# Patient Record
Sex: MALE | Race: WHITE | NOT HISPANIC OR LATINO | Employment: OTHER | URBAN - METROPOLITAN AREA
[De-identification: names, ages, dates, MRNs, and addresses within clinical notes are randomized per-mention and may not be internally consistent; named-entity substitution may affect disease eponyms.]

---

## 2017-01-04 ENCOUNTER — TELEPHONE (OUTPATIENT)
Dept: TRANSPLANT | Facility: CLINIC | Age: 22
End: 2017-01-04

## 2017-01-04 NOTE — TELEPHONE ENCOUNTER
Issue Prograf tacrolimus level 12.3  Above goal level --creatinine above goal level   Plan confirm taking Prograf tacrolimus 1.5 mg twice per day   Confirm 12 hour Prograf tacrolimus level   Repeat transplant labs in one week -- with BK PCR QT /Urine protein

## 2017-01-04 NOTE — Clinical Note
OUTPATIENT LABORATORY TEST ORDER  Faxed to [facility] at [phone]    Patient Name: Jhoan Hoffman                   Transplant Date: April 28, 2012  YOB: 1995                                   Issue Date & Time:  1/4/2017  12:03 PM  [facility] MR: [id number]                               Exp. Date (1 year after date issued)    Diagnoses: Kidney Transplant (ICD-10  Z94.0)   Long term use of medications (ICD-10  Z79.899)     Lab results to be available on the same day drawn.   Patient should release information to the [facility], Transplant Center.  Please fax to the Transplant Center at [phone].    Every Month  ?CBC and Platelet  ?Basic Metabolic Panel (Sodium, Potassium, Chloride, CO2, Creatinine, Urea Nitrogen, Glucose, Calcium)  ?/Tacrolimus/Prograf drug level         ?BK (Polyoma Virus) PCR Quantitative - Plasma           Every 6 Months       Due: October and April                                          ?Urine for protein/creatinine             Every Year     Due: April                          ?ALA/PRA/DSA panel.                   Draw 20mL blood in red top tube and send to [facility].  If also sending an immunosuppressive drug level, ALA/PRA/DSA may be sent in same .  Please include completed instruction sheet and label tube with 2 identifiers.      If you have any questions, please call The Transplant [facility] at [phone] or [phone].    Please fax labs to [phone]  .

## 2017-01-10 ENCOUNTER — TELEPHONE (OUTPATIENT)
Dept: TRANSPLANT | Facility: CLINIC | Age: 22
End: 2017-01-10

## 2017-01-10 NOTE — Clinical Note
January 10, 2017    PHYSICIAN ORDERS      DATE & TIME ISSUED: January 10, 2017 2:58 PM  PATIENT NAME: Jhoan Hoffman   : 1995     [facility] MR# [if applicable]: [id number]     DIAGNOSIS:  Kidney Transplant  ICD-10 CODE: Z94.0     Please repeat the following labs in 1 week:  BMP  CBC  Tacrolimus level  BK virus    Any questions please call: [phone]    Please fax these results to [phone].    .

## 2017-01-10 NOTE — TELEPHONE ENCOUNTER
Issue Prograf tacrolimus level 12.6  And creatinine increased   Please call Jhoan Hoffman and mom repeat transplant  Labs in one week with BK virus

## 2017-01-10 NOTE — TELEPHONE ENCOUNTER
Spoke to patient and patients Mom regarding tac and creatinine levels increased.  Patient verbalizes understanding of repeat transplant levels in one week.  Updated current lab orders and faxed to lab.

## 2017-02-06 DIAGNOSIS — Z48.298 AFTERCARE FOLLOWING ORGAN TRANSPLANT: ICD-10-CM

## 2017-02-06 DIAGNOSIS — Z94.0 KIDNEY REPLACED BY TRANSPLANT: ICD-10-CM

## 2017-02-06 DIAGNOSIS — Z79.899 ENCOUNTER FOR LONG-TERM CURRENT USE OF MEDICATION: ICD-10-CM

## 2017-02-07 ENCOUNTER — TELEPHONE (OUTPATIENT)
Dept: NEPHROLOGY | Facility: CLINIC | Age: 22
End: 2017-02-07

## 2017-02-07 ENCOUNTER — HOSPITAL ENCOUNTER (OUTPATIENT)
Facility: CLINIC | Age: 22
Setting detail: SPECIMEN
Discharge: HOME | End: 2017-02-07
Admitting: INTERNAL MEDICINE
Payer: MEDICAID

## 2017-02-07 PROCEDURE — 80197 ASSAY OF TACROLIMUS: CPT | Performed by: INTERNAL MEDICINE

## 2017-02-07 NOTE — TELEPHONE ENCOUNTER
Venkat, this is Robbi's office from Medicine Specialty on the 3rd floor at [facility] located at [address] [place] [address]. We are reminding you of your upcoming Nephrology appointment on 2/20/2017 at 1:30 pm. Please arrive 30 minutes prior to your appointment time for check in. Also, please bring an updated medication list including dose of prescribed and over the counter medications you are currently taking or your labeled medication bottles with you to your appointment. If you have any questions or would like to cancel or reschedule your appointment, please call us at [phone].     If you are having labs draw same day you need a lab appointment scheduled 1 hour prior to your visit.    You are welcome to have your labs done 2-7 days before your appointment at any [facility] or [facility] facility. If you have your labs completed before your appointment, please still come 30 minutes early for check-in.     Thank-You for allowing us to be a member of your Health Care Team,     Henrietta MERCHANT CMA

## 2017-02-08 LAB
TACROLIMUS BLD-MCNC: 7.5 UG/L (ref 5–15)
TME LAST DOSE: NORMAL H

## 2017-02-09 ENCOUNTER — TELEPHONE (OUTPATIENT)
Dept: TRANSPLANT | Facility: CLINIC | Age: 22
End: 2017-02-09

## 2017-02-09 NOTE — Clinical Note
2017    PHYSICIAN ORDERS      DATE & TIME ISSUED: 2017 3:36 PM  PATIENT NAME: Jhoan Hoffman   : 1995     [facility] MR# [if applicable]: [id number]     DIAGNOSIS:  Kidney Transplant  ICD-10 CODE: Z94.0     Please repeat the following labs in one week:  CBC and Platelet  ?Basic Metabolic Panel (Sodium, Potassium, Chloride, CO2, Creatinine, Urea Nitrogen, Glucose, Calcium)  ?/Tacrolimus/Prograf drug level         ?BK (Polyoma Virus) PCR Quantitative - Plasma    Any questions please call: [phone]    Please fax these results to [phone].    .

## 2017-02-09 NOTE — TELEPHONE ENCOUNTER
Spoke to patients Mom, Yoanna, regarding need to repeat labs in one week.  Yoanna verbalizes understanding.  Lab orders mailed to patient and faxed to lab.

## 2017-02-09 NOTE — TELEPHONE ENCOUNTER
Please call  Jhoan Hoffman and his mother    Needs a complete set of transplant   labs repeated in one week

## 2017-02-17 DIAGNOSIS — Z94.0 KIDNEY REPLACED BY TRANSPLANT: ICD-10-CM

## 2017-02-17 DIAGNOSIS — Z79.899 ENCOUNTER FOR LONG-TERM CURRENT USE OF MEDICATION: ICD-10-CM

## 2017-02-17 DIAGNOSIS — Z48.298 AFTERCARE FOLLOWING ORGAN TRANSPLANT: ICD-10-CM

## 2017-02-17 PROCEDURE — 80197 ASSAY OF TACROLIMUS: CPT | Performed by: INTERNAL MEDICINE

## 2017-02-19 LAB
TACROLIMUS BLD-MCNC: 8.4 UG/L (ref 5–15)
TME LAST DOSE: NORMAL H

## 2017-02-20 ENCOUNTER — OFFICE VISIT (OUTPATIENT)
Dept: NEPHROLOGY | Facility: CLINIC | Age: 22
End: 2017-02-20
Attending: INTERNAL MEDICINE
Payer: MEDICAID

## 2017-02-20 VITALS
BODY MASS INDEX: 37.13 KG/M2 | HEIGHT: 62 IN | DIASTOLIC BLOOD PRESSURE: 87 MMHG | SYSTOLIC BLOOD PRESSURE: 148 MMHG | HEART RATE: 96 BPM | TEMPERATURE: 98.2 F | RESPIRATION RATE: 16 BRPM | WEIGHT: 201.8 LBS

## 2017-02-20 DIAGNOSIS — I15.1 HYPERTENSION SECONDARY TO OTHER RENAL DISORDERS: ICD-10-CM

## 2017-02-20 DIAGNOSIS — Z48.298 AFTERCARE FOLLOWING ORGAN TRANSPLANT: ICD-10-CM

## 2017-02-20 DIAGNOSIS — Z94.0 S/P KIDNEY TRANSPLANT: ICD-10-CM

## 2017-02-20 DIAGNOSIS — N25.81 SECONDARY RENAL HYPERPARATHYROIDISM (H): ICD-10-CM

## 2017-02-20 DIAGNOSIS — E55.9 VITAMIN D DEFICIENCY: ICD-10-CM

## 2017-02-20 DIAGNOSIS — Z94.0 KIDNEY REPLACED BY TRANSPLANT: Primary | ICD-10-CM

## 2017-02-20 DIAGNOSIS — D84.9 IMMUNOSUPPRESSED STATUS (H): ICD-10-CM

## 2017-02-20 PROCEDURE — 99212 OFFICE O/P EST SF 10 MIN: CPT | Mod: ZF

## 2017-02-20 RX ORDER — PREDNISONE 5 MG/1
5 TABLET ORAL DAILY
Qty: 90 TABLET | Refills: 3 | Status: SHIPPED | OUTPATIENT
Start: 2017-02-20 | End: 2018-03-12

## 2017-02-20 RX ORDER — MYCOPHENOLATE MOFETIL 250 MG/1
500 CAPSULE ORAL 2 TIMES DAILY
Qty: 120 CAPSULE | Refills: 11 | Status: SHIPPED | OUTPATIENT
Start: 2017-02-20 | End: 2017-08-30

## 2017-02-20 ASSESSMENT — PAIN SCALES - GENERAL: PAINLEVEL: NO PAIN (0)

## 2017-02-20 NOTE — LETTER
2/20/2017       RE: Jhoan Hoffman  [address] [facility] [address]     Dear Colleague,    Thank you for referring your patient, Jhoan Hoffman, to the [facility] NEPHROLOGY at [facility]. Please see a copy of my visit note below.    Assessment and Plan:  1. DDKT - baseline Cr ~ 1.6-1.9, which has been mostly stable outside of slightly increased creatinine at time of recent illness.  Kidney function back to baseline with recent labs.  As previously planned and discussed, recommend decreasing prednisone to 5 mg daily and increasing mycophenolate mofetil to 500 mg bid.  Will continue on tacrolimus at present dose.  2. HTN - fair control above target of less than 140/90 at this clinic visit.  Recommend patient check BP at home.  In addition, would recommend changing off atenolol because of its renal clearance to one of liver metabolism, such as carvedilol or metoprolol.  Would also consider adding ARB if not at goal.  Discussed weight loss, which should help lower blood pressure.  3. Anemia in chronic renal disease - stable Hgb.  Will follow.  4. Secondary renal hyperparathyroidism - mildly increased PTH and will treat vitamin D deficiency first.  Recommend repeating PTH at next clinic visit.  5. Vitamin D deficiency - low vitamin D level when last checked and patient just finished course of ergocalciferol.  Will start cholecalciferol 2000 iu daily now and repeat vitamin D level at next clinic visit.  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  7. Overweight - recommend increased exercise and watch caloric intake for weight loss.  8. Recommend return visit in 6 months.    Assessment and plan was discussed with patient and he voiced his understanding and agreement.    Reason for Visit:  Mr. Hoffman is here for routine follow up.    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          Transplant Hx:       Tx: DDKT  Date: 4/28/12       Present Maintenance IS: Tacrolimus, Mycophenolate mofetil and Prednisone       Baseline Creatinine: 1.6-1.9       Recent DSA: Yes  Date last checked: 9/2016       Biopsy: No    Mr. Hoffman reports feeling good overall with minimal medical complaints.  At his last clinic visit in August, it was discussed with patient about decreasing prednisone to 5 mg daily and increasing mycophenolate mofetil up to 500 mg bid.  Patient wanted to discuss this with his mother and after that discussion, he agree to make the change in about October.  There is documentation with the transplant coordinator about making this immunosuppression change and new prescriptions were sent.  However, patient now states he doesn't remember about this change and is still taking prednisone 10 mg daily and mycophenolate mofetil 250 mg bid.  He is okay with making the change, however.    His energy level is good and remains normal.  He is active and does get some exercise, mostly with walking.  He did have a flu-like virus about 2 weeks ago with a couple of days of GI upset, nausea and diarrhea.  Symptoms have since resolved.  Denies any chest pain or shortness of breath with exertion.  Appetite is good and weight is mostly stable.  No nausea, vomiting or diarrhea now.  No fever, sweats or chills.  No leg swelling.    Home BP: Not checked.      ROS:   A comprehensive review of systems was obtained and negative, except as noted in the HPI or PMH.    Active Medical Problems:  Patient Active Problem List   Diagnosis     Kidney replaced by transplant     Hypertension secondary to other renal disorders     Depression     BK viremia     GERD (gastroesophageal reflux disease)     Secondary renal hyperparathyroidism (H)     Vitamin D deficiency     Immunosuppressed status (H)     Aftercare following organ transplant       Personal Hx:  Social History     Social History     Marital status: Single     Spouse  name: N/A     Number of children: N/A     Years of education: N/A     Occupational History     Not on file.     Social History Main Topics     Smoking status: Never Smoker     Smokeless tobacco: Never Used      Comment: mother smokes outside     Alcohol use No     Drug use: No     Sexual activity: Not on file     Other Topics Concern     Not on file     Social History Narrative       Allergies:  Allergies   Allergen Reactions     Amoxicillin Swelling     Azithromycin      Z pack - can't take with cyclosporine.     Vancomycin      Ruth syndrome     Cefprozil Rash       Medications:  Prior to Admission medications    Medication Sig Start Date End Date Taking? Authorizing Provider   predniSONE (DELTASONE) 5 MG tablet Take 1 tablet (5 mg) by mouth daily 2/20/17  Yes Jose Culp MD   mycophenolate (CELLCEPT - GENERIC EQUIVALENT) 250 MG capsule Take 2 capsules (500 mg) by mouth 2 times daily 2/20/17  Yes Jose Culp MD   cholecalciferol 2000 UNITS CAPS Take 2,000 Int'l Units by mouth daily 2/20/17  Yes Jose Culp MD   amLODIPine (NORVASC) 5 MG tablet Take 1 tablet (5 mg) by mouth daily 8/24/16  Yes Jose Culp MD   tacrolimus (PROGRAF - GENERIC EQUIVALENT) 0.5 MG capsule Take 3 capsules (1.5 mg) by mouth 2 times daily 8/18/16  Yes Jose Culp MD   sulfamethoxazole-trimethoprim (BACTRIM,SEPTRA) 400-80 MG per tablet Take 1 tablet by mouth daily 5/16/16  Yes Jose Culp MD   omeprazole 20 MG tablet Take 1 tablet (20 mg) by mouth every 12 hours 1/29/16  Yes Samara Hauser MD   atenolol (TENORMIN) 25 MG tablet Take 2 tablets (50 mg) by mouth daily 10/31/14  Yes Jan Jones MD   FLUoxetine (PROZAC) 20 MG capsule Take 40 mg by mouth daily  7/23/14  Yes Mitch Anguiano MD   aspirin (ASPIRIN ADULT LOW STRENGTH) 81 MG EC tablet Take 1 tablet by mouth daily. 5/15/12  Yes Garrett Oneil MD   clonazePAM (KLONOPIN) 0.5 MG tablet Take 1 tablet (0.5 mg) by  "mouth 2 times daily as needed for anxiety 7/23/14 8/22/14  Mitch Anguiano MD       Vitals:  /87 (BP Location: Left arm, Patient Position: Chair, Cuff Size: Adult Regular)  Pulse 96  Temp 98.2  F (36.8  C) (Oral)  Resp 16  Ht 1.581 m (5' 2.25\")  Wt 91.5 kg (201 lb 12.8 oz)  BMI 36.61 kg/m2    Exam:   GENERAL APPEARANCE: alert and no distress  HENT: mouth without ulcers or lesions  LYMPHATICS: no cervical or supraclavicular nodes  RESP: lungs clear to auscultation - no rales, rhonchi or wheezes  CV: regular rhythm, normal rate, no rub, no murmur  EDEMA: no LE edema bilaterally  ABDOMEN: soft, nondistended, nontender, bowel sounds normal, overweight  MS: extremities normal - no gross deformities noted, no evidence of inflammation in joints, no muscle tenderness  SKIN: no rash  TX KIDNEY: normal    Results:   Recent Results (from the past 168 hour(s))   Tacrolimus level    Collection Time: 02/17/17  8:45 AM   Result Value Ref Range    Tacrolimus Last Dose 02/16/2017 2200     Tacrolimus Level 8.4 5.0 - 15.0 ug/L   TXP External Lab Result    Collection Time: 02/17/17  8:45 AM   Result Value Ref Range    Glucose (External) 109 70 - 110 MG/DL    Urea Nitrogen (External) 14.0 7 - 20 MG/DL    Creatinine (External) 1.85 (H) 0.70 - 1.30 MG/DL    GFR Estimated (External) 46 (L) >60 ml/min/1.73 m2    GFR Est if Black (External) 56 (L) >60 ml/min/1.73 m2    CO2 (External) 23.0 21 - 32 MMOL/L    Chloride (External) 106 98 - 108 MMOL/L    Sodium (External) 141 136 - 145 MMOL/L    Potassium (External) 4.0 3.5 - 5.1 MMOL/L    Calcium (External) 9.2 8.5 - 10.1 MG/DL    Anion Gap (External) 16.0 10.0 - 20.0    WBC Count (External) 10.1 4.80 - 10.80 K/UL    RBC Count (External) 4.62 (L) 4.70 - 6.10 M/UL    Hemoglobin (External) 13.3 (L) 14.0 - 18.0 GM/DL    Hematocrit (External) 39.4 (L) 42.0 - 52.0 %    MCV (External) 85.3 80.0 - 94.0 FL    MCH (External) 28.8 27.0 - 31.0 PG    MCHC (External) 33.8 31.5 - 36.5 %    RDW (External) " 13.7 11.5 - 14.5 %    Platelet Count (External) 204 150 - 450 K/UL    MPV (External) 11.8 9.0 - 13.0 FL    Nucleated RBCs (External) 0.0 <1 %    BK Virus PCR Quant Result (External) None Detected None Detected           Again, thank you for allowing me to participate in the care of your patient.      Sincerely,    Jose Culp MD

## 2017-02-20 NOTE — MR AVS SNAPSHOT
After Visit Summary   2/20/2017    Jhoan Hoffman    MRN: [id number]           Patient Information     Date Of Birth          1995        Visit Information        Provider Department      2/20/2017 1:30 PM Jose Culp MD [facility] Nephrology        Today's Diagnoses     Kidney replaced by transplant    -  1    S/P kidney transplant        Immunosuppressed status (H)        Aftercare following organ transplant        Hypertension secondary to other renal disorders        Secondary renal hyperparathyroidism (H)        Vitamin D deficiency          Care Instructions    Decrease prednisone to 5 mg daily.  Increase mycophenolate mofetil 500 mg (2 tablets) twice daily.    Because atenolol is excreted by the kidney and thus the dose in the blood stream may vary with changes in kidney function, would recommend changing this to another antihypertensive medication in the same category (beta blockers), such as carvedilol or metoprolol.    Please check and record blood pressure at home.  Goal is consistent sitting blood pressure less than 140/90.  Would check blood pressure after sitting for 3-5 minutes.    In addition, would recommend weight loss with watching caloric intake and increased exercise.        Follow-ups after your visit        Follow-up notes from your care team     Return in about 6 months (around 8/20/2017).      Your next 10 appointments already scheduled     Sep 18, 2017  1:30 PM CDT   (Arrive by 1:00 PM)   Return Kidney Transplant with Jose Culp MD   [facility] Nephrology ([facility])    [address] [place] [id number] [phone]              Who to contact     If you have questions or need follow up information about today's clinic visit or your schedule please contact [facility] NEPHROLOGY directly at [phone].  Normal or non-critical lab and imaging results will be communicated to you by MyChart, letter or  "phone within 4 business days after the clinic has received the results. If you do not hear from us within 7 days, please contact the clinic through [facility] or phone. If you have a critical or abnormal lab result, we will notify you by phone as soon as possible.  Submit refill requests through [facility] or call your pharmacy and they will forward the refill request to us. Please allow 3 business days for your refill to be completed.          Additional Information About Your Visit        [facility] Information     [facility] lets you send messages to your doctor, view your test results, renew your prescriptions, schedule appointments and more. To sign up, go to [email or web address] . Click on \"Log in\" on the left side of the screen, which will take you to the Welcome page. Then click on \"Sign up Now\" on the right side of the page.     You will be asked to enter the access code listed below, as well as some personal information. Please follow the directions to create your username and password.     Your access code is: [email or web address]  Expires: 2017  6:30 AM     Your access code will  in 90 days. If you need help or a new code, please call your [place] clinic or [phone].        Care EveryWhere ID     This is your Care EveryWhere ID. This could be used by other organizations to access your [place] medical records  [id number]        Your Vitals Were     Pulse Temperature Respirations Height BMI (Body Mass Index)       96 98.2  F (36.8  C) (Oral) 16 1.581 m (5' 2.25\") 36.61 kg/m2        Blood Pressure from Last 3 Encounters:   17 148/87   16 126/83   16 133/72    Weight from Last 3 Encounters:   17 91.5 kg (201 lb 12.8 oz)   16 90 kg (198 lb 6.4 oz)   16 94.8 kg (209 lb)              Today, you had the following     No orders found for display         Today's Medication Changes          These changes are accurate as of: 17  1:55 PM.  If you have any questions, ask your " nurse or doctor.               These medicines have changed or have updated prescriptions.        Dose/Directions    predniSONE 5 MG tablet   Commonly known as:  DELTASONE   This may have changed:  medication strength   Used for:  Kidney replaced by transplant   Changed by:  Jose Culp MD        Dose:  5 mg   Take 1 tablet (5 mg) by mouth daily   Quantity:  90 tablet   Refills:  3         Stop taking these medicines if you haven't already. Please contact your care team if you have questions.     vitamin D 88912 UNIT capsule   Commonly known as:  ERGOCALCIFEROL   Stopped by:  Jose Culp MD                Where to get your medicines      These medications were sent to [id number] - [place], [place] - [address] [address]     Phone:  [phone]     mycophenolate 250 MG capsule    predniSONE 5 MG tablet                Primary Care Provider Office Phone # Fax #    Leandro Malagon [phone]       [address] [address]        Thank you!     Thank you for choosing [facility] NEPHROLOGY  for your care. Our goal is always to provide you with excellent care. Hearing back from our patients is one way we can continue to improve our services. Please take a few minutes to complete the written survey that you may receive in the mail after your visit with us. Thank you!             Your Updated Medication List - Protect others around you: Learn how to safely use, store and throw away your medicines at www.disposemymeds.org.          This list is accurate as of: 2/20/17  1:55 PM.  Always use your most recent med list.                   Brand Name Dispense Instructions for use    amLODIPine 5 MG tablet    NORVASC    90 tablet    Take 1 tablet (5 mg) by mouth daily       ASPIRIN ADULT LOW STRENGTH 81 MG EC tablet   Generic drug:  aspirin     90 tablet    Take 1 tablet by mouth daily.       atenolol 25 MG tablet     TENORMIN    60 tablet    Take 2 tablets (50 mg) by mouth daily       cholecalciferol 2000 UNITS Caps     30 capsule    Take 2,000 Int'l Units by mouth daily       clonazePAM 0.5 MG tablet    klonoPIN    20 tablet    Take 1 tablet (0.5 mg) by mouth 2 times daily as needed for anxiety       FLUoxetine 20 MG capsule    PROzac    90 capsule    Take 40 mg by mouth daily       mycophenolate 250 MG capsule    CELLCEPT - GENERIC EQUIVALENT    120 capsule    Take 2 capsules (500 mg) by mouth 2 times daily       omeprazole 20 MG tablet     180 tablet    Take 1 tablet (20 mg) by mouth every 12 hours       predniSONE 5 MG tablet    DELTASONE    90 tablet    Take 1 tablet (5 mg) by mouth daily       sulfamethoxazole-trimethoprim 400-80 MG per tablet    BACTRIM/SEPTRA    30 tablet    Take 1 tablet by mouth daily       tacrolimus 0.5 MG capsule    PROGRAF - GENERIC EQUIVALENT    180 capsule    Take 3 capsules (1.5 mg) by mouth 2 times daily

## 2017-02-20 NOTE — PROGRESS NOTES
Assessment and Plan:  1. DDKT - baseline Cr ~ 1.6-1.9, which has been mostly stable outside of slightly increased creatinine at time of recent illness.  Kidney function back to baseline with recent labs.  As previously planned and discussed, recommend decreasing prednisone to 5 mg daily and increasing mycophenolate mofetil to 500 mg bid.  Will continue on tacrolimus at present dose.  2. HTN - fair control above target of less than 140/90 at this clinic visit.  Recommend patient check BP at home.  In addition, would recommend changing off atenolol because of its renal clearance to one of liver metabolism, such as carvedilol or metoprolol.  Would also consider adding ARB if not at goal.  Discussed weight loss, which should help lower blood pressure.  3. Anemia in chronic renal disease - stable Hgb.  Will follow.  4. Secondary renal hyperparathyroidism - mildly increased PTH and will treat vitamin D deficiency first.  Recommend repeating PTH at next clinic visit.  5. Vitamin D deficiency - low vitamin D level when last checked and patient just finished course of ergocalciferol.  Will start cholecalciferol 2000 iu daily now and repeat vitamin D level at next clinic visit.  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  7. Overweight - recommend increased exercise and watch caloric intake for weight loss.  8. Recommend return visit in 6 months.    Assessment and plan was discussed with patient and he voiced his understanding and agreement.    Reason for Visit:  Mr. Hoffman is here for routine follow up.    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         Transplant Hx:       Tx: DDKT  Date: 4/28/12       Present Maintenance IS: Tacrolimus, Mycophenolate mofetil and Prednisone       Baseline Creatinine: 1.6-1.9       Recent DSA: Yes  Date last checked: 9/2016       Biopsy: No    Mr. Hoffman reports feeling good overall with minimal medical  complaints.  At his last clinic visit in August, it was discussed with patient about decreasing prednisone to 5 mg daily and increasing mycophenolate mofetil up to 500 mg bid.  Patient wanted to discuss this with his mother and after that discussion, he agree to make the change in about October.  There is documentation with the transplant coordinator about making this immunosuppression change and new prescriptions were sent.  However, patient now states he doesn't remember about this change and is still taking prednisone 10 mg daily and mycophenolate mofetil 250 mg bid.  He is okay with making the change, however.    His energy level is good and remains normal.  He is active and does get some exercise, mostly with walking.  He did have a flu-like virus about 2 weeks ago with a couple of days of GI upset, nausea and diarrhea.  Symptoms have since resolved.  Denies any chest pain or shortness of breath with exertion.  Appetite is good and weight is mostly stable.  No nausea, vomiting or diarrhea now.  No fever, sweats or chills.  No leg swelling.    Home BP: Not checked.      ROS:   A comprehensive review of systems was obtained and negative, except as noted in the HPI or PMH.    Active Medical Problems:  Patient Active Problem List   Diagnosis     Kidney replaced by transplant     Hypertension secondary to other renal disorders     Depression     BK viremia     GERD (gastroesophageal reflux disease)     Secondary renal hyperparathyroidism (H)     Vitamin D deficiency     Immunosuppressed status (H)     Aftercare following organ transplant       Personal Hx:  Social History     Social History     Marital status: Single     Spouse name: N/A     Number of children: N/A     Years of education: N/A     Occupational History     Not on file.     Social History Main Topics     Smoking status: Never Smoker     Smokeless tobacco: Never Used      Comment: mother smokes outside     Alcohol use No     Drug use: No     Sexual  "activity: Not on file     Other Topics Concern     Not on file     Social History Narrative       Allergies:  Allergies   Allergen Reactions     Amoxicillin Swelling     Azithromycin      Z pack - can't take with cyclosporine.     Vancomycin      Ruth syndrome     Cefprozil Rash       Medications:  Prior to Admission medications    Medication Sig Start Date End Date Taking? Authorizing Provider   predniSONE (DELTASONE) 5 MG tablet Take 1 tablet (5 mg) by mouth daily 2/20/17  Yes Jose Culp MD   mycophenolate (CELLCEPT - GENERIC EQUIVALENT) 250 MG capsule Take 2 capsules (500 mg) by mouth 2 times daily 2/20/17  Yes Jose Culp MD   cholecalciferol 2000 UNITS CAPS Take 2,000 Int'l Units by mouth daily 2/20/17  Yes Jose Culp MD   amLODIPine (NORVASC) 5 MG tablet Take 1 tablet (5 mg) by mouth daily 8/24/16  Yes Jose Culp MD   tacrolimus (PROGRAF - GENERIC EQUIVALENT) 0.5 MG capsule Take 3 capsules (1.5 mg) by mouth 2 times daily 8/18/16  Yes Jose Culp MD   sulfamethoxazole-trimethoprim (BACTRIM,SEPTRA) 400-80 MG per tablet Take 1 tablet by mouth daily 5/16/16  Yes Jose Culp MD   omeprazole 20 MG tablet Take 1 tablet (20 mg) by mouth every 12 hours 1/29/16  Yes Samara Hauser MD   atenolol (TENORMIN) 25 MG tablet Take 2 tablets (50 mg) by mouth daily 10/31/14  Yes Jan Jones MD   FLUoxetine (PROZAC) 20 MG capsule Take 40 mg by mouth daily  7/23/14  Yes Mitch Anguiano MD   aspirin (ASPIRIN ADULT LOW STRENGTH) 81 MG EC tablet Take 1 tablet by mouth daily. 5/15/12  Yes Garrett Oneil MD   clonazePAM (KLONOPIN) 0.5 MG tablet Take 1 tablet (0.5 mg) by mouth 2 times daily as needed for anxiety 7/23/14 8/22/14  Mitch Anguiano MD       Vitals:  /87 (BP Location: Left arm, Patient Position: Chair, Cuff Size: Adult Regular)  Pulse 96  Temp 98.2  F (36.8  C) (Oral)  Resp 16  Ht 1.581 m (5' 2.25\")  Wt 91.5 kg (201 lb 12.8 oz)  BMI " 36.61 kg/m2    Exam:   GENERAL APPEARANCE: alert and no distress  HENT: mouth without ulcers or lesions  LYMPHATICS: no cervical or supraclavicular nodes  RESP: lungs clear to auscultation - no rales, rhonchi or wheezes  CV: regular rhythm, normal rate, no rub, no murmur  EDEMA: no LE edema bilaterally  ABDOMEN: soft, nondistended, nontender, bowel sounds normal, overweight  MS: extremities normal - no gross deformities noted, no evidence of inflammation in joints, no muscle tenderness  SKIN: no rash  TX KIDNEY: normal    Results:   Recent Results (from the past 168 hour(s))   Tacrolimus level    Collection Time: 02/17/17  8:45 AM   Result Value Ref Range    Tacrolimus Last Dose 02/16/2017 2200     Tacrolimus Level 8.4 5.0 - 15.0 ug/L   TXP External Lab Result    Collection Time: 02/17/17  8:45 AM   Result Value Ref Range    Glucose (External) 109 70 - 110 MG/DL    Urea Nitrogen (External) 14.0 7 - 20 MG/DL    Creatinine (External) 1.85 (H) 0.70 - 1.30 MG/DL    GFR Estimated (External) 46 (L) >60 ml/min/1.73 m2    GFR Est if Black (External) 56 (L) >60 ml/min/1.73 m2    CO2 (External) 23.0 21 - 32 MMOL/L    Chloride (External) 106 98 - 108 MMOL/L    Sodium (External) 141 136 - 145 MMOL/L    Potassium (External) 4.0 3.5 - 5.1 MMOL/L    Calcium (External) 9.2 8.5 - 10.1 MG/DL    Anion Gap (External) 16.0 10.0 - 20.0    WBC Count (External) 10.1 4.80 - 10.80 K/UL    RBC Count (External) 4.62 (L) 4.70 - 6.10 M/UL    Hemoglobin (External) 13.3 (L) 14.0 - 18.0 GM/DL    Hematocrit (External) 39.4 (L) 42.0 - 52.0 %    MCV (External) 85.3 80.0 - 94.0 FL    MCH (External) 28.8 27.0 - 31.0 PG    MCHC (External) 33.8 31.5 - 36.5 %    RDW (External) 13.7 11.5 - 14.5 %    Platelet Count (External) 204 150 - 450 K/UL    MPV (External) 11.8 9.0 - 13.0 FL    Nucleated RBCs (External) 0.0 <1 %    BK Virus PCR Quant Result (External) None Detected None Detected

## 2017-02-20 NOTE — PATIENT INSTRUCTIONS
Decrease prednisone to 5 mg daily.  Increase mycophenolate mofetil 500 mg (2 tablets) twice daily.    Because atenolol is excreted by the kidney and thus the dose in the blood stream may vary with changes in kidney function, would recommend changing this to another antihypertensive medication in the same category (beta blockers), such as carvedilol or metoprolol.    Please check and record blood pressure at home.  Goal is consistent sitting blood pressure less than 140/90.  Would check blood pressure after sitting for 3-5 minutes.    In addition, would recommend weight loss with watching caloric intake and increased exercise.

## 2017-02-20 NOTE — NURSING NOTE
"Chief Complaint   Patient presents with     RECHECK     Kidney follow up       Initial /87 (BP Location: Left arm, Patient Position: Chair, Cuff Size: Adult Regular)  Pulse 96  Temp 98.2  F (36.8  C) (Oral)  Resp 16  Ht 1.581 m (5' 2.25\")  Wt 91.5 kg (201 lb 12.8 oz)  BMI 36.61 kg/m2 Estimated body mass index is 36.61 kg/(m^2) as calculated from the following:    Height as of this encounter: 1.581 m (5' 2.25\").    Weight as of this encounter: 91.5 kg (201 lb 12.8 oz).  Medication Reconciliation: complete    "

## 2017-02-20 NOTE — LETTER
2/20/2017      RE: Jhoan Hoffman  [address] [facility] [address]       Assessment and Plan:  1. DDKT - baseline Cr ~ 1.6-1.9, which has been mostly stable outside of slightly increased creatinine at time of recent illness.  Kidney function back to baseline with recent labs.  As previously planned and discussed, recommend decreasing prednisone to 5 mg daily and increasing mycophenolate mofetil to 500 mg bid.  Will continue on tacrolimus at present dose.  2. HTN - fair control above target of less than 140/90 at this clinic visit.  Recommend patient check BP at home.  In addition, would recommend changing off atenolol because of its renal clearance to one of liver metabolism, such as carvedilol or metoprolol.  Would also consider adding ARB if not at goal.  Discussed weight loss, which should help lower blood pressure.  3. Anemia in chronic renal disease - stable Hgb.  Will follow.  4. Secondary renal hyperparathyroidism - mildly increased PTH and will treat vitamin D deficiency first.  Recommend repeating PTH at next clinic visit.  5. Vitamin D deficiency - low vitamin D level when last checked and patient just finished course of ergocalciferol.  Will start cholecalciferol 2000 iu daily now and repeat vitamin D level at next clinic visit.  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  7. Overweight - recommend increased exercise and watch caloric intake for weight loss.  8. Recommend return visit in 6 months.    Assessment and plan was discussed with patient and he voiced his understanding and agreement.    Reason for Visit:  Mr. Hoffman is here for routine follow up.    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         Transplant Hx:       Tx: DDKT  Date: 4/28/12       Present Maintenance IS: Tacrolimus, Mycophenolate mofetil and Prednisone       Baseline Creatinine: 1.6-1.9       Recent DSA: Yes  Date last checked:  9/2016       Biopsy: No    Mr. Hoffman reports feeling good overall with minimal medical complaints.  At his last clinic visit in August, it was discussed with patient about decreasing prednisone to 5 mg daily and increasing mycophenolate mofetil up to 500 mg bid.  Patient wanted to discuss this with his mother and after that discussion, he agree to make the change in about October.  There is documentation with the transplant coordinator about making this immunosuppression change and new prescriptions were sent.  However, patient now states he doesn't remember about this change and is still taking prednisone 10 mg daily and mycophenolate mofetil 250 mg bid.  He is okay with making the change, however.    His energy level is good and remains normal.  He is active and does get some exercise, mostly with walking.  He did have a flu-like virus about 2 weeks ago with a couple of days of GI upset, nausea and diarrhea.  Symptoms have since resolved.  Denies any chest pain or shortness of breath with exertion.  Appetite is good and weight is mostly stable.  No nausea, vomiting or diarrhea now.  No fever, sweats or chills.  No leg swelling.    Home BP: Not checked.      ROS:   A comprehensive review of systems was obtained and negative, except as noted in the HPI or PMH.    Active Medical Problems:  Patient Active Problem List   Diagnosis     Kidney replaced by transplant     Hypertension secondary to other renal disorders     Depression     BK viremia     GERD (gastroesophageal reflux disease)     Secondary renal hyperparathyroidism (H)     Vitamin D deficiency     Immunosuppressed status (H)     Aftercare following organ transplant       Personal Hx:  Social History     Social History     Marital status: Single     Spouse name: N/A     Number of children: N/A     Years of education: N/A     Occupational History     Not on file.     Social History Main Topics     Smoking status: Never Smoker     Smokeless tobacco: Never Used       Comment: mother smokes outside     Alcohol use No     Drug use: No     Sexual activity: Not on file     Other Topics Concern     Not on file     Social History Narrative       Allergies:  Allergies   Allergen Reactions     Amoxicillin Swelling     Azithromycin      Z pack - can't take with cyclosporine.     Vancomycin      Ruth syndrome     Cefprozil Rash       Medications:  Prior to Admission medications    Medication Sig Start Date End Date Taking? Authorizing Provider   predniSONE (DELTASONE) 5 MG tablet Take 1 tablet (5 mg) by mouth daily 2/20/17  Yes Jose Culp MD   mycophenolate (CELLCEPT - GENERIC EQUIVALENT) 250 MG capsule Take 2 capsules (500 mg) by mouth 2 times daily 2/20/17  Yes Jose Culp MD   cholecalciferol 2000 UNITS CAPS Take 2,000 Int'l Units by mouth daily 2/20/17  Yes Jose Culp MD   amLODIPine (NORVASC) 5 MG tablet Take 1 tablet (5 mg) by mouth daily 8/24/16  Yes Jose Culp MD   tacrolimus (PROGRAF - GENERIC EQUIVALENT) 0.5 MG capsule Take 3 capsules (1.5 mg) by mouth 2 times daily 8/18/16  Yes Jose Culp MD   sulfamethoxazole-trimethoprim (BACTRIM,SEPTRA) 400-80 MG per tablet Take 1 tablet by mouth daily 5/16/16  Yes Jose Culp MD   omeprazole 20 MG tablet Take 1 tablet (20 mg) by mouth every 12 hours 1/29/16  Yes Samara Hauser MD   atenolol (TENORMIN) 25 MG tablet Take 2 tablets (50 mg) by mouth daily 10/31/14  Yes Jan Jones MD   FLUoxetine (PROZAC) 20 MG capsule Take 40 mg by mouth daily  7/23/14  Yes Mitch Anguiano MD   aspirin (ASPIRIN ADULT LOW STRENGTH) 81 MG EC tablet Take 1 tablet by mouth daily. 5/15/12  Yes Garrett Oneil MD   clonazePAM (KLONOPIN) 0.5 MG tablet Take 1 tablet (0.5 mg) by mouth 2 times daily as needed for anxiety 7/23/14 8/22/14  Mitch Anguiano MD       Vitals:  /87 (BP Location: Left arm, Patient Position: Chair, Cuff Size: Adult Regular)  Pulse 96  Temp 98.2  F  "(36.8  C) (Oral)  Resp 16  Ht 1.581 m (5' 2.25\")  Wt 91.5 kg (201 lb 12.8 oz)  BMI 36.61 kg/m2    Exam:   GENERAL APPEARANCE: alert and no distress  HENT: mouth without ulcers or lesions  LYMPHATICS: no cervical or supraclavicular nodes  RESP: lungs clear to auscultation - no rales, rhonchi or wheezes  CV: regular rhythm, normal rate, no rub, no murmur  EDEMA: no LE edema bilaterally  ABDOMEN: soft, nondistended, nontender, bowel sounds normal, overweight  MS: extremities normal - no gross deformities noted, no evidence of inflammation in joints, no muscle tenderness  SKIN: no rash  TX KIDNEY: normal    Results:   Recent Results (from the past 168 hour(s))   Tacrolimus level    Collection Time: 02/17/17  8:45 AM   Result Value Ref Range    Tacrolimus Last Dose 02/16/2017 2200     Tacrolimus Level 8.4 5.0 - 15.0 ug/L   TXP External Lab Result    Collection Time: 02/17/17  8:45 AM   Result Value Ref Range    Glucose (External) 109 70 - 110 MG/DL    Urea Nitrogen (External) 14.0 7 - 20 MG/DL    Creatinine (External) 1.85 (H) 0.70 - 1.30 MG/DL    GFR Estimated (External) 46 (L) >60 ml/min/1.73 m2    GFR Est if Black (External) 56 (L) >60 ml/min/1.73 m2    CO2 (External) 23.0 21 - 32 MMOL/L    Chloride (External) 106 98 - 108 MMOL/L    Sodium (External) 141 136 - 145 MMOL/L    Potassium (External) 4.0 3.5 - 5.1 MMOL/L    Calcium (External) 9.2 8.5 - 10.1 MG/DL    Anion Gap (External) 16.0 10.0 - 20.0    WBC Count (External) 10.1 4.80 - 10.80 K/UL    RBC Count (External) 4.62 (L) 4.70 - 6.10 M/UL    Hemoglobin (External) 13.3 (L) 14.0 - 18.0 GM/DL    Hematocrit (External) 39.4 (L) 42.0 - 52.0 %    MCV (External) 85.3 80.0 - 94.0 FL    MCH (External) 28.8 27.0 - 31.0 PG    MCHC (External) 33.8 31.5 - 36.5 %    RDW (External) 13.7 11.5 - 14.5 %    Platelet Count (External) 204 150 - 450 K/UL    MPV (External) 11.8 9.0 - 13.0 FL    Nucleated RBCs (External) 0.0 <1 %    BK Virus PCR Quant Result (External) None Detected None " Detected           Jose Culp MD

## 2017-03-15 ENCOUNTER — TELEPHONE (OUTPATIENT)
Dept: TRANSPLANT | Facility: CLINIC | Age: 22
End: 2017-03-15

## 2017-03-15 NOTE — TELEPHONE ENCOUNTER
----- Message from Jacqueline Sifuentes RN sent at 3/5/2017  7:25 AM CST -----      ----- Message -----     From: Jose Culp MD     Sent: 2/20/2017   1:52 PM       To: Jacqueline Sifuentes RN    See clinic note.  Patient never did make immunosuppression change as recommended and documented in your telephone note that he did.    Is willing to decrease prednisone to 5 mg daily and increase mycophenolate mofetil to 500 mg bid now.  Please check in with him to see if he did this.    I would also like to change him off atenolol, but he wants to check with his mother.    He should be checking his BP.  I will have Rukhsana check in on him for BP in a couple of weeks.    Thanks.

## 2017-03-22 ENCOUNTER — TELEPHONE (OUTPATIENT)
Dept: NEPHROLOGY | Facility: CLINIC | Age: 22
End: 2017-03-22

## 2017-03-22 NOTE — TELEPHONE ENCOUNTER
Patient reports resting blood pressures consistently around 120/80. He is checking weekly and writer educated on the importance of consistent and accurate BP checks. Patient reports weight is stable and denies edema. Confirms that he changed Pred dose from 10mg to 5mg and increased MMF from 250mg to 500mg.    Patient returned verbal understanding of all teaching and will call with further questions/concerns.

## 2017-05-04 ENCOUNTER — RESULTS ONLY (OUTPATIENT)
Dept: OTHER | Facility: CLINIC | Age: 22
End: 2017-05-04

## 2017-05-04 DIAGNOSIS — Z48.298 AFTERCARE FOLLOWING ORGAN TRANSPLANT: ICD-10-CM

## 2017-05-04 DIAGNOSIS — Z79.899 ENCOUNTER FOR LONG-TERM CURRENT USE OF MEDICATION: ICD-10-CM

## 2017-05-04 DIAGNOSIS — Z94.0 KIDNEY REPLACED BY TRANSPLANT: ICD-10-CM

## 2017-05-04 PROCEDURE — 86833 HLA CLASS II HIGH DEFIN QUAL: CPT | Performed by: INTERNAL MEDICINE

## 2017-05-04 PROCEDURE — 80197 ASSAY OF TACROLIMUS: CPT | Performed by: INTERNAL MEDICINE

## 2017-05-04 PROCEDURE — 86832 HLA CLASS I HIGH DEFIN QUAL: CPT | Performed by: INTERNAL MEDICINE

## 2017-05-05 LAB
TACROLIMUS BLD-MCNC: 6.6 UG/L (ref 5–15)
TME LAST DOSE: NORMAL H

## 2017-05-08 LAB — PRA DONOR SPECIFIC ABY: NORMAL

## 2017-05-15 LAB
DONOR IDENTIFICATION: NORMAL
DSA COMMENTS: NORMAL
DSA PRESENT: NO
DSA TEST METHOD: NORMAL
ORGAN: NORMAL
SA1 CELL: NORMAL
SA1 COMMENTS: NORMAL
SA1 HI RISK ABY: NORMAL
SA1 MOD RISK ABY: NORMAL
SA1 TEST METHOD: NORMAL
SA2 CELL: NORMAL
SA2 COMMENTS: NORMAL
SA2 HI RISK ABY UA: NORMAL
SA2 MOD RISK ABY: NORMAL
SA2 TEST METHOD: NORMAL

## 2017-05-19 DIAGNOSIS — Z94.0 KIDNEY TRANSPLANTED: Primary | ICD-10-CM

## 2017-05-19 RX ORDER — SULFAMETHOXAZOLE AND TRIMETHOPRIM 400; 80 MG/1; MG/1
1 TABLET ORAL DAILY
Qty: 30 TABLET | Refills: 11 | Status: SHIPPED | OUTPATIENT
Start: 2017-05-19 | End: 2018-05-28

## 2017-05-31 DIAGNOSIS — Z94.0 KIDNEY REPLACED BY TRANSPLANT: ICD-10-CM

## 2017-05-31 DIAGNOSIS — Z48.298 AFTERCARE FOLLOWING ORGAN TRANSPLANT: ICD-10-CM

## 2017-05-31 DIAGNOSIS — Z79.899 ENCOUNTER FOR LONG-TERM CURRENT USE OF MEDICATION: ICD-10-CM

## 2017-05-31 PROCEDURE — 80197 ASSAY OF TACROLIMUS: CPT | Performed by: INTERNAL MEDICINE

## 2017-06-02 LAB
TACROLIMUS BLD-MCNC: ABNORMAL UG/L (ref 5–15)
TME LAST DOSE: ABNORMAL H

## 2017-06-06 ENCOUNTER — TELEPHONE (OUTPATIENT)
Dept: TRANSPLANT | Facility: CLINIC | Age: 22
End: 2017-06-06

## 2017-06-06 NOTE — TELEPHONE ENCOUNTER
Issue Prograf tacrolimus level less 3.0    Plan please call Jhoan Hoffman and Mom  Confirm taking medication  - no missed doses  Repeat transplant  Labs in one week

## 2017-06-06 NOTE — LETTER
PHYSICIAN ORDERS      DATE & TIME ISSUED: 2017 10:47 AM  PATIENT NAME: Johan Hoffman   : 1995     [facility] MR#  [id number]     DIAGNOSIS:  Kidney Transplant  ICD-10 CODE: Z94.0     Please collect the following lab work within 1-2 weeks    Tacrolimus Level  CBC  BMP    Any questions please call: [phone]    Please fax these results to [phone].    .

## 2017-06-07 NOTE — TELEPHONE ENCOUNTER
Call placed to patient: No answer. Voice message left requesting a return call to discuss tacrolimus level and informing patient to repeat transplant lab work in one week. Order sent.

## 2017-06-10 DIAGNOSIS — Z94.0 S/P KIDNEY TRANSPLANT: ICD-10-CM

## 2017-06-10 DIAGNOSIS — Z94.0 KIDNEY REPLACED BY TRANSPLANT: ICD-10-CM

## 2017-06-12 RX ORDER — TACROLIMUS 0.5 MG/1
CAPSULE ORAL
Qty: 180 CAPSULE | Refills: 11 | Status: SHIPPED | OUTPATIENT
Start: 2017-06-12 | End: 2018-05-12

## 2017-06-22 DIAGNOSIS — Z94.0 KIDNEY REPLACED BY TRANSPLANT: ICD-10-CM

## 2017-06-22 DIAGNOSIS — Z48.298 AFTERCARE FOLLOWING ORGAN TRANSPLANT: ICD-10-CM

## 2017-06-22 DIAGNOSIS — Z79.899 ENCOUNTER FOR LONG-TERM CURRENT USE OF MEDICATION: ICD-10-CM

## 2017-06-22 PROCEDURE — 80197 ASSAY OF TACROLIMUS: CPT | Performed by: INTERNAL MEDICINE

## 2017-06-25 LAB
TACROLIMUS BLD-MCNC: 8.5 UG/L (ref 5–15)
TME LAST DOSE: NORMAL H

## 2017-06-28 ENCOUNTER — TELEPHONE (OUTPATIENT)
Dept: TRANSPLANT | Facility: CLINIC | Age: 22
End: 2017-06-28

## 2017-06-28 NOTE — LETTER
PHYSICIAN ORDERS      DATE & TIME ISSUED: 2017 1:41 PM  PATIENT NAME: Jhoan Hoffman   : 1995     [facility] MR#  [id number]     DIAGNOSIS:  Kidney Transplant  ICD-10 CODE: Z94.0      Your recent creatinine level returned elevated. Please increase hydration to 2-3 liters per day and recheck the following lab within 1-2 weeks    BMP    Any questions please call: [phone]    Please fax these results to [phone].    .

## 2017-06-28 NOTE — TELEPHONE ENCOUNTER
Call placed to patients Mother Yoanna: No answer. Detailed voice message left requesting a return call to discuss elevated creatinine level. Order placed

## 2017-06-28 NOTE — TELEPHONE ENCOUNTER
ISSUE:  Cr elevated 2.22, baseline 1.7    PLAN:   Please call pt to ensure he is drinking 2-3L/day, no new illness/fever/malaise/abdominal pain/edema, medication changes, or normal UOP. If the above is normal, encourage continued hydration and repeat labs next week. Please place order for BMP.

## 2017-06-29 NOTE — TELEPHONE ENCOUNTER
F\U call placed to patient: No answer. Detailed voice message left requesting patient return call to discuss creatinine level.

## 2017-06-29 NOTE — TELEPHONE ENCOUNTER
Patient mother returned call to confirm no recent illness or medications. States that patient will increase hydration and recheck levels in 1-2 weeks.

## 2017-07-14 DIAGNOSIS — Z79.899 ENCOUNTER FOR LONG-TERM CURRENT USE OF MEDICATION: ICD-10-CM

## 2017-07-14 DIAGNOSIS — Z48.298 AFTERCARE FOLLOWING ORGAN TRANSPLANT: ICD-10-CM

## 2017-07-14 DIAGNOSIS — Z94.0 KIDNEY REPLACED BY TRANSPLANT: ICD-10-CM

## 2017-07-14 PROCEDURE — 80197 ASSAY OF TACROLIMUS: CPT | Performed by: INTERNAL MEDICINE

## 2017-07-17 LAB
TACROLIMUS BLD-MCNC: 7.3 UG/L (ref 5–15)
TME LAST DOSE: NORMAL H

## 2017-08-11 DIAGNOSIS — Z94.0 S/P KIDNEY TRANSPLANT: ICD-10-CM

## 2017-08-11 RX ORDER — AMLODIPINE BESYLATE 5 MG/1
TABLET ORAL
Qty: 90 TABLET | Refills: 3 | Status: SHIPPED | OUTPATIENT
Start: 2017-08-11 | End: 2018-08-13

## 2017-08-11 NOTE — TELEPHONE ENCOUNTER
Last Office Visit with Nephrologist:  2/20/17.  Medication refilled per Nephrology Clinic protocol.     Kary Hinojosa RN

## 2017-08-30 DIAGNOSIS — Z94.0 KIDNEY REPLACED BY TRANSPLANT: ICD-10-CM

## 2017-08-30 RX ORDER — MYCOPHENOLATE MOFETIL 250 MG/1
500 CAPSULE ORAL 2 TIMES DAILY
Qty: 120 CAPSULE | Refills: 6 | Status: SHIPPED | OUTPATIENT
Start: 2017-08-30 | End: 2018-05-04

## 2017-09-07 ENCOUNTER — TELEPHONE (OUTPATIENT)
Dept: NEPHROLOGY | Facility: CLINIC | Age: 22
End: 2017-09-07

## 2017-09-07 NOTE — TELEPHONE ENCOUNTER
Venkat, this is Robbi's office from Medicine Specialty on the 3rd floor at [facility] located at [address] [place] [address]. We are reminding you of your upcoming Nephrology appointment on 9/18/2017 at 1:30 pm. Please arrive an hour prior to your appointment time for labs. Also, please bring an updated medication list including dose of prescribed and over the counter medications you are currently taking or your labeled medication bottles with you to your appointment. If you have any questions or would like to cancel or reschedule your appointment, please call us at [phone].     If you are having labs draw same day you need a lab appointment scheduled 1 hour prior to your visit.    You are welcome to have your labs done 2-7 days before your appointment at any [facility] or [facility] facility. If you have your labs completed before your appointment, please still come 30 minutes early for check-in.     Thank-You for allowing us to be a member of your Health Care Team,     Henrietta Grissom., CMA

## 2017-09-18 ENCOUNTER — OFFICE VISIT (OUTPATIENT)
Dept: NEPHROLOGY | Facility: CLINIC | Age: 22
End: 2017-09-18
Attending: INTERNAL MEDICINE
Payer: MEDICAID

## 2017-09-18 VITALS
DIASTOLIC BLOOD PRESSURE: 87 MMHG | HEART RATE: 77 BPM | HEIGHT: 65 IN | WEIGHT: 193.2 LBS | OXYGEN SATURATION: 96 % | SYSTOLIC BLOOD PRESSURE: 136 MMHG | BODY MASS INDEX: 32.19 KG/M2

## 2017-09-18 DIAGNOSIS — N18.30 ANEMIA IN STAGE 3 CHRONIC KIDNEY DISEASE (H): ICD-10-CM

## 2017-09-18 DIAGNOSIS — E55.9 VITAMIN D DEFICIENCY: ICD-10-CM

## 2017-09-18 DIAGNOSIS — D84.9 IMMUNOSUPPRESSED STATUS (H): ICD-10-CM

## 2017-09-18 DIAGNOSIS — D63.1 ANEMIA IN STAGE 3 CHRONIC KIDNEY DISEASE (H): ICD-10-CM

## 2017-09-18 DIAGNOSIS — N25.81 SECONDARY RENAL HYPERPARATHYROIDISM (H): ICD-10-CM

## 2017-09-18 DIAGNOSIS — Z48.298 AFTERCARE FOLLOWING ORGAN TRANSPLANT: ICD-10-CM

## 2017-09-18 DIAGNOSIS — Z94.0 KIDNEY REPLACED BY TRANSPLANT: Primary | ICD-10-CM

## 2017-09-18 DIAGNOSIS — Z94.0 KIDNEY REPLACED BY TRANSPLANT: ICD-10-CM

## 2017-09-18 DIAGNOSIS — I15.1 HYPERTENSION SECONDARY TO OTHER RENAL DISORDERS: ICD-10-CM

## 2017-09-18 DIAGNOSIS — Z79.899 ENCOUNTER FOR LONG-TERM CURRENT USE OF MEDICATION: ICD-10-CM

## 2017-09-18 LAB
ANION GAP SERPL CALCULATED.3IONS-SCNC: 10 MMOL/L (ref 3–14)
BUN SERPL-MCNC: 20 MG/DL (ref 7–30)
CALCIUM SERPL-MCNC: 10 MG/DL (ref 8.5–10.1)
CHLORIDE SERPL-SCNC: 107 MMOL/L (ref 94–109)
CO2 SERPL-SCNC: 22 MMOL/L (ref 20–32)
CREAT SERPL-MCNC: 1.87 MG/DL (ref 0.66–1.25)
CREAT UR-MCNC: 111 MG/DL
ERYTHROCYTE [DISTWIDTH] IN BLOOD BY AUTOMATED COUNT: 13.2 % (ref 10–15)
GFR SERPL CREATININE-BSD FRML MDRD: 45 ML/MIN/1.7M2
GLUCOSE SERPL-MCNC: 108 MG/DL (ref 70–99)
HCT VFR BLD AUTO: 41.8 % (ref 40–53)
HGB BLD-MCNC: 14.1 G/DL (ref 13.3–17.7)
MCH RBC QN AUTO: 29.2 PG (ref 26.5–33)
MCHC RBC AUTO-ENTMCNC: 33.7 G/DL (ref 31.5–36.5)
MCV RBC AUTO: 87 FL (ref 78–100)
PLATELET # BLD AUTO: 215 10E9/L (ref 150–450)
POTASSIUM SERPL-SCNC: 3.7 MMOL/L (ref 3.4–5.3)
PROT UR-MCNC: 0.88 G/L
PROT/CREAT 24H UR: 0.79 G/G CR (ref 0–0.2)
PTH-INTACT SERPL-MCNC: 151 PG/ML (ref 12–72)
RBC # BLD AUTO: 4.83 10E12/L (ref 4.4–5.9)
SODIUM SERPL-SCNC: 139 MMOL/L (ref 133–144)
WBC # BLD AUTO: 10.7 10E9/L (ref 4–11)

## 2017-09-18 PROCEDURE — 36415 COLL VENOUS BLD VENIPUNCTURE: CPT | Performed by: INTERNAL MEDICINE

## 2017-09-18 PROCEDURE — 83970 ASSAY OF PARATHORMONE: CPT | Performed by: INTERNAL MEDICINE

## 2017-09-18 PROCEDURE — 80197 ASSAY OF TACROLIMUS: CPT | Performed by: INTERNAL MEDICINE

## 2017-09-18 PROCEDURE — 85027 COMPLETE CBC AUTOMATED: CPT | Performed by: INTERNAL MEDICINE

## 2017-09-18 PROCEDURE — 82306 VITAMIN D 25 HYDROXY: CPT | Performed by: INTERNAL MEDICINE

## 2017-09-18 PROCEDURE — 99212 OFFICE O/P EST SF 10 MIN: CPT | Mod: ZF

## 2017-09-18 PROCEDURE — 84156 ASSAY OF PROTEIN URINE: CPT | Performed by: INTERNAL MEDICINE

## 2017-09-18 PROCEDURE — 80048 BASIC METABOLIC PNL TOTAL CA: CPT | Performed by: INTERNAL MEDICINE

## 2017-09-18 ASSESSMENT — PAIN SCALES - GENERAL: PAINLEVEL: NO PAIN (0)

## 2017-09-18 NOTE — PROGRESS NOTES
Assessment and Plan:  1. DDKT - baseline Cr ~ 1.6-1.9, which had been stable, although seems to be trending closer to 1.9-2.2 range for the last 10 months.  This change is unlikely to be rejection and now stable.  Would not recommend a kidney transplant biopsy at this time, but would consider is creatinine is over 2.5.  Will make no changes in immunosuppression.  2. HTN - okay control right at target of less than 140/90.  No changes.  3. Anemia in chronic renal disease - stable Hgb.  Will follow.  4. Secondary renal hyperparathyroidism - mildly increased PTH and have been treating vitamin D deficiency first.  Will recheck PTH today.  5. Vitamin D deficiency - low vitamin D level when last checked and patient finished course of ergocalciferol and is now on cholecalciferol.  Will recheck vitamin D level.  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  7. Overweight - recommend increased exercise and watch caloric intake for weight loss.  8. Recommend return visit in 6 months.    Assessment and plan was discussed with patient and he voiced his understanding and agreement.    Reason for Visit:  Mr. Hoffman is here for routine follow up.    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         Transplant Hx:       Tx: DDKT  Date: 4/28/12       Present Maintenance IS: Tacrolimus, Mycophenolate mofetil and Prednisone       Baseline Creatinine: 1.6-1.9       Recent DSA: Yes  Date last checked: 5/2017       Biopsy: No    Mr. Hoffman reports feeling good overall with minimal medical complaints.  Patient did make the change in immunosuppression at his last clinic visit with increasing mycophenolate mofetil to 500 mg bid and decreased prednisone to 5 mg daily.  He hasn't had any issues with this change.  His energy level is good and has remained normal.  He is active and does get some exercise.  Denies any chest pain or shortness of breath with  exertion.  Appetite is good, but his weight has been down about 5-7 lbs.  No nausea or vomiting.  Occasional loose stools about once every week or so.  No bloody or black, tarry stools.  No fever, sweats or chills.  No leg swelling.    Home BP: Not sure.      ROS:   A comprehensive review of systems was obtained and negative, except as noted in the HPI or PMH.    Active Medical Problems:  Patient Active Problem List   Diagnosis     Kidney replaced by transplant     Hypertension secondary to other renal disorders     Depression     BK viremia     GERD (gastroesophageal reflux disease)     Secondary renal hyperparathyroidism (H)     Vitamin D deficiency     Immunosuppressed status (H)     Aftercare following organ transplant       Personal Hx:  Social History     Social History     Marital status: Single     Spouse name: N/A     Number of children: N/A     Years of education: N/A     Occupational History     Not on file.     Social History Main Topics     Smoking status: Never Smoker     Smokeless tobacco: Never Used      Comment: mother smokes outside     Alcohol use No     Drug use: No     Sexual activity: Not on file     Other Topics Concern     Not on file     Social History Narrative       Allergies:  Allergies   Allergen Reactions     Amoxicillin Swelling     Azithromycin      Z pack - can't take with cyclosporine.     Vancomycin      Ruth syndrome     Cefprozil Rash       Medications:  Prior to Admission medications    Medication Sig Start Date End Date Taking? Authorizing Provider   predniSONE (DELTASONE) 5 MG tablet Take 1 tablet (5 mg) by mouth daily 2/20/17  Yes Jose Culp MD   mycophenolate (CELLCEPT - GENERIC EQUIVALENT) 250 MG capsule Take 2 capsules (500 mg) by mouth 2 times daily 2/20/17  Yes Jose Culp MD   cholecalciferol 2000 UNITS CAPS Take 2,000 Int'l Units by mouth daily 2/20/17  Yes Jose Culp MD   amLODIPine (NORVASC) 5 MG tablet Take 1 tablet (5 mg) by  "mouth daily 8/24/16  Yes Jose Culp MD   tacrolimus (PROGRAF - GENERIC EQUIVALENT) 0.5 MG capsule Take 3 capsules (1.5 mg) by mouth 2 times daily 8/18/16  Yes Jose Culp MD   sulfamethoxazole-trimethoprim (BACTRIM,SEPTRA) 400-80 MG per tablet Take 1 tablet by mouth daily 5/16/16  Yes Jose Culp MD   omeprazole 20 MG tablet Take 1 tablet (20 mg) by mouth every 12 hours 1/29/16  Yes Samara Hauser MD   atenolol (TENORMIN) 25 MG tablet Take 2 tablets (50 mg) by mouth daily 10/31/14  Yes Jan Jones MD   FLUoxetine (PROZAC) 20 MG capsule Take 40 mg by mouth daily  7/23/14  Yes Mitch Anguiano MD   aspirin (ASPIRIN ADULT LOW STRENGTH) 81 MG EC tablet Take 1 tablet by mouth daily. 5/15/12  Yes Garrett Oneil MD   clonazePAM (KLONOPIN) 0.5 MG tablet Take 1 tablet (0.5 mg) by mouth 2 times daily as needed for anxiety 7/23/14 8/22/14  Mitch Anguiano MD       Vitals:  /87  Pulse 77  Ht 1.651 m (5' 5\")  Wt 87.6 kg (193 lb 3.2 oz)  SpO2 96%  BMI 32.15 kg/m2    Exam:   GENERAL APPEARANCE: alert and no distress  HENT: mouth without ulcers or lesions  LYMPHATICS: no cervical or supraclavicular nodes  RESP: lungs clear to auscultation - no rales, rhonchi or wheezes  CV: regular rhythm, normal rate, no rub, no murmur  EDEMA: no LE edema bilaterally  ABDOMEN: soft, nondistended, nontender, bowel sounds normal, overweight  MS: extremities normal - no gross deformities noted, no evidence of inflammation in joints, no muscle tenderness  SKIN: no rash  TX KIDNEY: normal    Results:   Recent Results (from the past 2016 hour(s))   Tacrolimus level    Collection Time: 07/14/17  8:50 AM   Result Value Ref Range    Tacrolimus Last Dose 2030 07/13/17     Tacrolimus Level 7.3 5.0 - 15.0 ug/L   TXP External Lab Result    Collection Time: 07/14/17  8:50 AM   Result Value Ref Range    Glucose (External) 103 70 - 110 MG/DL    Urea Nitrogen (External) 18.0 7 - 20 MG/DL    Creatinine " (External) 2.09 (H) 0.70 - 1.30 MG/DL    GFR Estimated (External) 40 (L) >60 ml/min/1.73m2    GFR Est if Black (External) 48 (L) >60 ml/min/1.73m2    CO2 (External) 27.1 21 - 32 MMOL/L    Chloride (External) 107 98 - 108 MMOL/L    Sodium (External) 142 136 - 145 MMOL/L    Potassium (External) 4.1 3.5 - 5.1 MMOL/L    Calcium (External) 9.8 8.5 - 10.1 MG/DL    Anion Gap (External) 12.0 10.0 - 20.0    WBC Count (External) 9.3 4.80 - 10.80 K/UL    RBC Count (External) 4.51 (L) 4.70 - 6.10 M/UL    Hemoglobin (External) 12.9 (L) 14.0 - 18.0 GM/DL    Hematocrit (External) 38.8 (L) 42.0 - 52.0 %    MCV (External) 86.0 80.0 - 94.0 FL    MCH (External) 28.6 27.0 - 31.0 PG    MCHC (External) 33.2 31.5 - 36.5 %    RDW (External) 13.2 11.5 - 14.5 %    Platelet Count (External) 182 150 - 450 K/UL    MPV (External) 11.4 9.0 - 13.0 FL

## 2017-09-18 NOTE — MR AVS SNAPSHOT
After Visit Summary   9/18/2017    Jhoan Hoffman    MRN: [id number]           Patient Information     Date Of Birth          1995        Visit Information        Provider Department      9/18/2017 1:30 PM Jose Culp MD [facility]        Today's Diagnoses     Kidney replaced by transplant    -  1    Secondary renal hyperparathyroidism (H)        Vitamin D deficiency        Immunosuppressed status (H)        Anemia in stage 3 chronic kidney disease        Aftercare following organ transplant        Hypertension secondary to other renal disorders           Follow-ups after your visit        Follow-up notes from your care team     Return in about 6 months (around 3/18/2018).      Your next 10 appointments already scheduled     Sep 18, 2017  2:15 PM CDT   Lab with  LAB   [facility] ([facility])    [address] [place] [address]  [address] [facility] [address] [phone]            Mar 12, 2018  3:35 PM CDT   (Arrive by 3:05 PM)   Return Kidney Transplant with Jose Culp MD   [facility] ([facility])    [address] [place] [address]  [address] [facility] [address] [phone]              Future tests that were ordered for you today     Open Future Orders        Priority Expected Expires Ordered    Basic metabolic panel Routine  10/18/2017 9/18/2017    CBC with platelets Routine  10/18/2017 9/18/2017    Parathyroid Hormone Intact Routine  10/18/2017 9/18/2017    Vitamin D Deficiency Routine  10/18/2017 9/18/2017    Protein  random urine with Creat Ratio Routine  10/18/2017 9/18/2017            Who to contact     If you have questions or need follow up information about today's clinic visit or your schedule please contact [facility] directly at [phone].  Normal or non-critical lab and imaging results will be communicated to you by MyChart, letter or phone within 4 business days after the  "clinic has received the results. If you do not hear from us within 7 days, please contact the clinic through [employer] or phone. If you have a critical or abnormal lab result, we will notify you by phone as soon as possible.  Submit refill requests through [employer] or call your pharmacy and they will forward the refill request to us. Please allow 3 business days for your refill to be completed.          Additional Information About Your Visit        "[employer] Daily Deals Information     [employer] lets you send messages to your doctor, view your test results, renew your prescriptions, schedule appointments and more. To sign up, go to [email or web address] . Click on \"Log in\" on the left side of the screen, which will take you to the Welcome page. Then click on \"Sign up Now\" on the right side of the page.     You will be asked to enter the access code listed below, as well as some personal information. Please follow the directions to create your username and password.     Your access code is: [email or web address]  Expires: 12/3/2017  6:30 AM     Your access code will  in 90 days. If you need help or a new code, please call your Pool clinic or [phone].        Care EveryWhere ID     This is your Care EveryWhere ID. This could be used by other organizations to access your Pool medical records  [id number]        Your Vitals Were     Pulse Height Pulse Oximetry BMI (Body Mass Index)          77 1.651 m (5' 5\") 96% 32.15 kg/m2         Blood Pressure from Last 3 Encounters:   17 136/87   17 148/87   16 126/83    Weight from Last 3 Encounters:   17 87.6 kg (193 lb 3.2 oz)   17 91.5 kg (201 lb 12.8 oz)   16 90 kg (198 lb 6.4 oz)               Primary Care Provider Office Phone # Fax #    Leandro LAATORRE [facility] [phone]       [address] [address]        Equal Access to Services     [facility] QIANA AH: bari Contiadaha, " rudi haddad ah. So [facility] [phone].    ATENCIÓN: Si bertha chanel, tiene a [place]ak disposición servicios gratuitos de asistencia lingüística. Asha al [phone].    We comply with applicable federal civil rights laws and [place] laws. We do not discriminate on the basis of race, color, national origin, age, disability sex, sexual orientation or gender identity.            Thank you!     Thank you for choosing [facility]  for your care. Our goal is always to provide you with excellent care. Hearing back from our patients is one way we can continue to improve our services. Please take a few minutes to complete the written survey that you may receive in the mail after your visit with us. Thank you!             Your Updated Medication List - Protect others around you: Learn how to safely use, store and throw away your medicines at www.disposemymeds.org.          This list is accurate as of: 9/18/17  2:11 PM.  Always use your most recent med list.                   Brand Name Dispense Instructions for use Diagnosis    amLODIPine 5 MG tablet    NORVASC    90 tablet    TAKE 1 TABLET BY MOUTH ONCE DAILY    S/P kidney transplant       ASPIRIN ADULT LOW STRENGTH 81 MG EC tablet   Generic drug:  aspirin     90 tablet    Take 1 tablet by mouth daily.    S/P kidney transplant       atenolol 25 MG tablet    TENORMIN    60 tablet    Take 2 tablets (50 mg) by mouth daily    Kidney replaced by transplant, Chronic kidney disease, stage I, Acute rejection of kidney transplant       cholecalciferol 2000 UNITS Caps     30 capsule    Take 2,000 Int'l Units by mouth daily        clonazePAM 0.5 MG tablet    klonoPIN    20 tablet    Take 1 tablet (0.5 mg) by mouth 2 times daily as needed for anxiety    Depression with anxiety       FLUoxetine 20 MG capsule    PROzac    90 capsule    Take 40 mg by mouth daily    Depression with anxiety       mycophenolate 250 MG capsule     GENERIC EQUIVALENT    120 capsule    Take 2 capsules (500 mg) by mouth 2 times daily    Kidney replaced by transplant       omeprazole 20 MG tablet     180 tablet    Take 1 tablet (20 mg) by mouth every 12 hours    S/P kidney transplant, Acute rejection of kidney transplant       predniSONE 5 MG tablet    DELTASONE    90 tablet    Take 1 tablet (5 mg) by mouth daily    Kidney replaced by transplant       sulfamethoxazole-trimethoprim 400-80 MG per tablet    BACTRIM/SEPTRA    30 tablet    Take 1 tablet by mouth daily    Kidney transplanted       tacrolimus 0.5 MG capsule    GENERIC EQUIVALENT    180 capsule    TAKE 3 CAPSULES (1.5 MG) TWICE A DAY    S/P kidney transplant, Kidney replaced by transplant

## 2017-09-18 NOTE — NURSING NOTE
"Chief Complaint   Patient presents with     RECHECK     Post kidney tx follow up        Initial /87  Pulse 77  Ht 1.651 m (5' 5\")  Wt 87.6 kg (193 lb 3.2 oz)  SpO2 96%  BMI 32.15 kg/m2 Estimated body mass index is 32.15 kg/(m^2) as calculated from the following:    Height as of this encounter: 1.651 m (5' 5\").    Weight as of this encounter: 87.6 kg (193 lb 3.2 oz).  Medication Reconciliation: complete   Helen Dowell CMA    "

## 2017-09-18 NOTE — LETTER
9/18/2017      RE: Jhoan Hoffman  [address] [place] [address]       Assessment and Plan:  1. DDKT - baseline Cr ~ 1.6-1.9, which had been stable, although seems to be trending closer to 1.9-2.2 range for the last 10 months.  This change is unlikely to be rejection and now stable.  Would not recommend a kidney transplant biopsy at this time, but would consider is creatinine is over 2.5.  Will make no changes in immunosuppression.  2. HTN - okay control right at target of less than 140/90.  No changes.  3. Anemia in chronic renal disease - stable Hgb.  Will follow.  4. Secondary renal hyperparathyroidism - mildly increased PTH and have been treating vitamin D deficiency first.  Will recheck PTH today.  5. Vitamin D deficiency - low vitamin D level when last checked and patient finished course of ergocalciferol and is now on cholecalciferol.  Will recheck vitamin D level.  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  7. Overweight - recommend increased exercise and watch caloric intake for weight loss.  8. Recommend return visit in 6 months.    Assessment and plan was discussed with patient and he voiced his understanding and agreement.    Reason for Visit:  Mr. Hoffman is here for routine follow up.    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         Transplant Hx:       Tx: DDKT  Date: 4/28/12       Present Maintenance IS: Tacrolimus, Mycophenolate mofetil and Prednisone       Baseline Creatinine: 1.6-1.9       Recent DSA: Yes  Date last checked: 5/2017       Biopsy: No    Mr. Hoffman reports feeling good overall with minimal medical complaints.  Patient did make the change in immunosuppression at his last clinic visit with increasing mycophenolate mofetil to 500 mg bid and decreased prednisone to 5 mg daily.  He hasn't had any issues with this change.  His energy level is good and has remained normal.  He is active  and does get some exercise.  Denies any chest pain or shortness of breath with exertion.  Appetite is good, but his weight has been down about 5-7 lbs.  No nausea or vomiting.  Occasional loose stools about once every week or so.  No bloody or black, tarry stools.  No fever, sweats or chills.  No leg swelling.    Home BP: Not sure.      ROS:   A comprehensive review of systems was obtained and negative, except as noted in the HPI or PMH.    Active Medical Problems:  Patient Active Problem List   Diagnosis     Kidney replaced by transplant     Hypertension secondary to other renal disorders     Depression     BK viremia     GERD (gastroesophageal reflux disease)     Secondary renal hyperparathyroidism (H)     Vitamin D deficiency     Immunosuppressed status (H)     Aftercare following organ transplant       Personal Hx:  Social History     Social History     Marital status: Single     Spouse name: N/A     Number of children: N/A     Years of education: N/A     Occupational History     Not on file.     Social History Main Topics     Smoking status: Never Smoker     Smokeless tobacco: Never Used      Comment: mother smokes outside     Alcohol use No     Drug use: No     Sexual activity: Not on file     Other Topics Concern     Not on file     Social History Narrative       Allergies:  Allergies   Allergen Reactions     Amoxicillin Swelling     Azithromycin      Z pack - can't take with cyclosporine.     Vancomycin      Ruth syndrome     Cefprozil Rash       Medications:  Prior to Admission medications    Medication Sig Start Date End Date Taking? Authorizing Provider   predniSONE (DELTASONE) 5 MG tablet Take 1 tablet (5 mg) by mouth daily 2/20/17  Yes Jose Culp MD   mycophenolate (CELLCEPT - GENERIC EQUIVALENT) 250 MG capsule Take 2 capsules (500 mg) by mouth 2 times daily 2/20/17  Yes Jose Culp MD   cholecalciferol 2000 UNITS CAPS Take 2,000 Int'l Units by mouth daily 2/20/17  Yes Robbi  "Jose Gerardo MD   amLODIPine (NORVASC) 5 MG tablet Take 1 tablet (5 mg) by mouth daily 8/24/16  Yes Jose Culp MD   tacrolimus (PROGRAF - GENERIC EQUIVALENT) 0.5 MG capsule Take 3 capsules (1.5 mg) by mouth 2 times daily 8/18/16  Yes Jose Culp MD   sulfamethoxazole-trimethoprim (BACTRIM,SEPTRA) 400-80 MG per tablet Take 1 tablet by mouth daily 5/16/16  Yes Jose Culp MD   omeprazole 20 MG tablet Take 1 tablet (20 mg) by mouth every 12 hours 1/29/16  Yes Samara Hauser MD   atenolol (TENORMIN) 25 MG tablet Take 2 tablets (50 mg) by mouth daily 10/31/14  Yes Jan Jones MD   FLUoxetine (PROZAC) 20 MG capsule Take 40 mg by mouth daily  7/23/14  Yes Mitch Anguiano MD   aspirin (ASPIRIN ADULT LOW STRENGTH) 81 MG EC tablet Take 1 tablet by mouth daily. 5/15/12  Yes Garrett Oneil MD   clonazePAM (KLONOPIN) 0.5 MG tablet Take 1 tablet (0.5 mg) by mouth 2 times daily as needed for anxiety 7/23/14 8/22/14  Mitch Anguiano MD       Vitals:  /87  Pulse 77  Ht 1.651 m (5' 5\")  Wt 87.6 kg (193 lb 3.2 oz)  SpO2 96%  BMI 32.15 kg/m2    Exam:   GENERAL APPEARANCE: alert and no distress  HENT: mouth without ulcers or lesions  LYMPHATICS: no cervical or supraclavicular nodes  RESP: lungs clear to auscultation - no rales, rhonchi or wheezes  CV: regular rhythm, normal rate, no rub, no murmur  EDEMA: no LE edema bilaterally  ABDOMEN: soft, nondistended, nontender, bowel sounds normal, overweight  MS: extremities normal - no gross deformities noted, no evidence of inflammation in joints, no muscle tenderness  SKIN: no rash  TX KIDNEY: normal    Results:   Recent Results (from the past 2016 hour(s))   Tacrolimus level    Collection Time: 07/14/17  8:50 AM   Result Value Ref Range    Tacrolimus Last Dose 2030 07/13/17     Tacrolimus Level 7.3 5.0 - 15.0 ug/L   TXP External Lab Result    Collection Time: 07/14/17  8:50 AM   Result Value Ref Range    Glucose (External) 103 70 " - 110 MG/DL    Urea Nitrogen (External) 18.0 7 - 20 MG/DL    Creatinine (External) 2.09 (H) 0.70 - 1.30 MG/DL    GFR Estimated (External) 40 (L) >60 ml/min/1.73m2    GFR Est if Black (External) 48 (L) >60 ml/min/1.73m2    CO2 (External) 27.1 21 - 32 MMOL/L    Chloride (External) 107 98 - 108 MMOL/L    Sodium (External) 142 136 - 145 MMOL/L    Potassium (External) 4.1 3.5 - 5.1 MMOL/L    Calcium (External) 9.8 8.5 - 10.1 MG/DL    Anion Gap (External) 12.0 10.0 - 20.0    WBC Count (External) 9.3 4.80 - 10.80 K/UL    RBC Count (External) 4.51 (L) 4.70 - 6.10 M/UL    Hemoglobin (External) 12.9 (L) 14.0 - 18.0 GM/DL    Hematocrit (External) 38.8 (L) 42.0 - 52.0 %    MCV (External) 86.0 80.0 - 94.0 FL    MCH (External) 28.6 27.0 - 31.0 PG    MCHC (External) 33.2 31.5 - 36.5 %    RDW (External) 13.2 11.5 - 14.5 %    Platelet Count (External) 182 150 - 450 K/UL    MPV (External) 11.4 9.0 - 13.0 FL       Jose Culp MD

## 2017-09-19 ENCOUNTER — DOCUMENTATION ONLY (OUTPATIENT)
Dept: TRANSPLANT | Facility: CLINIC | Age: 22
End: 2017-09-19

## 2017-09-19 LAB
DEPRECATED CALCIDIOL+CALCIFEROL SERPL-MC: 28 UG/L (ref 20–75)
TACROLIMUS BLD-MCNC: 5.5 UG/L (ref 5–15)
TME LAST DOSE: NORMAL H

## 2017-09-19 NOTE — PROGRESS NOTES
Vit D = 28  PTH = 151    Notes Recorded by Jose Culp MD on 9/19/2017 at 1:41 PM  Vitamin D deficiency and would continue on cholecalciferol at present dose.  Improved parathormone level and no changes.

## 2017-10-16 DIAGNOSIS — T86.11 ACUTE REJECTION OF KIDNEY TRANSPLANT: ICD-10-CM

## 2017-10-16 DIAGNOSIS — Z94.0 KIDNEY REPLACED BY TRANSPLANT: ICD-10-CM

## 2017-10-16 DIAGNOSIS — N18.1 CHRONIC KIDNEY DISEASE, STAGE I: ICD-10-CM

## 2017-10-16 RX ORDER — PREDNISONE 10 MG/1
TABLET ORAL
Qty: 15 TABLET | Refills: 3 | Status: SHIPPED | OUTPATIENT
Start: 2017-10-16 | End: 2018-03-07

## 2017-12-26 DIAGNOSIS — Z79.899 ENCOUNTER FOR LONG-TERM CURRENT USE OF MEDICATION: ICD-10-CM

## 2017-12-26 DIAGNOSIS — Z48.298 AFTERCARE FOLLOWING ORGAN TRANSPLANT: ICD-10-CM

## 2017-12-26 DIAGNOSIS — Z94.0 KIDNEY REPLACED BY TRANSPLANT: ICD-10-CM

## 2017-12-26 PROCEDURE — 80197 ASSAY OF TACROLIMUS: CPT | Performed by: INTERNAL MEDICINE

## 2017-12-28 LAB
TACROLIMUS BLD-MCNC: 4.8 UG/L (ref 5–15)
TME LAST DOSE: ABNORMAL H

## 2018-02-13 ENCOUNTER — TELEPHONE (OUTPATIENT)
Dept: TRANSPLANT | Facility: CLINIC | Age: 23
End: 2018-02-13

## 2018-02-13 PROCEDURE — 80197 ASSAY OF TACROLIMUS: CPT | Performed by: INTERNAL MEDICINE

## 2018-02-13 NOTE — TELEPHONE ENCOUNTER
Provider Call: Transplant Lab  Facility Name: [facility]  Facility Location:  Reason for Call: Annual lab reorder Fax# [phone]  Callback needed? No

## 2018-02-13 NOTE — LETTER
OUTPATIENT LABORATORY TEST ORDER    Patient Name: Jhoan Hoffman                   Transplant Date: 04-  YOB: 1995                       Issue Date & Time: February 13, 20188:46 AM  [facility] MR: [id number]                      Exp. Date (1 year after date issued)      Diagnoses: Kidney Transplant (ICD-10 Z94.0)   Long term use of medications (ICD-10  Z79.899)     Lab results to be available on the same day drawn.   Patient should release information to the [facility].  Please fax to the Transplant Center at [phone].      Every  Month  ?CBC and Platelet  ?Basic Metabolic Panel (Sodium, Potassium, Chloride, CO2, Creatinine, Urea Nitrogen, Glucose, Calcium)         ?/Tacrolimus/Prograf drug level               Every 6 Months       Due:  and                 ?BK (Polyoma Virus) PCR Quantitative - Plasma                                           ?Urine for protein/creatinine       If you have any questions, please call The [facility] Center at [phone] or [phone].    Please fax labs to [phone]  .

## 2018-02-14 DIAGNOSIS — Z48.298 AFTERCARE FOLLOWING ORGAN TRANSPLANT: Primary | ICD-10-CM

## 2018-02-14 LAB
TACROLIMUS BLD-MCNC: 3.4 UG/L (ref 5–15)
TME LAST DOSE: ABNORMAL H

## 2018-02-16 ENCOUNTER — TELEPHONE (OUTPATIENT)
Dept: TRANSPLANT | Facility: CLINIC | Age: 23
End: 2018-02-16

## 2018-02-16 NOTE — TELEPHONE ENCOUNTER
WBC = 11.4, slightly elevated  Cr 2.09    PLAN:  Check if having any fever?  URTI / UTI symptoms?  Recommend improving hydration and recheck CBC, BMP, UA/UC in 2 weeks.    [facility] [phone] (Phone)  [phone] (Fax)

## 2018-02-16 NOTE — TELEPHONE ENCOUNTER
Spoke to patients Mom, Yoanna, regarding elevated WBC and Creatinine.  Patient denies any fevers or illness.  Patient will repeat labs in 2 weeks.  Faxed current lab orders to [facility] lab.

## 2018-02-16 NOTE — LETTER
PHYSICIAN ORDERS      DATE & TIME ISSUED: 2018 4:04 PM  PATIENT NAME: Jhoan Hoffman   : 1995     [facility] MR# [if applicable]: [id number]     DIAGNOSIS:  Kidney Transplant  ICD-10 CODE: Z94.0     Please complete the following labs in 2 weeks:  CBC  BMP   UA/UC    Any questions please call: [phone]    Please fax these results to [phone].    .

## 2018-03-07 DIAGNOSIS — T86.11 ACUTE REJECTION OF KIDNEY TRANSPLANT: ICD-10-CM

## 2018-03-07 DIAGNOSIS — Z94.0 KIDNEY REPLACED BY TRANSPLANT: ICD-10-CM

## 2018-03-07 DIAGNOSIS — N18.1 CHRONIC KIDNEY DISEASE, STAGE I: ICD-10-CM

## 2018-03-07 RX ORDER — PREDNISONE 10 MG/1
TABLET ORAL
Qty: 15 TABLET | Refills: 11 | Status: SHIPPED | OUTPATIENT
Start: 2018-03-07 | End: 2019-03-17

## 2018-03-12 ENCOUNTER — OFFICE VISIT (OUTPATIENT)
Dept: NEPHROLOGY | Facility: CLINIC | Age: 23
End: 2018-03-12
Attending: INTERNAL MEDICINE
Payer: MEDICAID

## 2018-03-12 VITALS
TEMPERATURE: 98.4 F | SYSTOLIC BLOOD PRESSURE: 127 MMHG | HEIGHT: 65 IN | DIASTOLIC BLOOD PRESSURE: 80 MMHG | HEART RATE: 88 BPM | OXYGEN SATURATION: 99 % | BODY MASS INDEX: 33.52 KG/M2 | WEIGHT: 201.2 LBS

## 2018-03-12 DIAGNOSIS — Z48.298 AFTERCARE FOLLOWING ORGAN TRANSPLANT: ICD-10-CM

## 2018-03-12 DIAGNOSIS — Z94.0 KIDNEY REPLACED BY TRANSPLANT: Primary | ICD-10-CM

## 2018-03-12 DIAGNOSIS — E55.9 VITAMIN D DEFICIENCY: ICD-10-CM

## 2018-03-12 DIAGNOSIS — D84.9 IMMUNOSUPPRESSED STATUS (H): ICD-10-CM

## 2018-03-12 DIAGNOSIS — I15.1 HYPERTENSION SECONDARY TO OTHER RENAL DISORDERS: ICD-10-CM

## 2018-03-12 DIAGNOSIS — N25.81 SECONDARY RENAL HYPERPARATHYROIDISM (H): ICD-10-CM

## 2018-03-12 PROCEDURE — G0463 HOSPITAL OUTPT CLINIC VISIT: HCPCS | Mod: ZF

## 2018-03-12 ASSESSMENT — PAIN SCALES - GENERAL: PAINLEVEL: NO PAIN (0)

## 2018-03-12 NOTE — MR AVS SNAPSHOT
"              After Visit Summary   3/12/2018    Jhoan Hoffman    MRN: [id number]           Patient Information     Date Of Birth          1995        Visit Information        Provider Department      3/12/2018 3:35 PM Jose Culp MD [facility] Neph[facility]         Follow-ups after your visit        Follow-up notes from your care team     Return in about 6 months (around 9/12/2018).      Your next 10 appointments already scheduled     Mar 12, 2018  3:35 PM CDT   (Arrive by 3:05 PM)   Return Kidney Transplant with Jose Cupl MD   [facility] Nephrology ([facility])    [address] [place] [address]  [address]  [facility] [address] [phone]            Sep 10, 2018  3:35 PM CDT   (Arrive by 3:05 PM)   Return Kidney Transplant with Jose Culp MD   [facility] Nephrology ([facility])    [address] [place] [address]  [address]  [facility] [place] [address] [phone]              Who to contact     If you have questions or need follow up information about today's clinic visit or your schedule please contact [facility] NEPHROLOGY directly at [phone].  Normal or non-critical lab and imaging results will be communicated to you by MyChart, letter or phone within 4 business days after the clinic has received the results. If you do not hear from us within 7 days, please contact the clinic through MyChart or phone. If you have a critical or abnormal lab result, we will notify you by phone as soon as possible.  Submit refill requests through [employer] or call your pharmacy and they will forward the refill request to us. Please allow 3 business days for your refill to be completed.          Additional Information About Your Visit        MyChart Information     [employer] lets you send messages to your doctor, view your test results, renew your prescriptions, schedule appointments and more. To sign up, go to [email or web address] . Click on \"Log in\" on " "the left side of the screen, which will take you to the Welcome page. Then click on \"Sign up Now\" on the right side of the page.     You will be asked to enter the access code listed below, as well as some personal information. Please follow the directions to create your username and password.     Your access code is: [email or web address]  Expires: 2018  7:30 AM     Your access code will  in 90 days. If you need help or a new code, please call your [facility] or [phone].        Care EveryWhere ID     This is your Care EveryWhere ID. This could be used by other organizations to access your [place] medical records  [id number]        Your Vitals Were     Pulse Temperature Height Pulse Oximetry BMI (Body Mass Index)       88 98.4  F (36.9  C) (Oral) 1.651 m (5' 5\") 99% 33.48 kg/m2        Blood Pressure from Last 3 Encounters:   18 127/80   17 136/87   17 148/87    Weight from Last 3 Encounters:   18 91.3 kg (201 lb 3.2 oz)   17 87.6 kg (193 lb 3.2 oz)   17 91.5 kg (201 lb 12.8 oz)              Today, you had the following     No orders found for display         Today's Medication Changes          These changes are accurate as of 3/12/18  3:31 PM.  If you have any questions, ask your nurse or doctor.               These medicines have changed or have updated prescriptions.        Dose/Directions    predniSONE 10 MG tablet   Commonly known as:  DELTASONE   This may have changed:  Another medication with the same name was removed. Continue taking this medication, and follow the directions you see here.   Used for:  Chronic kidney disease, stage I, Kidney replaced by transplant, Acute rejection of kidney transplant   Changed by:  Jose Culp MD        TAKE 1/2 TABLET BY MOUTH ONCE DAILY   Quantity:  15 tablet   Refills:  11                Primary Care Provider Office Phone # Fax #    Leandro Malagon [phone]       [facility] [address] [place] [address]        Equal Access to Services     SERAREYMUNDO LANA : Hadii deepali ku hadcyndyo Sopipeali, waaxda luqadaha, qaybta kaalmada roberterica, rudi solorzano radhajanee joneschinmay garcia jase . So [facility] [phone].    ATENCIÓN: Si habla español, tiene a nowak disposición servicios gratuitos de asistencia lingüística. Llame al [phone].    We comply with applicable federal civil rights laws and [place] laws. We do not discriminate on the basis of race, color, national origin, age, disability, sex, sexual orientation, or gender identity.            Thank you!     Thank you for choosing [facility] NEPHROLOGY  for your care. Our goal is always to provide you with excellent care. Hearing back from our patients is one way we can continue to improve our services. Please take a few minutes to complete the written survey that you may receive in the mail after your visit with us. Thank you!             Your Updated Medication List - Protect others around you: Learn how to safely use, store and throw away your medicines at www.disposemymeds.org.          This list is accurate as of 3/12/18  3:31 PM.  Always use your most recent med list.                   Brand Name Dispense Instructions for use Diagnosis    amLODIPine 5 MG tablet    NORVASC    90 tablet    TAKE 1 TABLET BY MOUTH ONCE DAILY    S/P kidney transplant       ASPIRIN ADULT LOW STRENGTH 81 MG EC tablet   Generic drug:  aspirin     90 tablet    Take 1 tablet by mouth daily.    S/P kidney transplant       atenolol 25 MG tablet    TENORMIN    60 tablet    Take 2 tablets (50 mg) by mouth daily    Kidney replaced by transplant, Chronic kidney disease, stage I, Acute rejection of kidney transplant       cholecalciferol 2000 UNITS Caps     30 capsule    Take 2,000 Int'l Units by mouth daily        clonazePAM 0.5 MG tablet    klonoPIN    20 tablet    Take 1 tablet (0.5 mg) by mouth 2 times daily as needed for anxiety    Depression with anxiety       FLUoxetine  20 MG capsule    PROzac    90 capsule    Take 40 mg by mouth daily    Depression with anxiety       mycophenolate 250 MG capsule    GENERIC EQUIVALENT    120 capsule    Take 2 capsules (500 mg) by mouth 2 times daily    Kidney replaced by transplant       omeprazole 20 MG tablet     180 tablet    Take 1 tablet (20 mg) by mouth every 12 hours    S/P kidney transplant, Acute rejection of kidney transplant       predniSONE 10 MG tablet    DELTASONE    15 tablet    TAKE 1/2 TABLET BY MOUTH ONCE DAILY    Chronic kidney disease, stage I, Kidney replaced by transplant, Acute rejection of kidney transplant       sulfamethoxazole-trimethoprim 400-80 MG per tablet    BACTRIM/SEPTRA    30 tablet    Take 1 tablet by mouth daily    Kidney transplanted       tacrolimus 0.5 MG capsule    GENERIC EQUIVALENT    180 capsule    TAKE 3 CAPSULES (1.5 MG) TWICE A DAY    S/P kidney transplant, Kidney replaced by transplant

## 2018-03-12 NOTE — PROGRESS NOTES
Assessment and Plan:  1. DDKT - baseline Cr ~ 1.9-2.2, which had been stable.  This change is unlikely to be rejection and now stable.  Mild proteinuria.  Would not recommend a kidney transplant biopsy at this time, but would consider is creatinine is over 2.5.  Will make no changes in immunosuppression.  2. HTN - okay control right at target of less than 140/90.  No changes.  3. Anemia in chronic kidney disease - stable Hgb, near normal.  Will follow.  4. Secondary renal hyperparathyroidism - mildly increased PTH and have been treating vitamin D deficiency first.  Will recheck PTH at next clinic visit.  5. Vitamin D deficiency - low vitamin D level when last checked and will continue cholecalciferol.  Will recheck vitamin D level at next clinic visit.  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  7. Overweight - recommend increased exercise and watch caloric intake for weight loss.  8. Skin cancer risk - no new skin lesions.  Discussed sun protection.  9. Recommend return visit in 6 months.    Assessment and plan was discussed with patient and he voiced his understanding and agreement.    Reason for Visit:  Mr. Hoffman is here for routine follow up.    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         Transplant Hx:       Tx: DDKT  Date: 4/28/12       Present Maintenance IS: Tacrolimus, Mycophenolate mofetil and Prednisone       Baseline Creatinine: 1.9-2.2       Recent DSA: Yes  Date last checked: 5/2017       Biopsy: No    Mr. Hoffman reports feeling good overall with minimal medical complaints.  His energy level has been good and remains normal.  He is active and gets some exercise at times.  Denies any chest pain or shortness of breath with exertion.  Appetite is good and he has gained about 5-7 lbs, which frequently happens in the winter.  No nausea, vomiting or diarrhea.  No fever, sweats or chills.  No leg  swelling.    Home BP: Not checked.      ROS:   A comprehensive review of systems was obtained and negative, except as noted in the HPI or PMH.    Active Medical Problems:  Patient Active Problem List   Diagnosis     Kidney replaced by transplant     Hypertension secondary to other renal disorders     Depression     BK viremia     GERD (gastroesophageal reflux disease)     Secondary renal hyperparathyroidism (H)     Vitamin D deficiency     Immunosuppressed status (H)     Aftercare following organ transplant       Personal Hx:  Social History     Social History     Marital status: Single     Spouse name: N/A     Number of children: N/A     Years of education: N/A     Occupational History     Not on file.     Social History Main Topics     Smoking status: Never Smoker     Smokeless tobacco: Never Used      Comment: mother smokes outside     Alcohol use No     Drug use: No     Sexual activity: Not on file     Other Topics Concern     Not on file     Social History Narrative       Allergies:  Allergies   Allergen Reactions     Amoxicillin Swelling     Azithromycin      Z pack - can't take with cyclosporine.     Vancomycin      Ruth syndrome     Cefprozil Rash       Medications:  Prior to Admission medications    Medication Sig Start Date End Date Taking? Authorizing Provider   predniSONE (DELTASONE) 5 MG tablet Take 1 tablet (5 mg) by mouth daily 2/20/17  Yes Jose Culp MD   mycophenolate (CELLCEPT - GENERIC EQUIVALENT) 250 MG capsule Take 2 capsules (500 mg) by mouth 2 times daily 2/20/17  Yes Jose Culp MD   cholecalciferol 2000 UNITS CAPS Take 2,000 Int'l Units by mouth daily 2/20/17  Yes Jose Culp MD   amLODIPine (NORVASC) 5 MG tablet Take 1 tablet (5 mg) by mouth daily 8/24/16  Yes Jose Culp MD   tacrolimus (PROGRAF - GENERIC EQUIVALENT) 0.5 MG capsule Take 3 capsules (1.5 mg) by mouth 2 times daily 8/18/16  Yes Jose Culp MD  "  sulfamethoxazole-trimethoprim (BACTRIM,SEPTRA) 400-80 MG per tablet Take 1 tablet by mouth daily 5/16/16  Yes Jose Culp MD   omeprazole 20 MG tablet Take 1 tablet (20 mg) by mouth every 12 hours 1/29/16  Yes Samara Hauser MD   atenolol (TENORMIN) 25 MG tablet Take 2 tablets (50 mg) by mouth daily 10/31/14  Yes Jan Jones MD   FLUoxetine (PROZAC) 20 MG capsule Take 40 mg by mouth daily  7/23/14  Yes Mitch Anguiano MD   aspirin (ASPIRIN ADULT LOW STRENGTH) 81 MG EC tablet Take 1 tablet by mouth daily. 5/15/12  Yes Garrett Oneil MD   clonazePAM (KLONOPIN) 0.5 MG tablet Take 1 tablet (0.5 mg) by mouth 2 times daily as needed for anxiety 7/23/14 8/22/14  Mitch Anguiano MD       Vitals:  /80  Pulse 88  Temp 98.4  F (36.9  C) (Oral)  Ht 1.651 m (5' 5\")  Wt 91.3 kg (201 lb 3.2 oz)  SpO2 99%  BMI 33.48 kg/m2    Exam:   GENERAL APPEARANCE: alert and no distress  HENT: mouth without ulcers or lesions  LYMPHATICS: no cervical or supraclavicular nodes  RESP: lungs clear to auscultation - no rales, rhonchi or wheezes  CV: regular rhythm, normal rate, no rub, no murmur  EDEMA: no LE edema bilaterally  ABDOMEN: soft, nondistended, nontender, bowel sounds normal, overweight  MS: extremities normal - no gross deformities noted, no evidence of inflammation in joints, no muscle tenderness  SKIN: no rash  TX KIDNEY: normal    Results:   Recent Results (from the past 336 hour(s))   CBC with platelets differential    Collection Time: 03/02/18  8:25 AM   Result Value Ref Range    WBC Count (External) 8.8 4.80 - 10.80 K/UL    RBC Count (External) 4.37 (L) 4.70 - 6.10 M/UL    Hemoglobin (External) 13.1 (L) 14.0 - 18.0 GM/DL    Hematocrit (External) 38.3 (L) 42.0 - 52.0 %    MCV (External) 87.6 80.0 - 94.0 FL    MCH (External) 30.0 27.0 - 31.0 PG    MCHC (External) 34.2 31.0 - 36.5 %    RDW (External) 13.1 11.5 - 14.5 %    Platelet Count (External) 193 150 - 350 K/UL    Method (External) Automated  "    % Neutrophils (External) 55.7 40 - 75 %    % Lymphocytes (External) 27.8 20 - 45 %    % Monocytes (External) 10.3 (H) 1 - 10 %    % Eosinophils (External) 5.0 0 - 7 %    % Basophils (External) 0.6 0 - 3 %    % Immature Granulocytes (External) 0.6 0 - 5 %    Absolute Neutrophils (External) 4.9 1.2 - 8.1 K/UL    Absolute Lymphocytes (External) 2.4 0.6 - 4.7 K/UL    Absolute Monocytes (External) 0.9 0.0 - 1.3 K/UL    Absolute Eosinophils (External) 0.4 0.0 - 0.7 K/UL    Absolute Basophils (External) 0.1 0.0 - 0.2 K/UL    Absolute Immature Granulocytes (External) 0.1 0.0 - 0.54 K/UL    Nucleated RBCs (External) 0.0 <1 %   Basic metabolic panel    Collection Time: 03/02/18  8:25 AM   Result Value Ref Range    Glucose (External) 122 (H) 70 - 110 MG/DL    Urea Nitrogen (External) 20.0 7 - 27 MG/DL    Creatinine (External) 1.99 (H) 0.64 - 1.34 MG/DL    GFR Estimated (External) 41 (L) >60 mL/min/1.73m2    GFR Est if Black (External) 50 (L) >60 mL/min/1.73m2    CO2 (External) 20.0 (L) 22 - 29 MMOL/L    Chloride (External) 102 97 - 107 MMOL/L    Sodium (External) 138 134 - 145 MMOL/L    Potassium (External) 4.0 3.5 - 5.1 MMOL/L    Calcium (External) 9.1 8.6 - 10.3 MG/DL    Anion Gap (External) 20.0 10.0 - 20.0

## 2018-03-12 NOTE — NURSING NOTE
"Chief Complaint   Patient presents with     RECHECK     Kidney tx follow up       Initial /80  Pulse 88  Temp 98.4  F (36.9  C) (Oral)  Ht 1.651 m (5' 5\")  Wt 91.3 kg (201 lb 3.2 oz)  SpO2 99%  BMI 33.48 kg/m2 Estimated body mass index is 33.48 kg/(m^2) as calculated from the following:    Height as of this encounter: 1.651 m (5' 5\").    Weight as of this encounter: 91.3 kg (201 lb 3.2 oz).  Medication Reconciliation: complete   KATERINA DIANE CMA      "

## 2018-03-12 NOTE — LETTER
3/12/2018      RE: Jhoan Hoffman  [address] [place] [address]       Assessment and Plan:  1. DDKT - baseline Cr ~ 1.9-2.2, which had been stable.  This change is unlikely to be rejection and now stable.  Mild proteinuria.  Would not recommend a kidney transplant biopsy at this time, but would consider is creatinine is over 2.5.  Will make no changes in immunosuppression.  2. HTN - okay control right at target of less than 140/90.  No changes.  3. Anemia in chronic kidney disease - stable Hgb, near normal.  Will follow.  4. Secondary renal hyperparathyroidism - mildly increased PTH and have been treating vitamin D deficiency first.  Will recheck PTH at next clinic visit.  5. Vitamin D deficiency - low vitamin D level when last checked and will continue cholecalciferol.  Will recheck vitamin D level at next clinic visit.  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  7. Overweight - recommend increased exercise and watch caloric intake for weight loss.  8. Skin cancer risk - no new skin lesions.  Discussed sun protection.  9. Recommend return visit in 6 months.    Assessment and plan was discussed with patient and he voiced his understanding and agreement.    Reason for Visit:  Mr. Hoffman is here for routine follow up.    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         Transplant Hx:       Tx: DDKT  Date: 4/28/12       Present Maintenance IS: Tacrolimus, Mycophenolate mofetil and Prednisone       Baseline Creatinine: 1.9-2.2       Recent DSA: Yes  Date last checked: 5/2017       Biopsy: No    Mr. Hoffman reports feeling good overall with minimal medical complaints.  His energy level has been good and remains normal.  He is active and gets some exercise at times.  Denies any chest pain or shortness of breath with exertion.  Appetite is good and he has gained about 5-7 lbs, which frequently happens in the winter.   No nausea, vomiting or diarrhea.  No fever, sweats or chills.  No leg swelling.    Home BP: Not checked.      ROS:   A comprehensive review of systems was obtained and negative, except as noted in the HPI or PMH.    Active Medical Problems:  Patient Active Problem List   Diagnosis     Kidney replaced by transplant     Hypertension secondary to other renal disorders     Depression     BK viremia     GERD (gastroesophageal reflux disease)     Secondary renal hyperparathyroidism (H)     Vitamin D deficiency     Immunosuppressed status (H)     Aftercare following organ transplant       Personal Hx:  Social History     Social History     Marital status: Single     Spouse name: N/A     Number of children: N/A     Years of education: N/A     Occupational History     Not on file.     Social History Main Topics     Smoking status: Never Smoker     Smokeless tobacco: Never Used      Comment: mother smokes outside     Alcohol use No     Drug use: No     Sexual activity: Not on file     Other Topics Concern     Not on file     Social History Narrative       Allergies:  Allergies   Allergen Reactions     Amoxicillin Swelling     Azithromycin      Z pack - can't take with cyclosporine.     Vancomycin      Ruth syndrome     Cefprozil Rash       Medications:  Prior to Admission medications    Medication Sig Start Date End Date Taking? Authorizing Provider   predniSONE (DELTASONE) 5 MG tablet Take 1 tablet (5 mg) by mouth daily 2/20/17  Yes Jose Culp MD   mycophenolate (CELLCEPT - GENERIC EQUIVALENT) 250 MG capsule Take 2 capsules (500 mg) by mouth 2 times daily 2/20/17  Yes Jose Culp MD   cholecalciferol 2000 UNITS CAPS Take 2,000 Int'l Units by mouth daily 2/20/17  Yes Jose Culp MD   amLODIPine (NORVASC) 5 MG tablet Take 1 tablet (5 mg) by mouth daily 8/24/16  Yes Jose Culp MD   tacrolimus (PROGRAF - GENERIC EQUIVALENT) 0.5 MG capsule Take 3 capsules (1.5 mg) by mouth 2 times  "daily 8/18/16  Yes Jose Culp MD   sulfamethoxazole-trimethoprim (BACTRIM,SEPTRA) 400-80 MG per tablet Take 1 tablet by mouth daily 5/16/16  Yes Jose Culp MD   omeprazole 20 MG tablet Take 1 tablet (20 mg) by mouth every 12 hours 1/29/16  Yes Samara Hauser MD   atenolol (TENORMIN) 25 MG tablet Take 2 tablets (50 mg) by mouth daily 10/31/14  Yes Jan Jones MD   FLUoxetine (PROZAC) 20 MG capsule Take 40 mg by mouth daily  7/23/14  Yes Mitch Anguiano MD   aspirin (ASPIRIN ADULT LOW STRENGTH) 81 MG EC tablet Take 1 tablet by mouth daily. 5/15/12  Yes Garrett Oneil MD   clonazePAM (KLONOPIN) 0.5 MG tablet Take 1 tablet (0.5 mg) by mouth 2 times daily as needed for anxiety 7/23/14 8/22/14  Mitch Anguiano MD       Vitals:  /80  Pulse 88  Temp 98.4  F (36.9  C) (Oral)  Ht 1.651 m (5' 5\")  Wt 91.3 kg (201 lb 3.2 oz)  SpO2 99%  BMI 33.48 kg/m2    Exam:   GENERAL APPEARANCE: alert and no distress  HENT: mouth without ulcers or lesions  LYMPHATICS: no cervical or supraclavicular nodes  RESP: lungs clear to auscultation - no rales, rhonchi or wheezes  CV: regular rhythm, normal rate, no rub, no murmur  EDEMA: no LE edema bilaterally  ABDOMEN: soft, nondistended, nontender, bowel sounds normal, overweight  MS: extremities normal - no gross deformities noted, no evidence of inflammation in joints, no muscle tenderness  SKIN: no rash  TX KIDNEY: normal    Results:   Recent Results (from the past 336 hour(s))   CBC with platelets differential    Collection Time: 03/02/18  8:25 AM   Result Value Ref Range    WBC Count (External) 8.8 4.80 - 10.80 K/UL    RBC Count (External) 4.37 (L) 4.70 - 6.10 M/UL    Hemoglobin (External) 13.1 (L) 14.0 - 18.0 GM/DL    Hematocrit (External) 38.3 (L) 42.0 - 52.0 %    MCV (External) 87.6 80.0 - 94.0 FL    MCH (External) 30.0 27.0 - 31.0 PG    MCHC (External) 34.2 31.0 - 36.5 %    RDW (External) 13.1 11.5 - 14.5 %    Platelet Count (External) 193 " 150 - 350 K/UL    Method (External) Automated     % Neutrophils (External) 55.7 40 - 75 %    % Lymphocytes (External) 27.8 20 - 45 %    % Monocytes (External) 10.3 (H) 1 - 10 %    % Eosinophils (External) 5.0 0 - 7 %    % Basophils (External) 0.6 0 - 3 %    % Immature Granulocytes (External) 0.6 0 - 5 %    Absolute Neutrophils (External) 4.9 1.2 - 8.1 K/UL    Absolute Lymphocytes (External) 2.4 0.6 - 4.7 K/UL    Absolute Monocytes (External) 0.9 0.0 - 1.3 K/UL    Absolute Eosinophils (External) 0.4 0.0 - 0.7 K/UL    Absolute Basophils (External) 0.1 0.0 - 0.2 K/UL    Absolute Immature Granulocytes (External) 0.1 0.0 - 0.54 K/UL    Nucleated RBCs (External) 0.0 <1 %   Basic metabolic panel    Collection Time: 03/02/18  8:25 AM   Result Value Ref Range    Glucose (External) 122 (H) 70 - 110 MG/DL    Urea Nitrogen (External) 20.0 7 - 27 MG/DL    Creatinine (External) 1.99 (H) 0.64 - 1.34 MG/DL    GFR Estimated (External) 41 (L) >60 mL/min/1.73m2    GFR Est if Black (External) 50 (L) >60 mL/min/1.73m2    CO2 (External) 20.0 (L) 22 - 29 MMOL/L    Chloride (External) 102 97 - 107 MMOL/L    Sodium (External) 138 134 - 145 MMOL/L    Potassium (External) 4.0 3.5 - 5.1 MMOL/L    Calcium (External) 9.1 8.6 - 10.3 MG/DL    Anion Gap (External) 20.0 10.0 - 20.0       Jose Culp MD

## 2018-04-06 DIAGNOSIS — Z48.298 AFTERCARE FOLLOWING ORGAN TRANSPLANT: ICD-10-CM

## 2018-04-06 PROCEDURE — 80197 ASSAY OF TACROLIMUS: CPT | Performed by: INTERNAL MEDICINE

## 2018-04-09 ENCOUNTER — TELEPHONE (OUTPATIENT)
Dept: TRANSPLANT | Facility: CLINIC | Age: 23
End: 2018-04-09

## 2018-04-09 LAB
TACROLIMUS BLD-MCNC: 7.2 UG/L (ref 5–15)
TME LAST DOSE: NORMAL H

## 2018-04-09 NOTE — TELEPHONE ENCOUNTER
4/6/18: Tacrolimus = 7.2  Previous levels within goal.  Will continue to monitor.  No changes at this time.

## 2018-05-04 DIAGNOSIS — Z94.0 KIDNEY REPLACED BY TRANSPLANT: ICD-10-CM

## 2018-05-04 RX ORDER — MYCOPHENOLATE MOFETIL 250 MG/1
CAPSULE ORAL
Qty: 120 CAPSULE | Refills: 6 | Status: SHIPPED | OUTPATIENT
Start: 2018-05-04 | End: 2019-04-08

## 2018-05-12 DIAGNOSIS — Z94.0 KIDNEY REPLACED BY TRANSPLANT: Primary | ICD-10-CM

## 2018-05-12 DIAGNOSIS — Z94.0 S/P KIDNEY TRANSPLANT: ICD-10-CM

## 2018-05-14 DIAGNOSIS — Z48.298 AFTERCARE FOLLOWING ORGAN TRANSPLANT: ICD-10-CM

## 2018-05-14 PROCEDURE — 80197 ASSAY OF TACROLIMUS: CPT | Performed by: INTERNAL MEDICINE

## 2018-05-14 RX ORDER — TACROLIMUS 0.5 MG/1
1.5 CAPSULE ORAL 2 TIMES DAILY
Qty: 180 CAPSULE | Refills: 11 | Status: SHIPPED | OUTPATIENT
Start: 2018-05-14 | End: 2019-04-15

## 2018-05-15 LAB
TACROLIMUS BLD-MCNC: 5.4 UG/L (ref 5–15)
TME LAST DOSE: NORMAL H

## 2018-05-28 DIAGNOSIS — Z94.0 KIDNEY TRANSPLANTED: ICD-10-CM

## 2018-05-29 RX ORDER — SULFAMETHOXAZOLE AND TRIMETHOPRIM 400; 80 MG/1; MG/1
TABLET ORAL
Qty: 30 TABLET | Refills: 3 | Status: SHIPPED | OUTPATIENT
Start: 2018-05-29 | End: 2018-11-11

## 2018-06-06 DIAGNOSIS — Z48.298 AFTERCARE FOLLOWING ORGAN TRANSPLANT: ICD-10-CM

## 2018-06-06 PROCEDURE — 80197 ASSAY OF TACROLIMUS: CPT | Performed by: INTERNAL MEDICINE

## 2018-06-07 ENCOUNTER — TELEPHONE (OUTPATIENT)
Dept: TRANSPLANT | Facility: CLINIC | Age: 23
End: 2018-06-07

## 2018-06-07 NOTE — TELEPHONE ENCOUNTER
ISSUES:  - creatinine 2.40, up from baseline 1.8-2.2 over past two years.  - tacrolimus level in route to lab; level one month ago 5.4.  - WBC 12.5    PHONE CALL:  - no new meds, no illnesses  - denies fever, cough and symptoms of dysuria  - missed morning dose of meds one time in past month    PLAN:  - ensure adequate hydration  - repeat creatinine next week  - follow up tacrolimus drug level  - verbalizes understanding and agreement with plan

## 2018-06-08 ENCOUNTER — TELEPHONE (OUTPATIENT)
Dept: TRANSPLANT | Facility: CLINIC | Age: 23
End: 2018-06-08

## 2018-06-08 LAB
TACROLIMUS BLD-MCNC: 6 UG/L (ref 5–15)
TME LAST DOSE: NORMAL H

## 2018-06-08 NOTE — TELEPHONE ENCOUNTER
Call placed to patient. Patient unsure of accurate trough level and confirms current dose. Patient v\u to ensure accurate trough with next lab draw.

## 2018-06-08 NOTE — TELEPHONE ENCOUNTER
Tacrolimus 6, goal 3-5 per protocol.   Please call pt to confirm this was a good trough level. Verify dose 1.5 mg BID. Repeat level with next set of labs. Will make dose changes at that time if necessary.

## 2018-06-19 NOTE — TELEPHONE ENCOUNTER
Patient returned call. Creatinine now 2.1, Patient denies any medication changes or recent illness and v\u to continue to improve hydration and repeat level in 1-2 weeks.

## 2018-07-09 DIAGNOSIS — Z48.298 AFTERCARE FOLLOWING ORGAN TRANSPLANT: ICD-10-CM

## 2018-07-09 PROCEDURE — 80197 ASSAY OF TACROLIMUS: CPT | Performed by: INTERNAL MEDICINE

## 2018-07-11 LAB
TACROLIMUS BLD-MCNC: 5.5 UG/L (ref 5–15)
TME LAST DOSE: NORMAL H

## 2018-08-10 DIAGNOSIS — Z48.298 AFTERCARE FOLLOWING ORGAN TRANSPLANT: ICD-10-CM

## 2018-08-10 PROCEDURE — 80197 ASSAY OF TACROLIMUS: CPT | Performed by: INTERNAL MEDICINE

## 2018-08-13 DIAGNOSIS — Z94.0 S/P KIDNEY TRANSPLANT: ICD-10-CM

## 2018-08-13 RX ORDER — AMLODIPINE BESYLATE 5 MG/1
TABLET ORAL
Qty: 90 TABLET | Refills: 3 | Status: SHIPPED | OUTPATIENT
Start: 2018-08-13 | End: 2019-06-07

## 2018-08-14 LAB
TACROLIMUS BLD-MCNC: 3.4 UG/L (ref 5–15)
TME LAST DOSE: ABNORMAL H

## 2018-10-17 DIAGNOSIS — Z48.298 AFTERCARE FOLLOWING ORGAN TRANSPLANT: ICD-10-CM

## 2018-10-17 PROCEDURE — 80197 ASSAY OF TACROLIMUS: CPT | Performed by: INTERNAL MEDICINE

## 2018-10-18 ENCOUNTER — TELEPHONE (OUTPATIENT)
Dept: TRANSPLANT | Facility: CLINIC | Age: 23
End: 2018-10-18

## 2018-10-18 LAB
TACROLIMUS BLD-MCNC: 4.9 UG/L (ref 5–15)
TME LAST DOSE: ABNORMAL H

## 2018-10-18 NOTE — TELEPHONE ENCOUNTER
ISSUE:  WBC elevated 11.6    PLAN:  Call and assess for any recent illness, fever, s/s of uti, or other infection symptoms  Creatinine at baseline.  If having any dysuria symptoms order ua/uc, otherwise f/u with PCP    LPN TASK:  Call with above instructions  Enter ua/uc orders if needed

## 2018-10-18 NOTE — TELEPHONE ENCOUNTER
Call placed to patient. He notes that he was recently treated for a sore throat and ear infection with antibiotics. Otherwise not currently ill, no fever, s\s of uti or painful urination. Patient v\u to f\u with his PCP for elevated WBC.

## 2018-10-24 ENCOUNTER — DOCUMENTATION ONLY (OUTPATIENT)
Dept: TRANSPLANT | Facility: CLINIC | Age: 23
End: 2018-10-24

## 2018-10-24 DIAGNOSIS — Z48.298 AFTERCARE FOLLOWING ORGAN TRANSPLANT: ICD-10-CM

## 2018-10-24 DIAGNOSIS — Z94.0 KIDNEY TRANSPLANTED: Primary | ICD-10-CM

## 2018-10-24 NOTE — PROGRESS NOTES
Chart Prep    Clinic Visit on: 12/6/18    Last lab completed:  10/17/18    Lab letter updated: 10/24/18    Lab orders faxed to:  [facility] [phone] (Phone)  [phone] (Fax)     Lab orders up to date in Epic.

## 2018-10-24 NOTE — LETTER
PHYSICIAN ORDERS    DATE & TIME ISSUED: 2018 5:25 PM  PATIENT NAME: Jhoan Hoffman   : 1995     [facility] MR# [if applicable]: [id number]     DIAGNOSIS / ICD - 10 CODES    Kidney Transplanted (Z94.0)    After Care Following Organ Transplant (Z48.298)    Long Term Use of Medication (Z79.899)    Complications Kidney Transplant (T86.10)      Please complete the following labs:    Every 3 Months    Basic Metabolic panel    Complete Blood Count    Tacrolimus level    Every 6 months    Protein Random Urine with creatinine ratio    PRA Donor Specific Antibodies      Patient should release information to the [facility].   Please fax results to the Transplant Center at [phone].  Any questions please call [phone].      .

## 2018-11-01 ENCOUNTER — TELEPHONE (OUTPATIENT)
Dept: TRANSPLANT | Facility: CLINIC | Age: 23
End: 2018-11-01

## 2018-11-01 NOTE — TELEPHONE ENCOUNTER
Called and spoke with Jhoan Lenz's mother.  She denies any new medications.  We discussed that Dr. Culp agrees that this is an ER issue.   Provided her with the physician to physician phone line if the ER providers want to discuss patient with our team.    Mother verbalized understanding.    Jose Culp MD Hasebroock, Rebecca, RN                   I agree.  I really wouldn't know what else to do outside of what has been done so far.  I don't see any medications that would likely cause it.  Would confirm that he wasn't started on a new medication, especially an ACEI or ARB.     Jose Juan            Previous Messages       ----- Message -----      From: Joan Mayer, RN      Sent: 11/1/2018   8:44 AM        To: Jose Culp MD   Subject: reccommendation                                   Hi Dr. Culp-   Received a call from Jhoan's mother.  He has been in the ER 3 in [facility] 3 times over the last week and is in the ER again today for his throat swelling shut.  Strep was negative, CT was negative for any abscesses or abnormalities.  He has received antibiotics as well as decadron.  I talked to the mother and so did Jacqueline.  We told her this is not a transplant issue this is an ER issue.  Jacqueline reccommended to the mother that they go to an ER that is a little bigger than [facility], maybe [facility] or the .       Mother was insistent that we talk to a nephrologist for further recommendations.  Do you have any further recommendations other than what we told her?     Ginger

## 2018-11-01 NOTE — TELEPHONE ENCOUNTER
Received a call from Jhoan's mother informing me that he has been in the ER in [facility] 3 times int he last week and is there again this morning because his throat is swelling shut.  Mother reports he received antibiotics and decadron.  Strep test was negative and CT was negative for any abnormalities.   Mother looking for recommendations from the transplant team.    Jacqueline Meehan RNCC spoke with mother and informed her that this was an ER issue, not a transplant issue.  Mother concerned that strep causes kidney failure, but strep test was negative.  If the ER keeps discharging him and they are not getting the workup that is needed, Jacqueline recommended going to a larger ER, possibly [facility] or the  of .  Discussed that we can always provide a doc to doc number if the ER physicians want to collaborate with our team.      Discussed with mother that we will inform Dr. Culp of her concern and see if he has any recommendations further.  Mother verbalized understanding.   Call back number provided [phone].

## 2018-11-11 DIAGNOSIS — Z94.0 KIDNEY TRANSPLANTED: Primary | ICD-10-CM

## 2018-11-12 DIAGNOSIS — T86.11 ACUTE REJECTION OF KIDNEY TRANSPLANT: ICD-10-CM

## 2018-11-12 DIAGNOSIS — N18.1 CHRONIC KIDNEY DISEASE, STAGE I: ICD-10-CM

## 2018-11-12 DIAGNOSIS — Z94.0 KIDNEY REPLACED BY TRANSPLANT: ICD-10-CM

## 2018-11-12 RX ORDER — SULFAMETHOXAZOLE AND TRIMETHOPRIM 400; 80 MG/1; MG/1
1 TABLET ORAL DAILY
Qty: 30 TABLET | Refills: 11 | Status: SHIPPED | OUTPATIENT
Start: 2018-11-12 | End: 2019-10-22

## 2018-11-13 RX ORDER — FLUOXETINE 40 MG/1
CAPSULE ORAL
Qty: 90 CAPSULE | Refills: 11 | OUTPATIENT
Start: 2018-11-13

## 2018-11-13 RX ORDER — ATENOLOL 25 MG/1
TABLET ORAL
Qty: 180 TABLET | Refills: 11 | Status: SHIPPED | OUTPATIENT
Start: 2018-11-13 | End: 2019-08-12 | Stop reason: ALTCHOICE

## 2018-12-06 ENCOUNTER — OFFICE VISIT (OUTPATIENT)
Dept: NEPHROLOGY | Facility: CLINIC | Age: 23
End: 2018-12-06
Attending: INTERNAL MEDICINE
Payer: MEDICAID

## 2018-12-06 VITALS
OXYGEN SATURATION: 97 % | HEART RATE: 76 BPM | HEIGHT: 65 IN | WEIGHT: 203.4 LBS | SYSTOLIC BLOOD PRESSURE: 136 MMHG | DIASTOLIC BLOOD PRESSURE: 87 MMHG | BODY MASS INDEX: 33.89 KG/M2

## 2018-12-06 DIAGNOSIS — Z48.298 AFTERCARE FOLLOWING ORGAN TRANSPLANT: Primary | ICD-10-CM

## 2018-12-06 DIAGNOSIS — T86.11 ACUTE REJECTION OF KIDNEY TRANSPLANT: ICD-10-CM

## 2018-12-06 DIAGNOSIS — Z94.0 KIDNEY TRANSPLANTED: ICD-10-CM

## 2018-12-06 DIAGNOSIS — N18.30 ANEMIA IN STAGE 3 CHRONIC KIDNEY DISEASE (H): ICD-10-CM

## 2018-12-06 DIAGNOSIS — D84.9 IMMUNOSUPPRESSED STATUS (H): ICD-10-CM

## 2018-12-06 DIAGNOSIS — E55.9 VITAMIN D DEFICIENCY: ICD-10-CM

## 2018-12-06 DIAGNOSIS — Z94.0 S/P KIDNEY TRANSPLANT: ICD-10-CM

## 2018-12-06 DIAGNOSIS — D63.1 ANEMIA IN STAGE 3 CHRONIC KIDNEY DISEASE (H): ICD-10-CM

## 2018-12-06 DIAGNOSIS — I15.1 HYPERTENSION SECONDARY TO OTHER RENAL DISORDERS: ICD-10-CM

## 2018-12-06 DIAGNOSIS — Z94.0 KIDNEY REPLACED BY TRANSPLANT: ICD-10-CM

## 2018-12-06 DIAGNOSIS — Z48.298 AFTERCARE FOLLOWING ORGAN TRANSPLANT: ICD-10-CM

## 2018-12-06 DIAGNOSIS — N25.81 SECONDARY RENAL HYPERPARATHYROIDISM (H): ICD-10-CM

## 2018-12-06 PROCEDURE — G0463 HOSPITAL OUTPT CLINIC VISIT: HCPCS | Mod: ZF

## 2018-12-06 PROCEDURE — 80197 ASSAY OF TACROLIMUS: CPT | Performed by: INTERNAL MEDICINE

## 2018-12-06 ASSESSMENT — PAIN SCALES - GENERAL: PAINLEVEL: NO PAIN (0)

## 2018-12-06 NOTE — MR AVS SNAPSHOT
After Visit Summary   12/6/2018    Jhoan Hoffman    MRN: [id number]           Patient Information     Date Of Birth          1995        Visit Information        Provider Department      12/6/2018 4:35 PM 1, Uc Kidney/Pancreas Recipient [facility]        Today's Diagnoses     Kidney replaced by transplant    -  1    Aftercare following organ transplant        Immunosuppressed status (H)        Kidney transplanted        Hypertension secondary to other renal disorders        Acute rejection of kidney transplant        Secondary renal hyperparathyroidism (H)        Vitamin D deficiency           Follow-ups after your visit        Follow-up notes from your care team     Return in about 6 months (around 6/6/2019) for Routine Visit.      Your next 10 appointments already scheduled     Dec 06, 2018  4:35 PM CST   (Arrive by 4:05 PM)   Return Kidney Transplant with  Kidney/Pancreas Recipient 1   [facility] ([facility])    [address] [place] [address]  [address] [place] [address] [id number] [phone]            Jun 07, 2019  3:35 PM CDT   (Arrive by 3:05 PM)   Return Kidney Transplant with  Kidney/Pancreas Recipient 1   [facility] ([facility])    [address] [place] [address]  [address] [place] [id number] [phone]              Who to contact     If you have questions or need follow up information about today's clinic visit or your schedule please contact [facility] [place] [facility] NE[facility] directly at [phone].  Normal or non-critical lab and imaging results will be communicated to you by MyChart, letter or phone within 4 business days after the clinic has received the results. If you do not hear from us within 7 days, please contact the clinic through MyChart or phone. If you have a critical or abnormal lab result, we will notify you by phone as soon as possible.  Submit refill requests through [email or web address] or call your pharmacy and  "they will forward the refill request to us. Please allow 3 business days for your refill to be completed.          Additional Information About Your Visit        MyChart Information     [facility] lets you send messages to your doctor, view your test results, renew your prescriptions, schedule appointments and more. To sign up, go to [email or web address] . Click on \"Log in\" on the left side of the screen, which will take you to the Welcome page. Then click on \"Sign up Now\" on the right side of the page.     You will be asked to enter the access code listed below, as well as some personal information. Please follow the directions to create your username and password.     Your access code is: [email or web address]  Expires: 3/6/2019  4:22 PM     Your access code will  in 90 days. If you need help or a new code, please call your [place] clinic or [phone].        Care EveryWhere ID     This is your Care EveryWhere ID. This could be used by other organizations to access your [place] medical records  [id number]        Your Vitals Were     Pulse Height Pulse Oximetry BMI (Body Mass Index)          76 1.651 m (5' 5\") 97% 33.85 kg/m2         Blood Pressure from Last 3 Encounters:   18 136/87   18 127/80   17 136/87    Weight from Last 3 Encounters:   18 92.3 kg (203 lb 6.4 oz)   18 91.3 kg (201 lb 3.2 oz)   17 87.6 kg (193 lb 3.2 oz)              Today, you had the following     No orders found for display       Primary Care Provider Office Phone # Fax #    Leandro ALATORRE Manas [phone]       [place] [address] [address] [facility] [id number]        Equal Access to Services     KOURTNEY SCHWARTZ : Walt Avina, bari medina, qanitish kaalrudi veras. So [facility] [phone].    ATENCIÓN: Si habla español, tiene a nowak disposición servicios gratuitos de asistencia lingüística. Llame al [phone].    We " comply with applicable federal civil rights laws and [place] laws. We do not discriminate on the basis of race, color, national origin, age, disability, sex, sexual orientation, or gender identity.            Thank you!     Thank you for choosing [facility]PHR[facility]  for your care. Our goal is always to provide you with excellent care. Hearing back from our patients is one way we can continue to improve our services. Please take a few minutes to complete the written survey that you may receive in the mail after your visit with us. Thank you!             Your Updated Medication List - Protect others around you: Learn how to safely use, store and throw away your medicines at www.disposemymeds.org.          This list is accurate as of 12/6/18  4:22 PM.  Always use your most recent med list.                   Brand Name Dispense Instructions for use Diagnosis    amLODIPine 5 MG tablet    NORVASC    90 tablet    TAKE 1 TABLET BY MOUTH ONCE DAILY    S/P kidney transplant       ASPIRIN ADULT LOW STRENGTH 81 MG EC tablet   Generic drug:  aspirin     90 tablet    Take 1 tablet by mouth daily.    S/P kidney transplant       atenolol 25 MG tablet    TENORMIN    180 tablet    TAKE 1 TABLET BY MOUTH TWICE DAILY    Kidney replaced by transplant, Chronic kidney disease, stage I, Acute rejection of kidney transplant       cholecalciferol 2000 units Caps     30 capsule    Take 2,000 Int'l Units by mouth daily        clonazePAM 0.5 MG tablet    klonoPIN    20 tablet    Take 1 tablet (0.5 mg) by mouth 2 times daily as needed for anxiety    Depression with anxiety       FLUoxetine 20 MG capsule    PROzac    90 capsule    Take 40 mg by mouth daily    Depression with anxiety       mycophenolate 250 MG capsule    GENERIC EQUIVALENT    120 capsule    TAKE 2 CAPSULES TWICE A DAY    Kidney replaced by transplant       omeprazole 20 MG tablet     180 tablet    Take 1 tablet (20 mg) by mouth every 12 hours    S/P kidney transplant, Acute  rejection of kidney transplant       predniSONE 10 MG tablet    DELTASONE    15 tablet    TAKE 1/2 TABLET BY MOUTH ONCE DAILY    Chronic kidney disease, stage I, Kidney replaced by transplant, Acute rejection of kidney transplant       sulfamethoxazole-trimethoprim 400-80 MG tablet    BACTRIM/SEPTRA    30 tablet    Take 1 tablet by mouth daily    Kidney transplanted       tacrolimus 0.5 MG capsule    GENERIC EQUIVALENT    180 capsule    Take 3 capsules (1.5 mg) by mouth 2 times daily    S/P kidney transplant, Kidney replaced by transplant

## 2018-12-06 NOTE — NURSING NOTE
"Chief Complaint   Patient presents with     RECHECK     6 month post kidney tx     /87  Pulse 76  Ht 1.651 m (5' 5\")  Wt 92.3 kg (203 lb 6.4 oz)  SpO2 97%  BMI 33.85 kg/m2  Marva Waddell CMA  "

## 2018-12-06 NOTE — PROGRESS NOTES
[facility]  Nephrology Clinic  Kidney/Pancreas Recipient  2018     Name: Jhoan Hoffman  MRN: [id number]   Age: 23 year old  : 1995  Referring provider: Jan Jones     CHRONIC TRANSPLANT NEPHROLOGY VISIT    Assessment and Plan:   # DDKT:    - Baseline Cr ~ 1.9-2.2; Stable from last visit and less likely rejection.  The patient is on triple immune suppression but with some low-normal tacrolimus levels.  Will recheck DSA and urine studies and consider biopsy vs increase in tacrolimus goals   - Proteinuria: Mild   - Date of DSA last checked: 2017 Latest DSA: No   - BK Viremia: No   - Kidney Tx Biopsy: No    # Immunosuppression: Tacrolimus immediate release (goal  4-6), Mycophenolate mofetil (goal  1-3.5) and Prednisone (dose  5 mg daily)   - Changes: No    # Prophylaxis:    - PJP on bactrim.     # Hypertension: Controlled; Goal BP: < 130/80   - Changes: No    # Anemia in chronic renal disease: Hgb: Stable   - Iron studies: Not checked recently    # Mineral Bone Disorder:    - Secondary renal hyperparathyroidism; PTH level is: Minimally elevated   - Vitamin D; level is: Low, started on supplementation   - Calcium; level is: Normal   - Phosphorus; level is: Not checked recently     # Electrolytes:   - Bicarbonate; level: Low, mildly low, continue to monitor    Follow-up: Return in about 6 months (around 2019) for Routine Visit.     # Transplant History:  Etiology of kidney failure: congenital renal disease  Tx: DDKT  Transplant: 2012 (Kidney), 1995 (Kidney), 2011 (Kidney)  Donor Type:  - Brain Death Donor Class: Standard Criteria Donor  History of BK viremia: Yes  Significant changes in immunosuppression: None  Significant transplant-related complications: None    Transplant Office Phone Number: [phone]    Assessment and plan was discussed with the patient and they voiced understanding and agreement.    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 The patient was last seen on 03/12/2018. During this visit no changes were made to his immunosuppression. Please see that note for details.     Today, the patient reports feeling great. Was recently at the Emergency Room for throat pain and inflammation. This has since resolved. He says he is not exercising very frequently. The patient has received a flu vaccination this season.  He notes current good energy level.  He enjoys playing video games.     Recent Hospitalizations:  [x] No [] Yes    New Medical Issues: [x] No [] Yes    Decreased energy: [x] No [] Yes    Chest pain or SOB with exertion:  [x] No [] Yes    Appetite change or weight change: [x] No [] Yes    Nausea, vomiting or diarrhea:  [x] No [] Yes    Fever, sweats or chills: [x] No [] Yes    Leg swelling: [x] No [] Yes      Other medical issues:  No    Home BP: Doesn't know     Review of Systems:   A comprehensive review of systems was obtained and negative, except as noted in the HPI or past medical history.     Active Medications:     Current Outpatient Prescriptions:      amLODIPine (NORVASC) 5 MG tablet, TAKE 1 TABLET BY MOUTH ONCE DAILY, Disp: 90 tablet, Rfl: 3     aspirin (ASPIRIN ADULT LOW STRENGTH) 81 MG EC tablet, Take 1 tablet by mouth daily., Disp: 90 tablet, Rfl: 3     atenolol (TENORMIN) 25 MG tablet, TAKE 1 TABLET BY MOUTH TWICE DAILY, Disp: 180 tablet, Rfl: 11     cholecalciferol 2000 UNITS CAPS, Take 2,000 Int'l Units by mouth daily, Disp: 30 capsule, Rfl:      clonazePAM (KLONOPIN) 0.5 MG tablet, Take 1 tablet (0.5 mg) by mouth 2 times daily as needed for anxiety, Disp: 20 tablet, Rfl: 0     FLUoxetine (PROZAC) 20 MG capsule, Take 40 mg by mouth daily , Disp: 90 capsule, Rfl: 0     mycophenolate (GENERIC EQUIVALENT) 250 MG capsule, TAKE 2 CAPSULES TWICE A DAY,  Disp: 120 capsule, Rfl: 6     omeprazole 20 MG tablet, Take 1 tablet (20 mg) by mouth every 12 hours, Disp: 180 tablet, Rfl: 0     predniSONE (DELTASONE) 10 MG tablet, TAKE 1/2 TABLET BY MOUTH ONCE DAILY, Disp: 15 tablet, Rfl: 11     sulfamethoxazole-trimethoprim (BACTRIM/SEPTRA) 400-80 MG per tablet, Take 1 tablet by mouth daily, Disp: 30 tablet, Rfl: 11     tacrolimus (GENERIC EQUIVALENT) 0.5 MG capsule, Take 3 capsules (1.5 mg) by mouth 2 times daily, Disp: 180 capsule, Rfl: 11      Allergies:   Amoxicillin  Azithromycin  Vancomycin  Cefprozil      Active Medical Problems:  Kidney replaced by transplant  Hypertension secondary to other renal disorders  Depression   BK viremia  Gastroesophageal reflux disease  Secondary renal hyperparathyroidism  Vitamin D deficiency  Immunosuppressed status    Social History:   The patient doesn't smoke or consume alcohol.     Physical Exam:   There were no vitals taken for this visit.   Wt Readings from Last 4 Encounters:   03/12/18 91.3 kg (201 lb 3.2 oz)   09/18/17 87.6 kg (193 lb 3.2 oz)   02/20/17 91.5 kg (201 lb 12.8 oz)   08/25/16 90 kg (198 lb 6.4 oz)       GENERAL APPEARANCE: alert and no distress  HENT: mouth without ulcers or lesions  LYMPHATICS: no cervical or supraclavicular nodes  RESP: lungs clear to auscultation - no rales, rhonchi or wheezes  CV: regular rhythm, normal rate, no rub, no murmur  EDEMA: no LE edema bilaterally  ABDOMEN: soft, nondistended, nontender, bowel sounds normal  MS: extremities normal - no gross deformities noted, no evidence of inflammation in joints, no muscle tenderness  SKIN: no rash  TX KIDNEY: normal      Data:   Renal Latest Ref Rng & Units 12/6/2018 10/17/2018 8/10/2018   Na 133 - 144 mmol/L - - -   Na (external) 134 - 145 mmol/L 143 142 143   K 3.4 - 5.3 mmol/L - - -   K (external) 3.5 - 5.1 mmol/L 4.1 4.2 4.2   Cl 94 - 109 mmol/L - - -   Cl (external) 97 - 107 mmol/L 109(H) 107 105   CO2 20 - 32 mmol/L - - -   CO2 (external) 22 -  29 mmol/L 20(L) 21.0(L) 24.0   BUN 7 - 30 mg/dL - - -   BUN (external) 7.0 - 27.0 mg/dL 25.0 21.0 18.0   Cr 0.66 - 1.25 mg/dL - - -   Cr (external) 0.64 - 1.34 mg/dL 2.25(H) 1.88(H) 1.94(H)   Glucose 70 - 99 mg/dL - - -   Glucose (external) 70 - 110 mg/dL 100 86 94   Ca  8.5 - 10.1 mg/dL - - -   Ca (external) 8.6 - 10.3 mg/dL 10.1 10.0 9.1   Mg 1.6 - 2.3 mg/dL - - -   Mg (external) 1.8 - 2.4 MG/DL - - -     Bone Health Latest Ref Rng & Units 9/18/2017 3/27/2015 1/28/2015   Phos 2.8 - 4.6 mg/dL - - -   Phos (external) 2.5 - 4.9 MG/DL - 4.1 3.7   PTHi 12 - 72 pg/mL 151(H) - -   Vit D Def 20 - 75 ug/L 28 - -     Heme Latest Ref Rng & Units 12/6/2018 10/17/2018 8/10/2018   WBC 4.0 - 11.0 10e9/L - - -   WBC (external) 4.8 - 10.8 10(3)/uL 9.8 11.6(H) 9.2   Hgb 13.3 - 17.7 g/dL - - -   Hgb (external) 14.0 - 18.0 g/dL 13.5(L) 13.0(L) 13.8(L)   Plt 150 - 450 10e9/L - - -   Plt (external) 150 - 350 10(3)/uL 217 231 191     Liver Latest Ref Rng & Units 11/7/2017 9/18/2015 4/28/2015   AP 40 - 150 U/L - - -   AP (external) 46 - 116 U/L 146(H) 102 110   TBili 0.2 - 1.3 mg/dL - - -   TBili (external) 0.2 - 1.0 MG/DL 0.3 0.5 0.4   ALT 0 - 70 U/L - - -   ALT (external) 12 - 78 U/L 30 34 36   AST 0 - 45 U/L - - -   AST (external) 15 - 37 U/L 14(L) 15 14(L)   Tot Protein 6.8 - 8.8 g/dL - - -   Tot Protein (external) 6.4 - 8.2 GM/DL 7.3 7.0 6.3(L)   Albumin 3.9 - 5.1 g/dL - - -   Albumin (external) 3.4 - 5.0 GM/DL 4.0 4.1 3.6     Pancreas Latest Ref Rng & Units 2/16/2011 10/20/2010   Amylase 30 - 110 U/L 142(H) 127(H)   Lipase 20 - 250 U/L 202 256(H)     Iron studies Latest Ref Rng & Units 3/21/2012 12/13/2011 9/27/2011   Iron 35 - 180 ug/dL 138 63 81   Iron sat 15 - 46 % 75(H) 33 55(H)   Ferritin 20 - 300 ng/mL - - -     [facility] Txp Virology Latest Ref Rng & Units 11/7/2017 5/4/2017 2/17/2017   CMV IgG EU/mL - - -   CMV IgM <0.90 - - -   CMV IgM Interp <0.90 - - -   CVM DNA Quant - - - -   CMV Quant <100 Copies/mL - - -   CMV QT Log <2.0  Log copies/mL - - -   BK Spec - - - -   BK Res BKNEG copies/mL - - -   BK Log <2.7 Log copies/mL - - -   BK Quant Log Ext - - - -   BK Quant Result Ext None detected None detected None detected None Detected   BK Quant Spec Ext - - - -   EBV IgG - - - -   Hep B Core NEG - - -   Hep B Surf - - - -   HIV 1&2 NEG - - -        Recent Labs   Lab Test  07/09/18   0850  08/10/18   0819  10/17/18   0834   DOSTAC  07/08/18  2200  2030, 08/09/18  2000, 10/16/18   TACROL  5.5  3.4*  4.9*     Recent Labs   Lab Test  06/02/11   0654  06/03/11   2340  08/30/11   1503   DOSMPA  Not Provided  Not Provided  [id number] CORRECTED ON 08/30 AT 1509: PREVIOUSLY REPORTED  ON 8 30   MPAG  >200.0*  >200.0*  38.5           Scribe Disclosure:   I, John Gold, am serving as a scribe to document services personally performed by German Comer at this visit, based upon the provider's statements to me. All documentation has been reviewed by the aforementioned provider prior to being entered into the official medical record.     Portions of this medical record were completed by a scribe. UPON MY REVIEW AND AUTHENTICATION BY ELECTRONIC SIGNATURE, this confirms (a) I performed the applicable clinical services, and (b) the record is accurate.

## 2018-12-07 ENCOUNTER — TELEPHONE (OUTPATIENT)
Dept: TRANSPLANT | Facility: CLINIC | Age: 23
End: 2018-12-07

## 2018-12-07 LAB
TACROLIMUS BLD-MCNC: 4.3 UG/L (ref 5–15)
TME LAST DOSE: ABNORMAL H

## 2018-12-07 NOTE — LETTER
PHYSICIAN ORDERS      DATE & TIME ISSUED: 2018 11:11 AM  PATIENT NAME: Jhoan Hoffman   : 1995     [facility] MR# [if applicable]: [id number]     DIAGNOSIS:  Kidney transplant  ICD-10 CODE: Z94.0     Please obtain the following labs in 1-2 weeks   Routine UA with micro reflex to culture   Random urine protein/creatinine ratio   PRA Donor Specic Antibody     Any questions please call: [phone]  Please fax results [phone].    .

## 2018-12-07 NOTE — TELEPHONE ENCOUNTER
Called Jhoan and discussed Dr. Comer's reccommendations listed below.    Jhoan verbalized understanding and states he will go get them done in the next 1-2 weeks    German Comer MD Hasebroock, Rebecca, RN                   Creatinine up a little over the past couple of years     Lets check:   1. Urine protein   2. Urinalysis   3. DSA     Thanks,   Viral

## 2018-12-07 NOTE — LETTER
PHYSICIAN ORDERS      DATE & TIME ISSUED: 2018 11:07 AM  PATIENT NAME: Jhoan Hoffman   : 1995     [facility] MR# [if applicable]: [id number]     DIAGNOSIS:  Kidney transplant  ICD-10 CODE: Z94.0     Please obtain the following labs in 1-2 weeks   Routine UA with micro reflex to culture   Random urine protein/creatinine ratio          Any questions please call: ***    { TRANSPLANT DEPT FAXES:[id number]}.    {[facility] TRANSPLANT MD SIGNATURE:[id number]}

## 2019-01-15 ENCOUNTER — RESULTS ONLY (OUTPATIENT)
Dept: OTHER | Facility: CLINIC | Age: 24
End: 2019-01-15

## 2019-01-15 DIAGNOSIS — Z48.298 AFTERCARE FOLLOWING ORGAN TRANSPLANT: ICD-10-CM

## 2019-01-15 DIAGNOSIS — Z94.0 KIDNEY TRANSPLANTED: ICD-10-CM

## 2019-01-15 PROCEDURE — 86833 HLA CLASS II HIGH DEFIN QUAL: CPT | Performed by: TRANSPLANT SURGERY

## 2019-01-15 PROCEDURE — 80197 ASSAY OF TACROLIMUS: CPT | Performed by: INTERNAL MEDICINE

## 2019-01-15 PROCEDURE — 86832 HLA CLASS I HIGH DEFIN QUAL: CPT | Performed by: TRANSPLANT SURGERY

## 2019-01-16 ENCOUNTER — TELEPHONE (OUTPATIENT)
Dept: TRANSPLANT | Facility: CLINIC | Age: 24
End: 2019-01-16

## 2019-01-16 LAB
TACROLIMUS BLD-MCNC: 5.5 UG/L (ref 5–15)
TME LAST DOSE: NORMAL H

## 2019-01-16 NOTE — LETTER
PHYSICIAN ORDERS      DATE & TIME ISSUED: 2019 11:12 AM  PATIENT NAME: Jhoan Hoffman   : 1995     [facility] MR# [if applicable]: [id number]     DIAGNOSIS:  Kidney transplant  ICD-10 CODE: Z94.0     Please obtain the following labs with next lab draw   Routine UA with micro reflex to culture               Random urine protein/creatinine ratio               PRA Donor Specic Antibody     BMP    Any questions please call: [phone]  Please fax results to [phone].    .

## 2019-01-16 NOTE — TELEPHONE ENCOUNTER
ISSUE:  WBC elevated 11.9  Patient was supposed to have UA with reflux to culture, DSA, and urine protein creatinine ration completed in December per Dr. Comer's request for elevated creatinine    PLAN:  Contacted lab to track down lab results  Call and assess for s/s of infection.  Encourage him to follow up with PCP for any illness  Encourage him to have additional labs completed    OUTCOME:  Called patients lab.  A BMP was collected yesterday, but no other labs.  They will fax BMP.   Called and spoke with Jhoan.  He states he has recently had some diarrhea but other than that no s/s of infection.  I encouraged him to follow up with his PCP if he continues to have diarrhea or he develops other signs of infection.   Patient plans to go get labs done tomorrow.  Orders faxed.

## 2019-01-17 LAB
DONOR IDENTIFICATION: NORMAL
DPB1*04 01: 1197
DSA COMMENTS: NORMAL
DSA PRESENT: YES
DSA TEST METHOD: NORMAL
ORGAN: NORMAL
SA1 CELL: NORMAL
SA1 COMMENTS: NORMAL
SA1 HI RISK ABY: NORMAL
SA1 MOD RISK ABY: NORMAL
SA1 TEST METHOD: NORMAL
SA2 CELL: NORMAL
SA2 COMMENTS: NORMAL
SA2 HI RISK ABY UA: NORMAL
SA2 MOD RISK ABY: NORMAL
SA2 TEST METHOD: NORMAL
UNACCEPTABLE ANTIGEN: NORMAL
UNOS CPRA: 100

## 2019-03-17 DIAGNOSIS — T86.11 ACUTE REJECTION OF KIDNEY TRANSPLANT: ICD-10-CM

## 2019-03-17 DIAGNOSIS — Z94.0 KIDNEY REPLACED BY TRANSPLANT: Primary | ICD-10-CM

## 2019-03-17 DIAGNOSIS — N18.1 CHRONIC KIDNEY DISEASE, STAGE I: ICD-10-CM

## 2019-03-18 RX ORDER — PREDNISONE 10 MG/1
5 TABLET ORAL DAILY
Qty: 15 TABLET | Refills: 11 | Status: SHIPPED | OUTPATIENT
Start: 2019-03-18 | End: 2020-07-10

## 2019-04-08 DIAGNOSIS — Z94.0 KIDNEY REPLACED BY TRANSPLANT: Primary | ICD-10-CM

## 2019-04-08 RX ORDER — MYCOPHENOLATE MOFETIL 250 MG/1
500 CAPSULE ORAL 2 TIMES DAILY
Qty: 120 CAPSULE | Refills: 1 | Status: SHIPPED | OUTPATIENT
Start: 2019-04-08 | End: 2019-06-03

## 2019-04-15 ENCOUNTER — TELEPHONE (OUTPATIENT)
Dept: TRANSPLANT | Facility: CLINIC | Age: 24
End: 2019-04-15

## 2019-04-15 ENCOUNTER — RESULTS ONLY (OUTPATIENT)
Dept: OTHER | Facility: CLINIC | Age: 24
End: 2019-04-15

## 2019-04-15 DIAGNOSIS — Z94.0 S/P KIDNEY TRANSPLANT: ICD-10-CM

## 2019-04-15 DIAGNOSIS — Z94.0 KIDNEY REPLACED BY TRANSPLANT: Primary | ICD-10-CM

## 2019-04-15 DIAGNOSIS — Z48.298 AFTERCARE FOLLOWING ORGAN TRANSPLANT: ICD-10-CM

## 2019-04-15 DIAGNOSIS — Z94.0 KIDNEY TRANSPLANTED: ICD-10-CM

## 2019-04-15 DIAGNOSIS — Z94.0 KIDNEY TRANSPLANTED: Primary | ICD-10-CM

## 2019-04-15 PROCEDURE — 86832 HLA CLASS I HIGH DEFIN QUAL: CPT | Performed by: TRANSPLANT SURGERY

## 2019-04-15 PROCEDURE — 80197 ASSAY OF TACROLIMUS: CPT | Performed by: INTERNAL MEDICINE

## 2019-04-15 PROCEDURE — 86833 HLA CLASS II HIGH DEFIN QUAL: CPT | Performed by: TRANSPLANT SURGERY

## 2019-04-15 RX ORDER — TACROLIMUS 0.5 MG/1
1.5 CAPSULE ORAL 2 TIMES DAILY
Qty: 180 CAPSULE | Refills: 1 | Status: SHIPPED | OUTPATIENT
Start: 2019-04-15 | End: 2019-06-03

## 2019-04-15 NOTE — LETTER
PHYSICIAN ORDERS      DATE & TIME ISSUED: April 15, 2019 11:33 AM  PATIENT NAME: Jhoan Hoffman   : 1995     [facility] MR# [if applicable]: [id number]     DIAGNOSIS:  Kidney transplant  ICD-10 CODE: Z94.0     Please obtain the following labs in 1-2 weeks   BMP      Any questions please call: [phone]  Please fax results to [phone].    .

## 2019-04-15 NOTE — TELEPHONE ENCOUNTER
"ISSUE:  Creatinine elevated     PLAN:  Call and assess hydration.  How much water does he drink per day?  Any recent illness, diarrhea, symptoms of a UTI, or medication changes?  Recommend increasing hydration and repeating labs within 1 week    OUTCOME:  Spoke with Jhoan.  He denies any recent illness, diarrhea, s/s of a UTI, or medication changes.  He reports he does not drink much water.  He is \"unsure\" exactly how much he drinks, probably a glass a day.  I educated him that he needs to increase his hydration to 2-3 L/day.  Jhoan voiced understanding.  He also reports he drinks a lot of caffeine. We discussed that he needs to cut back on caffeine and increase hydration and repeat labs in 1 week       "

## 2019-04-15 NOTE — LETTER
PHYSICIAN ORDERS      DATE & TIME ISSUED: 2019 2:01 PM  PATIENT NAME: Jhoan Hoffman   : 1995     [facility] MR# [if applicable]: [id number]     DIAGNOSIS:  Kidney transplant  ICD-10 CODE: Z94.0     Please obtain the following labs in 1-2 weeks   BMP    12 hour tacrolimus level    Any questions please call: [phone]  Please fax results to [phone].    .

## 2019-04-17 LAB
TACROLIMUS BLD-MCNC: 7.5 UG/L (ref 5–15)
TME LAST DOSE: NORMAL H

## 2019-04-18 DIAGNOSIS — Z94.0 KIDNEY REPLACED BY TRANSPLANT: Primary | ICD-10-CM

## 2019-04-18 DIAGNOSIS — Z79.899 ENCOUNTER FOR LONG-TERM CURRENT USE OF MEDICATION: ICD-10-CM

## 2019-04-18 DIAGNOSIS — Z48.298 AFTERCARE FOLLOWING ORGAN TRANSPLANT: ICD-10-CM

## 2019-04-18 NOTE — TELEPHONE ENCOUNTER
ISSUE:   Tacrolimus level 7.5 on 4/15, goal 4-6, dose 1.5 mg BID    PLAN:   Please call pt and confirm this was a good 12-hour trough. Verify dose 1.5 mg BID.   Confirm no new medications or illness (francisco. Diarrhea).   Prior levels have been within goal.  Recommend staying on the same dose and recheck level in 1 week when he repeats creatinine.    LPN TASK:   Call with above instructions   Update lab orders

## 2019-04-18 NOTE — TELEPHONE ENCOUNTER
Spoke with Jhoan regarding his elevated tacrolimus level.    He confirms an accurate trough and current dose.  He plans to repeat within 1 week.

## 2019-04-23 LAB
DONOR IDENTIFICATION: NORMAL
DPB1*04 01: 1546
DSA COMMENTS: NORMAL
DSA PRESENT: YES
DSA TEST METHOD: NORMAL
ORGAN: NORMAL
SA1 CELL: NORMAL
SA1 COMMENTS: NORMAL
SA1 HI RISK ABY: NORMAL
SA1 MOD RISK ABY: NORMAL
SA1 TEST METHOD: NORMAL
SA2 CELL: NORMAL
SA2 COMMENTS: NORMAL
SA2 HI RISK ABY UA: NORMAL
SA2 MOD RISK ABY: NORMAL
SA2 TEST METHOD: NORMAL
UNACCEPTABLE ANTIGEN: NORMAL
UNOS CPRA: 100

## 2019-05-10 ENCOUNTER — TELEPHONE (OUTPATIENT)
Dept: TRANSPLANT | Facility: CLINIC | Age: 24
End: 2019-05-10

## 2019-05-10 NOTE — TELEPHONE ENCOUNTER
Call returned to [facility]. ICD-10 code and transplant date provided.

## 2019-05-10 NOTE — TELEPHONE ENCOUNTER
Provider Call: Medication Refill  Route to LPN  Pharmacy Name: [facility]    They need ICD code for medication refill for Tacro & Mycophenolate     When will the patient be out of this medication?: Less than 3 days (Route high priority)  Callback needed? Yes    Return Call Needed  Same as documented in contacts section  When to return call?: Same day: Route High Priority

## 2019-06-03 DIAGNOSIS — Z94.0 KIDNEY REPLACED BY TRANSPLANT: ICD-10-CM

## 2019-06-03 DIAGNOSIS — Z94.0 S/P KIDNEY TRANSPLANT: Primary | ICD-10-CM

## 2019-06-03 RX ORDER — TACROLIMUS 0.5 MG/1
1.5 CAPSULE ORAL 2 TIMES DAILY
Qty: 180 CAPSULE | Refills: 3 | Status: SHIPPED | OUTPATIENT
Start: 2019-06-03 | End: 2019-06-28

## 2019-06-03 RX ORDER — MYCOPHENOLATE MOFETIL 250 MG/1
500 CAPSULE ORAL 2 TIMES DAILY
Qty: 120 CAPSULE | Refills: 3 | Status: SHIPPED | OUTPATIENT
Start: 2019-06-03 | End: 2019-06-28

## 2019-06-07 ENCOUNTER — OFFICE VISIT (OUTPATIENT)
Dept: NEPHROLOGY | Facility: CLINIC | Age: 24
End: 2019-06-07
Attending: INTERNAL MEDICINE
Payer: MEDICAID

## 2019-06-07 VITALS
OXYGEN SATURATION: 96 % | HEART RATE: 85 BPM | TEMPERATURE: 98.4 F | SYSTOLIC BLOOD PRESSURE: 134 MMHG | BODY MASS INDEX: 34.48 KG/M2 | WEIGHT: 207.2 LBS | DIASTOLIC BLOOD PRESSURE: 87 MMHG

## 2019-06-07 DIAGNOSIS — E55.9 VITAMIN D DEFICIENCY: ICD-10-CM

## 2019-06-07 DIAGNOSIS — Z79.899 ENCOUNTER FOR LONG-TERM CURRENT USE OF MEDICATION: ICD-10-CM

## 2019-06-07 DIAGNOSIS — Z94.0 HTN, KIDNEY TRANSPLANT RELATED: Primary | ICD-10-CM

## 2019-06-07 DIAGNOSIS — Z94.0 KIDNEY REPLACED BY TRANSPLANT: ICD-10-CM

## 2019-06-07 DIAGNOSIS — Z48.298 AFTERCARE FOLLOWING ORGAN TRANSPLANT: ICD-10-CM

## 2019-06-07 DIAGNOSIS — N25.81 SECONDARY RENAL HYPERPARATHYROIDISM (H): ICD-10-CM

## 2019-06-07 DIAGNOSIS — I15.1 HTN, KIDNEY TRANSPLANT RELATED: Primary | ICD-10-CM

## 2019-06-07 DIAGNOSIS — D84.9 IMMUNOSUPPRESSION (H): ICD-10-CM

## 2019-06-07 LAB
CREAT UR-MCNC: 125 MG/DL
PROT UR-MCNC: 2.47 G/L
PROT/CREAT 24H UR: 1.98 G/G CR (ref 0–0.2)

## 2019-06-07 PROCEDURE — 84156 ASSAY OF PROTEIN URINE: CPT | Performed by: INTERNAL MEDICINE

## 2019-06-07 PROCEDURE — G0463 HOSPITAL OUTPT CLINIC VISIT: HCPCS | Mod: ZF

## 2019-06-07 RX ORDER — AMLODIPINE BESYLATE 10 MG/1
10 TABLET ORAL AT BEDTIME
Qty: 90 TABLET | Refills: 3 | Status: ON HOLD | OUTPATIENT
Start: 2019-06-07 | End: 2022-12-13

## 2019-06-07 ASSESSMENT — PAIN SCALES - GENERAL: PAINLEVEL: NO PAIN (0)

## 2019-06-07 NOTE — NURSING NOTE
"Chief Complaint   Patient presents with     RECHECK     6 month follow up Kidney TX       Vital signs:  Temp: 98.4  F (36.9  C)   BP: 134/87 Pulse: 85     SpO2: 96 %       Weight: 94 kg (207 lb 3.2 oz)  Estimated body mass index is 34.48 kg/m  as calculated from the following:    Height as of 12/6/18: 1.651 m (5' 5\").    Weight as of this encounter: 94 kg (207 lb 3.2 oz).      Briana Alfonso CMA    "

## 2019-06-07 NOTE — PROGRESS NOTES
CHRONIC TRANSPLANT NEPHROLOGY VISIT    Assessment & Plan    # DDKT: Stable, felt dehydrated.  Will recheck labs.   - Baseline Cr ~ 1.9-2.2;    - Proteinuria: Not checked recently   - Date DSA Last Checked:        Latest DSA: Yes   - BK Viremia: No   - Kidney Tx Biopsy: No    # Immunosuppression: Tacrolimus immediate release (goal 4-6), Mycophenolate mofetil (goal 1-3.5) and Prednisone (dose 5 mg daily)   - Changes: No    # Prophylaxis:   - PJP: Sulfa/TMP (Bactrim)    # Hypertension: Borderline control; Goal BP: < 130/80   - Changes: Yes - Increase Amlodipine to 10 mg nightly    # Anemia in Chronic Renal Disease: Hgb: Stable      BASHIR: No    # Mineral Bone Disorder:   - Secondary renal hyperparathyroidism; PTH level: Normal (18-80 pg/ml)  - Vitamin D; level: Normal and will continue on cholecalciferol.  - Calcium; level: Normal     # Electrolytes:   - Potassium; level: Normal  - Bicarbonate; level: Normal    # Depression: The patient has been feeling more depressed lately. He reports he does not get out of the house much, despite being invited to social events. Denies any feeling of hurting himself or others.   - Recommend talking to his primary care doctor about this at his appointment on 19.    # Transplant History:  Etiology of kidney failure: congenital renal disease  Tx: DDKT  Transplant: 2012 (Kidney), 1995 (Kidney), 2011 (Kidney)  Donor Type:  - Brain Death Donor Class: Standard Criteria Donor  Significant changes in immunosuppression: None  Significant transplant-related complications: None    Transplant Office Phone Number: [phone]    Assessment and plan was discussed with the patient and he voiced his understanding and agreement.    Return visit: Return in about 6 months (around 2019).    Chief Complaint   Mr. Hoffman is a 24 year old here for routine follow up.    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 The patient was last seen on 12/6/2018 by Dr. Comer. Please see note for further details. At this time, no changes were made to his immunosuppression or other medications. Since last evaluation, the patient has had no hospitalizations.    Today, the patient presents feeling more fatigued since his last evaluation. He reports his appetite was decreased for a while and he was eating 1 meal per day, however this is completely resolved and he is currently eating 3 meals per day. He reports recent weight gain, as he has been less active. He also reports increasing feelings of depression, as he states he dose not get out of the house much despite being invited to social events. He states he will talk to his primary doctor about this, as he has an appointment on Tuesday. He states he has seen a psychologist in the past but did not feel it was helpful. The patient does not take his blood pressure at home and does not own a cuff, but states his mother does and he will obtain this from her. The patient denies recent hospitalizations. He denies suicidal ideations. He also denies fevers, sweats, chills, chest pain, shortness of breath with exertion, nausea, vomiting, diarrhea, and leg swelling.    Recent Hospitalizations:  [x] No [] Yes    New Medical Issues: [] No [x] Yes The patient reports increasing feelings of depression lately   Decreased energy: [] No [x] Yes    Chest pain or SOB with exertion:  [x] No [] Yes    Appetite change or weight change: [x] No [] Yes    Nausea, vomiting or diarrhea:  [x] No [] Yes    Fever, sweats or chills: [x] No [] Yes    Leg swelling: [x] No [] Yes      Home BP: Not checked    Review of Systems   A comprehensive review of systems was obtained and negative, except as noted in the HPI or PMH.    Problem List   Patient Active Problem List   Diagnosis     Kidney replaced by transplant     Hypertension  secondary to other renal disorders     Depression     BK viremia     GERD (gastroesophageal reflux disease)     Secondary renal hyperparathyroidism (H)     Vitamin D deficiency     Immunosuppressed status (H)     Aftercare following organ transplant       Social History   Social History     Tobacco Use     Smoking status: Never Smoker     Smokeless tobacco: Never Used     Tobacco comment: mother smokes outside   Substance Use Topics     Alcohol use: No     Drug use: No       Allergies   Allergies   Allergen Reactions     Amoxicillin Swelling     Azithromycin      Z pack - can't take with cyclosporine.     Vancomycin      Ruth syndrome     Cefprozil Rash       Medications   Current Outpatient Medications   Medication Sig     amLODIPine (NORVASC) 5 MG tablet TAKE 1 TABLET BY MOUTH ONCE DAILY     aspirin (ASPIRIN ADULT LOW STRENGTH) 81 MG EC tablet Take 1 tablet by mouth daily.     atenolol (TENORMIN) 25 MG tablet TAKE 1 TABLET BY MOUTH TWICE DAILY     cholecalciferol 2000 UNITS CAPS Take 2,000 Int'l Units by mouth daily     clonazePAM (KLONOPIN) 0.5 MG tablet Take 1 tablet (0.5 mg) by mouth 2 times daily as needed for anxiety     FLUoxetine (PROZAC) 20 MG capsule Take 40 mg by mouth daily      mycophenolate (GENERIC EQUIVALENT) 250 MG capsule Take 2 capsules (500 mg) by mouth 2 times daily     omeprazole 20 MG tablet Take 1 tablet (20 mg) by mouth every 12 hours     predniSONE (DELTASONE) 10 MG tablet Take 5 mg by mouth daily.     sulfamethoxazole-trimethoprim (BACTRIM/SEPTRA) 400-80 MG per tablet Take 1 tablet by mouth daily     tacrolimus (GENERIC EQUIVALENT) 0.5 MG capsule Take 3 capsules (1.5 mg) by mouth 2 times daily     No current facility-administered medications for this visit.      There are no discontinued medications.    Physical Exam   Vital Signs: /87   Pulse 85   Temp 98.4  F (36.9  C)   Wt 94 kg (207 lb 3.2 oz)   SpO2 96%   BMI 34.48 kg/m      GENERAL APPEARANCE: alert and no  distress  HENT: mouth without ulcers or lesions  RESP: lungs clear to auscultation - no rales, rhonchi or wheezes  CV: regular rhythm, normal rate, no rub, no murmur  EDEMA: no LE edema bilaterally  ABDOMEN: soft, nondistended, nontender, bowel sounds normal  MS: extremities normal - no gross deformities noted, no evidence of inflammation in joints, no muscle tenderness  SKIN: no rash  TX KIDNEY: normal      Data     Renal Latest Ref Rng & Units 4/15/2019 12/6/2018 10/17/2018   Na 133 - 144 mmol/L - - -   Na (external) 134 - 145 mmol/L 142 143 142   K 3.4 - 5.3 mmol/L - - -   K (external) 3.5 - 5.1 mmol/L 4.0 4.1 4.2   Cl 94 - 109 mmol/L - - -   Cl (external) 97 - 107 mmol/L 107 109(H) 107   CO2 20 - 32 mmol/L - - -   CO2 (external) 22 - 29 mmol/L 21(L) 20(L) 21.0(L)   BUN 7 - 30 mg/dL - - -   BUN (external) 7.0 - 27.0 mg/dL 15.0 25.0 21.0   Cr 0.66 - 1.25 mg/dL - - -   Cr (external) 0.64 - 1.34 mg/dL 2.61(H) 2.25(H) 1.88(H)   Glucose 70 - 99 mg/dL - - -   Glucose (external) 70 - 110 mg/dL 119(H) 100 86   Ca  8.5 - 10.1 mg/dL - - -   Ca (external) 8.6 - 10.3 mg/dL 9.5 10.1 10.0   Mg 1.6 - 2.3 mg/dL - - -   Mg (external) 1.8 - 2.4 MG/DL - - -     Bone Health Latest Ref Rng & Units 9/18/2017 3/27/2015 1/28/2015   Phos 2.8 - 4.6 mg/dL - - -   Phos (external) 2.5 - 4.9 MG/DL - 4.1 3.7   PTHi 12 - 72 pg/mL 151(H) - -   Vit D Def 20 - 75 ug/L 28 - -     Heme Latest Ref Rng & Units 4/15/2019 1/15/2019 12/6/2018   WBC 4.0 - 11.0 10e9/L - - -   WBC (external) 4.8 - 10.8 10(3)/uL 10.0 11.9(H) 9.8   Hgb 13.3 - 17.7 g/dL - - -   Hgb (external) 14.0 - 18.0 g/dL 13.5(L) 14.2 13.5(L)   Plt 150 - 450 10e9/L - - -   Plt (external) 150 - 350 10(3)/uL 215 246 217     Liver Latest Ref Rng & Units 11/7/2017 9/18/2015 4/28/2015   AP 40 - 150 U/L - - -   AP (external) 46 - 116 U/L 146(H) 102 110   TBili 0.2 - 1.3 mg/dL - - -   TBili (external) 0.2 - 1.0 MG/DL 0.3 0.5 0.4   ALT 0 - 70 U/L - - -   ALT (external) 12 - 78 U/L 30 34 36   AST 0  - 45 U/L - - -   AST (external) 15 - 37 U/L 14(L) 15 14(L)   Tot Protein 6.8 - 8.8 g/dL - - -   Tot Protein (external) 6.4 - 8.2 GM/DL 7.3 7.0 6.3(L)   Albumin 3.9 - 5.1 g/dL - - -   Albumin (external) 3.4 - 5.0 GM/DL 4.0 4.1 3.6     Pancreas Latest Ref Rng & Units 2/16/2011 10/20/2010   Amylase 30 - 110 U/L 142(H) 127(H)   Lipase 20 - 250 U/L 202 256(H)     Iron studies Latest Ref Rng & Units 3/21/2012 12/13/2011 9/27/2011   Iron 35 - 180 ug/dL 138 63 81   Iron sat 15 - 46 % 75(H) 33 55(H)   Ferritin 20 - 300 ng/mL - - -     [facility] Txp Virology Latest Ref Rng & Units 11/7/2017 5/4/2017 2/17/2017   CMV IgG EU/mL - - -   CMV IgM <0.90 - - -   CMV IgM Interp <0.90 - - -   CVM DNA Quant - - - -   CMV Quant <100 Copies/mL - - -   CMV QT Log <2.0 Log copies/mL - - -   BK Spec - - - -   BK Res BKNEG copies/mL - - -   BK Log <2.7 Log copies/mL - - -   BK Quant Log Ext - - - -   BK Quant Result Ext None detected None detected None detected None Detected   BK Quant Spec Ext - - - -   EBV IgG - - - -   Hep B Core NEG - - -   Hep B Surf - - - -   HIV 1&2 NEG - - -        Recent Labs   Lab Test 12/06/18  0830 01/15/19  0800 04/15/19  0830   DOSTAC 12/05/18 2100 01.14.19 2045 04/14/19 2030   TACROL 4.3* 5.5 7.5     Recent Labs   Lab Test 06/02/11  0654 06/03/11  2340 08/30/11  1503   DOSMPA Not Provided Not Provided [id number] CORRECTED ON 08/30 AT 1509: PREVIOUSLY REPORTED  ON 8 30   MPAG >200.0* >200.0* 38.5     Scribe Disclosure:  I, Radha Wilson, am serving as a scribe to document services personally performed by Dr. Culp at this visit, based upon the provider's statements to me. All documentation has been reviewed by the aforementioned provider prior to being entered into the official medical record.     Radha LORD, a scribe, prepared the chart for today's encounter.

## 2019-06-07 NOTE — LETTER
2019      RE: Jhoan Hoffman  [address] [facility] [address]       CHRONIC TRANSPLANT NEPHROLOGY VISIT    Assessment & Plan    # DDKT: Stable, felt dehydrated.  Will recheck labs.   - Baseline Cr ~ 1.9-2.2;    - Proteinuria: Not checked recently   - Date DSA Last Checked:        Latest DSA: Yes   - BK Viremia: No   - Kidney Tx Biopsy: No    # Immunosuppression: Tacrolimus immediate release (goal 4-6), Mycophenolate mofetil (goal 1-3.5) and Prednisone (dose 5 mg daily)   - Changes: No    # Prophylaxis:   - PJP: Sulfa/TMP (Bactrim)    # Hypertension: Borderline control; Goal BP: < 130/80   - Changes: Yes - Increase Amlodipine to 10 mg nightly    # Anemia in Chronic Renal Disease: Hgb: Stable      BASHIR: No    # Mineral Bone Disorder:   - Secondary renal hyperparathyroidism; PTH level: Normal (18-80 pg/ml)  - Vitamin D; level: Normal and will continue on cholecalciferol.  - Calcium; level: Normal     # Electrolytes:   - Potassium; level: Normal  - Bicarbonate; level: Normal    # Depression: The patient has been feeling more depressed lately. He reports he does not get out of the house much, despite being invited to social events. Denies any feeling of hurting himself or others.   - Recommend talking to his primary care doctor about this at his appointment on 19.    # Transplant History:  Etiology of kidney failure: congenital renal disease  Tx: DDKT  Transplant: 2012 (Kidney), 1995 (Kidney), 2011 (Kidney)  Donor Type:  - Brain Death Donor Class: Standard Criteria Donor  Significant changes in immunosuppression: None  Significant transplant-related complications: None    Transplant Office Phone Number: [phone]    Assessment and plan was discussed with the patient and he voiced his understanding and agreement.    Return visit: Return in about 6 months (around 2019).    Chief Complaint   Mr. Hoffman is a 24 year old here for routine follow up.    History of Present  Illness   Jhoan Hoffman is a 24 year old male with ESKD from congenital renal disease, status-post DDKT on 4/28/12, who presents for evaluation for follow-up. The patient was last seen on 12/6/2018 by Dr. Comer. Please see note for further details. At this time, no changes were made to his immunosuppression or other medications. Since last evaluation, the patient has had no hospitalizations.    Today, the patient presents feeling more fatigued since his last evaluation. He reports his appetite was decreased for a while and he was eating 1 meal per day, however this is completely resolved and he is currently eating 3 meals per day. He reports recent weight gain, as he has been less active. He also reports increasing feelings of depression, as he states he dose not get out of the house much despite being invited to social events. He states he will talk to his primary doctor about this, as he has an appointment on Tuesday. He states he has seen a psychologist in the past but did not feel it was helpful. The patient does not take his blood pressure at home and does not own a cuff, but states his mother does and he will obtain this from her. The patient denies recent hospitalizations. He denies suicidal ideations. He also denies fevers, sweats, chills, chest pain, shortness of breath with exertion, nausea, vomiting, diarrhea, and leg swelling.    Recent Hospitalizations:  [x] No [] Yes    New Medical Issues: [] No [x] Yes The patient reports increasing feelings of depression lately   Decreased energy: [] No [x] Yes    Chest pain or SOB with exertion:  [x] No [] Yes    Appetite change or weight change: [x] No [] Yes    Nausea, vomiting or diarrhea:  [x] No [] Yes    Fever, sweats or chills: [x] No [] Yes    Leg swelling: [x] No [] Yes      Home BP: Not checked    Review of Systems   A comprehensive review of systems was obtained and negative, except as noted in the HPI or PMH.    Problem List   Patient Active  Problem List   Diagnosis     Kidney replaced by transplant     Hypertension secondary to other renal disorders     Depression     BK viremia     GERD (gastroesophageal reflux disease)     Secondary renal hyperparathyroidism (H)     Vitamin D deficiency     Immunosuppressed status (H)     Aftercare following organ transplant       Social History   Social History     Tobacco Use     Smoking status: Never Smoker     Smokeless tobacco: Never Used     Tobacco comment: mother smokes outside   Substance Use Topics     Alcohol use: No     Drug use: No       Allergies   Allergies   Allergen Reactions     Amoxicillin Swelling     Azithromycin      Z pack - can't take with cyclosporine.     Vancomycin      Ruth syndrome     Cefprozil Rash       Medications   Current Outpatient Medications   Medication Sig     amLODIPine (NORVASC) 5 MG tablet TAKE 1 TABLET BY MOUTH ONCE DAILY     aspirin (ASPIRIN ADULT LOW STRENGTH) 81 MG EC tablet Take 1 tablet by mouth daily.     atenolol (TENORMIN) 25 MG tablet TAKE 1 TABLET BY MOUTH TWICE DAILY     cholecalciferol 2000 UNITS CAPS Take 2,000 Int'l Units by mouth daily     clonazePAM (KLONOPIN) 0.5 MG tablet Take 1 tablet (0.5 mg) by mouth 2 times daily as needed for anxiety     FLUoxetine (PROZAC) 20 MG capsule Take 40 mg by mouth daily      mycophenolate (GENERIC EQUIVALENT) 250 MG capsule Take 2 capsules (500 mg) by mouth 2 times daily     omeprazole 20 MG tablet Take 1 tablet (20 mg) by mouth every 12 hours     predniSONE (DELTASONE) 10 MG tablet Take 5 mg by mouth daily.     sulfamethoxazole-trimethoprim (BACTRIM/SEPTRA) 400-80 MG per tablet Take 1 tablet by mouth daily     tacrolimus (GENERIC EQUIVALENT) 0.5 MG capsule Take 3 capsules (1.5 mg) by mouth 2 times daily     No current facility-administered medications for this visit.      There are no discontinued medications.    Physical Exam   Vital Signs: /87   Pulse 85   Temp 98.4  F (36.9  C)   Wt 94 kg (207 lb 3.2 oz)    SpO2 96%   BMI 34.48 kg/m       GENERAL APPEARANCE: alert and no distress  HENT: mouth without ulcers or lesions  RESP: lungs clear to auscultation - no rales, rhonchi or wheezes  CV: regular rhythm, normal rate, no rub, no murmur  EDEMA: no LE edema bilaterally  ABDOMEN: soft, nondistended, nontender, bowel sounds normal  MS: extremities normal - no gross deformities noted, no evidence of inflammation in joints, no muscle tenderness  SKIN: no rash  TX KIDNEY: normal      Data     Renal Latest Ref Rng & Units 4/15/2019 12/6/2018 10/17/2018   Na 133 - 144 mmol/L - - -   Na (external) 134 - 145 mmol/L 142 143 142   K 3.4 - 5.3 mmol/L - - -   K (external) 3.5 - 5.1 mmol/L 4.0 4.1 4.2   Cl 94 - 109 mmol/L - - -   Cl (external) 97 - 107 mmol/L 107 109(H) 107   CO2 20 - 32 mmol/L - - -   CO2 (external) 22 - 29 mmol/L 21(L) 20(L) 21.0(L)   BUN 7 - 30 mg/dL - - -   BUN (external) 7.0 - 27.0 mg/dL 15.0 25.0 21.0   Cr 0.66 - 1.25 mg/dL - - -   Cr (external) 0.64 - 1.34 mg/dL 2.61(H) 2.25(H) 1.88(H)   Glucose 70 - 99 mg/dL - - -   Glucose (external) 70 - 110 mg/dL 119(H) 100 86   Ca  8.5 - 10.1 mg/dL - - -   Ca (external) 8.6 - 10.3 mg/dL 9.5 10.1 10.0   Mg 1.6 - 2.3 mg/dL - - -   Mg (external) 1.8 - 2.4 MG/DL - - -     Bone Health Latest Ref Rng & Units 9/18/2017 3/27/2015 1/28/2015   Phos 2.8 - 4.6 mg/dL - - -   Phos (external) 2.5 - 4.9 MG/DL - 4.1 3.7   PTHi 12 - 72 pg/mL 151(H) - -   Vit D Def 20 - 75 ug/L 28 - -     Heme Latest Ref Rng & Units 4/15/2019 1/15/2019 12/6/2018   WBC 4.0 - 11.0 10e9/L - - -   WBC (external) 4.8 - 10.8 10(3)/uL 10.0 11.9(H) 9.8   Hgb 13.3 - 17.7 g/dL - - -   Hgb (external) 14.0 - 18.0 g/dL 13.5(L) 14.2 13.5(L)   Plt 150 - 450 10e9/L - - -   Plt (external) 150 - 350 10(3)/uL 215 246 217     Liver Latest Ref Rng & Units 11/7/2017 9/18/2015 4/28/2015   AP 40 - 150 U/L - - -   AP (external) 46 - 116 U/L 146(H) 102 110   TBili 0.2 - 1.3 mg/dL - - -   TBili (external) 0.2 - 1.0 MG/DL 0.3 0.5 0.4    ALT 0 - 70 U/L - - -   ALT (external) 12 - 78 U/L 30 34 36   AST 0 - 45 U/L - - -   AST (external) 15 - 37 U/L 14(L) 15 14(L)   Tot Protein 6.8 - 8.8 g/dL - - -   Tot Protein (external) 6.4 - 8.2 GM/DL 7.3 7.0 6.3(L)   Albumin 3.9 - 5.1 g/dL - - -   Albumin (external) 3.4 - 5.0 GM/DL 4.0 4.1 3.6     Pancreas Latest Ref Rng & Units 2/16/2011 10/20/2010   Amylase 30 - 110 U/L 142(H) 127(H)   Lipase 20 - 250 U/L 202 256(H)     Iron studies Latest Ref Rng & Units 3/21/2012 12/13/2011 9/27/2011   Iron 35 - 180 ug/dL 138 63 81   Iron sat 15 - 46 % 75(H) 33 55(H)   Ferritin 20 - 300 ng/mL - - -     [facility] Txp Virology Latest Ref Rng & Units 11/7/2017 5/4/2017 2/17/2017   CMV IgG EU/mL - - -   CMV IgM <0.90 - - -   CMV IgM Interp <0.90 - - -   CVM DNA Quant - - - -   CMV Quant <100 Copies/mL - - -   CMV QT Log <2.0 Log copies/mL - - -   BK Spec - - - -   BK Res BKNEG copies/mL - - -   BK Log <2.7 Log copies/mL - - -   BK Quant Log Ext - - - -   BK Quant Result Ext None detected None detected None detected None Detected   BK Quant Spec Ext - - - -   EBV IgG - - - -   Hep B Core NEG - - -   Hep B Surf - - - -   HIV 1&2 NEG - - -        Recent Labs   Lab Test 12/06/18  0830 01/15/19  0800 04/15/19  0830   DOSTAC 12/05/18 2100 01.14.19 2045 04/14/19 2030   TACROL 4.3* 5.5 7.5     Recent Labs   Lab Test 06/02/11  0654 06/03/11  2340 08/30/11  1503   DOSMPA Not Provided Not Provided [id number] CORRECTED ON 08/30 AT 1509: PREVIOUSLY REPORTED  ON 8 30   MPAG >200.0* >200.0* 38.5     Scribe Disclosure:  I, Radha Wilson, am serving as a scribe to document services personally performed by Dr. Culp at this visit, based upon the provider's statements to me. All documentation has been reviewed by the aforementioned provider prior to being entered into the official medical record.     I, Radha Wilson, a scribe, prepared the chart for today's encounter.     Jose Culp MD

## 2019-06-12 ENCOUNTER — TELEPHONE (OUTPATIENT)
Dept: TRANSPLANT | Facility: CLINIC | Age: 24
End: 2019-06-12

## 2019-06-12 DIAGNOSIS — Z94.0 KIDNEY TRANSPLANTED: Primary | ICD-10-CM

## 2019-06-12 NOTE — TELEPHONE ENCOUNTER
Returned call to Jhoan.  He is hesitant about going through with the biopsy and is requesting to repeat his labs including a urine protein Monday.  I explained to Jhoan that ulitimately it is up to him if he wants to repeat labs and then reevaluate; however, the recommendation is to do the biospy at this time.  Proteinuria and elevated creatinine indicate that there is something going on in the kidney and the only way to know is with a biopsy.    Jhoan verbalized understanding and is still requesting to do labs Monday and reevaluate if it is needed.    Lab orders faxed.

## 2019-06-12 NOTE — TELEPHONE ENCOUNTER
Called and spoke with Jhoan regarding the recommendation for a kidney transplant biopsy.  Patient denied further questions.    Jose Culp MD Hasebroock, Rebecca, RN             Increased serum creatinine and proteinuria.  Recommend a kidney transplant biopsy.      Transplant Coordinator Renal Biopsy Communication    Call placed to Jhoan Jonathon Delgadoert to discuss indication for kidney transplant biopsy per Dr. Culp.     Indication for transplant renal biopsy: elevated creatinine and proteinuria   Laterality: right  Date of biopsy: 6/18/19    Patient location within 70 miles of [facility]: No.  If no, must stay overnight locally. Pt verbalizes staying locally Tuesday night.     Jhoan Hoffman's medication list was reviewed.   Anticoagulant: aspirin   Ibuprofen: No.  Fish Oil:  No.  Medications held: Asparin    Recent blood pressure readings are WNL or not applicable. Instructed to take medication, especially blood pressure medications, before [facility] at 0600 day of procedure.     Procedure expectations and duration of stay discussed. Expressed pt can expect a phone call from LPN/MA to confirm biopsy date/time/location/directions/review of medications. Pt has no additional questions at this time. Transplant Office phone number given to pt for future questions.      Joan Mayer  Kidney/Pancreas Transplant Coordinator  [phone] option 5               My Asthma Action Plan  Name: Emerald Pearl   YOB: 1974  Date: 12/12/2018   My doctor: Lily Fine PA-C   My clinic: [facility]        My Control Medicine: None  My Rescue Medicine: Albuterol (Proair/Ventolin/Proventil) inhaler     My Asthma Severity: intermittent  Avoid your asthma triggers: unknown               GREEN ZONE   Good Control    I feel good    No cough or wheeze    Can work, sleep and play without asthma symptoms       Take your asthma control medicine every day.     1. If exercise triggers your asthma, take your rescue medication    15 minutes before exercise or sports, and    During exercise if you have asthma symptoms  2. Spacer to use with inhaler: If you have a spacer, make sure to use it with your inhaler             YELLOW ZONE Getting Worse  I have ANY of these:    I do not feel good    Cough or wheeze    Chest feels tight    Wake up at night   1. Keep taking your Green Zone medications  2. Start taking your rescue medicine:    every 20 minutes for up to 1 hour. Then every 4 hours for 24-48 hours.  3. If you stay in the Yellow Zone for more than 12-24 hours, contact your doctor.  4. If you do not return to the Green Zone in 12-24 hours or you get worse, start taking your oral steroid medicine if prescribed by your provider.           RED ZONE Medical Alert - Get Help  I have ANY of these:    I feel awful    Medicine is not helping    Breathing getting harder    Trouble walking or talking    Nose opens wide to breathe       1. Take your rescue medicine NOW  2. If your provider has prescribed an oral steroid medicine, start taking it NOW  3. Call your doctor NOW  4. If you are still in the Red Zone after 20 minutes and you have not reached your doctor:    Take your rescue medicine again and    Call 911 or go to the emergency room right away    See your regular doctor within 2 weeks of an Emergency Room or Urgent Care visit for follow-up treatment.          Annual  Reminders:  Meet with Asthma Educator,  Flu Shot in the Fall, consider Pneumonia Vaccination for patients with asthma (aged 19 and older).    Pharmacy:    [id number] - [place], [place] - [address]  [id number] - [place] - [address] AT [facility]  [id number] [address] - [address] [place] [address] AVE [address]                      Asthma Triggers  How To Control Things That Make Your Asthma Worse    Triggers are things that make your asthma worse.  Look at the list below to help you find your triggers and what you can do about them.  You can help prevent asthma flare-ups by staying away from your triggers.      Trigger                                                          What you can do   Cigarette Smoke  Tobacco smoke can make asthma worse. Do not allow smoking in your home, car or around you.  Be sure no one smokes at a child s day care or school.  If you smoke, ask your health care provider for ways to help you quit.  Ask family members to quit too.  Ask your health care provider for a referral to Quit Plan to help you quit smoking, or call 3-490-735-PLAN.     Colds, Flu, Bronchitis  These are common triggers of asthma. Wash your hands often.  Don t touch your eyes, nose or mouth.  Get a flu shot every year.     Dust Mites  These are tiny bugs that live in cloth or carpet. They are too small to see. Wash sheets and blankets in hot water every week.   Encase pillows and mattress in dust mite proof covers.  Avoid having carpet if you can. If you have carpet, vacuum weekly.   Use a dust mask and HEPA vacuum.   Pollen and Outdoor Mold  Some people are allergic to trees, grass, or weed pollen, or molds. Try to keep your windows closed.  Limit time out doors when pollen count is high.   Ask you health care provider about taking medicine during allergy season.     Animal Dander  Some people are allergic to skin flakes, urine or  saliva from pets with fur or feathers. Keep pets with fur or feathers out of your home.    If you can t keep the pet outdoors, then keep the pet out of your bedroom.  Keep the bedroom door closed.  Keep pets off cloth furniture and away from stuffed toys.     Mice, Rats, and Cockroaches  Some people are allergic to the waste from these pests.   Cover food and garbage.  Clean up spills and food crumbs.  Store grease in the refrigerator.   Keep food out of the bedroom.   Indoor Mold  This can be a trigger if your home has high moisture. Fix leaking faucets, pipes, or other sources of water.   Clean moldy surfaces.  Dehumidify basement if it is damp and smelly.   Smoke, Strong Odors, and Sprays  These can reduce air quality. Stay away from strong odors and sprays, such as perfume, powder, hair spray, paints, smoke incense, paint, cleaning products, candles and new carpet.   Exercise or Sports  Some people with asthma have this trigger. Be active!  Ask your doctor about taking medicine before sports or exercise to prevent symptoms.    Warm up for 5-10 minutes before and after sports or exercise.     Other Triggers of Asthma  Cold air:  Cover your nose and mouth with a scarf.  Sometimes laughing or crying can be a trigger.  Some medicines and food can trigger asthma.

## 2019-06-12 NOTE — LETTER
PHYSICIAN ORDERS      DATE & TIME ISSUED: 2019 3:55 PM  PATIENT NAME: Jhoan Hoffman   : 1995     [facility] MR# [if applicable]: [id number]     DIAGNOSIS:  Kidney transplant  ICD-10 CODE: Z94.0     Please obtain the following labs next week   BMP   CBC   12 hour tacrolimus level    Protein random urine (aka Random urine protein/creatinine ratio)       Any questions please call: [phone]  Please fax results to [phone].    .

## 2019-06-12 NOTE — TELEPHONE ENCOUNTER
Patient Call: Transplant Lab/Orders  Route to LPN  Post Transplant Days: 2601  When patient is less than 60 days post-transplant, route high priority    Reason for Call: Discuss lab results; which results? His Hgb., and Cr.,   Callback needed? Yes    Return Call Needed  Same as documented in contacts section  When to return call?: Same day: Route High Priority

## 2019-06-17 DIAGNOSIS — Z94.0 KIDNEY REPLACED BY TRANSPLANT: ICD-10-CM

## 2019-06-17 DIAGNOSIS — Z79.899 ENCOUNTER FOR LONG-TERM CURRENT USE OF MEDICATION: ICD-10-CM

## 2019-06-17 DIAGNOSIS — Z48.298 AFTERCARE FOLLOWING ORGAN TRANSPLANT: ICD-10-CM

## 2019-06-17 PROCEDURE — 80197 ASSAY OF TACROLIMUS: CPT | Performed by: INTERNAL MEDICINE

## 2019-06-17 NOTE — TELEPHONE ENCOUNTER
Spoke with Jhoan and explained that his labs are mostly unchanged.  He still has 1.6 grams of protein in the urine and his creatinine is 2.58, so we are recommending he proceed with the kidney transplant biopsy.    Jhoan states he will need to discuss with his mother.  He will return call to the sot office.

## 2019-06-18 NOTE — TELEPHONE ENCOUNTER
Spoke with Jhoan's mother Yoanna.  She has a lot of concerns about proceeding with the biopsy as well.  She reports Jhoan has had 4 biopsies in the past and all have come back as inconclusive.  She wants to make sure all other options have been ruled out prior to proceeding with the biopsy.  I explained that with the elevated creatinine and proteinuria, there is something causing this and there is no way to know for sure without a biopsy.  I told Yoanna I would update Dr. Culp with their concerns and see if he has further recommendations.

## 2019-06-18 NOTE — TELEPHONE ENCOUNTER
Spoke with Jhoan.  He has a lot of hesitations regarding the biopsy.  We discussed the indication is because his creatinine is elevated and he has protein in his urine.  He is concerned about the risk of infection.  I explained that we take all measures to minimize the risk of infection.  He is also concerned about losing some of his kidney tissue.  I explained that we take a very small amount of kidney tissue to see what is going on.  I educated Jhoan that if there is some type of rejection going on, it is better to know so that we can treat it appropriately.  We also discussed the possibility of the biopsy showing chronic changes, in which there is little to do about it but the only way to know what is going on definitively is with a biopsy.    Jhoan told me he needs to think about it and discuss with his mother.  His mother might call to speak with me.

## 2019-06-19 NOTE — TELEPHONE ENCOUNTER
Reviewed patients mothers concerns with Dr. Culp.  Recommendation continues to be to proceed with kidney transplant biopsy.    I called and spoke with Yoanna.  She agreed to proceed with the biopsy.  However, when I discussed holding the baby aspirin, she states his surgeon told her that he is NEVER supposed to not take his aspirin as the artery was smaller than normal.  She is not comfortable having him hold it.  Message sent to Dr. Culp regarding aspirin.         Jose Culp MD Hasebroock, Rebecca, RN             The previous biopsies were done on his previous kidney(s), not on this one.  As best I can tell from our records and his history, this kidney transplant has never had a biopsy.  In addition with the creatinine still elevated now on repeat, as well as persistently elevated proteinuria, we would recommend a biopsy.     The differential is a bit broad, but would include acute rejection, both cellular and antibody-mediated rejection.  Treatment of these would be different.  It could be chronic changes from transplant glomerulopathy due to chronic antibody-mediated rejection.  Treatment for this would be different.  Finally, it could be a denovo (new) glomerular disease, such as membranous nephropathy or FSGS.  Again, treatment of these would be different than any of the above diagnoses.     Thus, it is possible that we will find chronic changes only, which may not require much in the way of medical changes.  However, it is also very possible that we may find one of the other possibilities, which we would recommend a very different treatment.  Our goal is just to keep this kidney working as long as possible.     Jose Juan    Previous Messages      ----- Message -----   From: Joan Mayer, RN   Sent: 6/18/2019  10:29 AM   To: Jose Culp MD   Subject: hesitations about biopsy                         Hi Dr. Culp-   You recently saw Jhoan in clinic and recommended a biopsy due to  elevated creatinine and proteinuria.       I have spoke with Jhoan and his mother multiple times and explained why we are recommending a biopsy at this time.  He wanted to repeat labs prior to scheduling a biopsy, and his creatinine/proteinuria was still elevated.       His mother wants to make sure you have exhausted all other less invasive options that could be causing this before moving ahead with the biopsy.  She states in the past, Jhoan has had 4 biopsies that have been inconclusive so she is really hesitant about him moving forward with another biopsy again as in the past it didn't help them guide treatment at all.     I talked with her for quite some time.  I told her I would update you on her concerns and see what you recommend from here.     Ginger

## 2019-06-19 NOTE — TELEPHONE ENCOUNTER
Spoke with Jhoan regarding the recommendation to proceed with the biopsy.  All questions answered.       Transplant Coordinator Renal Biopsy Communication    Call placed to Jhoan Hoffman to discuss indication for kidney transplant biopsy per Dr. Culp.     Indication for transplant renal biopsy: proteinuria/elevated creatinine   Laterality: right  Date of biopsy: 6/26/19    Patient location within 70 miles of [facility]: No.  If no, must stay overnight locally. Pt verbalizes staying Yes.     Jhoan Jonathon Hoffman's medication list was reviewed.   Anticoagulant: aspirin   Ibuprofen: No.  Fish Oil:  No.  Medications held: Aspirin    Recent blood pressure readings are WNL or not applicable. Instructed to take medication, especially blood pressure medications, before arriving to the [facility] at 0600 day of procedure.     Procedure expectations and duration of stay discussed. Expressed pt can expect a phone call from LPN/MA to confirm biopsy date/time/location/directions/review of medications. Pt has no additional questions at this time. Transplant Office phone number given to pt for future questions.      Joan Mayer  Kidney/Pancreas Transplant Coordinator  [phone] option 5

## 2019-06-20 LAB
TACROLIMUS BLD-MCNC: 3.9 UG/L (ref 5–15)
TME LAST DOSE: ABNORMAL H

## 2019-06-20 NOTE — TELEPHONE ENCOUNTER
Reviewed patients mothers concerns about holding aspirin with Dr. Culp.   Ok to hold aspirin for 5 days.  Spoke with Jhoan who verbalized understanding to hold the aspirin.    Jose Culp MD Hasebroock, Rebecca, RN             This far out would not be a risk for clotting to allograft.  Okay to hold for 5 days.    Previous Messages      ----- Message -----   From: Joan Mayer, RN   Sent: 6/19/2019  12:31 PM   To: Jose Culp MD   Subject: RE: hesitations about biopsy                     Ok thank you.  I have spoke with her and she agreed.  He is on a baby aspirin daily and when I said he needs to be off of it for 5 days, she stated that his surgeon said he is NEVER to come off of the aspirin as the artery was smaller than normal and she is not comfortable having him hold it.  I told her I would run it past you and see your recommendations?       Ginger

## 2019-06-24 DIAGNOSIS — T86.10 COMPLICATIONS, KIDNEY TRANSPLANT: Primary | ICD-10-CM

## 2019-06-24 NOTE — TELEPHONE ENCOUNTER
LPN/MA  Renal Biopsy Communication    Call to pt to confirm renal biopsy procedure. Biopsy orders entered and patient aware of date 6/26/2019, [place] [facility] and to arrive at 6:00AM.     No need to be NPO.      Discussed anticoagulants (i.e. fish oil, ASA, Plavix, Coumadin, Ibuprofen). Pt confirms use of anticoagulants.   Patient is currently holding aspirin X 5 days.     Take all medicine before arrival for biopsy and bring all medicine bottles with.      Report to 1st floor lab, then 5th floor for biopsy.      Use [employer] and bring form of entertainment.     Discussed with patient need to stay overnight locally evening of procedure if live more than 70 miles away.    Call placed to scheduling at [phone] to schedule and confirm biopsy date/time    Lab appointment scheduled for morning of biopsy.

## 2019-06-26 ENCOUNTER — HOSPITAL ENCOUNTER (OUTPATIENT)
Facility: AMBULATORY SURGERY CENTER | Age: 24
End: 2019-06-26
Attending: INTERNAL MEDICINE
Payer: MEDICAID

## 2019-06-26 ENCOUNTER — RESULTS ONLY (OUTPATIENT)
Dept: OTHER | Facility: CLINIC | Age: 24
End: 2019-06-26

## 2019-06-26 ENCOUNTER — ANCILLARY PROCEDURE (OUTPATIENT)
Dept: RADIOLOGY | Facility: AMBULATORY SURGERY CENTER | Age: 24
End: 2019-06-26
Attending: INTERNAL MEDICINE
Payer: MEDICAID

## 2019-06-26 VITALS
HEART RATE: 65 BPM | OXYGEN SATURATION: 98 % | TEMPERATURE: 98.5 F | DIASTOLIC BLOOD PRESSURE: 79 MMHG | SYSTOLIC BLOOD PRESSURE: 121 MMHG | RESPIRATION RATE: 18 BRPM

## 2019-06-26 DIAGNOSIS — T86.10 COMPLICATIONS, KIDNEY TRANSPLANT: ICD-10-CM

## 2019-06-26 DIAGNOSIS — Z94.0 KIDNEY TRANSPLANTED: ICD-10-CM

## 2019-06-26 LAB
ABO + RH BLD: NORMAL
ABO + RH BLD: NORMAL
ALBUMIN UR-MCNC: 100 MG/DL
ANION GAP SERPL CALCULATED.3IONS-SCNC: 10 MMOL/L (ref 3–14)
APPEARANCE UR: CLEAR
BASOPHILS # BLD AUTO: 0.1 10E9/L (ref 0–0.2)
BASOPHILS NFR BLD AUTO: 0.4 %
BILIRUB UR QL STRIP: NEGATIVE
BLD GP AB SCN SERPL QL: NORMAL
BLOOD BANK CMNT PATIENT-IMP: NORMAL
BUN SERPL-MCNC: 32 MG/DL (ref 7–30)
CALCIUM SERPL-MCNC: 9 MG/DL (ref 8.5–10.1)
CHLORIDE SERPL-SCNC: 108 MMOL/L (ref 94–109)
CO2 SERPL-SCNC: 20 MMOL/L (ref 20–32)
COLOR UR AUTO: ABNORMAL
CREAT SERPL-MCNC: 2.37 MG/DL (ref 0.66–1.25)
CREAT UR-MCNC: 68 MG/DL
DIFFERENTIAL METHOD BLD: ABNORMAL
DONOR IDENTIFICATION: NORMAL
DSA COMMENTS: NORMAL
DSA PRESENT: NO
DSA TEST METHOD: NORMAL
EOSINOPHIL # BLD AUTO: 0.4 10E9/L (ref 0–0.7)
EOSINOPHIL NFR BLD AUTO: 3 %
ERYTHROCYTE [DISTWIDTH] IN BLOOD BY AUTOMATED COUNT: 13.2 % (ref 10–15)
GFR SERPL CREATININE-BSD FRML MDRD: 37 ML/MIN/{1.73_M2}
GLUCOSE SERPL-MCNC: 84 MG/DL (ref 70–99)
GLUCOSE UR STRIP-MCNC: NEGATIVE MG/DL
HCT VFR BLD AUTO: 36 % (ref 40–53)
HGB BLD-MCNC: 12.1 G/DL (ref 13.3–17.7)
HGB BLD-MCNC: 12.3 G/DL (ref 13.3–17.7)
HGB UR QL STRIP: ABNORMAL
IMM GRANULOCYTES # BLD: 0.1 10E9/L (ref 0–0.4)
IMM GRANULOCYTES NFR BLD: 0.4 %
INR PPP: 1.04 (ref 0.86–1.14)
KETONES UR STRIP-MCNC: NEGATIVE MG/DL
LEUKOCYTE ESTERASE UR QL STRIP: NEGATIVE
LYMPHOCYTES # BLD AUTO: 2.9 10E9/L (ref 0.8–5.3)
LYMPHOCYTES NFR BLD AUTO: 24.6 %
MCH RBC QN AUTO: 29.3 PG (ref 26.5–33)
MCHC RBC AUTO-ENTMCNC: 33.6 G/DL (ref 31.5–36.5)
MCV RBC AUTO: 87 FL (ref 78–100)
MICROALBUMIN UR-MCNC: 1070 MG/L
MICROALBUMIN/CREAT UR: 1578.17 MG/G CR (ref 0–17)
MONOCYTES # BLD AUTO: 0.9 10E9/L (ref 0–1.3)
MONOCYTES NFR BLD AUTO: 7.4 %
MUCOUS THREADS #/AREA URNS LPF: PRESENT /LPF
NEUTROPHILS # BLD AUTO: 7.5 10E9/L (ref 1.6–8.3)
NEUTROPHILS NFR BLD AUTO: 64.2 %
NITRATE UR QL: NEGATIVE
NRBC # BLD AUTO: 0 10*3/UL
NRBC BLD AUTO-RTO: 0 /100
ORGAN: NORMAL
PH UR STRIP: 5 PH (ref 5–7)
PLATELET # BLD AUTO: 210 10E9/L (ref 150–450)
POTASSIUM SERPL-SCNC: 3.8 MMOL/L (ref 3.4–5.3)
PROT UR-MCNC: 1.26 G/L
PROT/CREAT 24H UR: 1.86 G/G CR (ref 0–0.2)
RBC # BLD AUTO: 4.13 10E12/L (ref 4.4–5.9)
RBC #/AREA URNS AUTO: 2 /HPF (ref 0–2)
SA1 CELL: NORMAL
SA1 COMMENTS: NORMAL
SA1 HI RISK ABY: NORMAL
SA1 MOD RISK ABY: NORMAL
SA1 TEST METHOD: NORMAL
SA2 CELL: NORMAL
SA2 COMMENTS: NORMAL
SA2 HI RISK ABY UA: NORMAL
SA2 MOD RISK ABY: NORMAL
SA2 TEST METHOD: NORMAL
SODIUM SERPL-SCNC: 138 MMOL/L (ref 133–144)
SOURCE: ABNORMAL
SP GR UR STRIP: 1.01 (ref 1–1.03)
SPECIMEN EXP DATE BLD: NORMAL
TACROLIMUS BLD-MCNC: 6.1 UG/L (ref 5–15)
TME LAST DOSE: 1000 H
UNACCEPTABLE ANTIGEN: NORMAL
UNOS CPRA: 100
UROBILINOGEN UR STRIP-MCNC: 0 MG/DL (ref 0–2)
WBC # BLD AUTO: 11.7 10E9/L (ref 4–11)
WBC #/AREA URNS AUTO: 0 /HPF (ref 0–5)

## 2019-06-26 PROCEDURE — 87799 DETECT AGENT NOS DNA QUANT: CPT | Performed by: INTERNAL MEDICINE

## 2019-06-26 PROCEDURE — 88313 SPECIAL STAINS GROUP 2: CPT | Performed by: INTERNAL MEDICINE

## 2019-06-26 PROCEDURE — 86833 HLA CLASS II HIGH DEFIN QUAL: CPT | Performed by: INTERNAL MEDICINE

## 2019-06-26 PROCEDURE — 88305 TISSUE EXAM BY PATHOLOGIST: CPT | Performed by: INTERNAL MEDICINE

## 2019-06-26 PROCEDURE — 88350 IMFLUOR EA ADDL 1ANTB STN PX: CPT | Performed by: INTERNAL MEDICINE

## 2019-06-26 PROCEDURE — 88346 IMFLUOR 1ST 1ANTB STAIN PX: CPT | Performed by: INTERNAL MEDICINE

## 2019-06-26 PROCEDURE — 88348 ELECTRON MICROSCOPY DX: CPT | Performed by: INTERNAL MEDICINE

## 2019-06-26 PROCEDURE — 86832 HLA CLASS I HIGH DEFIN QUAL: CPT | Performed by: INTERNAL MEDICINE

## 2019-06-26 PROCEDURE — 80197 ASSAY OF TACROLIMUS: CPT | Performed by: INTERNAL MEDICINE

## 2019-06-26 RX ADMIN — Medication 10 ML: at 09:39

## 2019-06-26 NOTE — PROCEDURES
Kidney Transplant Biopsy Procedure Note    Indication/Diagnosis:  Kidney transplant with elevated creatinine    Procedure:  The indications and risks of the kidney biopsy, including bleeding severe enough to require hospitalization, transfusion, surgery, or even loss of the kidney or death, were explained, understood and agreed.  Consent was signed.  The kidney, located in the right lower quadrant, was visualized under ultrasound and biopsy site marked.  Patient was prepped and draped in the usual sterile manner.  Biopsy site was anesthetized with 1% lidocaine.  Biopsy consisted of 3 passes with a 18 ga needle under direct ultrasound guidance were made and tissue was sent to pathology.  There were no immediate complications.  Pressure was held over biopsy site and patient will be observed for signs of bleeding or other complications.  Patient remained hemodynamically stable during and early post procedure.

## 2019-06-26 NOTE — DISCHARGE INSTRUCTIONS
[facility]  Home Care Following Kidney Biopsy  ACTIVITY: NO heavy lifting (weight greater than 10 pounds) for 1 week. NO strenuous activity for 24 hours; relax and take it easy.     DIET: Resume your regular diet and drink plenty of fluids UNLESS you are fluid restricted.    DRAINAGE: There should be minimal drainage from the biopsy site. If bleeding soaks the dressing, you should lie down and apply pressure to the site for a minimum of 10 minutes. Call one of the numbers below if experienced bleeding that soaked the dressing.     DRESSING: Keep the dressing in place for 24 hours to prevent the site from re-opening and bleeding.       CALL ONE OF THE NUMBERS BELOW IF YOU EXPERIENCE ANY ONE OF THE FOLLOWING:      Excessive bleeding or drainage    Excessive swelling, redness, or tenderness at the site    Fever above 100.5 F, orally    Severe pain    Drainage that is green, yellow, thick white, or has a bad odor    If you havebloody urine or see bloody clots in your urine     Emergency Department: [phone] (open 24 hours)  Transplant Center: [phone] (open 7:00 AM - 6:30 PM)

## 2019-06-27 DIAGNOSIS — Z94.0 S/P KIDNEY TRANSPLANT: ICD-10-CM

## 2019-06-27 DIAGNOSIS — Z94.0 KIDNEY REPLACED BY TRANSPLANT: ICD-10-CM

## 2019-06-27 DIAGNOSIS — T86.11 KIDNEY TRANSPLANT REJECTION: Primary | ICD-10-CM

## 2019-06-27 NOTE — LETTER
PHYSICIAN ORDERS      DATE & TIME ISSUED: 2019 8:41 AM  PATIENT NAME: Jhoan Hoffman   : 1995     [facility] MR# [if applicable]: [id number]     DIAGNOSIS:  Kidney replaced by transplant, acute kidney rejection  ICD-10 CODE: Z94.0, T86.11     Please infuse 500mg solumedrol, intravenous, daily for 3 days.    Any questions please call: [facility] Solid Organ Transplant                                             [phone]        Peter Briggs MD

## 2019-06-27 NOTE — LETTER
PHYSICIAN ORDERS      DATE & TIME ISSUED: 2019 10:56 AM  PATIENT NAME: Jhoan Hoffman   : 1995     [facility] MR# [if applicable]: [id number]     DIAGNOSIS:  Kidney transplant   ICD-10 CODE: Z94.0      Please complete the following labs weekly for 6 weeks then resume standing lab orders  Tacrolimus level (ensure 12 hours between last dose and blood draw)  BMP  CBC with platelets and differential    Any questions please call: [phone] option 5    please fax results to [phone].

## 2019-06-27 NOTE — TELEPHONE ENCOUNTER
ISSUE:  1A acute rejection seen on bx    PLAN:  Per Dr. Briggs - needs Solu Medrol 500mg IV X 3 days. Pred taper 60mg, 40, 20, 10 (3 days each) - then to remain on 5mg daily.   Call placed to pt. He is back home now 200 miles away. To discuss which infusion center (likely [facility]) ASAP tomorrow.   Jhoan voices understanding.     tacro goal = 6-8  INCREASE mmf to 750 BID  Labs weekly x6 weeks.  Closure biopsy in 4-6 weeks, unless no improvement in serum creatinine, then earlier.     [facility] IV solumedrol Friday, Saturday, Sunday.  Oral pred taper sent to local [facility].    He will notify [facility] if he does not hear from [facility] for infusion 6/28/2019.  He notes he has been taking his cellcept only 250mg BID for 1 month. Continue with increase to 750mg BID.    Last tacro level 6.1, this was only a 9h trough.  WILL INCREASE tacro from 1.5mg BID to 2mg BID      Jhoan voiced understanding of the above.

## 2019-06-28 LAB
BKV DNA # SPEC NAA+PROBE: NORMAL COPIES/ML
BKV DNA SPEC NAA+PROBE-LOG#: NORMAL LOG COPIES/ML
COPATH REPORT: NORMAL
SPECIMEN SOURCE: NORMAL

## 2019-06-28 RX ORDER — PREDNISONE 10 MG/1
TABLET ORAL
Qty: 39 TABLET | Refills: 0 | Status: SHIPPED | OUTPATIENT
Start: 2019-07-02 | End: 2019-07-14

## 2019-06-28 RX ORDER — PREDNISONE 10 MG/1
TABLET ORAL
Qty: 39 TABLET | Refills: 0 | Status: SHIPPED | OUTPATIENT
Start: 2019-07-02 | End: 2019-06-28

## 2019-06-28 RX ORDER — TACROLIMUS 0.5 MG/1
2 CAPSULE ORAL 2 TIMES DAILY
Qty: 240 CAPSULE | Refills: 11 | Status: SHIPPED | OUTPATIENT
Start: 2019-06-28 | End: 2019-11-26

## 2019-06-28 RX ORDER — MYCOPHENOLATE MOFETIL 250 MG/1
750 CAPSULE ORAL 2 TIMES DAILY
Qty: 180 CAPSULE | Refills: 11 | Status: SHIPPED | OUTPATIENT
Start: 2019-06-28 | End: 2020-01-16

## 2019-06-28 NOTE — PROGRESS NOTES
Discussed with patient and mother the biopsy results. Acute cellular rejection IA. Will treat with treat with solumedrol 500 mg IV daily x 3 then oral taper. He will continue tac with goal 6-8 and will increase MMF dose to 750 mg po bid.   Will consider rebiopsy. Discussed the need for adherence. Patient reported compliance during the biopsy encounter

## 2019-06-28 NOTE — TELEPHONE ENCOUNTER
Call placed to [facility] pharmacy. Pharmacy tech confirms receipt of mycophenolate and tacrolimus rx. Prednisone taper will be resent.     Patient aware

## 2019-07-01 ENCOUNTER — TELEPHONE (OUTPATIENT)
Dept: TRANSPLANT | Facility: CLINIC | Age: 24
End: 2019-07-01

## 2019-07-01 NOTE — TELEPHONE ENCOUNTER
Spoke with Jhoan.  He received the 3 doses of IV Solumedrol.  He did make the change to increase his mycophenolate to 750 mg BID and tacrolimus 2 mg BID.  He wanted to confirm the instructions for the prednisone taper.  Reviewed he is to take prednisone 60 mg daily for 3 days, 40 mg daily for 3 days, 20 mg daily for 3 days, 10 mg daily for 3 days and then resume prednisone 5 mg daily.  He is aware he needs to be getting weekly labs X 6.  Jhoan verbalized understanding. Jhoan states he does not have the prednisone yet, but he should be getting it today.  I called [facility] pharmacy to confirm they have the rx, and they do.  The pharmacist states they will get it to him today.

## 2019-07-09 ENCOUNTER — TELEPHONE (OUTPATIENT)
Dept: TRANSPLANT | Facility: CLINIC | Age: 24
End: 2019-07-09

## 2019-07-09 DIAGNOSIS — Z94.0 KIDNEY REPLACED BY TRANSPLANT: ICD-10-CM

## 2019-07-09 DIAGNOSIS — Z48.298 AFTERCARE FOLLOWING ORGAN TRANSPLANT: ICD-10-CM

## 2019-07-09 DIAGNOSIS — Z79.899 ENCOUNTER FOR LONG-TERM CURRENT USE OF MEDICATION: ICD-10-CM

## 2019-07-09 PROCEDURE — 80197 ASSAY OF TACROLIMUS: CPT | Performed by: INTERNAL MEDICINE

## 2019-07-09 NOTE — TELEPHONE ENCOUNTER
Patient Call: Transplant Lab/Orders  Route to LPN  Post Transplant Days: 2628  When patient is less than 60 days post-transplant, route high priority    Reason for Call: Discuss lab results; which results? Transplant Lab  Callback needed? Yes    Return Call Needed  Same as documented in contacts section  When to return call?: Same day: Route High Priority

## 2019-07-10 NOTE — TELEPHONE ENCOUNTER
Spoke with Jhoan.  Explained his creatinine is trending down.  Plan to continue to monitor weekly labs.  Jhoan verbalized understanding of the plan.

## 2019-07-10 NOTE — TELEPHONE ENCOUNTER
Returned call to Jhoan.  No answer, left v/m explaining creatinine has started trending down following treatment for rejection.  He needs to continue to get weekly labs.   Encouraged him to call back with further questions.

## 2019-07-11 LAB
TACROLIMUS BLD-MCNC: 7.1 UG/L (ref 5–15)
TME LAST DOSE: NORMAL H

## 2019-07-17 ENCOUNTER — TELEPHONE (OUTPATIENT)
Dept: TRANSPLANT | Facility: CLINIC | Age: 24
End: 2019-07-17

## 2019-07-17 DIAGNOSIS — Z79.899 ENCOUNTER FOR LONG-TERM CURRENT USE OF MEDICATION: ICD-10-CM

## 2019-07-17 DIAGNOSIS — Z94.0 KIDNEY REPLACED BY TRANSPLANT: Primary | ICD-10-CM

## 2019-07-17 DIAGNOSIS — Z94.0 KIDNEY REPLACED BY TRANSPLANT: ICD-10-CM

## 2019-07-17 DIAGNOSIS — Z48.298 AFTERCARE FOLLOWING ORGAN TRANSPLANT: ICD-10-CM

## 2019-07-17 PROCEDURE — 80197 ASSAY OF TACROLIMUS: CPT | Performed by: INTERNAL MEDICINE

## 2019-07-17 NOTE — TELEPHONE ENCOUNTER
Spoke with Jhoan.  Explained Dr. Briggs is recommending we repeat the biopsy.  Jhoan verbalized understanding.    Peter Briggs MD Hasebroock, Rebecca, RN             Yes bx    Previous Messages      ----- Message -----   From: Joan Mayer, RN   Sent: 7/17/2019   1:15 PM   To: Peter Briggs MD   Subject: repeat biopsy??                                   Hi Dr. Briggs-   You did a biopsy on Jhoan on 6/26.  It showed acute 1 a rejection and he was treated with 3 doses of solumedrol, tac goal and mmf were increased.  Notes say we were going to plan for a closure biopsy in 4-6 weeks unless creatinine did not improve, then biopsy sooner.  It was trending down, but is now elevated again at 2.68.  Jhoan denies any illness, diarrhea, medication changes, s/s of a UTI.  He states he is drinking more than enough water.       Do you recommend he repeat labs or plan to do another biopsy?         Transplant Coordinator Renal Biopsy Communication    Call placed to Jhoan Hoffman to discuss indication for kidney transplant biopsy per Dr. Brgigs.     Indication for transplant renal biopsy: Creatinine elevation after recent treatment for rejection   Laterality: right  Date of biopsy: Tuesday 7/23    Patient location within 70 miles of [facility]: No.  If no, must stay overnight locally. Pt verbalizes staying locally.     Jhoan Hoffman's medication list was reviewed.   Anticoagulant: aspirin   Ibuprofen: No.  Fish Oil:  No.  Medications held: aspirin    Recent blood pressure readings are WNL or not applicable. Instructed to take medication, especially blood pressure medications, before [facility] to the [facility] at 0600 day of procedure.     Procedure expectations and duration of stay discussed. Expressed pt can expect a phone call from LPN/MA to confirm biopsy date/time/location/directions/review of medications. Pt has no additional questions at this time. Transplant Office phone number given to  pt for future questions.      Joan Mayer  Kidney/Pancreas Transplant Coordinator  [phone] option 5

## 2019-07-17 NOTE — TELEPHONE ENCOUNTER
Patient Call: Transplant Lab/Orders  Route to LPN  Post Transplant Days: 2636  When patient is less than 60 days post-transplant, route high priority    Reason for Call: Discuss lab results; which results? 07/17/2019 lab results  Callback needed? Yes    Return Call Needed  Same as documented in contacts section  When to return call?: Greater than one day: Route standard priority

## 2019-07-17 NOTE — TELEPHONE ENCOUNTER
Spoke with Jhoan.  His creatinine trended back up to 2.68.   Jhoan denies any illness, diarrhea, medication changes, of s/s of a UTI.  He states he has been drinking more than enough water lately.   He recently had a biopsy that showed acute 1 A rejection on 6/26, treated with IV Solumedrol.  Per Dr. Briggs, plan was for a closure biopsy in 4-6 weeks unless creatinine did not improve, then plan for earlier.    Message sent to Dr. Briggs to advise.

## 2019-07-18 LAB
TACROLIMUS BLD-MCNC: 9.6 UG/L (ref 5–15)
TME LAST DOSE: NORMAL H

## 2019-07-19 ENCOUNTER — TELEPHONE (OUTPATIENT)
Dept: TRANSPLANT | Facility: CLINIC | Age: 24
End: 2019-07-19

## 2019-07-19 DIAGNOSIS — Z94.0 KIDNEY REPLACED BY TRANSPLANT: Primary | ICD-10-CM

## 2019-07-19 NOTE — TELEPHONE ENCOUNTER
Keys   LPN/MA  Renal Biopsy Communication    Call to pt to confirm renal biopsy procedure. Biopsy orders entered and patient aware of date 7- , in [facility] and to arrive at 6:00AM.     No need to be NPO.      Discussed anticoagulants (i.e. fish oil, ASA, Plavix, Coumadin, Ibuprofen). Pt confirms use of anticoagulants.  HOLDING    Take all medicine before arrival for biopsy and bring all medicine bottles with.      Report to 1st floor lab, then 5th floor for biopsy.      Use [employer] services and bring form of entertainment.     Discussed with patient need to stay overnight locally evening of procedure if live more than 70 miles away.    Call placed to scheduling at [phone] to schedule and confirm biopsy date/time    Lab appointment scheduled for morning of biopsy.      Patient v\u of instructions listed above. Biopsy scheduled. Orders placed.

## 2019-07-19 NOTE — TELEPHONE ENCOUNTER
ISSUE:  Tac 9.6, goal 6-8    PLAN:  Call and confirm a 12 hour trough and current tac dose of 4 mg twice daily   Any recent illness, diarrhea, or medication changes  Prior levels have been at goal.  Plan to repeat tac level before biopsy on 7/23 ensuring 12 hour trough.    OUTCOME:   Spoke with Jhoan.  He states the trough might have been a little short.   Confirms current dose.  Denies illness, diarrhea, or medication changes.   Verbalized understanding to time meds so that we get a 12 hour trough on Tuesday 7/23

## 2019-07-23 ENCOUNTER — HOSPITAL ENCOUNTER (OUTPATIENT)
Facility: AMBULATORY SURGERY CENTER | Age: 24
End: 2019-07-23
Attending: INTERNAL MEDICINE
Payer: MEDICAID

## 2019-07-23 ENCOUNTER — RESULTS ONLY (OUTPATIENT)
Dept: OTHER | Facility: CLINIC | Age: 24
End: 2019-07-23

## 2019-07-23 ENCOUNTER — ANCILLARY PROCEDURE (OUTPATIENT)
Dept: RADIOLOGY | Facility: AMBULATORY SURGERY CENTER | Age: 24
End: 2019-07-23
Attending: INTERNAL MEDICINE
Payer: MEDICAID

## 2019-07-23 VITALS
DIASTOLIC BLOOD PRESSURE: 90 MMHG | RESPIRATION RATE: 18 BRPM | HEIGHT: 66 IN | TEMPERATURE: 98.2 F | HEART RATE: 78 BPM | BODY MASS INDEX: 33.75 KG/M2 | WEIGHT: 210 LBS | OXYGEN SATURATION: 96 % | SYSTOLIC BLOOD PRESSURE: 131 MMHG

## 2019-07-23 DIAGNOSIS — Z94.0 KIDNEY REPLACED BY TRANSPLANT: ICD-10-CM

## 2019-07-23 LAB
ABO + RH BLD: NORMAL
ABO + RH BLD: NORMAL
ALBUMIN UR-MCNC: 100 MG/DL
ANION GAP SERPL CALCULATED.3IONS-SCNC: 6 MMOL/L (ref 3–14)
APPEARANCE UR: ABNORMAL
BASOPHILS # BLD AUTO: 0 10E9/L (ref 0–0.2)
BASOPHILS NFR BLD AUTO: 0.5 %
BILIRUB UR QL STRIP: NEGATIVE
BLD GP AB SCN SERPL QL: NORMAL
BLOOD BANK CMNT PATIENT-IMP: NORMAL
BUN SERPL-MCNC: 28 MG/DL (ref 7–30)
CALCIUM SERPL-MCNC: 8.8 MG/DL (ref 8.5–10.1)
CHLORIDE SERPL-SCNC: 109 MMOL/L (ref 94–109)
CO2 SERPL-SCNC: 23 MMOL/L (ref 20–32)
COLOR UR AUTO: ABNORMAL
CREAT SERPL-MCNC: 2.33 MG/DL (ref 0.66–1.25)
CREAT UR-MCNC: 36 MG/DL
DIFFERENTIAL METHOD BLD: ABNORMAL
DONOR IDENTIFICATION: NORMAL
DSA COMMENTS: NORMAL
DSA PRESENT: NO
DSA TEST METHOD: NORMAL
EOSINOPHIL # BLD AUTO: 0.3 10E9/L (ref 0–0.7)
EOSINOPHIL NFR BLD AUTO: 3.5 %
ERYTHROCYTE [DISTWIDTH] IN BLOOD BY AUTOMATED COUNT: 13.7 % (ref 10–15)
GFR SERPL CREATININE-BSD FRML MDRD: 37 ML/MIN/{1.73_M2}
GLUCOSE SERPL-MCNC: 84 MG/DL (ref 70–99)
GLUCOSE UR STRIP-MCNC: NEGATIVE MG/DL
HCT VFR BLD AUTO: 38.1 % (ref 40–53)
HGB BLD-MCNC: 11.7 G/DL (ref 13.3–17.7)
HGB BLD-MCNC: 12.2 G/DL (ref 13.3–17.7)
HGB UR QL STRIP: ABNORMAL
IMM GRANULOCYTES # BLD: 0 10E9/L (ref 0–0.4)
IMM GRANULOCYTES NFR BLD: 0.5 %
INR PPP: 0.95 (ref 0.86–1.14)
KETONES UR STRIP-MCNC: NEGATIVE MG/DL
LEUKOCYTE ESTERASE UR QL STRIP: NEGATIVE
LYMPHOCYTES # BLD AUTO: 2.6 10E9/L (ref 0.8–5.3)
LYMPHOCYTES NFR BLD AUTO: 29.9 %
MCH RBC QN AUTO: 29 PG (ref 26.5–33)
MCHC RBC AUTO-ENTMCNC: 32 G/DL (ref 31.5–36.5)
MCV RBC AUTO: 91 FL (ref 78–100)
MICROALBUMIN UR-MCNC: 806 MG/L
MICROALBUMIN/CREAT UR: 2208.22 MG/G CR (ref 0–17)
MONOCYTES # BLD AUTO: 0.8 10E9/L (ref 0–1.3)
MONOCYTES NFR BLD AUTO: 9.2 %
NEUTROPHILS # BLD AUTO: 4.9 10E9/L (ref 1.6–8.3)
NEUTROPHILS NFR BLD AUTO: 56.4 %
NITRATE UR QL: NEGATIVE
NRBC # BLD AUTO: 0 10*3/UL
NRBC BLD AUTO-RTO: 0 /100
ORGAN: NORMAL
PH UR STRIP: 6 PH (ref 5–7)
PLATELET # BLD AUTO: 189 10E9/L (ref 150–450)
POTASSIUM SERPL-SCNC: 3.9 MMOL/L (ref 3.4–5.3)
PROT UR-MCNC: 1.03 G/L
PROT/CREAT 24H UR: 2.82 G/G CR (ref 0–0.2)
RBC # BLD AUTO: 4.2 10E12/L (ref 4.4–5.9)
RBC #/AREA URNS AUTO: 0 /HPF (ref 0–2)
SA1 CELL: NORMAL
SA1 COMMENTS: NORMAL
SA1 HI RISK ABY: NORMAL
SA1 MOD RISK ABY: NORMAL
SA1 TEST METHOD: NORMAL
SA2 CELL: NORMAL
SA2 COMMENTS: NORMAL
SA2 HI RISK ABY UA: NORMAL
SA2 MOD RISK ABY: NORMAL
SA2 TEST METHOD: NORMAL
SODIUM SERPL-SCNC: 138 MMOL/L (ref 133–144)
SOURCE: ABNORMAL
SP GR UR STRIP: 1 (ref 1–1.03)
SPECIMEN EXP DATE BLD: NORMAL
UNACCEPTABLE ANTIGEN: NORMAL
UNOS CPRA: 100
UROBILINOGEN UR STRIP-MCNC: 0 MG/DL (ref 0–2)
WBC # BLD AUTO: 8.6 10E9/L (ref 4–11)
WBC #/AREA URNS AUTO: 0 /HPF (ref 0–5)

## 2019-07-23 PROCEDURE — 88350 IMFLUOR EA ADDL 1ANTB STN PX: CPT | Performed by: INTERNAL MEDICINE

## 2019-07-23 PROCEDURE — 86832 HLA CLASS I HIGH DEFIN QUAL: CPT | Performed by: INTERNAL MEDICINE

## 2019-07-23 PROCEDURE — 88346 IMFLUOR 1ST 1ANTB STAIN PX: CPT | Performed by: INTERNAL MEDICINE

## 2019-07-23 PROCEDURE — 88348 ELECTRON MICROSCOPY DX: CPT | Performed by: INTERNAL MEDICINE

## 2019-07-23 PROCEDURE — 88313 SPECIAL STAINS GROUP 2: CPT | Performed by: INTERNAL MEDICINE

## 2019-07-23 PROCEDURE — 87086 URINE CULTURE/COLONY COUNT: CPT | Performed by: INTERNAL MEDICINE

## 2019-07-23 PROCEDURE — 88305 TISSUE EXAM BY PATHOLOGIST: CPT | Performed by: INTERNAL MEDICINE

## 2019-07-23 PROCEDURE — 86833 HLA CLASS II HIGH DEFIN QUAL: CPT | Performed by: INTERNAL MEDICINE

## 2019-07-23 PROCEDURE — 87799 DETECT AGENT NOS DNA QUANT: CPT | Performed by: INTERNAL MEDICINE

## 2019-07-23 RX ADMIN — Medication 9 ML: at 08:26

## 2019-07-23 ASSESSMENT — MIFFLIN-ST. JEOR: SCORE: 1880.3

## 2019-07-23 NOTE — DISCHARGE INSTRUCTIONS
[facility]  Home Care Following Kidney Biopsy  ACTIVITY: NO heavy lifting (weight greater than 10 pounds) for 1 week. NO strenuous activity for 24 hours; relax and take it easy.     DIET: Resume your regular diet and drink plenty of fluids UNLESS you are fluid restricted.    DRAINAGE: There should be minimal drainage from the biopsy site. If bleeding soaks the dressing, you should lie down and apply pressure to the site for a minimum of 10 minutes. Call one of the numbers below if experienced bleeding that soaked the dressing.     DRESSING: Keep the dressing in place for 24 hours to prevent the site from re-opening and bleeding.     SEDATION: IF you received sedation medications, DO NOT drive or operate heavy machinery, DO NOT drink alcoholic beverages, and DO NOT make important legal decisions.    CALL ONE OF THE NUMBERS BELOW IF YOU EXPERIENCE ANY ONE OF THE FOLLOWING:      Excessive bleeding or drainage    Excessive swelling, redness, or tenderness at the site    Fever above 100.5 F, orally    Severe pain    Drainage that is green, yellow, thick white, or has a bad odor    If you havebloody urine or see bloody clots in your urine     Emergency Department: [phone] (open 24 hours)  Transplant Center: [phone] (open 7:00 AM - 6:30 PM)

## 2019-07-23 NOTE — H&P
Nephrology Procedure H&P  07/23/2019     Assessment & Recommendations:   1. DDKT - baseline creatinine ~ 2.3, which has increased. Moderate proteinuria. Will plan on kidney transplant biopsy to evaluate for etiology of elevated creatinine.  Possible causes include, but not limited to, rejection vs other. Will make no changes in immunosuppression.    Transplant History:  Transplant: 4/28/2012 (Kidney), 7/13/1995 (Kidney), 5/18/2011 (Kidney)        Donor Class: Standard Criteria Donor  Crossmatch at time of Transplant:    DSA at time of Transplant:  No  Present Maintenance Immunosuppression:  Tacrolimus, Mycophenolate mofetil and Prednisone  Baseline creatinine:  2.3  Latest DSA lab date:  PRA:  Class I:   SA1 Comments   Date Value Ref Range Status   06/26/2019   Final     Test performed by modified procedure. Serum heat inactivated and tested   by a modified ([place]) protocol including fetal calf serum addition.   High-risk, mfi >3,000. Mod-risk, mfi 500-3,000.         Class II:    SA2 Comments   Date Value Ref Range Status   06/26/2019   Final     Test performed by modified procedure. Serum heat inactivated and tested   by a modified ([place]) protocol including fetal calf serum addition.   High-risk, mfi >3,000. Mod-risk, mfi 500-3,000.       Biopsy:  Yes: 1A rejection on 6/26  Rejection History:  Yes: 1A on 6/26/2019  Significant Complications: None  Transplant Coordinator: Joan Mayer   Transplant Office Phone Number:  [phone]     2. Hypertension - well controlled at target of less than < 130/80. No changes.      Reason for Visit:  Mr. Hoffman is here for kidney transplant and elevated creatinine and potential kidney transplant biopsy.    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    The patient is a 24-year-old male with history of end-stage kidney disease due to congenital dysplasia of his native  kidneys who is status post third kidney transplant from 2012 here for a repeat biopsy after treatment of cellular rejection 1A with Solu-Medrol at the end of June 2019.    The patient currently is asymptomatic.  He specifically denies any chest pain or breathing difficulties.  He has no nausea or vomiting.  He denies any fever shakes or chills.  He has intermittent diarrhea but this is been well controlled of late.    He is currently on chronic immunosuppression therapy with tacrolimus, Mycophenolate, and prednisone.    The patient denies any dysuria.  He has no hematuria.  He has not been taking any blood thinners.    Pain Over Kidney Tx:  No Pain or Burning with Urination:  No  Gross Hematuria:  No  Taking NSAIDs:  No    Home BP: at goal    Review of Systems:  A comprehensive review of systems was obtained and negative, except as noted in the History of Present Illness or Active Medical Problems.    Active Medical Problems:  Patient Active Problem List    Diagnosis     Vitamin D deficiency     Immunosuppression (H)     Aftercare following organ transplant     Secondary renal hyperparathyroidism (H)     GERD (gastroesophageal reflux disease)     BK viremia     Depression     HTN, kidney transplant related     Kidney replaced by transplant     Current Medications:  Current Outpatient Medications   Medication Sig Dispense Refill     amLODIPine (NORVASC) 10 MG tablet Take 1 tablet (10 mg) by mouth At Bedtime 90 tablet 3     atenolol (TENORMIN) 25 MG tablet TAKE 1 TABLET BY MOUTH TWICE DAILY 180 tablet 11     cholecalciferol 2000 UNITS CAPS Take 2,000 Int'l Units by mouth daily 30 capsule      FLUoxetine (PROZAC) 20 MG capsule Take 40 mg by mouth daily  90 capsule 0     mycophenolate (GENERIC EQUIVALENT) 250 MG capsule Take 3 capsules (750 mg) by mouth 2 times daily 180 capsule 11     omeprazole 20 MG tablet Take 1 tablet (20 mg) by mouth every 12 hours 180 tablet 0     predniSONE (DELTASONE) 10 MG tablet Take 5 mg by  "mouth daily. 15 tablet 11     sulfamethoxazole-trimethoprim (BACTRIM/SEPTRA) 400-80 MG per tablet Take 1 tablet by mouth daily 30 tablet 11     tacrolimus (GENERIC EQUIVALENT) 0.5 MG capsule Take 4 capsules (2 mg) by mouth 2 times daily 240 capsule 11     aspirin (ASPIRIN ADULT LOW STRENGTH) 81 MG EC tablet Take 1 tablet by mouth daily. 90 tablet 3     clonazePAM (KLONOPIN) 0.5 MG tablet Take 1 tablet (0.5 mg) by mouth 2 times daily as needed for anxiety 20 tablet 0     Vitals:  /86 (Cuff Size: Adult Regular)   Pulse 74   Temp 98.2  F (36.8  C)   Resp 18   Ht 1.676 m (5' 6\")   Wt 95.3 kg (210 lb)   SpO2 96%   BMI 33.89 kg/m      Physical Exam:   GENERAL APPEARANCE: alert and no distress  HENT: mouth without ulcers or lesions  PULM: lungs clear to auscultation, equal air movement  CV: regular rhythm, normal rate     - no LE edema bilaterally  GI: soft, nontender, bowel sounds are normal  MS: no evidence of inflammation in joints, no muscle tenderness  TX KIDNEY: nontender    Labs:   All labs reviewed by me  Recent Results (from the past 8 hour(s))   Protein  random urine with Creat Ratio    Collection Time: 07/23/19  6:00 AM   Result Value Ref Range    Protein Random Urine 1.03 g/L    Protein Total Urine g/gr Creatinine 2.82 (H) 0 - 0.2 g/g Cr   Albumin Random Urine Quantitative with Creat Ratio    Collection Time: 07/23/19  6:00 AM   Result Value Ref Range    Creatinine Urine 36 mg/dL    Albumin Urine mg/L PENDING mg/L    Albumin Urine mg/g Cr PENDING 0 - 17 mg/g Cr   Routine UA with microscopic    Collection Time: 07/23/19  6:00 AM   Result Value Ref Range    Color Urine Straw     Appearance Urine Slightly Cloudy     Glucose Urine Negative NEG^Negative mg/dL    Bilirubin Urine Negative NEG^Negative    Ketones Urine Negative NEG^Negative mg/dL    Specific Gravity Urine 1.005 1.003 - 1.035    Blood Urine Small (A) NEG^Negative    pH Urine 6.0 5.0 - 7.0 pH    Protein Albumin Urine 100 (A) NEG^Negative " mg/dL    Urobilinogen mg/dL 0.0 0.0 - 2.0 mg/dL    Nitrite Urine Negative NEG^Negative    Leukocyte Esterase Urine Negative NEG^Negative    Source Midstream Urine     WBC Urine PENDING OTO5^0 - 5 /HPF    RBC Urine PENDING OTO2^O - 2 /HPF   ABO/Rh type and screen    Collection Time: 07/23/19  6:01 AM   Result Value Ref Range    ABO A     RH(D) Neg     Antibody Screen Neg     Test Valid Only At          [facility]    Specimen Expires 07/26/2019    INR    Collection Time: 07/23/19  6:01 AM   Result Value Ref Range    INR 0.95 0.86 - 1.14   CBC with platelets differential    Collection Time: 07/23/19  6:01 AM   Result Value Ref Range    WBC 8.6 4.0 - 11.0 10e9/L    RBC Count 4.20 (L) 4.4 - 5.9 10e12/L    Hemoglobin 12.2 (L) 13.3 - 17.7 g/dL    Hematocrit 38.1 (L) 40.0 - 53.0 %    MCV 91 78 - 100 fl    MCH 29.0 26.5 - 33.0 pg    MCHC 32.0 31.5 - 36.5 g/dL    RDW 13.7 10.0 - 15.0 %    Platelet Count 189 150 - 450 10e9/L    Diff Method Automated Method     % Neutrophils 56.4 %    % Lymphocytes 29.9 %    % Monocytes 9.2 %    % Eosinophils 3.5 %    % Basophils 0.5 %    % Immature Granulocytes 0.5 %    Nucleated RBCs 0 0 /100    Absolute Neutrophil 4.9 1.6 - 8.3 10e9/L    Absolute Lymphocytes 2.6 0.8 - 5.3 10e9/L    Absolute Monocytes 0.8 0.0 - 1.3 10e9/L    Absolute Eosinophils 0.3 0.0 - 0.7 10e9/L    Absolute Basophils 0.0 0.0 - 0.2 10e9/L    Abs Immature Granulocytes 0.0 0 - 0.4 10e9/L    Absolute Nucleated RBC 0.0    Basic metabolic panel    Collection Time: 07/23/19  6:01 AM   Result Value Ref Range    Sodium 138 133 - 144 mmol/L    Potassium 3.9 3.4 - 5.3 mmol/L    Chloride 109 94 - 109 mmol/L    Carbon Dioxide 23 20 - 32 mmol/L    Anion Gap 6 3 - 14 mmol/L    Glucose 84 70 - 99 mg/dL    Urea Nitrogen 28 7 - 30 mg/dL    Creatinine 2.33 (H) 0.66 - 1.25 mg/dL    GFR Estimate 37 (L) >60 mL/min/[1.73_m2]    GFR Estimate If Black 43 (L) >60 mL/min/[1.73_m2]    Calcium 8.8 8.5 -  10.1 mg/dL        Pro Menard MD

## 2019-07-24 LAB
BACTERIA SPEC CULT: NO GROWTH
BKV DNA # SPEC NAA+PROBE: NORMAL COPIES/ML
BKV DNA SPEC NAA+PROBE-LOG#: NORMAL LOG COPIES/ML
COPATH REPORT: NORMAL
Lab: NORMAL
SPECIMEN SOURCE: NORMAL
SPECIMEN SOURCE: NORMAL

## 2019-07-25 ENCOUNTER — TELEPHONE (OUTPATIENT)
Dept: TRANSPLANT | Facility: CLINIC | Age: 24
End: 2019-07-25

## 2019-07-25 NOTE — TELEPHONE ENCOUNTER
Called Jhoan to discuss his biopsy results showing no rejection.    No answer, left v/m requesting a return call.      Pro Menard MD [place]ase[place]ock, Rebecca, RN Becca:     Can you let Jhoan know his biopsy shows no rejection now.     Thanks,     d

## 2019-07-25 NOTE — TELEPHONE ENCOUNTER
Spoke with soha mother.  Explained that the biopsy showed no rejection.  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  Discussed that no treatment is indicated an no changes to immunosuppression are needed, we will plan to continue monitoring and albert should continue to be compliant with his medications and lab draws and have good BP control.    Albert's mother kept asking what percentage his kidney is at.  I told her that I am unsure what she is referring to.  We reviewed his current GFR and his creatinine, but she said that is not what she is talking about.  She then got frustrated and asked why the doctor was not calling.  I told her I would have Dr. Menard call to answer questions further.

## 2019-07-25 NOTE — TELEPHONE ENCOUNTER
Spoke with Jhoan.  Explained there was no rejection seen on the biopsy, only chronic changes were seen.    Jhoan requests I contact his mother and tell her as well.   Called and left a v/m for soha mother.

## 2019-07-26 ENCOUNTER — TELEPHONE (OUTPATIENT)
Dept: NEPHROLOGY | Facility: CLINIC | Age: 24
End: 2019-07-26

## 2019-07-26 NOTE — TELEPHONE ENCOUNTER
Per the patient's request, I spoke with his mother regarding the results of his kidney transplant biopsy.  He does have evidence of transplant glomerulopathy with approximately 2 g/g of proteinuria.  There is no evidence of acute cellular or antibody mediated rejection.    She did ask about how much scar was present on this kidney biopsy which I told her 5 of the 20 glomeruli were globally sclerosed.    We discussed the implications of his kidney biopsy.  I have recommended that he take his medications as well as get his screening labs and he should follow-up with his doctors.  The current GFR is 37 mL/min.  It is possible that his kidney function could deteriorate quickly or continue on its current course for many years.  When his GFR is 20 mL/min or below he will be re-referred for kidney transplant discussion although his highly sensitized state will make it difficult to receive another transplant without pair Exchange.

## 2019-08-06 DIAGNOSIS — Z48.298 AFTERCARE FOLLOWING ORGAN TRANSPLANT: ICD-10-CM

## 2019-08-06 DIAGNOSIS — Z79.899 ENCOUNTER FOR LONG-TERM CURRENT USE OF MEDICATION: ICD-10-CM

## 2019-08-06 DIAGNOSIS — Z94.0 KIDNEY REPLACED BY TRANSPLANT: ICD-10-CM

## 2019-08-06 PROCEDURE — 80197 ASSAY OF TACROLIMUS: CPT | Performed by: INTERNAL MEDICINE

## 2019-08-08 ENCOUNTER — TELEPHONE (OUTPATIENT)
Dept: TRANSPLANT | Facility: CLINIC | Age: 24
End: 2019-08-08

## 2019-08-08 LAB
TACROLIMUS BLD-MCNC: 6.9 UG/L (ref 5–15)
TME LAST DOSE: NORMAL H

## 2019-08-08 NOTE — LETTER
PHYSICIAN ORDERS      DATE & TIME ISSUED: 2019 9:56 AM  PATIENT NAME: Jhoan Hoffman   : 1995     [facility] MR# [if applicable]: [id number]     DIAGNOSIS:  Kidney transplant  ICD-10 CODE: Z94.0     Please obtain the following    BMP   CBC   12 hour tacrolimus level     Any questions please call: [phone]  Please fax results to [phone].    .

## 2019-08-08 NOTE — TELEPHONE ENCOUNTER
Patient Call: Jhoan called but was inclined to say what he needed to talk to you about          Call back needed? Yes    Return Call Needed  Same as documented in contacts section  When to return call?: Same day: Route High Priority

## 2019-08-09 ENCOUNTER — CARE COORDINATION (OUTPATIENT)
Dept: NEPHROLOGY | Facility: CLINIC | Age: 24
End: 2019-08-09

## 2019-08-09 DIAGNOSIS — I15.1 HTN, KIDNEY TRANSPLANT RELATED: Primary | ICD-10-CM

## 2019-08-09 DIAGNOSIS — Z94.0 HTN, KIDNEY TRANSPLANT RELATED: Primary | ICD-10-CM

## 2019-08-09 NOTE — PROGRESS NOTES
Received message from patient's coordinator that he has questions / concerns about his BP and meds. Left message for patient to call back.    Kary Hinojosa RN

## 2019-08-09 NOTE — TELEPHONE ENCOUNTER
Called Jhoan.  He is wondering how frequently he should do labs.    He had a biopsy the end of June that showed ACR.  Recent biopsy on 7/23 showed no rejection.  Recommended he do labs every other week X 4.   Jhoan verbalized understanding.   Lab orders sent.

## 2019-08-12 RX ORDER — CARVEDILOL 6.25 MG/1
6.25 TABLET ORAL 2 TIMES DAILY WITH MEALS
Qty: 180 TABLET | Refills: 3 | Status: SHIPPED | OUTPATIENT
Start: 2019-08-12 | End: 2019-12-05

## 2019-08-12 NOTE — PROGRESS NOTES
Nephrology Note: Nursing Outreach Encounter    REASON FOR CALL:                                                      REASON FOR CALL: Blood Pressure Follow Up                                          SITUATION/BACKROUND:                                                    Patient is being treated for HTN.      ASSESSMENT:                                                      Spoke with patient. His BP has been elevated in the 130/90's for one week. He doesn't check his heart rate. Denied any symptoms today. He's concerned that the readings are high, wants to know if he needs a med adjustment.    Currently taking amlodipine 10mg at bedtime and atenolol 25mg BID.    Uremic Symptoms: No       PLAN:                                                      Follow Up:   Route to provider to further advise     Per Dr. Culp: Would recommend changing atenolol to carvedilol 6.25 mg bid.     Patient verbalized understanding and will contact the clinic with any further questions or concerns.     Kary Hinojosa RN

## 2019-08-22 PROCEDURE — 80197 ASSAY OF TACROLIMUS: CPT | Performed by: INTERNAL MEDICINE

## 2019-08-23 ENCOUNTER — TELEPHONE (OUTPATIENT)
Dept: TRANSPLANT | Facility: CLINIC | Age: 24
End: 2019-08-23

## 2019-08-23 NOTE — LETTER
PHYSICIAN ORDERS      DATE & TIME ISSUED: 2019 3:01 PM  PATIENT NAME: Jhoan Hoffman   : 1995     [facility] MR# [if applicable]: [id number]     DIAGNOSIS:  kidney transplant  ICD-10 CODE: Z94.0     Please obtain the following labs in 1-2 weeks   BMP    Any questions please call: [phone]  Please fax results to [phone].    .

## 2019-08-23 NOTE — TELEPHONE ENCOUNTER
Spoke with Jhoan.  He denies any illness, diarrhea, s/s of a UTI, or medication changes.   He reports he thinks he is drinking enough water.  He has been drinking around 70 ounces.   He verbalized understanding to increase his hydration and repeat labs next week.   Lab order faxed.

## 2019-08-23 NOTE — TELEPHONE ENCOUNTER
ISSUE:  Creatinine elevated 2.88    PLAN:  Call and assess hydration status.  How much water is he drinking per day?  Any recent illness, diarrhea, s/s of a UTI, or medication changes?  Any increased intake of alcohol or caffeine?  Recommend increasing hydration and repeating labs within 1 week.    LPN TASK:  Call with above instructions   Update lab orders

## 2019-08-25 LAB
TACROLIMUS BLD-MCNC: 8 UG/L (ref 5–15)
TME LAST DOSE: NORMAL H

## 2019-08-27 ENCOUNTER — CARE COORDINATION (OUTPATIENT)
Dept: NEPHROLOGY | Facility: CLINIC | Age: 24
End: 2019-08-27

## 2019-08-27 NOTE — PROGRESS NOTES
Nephrology Note: Nursing Outreach Encounter    REASON FOR CALL:                                                      REASON FOR CALL: Blood Pressure Follow Up                                          SITUATION/BACKROUND:                                                    Patient is being treated for HTN.      ASSESSMENT:                                                      Called patient to check in on BP. He hasn't been checking readings since we changed his meds, but agreed to start a log today. Will follow up next week. He denied any symptoms or concerns today.    Uremic Symptoms: No       PLAN:                                                      Follow Up:   Follow up call in 1-2 weeks     Patient verbalized understanding and will contact the clinic with any further questions or concerns.     Kary Hinojosa RN

## 2019-09-03 ENCOUNTER — CARE COORDINATION (OUTPATIENT)
Dept: NEPHROLOGY | Facility: CLINIC | Age: 24
End: 2019-09-03

## 2019-09-03 NOTE — PROGRESS NOTES
Nephrology Note: Nursing Outreach Encounter    REASON FOR CALL:                                                      REASON FOR CALL: Blood Pressure Follow Up                                          SITUATION/BACKROUND:                                                    Patient is being treated for transplant.      ASSESSMENT:                                                      Spoke with patient. He's feeling well today, denied any symptoms or concerns. He hasn't been able to check his BP due to his busy schedule. He will continue to monitor and call if any concerns.    Uremic Symptoms: No       PLAN:                                                      Follow Up:   Follow up call in 3-4 weeks     Patient verbalized understanding and will contact the clinic with any further questions or concerns.     Kary Hinojosa RN

## 2019-09-17 PROCEDURE — 80197 ASSAY OF TACROLIMUS: CPT | Performed by: INTERNAL MEDICINE

## 2019-09-18 ENCOUNTER — TELEPHONE (OUTPATIENT)
Dept: TRANSPLANT | Facility: CLINIC | Age: 24
End: 2019-09-18

## 2019-09-18 NOTE — TELEPHONE ENCOUNTER
ISSUE:  WBC 11.8    PLAN:  Call and screen for any recent illness.    Any recent symptoms of infection?  Recommend following up with PCP if he develops any illness/infection    OUTCOME:  Spoke with Jhoan.  He denies any recent illness/infection.    He verbalized understanding to follow up with his PCP if needed.

## 2019-09-19 ENCOUNTER — TELEPHONE (OUTPATIENT)
Dept: TRANSPLANT | Facility: CLINIC | Age: 24
End: 2019-09-19

## 2019-09-19 LAB
TACROLIMUS BLD-MCNC: 5.2 UG/L (ref 5–15)
TME LAST DOSE: NORMAL H

## 2019-09-19 NOTE — LETTER
PHYSICIAN ORDERS      DATE & TIME ISSUED: 2019 5:23 PM  PATIENT NAME: Jhoan Hoffman   : 1995     [facility] MR# [if applicable]: [id number]     DIAGNOSIS:  Kidney transplant   ICD-10 CODE: Z94.0       Please complete the following level in 1-2 weeks   Tacrolimus level ( ensure 12 hours between last dose and blood draw)    Any questions please call: [phone] option 5    Please fax results to [phone].

## 2019-09-19 NOTE — TELEPHONE ENCOUNTER
Tacrolimus = 5.2  Goal 6-8 d/t recent rejection  --- Previous levels within goal.  Current tac dose 2 mg BID    PLAN:  Confirm result is a good trough and correct Tac dose of 2 mg BID.  Previous levels at goal. No dose change at this time.   Have levels rechecked in 1 - 2 weeks.    OUTCOME:  Left VM re: rechecking tac levels.    LPN task:  Fax orders to:   [facility] [phone] (Phone)  [phone] (Fax)

## 2019-09-23 ENCOUNTER — TELEPHONE (OUTPATIENT)
Dept: TRANSPLANT | Facility: CLINIC | Age: 24
End: 2019-09-23

## 2019-09-23 NOTE — TELEPHONE ENCOUNTER
Returned call to Jhoan.  Explained he can use tylenol, throat drops, or gargling with warm salt water.    Encouraged him to follow up with PCP if he doesn't improve.

## 2019-09-23 NOTE — TELEPHONE ENCOUNTER
Patient Call: Transplant Illness  If the patient reports CHEST PAIN, SEVERE SHORTNESS OF BREATH, ONE SIDED WEAKNESS, or DIFFICULTY SPEAKING: CONTACT RN FACE-TO-FACE IMMEDIATELY    Duration of illness: ?  Transplanted organ? Kidney  Illness: Other Sore Throat  Patient wanted to let his coordinator know he has a sore throat not sure if he wants to know what he can take or not.

## 2019-09-27 DIAGNOSIS — Z94.0 KIDNEY REPLACED BY TRANSPLANT: Primary | ICD-10-CM

## 2019-10-01 ENCOUNTER — CARE COORDINATION (OUTPATIENT)
Dept: NEPHROLOGY | Facility: CLINIC | Age: 24
End: 2019-10-01

## 2019-10-01 NOTE — PROGRESS NOTES
Nephrology Note: Nursing Outreach Encounter    REASON FOR CALL:                                                      REASON FOR CALL: Blood Pressure Follow Up                                          SITUATION/BACKROUND:                                                    Patient is being treated for HTN.      ASSESSMENT:                                                      Spoke with patient. He's feeling well today, said sore throat has resolved. He still hasn't been checking BP. He has a cuff, said he needs to set this up. Explained the importance of BP management in relation to transplant, he verbalized understanding. Will continue to monitor.    Uremic Symptoms: No       PLAN:                                                      Follow Up:   Follow up call in 3-4 weeks     Patient verbalized understanding and will contact the clinic with any further questions or concerns.     Kary Hinojosa RN

## 2019-10-22 DIAGNOSIS — Z94.0 KIDNEY TRANSPLANTED: Primary | ICD-10-CM

## 2019-10-22 RX ORDER — SULFAMETHOXAZOLE AND TRIMETHOPRIM 400; 80 MG/1; MG/1
1 TABLET ORAL DAILY
Qty: 30 TABLET | Refills: 11 | Status: SHIPPED | OUTPATIENT
Start: 2019-10-22 | End: 2020-10-23

## 2019-10-29 ENCOUNTER — CARE COORDINATION (OUTPATIENT)
Dept: NEPHROLOGY | Facility: CLINIC | Age: 24
End: 2019-10-29

## 2019-10-29 NOTE — PROGRESS NOTES
Nephrology Note: Nursing Outreach Encounter    REASON FOR CALL:                                                      REASON FOR CALL: Care Coordination                                          SITUATION/BACKROUND:                                                    Patient is being treated for HTN.      ASSESSMENT:                                                      Spoke with patient. He is feeling great, denied any symptoms or concerns today. He still hasn't been checking BP, as his cuff is at his parents house. Agreed to call clinic if he has any concerns with symptoms or BP.    Uremic Symptoms: No       PLAN:                                                      Follow Up:   Patient to call/[employer] message with updates     Patient verbalized understanding and will contact the clinic with any further questions or concerns.     Kary Hinojosa RN

## 2019-11-07 ENCOUNTER — TELEPHONE (OUTPATIENT)
Dept: TRANSPLANT | Facility: CLINIC | Age: 24
End: 2019-11-07

## 2019-11-07 DIAGNOSIS — Z94.0 KIDNEY REPLACED BY TRANSPLANT: ICD-10-CM

## 2019-11-07 PROCEDURE — 80197 ASSAY OF TACROLIMUS: CPT | Performed by: INTERNAL MEDICINE

## 2019-11-07 NOTE — TELEPHONE ENCOUNTER
ISSUE:  Creatinine elevated 3.01  WBC elevated 12.8  Bicarb 18  K+ 3.4    PLAN:  Call and assess hydration status.  How much water is he drinking per day?  Any recent illness, diarrhea, s/s of a UTI, or medication changes?  If he is experiencing any illness or symptoms of infection, recommend he follow up with PCP  Encouraged increased dietary intake of K+ rich foods (bananas, oranges, tomatoes, avocados, etc)  Recommend increasing hydration and repeating labs early next week    LPN TASK:  Call with above instructions  Update lab orders

## 2019-11-07 NOTE — TELEPHONE ENCOUNTER
Call placed to patient. No answer. Voice message left with instructions listed below. Will try back.

## 2019-11-07 NOTE — LETTER
PHYSICIAN ORDERS      DATE & TIME ISSUED: 2019 6:21 PM  PATIENT NAME: Jhoan Hoffman   : 1995     [facility] MR# [if applicable]: [id number]     DIAGNOSIS:  Kidney transplant   ICD-10 CODE: Z94.0      Please complete the following level in one week  BMP    Any questions please call: [phone] option 5    Please fax results to [phone].

## 2019-11-08 LAB
TACROLIMUS BLD-MCNC: 6 UG/L (ref 5–15)
TME LAST DOSE: NORMAL H

## 2019-11-09 NOTE — TELEPHONE ENCOUNTER
Call placed to patient. Patient state that he wasn't drinking much water prior to his lab draw and doesn't know how much water he drink daily. Patient denies any illness s\s of uti or medication changes. Patient v\u to improve hydration; increase his dietary intake of potassium rich food; and repeat labs in one week. Order sent

## 2019-11-22 ENCOUNTER — TELEPHONE (OUTPATIENT)
Dept: TRANSPLANT | Facility: CLINIC | Age: 24
End: 2019-11-22

## 2019-11-22 DIAGNOSIS — Z94.0 KIDNEY REPLACED BY TRANSPLANT: ICD-10-CM

## 2019-11-22 PROCEDURE — 80197 ASSAY OF TACROLIMUS: CPT | Performed by: INTERNAL MEDICINE

## 2019-11-22 NOTE — TELEPHONE ENCOUNTER
LEUKOCYTOSIS: WBC 13.0  Long-standing elevation of white blood count  - denies fever, night sweats, dysuria  - denies respiratory tract symptoms and abdominal pain    PLAN  Nephrology clinic appointment in 2 weeks -- scheduled for 12/5/2019  - address increased WBC at clinic visit

## 2019-11-24 LAB
TACROLIMUS BLD-MCNC: 11.3 UG/L (ref 5–15)
TME LAST DOSE: NORMAL H

## 2019-11-25 DIAGNOSIS — Z94.0 S/P KIDNEY TRANSPLANT: ICD-10-CM

## 2019-11-25 DIAGNOSIS — Z94.0 KIDNEY REPLACED BY TRANSPLANT: ICD-10-CM

## 2019-11-25 NOTE — TELEPHONE ENCOUNTER
IMMUNOSUPPRESSION  - tacrolimus level 11.3 reported as a 12-hour level  - goal for 12-hour trough 6-8 (recent rejections June 2019)  - current tacrolimus dose 2 mg twice daily per medication profile    PLAN  - confirm timing of drug level and current dose  - decrease dose to 1.5 mg twice daily  - check tacrolimus level 1 week after dose change    LPN TASK  Call with questions and instructions per plan.

## 2019-11-25 NOTE — LETTER
PHYSICIAN ORDERS      DATE & TIME ISSUED: 2019 11:03 AM  PATIENT NAME: Jhoan Hoffamn   : 1995     [facility] MR# [if applicable]: [id number]     DIAGNOSIS:  Kidney transplant   ICD-10 CODE: Z94.0      Please repeat the following level in one week of dose change  Tacrolimus level (ensure 12 hours between last dose and blood draw)    Any questions please call: [phone] option 5    Please fax results to [phone]    .

## 2019-11-26 RX ORDER — TACROLIMUS 0.5 MG/1
1.5 CAPSULE ORAL 2 TIMES DAILY
Qty: 180 CAPSULE | Refills: 11 | Status: SHIPPED | OUTPATIENT
Start: 2019-11-26 | End: 2020-01-16

## 2019-11-26 NOTE — TELEPHONE ENCOUNTER
Call placed to patient. Patient confirms current dose and accurate trough level. Denies any recent illness, diarrhea or medication changes. Patient v\u to decrease dose to 1.5 mg bid and repeat level in one week. Rx/Order sent

## 2019-12-05 ENCOUNTER — OFFICE VISIT (OUTPATIENT)
Dept: NEPHROLOGY | Facility: CLINIC | Age: 24
End: 2019-12-05
Attending: INTERNAL MEDICINE
Payer: MEDICAID

## 2019-12-05 VITALS
OXYGEN SATURATION: 96 % | SYSTOLIC BLOOD PRESSURE: 140 MMHG | HEART RATE: 120 BPM | BODY MASS INDEX: 35.61 KG/M2 | WEIGHT: 220.6 LBS | DIASTOLIC BLOOD PRESSURE: 99 MMHG | TEMPERATURE: 98.7 F

## 2019-12-05 DIAGNOSIS — B34.8 BK VIREMIA: ICD-10-CM

## 2019-12-05 DIAGNOSIS — E55.9 VITAMIN D DEFICIENCY: ICD-10-CM

## 2019-12-05 DIAGNOSIS — Z94.0 HTN, KIDNEY TRANSPLANT RELATED: ICD-10-CM

## 2019-12-05 DIAGNOSIS — D84.9 IMMUNOSUPPRESSION (H): ICD-10-CM

## 2019-12-05 DIAGNOSIS — Z94.0 KIDNEY REPLACED BY TRANSPLANT: ICD-10-CM

## 2019-12-05 DIAGNOSIS — N25.81 SECONDARY RENAL HYPERPARATHYROIDISM (H): ICD-10-CM

## 2019-12-05 DIAGNOSIS — I15.1 HTN, KIDNEY TRANSPLANT RELATED: ICD-10-CM

## 2019-12-05 DIAGNOSIS — Z48.298 AFTERCARE FOLLOWING ORGAN TRANSPLANT: ICD-10-CM

## 2019-12-05 DIAGNOSIS — E66.9 OBESITY, UNSPECIFIED CLASSIFICATION, UNSPECIFIED OBESITY TYPE, UNSPECIFIED WHETHER SERIOUS COMORBIDITY PRESENT: Primary | ICD-10-CM

## 2019-12-05 PROCEDURE — G0463 HOSPITAL OUTPT CLINIC VISIT: HCPCS | Mod: ZF

## 2019-12-05 RX ORDER — CARVEDILOL 12.5 MG/1
12.5 TABLET ORAL 2 TIMES DAILY WITH MEALS
Qty: 180 TABLET | Refills: 3 | Status: ON HOLD | OUTPATIENT
Start: 2019-12-05 | End: 2020-12-21

## 2019-12-05 ASSESSMENT — PAIN SCALES - GENERAL: PAINLEVEL: NO PAIN (0)

## 2019-12-05 NOTE — LETTER
12/5/2019      RE: Jhoan Hoffman  [address] [facility] [address]       CHRONIC TRANSPLANT NEPHROLOGY VISIT    Assessment & Plan   # DDKT: Increased creatinine with new baseline closer to 3s.  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  Will refer patient for retransplant evaluation.   - Baseline Cr ~ 3.0-3.4   - Proteinuria: Moderate (1-3 grams)   - Date DSA Last Checked: Jul/2019      Latest DSA: No   - BK Viremia: No   - Kidney Tx Biopsy: Jul 23, 2019; Result: No diagnostic evidence of acute rejection.  Transplant glomerulopathy.             Jun 26, 2019; Result: Acute cellular-mediated rejection, [place] 1A.    # Immunosuppression: Tacrolimus immediate release (goal 4-6), Mycophenolate mofetil (goal not followed) and Prednisone (dose 5 mg daily)   - Changes: No    # Infection Prophylaxis:   - PJP: Sulfa/TMP (Bactrim)    # Hypertension: Borderline control;  Goal BP: < 130/80   - Changes: Yes - Will increase carvedilol to 12.5 mg bid.    # Leukocytosis: High normal to high WBC, stable.  No signs or symptoms of infection.     # Mineral Bone Disorder:   - Vitamin D; level: Not checked recently        On Supplement: Yes  - Calcium; level: Normal        On Supplement: No    # Electrolytes:   - Potassium; level: Normal        On Supplement: No  - Bicarbonate; level: Normal        On Supplement: No    # Obesity: Increased weight over the last year.   - Recommend increased exercise and watch caloric intake.    # Depression: Mood has been good lately, but still some anxiety at times.     # Skin Cancer Risk:    - Discussed sun protection and recommend regular follow up with Dermatology.    # Medical Compliance: Yes    # Transplant History:  Etiology of Kidney Failure: Congenital dysplagia  Tx: DDKT  Transplant: 4/28/2012 (Kidney), 7/13/1995 (Kidney), 5/18/2011 (Kidney)  Donor Type: Donation after Brain Death Donor Class: Standard Criteria Donor  Significant changes in  "immunosuppression: None  Significant transplant-related complications: None    Transplant Office Phone Number: [phone]    Assessment and plan was discussed with the patient and he voiced his understanding and agreement.    Return visit: Return in about 6 months (around 6/5/2020).    Jose Culp MD    Chief Complaint   Mr. Hoffman is a 24 year old here for routine follow up.    History of Present Illness    Mr. Hoffman reports feeling good overall with some medical complaints.  Since last clinic visit, patient reports no hospitalizations or new medical complaints and has been doing well overall.  He did have a sore throat last week for a few days, but that is resolved now.  His energy level is \"average\" and has been a bit up and down.  He is active, although really gets minimal exercise.  Denies any chest pain or shortness of breath with exertion.  Appetite is \"too good\" and his weight is up ~ 10-20 lbs in the last year or so.  No nausea, vomiting or diarrhea.  No fever, sweats or chills.  Some leg swelling, but decreased after he lowered his salt intake.    Recent Hospitalizations:  [x] No [] Yes    New Medical Issues: [x] No [] Yes    Decreased energy: [x] No [] Yes    Chest pain or SOB with exertion:  [x] No [] Yes    Appetite change or weight change: [] No [x] Yes Weight is up ~ 10-20 lbs in the last year or so   Nausea, vomiting or diarrhea:  [x] No [] Yes    Fever, sweats or chills: [x] No [] Yes    Leg swelling: [] No [x] Yes Better     Home BP: 140/90s    Review of Systems   A comprehensive review of systems was obtained and negative, except as noted in the HPI or PMH.    Problem List   Patient Active Problem List   Diagnosis     Kidney replaced by transplant     HTN, kidney transplant related     Depression     BK viremia     GERD (gastroesophageal reflux disease)     Secondary renal hyperparathyroidism (H)     Vitamin D deficiency     Immunosuppression (H)     Aftercare following organ " transplant       Social History   Social History     Tobacco Use     Smoking status: Never Smoker     Smokeless tobacco: Never Used     Tobacco comment: mother smokes outside   Substance Use Topics     Alcohol use: No     Drug use: No       Allergies   Allergies   Allergen Reactions     Amoxicillin Swelling     Azithromycin      Z pack - can't take with cyclosporine.     Vancomycin      Ruth syndrome     Cefprozil Rash       Medications   Current Outpatient Medications   Medication Sig     amLODIPine (NORVASC) 10 MG tablet Take 1 tablet (10 mg) by mouth At Bedtime     aspirin (ASPIRIN ADULT LOW STRENGTH) 81 MG EC tablet Take 1 tablet by mouth daily.     carvedilol (COREG) 12.5 MG tablet Take 1 tablet (12.5 mg) by mouth 2 times daily (with meals)     cholecalciferol 2000 UNITS CAPS Take 2,000 Int'l Units by mouth daily     FLUoxetine (PROZAC) 20 MG capsule Take 40 mg by mouth daily      mycophenolate (GENERIC EQUIVALENT) 250 MG capsule Take 3 capsules (750 mg) by mouth 2 times daily     omeprazole 20 MG tablet Take 1 tablet (20 mg) by mouth every 12 hours     predniSONE (DELTASONE) 10 MG tablet Take 5 mg by mouth daily.     sulfamethoxazole-trimethoprim (BACTRIM/SEPTRA) 400-80 MG tablet Take 1 tablet by mouth daily     tacrolimus (GENERIC EQUIVALENT) 0.5 MG capsule Take 3 capsules (1.5 mg) by mouth 2 times daily     clonazePAM (KLONOPIN) 0.5 MG tablet Take 1 tablet (0.5 mg) by mouth 2 times daily as needed for anxiety     No current facility-administered medications for this visit.      Medications Discontinued During This Encounter   Medication Reason     carvedilol (COREG) 6.25 MG tablet        Physical Exam   Vital Signs: BP (!) 140/99 (BP Location: Right arm, Patient Position: Chair, Cuff Size: Adult Large)   Pulse 120   Temp 98.7  F (37.1  C) (Oral)   Wt 100.1 kg (220 lb 9.6 oz)   SpO2 96%   BMI 35.61 kg/m       GENERAL APPEARANCE: alert and no distress  HENT: mouth without ulcers or lesions  LYMPHATICS:  no cervical or supraclavicular nodes  RESP: lungs clear to auscultation - no rales, rhonchi or wheezes  CV: regular rhythm, normal rate, no rub, no murmur  EDEMA: trace LE edema bilaterally  ABDOMEN: soft, nondistended, nontender, bowel sounds normal  MS: extremities normal - no gross deformities noted, no evidence of inflammation in joints, no muscle tenderness  SKIN: no rash  TX KIDNEY: normal  DIALYSIS ACCESS:  None      Data     Renal Latest Ref Rng & Units 11/22/2019 11/7/2019 9/17/2019   Na 133 - 144 mmol/L - - -   Na (external) 134 - 145 mmol/L 141 139 143   K 3.4 - 5.3 mmol/L - - -   K (external) 3.5 - 5.1 mmol/L 4.3 3.4(L) 4.0   Cl 94 - 109 mmol/L - - -   Cl (external) 97 - 107 mmol/L 105 106 110(H)   CO2 20 - 32 mmol/L - - -   CO2 (external) 22 - 29 mmol/L 22 18(L) 19(L)   BUN 7 - 30 mg/dL - - -   BUN (external) 7.0 - 27.0 mg/dL 34.0(H) 28.0(H) 33.0(H)   Cr 0.66 - 1.25 mg/dL - - -   Cr (external) 0.64 - 1.34 mg/dL 3.39(H) 3.01(H) 2.42(H)   Glucose 70 - 99 mg/dL - - -   Glucose (external) 70 - 110 mg/dL 130(H) 165(H) 111(H)   Ca  8.5 - 10.1 mg/dL - - -   Ca (external) 8.6 - 10.3 mg/dL 9.4 9.1 9.4   Mg 1.6 - 2.3 mg/dL - - -   Mg (external) 1.8 - 2.4 MG/DL - - -     Bone Health Latest Ref Rng & Units 9/18/2017 3/27/2015 1/28/2015   Phos 2.8 - 4.6 mg/dL - - -   Phos (external) 2.5 - 4.9 MG/DL - 4.1 3.7   PTHi 12 - 72 pg/mL 151(H) - -   Vit D Def 20 - 75 ug/L 28 - -     Heme Latest Ref Rng & Units 11/22/2019 11/7/2019 9/17/2019   WBC 4.0 - 11.0 10e9/L - - -   WBC (external) 4.8 - 10.8 10(3)/uL 13.0(H) 12.4(H) 11.8(H)   Hgb 13.3 - 17.7 g/dL - - -   Hgb (external) 14.0 - 18.0 g/dL 13.0(L) 12.8(L) 13.1(L)   Plt 150 - 450 10e9/L - - -   Plt (external) 150 - 350 10(3)/uL 214 242 191     Liver Latest Ref Rng & Units 6/17/2019 11/7/2017 9/18/2015   AP 40 - 150 U/L - - -   AP (external) 45 - 108 U/L 116(H) 146(H) 102   TBili 0.2 - 1.3 mg/dL - - -   TBili (external) 0.2 - 1.2 mg/dL 0.7 0.3 0.5   ALT 0 - 70 U/L - - -    ALT (external) 8 - 51 U/L 24 30 34   AST 0 - 45 U/L - - -   AST (external) 10 - 36 U/L 22 14(L) 15   Tot Protein 6.8 - 8.8 g/dL - - -   Tot Protein (external) 6.3 - 8.2 g/dL 7.1 7.3 7.0   Albumin 3.9 - 5.1 g/dL - - -   Albumin (external) 3.4 - 5.3 g/dL 4.5 4.0 4.1     Pancreas Latest Ref Rng & Units 2/16/2011 10/20/2010   Amylase 30 - 110 U/L 142(H) 127(H)   Lipase 20 - 250 U/L 202 256(H)     Iron studies Latest Ref Rng & Units 3/21/2012 12/13/2011 9/27/2011   Iron 35 - 180 ug/dL 138 63 81   Iron sat 15 - 46 % 75(H) 33 55(H)   Ferritin 20 - 300 ng/mL - - -     [facility] Txp Virology Latest Ref Rng & Units 7/23/2019 6/26/2019 11/7/2017   CMV IgG EU/mL - - -   CMV IgM <0.90 - - -   CMV IgM Interp <0.90 - - -   CVM DNA Quant - - - -   CMV Quant <100 Copies/mL - - -   CMV QT Log <2.0 Log copies/mL - - -   BK Spec - Plasma Plasma -   BK Res BKNEG:BK Virus DNA Not Detected copies/mL BK Virus DNA Not Detected BK Virus DNA Not Detected -   BK Log <2.7 Log copies/mL Not Calculated Not Calculated -   BK Quant Log Ext - - - -   BK Quant Result Ext None detected - - None detected   BK Quant Spec Ext - - - -   EBV IgG - - - -   Hep B Core NEG - - -   Hep B Surf - - - -   HIV 1&2 NEG - - -        Recent Labs   Lab Test 09/17/19  0955 11/07/19  0905 11/22/19  0930   DOSTAC 2100 09/16/19 11/06/19 2030 11/21/2019 21:30   TACROL 5.2 6.0 11.3           Jose Culp MD

## 2019-12-05 NOTE — NURSING NOTE
Chief Complaint   Patient presents with     RECHECK     6 Months post kidney tx     Blood pressure (!) 140/99, pulse 120, temperature 98.7  F (37.1  C), temperature source Oral, weight 100.1 kg (220 lb 9.6 oz), SpO2 96 %.    Lisy Smith/GABE  December 5, 2019 1:56 PM

## 2019-12-05 NOTE — LETTER
12/5/2019       RE: Jhoan Hoffman  [address] [facility] [address]     Dear Colleague,    Thank you for referring your patient, Jhoan Hoffman, to the [facility] NEPHROLOGY at [facility]. Please see a copy of my visit note below.    CHRONIC TRANSPLANT NEPHROLOGY VISIT    Assessment & Plan   # DDKT: Increased creatinine with new baseline closer to 3s.  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  Will refer patient for retransplant evaluation.   - Baseline Cr ~ 3.0-3.4   - Proteinuria: Moderate (1-3 grams)   - Date DSA Last Checked: Jul/2019      Latest DSA: No   - BK Viremia: No   - Kidney Tx Biopsy: Jul 23, 2019; Result: No diagnostic evidence of acute rejection.  Transplant glomerulopathy.             Jun 26, 2019; Result: Acute cellular-mediated rejection, [place] 1A.    # Immunosuppression: Tacrolimus immediate release (goal 4-6), Mycophenolate mofetil (goal not followed) and Prednisone (dose 5 mg daily)   - Changes: No    # Infection Prophylaxis:   - PJP: Sulfa/TMP (Bactrim)    # Hypertension: Borderline control;  Goal BP: < 130/80   - Changes: Yes - Will increase carvedilol to 12.5 mg bid.    # Leukocytosis: High normal to high WBC, stable.  No signs or symptoms of infection.     # Mineral Bone Disorder:   - Vitamin D; level: Not checked recently        On Supplement: Yes  - Calcium; level: Normal        On Supplement: No    # Electrolytes:   - Potassium; level: Normal        On Supplement: No  - Bicarbonate; level: Normal        On Supplement: No    # Obesity: Increased weight over the last year.   - Recommend increased exercise and watch caloric intake.    # Depression: Mood has been good lately, but still some anxiety at times.     # Skin Cancer Risk:    - Discussed sun protection and recommend regular follow up with Dermatology.    # Medical Compliance: Yes    # Transplant History:  Etiology of Kidney Failure:  "Congenital dysplagia  Tx: DDKT  Transplant: 4/28/2012 (Kidney), 7/13/1995 (Kidney), 5/18/2011 (Kidney)  Donor Type: Donation after Brain Death Donor Class: Standard Criteria Donor  Significant changes in immunosuppression: None  Significant transplant-related complications: None    Transplant Office Phone Number: [phone]    Assessment and plan was discussed with the patient and he voiced his understanding and agreement.    Return visit: Return in about 6 months (around 6/5/2020).    Jose Culp MD    Chief Complaint   Mr. Hoffman is a 24 year old here for routine follow up.    History of Present Illness    Mr. Hoffman reports feeling good overall with some medical complaints.  Since last clinic visit, patient reports no hospitalizations or new medical complaints and has been doing well overall.  He did have a sore throat last week for a few days, but that is resolved now.  His energy level is \"average\" and has been a bit up and down.  He is active, although really gets minimal exercise.  Denies any chest pain or shortness of breath with exertion.  Appetite is \"too good\" and his weight is up ~ 10-20 lbs in the last year or so.  No nausea, vomiting or diarrhea.  No fever, sweats or chills.  Some leg swelling, but decreased after he lowered his salt intake.    Recent Hospitalizations:  [x] No [] Yes    New Medical Issues: [x] No [] Yes    Decreased energy: [x] No [] Yes    Chest pain or SOB with exertion:  [x] No [] Yes    Appetite change or weight change: [] No [x] Yes Weight is up ~ 10-20 lbs in the last year or so   Nausea, vomiting or diarrhea:  [x] No [] Yes    Fever, sweats or chills: [x] No [] Yes    Leg swelling: [] No [x] Yes Better     Home BP: 140/90s    Review of Systems   A comprehensive review of systems was obtained and negative, except as noted in the HPI or PMH.    Problem List   Patient Active Problem List   Diagnosis     Kidney replaced by transplant     HTN, kidney transplant related "     Depression     BK viremia     GERD (gastroesophageal reflux disease)     Secondary renal hyperparathyroidism (H)     Vitamin D deficiency     Immunosuppression (H)     Aftercare following organ transplant       Social History   Social History     Tobacco Use     Smoking status: Never Smoker     Smokeless tobacco: Never Used     Tobacco comment: mother smokes outside   Substance Use Topics     Alcohol use: No     Drug use: No       Allergies   Allergies   Allergen Reactions     Amoxicillin Swelling     Azithromycin      Z pack - can't take with cyclosporine.     Vancomycin      Ruth syndrome     Cefprozil Rash       Medications   Current Outpatient Medications   Medication Sig     amLODIPine (NORVASC) 10 MG tablet Take 1 tablet (10 mg) by mouth At Bedtime     aspirin (ASPIRIN ADULT LOW STRENGTH) 81 MG EC tablet Take 1 tablet by mouth daily.     carvedilol (COREG) 12.5 MG tablet Take 1 tablet (12.5 mg) by mouth 2 times daily (with meals)     cholecalciferol 2000 UNITS CAPS Take 2,000 Int'l Units by mouth daily     FLUoxetine (PROZAC) 20 MG capsule Take 40 mg by mouth daily      mycophenolate (GENERIC EQUIVALENT) 250 MG capsule Take 3 capsules (750 mg) by mouth 2 times daily     omeprazole 20 MG tablet Take 1 tablet (20 mg) by mouth every 12 hours     predniSONE (DELTASONE) 10 MG tablet Take 5 mg by mouth daily.     sulfamethoxazole-trimethoprim (BACTRIM/SEPTRA) 400-80 MG tablet Take 1 tablet by mouth daily     tacrolimus (GENERIC EQUIVALENT) 0.5 MG capsule Take 3 capsules (1.5 mg) by mouth 2 times daily     clonazePAM (KLONOPIN) 0.5 MG tablet Take 1 tablet (0.5 mg) by mouth 2 times daily as needed for anxiety     No current facility-administered medications for this visit.      Medications Discontinued During This Encounter   Medication Reason     carvedilol (COREG) 6.25 MG tablet        Physical Exam   Vital Signs: BP (!) 140/99 (BP Location: Right arm, Patient Position: Chair, Cuff Size: Adult Large)   Pulse  120   Temp 98.7  F (37.1  C) (Oral)   Wt 100.1 kg (220 lb 9.6 oz)   SpO2 96%   BMI 35.61 kg/m       GENERAL APPEARANCE: alert and no distress  HENT: mouth without ulcers or lesions  LYMPHATICS: no cervical or supraclavicular nodes  RESP: lungs clear to auscultation - no rales, rhonchi or wheezes  CV: regular rhythm, normal rate, no rub, no murmur  EDEMA: trace LE edema bilaterally  ABDOMEN: soft, nondistended, nontender, bowel sounds normal  MS: extremities normal - no gross deformities noted, no evidence of inflammation in joints, no muscle tenderness  SKIN: no rash  TX KIDNEY: normal  DIALYSIS ACCESS:  None      Data     Renal Latest Ref Rng & Units 11/22/2019 11/7/2019 9/17/2019   Na 133 - 144 mmol/L - - -   Na (external) 134 - 145 mmol/L 141 139 143   K 3.4 - 5.3 mmol/L - - -   K (external) 3.5 - 5.1 mmol/L 4.3 3.4(L) 4.0   Cl 94 - 109 mmol/L - - -   Cl (external) 97 - 107 mmol/L 105 106 110(H)   CO2 20 - 32 mmol/L - - -   CO2 (external) 22 - 29 mmol/L 22 18(L) 19(L)   BUN 7 - 30 mg/dL - - -   BUN (external) 7.0 - 27.0 mg/dL 34.0(H) 28.0(H) 33.0(H)   Cr 0.66 - 1.25 mg/dL - - -   Cr (external) 0.64 - 1.34 mg/dL 3.39(H) 3.01(H) 2.42(H)   Glucose 70 - 99 mg/dL - - -   Glucose (external) 70 - 110 mg/dL 130(H) 165(H) 111(H)   Ca  8.5 - 10.1 mg/dL - - -   Ca (external) 8.6 - 10.3 mg/dL 9.4 9.1 9.4   Mg 1.6 - 2.3 mg/dL - - -   Mg (external) 1.8 - 2.4 MG/DL - - -     Bone Health Latest Ref Rng & Units 9/18/2017 3/27/2015 1/28/2015   Phos 2.8 - 4.6 mg/dL - - -   Phos (external) 2.5 - 4.9 MG/DL - 4.1 3.7   PTHi 12 - 72 pg/mL 151(H) - -   Vit D Def 20 - 75 ug/L 28 - -     Heme Latest Ref Rng & Units 11/22/2019 11/7/2019 9/17/2019   WBC 4.0 - 11.0 10e9/L - - -   WBC (external) 4.8 - 10.8 10(3)/uL 13.0(H) 12.4(H) 11.8(H)   Hgb 13.3 - 17.7 g/dL - - -   Hgb (external) 14.0 - 18.0 g/dL 13.0(L) 12.8(L) 13.1(L)   Plt 150 - 450 10e9/L - - -   Plt (external) 150 - 350 10(3)/uL 214 242 191     Liver Latest Ref Rng & Units  6/17/2019 11/7/2017 9/18/2015   AP 40 - 150 U/L - - -   AP (external) 45 - 108 U/L 116(H) 146(H) 102   TBili 0.2 - 1.3 mg/dL - - -   TBili (external) 0.2 - 1.2 mg/dL 0.7 0.3 0.5   ALT 0 - 70 U/L - - -   ALT (external) 8 - 51 U/L 24 30 34   AST 0 - 45 U/L - - -   AST (external) 10 - 36 U/L 22 14(L) 15   Tot Protein 6.8 - 8.8 g/dL - - -   Tot Protein (external) 6.3 - 8.2 g/dL 7.1 7.3 7.0   Albumin 3.9 - 5.1 g/dL - - -   Albumin (external) 3.4 - 5.3 g/dL 4.5 4.0 4.1     Pancreas Latest Ref Rng & Units 2/16/2011 10/20/2010   Amylase 30 - 110 U/L 142(H) 127(H)   Lipase 20 - 250 U/L 202 256(H)     Iron studies Latest Ref Rng & Units 3/21/2012 12/13/2011 9/27/2011   Iron 35 - 180 ug/dL 138 63 81   Iron sat 15 - 46 % 75(H) 33 55(H)   Ferritin 20 - 300 ng/mL - - -     [facility] Txp Virology Latest Ref Rng & Units 7/23/2019 6/26/2019 11/7/2017   CMV IgG EU/mL - - -   CMV IgM <0.90 - - -   CMV IgM Interp <0.90 - - -   CVM DNA Quant - - - -   CMV Quant <100 Copies/mL - - -   CMV QT Log <2.0 Log copies/mL - - -   BK Spec - Plasma Plasma -   BK Res BKNEG:BK Virus DNA Not Detected copies/mL BK Virus DNA Not Detected BK Virus DNA Not Detected -   BK Log <2.7 Log copies/mL Not Calculated Not Calculated -   BK Quant Log Ext - - - -   BK Quant Result Ext None detected - - None detected   BK Quant Spec Ext - - - -   EBV IgG - - - -   Hep B Core NEG - - -   Hep B Surf - - - -   HIV 1&2 NEG - - -        Recent Labs   Lab Test 09/17/19  0955 11/07/19  0905 11/22/19  0930   DOSTAC 2100 09/16/19 11/06/19 2030 11/21/2019 21:30   TACROL 5.2 6.0 11.3             Again, thank you for allowing me to participate in the care of your patient.      Sincerely,    Kidney/Pancreas Recipient

## 2019-12-06 ENCOUNTER — REFERRAL (OUTPATIENT)
Dept: TRANSPLANT | Facility: CLINIC | Age: 24
End: 2019-12-06

## 2019-12-06 DIAGNOSIS — N18.9 CHRONIC KIDNEY DISEASE: Primary | ICD-10-CM

## 2019-12-06 NOTE — LETTER
January 7, 2020        Jhoan Hoffman  [address] [facility] [address]      Dear Jhoan,       You have recently expressed interest in our Solid Organ Transplant Program and have requested to begin the evaluation process.  We have made several attempts to get in touch with you but have been unable to reach you.    If you are still interested and/or have any questions, please contact us at  [phone] or [phone]   Monday - Friday, between the hours of 8:30am and 5:00pm central time.    We look forward to hearing from you.      Regards,     Solid Organ Transplant Intake   [facility]'s [address]  [address], [facility] [address]  [place] [address]

## 2019-12-06 NOTE — LETTER
1/22/20     Jhoan Hoffman  [address] [place] [id number]    Dear Jhoan,    Thank you for your interest in the Transplant Center at [facility], [facility]. We look forward to being a part of your care team and assisting you through the transplant process.    As we discussed, your transplant coordinator is Zenobia Goldberg (Kidney).  You may call your coordinator at any time with questions or concerns call [phone].    Please complete the following.    1. Fill out and return the enclosed forms    Authorization for Electronic Communication    Authorization to Discuss Protected Health Information    Authorization for Release of Protected Health Information      2. Sign up for:    [employer], access to your electronic medical record (see enclosed pamphlet)    [email or web address], a transplant education website    You can use these tools to learn more about your transplant, communicate with your care team, and track your medical details      Sincerely,    Solid Organ Transplant  [facility], [facility]    Cc: Alexis Pabon MD; Jose Culp MD

## 2019-12-09 ENCOUNTER — TELEPHONE (OUTPATIENT)
Dept: TRANSPLANT | Facility: CLINIC | Age: 24
End: 2019-12-09

## 2019-12-09 NOTE — TELEPHONE ENCOUNTER
Received message from post txp coordinator and talked with Dr Culp. Dr Culp would like pt to lose some weight prior to possible retransplant. Per chart review, pt/mom declined appt d/t distance from home. Called pt and LVM to see if there is anything we could discuss via phone. LVM for pt to cb if desired.     Of note, recent weight 220 lbs (BMI 35.5)  Goal weight for transplant (BMI 35) = 216 lbs

## 2019-12-16 NOTE — PROGRESS NOTES
CHRONIC TRANSPLANT NEPHROLOGY VISIT    Assessment & Plan   # DDKT: Increased creatinine with new baseline closer to 3s.  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  Will refer patient for retransplant evaluation.   - Baseline Cr ~ 3.0-3.4   - Proteinuria: Moderate (1-3 grams)   - Date DSA Last Checked: Jul/2019      Latest DSA: No   - BK Viremia: No   - Kidney Tx Biopsy: Jul 23, 2019; Result: No diagnostic evidence of acute rejection.  Transplant glomerulopathy.             Jun 26, 2019; Result: Acute cellular-mediated rejection, [place] 1A.    # Immunosuppression: Tacrolimus immediate release (goal 4-6), Mycophenolate mofetil (goal not followed) and Prednisone (dose 5 mg daily)   - Changes: No    # Infection Prophylaxis:   - PJP: Sulfa/TMP (Bactrim)    # Hypertension: Borderline control;  Goal BP: < 130/80   - Changes: Yes - Will increase carvedilol to 12.5 mg bid.    # Leukocytosis: High normal to high WBC, stable.  No signs or symptoms of infection.     # Mineral Bone Disorder:   - Vitamin D; level: Not checked recently        On Supplement: Yes  - Calcium; level: Normal        On Supplement: No    # Electrolytes:   - Potassium; level: Normal        On Supplement: No  - Bicarbonate; level: Normal        On Supplement: No    # Obesity: Increased weight over the last year.   - Recommend increased exercise and watch caloric intake.    # Depression: Mood has been good lately, but still some anxiety at times.     # Skin Cancer Risk:    - Discussed sun protection and recommend regular follow up with Dermatology.    # Medical Compliance: Yes    # Transplant History:  Etiology of Kidney Failure: Congenital dysplagia  Tx: DDKT  Transplant: 4/28/2012 (Kidney), 7/13/1995 (Kidney), 5/18/2011 (Kidney)  Donor Type: Donation after Brain Death Donor Class: Standard Criteria Donor  Significant changes in immunosuppression: None  Significant transplant-related complications:  "None    Transplant Office Phone Number: [phone]    Assessment and plan was discussed with the patient and he voiced his understanding and agreement.    Return visit: Return in about 6 months (around 6/5/2020).    Jose Culp MD    Chief Complaint   Mr. Hoffman is a 24 year old here for routine follow up.    History of Present Illness    Mr. Hoffman reports feeling good overall with some medical complaints.  Since last clinic visit, patient reports no hospitalizations or new medical complaints and has been doing well overall.  He did have a sore throat last week for a few days, but that is resolved now.  His energy level is \"average\" and has been a bit up and down.  He is active, although really gets minimal exercise.  Denies any chest pain or shortness of breath with exertion.  Appetite is \"too good\" and his weight is up ~ 10-20 lbs in the last year or so.  No nausea, vomiting or diarrhea.  No fever, sweats or chills.  Some leg swelling, but decreased after he lowered his salt intake.    Recent Hospitalizations:  [x] No [] Yes    New Medical Issues: [x] No [] Yes    Decreased energy: [x] No [] Yes    Chest pain or SOB with exertion:  [x] No [] Yes    Appetite change or weight change: [] No [x] Yes Weight is up ~ 10-20 lbs in the last year or so   Nausea, vomiting or diarrhea:  [x] No [] Yes    Fever, sweats or chills: [x] No [] Yes    Leg swelling: [] No [x] Yes Better     Home BP: 140/90s    Review of Systems   A comprehensive review of systems was obtained and negative, except as noted in the HPI or PMH.    Problem List   Patient Active Problem List   Diagnosis     Kidney replaced by transplant     HTN, kidney transplant related     Depression     BK viremia     GERD (gastroesophageal reflux disease)     Secondary renal hyperparathyroidism (H)     Vitamin D deficiency     Immunosuppression (H)     Aftercare following organ transplant       Social History   Social History     Tobacco Use     Smoking " status: Never Smoker     Smokeless tobacco: Never Used     Tobacco comment: mother smokes outside   Substance Use Topics     Alcohol use: No     Drug use: No       Allergies   Allergies   Allergen Reactions     Amoxicillin Swelling     Azithromycin      Z pack - can't take with cyclosporine.     Vancomycin      Ruth syndrome     Cefprozil Rash       Medications   Current Outpatient Medications   Medication Sig     amLODIPine (NORVASC) 10 MG tablet Take 1 tablet (10 mg) by mouth At Bedtime     aspirin (ASPIRIN ADULT LOW STRENGTH) 81 MG EC tablet Take 1 tablet by mouth daily.     carvedilol (COREG) 12.5 MG tablet Take 1 tablet (12.5 mg) by mouth 2 times daily (with meals)     cholecalciferol 2000 UNITS CAPS Take 2,000 Int'l Units by mouth daily     FLUoxetine (PROZAC) 20 MG capsule Take 40 mg by mouth daily      mycophenolate (GENERIC EQUIVALENT) 250 MG capsule Take 3 capsules (750 mg) by mouth 2 times daily     omeprazole 20 MG tablet Take 1 tablet (20 mg) by mouth every 12 hours     predniSONE (DELTASONE) 10 MG tablet Take 5 mg by mouth daily.     sulfamethoxazole-trimethoprim (BACTRIM/SEPTRA) 400-80 MG tablet Take 1 tablet by mouth daily     tacrolimus (GENERIC EQUIVALENT) 0.5 MG capsule Take 3 capsules (1.5 mg) by mouth 2 times daily     clonazePAM (KLONOPIN) 0.5 MG tablet Take 1 tablet (0.5 mg) by mouth 2 times daily as needed for anxiety     No current facility-administered medications for this visit.      Medications Discontinued During This Encounter   Medication Reason     carvedilol (COREG) 6.25 MG tablet        Physical Exam   Vital Signs: BP (!) 140/99 (BP Location: Right arm, Patient Position: Chair, Cuff Size: Adult Large)   Pulse 120   Temp 98.7  F (37.1  C) (Oral)   Wt 100.1 kg (220 lb 9.6 oz)   SpO2 96%   BMI 35.61 kg/m      GENERAL APPEARANCE: alert and no distress  HENT: mouth without ulcers or lesions  LYMPHATICS: no cervical or supraclavicular nodes  RESP: lungs clear to auscultation - no  rales, rhonchi or wheezes  CV: regular rhythm, normal rate, no rub, no murmur  EDEMA: trace LE edema bilaterally  ABDOMEN: soft, nondistended, nontender, bowel sounds normal  MS: extremities normal - no gross deformities noted, no evidence of inflammation in joints, no muscle tenderness  SKIN: no rash  TX KIDNEY: normal  DIALYSIS ACCESS:  None      Data     Renal Latest Ref Rng & Units 11/22/2019 11/7/2019 9/17/2019   Na 133 - 144 mmol/L - - -   Na (external) 134 - 145 mmol/L 141 139 143   K 3.4 - 5.3 mmol/L - - -   K (external) 3.5 - 5.1 mmol/L 4.3 3.4(L) 4.0   Cl 94 - 109 mmol/L - - -   Cl (external) 97 - 107 mmol/L 105 106 110(H)   CO2 20 - 32 mmol/L - - -   CO2 (external) 22 - 29 mmol/L 22 18(L) 19(L)   BUN 7 - 30 mg/dL - - -   BUN (external) 7.0 - 27.0 mg/dL 34.0(H) 28.0(H) 33.0(H)   Cr 0.66 - 1.25 mg/dL - - -   Cr (external) 0.64 - 1.34 mg/dL 3.39(H) 3.01(H) 2.42(H)   Glucose 70 - 99 mg/dL - - -   Glucose (external) 70 - 110 mg/dL 130(H) 165(H) 111(H)   Ca  8.5 - 10.1 mg/dL - - -   Ca (external) 8.6 - 10.3 mg/dL 9.4 9.1 9.4   Mg 1.6 - 2.3 mg/dL - - -   Mg (external) 1.8 - 2.4 MG/DL - - -     Bone Health Latest Ref Rng & Units 9/18/2017 3/27/2015 1/28/2015   Phos 2.8 - 4.6 mg/dL - - -   Phos (external) 2.5 - 4.9 MG/DL - 4.1 3.7   PTHi 12 - 72 pg/mL 151(H) - -   Vit D Def 20 - 75 ug/L 28 - -     Heme Latest Ref Rng & Units 11/22/201922/2019 11/7/2019 9/17/2019   WBC 4.0 - 11.0 10e9/L - - -   WBC (external) 4.8 - 10.8 10(3)/uL 13.0(H) 12.4(H) 11.8(H)   Hgb 13.3 - 17.7 g/dL - - -   Hgb (external) 14.0 - 18.0 g/dL 13.0(L) 12.8(L) 13.1(L)   Plt 150 - 450 10e9/L - - -   Plt (external) 150 - 350 10(3)/uL 214 242 191     Liver Latest Ref Rng & Units 6/17/2019 11/7/2017 9/18/2015   AP 40 - 150 U/L - - -   AP (external) 45 - 108 U/L 116(H) 146(H) 102   TBili 0.2 - 1.3 mg/dL - - -   TBili (external) 0.2 - 1.2 mg/dL 0.7 0.3 0.5   ALT 0 - 70 U/L - - -   ALT (external) 8 - 51 U/L 24 30 34   AST 0 - 45 U/L - - -   AST (external) 10  - 36 U/L 22 14(L) 15   Tot Protein 6.8 - 8.8 g/dL - - -   Tot Protein (external) 6.3 - 8.2 g/dL 7.1 7.3 7.0   Albumin 3.9 - 5.1 g/dL - - -   Albumin (external) 3.4 - 5.3 g/dL 4.5 4.0 4.1     Pancreas Latest Ref Rng & Units 2/16/2011 10/20/2010   Amylase 30 - 110 U/L 142(H) 127(H)   Lipase 20 - 250 U/L 202 256(H)     Iron studies Latest Ref Rng & Units 3/21/2012 12/13/2011 9/27/2011   Iron 35 - 180 ug/dL 138 63 81   Iron sat 15 - 46 % 75(H) 33 55(H)   Ferritin 20 - 300 ng/mL - - -     [facility] Txp Virology Latest Ref Rng & Units 7/23/2019 6/26/2019 11/7/2017   CMV IgG EU/mL - - -   CMV IgM <0.90 - - -   CMV IgM Interp <0.90 - - -   CVM DNA Quant - - - -   CMV Quant <100 Copies/mL - - -   CMV QT Log <2.0 Log copies/mL - - -   BK Spec - Plasma Plasma -   BK Res BKNEG:BK Virus DNA Not Detected copies/mL BK Virus DNA Not Detected BK Virus DNA Not Detected -   BK Log <2.7 Log copies/mL Not Calculated Not Calculated -   BK Quant Log Ext - - - -   BK Quant Result Ext None detected - - None detected   BK Quant Spec Ext - - - -   EBV IgG - - - -   Hep B Core NEG - - -   Hep B Surf - - - -   HIV 1&2 NEG - - -        Recent Labs   Lab Test 09/17/19  0955 11/07/19  0905 11/22/19  0930   DOSTAC 2100 09/16/19 11/06/19 2030 11/21/2019 21:30   TACROL 5.2 6.0 11.3

## 2020-01-16 ENCOUNTER — TELEPHONE (OUTPATIENT)
Dept: TRANSPLANT | Facility: CLINIC | Age: 25
End: 2020-01-16

## 2020-01-16 VITALS — WEIGHT: 220 LBS | BODY MASS INDEX: 35.36 KG/M2 | HEIGHT: 66 IN

## 2020-01-16 DIAGNOSIS — Z94.0 S/P KIDNEY TRANSPLANT: ICD-10-CM

## 2020-01-16 DIAGNOSIS — Z94.0 KIDNEY REPLACED BY TRANSPLANT: ICD-10-CM

## 2020-01-16 RX ORDER — MYCOPHENOLATE MOFETIL 250 MG/1
750 CAPSULE ORAL 2 TIMES DAILY
Qty: 180 CAPSULE | Refills: 11 | Status: ON HOLD | OUTPATIENT
Start: 2020-01-16 | End: 2020-12-21

## 2020-01-16 RX ORDER — TACROLIMUS 0.5 MG/1
1.5 CAPSULE ORAL 2 TIMES DAILY
Qty: 180 CAPSULE | Refills: 11 | Status: SHIPPED | OUTPATIENT
Start: 2020-01-16 | End: 2021-02-18

## 2020-01-16 ASSESSMENT — MIFFLIN-ST. JEOR: SCORE: 1925.66

## 2020-01-16 NOTE — TELEPHONE ENCOUNTER
Francesca Figueroa LPN [place], Teresa, RN             [facility],     I just spoke to this patient to complete an intake call for him. He mentioned being out of his two transplant medications. Hasn't had them for over one month. Are you able to assist?     Thanks,   RONEN Chávez, LPN   Solid Organ Transplant           PLAN:  Call Jhoan Hoffman and discuss medication issue.    OUTCOME:  Reports he has not had medication shipped to him in 2 months.  He currently is taking medications and has enough for half a month.  This is for shipment Tacrolimus and mycophenolate.  He does not know the name of the company that ships it to him nor does he have the number.

## 2020-01-16 NOTE — TELEPHONE ENCOUNTER
PCP: Dr. Alexis Pabon   Referring Provider: Dr. Jose Culp   Referring Diagnosis: CKD/Failing Kidney Transplant 2012    Is patient under the age of 65? No  Is patient diabetic? No  Is patient on insulin? No  Was patient offered a pancreas transplant referral? No    Is patient in a group home/assisted living? No  Does patient have a guardian? No    Referral intake process completed.  Patient is aware that after financial approval is received, medical records will be requested.   Patient confirmed for a callback from transplant coordinator on 1/28/2020. (within 2 weeks)  Tentative evaluation date 3/26/2020. (within 4 weeks)    Confirmed coordinator will discuss evaluation process in more detail at the time of their call.   Patient is aware of the need to arrange age appropriate cancer screening, vaccinations, and dental care.  Reminded patient to complete questionnaire, complete medical records release, and review packet prior to evaluation visit .  Assessed patient for special needs (ie--wheelchair, assistance, guardian, and ): No   Patient instructed to call [phone] with questions.     RONEN Chávez, LPN   Solid Organ Transplant

## 2020-01-16 NOTE — TELEPHONE ENCOUNTER
Intake staff sent message unable to reach Jhoan Aly to end episode.      I called Jhoan and he answered cell on first ring.  I connected him with Francesca Figueroa LPN INtake staff to start the referral process.    I asked Jhoan if he is willing to work with us for re kidney transplant. YES he is willing.    He asks to have the home and cell be his own phone.  Mother can be the emergency contact but not the first call.  He will take all calls.      Francesca to check INS first and Ruslan to give clearance prior to I/Zenobia call him back in future.     cindi INTAKE staffRuslan

## 2020-01-27 ENCOUNTER — TELEPHONE (OUTPATIENT)
Dept: TRANSPLANT | Facility: CLINIC | Age: 25
End: 2020-01-27

## 2020-01-27 DIAGNOSIS — N18.9 CHRONIC RENAL FAILURE: ICD-10-CM

## 2020-01-27 DIAGNOSIS — Z01.818 PRE-TRANSPLANT EVALUATION FOR KIDNEY TRANSPLANT: ICD-10-CM

## 2020-01-27 DIAGNOSIS — T86.12 KIDNEY TRANSPLANT FAILURE: ICD-10-CM

## 2020-01-27 DIAGNOSIS — Z76.82 ORGAN TRANSPLANT CANDIDATE: ICD-10-CM

## 2020-01-27 NOTE — TELEPHONE ENCOUNTER
Called back Jhoan Hoffman. States he needs the name of Pharmacy that delivers his meds.  Contact number provided for [facility] specialty pharmacy.  [facility] - [place], [place] - [address] [phone] (Phone)  [phone] (Fax)

## 2020-01-27 NOTE — TELEPHONE ENCOUNTER
I contacted Jhoan on behalf of his pre-transplant coordinator, Zenobia Goldberg.   I reviewed the role of the pre-transplant coordinator and reminded Jhoan that he will continue to follow with the post-transplant team as they manage his current kidney transplant.  I explained the purpose of this call including reviewing next steps and answering questions.    Confirmed Referring Provider and Primary Care Physician. Notified patient of the importance of continued communication with referring providers and primary care physicians.    Reviewed components of transplant evaluation process including necessary appointments, tests, and procedures.    Answered questions for patient regarding evaluation, provided Zenobiajanee Goldberg's name and contact information and requested they call with any additional questions.    Notified Jhoan that he will hear from a Transplant  to schedule evaluation.      Jhoan is a 24 year old man, well known to our transplant program who has undergone 2 previous living donor kidney transplants (, ) and a  donor kidney transplant in  which is now failing.  He states that it is unlikely he will have a living donor for a 4th transplant and he is aware that he has high antibodies.  He plans labs this week, last GFR 24, has not reached qualifying GFR to list yet.  He is aware of UNOS listing requirement that GFR needs to be <20.  I reviewed the evaluation process.  He is unsure if anyone will accompany him.  At last visit with Dr Culp he was advised to lose some weight and he states he has been working at that with some success.  He states he has not seen a dentist recently so I advised him to make an appointment.      Orders to  for standard 1st day of evaluation appointments.

## 2020-01-27 NOTE — TELEPHONE ENCOUNTER
Patient Call: Medication Clarification  Route to LPN    tacrolimus (GENERIC EQUIVALENT) 0.5 MG capsule  mycophenolate (GENERIC EQUIVALENT) 250 MG capsule    Call back needed? Yes    Return Call Needed  Same as documented in contacts section  When to return call?: Greater than one day: Route standard priority

## 2020-01-28 DIAGNOSIS — Z94.0 KIDNEY REPLACED BY TRANSPLANT: ICD-10-CM

## 2020-01-28 PROCEDURE — 80197 ASSAY OF TACROLIMUS: CPT | Performed by: INTERNAL MEDICINE

## 2020-01-29 LAB
TACROLIMUS BLD-MCNC: 4.4 UG/L (ref 5–15)
TME LAST DOSE: ABNORMAL H

## 2020-02-24 DIAGNOSIS — Z94.0 KIDNEY REPLACED BY TRANSPLANT: ICD-10-CM

## 2020-02-24 PROCEDURE — 80197 ASSAY OF TACROLIMUS: CPT | Performed by: INTERNAL MEDICINE

## 2020-02-25 ENCOUNTER — TELEPHONE (OUTPATIENT)
Dept: TRANSPLANT | Facility: CLINIC | Age: 25
End: 2020-02-25

## 2020-02-25 NOTE — TELEPHONE ENCOUNTER
ISSUE:  Creatinine elevated 3.71    PLAN:  Call and assess hydration status.  How much water is he drinking per day?  Any recent illness, diarrhea, s/s of a UTI, or medication changes?  Any increased intake of alcohol or caffeine?  Recommend increasing hydration and repeating labs within 1 week.    OUTCOME:  Spoke with Jhoan.  He denies any recent illness, n/v, diarrhea, or s/s of a UTI.   He reports he is drinking water but likely not enough.   He verbalized understanding to improve hydration and repeat labs in 1-2 weeks.   Lab order faxed.

## 2020-02-25 NOTE — LETTER
PHYSICIAN ORDERS      DATE & TIME ISSUED: 2020 10:00 AM  PATIENT NAME: Jhoan Hoffman   : 1995     [facility] MR# [if applicable]: [id number]     DIAGNOSIS:  kidney transplant  ICD-10 CODE: Z94.0     Please obtain the following labs in 1-2 weeks   BMP    Any questions please call: [phone]  Please fax results to [phone].    .

## 2020-02-26 LAB
TACROLIMUS BLD-MCNC: 4.9 UG/L (ref 5–15)
TME LAST DOSE: ABNORMAL H

## 2020-03-03 ENCOUNTER — TELEPHONE (OUTPATIENT)
Dept: TRANSPLANT | Facility: CLINIC | Age: 25
End: 2020-03-03

## 2020-03-03 NOTE — TELEPHONE ENCOUNTER
Prior Authorization Specialty Medication Request    Medication/Dose:   predniSONE (DELTASONE) 10 MG tablet Take 5 mg by mouth daily.     ICD code (if different than what is on RX):  Z94.0  Previously Tried and Failed:      Important Lab Values:   Rationale:     Insurance Name:   Insurance ID:   Insurance Phone Number:     Pharmacy Information (if different than what is on RX)  Name:  [facility]  Phone:  [phone]

## 2020-03-05 NOTE — TELEPHONE ENCOUNTER
"Prior Authorization Not Needed per Insurance    Medication: PREDNISONE 10MG - NOT ELIGIBLE FOR PRIOR AUTH  Insurance Company:  SHREYA Martini  Expected CoPay:      Pharmacy Filling the Rx:  [id number] - [place], [place] - [address]  Pharmacy Notified:  yes  Patient Notified:  yes    Per SHREYA Martini, \"This request cannot be processed due to the medication is not covered by the plan for the patient's age.\" The medication is not eligible for prior authorization at all per insurance plan.    Patient could use a discount card for Prednisone through [employer] Discount Card ID#[id number] GRP#[id number] BIN#[id number] PCN#[id number], test claim with discount card = $4.74  Left message for patient to advise of this.    Spoke to [employer] [facility] Zuleta to advise of this. They are aware and do use their own [facility] discount card for cash paying patient's as well.   Will await for patient call back with any further issues with medication.      "

## 2020-03-23 ENCOUNTER — TELEPHONE (OUTPATIENT)
Dept: TRANSPLANT | Facility: CLINIC | Age: 25
End: 2020-03-23

## 2020-03-23 PROCEDURE — 80197 ASSAY OF TACROLIMUS: CPT | Performed by: INTERNAL MEDICINE

## 2020-03-23 NOTE — LETTER
OUTPATIENT LABORATORY TEST ORDER    Patient Name: Jhoan Hoffman  Transplant Date: 4/28/2012   YOB: 1995  Issue Date & Time: 3/23/2020  13:34 PM  [facility] MR: [id number] Exp. Date (1 year after date issued)      Diagnoses: Kidney Transplant (ICD-10  Z94.0)   Long term use of medications (ICD-10  Z79.899)     Lab results to be available on the same day drawn.   Patient should release information to the [facility].  Please fax to the Transplant Center at [phone].    Monthly   ?Hemogram and Platelet  ?Basic Metabolic Panel (Sodium, Potassium, Chloride, CO2, Creatinine, Urea Nitrogen,     Glucose,   Calcium)         ?/Tacrolimus/Prograf drug level                          Every 6 Months                  ?Urine for protein/creatinine    If you have any questions, please call The Transplant Center at [phone] or [phone].    Please fax labs to [phone]  .

## 2020-03-23 NOTE — TELEPHONE ENCOUNTER
Issue:  Most recent creatinine 3.71 (2/24/20), no redraw seen.    Outcome:  Called Jhoan to request he have labs drawn. Jhoan states he went this morning. I informed him that we will update his lab order from every three months, to monthly.

## 2020-03-23 NOTE — TELEPHONE ENCOUNTER
Provider Call: Transplant Lab/Orders  Route to LPN  Post Transplant Days: 2886  When patient is less than 60 days post-transplant, route high priority  Reason for Call: Annual lab reorder  Liver patients reporting abnormal lab results: Route to RN and Page  Document lab facility information when provider is calling about annual lab orders. Delete facility wildcards when not needed.  Facility Name: [facility]   Facility Location:   Outside Facility Fax Number: [phone]  Callback needed? No-  Pt has been coming in more frequently past 2-3 months  Want to change it to Monthly

## 2020-03-25 DIAGNOSIS — Z79.899 ENCOUNTER FOR LONG-TERM CURRENT USE OF MEDICATION: ICD-10-CM

## 2020-03-25 DIAGNOSIS — Z94.0 KIDNEY REPLACED BY TRANSPLANT: ICD-10-CM

## 2020-03-25 DIAGNOSIS — Z48.298 AFTERCARE FOLLOWING ORGAN TRANSPLANT: Primary | ICD-10-CM

## 2020-03-25 LAB
TACROLIMUS BLD-MCNC: 4.7 UG/L (ref 5–15)
TME LAST DOSE: ABNORMAL H

## 2020-04-22 NOTE — TELEPHONE ENCOUNTER
Called patient about virtual appts on April 27 and he has no capabilities ansd asked to reschedule into July, he confirmed for Mon, July 6.

## 2020-05-01 DIAGNOSIS — Z48.298 AFTERCARE FOLLOWING ORGAN TRANSPLANT: ICD-10-CM

## 2020-05-01 DIAGNOSIS — Z79.899 ENCOUNTER FOR LONG-TERM CURRENT USE OF MEDICATION: ICD-10-CM

## 2020-05-01 DIAGNOSIS — Z94.0 KIDNEY REPLACED BY TRANSPLANT: ICD-10-CM

## 2020-05-01 PROCEDURE — 80197 ASSAY OF TACROLIMUS: CPT | Performed by: INTERNAL MEDICINE

## 2020-05-04 LAB
TACROLIMUS BLD-MCNC: 4.3 UG/L (ref 5–15)
TME LAST DOSE: ABNORMAL H

## 2020-05-05 ENCOUNTER — TELEPHONE (OUTPATIENT)
Dept: TRANSPLANT | Facility: CLINIC | Age: 25
End: 2020-05-05

## 2020-05-05 DIAGNOSIS — Z94.0 KIDNEY TRANSPLANTED: Primary | ICD-10-CM

## 2020-05-05 NOTE — LETTER
PHYSICIAN ORDERS      DATE & TIME ISSUED: May 6, 2020 12:26 PM  PATIENT NAME: Jhoan Hoffman   : 1995     [facility] MR# [if applicable]: [id number]     DIAGNOSIS:  kidney transplant  ICD-10 CODE: Z94.0     Please obtain the following labs in 1-2 weeks   BMP    Any questions please call: [phone]  Please fax results to   [phone].    .

## 2020-05-05 NOTE — TELEPHONE ENCOUNTER
ISSUE:  Creatinine elevated 3.97  Bicarb low at 18- not on a supplement    PLAN:  Call and assess hydration status.  How much water is he drinking per day?  Any recent illness, diarrhea, s/s of a UTI, or medication changes?  Any increased intake of alcohol or caffeine?  Recommend increasing hydration and repeating labs within 1 week.  Plan to start sodium bicarb supplement 650 mg BID per Dr. Culp    OUTCOME:  Called Jhoan to discuss.  No answer, left v/m requesting a return call to discuss labs.       Jose Culp MD [place]asebroock, Rebecca, RN               Yes, sodium bicarbonate 650 mg bid.    Previous Messages     ----- Message -----   From: Joan Mayer, RN   Sent: 5/5/2020  11:30 AM CDT   To: Jose Culp MD   Subject: bicarb                                           Hi Dr. Culp-   I am trying to get a hold of Jhoan to discuss his labs but haven't been able to speak to him yet.   His creatinine is elevated at 3.97- last biopsy showed chronic changes.  His bicarb is low at 18 and he is not on a supplement.  Do you want to start one?     Ginger

## 2020-05-06 RX ORDER — SODIUM BICARBONATE 650 MG/1
650 TABLET ORAL 2 TIMES DAILY
Qty: 60 TABLET | Refills: 11 | Status: SHIPPED | OUTPATIENT
Start: 2020-05-06 | End: 2020-09-24

## 2020-05-06 NOTE — TELEPHONE ENCOUNTER
Called and spoke with Jhoan.  He confirms some recent diarrhea.  We discussed the need to increase his fluid intake and repeat labs in the next couple of weeks.  Educated him to let us know if diarrhea does not improve.  Explained Dr. Culp's recommendation to start sodium bicarb.  Rx sent to pharmacy.  Jhoan denied questions.

## 2020-05-20 ENCOUNTER — TELEPHONE (OUTPATIENT)
Dept: TRANSPLANT | Facility: CLINIC | Age: 25
End: 2020-05-20

## 2020-05-20 NOTE — TELEPHONE ENCOUNTER
Left message for patient to  change schedule  Appointments to video.  Asked patient to call back to schedule.

## 2020-06-02 NOTE — TELEPHONE ENCOUNTER
Spoke with patient about converting to video visits. He voiced understanding. [employer] message sent with instructions.

## 2020-06-04 ENCOUNTER — TELEPHONE (OUTPATIENT)
Dept: TRANSPLANT | Facility: CLINIC | Age: 25
End: 2020-06-04

## 2020-06-04 ENCOUNTER — VIRTUAL VISIT (OUTPATIENT)
Dept: NEPHROLOGY | Facility: CLINIC | Age: 25
End: 2020-06-04
Attending: INTERNAL MEDICINE
Payer: MEDICAID

## 2020-06-04 DIAGNOSIS — N25.81 SECONDARY RENAL HYPERPARATHYROIDISM (H): ICD-10-CM

## 2020-06-04 DIAGNOSIS — Z94.0 KIDNEY REPLACED BY TRANSPLANT: Primary | ICD-10-CM

## 2020-06-04 DIAGNOSIS — Z94.0 HTN, KIDNEY TRANSPLANT RELATED: ICD-10-CM

## 2020-06-04 DIAGNOSIS — D84.9 IMMUNOSUPPRESSION (H): ICD-10-CM

## 2020-06-04 DIAGNOSIS — E55.9 VITAMIN D DEFICIENCY: ICD-10-CM

## 2020-06-04 DIAGNOSIS — Z48.298 AFTERCARE FOLLOWING ORGAN TRANSPLANT: ICD-10-CM

## 2020-06-04 DIAGNOSIS — I15.1 HTN, KIDNEY TRANSPLANT RELATED: ICD-10-CM

## 2020-06-04 NOTE — LETTER
PHYSICIAN ORDERS      DATE & TIME ISSUED: 2020 11:26 AM  PATIENT NAME: Jhoan Hoffman   : 1995     [facility] MR# [if applicable]: [id number]     DIAGNOSIS:  kidney transplant  ICD-10 CODE: Z94.0     Please obtain the following labs with monthly labs in :   -PTH   -Vitamin D Level   -UPC (protein random urine with a creatinine ratio)    Any questions please call: [phone]  Please fax results to   [phone].    .

## 2020-06-04 NOTE — LETTER
"6/4/2020       RE: Jhoan Hoffman  [address] [facility] [address]     Dear Colleague,    Thank you for referring your patient, Jhoan Hoffman, to the [facility] NEPHROLOGY at [facility]. Please see a copy of my visit note below.    Jhoan Hoffman is a 25 year old male who is being evaluated via a billable telephone visit.      The patient has been notified of following:     \"This telephone visit will be conducted via a call between you and your physician/provider. We have found that certain health care needs can be provided without the need for a physical exam.  This service lets us provide the care you need with a short phone conversation.  If a prescription is necessary we can send it directly to your pharmacy.  If lab work is needed we can place an order for that and you can then stop by our lab to have the test done at a later time.    Telephone visits are billed at different rates depending on your insurance coverage. During this emergency period, for some insurers they may be billed the same as an in-person visit.  Please reach out to your insurance provider with any questions.    If during the course of the call the physician/provider feels a telephone visit is not appropriate, you will not be charged for this service.\"    Patient has given verbal consent for Telephone visit?  Yes    What phone number would you like to be contacted at? [phone]    How would you like to obtain your AVS? 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  This is likely secondary to progressive chronic changes and would recommend patient undergo kidney retransplant evaluation as he has a qualifying GFR.   - Baseline Cr ~ 3.0-3.4   - Proteinuria: Moderate (1-3 grams)   - Date DSA Last Checked: Jul/2019      Latest DSA: No   - BK Viremia: " No   - Kidney Tx Biopsy: Jul 23, 2019; Result: No diagnostic evidence of acute rejection.  Transplant glomerulopathy.                                              Jun 26, 2019; Result: Acute cellular-mediated rejection, [place] 1A.    # Immunosuppression: Tacrolimus immediate release (goal 4-6), Mycophenolate mofetil (goal not followed) and Prednisone (dose 5 mg daily)   - Changes: No    # Infection Prophylaxis:   - PJP: Sulfa/TMP (Bactrim)    # Hypertension: Not checked recently;  Goal BP: < 130/80   - Changes: No, but recommend checking BP at home    # Anemia in Chronic Renal Disease: Hgb: Trend down      BASHIR: No   - Iron studies: Not checked recently    # Mineral Bone Disorder:   - Secondary renal hyperparathyroidism; PTH level: Not checked recently        On treatment: None  - Vitamin D; level: Not checked recently        On Supplement: Yes  - Calcium; level: Normal        On Supplement: No    # Electrolytes:   - Potassium; level: Normal        On Supplement: No  - Bicarbonate; level: Low        On Supplement: Yes, just started after low level with recent labs     # Obesity: Significant intentional decrease in weight of ~ 35 lbs.   - Recommend maintaining weight loss for overall health by continuing exercise and watching caloric intake.    # GERD: Mostly well controlled with occasional use of PPI.   - Recommend using H2 blocker, such as famotidine (Pepcid) for occasional symptoms, instead of PPI.    # Depression: Symptoms appear well controlled on fluoxetine.    # Skin Cancer Risk:    - Discussed sun protection and recommend regular follow up with Dermatology.    # Medical Compliance: Yes     # COVID-19 Virus Review: Discussed COVID-19 virus and the potential medical risks.  Reviewed preventative health recommendations, which includes washing hands for 20 seconds, avoid touching your face, and social distancing.  Asked patient to inform the transplant center if they are exposed or diagnosed with this virus.    #  "Transplant History:  Etiology of Kidney Failure: Congenital dysplasia  Tx: DDKT  Transplant: 4/28/2012 (Kidney), 7/13/1995 (Kidney), 5/18/2011 (Kidney)  Donor Type: Donation after Brain Death Donor Class: Standard Criteria Donor  Significant changes in immunosuppression: None  Significant transplant-related complications: BK Viremia    Transplant Office Phone Number: [phone]    Assessment and plan was discussed with the patient and he voiced his understanding and agreement.    Return visit: Return in about 6 months (around 12/4/2020).    Jose Culp MD    Chief Complaint   Mr. Hoffman is a 25 year old here for kidney transplant and immunosuppression management.    History of Present Illness    Mr. Hoffman reports feeling okay overall with some medical complaints.  Since last clinic visit, patient reports no hospitalizations or new medical complaints and has been doing well overall.  His creatinine is trending up and patient was supposed to be seen for retransplant evaluation, but that was delayed due to the COVID-19 pandemic.  His energy level remains \"great\" and had been normal.  He has been getting more exercise and denies any chest pain or shortness of breath with exertion.  Appetite is good, but he has been trying to eat better lately.  With the increased exercise and better diet, patient reports losing ~ 35 lbs and now weighs ~ 185 lbs.  No nausea or vomiting.  Occasional loose stools, maybe once ever couple of weeks.  He isn't interested in changing his immunosuppression to see if that helps.  No fever, sweats or chills.  No leg swelling.    Recent Hospitalizations:  [x] No [] Yes    New Medical Issues: [x] No [] Yes    Decreased energy: [x] No [] Yes    Chest pain or SOB with exertion:  [x] No [] Yes    Appetite change or weight change: [] No [x] Yes Intentionally lost ~ 35 lbs   Nausea, vomiting or diarrhea:  [] No [x] Yes Occasional loose stools   Fever, sweats or chills: [x] No [] Yes    Leg " swelling: [x] No [] Yes      Home BP: Not checked    Review of Systems   A comprehensive review of systems was obtained and negative, except as noted in the HPI or PMH.    Problem List   Patient Active Problem List   Diagnosis     Kidney replaced by transplant     HTN, kidney transplant related     Depression     BK viremia     GERD (gastroesophageal reflux disease)     Secondary renal hyperparathyroidism (H)     Vitamin D deficiency     Immunosuppression (H)     Aftercare following organ transplant       Social History   Social History     Tobacco Use     Smoking status: Never Smoker     Smokeless tobacco: Never Used     Tobacco comment: mother smokes outside   Substance Use Topics     Alcohol use: No     Drug use: No       Allergies   Allergies   Allergen Reactions     Amoxicillin Swelling     Azithromycin      Z pack - can't take with cyclosporine.     Vancomycin      Ruth syndrome     Cefprozil Rash       Medications   Current Outpatient Medications   Medication Sig     amLODIPine (NORVASC) 10 MG tablet Take 1 tablet (10 mg) by mouth At Bedtime     aspirin (ASPIRIN ADULT LOW STRENGTH) 81 MG EC tablet Take 1 tablet by mouth daily.     carvedilol (COREG) 12.5 MG tablet Take 1 tablet (12.5 mg) by mouth 2 times daily (with meals)     cholecalciferol 2000 UNITS CAPS Take 2,000 Int'l Units by mouth daily     FLUoxetine (PROZAC) 20 MG capsule Take 40 mg by mouth daily      mycophenolate (GENERIC EQUIVALENT) 250 MG capsule Take 3 capsules (750 mg) by mouth 2 times daily     predniSONE (DELTASONE) 10 MG tablet Take 5 mg by mouth daily.     sodium bicarbonate 650 MG tablet Take 1 tablet (650 mg) by mouth 2 times daily     sulfamethoxazole-trimethoprim (BACTRIM/SEPTRA) 400-80 MG tablet Take 1 tablet by mouth daily     tacrolimus (GENERIC EQUIVALENT) 0.5 MG capsule Take 3 capsules (1.5 mg) by mouth 2 times daily     clonazePAM (KLONOPIN) 0.5 MG tablet Take 1 tablet (0.5 mg) by mouth 2 times daily as needed for anxiety      No current facility-administered medications for this visit.      Medications Discontinued During This Encounter   Medication Reason     omeprazole 20 MG tablet        Physical Exam   Vital signs were deferred for this telemedicine visit.    GENERAL APPEARANCE: alert and no distress  HENT: no obvious abnormalities on appearance  RESP: breathing appears unremarkable with normal rate and no apparent shortness of breath with conversation  MS: extremities normal - no gross deformities noted, no evidence of inflammation in joints, no muscle tenderness  SKIN: no apparent rash and normal skin tone  NEURO: speech is clear with no obvious neurological deficits  PSYCH: mentation appears normal and affect normal      Data     Renal Latest Ref Rng & Units 5/1/2020 3/23/2020 2/24/2020   Na 133 - 144 mmol/L - - -   Na (external) 134 - 145 mmol/L 139 140 143   K 3.4 - 5.3 mmol/L - - -   K (external) 3.5 - 5.1 mmol/L 4.3 4.4 4.4   Cl 94 - 109 mmol/L - - -   Cl (external) 97 - 107 mmol/L 107 103 108(H)   CO2 20 - 32 mmol/L - - -   CO2 (external) 22 - 29 mmol/L 18(L) 19(L) 20(L)   BUN 7 - 30 mg/dL - - -   BUN (external) 7.0 - 27.0 mg/dL 42.0(H) 37.0(H) 54.0(H)   Cr 0.66 - 1.25 mg/dL - - -   Cr (external) 0.64 - 1.34 mg/dL 3.97(H) 3.44(H) 3.71(H)   Glucose 70 - 99 mg/dL - - -   Glucose (external) 70 - 110 mg/dL 96 102 121(H)   Ca  8.5 - 10.1 mg/dL - - -   Ca (external) 8.6 - 10.3 mg/dL 9.3 9.9 9.7   Mg 1.6 - 2.3 mg/dL - - -   Mg (external) 1.8 - 2.4 MG/DL - - -     Bone Health Latest Ref Rng & Units 9/18/2017 3/27/2015 1/28/2015   Phos 2.8 - 4.6 mg/dL - - -   Phos (external) 2.5 - 4.9 MG/DL - 4.1 3.7   PTHi 12 - 72 pg/mL 151(H) - -   Vit D Def 20 - 75 ug/L 28 - -     Heme Latest Ref Rng & Units 5/1/2020 3/23/2020 2/24/2020   WBC 4.0 - 11.0 10e9/L - - -   WBC (external) 4.8 - 10.8 10(3)/uL 7.7 10.1 9.3   Hgb 13.3 - 17.7 g/dL - - -   Hgb (external) 14.0 - 18.0 g/dL 12.2(L) 13.5(L) 12.6(L)   Plt 150 - 450 10e9/L - - -   Plt  (external) 150 - 350 10(3)/uL 180 267 228   ABSOLUTE NEUTROPHIL 1.6 - 8.3 10e9/L - - -   ABSOLUTE NEUTROPHILS (EXTERNAL) 1.2 - 8.1 10(3)/uL 5.5 7.3 6.7   ABSOLUTE LYMPHOCYTES 0.8 - 5.3 10e9/L - - -   ABSOLUTE LYMPHOCYTES (EXTERNAL) 0.6 - 4.7 10(3)/uL 1.7 1.9 1.8   ABSOLUTE MONOCYTES 0.0 - 1.3 10e9/L - - -   ABSOLUTE MONOCYTES (EXTERNAL) 0.0 - 1.3 10(3)/uL 0.5 0.8 0.6   ABSOLUTE EOSINOPHILS 0.0 - 0.7 10e9/L - - -   ABSOLUTE EOSINOPHILS (EXTERNAL) 0.0 - 0.7 10(3)/uL 0.0 0.1 0.1   ABSOLUTE BASOPHILS 0.0 - 0.2 10e9/L - - -   ABSOLUTE BASOPHILS (EXTERNAL) 0.0 - 0.2 10(3)/uL 0.0 0.0 0.0   ABS IMMATURE GRANULOCYTES 0 - 0.4 10e9/L - - -   ABSOLUTE NUCLEATED RBC - - - -     Liver Latest Ref Rng & Units 6/17/2019 11/7/2017 9/18/2015   AP 40 - 150 U/L - - -   AP (external) 45 - 108 U/L 116(H) 146(H) 102   TBili 0.2 - 1.3 mg/dL - - -   TBili (external) 0.2 - 1.2 mg/dL 0.7 0.3 0.5   ALT 0 - 70 U/L - - -   ALT (external) 8 - 51 U/L 24 30 34   AST 0 - 45 U/L - - -   AST (external) 10 - 36 U/L 22 14(L) 15   Tot Protein 6.8 - 8.8 g/dL - - -   Tot Protein (external) 6.3 - 8.2 g/dL 7.1 7.3 7.0   Albumin 3.9 - 5.1 g/dL - - -   Albumin (external) 3.4 - 5.3 g/dL 4.5 4.0 4.1     Pancreas Latest Ref Rng & Units 2/16/2011 10/20/2010   Amylase 30 - 110 U/L 142(H) 127(H)   Lipase 20 - 250 U/L 202 256(H)     Iron studies Latest Ref Rng & Units 3/21/2012 12/13/2011 9/27/2011   Iron 35 - 180 ug/dL 138 63 81   Iron sat 15 - 46 % 75(H) 33 55(H)   Ferritin 20 - 300 ng/mL - - -     [facility] Txp Virology Latest Ref Rng & Units 7/23/2019 6/26/2019 11/7/2017   CMV IgG EU/mL - - -   CMV IgM <0.90 - - -   CMV IgM Interp <0.90 - - -   CVM DNA Quant - - - -   CMV Quant <100 Copies/mL - - -   CMV QT Log <2.0 Log copies/mL - - -   BK Spec - Plasma Plasma -   BK Res BKNEG:BK Virus DNA Not Detected copies/mL BK Virus DNA Not Detected BK Virus DNA Not Detected -   BK Log <2.7 Log copies/mL Not Calculated Not Calculated -   BK Quant Log Ext - - - -   BK Quant Result  Ext None detected - - None detected   BK Quant Spec Ext - - - -   EBV IgG - - - -   EBV VCA IGG ANTIBODY U/mL - - -   EBV VCA IGM ANTIBODY U/mL - - -   EBV DNA COPIES/ML <1000 Copies/mL - - -   EBV DNA LOG OF COPIES <3.0 Log copies/mL - - -   Hep B Core NEG - - -   Hep B Surf - - - -   HIV 1&2 NEG - - -        Recent Labs   Lab Test 02/24/20  0905 03/23/20  0830 05/01/20  0910   DOSTAC 02.23.20 2100 03/22/2020 20:30 04/30/20 2050   TACROL 4.9* 4.7* 4.3*               Again, thank you for allowing me to participate in the care of your patient.      Sincerely,    Kidney/Pancreas Recipient

## 2020-06-04 NOTE — TELEPHONE ENCOUNTER
Jose Culp MD Harris, Kathleen, RN Kathy,     Please check PTH, vitamin D and UPC with next labs.  With patient's creatinine trending higher, he should get labs every other month.     Thanks.      Outcome:  Spoke with Jhoan about above plan. Jhoan is agreeable to having labs done next week. He is aware to submit a urine sample for a UPC. He is agreeable to having labs drawn at least every other month to monitor increasing creatinine.  Lab orders sent to local lab.

## 2020-06-04 NOTE — PROGRESS NOTES
"Jhoan Hoffman is a 25 year old male who is being evaluated via a billable telephone visit.      The patient has been notified of following:     \"This telephone visit will be conducted via a call between you and your physician/provider. We have found that certain health care needs can be provided without the need for a physical exam.  This service lets us provide the care you need with a short phone conversation.  If a prescription is necessary we can send it directly to your pharmacy.  If lab work is needed we can place an order for that and you can then stop by our lab to have the test done at a later time.    Telephone visits are billed at different rates depending on your insurance coverage. During this emergency period, for some insurers they may be billed the same as an in-person visit.  Please reach out to your insurance provider with any questions.    If during the course of the call the physician/provider feels a telephone visit is not appropriate, you will not be charged for this service.\"    Patient has given verbal consent for Telephone visit?  Yes    What phone number would you like to be contacted at? [phone]    How would you like to obtain your AVS? 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  This is likely secondary to progressive chronic changes and would recommend patient undergo kidney retransplant evaluation as he has a qualifying GFR.   - Baseline Cr ~ 3.0-3.4   - Proteinuria: Moderate (1-3 grams)   - Date DSA Last Checked: Jul/2019      Latest DSA: No   - BK Viremia: No   - Kidney Tx Biopsy: Jul 23, 2019; Result: No diagnostic evidence of acute rejection.  Transplant glomerulopathy.                                              Jun 26, 2019; Result: Acute cellular-mediated rejection, [place] 1A.    # Immunosuppression: Tacrolimus immediate release " (goal 4-6), Mycophenolate mofetil (goal not followed) and Prednisone (dose 5 mg daily)   - Changes: No    # Infection Prophylaxis:   - PJP: Sulfa/TMP (Bactrim)    # Hypertension: Not checked recently;  Goal BP: < 130/80   - Changes: No, but recommend checking BP at home    # Anemia in Chronic Renal Disease: Hgb: Trend down      BASHIR: No   - Iron studies: Not checked recently    # Mineral Bone Disorder:   - Secondary renal hyperparathyroidism; PTH level: Not checked recently        On treatment: None  - Vitamin D; level: Not checked recently        On Supplement: Yes  - Calcium; level: Normal        On Supplement: No    # Electrolytes:   - Potassium; level: Normal        On Supplement: No  - Bicarbonate; level: Low        On Supplement: Yes, just started after low level with recent labs     # Obesity: Significant intentional decrease in weight of ~ 35 lbs.   - Recommend maintaining weight loss for overall health by continuing exercise and watching caloric intake.    # GERD: Mostly well controlled with occasional use of PPI.   - Recommend using H2 blocker, such as famotidine (Pepcid) for occasional symptoms, instead of PPI.    # Depression: Symptoms appear well controlled on fluoxetine.    # Skin Cancer Risk:    - Discussed sun protection and recommend regular follow up with Dermatology.    # Medical Compliance: Yes     # COVID-19 Virus Review: Discussed COVID-19 virus and the potential medical risks.  Reviewed preventative health recommendations, which includes washing hands for 20 seconds, avoid touching your face, and social distancing.  Asked patient to inform the transplant center if they are exposed or diagnosed with this virus.    # Transplant History:  Etiology of Kidney Failure: Congenital dysplasia  Tx: DDKT  Transplant: 4/28/2012 (Kidney), 7/13/1995 (Kidney), 5/18/2011 (Kidney)  Donor Type: Donation after Brain Death Donor Class: Standard Criteria Donor  Significant changes in immunosuppression:  "None  Significant transplant-related complications: BK Viremia    Transplant Office Phone Number: [phone]    Assessment and plan was discussed with the patient and he voiced his understanding and agreement.    Return visit: Return in about 6 months (around 12/4/2020).    Jose Culp MD    Chief Complaint   Mr. Hoffman is a 25 year old here for kidney transplant and immunosuppression management.    History of Present Illness    Mr. Hoffman reports feeling okay overall with some medical complaints.  Since last clinic visit, patient reports no hospitalizations or new medical complaints and has been doing well overall.  His creatinine is trending up and patient was supposed to be seen for retransplant evaluation, but that was delayed due to the COVID-19 pandemic.  His energy level remains \"great\" and had been normal.  He has been getting more exercise and denies any chest pain or shortness of breath with exertion.  Appetite is good, but he has been trying to eat better lately.  With the increased exercise and better diet, patient reports losing ~ 35 lbs and now weighs ~ 185 lbs.  No nausea or vomiting.  Occasional loose stools, maybe once ever couple of weeks.  He isn't interested in changing his immunosuppression to see if that helps.  No fever, sweats or chills.  No leg swelling.    Recent Hospitalizations:  [x] No [] Yes    New Medical Issues: [x] No [] Yes    Decreased energy: [x] No [] Yes    Chest pain or SOB with exertion:  [x] No [] Yes    Appetite change or weight change: [] No [x] Yes Intentionally lost ~ 35 lbs   Nausea, vomiting or diarrhea:  [] No [x] Yes Occasional loose stools   Fever, sweats or chills: [x] No [] Yes    Leg swelling: [x] No [] Yes      Home BP: Not checked    Review of Systems   A comprehensive review of systems was obtained and negative, except as noted in the HPI or PMH.    Problem List   Patient Active Problem List   Diagnosis     Kidney replaced by transplant     HTN, " kidney transplant related     Depression     BK viremia     GERD (gastroesophageal reflux disease)     Secondary renal hyperparathyroidism (H)     Vitamin D deficiency     Immunosuppression (H)     Aftercare following organ transplant       Social History   Social History     Tobacco Use     Smoking status: Never Smoker     Smokeless tobacco: Never Used     Tobacco comment: mother smokes outside   Substance Use Topics     Alcohol use: No     Drug use: No       Allergies   Allergies   Allergen Reactions     Amoxicillin Swelling     Azithromycin      Z pack - can't take with cyclosporine.     Vancomycin      Ruth syndrome     Cefprozil Rash       Medications   Current Outpatient Medications   Medication Sig     amLODIPine (NORVASC) 10 MG tablet Take 1 tablet (10 mg) by mouth At Bedtime     aspirin (ASPIRIN ADULT LOW STRENGTH) 81 MG EC tablet Take 1 tablet by mouth daily.     carvedilol (COREG) 12.5 MG tablet Take 1 tablet (12.5 mg) by mouth 2 times daily (with meals)     cholecalciferol 2000 UNITS CAPS Take 2,000 Int'l Units by mouth daily     FLUoxetine (PROZAC) 20 MG capsule Take 40 mg by mouth daily      mycophenolate (GENERIC EQUIVALENT) 250 MG capsule Take 3 capsules (750 mg) by mouth 2 times daily     predniSONE (DELTASONE) 10 MG tablet Take 5 mg by mouth daily.     sodium bicarbonate 650 MG tablet Take 1 tablet (650 mg) by mouth 2 times daily     sulfamethoxazole-trimethoprim (BACTRIM/SEPTRA) 400-80 MG tablet Take 1 tablet by mouth daily     tacrolimus (GENERIC EQUIVALENT) 0.5 MG capsule Take 3 capsules (1.5 mg) by mouth 2 times daily     clonazePAM (KLONOPIN) 0.5 MG tablet Take 1 tablet (0.5 mg) by mouth 2 times daily as needed for anxiety     No current facility-administered medications for this visit.      Medications Discontinued During This Encounter   Medication Reason     omeprazole 20 MG tablet        Physical Exam   Vital signs were deferred for this telemedicine visit.    GENERAL APPEARANCE: alert  and no distress  HENT: no obvious abnormalities on appearance  RESP: breathing appears unremarkable with normal rate and no apparent shortness of breath with conversation  MS: extremities normal - no gross deformities noted, no evidence of inflammation in joints, no muscle tenderness  SKIN: no apparent rash and normal skin tone  NEURO: speech is clear with no obvious neurological deficits  PSYCH: mentation appears normal and affect normal      Data     Renal Latest Ref Rng & Units 5/1/2020 3/23/2020 2/24/2020   Na 133 - 144 mmol/L - - -   Na (external) 134 - 145 mmol/L 139 140 143   K 3.4 - 5.3 mmol/L - - -   K (external) 3.5 - 5.1 mmol/L 4.3 4.4 4.4   Cl 94 - 109 mmol/L - - -   Cl (external) 97 - 107 mmol/L 107 103 108(H)   CO2 20 - 32 mmol/L - - -   CO2 (external) 22 - 29 mmol/L 18(L) 19(L) 20(L)   BUN 7 - 30 mg/dL - - -   BUN (external) 7.0 - 27.0 mg/dL 42.0(H) 37.0(H) 54.0(H)   Cr 0.66 - 1.25 mg/dL - - -   Cr (external) 0.64 - 1.34 mg/dL 3.97(H) 3.44(H) 3.71(H)   Glucose 70 - 99 mg/dL - - -   Glucose (external) 70 - 110 mg/dL 96 102 121(H)   Ca  8.5 - 10.1 mg/dL - - -   Ca (external) 8.6 - 10.3 mg/dL 9.3 9.9 9.7   Mg 1.6 - 2.3 mg/dL - - -   Mg (external) 1.8 - 2.4 MG/DL - - -     Bone Health Latest Ref Rng & Units 9/18/2017 3/27/2015 1/28/2015   Phos 2.8 - 4.6 mg/dL - - -   Phos (external) 2.5 - 4.9 MG/DL - 4.1 3.7   PTHi 12 - 72 pg/mL 151(H) - -   Vit D Def 20 - 75 ug/L 28 - -     Heme Latest Ref Rng & Units 5/1/2020 3/23/2020 2/24/2020   WBC 4.0 - 11.0 10e9/L - - -   WBC (external) 4.8 - 10.8 10(3)/uL 7.7 10.1 9.3   Hgb 13.3 - 17.7 g/dL - - -   Hgb (external) 14.0 - 18.0 g/dL 12.2(L) 13.5(L) 12.6(L)   Plt 150 - 450 10e9/L - - -   Plt (external) 150 - 350 10(3)/uL 180 267 228   ABSOLUTE NEUTROPHIL 1.6 - 8.3 10e9/L - - -   ABSOLUTE NEUTROPHILS (EXTERNAL) 1.2 - 8.1 10(3)/uL 5.5 7.3 6.7   ABSOLUTE LYMPHOCYTES 0.8 - 5.3 10e9/L - - -   ABSOLUTE LYMPHOCYTES (EXTERNAL) 0.6 - 4.7 10(3)/uL 1.7 1.9 1.8   ABSOLUTE  MONOCYTES 0.0 - 1.3 10e9/L - - -   ABSOLUTE MONOCYTES (EXTERNAL) 0.0 - 1.3 10(3)/uL 0.5 0.8 0.6   ABSOLUTE EOSINOPHILS 0.0 - 0.7 10e9/L - - -   ABSOLUTE EOSINOPHILS (EXTERNAL) 0.0 - 0.7 10(3)/uL 0.0 0.1 0.1   ABSOLUTE BASOPHILS 0.0 - 0.2 10e9/L - - -   ABSOLUTE BASOPHILS (EXTERNAL) 0.0 - 0.2 10(3)/uL 0.0 0.0 0.0   ABS IMMATURE GRANULOCYTES 0 - 0.4 10e9/L - - -   ABSOLUTE NUCLEATED RBC - - - -     Liver Latest Ref Rng & Units 6/17/2019 11/7/2017 9/18/2015   AP 40 - 150 U/L - - -   AP (external) 45 - 108 U/L 116(H) 146(H) 102   TBili 0.2 - 1.3 mg/dL - - -   TBili (external) 0.2 - 1.2 mg/dL 0.7 0.3 0.5   ALT 0 - 70 U/L - - -   ALT (external) 8 - 51 U/L 24 30 34   AST 0 - 45 U/L - - -   AST (external) 10 - 36 U/L 22 14(L) 15   Tot Protein 6.8 - 8.8 g/dL - - -   Tot Protein (external) 6.3 - 8.2 g/dL 7.1 7.3 7.0   Albumin 3.9 - 5.1 g/dL - - -   Albumin (external) 3.4 - 5.3 g/dL 4.5 4.0 4.1     Pancreas Latest Ref Rng & Units 2/16/2011 10/20/2010   Amylase 30 - 110 U/L 142(H) 127(H)   Lipase 20 - 250 U/L 202 256(H)     Iron studies Latest Ref Rng & Units 3/21/2012 12/13/2011 9/27/2011   Iron 35 - 180 ug/dL 138 63 81   Iron sat 15 - 46 % 75(H) 33 55(H)   Ferritin 20 - 300 ng/mL - - -     [facility] Txp Virology Latest Ref Rng & Units 7/23/2019 6/26/2019 11/7/2017   CMV IgG EU/mL - - -   CMV IgM <0.90 - - -   CMV IgM Interp <0.90 - - -   CVM DNA Quant - - - -   CMV Quant <100 Copies/mL - - -   CMV QT Log <2.0 Log copies/mL - - -   BK Spec - Plasma Plasma -   BK Res BKNEG:BK Virus DNA Not Detected copies/mL BK Virus DNA Not Detected BK Virus DNA Not Detected -   BK Log <2.7 Log copies/mL Not Calculated Not Calculated -   BK Quant Log Ext - - - -   BK Quant Result Ext None detected - - None detected   BK Quant Spec Ext - - - -   EBV IgG - - - -   EBV VCA IGG ANTIBODY U/mL - - -   EBV VCA IGM ANTIBODY U/mL - - -   EBV DNA COPIES/ML <1000 Copies/mL - - -   EBV DNA LOG OF COPIES <3.0 Log copies/mL - - -   Hep B Core NEG - - -   Hep B  Surf - - - -   HIV 1&2 NEG - - -        Recent Labs   Lab Test 02/24/20  0905 03/23/20  0830 05/01/20  0910   DOSTA 02.23.20 2100 03/22/2020 20:30 04/30/20 2050   TACROL 4.9* 4.7* 4.3*

## 2020-06-04 NOTE — LETTER
"6/4/2020      RE: Jhoan Hoffman  [address] [facility] [address]       Jhoan Hoffman is a 25 year old male who is being evaluated via a billable telephone visit.      The patient has been notified of following:     \"This telephone visit will be conducted via a call between you and your physician/provider. We have found that certain health care needs can be provided without the need for a physical exam.  This service lets us provide the care you need with a short phone conversation.  If a prescription is necessary we can send it directly to your pharmacy.  If lab work is needed we can place an order for that and you can then stop by our lab to have the test done at a later time.    Telephone visits are billed at different rates depending on your insurance coverage. During this emergency period, for some insurers they may be billed the same as an in-person visit.  Please reach out to your insurance provider with any questions.    If during the course of the call the physician/provider feels a telephone visit is not appropriate, you will not be charged for this service.\"    Patient has given verbal consent for Telephone visit?  Yes    What phone number would you like to be contacted at? [phone]    How would you like to obtain your AVS? 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  This is likely secondary to progressive chronic changes and would recommend patient undergo kidney retransplant evaluation as he has a qualifying GFR.   - Baseline Cr ~ 3.0-3.4   - Proteinuria: Moderate (1-3 grams)   - Date DSA Last Checked: Jul/2019      Latest DSA: No   - BK Viremia: No   - Kidney Tx Biopsy: Jul 23, 2019; Result: No diagnostic evidence of acute rejection.  Transplant glomerulopathy.                                              Jun 26, 2019; Result: Acute " cellular-mediated rejection, [place] 1A.    # Immunosuppression: Tacrolimus immediate release (goal 4-6), Mycophenolate mofetil (goal not followed) and Prednisone (dose 5 mg daily)   - Changes: No    # Infection Prophylaxis:   - PJP: Sulfa/TMP (Bactrim)    # Hypertension: Not checked recently;  Goal BP: < 130/80   - Changes: No, but recommend checking BP at home    # Anemia in Chronic Renal Disease: Hgb: Trend down      BASHIR: No   - Iron studies: Not checked recently    # Mineral Bone Disorder:   - Secondary renal hyperparathyroidism; PTH level: Not checked recently        On treatment: None  - Vitamin D; level: Not checked recently        On Supplement: Yes  - Calcium; level: Normal        On Supplement: No    # Electrolytes:   - Potassium; level: Normal        On Supplement: No  - Bicarbonate; level: Low        On Supplement: Yes, just started after low level with recent labs     # Obesity: Significant intentional decrease in weight of ~ 35 lbs.   - Recommend maintaining weight loss for overall health by continuing exercise and watching caloric intake.    # GERD: Mostly well controlled with occasional use of PPI.   - Recommend using H2 blocker, such as famotidine (Pepcid) for occasional symptoms, instead of PPI.    # Depression: Symptoms appear well controlled on fluoxetine.    # Skin Cancer Risk:    - Discussed sun protection and recommend regular follow up with Dermatology.    # Medical Compliance: Yes     # COVID-19 Virus Review: Discussed COVID-19 virus and the potential medical risks.  Reviewed preventative health recommendations, which includes washing hands for 20 seconds, avoid touching your face, and social distancing.  Asked patient to inform the transplant center if they are exposed or diagnosed with this virus.    # Transplant History:  Etiology of Kidney Failure: Congenital dysplasia  Tx: DDKT  Transplant: 4/28/2012 (Kidney), 7/13/1995 (Kidney), 5/18/2011 (Kidney)  Donor Type: Donation after Brain  "Death Donor Class: Standard Criteria Donor  Significant changes in immunosuppression: None  Significant transplant-related complications: BK Viremia    Transplant Office Phone Number: [phone]    Assessment and plan was discussed with the patient and he voiced his understanding and agreement.    Return visit: Return in about 6 months (around 12/4/2020).    Jose Culp MD    Chief Complaint   Mr. Hoffman is a 25 year old here for kidney transplant and immunosuppression management.    History of Present Illness    Mr. Hoffman reports feeling okay overall with some medical complaints.  Since last clinic visit, patient reports no hospitalizations or new medical complaints and has been doing well overall.  His creatinine is trending up and patient was supposed to be seen for retransplant evaluation, but that was delayed due to the COVID-19 pandemic.  His energy level remains \"great\" and had been normal.  He has been getting more exercise and denies any chest pain or shortness of breath with exertion.  Appetite is good, but he has been trying to eat better lately.  With the increased exercise and better diet, patient reports losing ~ 35 lbs and now weighs ~ 185 lbs.  No nausea or vomiting.  Occasional loose stools, maybe once ever couple of weeks.  He isn't interested in changing his immunosuppression to see if that helps.  No fever, sweats or chills.  No leg swelling.    Recent Hospitalizations:  [x] No [] Yes    New Medical Issues: [x] No [] Yes    Decreased energy: [x] No [] Yes    Chest pain or SOB with exertion:  [x] No [] Yes    Appetite change or weight change: [] No [x] Yes Intentionally lost ~ 35 lbs   Nausea, vomiting or diarrhea:  [] No [x] Yes Occasional loose stools   Fever, sweats or chills: [x] No [] Yes    Leg swelling: [x] No [] Yes      Home BP: Not checked    Review of Systems   A comprehensive review of systems was obtained and negative, except as noted in the HPI or PMH.    Problem List "   Patient Active Problem List   Diagnosis     Kidney replaced by transplant     HTN, kidney transplant related     Depression     BK viremia     GERD (gastroesophageal reflux disease)     Secondary renal hyperparathyroidism (H)     Vitamin D deficiency     Immunosuppression (H)     Aftercare following organ transplant       Social History   Social History     Tobacco Use     Smoking status: Never Smoker     Smokeless tobacco: Never Used     Tobacco comment: mother smokes outside   Substance Use Topics     Alcohol use: No     Drug use: No       Allergies   Allergies   Allergen Reactions     Amoxicillin Swelling     Azithromycin      Z pack - can't take with cyclosporine.     Vancomycin      Ruth syndrome     Cefprozil Rash       Medications   Current Outpatient Medications   Medication Sig     amLODIPine (NORVASC) 10 MG tablet Take 1 tablet (10 mg) by mouth At Bedtime     aspirin (ASPIRIN ADULT LOW STRENGTH) 81 MG EC tablet Take 1 tablet by mouth daily.     carvedilol (COREG) 12.5 MG tablet Take 1 tablet (12.5 mg) by mouth 2 times daily (with meals)     cholecalciferol 2000 UNITS CAPS Take 2,000 Int'l Units by mouth daily     FLUoxetine (PROZAC) 20 MG capsule Take 40 mg by mouth daily      mycophenolate (GENERIC EQUIVALENT) 250 MG capsule Take 3 capsules (750 mg) by mouth 2 times daily     predniSONE (DELTASONE) 10 MG tablet Take 5 mg by mouth daily.     sodium bicarbonate 650 MG tablet Take 1 tablet (650 mg) by mouth 2 times daily     sulfamethoxazole-trimethoprim (BACTRIM/SEPTRA) 400-80 MG tablet Take 1 tablet by mouth daily     tacrolimus (GENERIC EQUIVALENT) 0.5 MG capsule Take 3 capsules (1.5 mg) by mouth 2 times daily     clonazePAM (KLONOPIN) 0.5 MG tablet Take 1 tablet (0.5 mg) by mouth 2 times daily as needed for anxiety     No current facility-administered medications for this visit.      Medications Discontinued During This Encounter   Medication Reason     omeprazole 20 MG tablet        Physical Exam    Vital signs were deferred for this telemedicine visit.    GENERAL APPEARANCE: alert and no distress  HENT: no obvious abnormalities on appearance  RESP: breathing appears unremarkable with normal rate and no apparent shortness of breath with conversation  MS: extremities normal - no gross deformities noted, no evidence of inflammation in joints, no muscle tenderness  SKIN: no apparent rash and normal skin tone  NEURO: speech is clear with no obvious neurological deficits  PSYCH: mentation appears normal and affect normal      Data     Renal Latest Ref Rng & Units 5/1/2020 3/23/2020 2/24/2020   Na 133 - 144 mmol/L - - -   Na (external) 134 - 145 mmol/L 139 140 143   K 3.4 - 5.3 mmol/L - - -   K (external) 3.5 - 5.1 mmol/L 4.3 4.4 4.4   Cl 94 - 109 mmol/L - - -   Cl (external) 97 - 107 mmol/L 107 103 108(H)   CO2 20 - 32 mmol/L - - -   CO2 (external) 22 - 29 mmol/L 18(L) 19(L) 20(L)   BUN 7 - 30 mg/dL - - -   BUN (external) 7.0 - 27.0 mg/dL 42.0(H) 37.0(H) 54.0(H)   Cr 0.66 - 1.25 mg/dL - - -   Cr (external) 0.64 - 1.34 mg/dL 3.97(H) 3.44(H) 3.71(H)   Glucose 70 - 99 mg/dL - - -   Glucose (external) 70 - 110 mg/dL 96 102 121(H)   Ca  8.5 - 10.1 mg/dL - - -   Ca (external) 8.6 - 10.3 mg/dL 9.3 9.9 9.7   Mg 1.6 - 2.3 mg/dL - - -   Mg (external) 1.8 - 2.4 MG/DL - - -     Bone Health Latest Ref Rng & Units 9/18/2017 3/27/2015 1/28/2015   Phos 2.8 - 4.6 mg/dL - - -   Phos (external) 2.5 - 4.9 MG/DL - 4.1 3.7   PTHi 12 - 72 pg/mL 151(H) - -   Vit D Def 20 - 75 ug/L 28 - -     Heme Latest Ref Rng & Units 5/1/2020 3/23/2020 2/24/2020   WBC 4.0 - 11.0 10e9/L - - -   WBC (external) 4.8 - 10.8 10(3)/uL 7.7 10.1 9.3   Hgb 13.3 - 17.7 g/dL - - -   Hgb (external) 14.0 - 18.0 g/dL 12.2(L) 13.5(L) 12.6(L)   Plt 150 - 450 10e9/L - - -   Plt (external) 150 - 350 10(3)/uL 180 267 228   ABSOLUTE NEUTROPHIL 1.6 - 8.3 10e9/L - - -   ABSOLUTE NEUTROPHILS (EXTERNAL) 1.2 - 8.1 10(3)/uL 5.5 7.3 6.7   ABSOLUTE LYMPHOCYTES 0.8 - 5.3 10e9/L - -  -   ABSOLUTE LYMPHOCYTES (EXTERNAL) 0.6 - 4.7 10(3)/uL 1.7 1.9 1.8   ABSOLUTE MONOCYTES 0.0 - 1.3 10e9/L - - -   ABSOLUTE MONOCYTES (EXTERNAL) 0.0 - 1.3 10(3)/uL 0.5 0.8 0.6   ABSOLUTE EOSINOPHILS 0.0 - 0.7 10e9/L - - -   ABSOLUTE EOSINOPHILS (EXTERNAL) 0.0 - 0.7 10(3)/uL 0.0 0.1 0.1   ABSOLUTE BASOPHILS 0.0 - 0.2 10e9/L - - -   ABSOLUTE BASOPHILS (EXTERNAL) 0.0 - 0.2 10(3)/uL 0.0 0.0 0.0   ABS IMMATURE GRANULOCYTES 0 - 0.4 10e9/L - - -   ABSOLUTE NUCLEATED RBC - - - -     Liver Latest Ref Rng & Units 6/17/2019 11/7/2017 9/18/2015   AP 40 - 150 U/L - - -   AP (external) 45 - 108 U/L 116(H) 146(H) 102   TBili 0.2 - 1.3 mg/dL - - -   TBili (external) 0.2 - 1.2 mg/dL 0.7 0.3 0.5   ALT 0 - 70 U/L - - -   ALT (external) 8 - 51 U/L 24 30 34   AST 0 - 45 U/L - - -   AST (external) 10 - 36 U/L 22 14(L) 15   Tot Protein 6.8 - 8.8 g/dL - - -   Tot Protein (external) 6.3 - 8.2 g/dL 7.1 7.3 7.0   Albumin 3.9 - 5.1 g/dL - - -   Albumin (external) 3.4 - 5.3 g/dL 4.5 4.0 4.1     Pancreas Latest Ref Rng & Units 2/16/2011 10/20/2010   Amylase 30 - 110 U/L 142(H) 127(H)   Lipase 20 - 250 U/L 202 256(H)     Iron studies Latest Ref Rng & Units 3/21/2012 12/13/2011 9/27/2011   Iron 35 - 180 ug/dL 138 63 81   Iron sat 15 - 46 % 75(H) 33 55(H)   Ferritin 20 - 300 ng/mL - - -     [facility] Txp Virology Latest Ref Rng & Units 7/23/2019 6/26/2019 11/7/2017   CMV IgG EU/mL - - -   CMV IgM <0.90 - - -   CMV IgM Interp <0.90 - - -   CVM DNA Quant - - - -   CMV Quant <100 Copies/mL - - -   CMV QT Log <2.0 Log copies/mL - - -   BK Spec - Plasma Plasma -   BK Res BKNEG:BK Virus DNA Not Detected copies/mL BK Virus DNA Not Detected BK Virus DNA Not Detected -   BK Log <2.7 Log copies/mL Not Calculated Not Calculated -   BK Quant Log Ext - - - -   BK Quant Result Ext None detected - - None detected   BK Quant Spec Ext - - - -   EBV IgG - - - -   EBV VCA IGG ANTIBODY U/mL - - -   EBV VCA IGM ANTIBODY U/mL - - -   EBV DNA COPIES/ML <1000 Copies/mL - - -    EBV DNA LOG OF COPIES <3.0 Log copies/mL - - -   Hep B Core NEG - - -   Hep B Surf - - - -   HIV 1&2 NEG - - -        Recent Labs   Lab Test 02/24/20  0905 03/23/20  0830 05/01/20  0910   DOSTA 02.23.20 2100 03/22/2020 20:30 04/30/20 2050   TACROL 4.9* 4.7* 4.3*             Jose Culp MD

## 2020-06-25 DIAGNOSIS — Z94.0 KIDNEY TRANSPLANTED: Primary | ICD-10-CM

## 2020-06-25 NOTE — TELEPHONE ENCOUNTER
Issue:  PTH elevated at 758    Plan:  Jose Culp MD Lavelle Peterson, Meghan M, RN               Elevated parathormone level and recommend starting calcitriol 0.25 mg daily.      LPN task:  Please call Jhoan Hoffman with recommendation. Send in prescription to preferred pharmacy.

## 2020-06-26 RX ORDER — CALCITRIOL 0.25 UG/1
0.25 CAPSULE, LIQUID FILLED ORAL DAILY
Qty: 30 CAPSULE | Refills: 11 | Status: SHIPPED | OUTPATIENT
Start: 2020-06-26 | End: 2020-12-16

## 2020-06-26 NOTE — TELEPHONE ENCOUNTER
Call returned to patient. No answer. No voice message. Mychart message left instructing patient to begin calcitiol 0.25 mg daily. Rx sent

## 2020-06-29 ENCOUNTER — TELEPHONE (OUTPATIENT)
Dept: TRANSPLANT | Facility: CLINIC | Age: 25
End: 2020-06-29

## 2020-06-29 DIAGNOSIS — Z94.0 KIDNEY TRANSPLANTED: ICD-10-CM

## 2020-06-29 DIAGNOSIS — T86.11 KIDNEY TRANSPLANT REJECTION: Primary | ICD-10-CM

## 2020-06-29 RX ORDER — CALCITRIOL 0.25 UG/1
0.25 CAPSULE, LIQUID FILLED ORAL DAILY
Qty: 90 CAPSULE | Refills: 3 | Status: SHIPPED | OUTPATIENT
Start: 2020-06-29 | End: 2020-12-16

## 2020-06-29 NOTE — TELEPHONE ENCOUNTER
Jose Culp MD Lavelle Peterson, Meghan M, RN                Elevated parathormone level and recommend starting calcitriol 0.25 mg daily.      Outcome:  Called Jhoan to discuss above recommendation. Jhoan is agreeable to starting Cacitriol. Prescription sent to preferred pharmacy.  Jhoan is agreeable to having a full set of transplant labs drawn in July.

## 2020-06-29 NOTE — TELEPHONE ENCOUNTER
Called patient to see if he has virtual capability for kidney eval on Mon 7/6 and no he has nothing.  He stated he does all his appts just on his phone as calls, I explained to him that he would have to do in line education class before July 6 appts and he stated he has no capability of doing these appts.  I offered to reschedule him into August and he confirmed for Wed, Aug 12.

## 2020-07-10 DIAGNOSIS — Z94.0 KIDNEY REPLACED BY TRANSPLANT: ICD-10-CM

## 2020-07-10 DIAGNOSIS — T86.11 ACUTE REJECTION OF KIDNEY TRANSPLANT: ICD-10-CM

## 2020-07-10 DIAGNOSIS — N18.1 CHRONIC KIDNEY DISEASE, STAGE I: ICD-10-CM

## 2020-07-10 RX ORDER — PREDNISONE 10 MG/1
TABLET ORAL
Qty: 15 TABLET | Refills: 11 | Status: SHIPPED | OUTPATIENT
Start: 2020-07-10 | End: 2021-02-18

## 2020-07-31 NOTE — TELEPHONE ENCOUNTER
Called patient to schedule PKE, patient has save the date for Wed 8/12. Patient states he will need to reschedule as he is moving during this month. He will call us back when he is ready to reschedule.

## 2020-08-03 ENCOUNTER — TELEPHONE (OUTPATIENT)
Dept: TRANSPLANT | Facility: CLINIC | Age: 25
End: 2020-08-03

## 2020-08-03 NOTE — TELEPHONE ENCOUNTER
Issue:  Overdue for transplant labs. Jhoan should be getting labs every other month.  Needs a Vitamin D level and a urine protein as well.    Plan:  Call Jhoan:  Request a full set of transplant labs, including a good drug level, be drawn in the next 1-2 weeks.  Educate the patient on the importance of lab compliance in monitoring the health of their transplant.    Outcome:  Called Jhoan to discuss above plan. Jhoan states he will call his lab and make an appointment. I reminded him to have a urine protein and vitamin D level drawn in addition to his normal transplant labs. He verbalized understanding.

## 2020-08-03 NOTE — LETTER
PHYSICIAN ORDERS      DATE & TIME ISSUED: August 3, 2020 2:26 PM  PATIENT NAME: Jhoan Hoffman   : 1995     [facility] MR# [if applicable]: [id number]     DIAGNOSIS:  kidney transplant  ICD-10 CODE: Z94.0     Please obtain the following labs in the next 1-2 weeks (in addition to normal transplant labs):   -Vitamin D Level   -UPC (protein random urine with a creatinine ratio)    Any questions please call: [phone]    Please fax results to [phone]      .

## 2020-08-19 ENCOUNTER — TELEPHONE (OUTPATIENT)
Dept: TRANSPLANT | Facility: CLINIC | Age: 25
End: 2020-08-19

## 2020-08-19 NOTE — LETTER
PHYSICIAN ORDERS      DATE & TIME ISSUED: 2020 8:26 AM  PATIENT NAME: Jhoan Hoffman   : 1995     [facility] MR# [if applicable]: [id number]     DIAGNOSIS:  kidney transplant  ICD-10 CODE: Z94.0     Please obtain the following labs in the next 1-2 weeks (in addition to normal transplant labs):   -Vitamin D Level   -UPC (protein random urine with a creatinine ratio)    Any questions please call: [phone]    Please fax results to [phone]      .

## 2020-08-19 NOTE — TELEPHONE ENCOUNTER
Issue:  Overdue for transplant labs. Jhoan should be getting labs every other month.  Needs a Vitamin D level and a urine protein as well.     Plan:  Call Jhoan:  Request a full set of transplant labs, including a good drug level, be drawn in the next 1-2 weeks.  Educate the patient on the importance of lab compliance in monitoring the health of their transplant.     Outcome:  Called Jhoan to discuss above plan. Jhoan states he will call his lab and make an appointment. I reminded him to have a urine protein and vitamin D level drawn in addition to his normal transplant labs. He verbalized understanding, lab orders resent to local lab.

## 2020-09-09 ENCOUNTER — TELEPHONE (OUTPATIENT)
Dept: TRANSPLANT | Facility: CLINIC | Age: 25
End: 2020-09-09

## 2020-09-09 DIAGNOSIS — Z48.298 AFTERCARE FOLLOWING ORGAN TRANSPLANT: ICD-10-CM

## 2020-09-09 DIAGNOSIS — Z79.899 ENCOUNTER FOR LONG-TERM CURRENT USE OF MEDICATION: ICD-10-CM

## 2020-09-09 DIAGNOSIS — Z94.0 KIDNEY REPLACED BY TRANSPLANT: ICD-10-CM

## 2020-09-09 PROCEDURE — 80197 ASSAY OF TACROLIMUS: CPT | Performed by: INTERNAL MEDICINE

## 2020-09-09 NOTE — TELEPHONE ENCOUNTER
DATE:  9/9/2020   TIME OF RECEIPT FROM LAB:  1036  LAB TEST:  creatinine  LAB VALUE:  4.86  RESULTS GIVEN WITH READ-BACK TO (PROVIDER):  CHRISTEN CARVAJAL  TIME LAB VALUE REPORTED TO PROVIDER:   1038

## 2020-09-09 NOTE — LETTER
PHYSICIAN ORDERS      DATE & TIME ISSUED: 2020 11:26 AM  PATIENT NAME: Jhoan Hoffman   : 1995     [facility] MR# [if applicable]: [id number]     DIAGNOSIS:  kidney transplant  ICD-10 CODE: Z94.0     Please obtain the following labs in the next 1-2 weeks:   -BMP   -CBC   -Tacrolimus level (12 hour trough indicating time of last dose taken)    Any questions please call: [phone]    Please fax results to [phone]      .

## 2020-09-09 NOTE — TELEPHONE ENCOUNTER
Issue:  Critical creatinine of 4.86 (other labs pending), baseline ~4.0    Outcome:  Called Jhoan to discuss elevated Creatinine. Jhoan reports he feels OK, but has had an upset stomach and diarrhea for the past few days. He reports the diarrhea is starting to resolve on it's own. He denies any nausea/vomiting, he reports he is able to hydrate well. He denies any new meds.  Jhoan will increase his hydration to at least 2.5 L of water/day and have labs rechecked in 1-2 weeks. He will contact his PCP if diarrhea worsens or has not resolved in the next couple days.  Lab orders placed.  Message sent to scheduling for 6 month follow up in December.

## 2020-09-09 NOTE — TELEPHONE ENCOUNTER
Issue:  Viatmin D level 16.1    Outcome:  Jhoan states he has not been taking his Cholecalciferol as prescribed. He has it at home, does not need a refill. He will begin taking 2,000 units daily as prescribed.

## 2020-09-10 LAB
TACROLIMUS BLD-MCNC: 5.5 UG/L (ref 5–15)
TME LAST DOSE: NORMAL H

## 2020-09-21 DIAGNOSIS — Z48.298 AFTERCARE FOLLOWING ORGAN TRANSPLANT: ICD-10-CM

## 2020-09-21 DIAGNOSIS — Z79.899 ENCOUNTER FOR LONG-TERM CURRENT USE OF MEDICATION: ICD-10-CM

## 2020-09-21 DIAGNOSIS — Z94.0 KIDNEY REPLACED BY TRANSPLANT: ICD-10-CM

## 2020-09-21 PROCEDURE — 80197 ASSAY OF TACROLIMUS: CPT | Performed by: INTERNAL MEDICINE

## 2020-09-23 ENCOUNTER — TELEPHONE (OUTPATIENT)
Dept: TRANSPLANT | Facility: CLINIC | Age: 25
End: 2020-09-23

## 2020-09-23 NOTE — LETTER
[facility]  Room 2[address]  [facility],  [address]  [address]  Tel [phone]  Toll Free [phone]                OUTPATIENT LABORATORY TEST ORDER    Patient Name: Jhoan Hoffman  Transplant Date: 4/28/2012 (Kidney), 7/13/1995 (Kidney), 5/18/2011 (Kidney)  YOB: 1995  Issue Date: September 23, 2020   [facility] MR:  [id number]  Exp. Date (1 year after date issued)      Diagnoses: Kidney Transplant (ICD-10 Z94.0)   Long term use of medications (ICD-10 Z79.899)     Lab results to be available on the same day drawn.   Patient should release information to the [facility].  Please fax to the Transplant Center at [phone].    Monthly:  ?Hemogram and Platelet  ?Basic Metabolic Panel   ?/Tacrolimus/Prograf drug level                    Every 6 Months:                                           ?Urine for protein/creatinine      If you have any questions, please call [facility] at [phone] or [phone].    Please fax labs to [phone]  .

## 2020-09-23 NOTE — TELEPHONE ENCOUNTER
"Issue:  Repeat creatinine elevated to 5.23      Outcome:  Called Jhoan to discuss continued elevated of creatinine. Jhoan states he is no longer having diarrhea. He states he is drinking \"lots of water\", but unable to quantify. He denies any swelling/weight gain, he denies any nausea. He says he has good energy, not experiencing any fatigue.   States he is taking his sodium bicarb consistently 1 tab BID.  Jhoan has an active referral for re-transplant. He is unsure where he is in the process of being activated. Will message pre-transplant coordinator.   Jhoan states his mom would like to speak with someone regarding getting Jhoan set-up for a AV fistula for HD. Will message nephrology RN.    Results forwarded to provider to review.    Jose Culp MD Harris, Kathleen, RN  Cc: Gege Grigsby, RN; Alma Swanson, RN               I called and talked with patient and his mother.     They are interested in moving forward in preparing for dialysis, knowing that his kidney function might improve a little if this was partly prerenal from diarrhea.     He is interested in hemodialysis and if Gege can set them up with some dialysis education and vascular access, they are ready to move forward with this.     In addition, I have included Alma on this email.  I don't know what happened, but he has been referred for another kidney transplant, but doesn't seem like this moved forward for evaluation.  Not sure if that was due to COVID or what, but we should get him a transplant evaluation as soon as possible.     Thanks.     Jose Juan"

## 2020-09-24 ENCOUNTER — TELEPHONE (OUTPATIENT)
Dept: TRANSPLANT | Facility: CLINIC | Age: 25
End: 2020-09-24

## 2020-09-24 ENCOUNTER — TELEPHONE (OUTPATIENT)
Dept: NEPHROLOGY | Facility: CLINIC | Age: 25
End: 2020-09-24

## 2020-09-24 DIAGNOSIS — Z94.0 KIDNEY TRANSPLANTED: ICD-10-CM

## 2020-09-24 RX ORDER — SODIUM BICARBONATE 650 MG/1
1300 TABLET ORAL 2 TIMES DAILY
Qty: 120 TABLET | Refills: 11 | Status: ON HOLD | OUTPATIENT
Start: 2020-09-24 | End: 2020-12-21

## 2020-09-24 NOTE — TELEPHONE ENCOUNTER
Confirmed with Dr. Culp that labs should be monthly and to increase sodium bicarb dose to 1,300 mg BID.  Called Jhoan to discuss. No answer. Left a voice message requesting he get labs done monthly and increase sodium bicarb dose.  Let Jhoan know he should call his RNCC should he start experiencing swelling, nausea, fatigue, altered mental status, or any acute change in health.   Updated lab letter sent. Prescription updated.

## 2020-09-25 LAB
TACROLIMUS BLD-MCNC: 3.7 UG/L (ref 5–15)
TME LAST DOSE: ABNORMAL H

## 2020-09-27 ENCOUNTER — TELEPHONE (OUTPATIENT)
Dept: TRANSPLANT | Facility: CLINIC | Age: 25
End: 2020-09-27

## 2020-09-27 DIAGNOSIS — N18.6 ESRD (END STAGE RENAL DISEASE) (H): Primary | ICD-10-CM

## 2020-09-27 NOTE — TELEPHONE ENCOUNTER
Contacted patient and introduced myself as their Transplant Coordinator, also introduced the role of the Transplant Coordinator in the transplant process.  Explained the purpose of this call including reviewing next steps and answering questions.    Confirmed Referring Provider, Dialysis Center (not yet on dialysis), and Primary Care Physician. Notified patient of the importance of continued communication with referring providers and primary care physicians.    Reviewed components of transplant evaluation process including necessary appointments, tests, and procedures.    Answered questions for patient regarding evaluation, provided my name and contact information and requested they call with any additional questions.    Jhoan would like Dr. Oneil as eval surgeon if possible. He prefers virtual at this time.  Notified patient that once eval date is identified, I will call him to make sure that date works for him. He is MyChart active.

## 2020-09-28 DIAGNOSIS — Z45.2 ENCOUNTER FOR ADJUSTMENT AND MANAGEMENT OF VASCULAR ACCESS DEVICE: ICD-10-CM

## 2020-09-28 DIAGNOSIS — N18.5 CKD (CHRONIC KIDNEY DISEASE) STAGE 5, GFR LESS THAN 15 ML/MIN (H): Primary | ICD-10-CM

## 2020-09-30 NOTE — TELEPHONE ENCOUNTER
Called patient to schedule PKE clinic.  Reviewed equipment needed for virtual visits. Patient confirmed he has a laptop with camera and would like to schedule for Wed 11/4/20.  Patient is active in [email or web address]. Informed patient we would send information about appointments via [employer] and Transplant coordinator will call week prior to appt to review education.

## 2020-10-06 ENCOUNTER — TELEPHONE (OUTPATIENT)
Dept: TRANSPLANT | Facility: CLINIC | Age: 25
End: 2020-10-06

## 2020-10-07 ENCOUNTER — TELEPHONE (OUTPATIENT)
Dept: NEPHROLOGY | Facility: CLINIC | Age: 25
End: 2020-10-07

## 2020-10-07 NOTE — TELEPHONE ENCOUNTER
Attempted to reach patient to see if he had any questions regarding kidney smart or vascular access appointment.   Left message for patient to call office back.

## 2020-10-13 ENCOUNTER — PATIENT OUTREACH (OUTPATIENT)
Dept: NEPHROLOGY | Facility: CLINIC | Age: 25
End: 2020-10-13

## 2020-10-13 NOTE — PROGRESS NOTES
"CKD to ESRD Checklist    CKD Education:   Status: referred Date: 9/24/20   Definition/Purpose: During the encounter the patient is instructed about one or more of the following: the etiology and prognosis of their CKD; the stability (or lack thereof) of their eGFR or CrCl; medication dosing, IV contrast avoidance, or specific medications to avoid; sodium, potassium, or phosphorus restriction (or other dietary counseling).  Referral to an educator or program that offers any CKD-specific counseling is applicable. Having completed education in the previous 12 months to the encounter date is also applicable.     Modality Education:   Status: referred--does not want to attend, has a mother who is dialysis nurse who he will ask to educate him on what he would learn in class. Date: 9/24/20   Definition/Purpose: During the encounter the patient is directly counseled and/or referred to education regarding the options for dialysis including in-center, home therapies, transplant, and/or hospice.  Patients who are at low risk of progression (by various risk calculators) may be deemed \"not a candidate.\" Completion of education in the past is also applicable.     Preferred Modality:   Definition/Purpose: During the encounter the patient indicates that they have chosen a modality of dialysis, irrespective of the estimated time to ESRD.  The goal of this measure is to reflect education and understanding.  This may change visit to visit as the patient s health status and other conditions dictate.  \"Not a Candidate\" should be selected for patients choosing hospice, are at low risk for progression (based on various scoring and/or clinician judgment), or for other medically documented exclusion.     Access Surgeon Referral:   Status: referred Date: 9/23/20   Definition/Purpose: This measure indicates that the patient has been referred for discussion and/or evaluation by a dialysis access surgeon.  \"Not a Candidate\" should be selected " "for patients choosing hospice, are at low risk for progression (based on various scoring and/or clinician judgment), or for other medically documented exclusion.     Access Placed:   Definition/Purpose: This measure indicates that the patient has undergone surgery for dialysis access placement.   \"Not a Candidate\" should be selected for patients choosing hospice, are at low risk for progression (based on various scoring and/or clinician judgment), or for other medically documented exclusion.     Transplant Listing:   Status: referred    Definition/Purpose: This measure indicates that the patient has actively engaged in the transplant listing process.   \"Not Eligible\" should be selected for patients choosing hospice, are at low risk for progression (based on various scoring and/or clinician judgment), or for other medically documented exclusion.         "

## 2020-10-16 ENCOUNTER — PATIENT OUTREACH (OUTPATIENT)
Dept: NEPHROLOGY | Facility: CLINIC | Age: 25
End: 2020-10-16

## 2020-10-21 ENCOUNTER — TELEPHONE (OUTPATIENT)
Dept: NEPHROLOGY | Facility: CLINIC | Age: 25
End: 2020-10-21

## 2020-10-21 NOTE — TELEPHONE ENCOUNTER
Nephrology Note: Nursing Outreach Encounter    REASON FOR CALL:                                                      REASON FOR CALL: Care Coordination                                          SITUATION/BACKROUND:                                                    Patient is being treated for CKD Stage 5.    Last GFR 14      ASSESSMENT:                                                      Jhoan reports feeling well. Denies N/V, anorexia, SOB, swelling or high blood pressures.  Has not attended [facility], but reports he will ask his mother who is a dialysis nurse in [place] for more information about preserving kidney function, etc.  Uremic Symptoms: No     Jhoan states he prefers dialysis in center hemodialysis with hopes of sometime transitioning to HHD. He did not like PD in the past. Sees Dr. Palma in November for access consult.    Thinks [place] would be his best bet for HD, as he doesn't want out of state dialysis.      PLAN:                                                      Follow Up:   Will update CKD Journey.     Patient verbalized understanding and will contact the clinic with any further questions or concerns.     Gege Grigsby RN

## 2020-10-21 NOTE — PROGRESS NOTES
"CKD to ESRD Checklist    CKD Education:   Status: referred Date: 9/24/20   Definition/Purpose: During the encounter the patient is instructed about one or more of the following: the etiology and prognosis of their CKD; the stability (or lack thereof) of their eGFR or CrCl; medication dosing, IV contrast avoidance, or specific medications to avoid; sodium, potassium, or phosphorus restriction (or other dietary counseling).  Referral to an educator or program that offers any CKD-specific counseling is applicable. Having completed education in the previous 12 months to the encounter date is also applicable.     Modality Education:   Status: referred Date: 9/24/20   Comments: --does not want to attend, his mother is a dialysis nurse and he will ask her to educate him on what would be taught in class.  Definition/Purpose: During the encounter the patient is directly counseled and/or referred to education regarding the options for dialysis including in-center, home therapies, transplant, and/or hospice.  Patients who are at low risk of progression (by various risk calculators) may be deemed \"not a candidate.\" Completion of education in the past is also applicable.     Preferred Modality:   Status: hemodialysis    Comments: Has done PD in the past and did not like it. Prefers in center HD with plan of hopefully HHD someday.  Definition/Purpose: During the encounter the patient indicates that they have chosen a modality of dialysis, irrespective of the estimated time to ESRD.  The goal of this measure is to reflect education and understanding.  This may change visit to visit as the patient s health status and other conditions dictate.  \"Not a Candidate\" should be selected for patients choosing hospice, are at low risk for progression (based on various scoring and/or clinician judgment), or for other medically documented exclusion.     Access Surgeon Referral:   Status: referred Date: 9/23/20   Comments: Appt 11/9 with " "Lashellswamy  Definition/Purpose: This measure indicates that the patient has been referred for discussion and/or evaluation by a dialysis access surgeon.  \"Not a Candidate\" should be selected for patients choosing hospice, are at low risk for progression (based on various scoring and/or clinician judgment), or for other medically documented exclusion.     Access Placed:   Definition/Purpose: This measure indicates that the patient has undergone surgery for dialysis access placement.   \"Not a Candidate\" should be selected for patients choosing hospice, are at low risk for progression (based on various scoring and/or clinician judgment), or for other medically documented exclusion.     Transplant Listing:   Status: referred    Definition/Purpose: This measure indicates that the patient has actively engaged in the transplant listing process.   \"Not Eligible\" should be selected for patients choosing hospice, are at low risk for progression (based on various scoring and/or clinician judgment), or for other medically documented exclusion.       "

## 2020-10-23 DIAGNOSIS — Z94.0 KIDNEY TRANSPLANTED: ICD-10-CM

## 2020-10-23 RX ORDER — SULFAMETHOXAZOLE AND TRIMETHOPRIM 400; 80 MG/1; MG/1
1 TABLET ORAL DAILY
Qty: 30 TABLET | Refills: 11 | Status: SHIPPED | OUTPATIENT
Start: 2020-10-23 | End: 2021-02-18

## 2020-10-26 ENCOUNTER — TELEPHONE (OUTPATIENT)
Dept: TRANSPLANT | Facility: CLINIC | Age: 25
End: 2020-10-26

## 2020-10-26 NOTE — TELEPHONE ENCOUNTER
Issue:  Due for monthly labs.    Outcome:  Jhoan states he will have labs drawn tomorrow.  He reports feeling well. No swelling, fatigue, nausea/vomiting. He will call the transplant office if he experiences any uremic symptoms.  Will continue with monthly labs for now.

## 2020-10-27 DIAGNOSIS — Z48.298 AFTERCARE FOLLOWING ORGAN TRANSPLANT: ICD-10-CM

## 2020-10-27 DIAGNOSIS — Z94.0 KIDNEY REPLACED BY TRANSPLANT: ICD-10-CM

## 2020-10-27 DIAGNOSIS — Z79.899 ENCOUNTER FOR LONG-TERM CURRENT USE OF MEDICATION: ICD-10-CM

## 2020-10-27 PROCEDURE — 80197 ASSAY OF TACROLIMUS: CPT | Performed by: INTERNAL MEDICINE

## 2020-10-28 ENCOUNTER — TELEPHONE (OUTPATIENT)
Dept: TRANSPLANT | Facility: CLINIC | Age: 25
End: 2020-10-28

## 2020-10-28 NOTE — TELEPHONE ENCOUNTER
Issue:  Creatinine up to 6.91 (from 5.23). Followed by nephrology RN for HD needs.  CO2 16-was to increased sodium bicarb supplement to 1300 mg BID last month.  WBC 11.2    Plan:  Call Jhoan:  -Assess for any s/s of uremia.  -Assess if sodium bicarb is being taken as prescribed.  -Remind him of his vascular access appointments on 11/9/20.  -Assess for any s/s of infection    Outcome:  Jhoan states he is feeling well. He denies any swelling, extreme fatigue, altered mental status. He reports that he vomited once a couple days ago, but thinks it was something he ate, he has not had any episodes since and denies any ongoing nausea or lack of appetite. Jhoan does not check BPs at home, but does have a cuff. I advised him to begin checking them daily and recording.   Jhoan  is not taking his Bicarb supplement as prescribed. He will increase the dose in his pill box today.  Jhoan  is available for his appointments on 11/9/20 and will not miss them.  Jhoan  denies any s/s of infection. Will recheck WBC in 2 weeks.   Jhoan  will have labs drawn in 2 weeks instead of waiting a month, and notify the transplant office immediately if he experiences any uremic symptoms.

## 2020-10-28 NOTE — LETTER
PHYSICIAN ORDERS      DATE & TIME ISSUED: 2020 2:03 PM  PATIENT NAME: Jhoan Hoffman   : 1995     [facility] MR# [if applicable]: [id number]     DIAGNOSIS:  kidney transplant  ICD-10 CODE: Z94.0     Please obtain the following labs in 2 weeks:   -BMP   -CBC   -Tacrolimus drug level (12 hour trough)    Any questions please call: [phone]    Please fax results to [phone]      .

## 2020-11-01 LAB
TACROLIMUS BLD-MCNC: 3.4 UG/L (ref 5–15)
TME LAST DOSE: ABNORMAL H

## 2020-11-03 NOTE — PROGRESS NOTES
Called patient and he said he was not aware of a appt today and that he wanted to cancel his eval to reschedule later, I told him to contact Alma -Transplant Coordinator. Suzie Franklin CMA      This encounter was opened in error. Please disregard.

## 2020-11-04 ENCOUNTER — VIRTUAL VISIT (OUTPATIENT)
Dept: TRANSPLANT | Facility: CLINIC | Age: 25
End: 2020-11-04
Attending: INTERNAL MEDICINE
Payer: MEDICAID

## 2020-11-04 DIAGNOSIS — Z76.82 ORGAN TRANSPLANT CANDIDATE: ICD-10-CM

## 2020-11-04 DIAGNOSIS — Z53.9 ERRONEOUS ENCOUNTER--DISREGARD: Primary | ICD-10-CM

## 2020-11-04 DIAGNOSIS — N18.9 CHRONIC RENAL FAILURE: ICD-10-CM

## 2020-11-04 DIAGNOSIS — T86.12 KIDNEY TRANSPLANT FAILURE: ICD-10-CM

## 2020-11-04 DIAGNOSIS — Z01.818 PRE-TRANSPLANT EVALUATION FOR KIDNEY TRANSPLANT: ICD-10-CM

## 2020-11-04 NOTE — LETTER
11/4/2020         RE: Jhoan Hoffman  [address]  [address]  [facility] [address]        Dear Colleague,    Thank you for referring your patient, Jhoan Hoffman, to the [facility] TRANSPLANT [facility]. Please see a copy of my visit note below.    Called patient and he said he was not aware of a appt today and that he wanted to cancel his eval to reschedule later, I told him to contact [place] -Transplant Coordinator. Suzie [facility]    TRANSPLANT NEPHROLOGY RECIPIENT EVALUATION NOTE    Assessment and Plan:  # Kidney Transplant Evaluation: Patient is a {desc.:132020} candidate overall. Benefits of a living donor transplant were discussed.    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 He's had a progressive decline in his kidney function with recent eGFR around 10 ml/min. Maintained on tacrolimus, MMF, and prednisone 5 mg daily. ***benefit from a 4th kidney transplant.    # Cardiac Risk: he has no known history of cardiac disease or events and is ***with exertion. Will update EKG and ECHO.    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    # Depression and Anxiety: currently taking ***and followed by ***. Appreciate social work input.    # Health Maintenance: Dermatology: {P TX UP TO DATE:641719712} and Dental: {P TX UP TO DATE:288955304}    Discussed the risks and benefits of a transplant, including the risk of surgery and immunosuppression medications.  Patient's overall evaluation will be discussed in the Transplant Program's regular meeting with a final recommendation on the patients suitability for transplant to be made at that time.  Patient was seen in conjunction with Dr. Peter Briggs as part of a shared visit.    Evaluation:  Jhoan Hoffman was seen in consultation at the request of  {Referring Surgeon:[id number]} for evaluation as a  potential kidney transplant recipient.    Reason for Visit:  Jhoan Hoffman is a 25-year-old male with CKD from failing graft, who presents for kidney transplant evaluation.    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 2) LDKT 5/18/2011 (poor function due to inadequate blood flow and explanted July 2011) 3) and most recently DDKT 4/28/2012 that has been complicated by BK viremia (last checked and negative July 2019) and acute cellular rejection in June 2019 treated with steroid taper. A second kidney transplant biopsy in July 2019 showed chronic changes with transplant glomerulopathy. He has been maintained on tacrolimus, MMF, and prednisone. He's had a progressive decline in his kidney function this year. eGFR was in the low 20's ml/min earlier this year, but most recently was 10%. He's feeling ***       Kidney Disease Dx: { :263357}       Biopsy Proven: {YES WITH WILD CARD/NO:87578197}         On Dialysis: {[facility] YES NO NEPH:401852441}       Primary Nephrologist: [facility] transplant nephrology       H/o Kidney Stones: No       H/o Recurrent/Frequent UTI: No         Diabetic Hx: None           Cardiac/Vascular Disease Risk Factors:        - None         Viral Serology Status       CMV IgG Antibody: Negative       EBV IgG Antibody: Positive         Volume Status/Weight:        Volume status: { :185491}       Weight:  {FV RENAL TX Recip Eval Weight:641708}       BMI: There is no height or weight on file to calculate BMI.         Functional Capacity/Frailty:        ***      Fatigue/Decreased Energy: [] No [] Yes    Chest Pain or SOB with Exertion: [] No [] Yes    Significant Weight Change: [] No [] Yes    Nausea, Vomiting or Diarrhea: [] No [] Yes    Fever, Sweats or Chills:  [] No [] Yes    Leg Swelling [] No [] Yes         History of Cancer: None    Other Significant Medical Issues: {None/***:034495}    Review of Systems:  A comprehensive review of systems was obtained and negative, except as noted in the HPI or PMH.    Past Medical History:   Medical record was reviewed and PMH was discussed with patient and noted below.  Past Medical History:   Diagnosis Date     Anemia     in ESRD     Depression      History of blood transfusion      Hypertension      Kidney replaced by transplant      Peritoneal dialysis status (H)     in past     Renal failure      Sleep apnea        Past Social History:   Past Surgical History:   Procedure Laterality Date     BIOPSY       CYSTOSCOPY, REMOVE STENT(S), COMBINED  5/25/2012    Procedure:COMBINED CYSTOSCOPY, REMOVE STENT(S); Cystoscopy, Removal Of Urinary Stent; Surgeon:FLORENTINO KNIGHT; [facility]     ENDARECTERECTOMY RENAL  5/18/2011    Procedure:ENDARECTERECTOMY RENAL; Exploration of Transplant Kidney, Back Table Flush, Biopsy of Kidney and Reanastamosis of Kidney; Surgeon:FLORENTINO KNIGHT; [facility]     EXPLANT TRANSPLANTED KIDNEY  7/12/2011    Procedure:EXPLANT TRANSPLANTED KIDNEY; Transplant Nephrectomy; Surgeon:FLORENTINO KNIGHT; [facility]     INSERT CATHETER HEMODIALYSIS  5/18/2011    Procedure:INSERT CATHETER HEMODIALYSIS; Double Lumen; Surgeon:JOE HE; [facility]     INSERT CATHETER PERITONEAL DIALYSIS  4/28/2012    Procedure:INSERT CATHETER PERITONEAL DIALYSIS; Placement dialysis cath under fluoro, before kidney tx; Surgeon:FLORENTINO KNIGHT; [facility]     LAPAROTOMY EXPLORATORY  5/19/2011    Procedure:LAPAROTOMY EXPLORATORY; washout of kidney and closure; Surgeon:FLORENTINO KNIGHT; [facility]     PERCUTANEOUS BIOPSY KIDNEY  6/2/2011    Procedure:PERCUTANEOUS BIOPSY KIDNEY; Surgeon:GIL WILLIAM; [facility]     PERCUTANEOUS BIOPSY KIDNEY Right 6/26/2019    Procedure: Right Kidney Biopsy;  Surgeon: Peter Briggs MD;   [facility]     PERCUTANEOUS BIOPSY KIDNEY Right 7/23/2019    Procedure: Right Kidney Biopsy;  Surgeon: Pro Menard MD;  [facility]     REMOVE CATHETER PERITONEAL  5/3/2012    Procedure:REMOVE CATHETER PERITONEAL; Removal Of Peritoneal Dialysis Catheter; Surgeon:FLORENTINO KNIGHT; [facility]     TRANSPLANT KIDNEY RECIPIENT LIVING RELATED  1995     TRANSPLANT KIDNEY RECIPIENT LIVING UNRELATED  5/18/2011    Procedure:TRANSPLANT KIDNEY RECIPIENT LIVING UNRELATED; Living unrelated left kidney transplant and placement of ureteral stent into transplant ureter.; Surgeon:FLORENTINO KNIGHT; [facility]     TRANSPLANT KIDNEY RECIPIENT LIVING UNRELATED  4/28/2012    Procedure:TRANSPLANT KIDNEY RECIPIENT LIVING UNRELATED; kidney transplant -   UNOS# [id number]  Organ arrival: 2100 4/27  Cross match results: 0200 4/28  Donor blood type: A  Recipient blood type: A; Surgeon:FLORENTINO KNIGHT; [facility]     Personal history of bleeding or anesthesia problems: No    Family History:  No family history on file.    Personal History:   Social History     Socioeconomic History     Marital status: Single     Spouse name: Not on file     Number of children: Not on file     Years of education: Not on file     Highest education level: Not on file   Occupational History     Not on file   Social Needs     Financial resource strain: Not on file     Food insecurity     Worry: Not on file     Inability: Not on file     Transportation needs     Medical: Not on file     Non-medical: Not on file   Tobacco Use     Smoking status: Never Smoker     Smokeless tobacco: Never Used     Tobacco comment: mother smokes outside   Substance and Sexual Activity     Alcohol use: No     Drug use: No     Sexual activity: Not on file   Lifestyle     Physical activity     Days per week: Not on file     Minutes per session: Not on file     Stress: Not on file   Relationships     Social connections     Talks on phone: Not on file      Gets together: Not on file     Attends [employer] service: Not on file     Active member of club or organization: Not on file     Attends meetings of clubs or organizations: Not on file     Relationship status: Not on file     Intimate partner violence     Fear of current or ex partner: Not on file     Emotionally abused: Not on file     Physically abused: Not on file     Forced sexual activity: Not on file   Other Topics Concern     Parent/sibling w/ CABG, MI or angioplasty before 65F 55M? Not Asked   Social History Narrative     Not on file       Allergies:  Allergies   Allergen Reactions     Amoxicillin Swelling     Azithromycin      Z pack - can't take with cyclosporine.     Vancomycin      Ruth syndrome     Cefprozil Rash       Medications:  Current Outpatient Medications   Medication Sig     amLODIPine (NORVASC) 10 MG tablet Take 1 tablet (10 mg) by mouth At Bedtime     aspirin (ASPIRIN ADULT LOW STRENGTH) 81 MG EC tablet Take 1 tablet by mouth daily.     calcitRIOL (ROCALTROL) 0.25 MCG capsule Take 1 capsule (0.25 mcg) by mouth daily     calcitRIOL (ROCALTROL) 0.25 MCG capsule Take 1 capsule (0.25 mcg) by mouth daily     carvedilol (COREG) 12.5 MG tablet Take 1 tablet (12.5 mg) by mouth 2 times daily (with meals)     cholecalciferol 2000 UNITS CAPS Take 2,000 Int'l Units by mouth daily     clonazePAM (KLONOPIN) 0.5 MG tablet Take 1 tablet (0.5 mg) by mouth 2 times daily as needed for anxiety     FLUoxetine (PROZAC) 20 MG capsule Take 40 mg by mouth daily      mycophenolate (GENERIC EQUIVALENT) 250 MG capsule Take 3 capsules (750 mg) by mouth 2 times daily     predniSONE (DELTASONE) 10 MG tablet TAKE 1/2 TABLET BY MOUTH ONCE DAILY     sodium bicarbonate 650 MG tablet Take 2 tablets (1,300 mg) by mouth 2 times daily     sulfamethoxazole-trimethoprim (BACTRIM) 400-80 MG tablet TAKE 1 TABLET BY MOUTH DAILY     tacrolimus (GENERIC EQUIVALENT) 0.5 MG capsule Take 3 capsules (1.5 mg) by mouth 2 times daily     No  "current facility-administered medications for this visit.        Exam:  {video visit exam brief selected:[id number]::\"GENERAL: Healthy, alert and no distress\",\"EYES: Eyes grossly normal to inspection.  No discharge or erythema, or obvious scleral/conjunctival abnormalities.\",\"RESP: No audible wheeze, cough, or visible cyanosis.  No visible retractions or increased work of breathing.  \",\"SKIN: Visible skin clear. No significant rash, abnormal pigmentation or lesions.\",\"NEURO: Cranial nerves grossly intact.  Mentation and speech appropriate for age.\",\"PSYCH: Mentation appears normal, affect normal/bright, judgement and insight intact, normal speech and appearance well-groomed.\"}            Again, thank you for allowing me to participate in the care of your patient.        Sincerely,        MASOOD    "

## 2020-11-08 ENCOUNTER — HEALTH MAINTENANCE LETTER (OUTPATIENT)
Age: 25
End: 2020-11-08

## 2020-11-09 ENCOUNTER — OFFICE VISIT (OUTPATIENT)
Dept: TRANSPLANT | Facility: CLINIC | Age: 25
End: 2020-11-09
Attending: SURGERY
Payer: MEDICAID

## 2020-11-09 ENCOUNTER — ANCILLARY PROCEDURE (OUTPATIENT)
Dept: ULTRASOUND IMAGING | Facility: CLINIC | Age: 25
End: 2020-11-09
Attending: CLINICAL NURSE SPECIALIST
Payer: MEDICAID

## 2020-11-09 VITALS
WEIGHT: 190.6 LBS | BODY MASS INDEX: 30.76 KG/M2 | OXYGEN SATURATION: 97 % | SYSTOLIC BLOOD PRESSURE: 174 MMHG | HEART RATE: 92 BPM | DIASTOLIC BLOOD PRESSURE: 106 MMHG

## 2020-11-09 DIAGNOSIS — N18.5 CKD (CHRONIC KIDNEY DISEASE) STAGE 5, GFR LESS THAN 15 ML/MIN (H): ICD-10-CM

## 2020-11-09 DIAGNOSIS — Z45.2 ENCOUNTER FOR ADJUSTMENT AND MANAGEMENT OF VASCULAR ACCESS DEVICE: ICD-10-CM

## 2020-11-09 DIAGNOSIS — N18.5 CKD (CHRONIC KIDNEY DISEASE) STAGE 5, GFR LESS THAN 15 ML/MIN (H): Primary | ICD-10-CM

## 2020-11-09 DIAGNOSIS — Z45.2 ADJUSTMENT AND MANAGEMENT OF VASCULAR ACCESS DEVICE: ICD-10-CM

## 2020-11-09 PROCEDURE — 93970 EXTREMITY STUDY: CPT | Mod: GC | Performed by: RADIOLOGY

## 2020-11-09 PROCEDURE — 93931 UPPER EXTREMITY STUDY: CPT | Mod: GC | Performed by: RADIOLOGY

## 2020-11-09 PROCEDURE — G0463 HOSPITAL OUTPT CLINIC VISIT: HCPCS

## 2020-11-09 PROCEDURE — 99203 OFFICE O/P NEW LOW 30 MIN: CPT | Mod: 95 | Performed by: SURGERY

## 2020-11-09 ASSESSMENT — PAIN SCALES - GENERAL: PAINLEVEL: NO PAIN (0)

## 2020-11-09 NOTE — PROGRESS NOTES
Dialysis Access Service  Consult Note    Referred by Dr. Culp for surgical consult of permanent dialysis access.    HPI: Mr. 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 S/P kidney transplant on 7/13/1995, 5/18/2011 and 4/28/2012. He has a history of PD, he is not interested in PD this time. He is right handed. Mr. Hoffman is not dialyzing at (Not currently on dialysis).      Risk factors for vascular access:         Yes No  Hx of CVC    [x]    []   Comment: right INTERNAL JUGULAR   Hx of PICC line         []     [x]  Comment:   Hx of Pacemaker    []     [x]  Comment:   History of failed access:  []         [x]  Comment:  SVC syndrome   []      [x]  Comment:  Heart Failure    []     [x]  EF:    Periph arterial disease  []     [x]  Comment:  Prior Fracture/Surgery  []     [x]  Location:   DVT    []    [x]   Location:  Diabetes    []        [x]  Comment:  Neuropathy   []     [x]  Comment:   Anticoagulation:   [x]    []  Agent: ASA 81 mg      Anticoagulation contraindication:[]  [x]     Details:                   Pediatric    []         [x]  Age:                  Hx of transplant   [x]    []  Comment:  kidney transplant on 7/13/1995, 5/18/2011 and 4/28/2012   Current immunosuppression [x]    []  Comment:  Tacrolimus, MMF and prednisone          ROS: 10 point ROS neg other than the symptoms noted above in the HPI.        Past Medical History:   Diagnosis Date     Anemia     in ESRD     Depression      History of blood transfusion      Hypertension      Kidney replaced by transplant      Peritoneal dialysis status (H)     in past     Renal failure      Sleep apnea        Past Surgical History:   Procedure Laterality Date     BIOPSY       CYSTOSCOPY, REMOVE STENT(S), COMBINED  5/25/2012    Procedure:COMBINED CYSTOSCOPY, REMOVE STENT(S); Cystoscopy, Removal Of Urinary Stent; Surgeon:FLORENTINO KNIGHT; [facility]      ENDARECTERECTOMY RENAL  5/18/2011    Procedure:ENDARECTERECTOMY RENAL; Exploration of Transplant Kidney, Back Table Flush, Biopsy of Kidney and Reanastamosis of Kidney; Surgeon:FLORENTINO KNIGHT; [facility]     EXPLANT TRANSPLANTED KIDNEY  7/12/2011    Procedure:EXPLANT TRANSPLANTED KIDNEY; Transplant Nephrectomy; Surgeon:FLORENTINO KNIGHT; [facility]     INSERT CATHETER HEMODIALYSIS  5/18/2011    Procedure:INSERT CATHETER HEMODIALYSIS; Double Lumen; Surgeon:JOE HE; [facility]     INSERT CATHETER PERITONEAL DIALYSIS  4/28/2012    Procedure:INSERT CATHETER PERITONEAL DIALYSIS; Placement dialysis cath under fluoro, before kidney tx; Surgeon:FLORENTINO KNIGHT; [facility]     LAPAROTOMY EXPLORATORY  5/19/2011    Procedure:LAPAROTOMY EXPLORATORY; washout of kidney and closure; Surgeon:FLORENTINO KNIGHT; [facility]     PERCUTANEOUS BIOPSY KIDNEY  6/2/2011    Procedure:PERCUTANEOUS BIOPSY KIDNEY; Surgeon:GIL WILLIAM; [facility]     PERCUTANEOUS BIOPSY KIDNEY Right 6/26/2019    Procedure: Right Kidney Biopsy;  Surgeon: Peter Briggs MD;  [facility]     PERCUTANEOUS BIOPSY KIDNEY Right 7/23/2019    Procedure: Right Kidney Biopsy;  Surgeon: Pro Menard MD;  [facility]     REMOVE CATHETER PERITONEAL  5/3/2012    Procedure:REMOVE CATHETER PERITONEAL; Removal Of Peritoneal Dialysis Catheter; Surgeon:FLORENTINO KNIGHT; [facility]     TRANSPLANT KIDNEY RECIPIENT LIVING RELATED  1995     TRANSPLANT KIDNEY RECIPIENT LIVING UNRELATED  5/18/2011    Procedure:TRANSPLANT KIDNEY RECIPIENT LIVING UNRELATED; Living unrelated left kidney transplant and placement of ureteral stent into transplant ureter.; Surgeon:FLORENTINO KNIGHT; [facility]     TRANSPLANT KIDNEY RECIPIENT LIVING UNRELATED  4/28/2012    Procedure:TRANSPLANT KIDNEY RECIPIENT LIVING UNRELATED; kidney transplant -   UNOS# [id number]  Organ arrival: 2100 4/27  Cross match results: 0200  4/28  Donor blood type: A  Recipient blood type: A; Surgeon:FLORENTINO KNIGHT; [facility]       No family history on file.    Social History     Tobacco Use     Smoking status: Never Smoker     Smokeless tobacco: Never Used     Tobacco comment: mother smokes outside   Substance Use Topics     Alcohol use: No         Current Outpatient Medications:      amLODIPine (NORVASC) 10 MG tablet, Take 1 tablet (10 mg) by mouth At Bedtime, Disp: 90 tablet, Rfl: 3     aspirin (ASPIRIN ADULT LOW STRENGTH) 81 MG EC tablet, Take 1 tablet by mouth daily., Disp: 90 tablet, Rfl: 3     calcitRIOL (ROCALTROL) 0.25 MCG capsule, Take 1 capsule (0.25 mcg) by mouth daily, Disp: 90 capsule, Rfl: 3     calcitRIOL (ROCALTROL) 0.25 MCG capsule, Take 1 capsule (0.25 mcg) by mouth daily, Disp: 30 capsule, Rfl: 11     carvedilol (COREG) 12.5 MG tablet, Take 1 tablet (12.5 mg) by mouth 2 times daily (with meals), Disp: 180 tablet, Rfl: 3     cholecalciferol 2000 UNITS CAPS, Take 2,000 Int'l Units by mouth daily, Disp: 30 capsule, Rfl:      FLUoxetine (PROZAC) 20 MG capsule, Take 40 mg by mouth daily , Disp: 90 capsule, Rfl: 0     mycophenolate (GENERIC EQUIVALENT) 250 MG capsule, Take 3 capsules (750 mg) by mouth 2 times daily, Disp: 180 capsule, Rfl: 11     predniSONE (DELTASONE) 10 MG tablet, TAKE 1/2 TABLET BY MOUTH ONCE DAILY, Disp: 15 tablet, Rfl: 11     sodium bicarbonate 650 MG tablet, Take 2 tablets (1,300 mg) by mouth 2 times daily, Disp: 120 tablet, Rfl: 11     sulfamethoxazole-trimethoprim (BACTRIM) 400-80 MG tablet, TAKE 1 TABLET BY MOUTH DAILY, Disp: 30 tablet, Rfl: 11     tacrolimus (GENERIC EQUIVALENT) 0.5 MG capsule, Take 3 capsules (1.5 mg) by mouth 2 times daily, Disp: 180 capsule, Rfl: 11     clonazePAM (KLONOPIN) 0.5 MG tablet, Take 1 tablet (0.5 mg) by mouth 2 times daily as needed for anxiety, Disp: 20 tablet, Rfl: 0                        PHYSICAL EXAM:  Pulse:  [92] 92  BP: (174)/(106) 174/106  SpO2:  [97 %] 97  %  Constitutional: healthy, alert and cooperative  HEENT: sclera anicteric, MMM, conjunctiva pink  Chest: HD catheter absent.  CV:  NSR  : No CVA tenderness  Abdomen: old incision from kidney transplant surgeries  Skin:  No rashes or jaundice  Neuro: normal gait  Psych: normal mood and affect  EXTREMITY EXAM:   Vein Exam: Obvious suitable veins?    Left arm: YES   []           NO  [x]       Comment:    Right arm: YES   [x]           NO  []       Comment:   Arterial Exam:   Radial   L: 3+ R: 3+   Ulnar   L: 3+;R: 3+  Patent Palmar arch  L: YES   []  NO   []       R: YES   []   NO   []        Capillary refill:  L: <2 sec, R:<2 sec    Sensory exam:   Left hand: Normal   [x]       Abnormal   []     Comment:    Right hand: Normal   [x]       Abnormal   []     Comment:     Motor exam normal:   Left hand: Normal   [x]       Abnormal   []     Comment:     Right hand: Normal   [x]       Abnormal   []     Comment:                       Mapping Reviewed:  Target vein: 7.0 mm right cephalic vein  Target artery brachial artery at the distal upper arm    Assessment & Plan: Mr. Hoffman is a excellent candidate for placement of permanent dialysis access.  I would recommend right brachial artery to cephalic vein AV fistula creation with intraoperative ultrasound under Local with MAC and Scalene Block.     The surgical risks and benefits were reviewed and questions were answered. We discussed the day of surgery plan, anesthesia, postop care, risk of infection, numbness, injury to surrounding structures, bleeding, thrombosis, steal syndrome, possible need for future angioplasty or surgical revision, as well as nonmaturation or need for site abandonment. This was contrasted with morbidity and mortality risk of long-term catheter based hemodialysis access. The patient does wish to proceed with surgery for permanent access creation at this time.  He would like to have pre op H & P, EKG, CXR and COVID 19 test locally prior to surgery.  The patient was counselled to contact our nurse coordinator, JAJA Correa CNS ([place]) at [phone] with any questions or concerns.  Thank you for the opportunity to participate in Mr. Hoffman's care.    ANTOINE Murray (Sum)  Dialysis Vascular Access/SOT Clinical Nurse Specialist    Solid Organ Transplant Service - [facility]   Phone # [phone]  Pager # [phone]    I have reviewed history, examined patient and discussed plan with the fellow/resident/KARLIE.  I concur with the findings in this note.       Risks of the surgical procedure including but not limited to the rare risk of mortality discussed in detail. Patient verbalized good understanding and had several pertinent questions which were answered satisfactorily.     Total time: 45 min  Counseling time: 25 min        .

## 2020-11-09 NOTE — LETTER
11/9/2020         RE: Jhoan Hoffman  [address] [place] [id number]      Dialysis Access Service  Consult Note    Referred by Dr. Culp for surgical consult of permanent dialysis access.    HPI: Mr. 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 S/P kidney transplant on 7/13/1995, 5/18/2011 and 4/28/2012. He has a history of PD, he is not interested in PD this time. He is right handed. Mr. Hoffman is not dialyzing at (Not currently on dialysis).      Risk factors for vascular access:         Yes No  Hx of CVC    [x]    []   Comment: right INTERNAL JUGULAR   Hx of PICC line         []     [x]  Comment:   Hx of Pacemaker    []     [x]  Comment:   History of failed access:  []         [x]  Comment:  SVC syndrome   []      [x]  Comment:  Heart Failure    []     [x]  EF:    Periph arterial disease  []     [x]  Comment:  Prior Fracture/Surgery  []     [x]  Location:   DVT    []    [x]   Location:  Diabetes    []        [x]  Comment:  Neuropathy   []     [x]  Comment:   Anticoagulation:   [x]    []  Agent: ASA 81 mg      Anticoagulation contraindication:[]  [x]     Details:                   Pediatric    []         [x]  Age:                  Hx of transplant   [x]    []  Comment:  kidney transplant on 7/13/1995, 5/18/2011 and 4/28/2012   Current immunosuppression [x]    []  Comment:  Tacrolimus, MMF and prednisone          ROS: 10 point ROS neg other than the symptoms noted above in the HPI.        Past Medical History:   Diagnosis Date     Anemia     in ESRD     Depression      History of blood transfusion      Hypertension      Kidney replaced by transplant      Peritoneal dialysis status (H)     in past     Renal failure      Sleep apnea        Past Surgical History:   Procedure Laterality Date     BIOPSY       CYSTOSCOPY, REMOVE STENT(S), COMBINED  5/25/2012    Procedure:COMBINED CYSTOSCOPY, REMOVE STENT(S); Cystoscopy,  Removal Of Urinary Stent; Surgeon:FLORENTINO KNIGHT; [facility]     ENDARECTERECTOMY RENAL  5/18/2011    Procedure:ENDARECTERECTOMY RENAL; Exploration of Transplant Kidney, Back Table Flush, Biopsy of Kidney and Reanastamosis of Kidney; Surgeon:FLORENTINO KNIGHT; [facility]     EXPLANT TRANSPLANTED KIDNEY  7/12/2011    Procedure:EXPLANT TRANSPLANTED KIDNEY; Transplant Nephrectomy; Surgeon:FLORENTINO KNIGHT; [facility]     INSERT CATHETER HEMODIALYSIS  5/18/2011    Procedure:INSERT CATHETER HEMODIALYSIS; Double Lumen; Surgeon:JOE HE; [facility]     INSERT CATHETER PERITONEAL DIALYSIS  4/28/2012    Procedure:INSERT CATHETER PERITONEAL DIALYSIS; Placement dialysis cath under fluoro, before kidney tx; Surgeon:FLORENTINO KNIGHT; [facility]     LAPAROTOMY EXPLORATORY  5/19/2011    Procedure:LAPAROTOMY EXPLORATORY; washout of kidney and closure; Surgeon:FLORENTINO KNIGHT; [facility]     PERCUTANEOUS BIOPSY KIDNEY  6/2/2011    Procedure:PERCUTANEOUS BIOPSY KIDNEY; Surgeon:GIL WILLIAM; [facility]     PERCUTANEOUS BIOPSY KIDNEY Right 6/26/2019    Procedure: Right Kidney Biopsy;  Surgeon: Peter Briggs MD;  [facility]     PERCUTANEOUS BIOPSY KIDNEY Right 7/23/2019    Procedure: Right Kidney Biopsy;  Surgeon: Pro Menard MD;  [facility]     REMOVE CATHETER PERITONEAL  5/3/2012    Procedure:REMOVE CATHETER PERITONEAL; Removal Of Peritoneal Dialysis Catheter; Surgeon:FLORENTINO KNIGHT; [facility]     TRANSPLANT KIDNEY RECIPIENT LIVING RELATED  1995     TRANSPLANT KIDNEY RECIPIENT LIVING UNRELATED  5/18/2011    Procedure:TRANSPLANT KIDNEY RECIPIENT LIVING UNRELATED; Living unrelated left kidney transplant and placement of ureteral stent into transplant ureter.; Surgeon:FLORENTINO KNIGHT; [facility]     TRANSPLANT KIDNEY RECIPIENT LIVING UNRELATED  4/28/2012    Procedure:TRANSPLANT KIDNEY RECIPIENT LIVING UNRELATED; kidney transplant -    UNOS# [id number]  Organ arrival: 2100 4/27  Cross match results: 0200 4/28  Donor blood type: A  Recipient blood type: A; Surgeon:FLORENTINO KNIGHT; [facility]       No family history on file.    Social History     Tobacco Use     Smoking status: Never Smoker     Smokeless tobacco: Never Used     Tobacco comment: mother smokes outside   Substance Use Topics     Alcohol use: No         Current Outpatient Medications:      amLODIPine (NORVASC) 10 MG tablet, Take 1 tablet (10 mg) by mouth At Bedtime, Disp: 90 tablet, Rfl: 3     aspirin (ASPIRIN ADULT LOW STRENGTH) 81 MG EC tablet, Take 1 tablet by mouth daily., Disp: 90 tablet, Rfl: 3     calcitRIOL (ROCALTROL) 0.25 MCG capsule, Take 1 capsule (0.25 mcg) by mouth daily, Disp: 90 capsule, Rfl: 3     calcitRIOL (ROCALTROL) 0.25 MCG capsule, Take 1 capsule (0.25 mcg) by mouth daily, Disp: 30 capsule, Rfl: 11     carvedilol (COREG) 12.5 MG tablet, Take 1 tablet (12.5 mg) by mouth 2 times daily (with meals), Disp: 180 tablet, Rfl: 3     cholecalciferol 2000 UNITS CAPS, Take 2,000 Int'l Units by mouth daily, Disp: 30 capsule, Rfl:      FLUoxetine (PROZAC) 20 MG capsule, Take 40 mg by mouth daily , Disp: 90 capsule, Rfl: 0     mycophenolate (GENERIC EQUIVALENT) 250 MG capsule, Take 3 capsules (750 mg) by mouth 2 times daily, Disp: 180 capsule, Rfl: 11     predniSONE (DELTASONE) 10 MG tablet, TAKE 1/2 TABLET BY MOUTH ONCE DAILY, Disp: 15 tablet, Rfl: 11     sodium bicarbonate 650 MG tablet, Take 2 tablets (1,300 mg) by mouth 2 times daily, Disp: 120 tablet, Rfl: 11     sulfamethoxazole-trimethoprim (BACTRIM) 400-80 MG tablet, TAKE 1 TABLET BY MOUTH DAILY, Disp: 30 tablet, Rfl: 11     tacrolimus (GENERIC EQUIVALENT) 0.5 MG capsule, Take 3 capsules (1.5 mg) by mouth 2 times daily, Disp: 180 capsule, Rfl: 11     clonazePAM (KLONOPIN) 0.5 MG tablet, Take 1 tablet (0.5 mg) by mouth 2 times daily as needed for anxiety, Disp: 20 tablet, Rfl: 0                        PHYSICAL  EXAM:  Pulse:  [92] 92  BP: (174)/(106) 174/106  SpO2:  [97 %] 97 %  Constitutional: healthy, alert and cooperative  HEENT: sclera anicteric, MMM, conjunctiva pink  Chest: HD catheter absent.  CV:  NSR  : No CVA tenderness  Abdomen: old incision from kidney transplant surgeries  Skin:  No rashes or jaundice  Neuro: normal gait  Psych: normal mood and affect  EXTREMITY EXAM:   Vein Exam: Obvious suitable veins?    Left arm: YES   []           NO  [x]       Comment:    Right arm: YES   [x]           NO  []       Comment:   Arterial Exam:   Radial   L: 3+ R: 3+   Ulnar   L: 3+;R: 3+  Patent Palmar arch  L: YES   []  NO   []       R: YES   []   NO   []        Capillary refill:  L: <2 sec, R:<2 sec    Sensory exam:   Left hand: Normal   [x]       Abnormal   []     Comment:    Right hand: Normal   [x]       Abnormal   []     Comment:     Motor exam normal:   Left hand: Normal   [x]       Abnormal   []     Comment:     Right hand: Normal   [x]       Abnormal   []     Comment:                       Mapping Reviewed:  Target vein: 7.0 mm right cephalic vein  Target artery brachial artery at the distal upper arm    Assessment & Plan: Mr. Hoffman is a excellent candidate for placement of permanent dialysis access.  I would recommend right brachial artery to cephalic vein AV fistula creation with intraoperative ultrasound under Local with MAC and Scalene Block.     The surgical risks and benefits were reviewed and questions were answered. We discussed the day of surgery plan, anesthesia, postop care, risk of infection, numbness, injury to surrounding structures, bleeding, thrombosis, steal syndrome, possible need for future angioplasty or surgical revision, as well as nonmaturation or need for site abandonment. This was contrasted with morbidity and mortality risk of long-term catheter based hemodialysis access. The patient does wish to proceed with surgery for permanent access creation at this time.  He would like to have pre  op H & P, EKG, CXR and COVID 19 test locally prior to surgery. The patient was counselled to contact our nurse coordinator, JAJA Correa CNS (Sum) at [phone] with any questions or concerns.  Thank you for the opportunity to participate in Mr. Hoffman's care.    ANOTINE Murray (Sum)  Dialysis Vascular Access/SOT Clinical Nurse Specialist    Solid Organ Transplant Service - [facility]   Phone # [phone]  Pager # [phone]    I have reviewed history, examined patient and discussed plan with the fellow/resident/KARLIE.  I concur with the findings in this note.       Risks of the surgical procedure including but not limited to the rare risk of mortality discussed in detail. Patient verbalized good understanding and had several pertinent questions which were answered satisfactorily.     Total time: 45 min  Counseling time: 25 min        .

## 2020-11-09 NOTE — NURSING NOTE
Chief Complaint   Patient presents with     Consult     Vascular Access     Blood pressure (!) 174/106, pulse 92, weight 86.5 kg (190 lb 9.6 oz), SpO2 97 %.    Karina Hatch, CMA

## 2020-11-10 ENCOUNTER — PATIENT OUTREACH (OUTPATIENT)
Dept: NEPHROLOGY | Facility: CLINIC | Age: 25
End: 2020-11-10

## 2020-11-10 NOTE — PROGRESS NOTES
Capri, manager from [facility] reports that they are full currently with dialysis patients due to lack of staffing. They are in the process of training more RNs and will notify this writer if/when something opens up. Jhoan does not have an AVF at this time yet, so possibly could work by the time AVF has matured. She recommends this writer calls  [facility].      Left message for Echo Peck at [facility] to return call.

## 2020-11-11 NOTE — PROGRESS NOTES
Echo from [facility] in [place] returned message stating they are full, as well, but could put him on a wait-list, if he is interested in that. Will contact Jhoan by phone if no response to [email or web address] message soon.

## 2020-11-12 ENCOUNTER — PREP FOR PROCEDURE (OUTPATIENT)
Dept: TRANSPLANT | Facility: CLINIC | Age: 25
End: 2020-11-12

## 2020-11-12 DIAGNOSIS — N18.5 CHRONIC KIDNEY DISEASE, STAGE 5, KIDNEY FAILURE (H): Primary | ICD-10-CM

## 2020-11-12 DIAGNOSIS — N18.6 ENCOUNTER REGARDING VASCULAR ACCESS FOR DIALYSIS FOR ESRD (H): ICD-10-CM

## 2020-11-12 DIAGNOSIS — Z99.2 ENCOUNTER REGARDING VASCULAR ACCESS FOR DIALYSIS FOR ESRD (H): ICD-10-CM

## 2020-11-13 ENCOUNTER — PATIENT OUTREACH (OUTPATIENT)
Dept: NEPHROLOGY | Facility: CLINIC | Age: 25
End: 2020-11-13

## 2020-11-13 NOTE — PROGRESS NOTES
Jhoan had not read the [employer] message sent regarding dialysis options.    Spoke to him today. Said he would prefer to go on wait-list at [facility]. Called Echo Peck at [phone] x [phone] to request he be put on wait-list. Left Echo a message and will fax over ADT admissions sheet for wait-list to [phone]. Asked that she call this writer to confirm.

## 2020-11-14 NOTE — TELEPHONE ENCOUNTER
Called patient to schedule reschedule PKE clinic. Reviewed equipment needed for virtual visits. Patient states they do not have equipment for virtual visits and would prefer in person appointments. Reviewed schedule for evaluation day including 730am arrival followed by appts with Surgery, Nephrology, SW and Dietician. Informed patient that evaluation clinic will last 3-4hours and we may schedule additional testing (ie labs and imaging) following appointment. Patient is active in [employer]. Informed patient we would send information about appointments via [employer] week prior to clinic and Transplant coordinator will call to review education.

## 2020-11-18 ENCOUNTER — TELEPHONE (OUTPATIENT)
Dept: TRANSPLANT | Facility: CLINIC | Age: 25
End: 2020-11-18

## 2020-11-18 DIAGNOSIS — Z11.59 ENCOUNTER FOR SCREENING FOR OTHER VIRAL DISEASES: Primary | ICD-10-CM

## 2020-11-18 PROCEDURE — 80197 ASSAY OF TACROLIMUS: CPT | Performed by: INTERNAL MEDICINE

## 2020-11-18 NOTE — TELEPHONE ENCOUNTER
Issue:  Due for every 2 week labs.    Outcome:  Jhoan reports he just had labs drawn. He is still feeling well. No uremic symptoms. Reports he has he PKE classes at the end of November.  He will continue with every 2 week labs, and call SOT if he begins to develop any uremic symptoms.

## 2020-11-19 ENCOUNTER — TELEPHONE (OUTPATIENT)
Dept: TRANSPLANT | Facility: CLINIC | Age: 25
End: 2020-11-19

## 2020-11-19 DIAGNOSIS — Z48.298 AFTERCARE FOLLOWING ORGAN TRANSPLANT: ICD-10-CM

## 2020-11-19 DIAGNOSIS — Z94.0 KIDNEY REPLACED BY TRANSPLANT: Primary | ICD-10-CM

## 2020-11-19 DIAGNOSIS — Z79.899 ENCOUNTER FOR LONG-TERM CURRENT USE OF MEDICATION: ICD-10-CM

## 2020-11-19 LAB
TACROLIMUS BLD-MCNC: 4.3 UG/L (ref 5–15)
TME LAST DOSE: ABNORMAL H

## 2020-11-19 NOTE — TELEPHONE ENCOUNTER
Issue:  Creatinine up to 8.27  Bicarb 16    Outcome:  Jhoan reports feeling well. He denies any nausea/vomiting/swelling/alterred mental status. He reports some mild fatigue, but it does not stop him from going about his normal day.   He is only taking his bicarb 650 mg BID, rather than prescribed 1300 mg BID. He will increase it today.  He has his fistula surgery planned for 12/29.  Jhoan is aware he may need to start HD before that. Message sent to provider and nephrology RN.  Labs ordered for next Wednesday.

## 2020-11-19 NOTE — LETTER
PHYSICIAN ORDERS      DATE & TIME ISSUED: 2020 9:03 AM  PATIENT NAME: Jhoan Hoffman   : 1995     [facility] MR# [if applicable]: [id number]     DIAGNOSIS:  kidney transplant  ICD-10 CODE: Z94.0     Please obtain the following labs weekly:   -BMP   -Phosphorus   -CBC   -Tacrolimus drug level (12 hour trough)    Any questions please call: [phone]    Please fax results to [phone]      .

## 2020-11-23 ENCOUNTER — TELEPHONE (OUTPATIENT)
Dept: TRANSPLANT | Facility: CLINIC | Age: 25
End: 2020-11-23

## 2020-11-23 NOTE — TELEPHONE ENCOUNTER
Spoke with patient. Reminded patient of upcoming appointments 11/30/20. Informed patient of updated [facility] policy of no visitors accompanying patient on outpatient appointments. Patient stated he will still attend appointment. Requested 7:30 start time. Patient instructed to take regularly scheduled medications and eat breakfast.  Patient answered NO to all Covid-19 Screening Tool Questions. Requested to call and reschedule appointment if answers changed to YES prior to appointment. Patient stated understanding.  Patient stated he has contact numbers.

## 2020-11-27 ENCOUNTER — DOCUMENTATION ONLY (OUTPATIENT)
Dept: TRANSPLANT | Facility: CLINIC | Age: 25
End: 2020-11-27

## 2020-11-27 NOTE — PROGRESS NOTES
"I /Zenobia Goldberg RN BSN called Jhoan Hoffman to review the educational materials.    Email to Ann-Marie Marquez and Suzie for BALDO and KDPI form to be signed.     Kidney Transplant Referral - 12/6/2019  Jhoan Hoffman attended the pre-transplant patient education class today. The [facility] website pre-transplant modules were viewed; class participants were educated on using the site.     Content reviewed:    Living Donation and how to access that program    Paired exchange    Kidney Donor Profile Index (KDPI)    Waiting list issues (right to decline without penalty, high PHS risk donors, what to expect when called with an offer)    Hospital experience,  length of stay , need to stay locally post-discharge (2-4 weeks)    Surgical options (with pictures)                             Post-surgery lifting and driving restrictions    Post-transplant routines, frequency of lab work and clinic visits    Need to stay locally post-discharge (2-4 weeks)    Role of Transplant Coordinator    Participants were informed of the benefits of transplant as well as potential risks such as infection, cancer, and death.  The need for total adherence with immunosuppression medications and following transplant regimens was stressed.  The overall evaluation/approval/listing process was reviewed.        The patient was provided with the following documents:  What You Need to Know About a Kidney Transplant  Adult Kidney Transplant - A Guide for Patients  SRTR Data Sheet - Kidney  Brochure - Kidney Allocation  Brochure - Multiple Listing and Waiting Time Transfer  What Every Patient Needs to Know (UNOS)  UNOS Facts and Figures  Finding a Donor  [facility] - Quick Start Guide  KDPI Consent  Receipt of Information form    Jhoan Hoffman has NOT viewed [facility] videos.   Jhoan did not sign  Receipt of Information for Organ Transplant Recipient.\" or KDPI.  He was provided Alma Swanson RN BSN name and Phone " number to call with additional questions.  Pt Understands.     PLAN   Venkat Jacobsen and Suzie,    Please have Mr. Jhoan Hoffman sign these two forms.   Pt does NOT have computer access or address to sign DocSign in the mail     He needs to see the [facility] 15 videos Monday 11/30/2020    Alma He should have the maroon folder mailed to him with the educational materials. He is unsure if he received in the mail.  IF NOT he is to call Alma.

## 2020-11-29 DIAGNOSIS — Z11.59 ENCOUNTER FOR SCREENING FOR OTHER VIRAL DISEASES: Primary | ICD-10-CM

## 2020-12-03 ENCOUNTER — TELEPHONE (OUTPATIENT)
Dept: NEPHROLOGY | Facility: CLINIC | Age: 25
End: 2020-12-03

## 2020-12-03 ENCOUNTER — TELEPHONE (OUTPATIENT)
Dept: TRANSPLANT | Facility: CLINIC | Age: 25
End: 2020-12-03

## 2020-12-03 NOTE — TELEPHONE ENCOUNTER
Spoke with Capri, manager at the [facility] dialysis facility. She reports that their dialysis center is currently full, but they are currently training 3 more staff members. She took Jhoan's name to put him on the list in case of any openings. Recommended this writer follow up with her in another month or so.    Will notify patient of possibility to dialyze there, as [facility] in that area are all full and no wait-list.    Notified Jhoan. He prefers [place] over [place] which would be similar distance away from him, if he cannot get into the [facility] in [place].    Reports feeling fine and denies symptoms of uremia.

## 2020-12-03 NOTE — TELEPHONE ENCOUNTER
ISSUE:  Missed PKE on 11/30.  Needs labs for close monitoring of failing kidney.    OUTCOME:  Jhoan reports he slept in on 11/30 and was not able to get to here on time for appointments. He is getting internet at his home this week and would like to be able to do PKE virtually. Message sent to pre-transplant coordinator.  Jhoan reports feeling well. No uremic s/s. He confirmed he has increased his sodium bicarb to 1300 mg BID. He will have labs drawn next week.  Jhoan is aware of his fistula appointments on 12/17. He reports he already has a ride set-up and will not miss the appointments.

## 2020-12-14 ENCOUNTER — TELEPHONE (OUTPATIENT)
Dept: NEPHROLOGY | Facility: CLINIC | Age: 25
End: 2020-12-14

## 2020-12-14 NOTE — TELEPHONE ENCOUNTER
Health Call Center    Phone Message    May a detailed message be left on voicemail: yes     Reason for Call: Other: Patient is calling to discuss to Covid options  patient states there is only one place to get a covid test in [place], [place] and will not receive results back in enough time for his procedure on 12/17/20. Patient did a covid test done on 12/10/20 and the results were negative. Patient is wondering if the results he received will be acceptable. Please call patient to advise.    Action Taken: Message routed to:  [facility] ([facility]): burt gordillo    Travel Screening: Not Applicable

## 2020-12-14 NOTE — TELEPHONE ENCOUNTER
Returned a call to Jhoan regarding a COVID swab prior to his fistula placement on 12/17. Jhoan reports he had a COVID swab on 12/10 and wants to know if that can be used. He states the only local place doing swabs cannot guarantee results in 96 hours. He reports he does not have a ride to [place] to go to another facility. He cannot drive down to [place] on 12/16 to be swabbed and stay overnight before procedure.  I let Jhoan now the swab done on 12/10 cannot be used for his 12/17 procedure.  Called [facility] in [place]. They state that pre-procedural swabs are often results in 72 hours, but not guaranteed. Order faxed for pre-procedure COVID swab. Jhoan states he will leave for the lab now and try to have the swab done before they close at 4:00.   If results are not pending by Thursday, surgery will have to be rescheduled.

## 2020-12-14 NOTE — LETTER
PHYSICIAN ORDERS      DATE & TIME ISSUED: 2020 3:40 PM  PATIENT NAME: Jhoan Hoffman   : 1995     [facility] MR# [if applicable]: [id number]     DIAGNOSIS:  kidney transplant  ICD-10 CODE: Z94.0     Please collect a pre-surgical COVID swab on 20. The swab is needed for a surgical procedure on 20. Please fax results as soon as they become available.      Any questions please call: [phone]    Please fax results to [phone]      .

## 2020-12-15 DIAGNOSIS — Z45.2 ENCOUNTER FOR ADJUSTMENT AND MANAGEMENT OF VASCULAR ACCESS DEVICE: Primary | ICD-10-CM

## 2020-12-15 DIAGNOSIS — N18.6 ESRD (END STAGE RENAL DISEASE) (H): ICD-10-CM

## 2020-12-15 RX ORDER — CLINDAMYCIN PHOSPHATE 900 MG/50ML
900 INJECTION, SOLUTION INTRAVENOUS
Status: CANCELLED | OUTPATIENT
Start: 2020-12-17

## 2020-12-16 ENCOUNTER — ANESTHESIA EVENT (OUTPATIENT)
Dept: SURGERY | Facility: CLINIC | Age: 25
DRG: 673 | End: 2020-12-16
Payer: MEDICARE

## 2020-12-16 ENCOUNTER — TELEPHONE (OUTPATIENT)
Dept: TRANSPLANT | Facility: CLINIC | Age: 25
End: 2020-12-16

## 2020-12-17 ENCOUNTER — APPOINTMENT (OUTPATIENT)
Dept: GENERAL RADIOLOGY | Facility: CLINIC | Age: 25
DRG: 673 | End: 2020-12-17
Attending: SURGERY
Payer: MEDICARE

## 2020-12-17 ENCOUNTER — ANESTHESIA (OUTPATIENT)
Dept: SURGERY | Facility: CLINIC | Age: 25
DRG: 673 | End: 2020-12-17
Payer: MEDICARE

## 2020-12-17 ENCOUNTER — HOSPITAL ENCOUNTER (INPATIENT)
Facility: CLINIC | Age: 25
LOS: 4 days | Discharge: HOME | DRG: 673 | End: 2020-12-21
Attending: SURGERY | Admitting: INTERNAL MEDICINE
Payer: MEDICARE

## 2020-12-17 ENCOUNTER — PATIENT OUTREACH (OUTPATIENT)
Dept: NEPHROLOGY | Facility: CLINIC | Age: 25
End: 2020-12-17

## 2020-12-17 DIAGNOSIS — Z94.0 S/P KIDNEY TRANSPLANT: ICD-10-CM

## 2020-12-17 DIAGNOSIS — I15.1 HTN, KIDNEY TRANSPLANT RELATED: ICD-10-CM

## 2020-12-17 DIAGNOSIS — Z94.0 HTN, KIDNEY TRANSPLANT RELATED: ICD-10-CM

## 2020-12-17 DIAGNOSIS — N18.5 CHRONIC KIDNEY DISEASE, STAGE 5, KIDNEY FAILURE (H): ICD-10-CM

## 2020-12-17 DIAGNOSIS — Z94.0 KIDNEY REPLACED BY TRANSPLANT: ICD-10-CM

## 2020-12-17 DIAGNOSIS — N18.6 ESRD (END STAGE RENAL DISEASE) (H): ICD-10-CM

## 2020-12-17 DIAGNOSIS — Z99.2 ENCOUNTER REGARDING VASCULAR ACCESS FOR DIALYSIS FOR ESRD (H): ICD-10-CM

## 2020-12-17 DIAGNOSIS — Z45.2 ENCOUNTER FOR ADJUSTMENT AND MANAGEMENT OF VASCULAR ACCESS DEVICE: ICD-10-CM

## 2020-12-17 DIAGNOSIS — N18.6 ENCOUNTER REGARDING VASCULAR ACCESS FOR DIALYSIS FOR ESRD (H): ICD-10-CM

## 2020-12-17 LAB
ABO + RH BLD: NORMAL
ABO + RH BLD: NORMAL
ANION GAP SERPL CALCULATED.3IONS-SCNC: 13 MMOL/L (ref 3–14)
APTT PPP: 29 SEC (ref 22–37)
BASOPHILS # BLD AUTO: 0.1 10E9/L (ref 0–0.2)
BASOPHILS NFR BLD AUTO: 0.7 %
BLD GP AB SCN SERPL QL: NORMAL
BLD PROD TYP BPU: NORMAL
BLOOD BANK CMNT PATIENT-IMP: NORMAL
BUN SERPL-MCNC: 93 MG/DL (ref 7–30)
CALCIUM SERPL-MCNC: 8.6 MG/DL (ref 8.5–10.1)
CHLORIDE SERPL-SCNC: 111 MMOL/L (ref 94–109)
CO2 SERPL-SCNC: 14 MMOL/L (ref 20–32)
CREAT SERPL-MCNC: 9.48 MG/DL (ref 0.66–1.25)
CREAT SERPL-MCNC: 9.55 MG/DL (ref 0.66–1.25)
DIFFERENTIAL METHOD BLD: ABNORMAL
EOSINOPHIL # BLD AUTO: 0.4 10E9/L (ref 0–0.7)
EOSINOPHIL NFR BLD AUTO: 3.4 %
ERYTHROCYTE [DISTWIDTH] IN BLOOD BY AUTOMATED COUNT: 13.5 % (ref 10–15)
GFR SERPL CREATININE-BSD FRML MDRD: 7 ML/MIN/{1.73_M2}
GFR SERPL CREATININE-BSD FRML MDRD: 7 ML/MIN/{1.73_M2}
GLUCOSE BLDC GLUCOMTR-MCNC: 89 MG/DL (ref 70–99)
GLUCOSE SERPL-MCNC: 94 MG/DL (ref 70–99)
GLUCOSE SERPL-MCNC: 96 MG/DL (ref 70–99)
HCT VFR BLD AUTO: 30.3 % (ref 40–53)
HGB BLD-MCNC: 9.7 G/DL (ref 13.3–17.7)
IMM GRANULOCYTES # BLD: 0.1 10E9/L (ref 0–0.4)
IMM GRANULOCYTES NFR BLD: 0.6 %
INR PPP: 1.02 (ref 0.86–1.14)
LABORATORY COMMENT REPORT: NORMAL
LYMPHOCYTES # BLD AUTO: 2.6 10E9/L (ref 0.8–5.3)
LYMPHOCYTES NFR BLD AUTO: 23.9 %
MCH RBC QN AUTO: 28.4 PG (ref 26.5–33)
MCHC RBC AUTO-ENTMCNC: 32 G/DL (ref 31.5–36.5)
MCV RBC AUTO: 89 FL (ref 78–100)
MONOCYTES # BLD AUTO: 1 10E9/L (ref 0–1.3)
MONOCYTES NFR BLD AUTO: 9.7 %
NEUTROPHILS # BLD AUTO: 6.6 10E9/L (ref 1.6–8.3)
NEUTROPHILS NFR BLD AUTO: 61.7 %
NRBC # BLD AUTO: 0 10*3/UL
NRBC BLD AUTO-RTO: 0 /100
NUM BPU REQUESTED: 2
PLATELET # BLD AUTO: 171 10E9/L (ref 150–450)
POTASSIUM SERPL-SCNC: 4.4 MMOL/L (ref 3.4–5.3)
POTASSIUM SERPL-SCNC: 4.5 MMOL/L (ref 3.4–5.3)
RBC # BLD AUTO: 3.42 10E12/L (ref 4.4–5.9)
SARS-COV-2 RNA SPEC QL NAA+PROBE: NEGATIVE
SARS-COV-2 RNA SPEC QL NAA+PROBE: NORMAL
SODIUM SERPL-SCNC: 138 MMOL/L (ref 133–144)
SPECIMEN EXP DATE BLD: NORMAL
SPECIMEN SOURCE: NORMAL
SPECIMEN SOURCE: NORMAL
WBC # BLD AUTO: 10.7 10E9/L (ref 4–11)

## 2020-12-17 PROCEDURE — 86900 BLOOD TYPING SEROLOGIC ABO: CPT | Performed by: ANESTHESIOLOGY

## 2020-12-17 PROCEDURE — U0003 INFECTIOUS AGENT DETECTION BY NUCLEIC ACID (DNA OR RNA); SEVERE ACUTE RESPIRATORY SYNDROME CORONAVIRUS 2 (SARS-COV-2) (CORONAVIRUS DISEASE [COVID-19]), AMPLIFIED PROBE TECHNIQUE, MAKING USE OF HIGH THROUGHPUT TECHNOLOGIES AS DESCRIBED BY CMS-2020-01-R: HCPCS | Performed by: PHYSICIAN ASSISTANT

## 2020-12-17 PROCEDURE — 370N000002 HC ANESTHESIA TECHNICAL FEE, EACH ADDTL 15 MIN: Performed by: SURGERY

## 2020-12-17 PROCEDURE — 360N000022 HC SURGERY LEVEL 3 1ST 30 MIN - UMMC: Performed by: SURGERY

## 2020-12-17 PROCEDURE — 250N000013 HC RX MED GY IP 250 OP 250 PS 637: Performed by: SURGERY

## 2020-12-17 PROCEDURE — 250N000011 HC RX IP 250 OP 636: Performed by: NURSE PRACTITIONER

## 2020-12-17 PROCEDURE — 250N000011 HC RX IP 250 OP 636: Performed by: SURGERY

## 2020-12-17 PROCEDURE — 250N000013 HC RX MED GY IP 250 OP 250 PS 637: Performed by: NURSE PRACTITIONER

## 2020-12-17 PROCEDURE — 85610 PROTHROMBIN TIME: CPT | Performed by: SURGERY

## 2020-12-17 PROCEDURE — 86922 COMPATIBILITY TEST ANTIGLOB: CPT | Performed by: ANESTHESIOLOGY

## 2020-12-17 PROCEDURE — 999N001017 HC STATISTIC GLUCOSE BY METER IP

## 2020-12-17 PROCEDURE — 86901 BLOOD TYPING SEROLOGIC RH(D): CPT | Performed by: ANESTHESIOLOGY

## 2020-12-17 PROCEDURE — 250N000013 HC RX MED GY IP 250 OP 250 PS 637: Performed by: PHYSICIAN ASSISTANT

## 2020-12-17 PROCEDURE — 99207 PR APP CREDIT; MD BILLING SHARED VISIT: CPT | Performed by: PHYSICIAN ASSISTANT

## 2020-12-17 PROCEDURE — 73090 X-RAY EXAM OF FOREARM: CPT | Mod: 26 | Performed by: RADIOLOGY

## 2020-12-17 PROCEDURE — 99222 1ST HOSP IP/OBS MODERATE 55: CPT | Mod: AI | Performed by: INTERNAL MEDICINE

## 2020-12-17 PROCEDURE — 250N000012 HC RX MED GY IP 250 OP 636 PS 637: Performed by: PHYSICIAN ASSISTANT

## 2020-12-17 PROCEDURE — 86902 BLOOD TYPE ANTIGEN DONOR EA: CPT | Performed by: ANESTHESIOLOGY

## 2020-12-17 PROCEDURE — 36415 COLL VENOUS BLD VENIPUNCTURE: CPT | Performed by: SURGERY

## 2020-12-17 PROCEDURE — 84132 ASSAY OF SERUM POTASSIUM: CPT | Performed by: CLINICAL NURSE SPECIALIST

## 2020-12-17 PROCEDURE — 761N000003 HC RECOVERY PHASE 1 LEVEL 2 FIRST HR: Performed by: SURGERY

## 2020-12-17 PROCEDURE — 999N000063 XR FOREARM PORT LT 2 VW: Mod: LT

## 2020-12-17 PROCEDURE — 86850 RBC ANTIBODY SCREEN: CPT | Performed by: ANESTHESIOLOGY

## 2020-12-17 PROCEDURE — 370N000001 HC ANESTHESIA TECHNICAL FEE, 1ST 30 MIN: Performed by: SURGERY

## 2020-12-17 PROCEDURE — 258N000003 HC RX IP 258 OP 636: Performed by: SURGERY

## 2020-12-17 PROCEDURE — 250N000003 HC SEVOFLURANE, EA 15 MIN: Performed by: SURGERY

## 2020-12-17 PROCEDURE — 761N000004 HC RECOVERY PHASE 1 LEVEL 2 EA ADDTL HR: Performed by: SURGERY

## 2020-12-17 PROCEDURE — 85730 THROMBOPLASTIN TIME PARTIAL: CPT | Performed by: SURGERY

## 2020-12-17 PROCEDURE — 82565 ASSAY OF CREATININE: CPT | Performed by: SURGERY

## 2020-12-17 PROCEDURE — 250N000013 HC RX MED GY IP 250 OP 250 PS 637: Performed by: ANESTHESIOLOGY

## 2020-12-17 PROCEDURE — 76998 US GUIDE INTRAOP: CPT | Mod: 26 | Performed by: SURGERY

## 2020-12-17 PROCEDURE — 250N000009 HC RX 250: Performed by: NURSE ANESTHETIST, CERTIFIED REGISTERED

## 2020-12-17 PROCEDURE — 999N000139 HC STATISTIC PRE-PROCEDURE ASSESSMENT II: Performed by: SURGERY

## 2020-12-17 PROCEDURE — 250N000011 HC RX IP 250 OP 636: Performed by: NURSE ANESTHETIST, CERTIFIED REGISTERED

## 2020-12-17 PROCEDURE — 36821 AV FUSION DIRECT ANY SITE: CPT | Mod: 82 | Performed by: SURGERY

## 2020-12-17 PROCEDURE — 85025 COMPLETE CBC W/AUTO DIFF WBC: CPT | Performed by: SURGERY

## 2020-12-17 PROCEDURE — 80048 BASIC METABOLIC PNL TOTAL CA: CPT | Performed by: PHYSICIAN ASSISTANT

## 2020-12-17 PROCEDURE — 360N000023 HC SURGERY LEVEL 3 EA 15 ADDTL MIN UMMC: Performed by: SURGERY

## 2020-12-17 PROCEDURE — 250N000009 HC RX 250: Performed by: CLINICAL NURSE SPECIALIST

## 2020-12-17 PROCEDURE — 36821 AV FUSION DIRECT ANY SITE: CPT | Performed by: SURGERY

## 2020-12-17 PROCEDURE — 250N000012 HC RX MED GY IP 250 OP 636 PS 637: Performed by: SURGERY

## 2020-12-17 PROCEDURE — 250N000011 HC RX IP 250 OP 636: Performed by: ANESTHESIOLOGY

## 2020-12-17 PROCEDURE — 120N000011 HC R&B TRANSPLANT UMMC

## 2020-12-17 PROCEDURE — 250N000011 HC RX IP 250 OP 636: Performed by: STUDENT IN AN ORGANIZED HEALTH CARE EDUCATION/TRAINING PROGRAM

## 2020-12-17 PROCEDURE — 272N000001 HC OR GENERAL SUPPLY STERILE: Performed by: SURGERY

## 2020-12-17 PROCEDURE — 03180ZD BYPASS LEFT BRACHIAL ARTERY TO UPPER ARM VEIN, OPEN APPROACH: ICD-10-PCS | Performed by: SURGERY

## 2020-12-17 PROCEDURE — 258N000003 HC RX IP 258 OP 636: Performed by: NURSE ANESTHETIST, CERTIFIED REGISTERED

## 2020-12-17 RX ORDER — SODIUM CHLORIDE 9 MG/ML
INJECTION, SOLUTION INTRAVENOUS CONTINUOUS PRN
Status: DISCONTINUED | OUTPATIENT
Start: 2020-12-17 | End: 2020-12-17

## 2020-12-17 RX ORDER — OXYCODONE HYDROCHLORIDE 5 MG/1
5 TABLET ORAL EVERY 6 HOURS PRN
Qty: 8 TABLET | Refills: 0 | Status: ON HOLD | OUTPATIENT
Start: 2020-12-17 | End: 2022-12-13

## 2020-12-17 RX ORDER — PREDNISONE 5 MG/1
5 TABLET ORAL DAILY
Status: DISCONTINUED | OUTPATIENT
Start: 2020-12-17 | End: 2020-12-21 | Stop reason: HOSPADM

## 2020-12-17 RX ORDER — PROPOFOL 10 MG/ML
INJECTION, EMULSION INTRAVENOUS PRN
Status: DISCONTINUED | OUTPATIENT
Start: 2020-12-17 | End: 2020-12-17

## 2020-12-17 RX ORDER — MYCOPHENOLATE MOFETIL 250 MG/1
750 CAPSULE ORAL 2 TIMES DAILY
Status: DISCONTINUED | OUTPATIENT
Start: 2020-12-17 | End: 2020-12-19

## 2020-12-17 RX ORDER — NALOXONE HYDROCHLORIDE 0.4 MG/ML
0.4 INJECTION, SOLUTION INTRAMUSCULAR; INTRAVENOUS; SUBCUTANEOUS
Status: DISCONTINUED | OUTPATIENT
Start: 2020-12-17 | End: 2020-12-17

## 2020-12-17 RX ORDER — AMLODIPINE BESYLATE 10 MG/1
10 TABLET ORAL AT BEDTIME
Status: DISCONTINUED | OUTPATIENT
Start: 2020-12-17 | End: 2020-12-21 | Stop reason: HOSPADM

## 2020-12-17 RX ORDER — OXYCODONE HYDROCHLORIDE 5 MG/1
5 TABLET ORAL
Status: DISCONTINUED | OUTPATIENT
Start: 2020-12-17 | End: 2020-12-17

## 2020-12-17 RX ORDER — FENTANYL CITRATE 50 UG/ML
25-50 INJECTION, SOLUTION INTRAMUSCULAR; INTRAVENOUS
Status: DISCONTINUED | OUTPATIENT
Start: 2020-12-17 | End: 2020-12-17 | Stop reason: HOSPADM

## 2020-12-17 RX ORDER — ONDANSETRON 4 MG/1
4 TABLET, ORALLY DISINTEGRATING ORAL EVERY 6 HOURS PRN
Status: DISCONTINUED | OUTPATIENT
Start: 2020-12-17 | End: 2020-12-21 | Stop reason: HOSPADM

## 2020-12-17 RX ORDER — FENTANYL CITRATE 50 UG/ML
25-50 INJECTION, SOLUTION INTRAMUSCULAR; INTRAVENOUS
Status: DISCONTINUED | OUTPATIENT
Start: 2020-12-17 | End: 2020-12-17

## 2020-12-17 RX ORDER — SULFAMETHOXAZOLE AND TRIMETHOPRIM 400; 80 MG/1; MG/1
1 TABLET ORAL DAILY
Status: DISCONTINUED | OUTPATIENT
Start: 2020-12-17 | End: 2020-12-18

## 2020-12-17 RX ORDER — NALOXONE HYDROCHLORIDE 0.4 MG/ML
0.2 INJECTION, SOLUTION INTRAMUSCULAR; INTRAVENOUS; SUBCUTANEOUS
Status: ACTIVE | OUTPATIENT
Start: 2020-12-17 | End: 2020-12-18

## 2020-12-17 RX ORDER — OXYCODONE HYDROCHLORIDE 5 MG/1
5 TABLET ORAL EVERY 4 HOURS PRN
Status: COMPLETED | OUTPATIENT
Start: 2020-12-17 | End: 2020-12-18

## 2020-12-17 RX ORDER — HYDROMORPHONE HYDROCHLORIDE 1 MG/ML
.3-.5 INJECTION, SOLUTION INTRAMUSCULAR; INTRAVENOUS; SUBCUTANEOUS EVERY 10 MIN PRN
Status: DISCONTINUED | OUTPATIENT
Start: 2020-12-17 | End: 2020-12-17

## 2020-12-17 RX ORDER — ONDANSETRON 2 MG/ML
4 INJECTION INTRAMUSCULAR; INTRAVENOUS EVERY 6 HOURS PRN
Status: DISCONTINUED | OUTPATIENT
Start: 2020-12-17 | End: 2020-12-21 | Stop reason: HOSPADM

## 2020-12-17 RX ORDER — ACETAMINOPHEN 325 MG/1
650 TABLET ORAL
Status: DISCONTINUED | OUTPATIENT
Start: 2020-12-17 | End: 2020-12-18 | Stop reason: DRUGHIGH

## 2020-12-17 RX ORDER — HYDRALAZINE HYDROCHLORIDE 20 MG/ML
10 INJECTION INTRAMUSCULAR; INTRAVENOUS EVERY 6 HOURS PRN
Status: DISCONTINUED | OUTPATIENT
Start: 2020-12-17 | End: 2020-12-21 | Stop reason: HOSPADM

## 2020-12-17 RX ORDER — ONDANSETRON 2 MG/ML
INJECTION INTRAMUSCULAR; INTRAVENOUS PRN
Status: DISCONTINUED | OUTPATIENT
Start: 2020-12-17 | End: 2020-12-17

## 2020-12-17 RX ORDER — MEPERIDINE HYDROCHLORIDE 25 MG/ML
12.5 INJECTION INTRAMUSCULAR; INTRAVENOUS; SUBCUTANEOUS
Status: DISCONTINUED | OUTPATIENT
Start: 2020-12-17 | End: 2020-12-17

## 2020-12-17 RX ORDER — HEPARIN SODIUM 1000 [USP'U]/ML
INJECTION, SOLUTION INTRAVENOUS; SUBCUTANEOUS PRN
Status: DISCONTINUED | OUTPATIENT
Start: 2020-12-17 | End: 2020-12-17

## 2020-12-17 RX ORDER — ACETAMINOPHEN 325 MG/1
650 TABLET ORAL EVERY 6 HOURS PRN
Status: DISCONTINUED | OUTPATIENT
Start: 2020-12-17 | End: 2020-12-21 | Stop reason: HOSPADM

## 2020-12-17 RX ORDER — NALOXONE HYDROCHLORIDE 0.4 MG/ML
0.4 INJECTION, SOLUTION INTRAMUSCULAR; INTRAVENOUS; SUBCUTANEOUS
Status: ACTIVE | OUTPATIENT
Start: 2020-12-17 | End: 2020-12-18

## 2020-12-17 RX ORDER — CARVEDILOL 12.5 MG/1
12.5 TABLET ORAL 2 TIMES DAILY WITH MEALS
Status: DISCONTINUED | OUTPATIENT
Start: 2020-12-17 | End: 2020-12-20

## 2020-12-17 RX ORDER — FLUOXETINE 10 MG/1
40 CAPSULE ORAL DAILY
Status: DISCONTINUED | OUTPATIENT
Start: 2020-12-17 | End: 2020-12-21 | Stop reason: HOSPADM

## 2020-12-17 RX ORDER — NALOXONE HYDROCHLORIDE 0.4 MG/ML
0.2 INJECTION, SOLUTION INTRAMUSCULAR; INTRAVENOUS; SUBCUTANEOUS
Status: DISCONTINUED | OUTPATIENT
Start: 2020-12-17 | End: 2020-12-17

## 2020-12-17 RX ORDER — SODIUM CHLORIDE, SODIUM LACTATE, POTASSIUM CHLORIDE, CALCIUM CHLORIDE 600; 310; 30; 20 MG/100ML; MG/100ML; MG/100ML; MG/100ML
INJECTION, SOLUTION INTRAVENOUS CONTINUOUS
Status: DISCONTINUED | OUTPATIENT
Start: 2020-12-17 | End: 2020-12-17 | Stop reason: HOSPADM

## 2020-12-17 RX ORDER — ACETAMINOPHEN 325 MG/1
975 TABLET ORAL ONCE
Status: COMPLETED | OUTPATIENT
Start: 2020-12-17 | End: 2020-12-17

## 2020-12-17 RX ORDER — VITAMIN B COMPLEX
2000 TABLET ORAL DAILY
Status: DISCONTINUED | OUTPATIENT
Start: 2020-12-17 | End: 2020-12-21 | Stop reason: HOSPADM

## 2020-12-17 RX ORDER — ONDANSETRON 2 MG/ML
4 INJECTION INTRAMUSCULAR; INTRAVENOUS EVERY 30 MIN PRN
Status: DISCONTINUED | OUTPATIENT
Start: 2020-12-17 | End: 2020-12-17

## 2020-12-17 RX ORDER — AMOXICILLIN 250 MG
1-2 CAPSULE ORAL 2 TIMES DAILY
Qty: 30 TABLET | Refills: 0 | Status: ON HOLD | OUTPATIENT
Start: 2020-12-17 | End: 2022-12-13

## 2020-12-17 RX ORDER — LIDOCAINE HYDROCHLORIDE 20 MG/ML
INJECTION, SOLUTION INFILTRATION; PERINEURAL PRN
Status: DISCONTINUED | OUTPATIENT
Start: 2020-12-17 | End: 2020-12-17

## 2020-12-17 RX ORDER — CLINDAMYCIN PHOSPHATE 900 MG/50ML
900 INJECTION, SOLUTION INTRAVENOUS
Status: COMPLETED | OUTPATIENT
Start: 2020-12-17 | End: 2020-12-17

## 2020-12-17 RX ORDER — SODIUM CHLORIDE, SODIUM LACTATE, POTASSIUM CHLORIDE, CALCIUM CHLORIDE 600; 310; 30; 20 MG/100ML; MG/100ML; MG/100ML; MG/100ML
INJECTION, SOLUTION INTRAVENOUS CONTINUOUS
Status: DISCONTINUED | OUTPATIENT
Start: 2020-12-17 | End: 2020-12-17

## 2020-12-17 RX ORDER — EPHEDRINE SULFATE 50 MG/ML
INJECTION, SOLUTION INTRAMUSCULAR; INTRAVENOUS; SUBCUTANEOUS PRN
Status: DISCONTINUED | OUTPATIENT
Start: 2020-12-17 | End: 2020-12-17

## 2020-12-17 RX ORDER — DEXAMETHASONE SODIUM PHOSPHATE 4 MG/ML
INJECTION, SOLUTION INTRA-ARTICULAR; INTRALESIONAL; INTRAMUSCULAR; INTRAVENOUS; SOFT TISSUE PRN
Status: DISCONTINUED | OUTPATIENT
Start: 2020-12-17 | End: 2020-12-17

## 2020-12-17 RX ORDER — LIDOCAINE 40 MG/G
CREAM TOPICAL
Status: DISCONTINUED | OUTPATIENT
Start: 2020-12-17 | End: 2020-12-21 | Stop reason: HOSPADM

## 2020-12-17 RX ORDER — HYDRALAZINE HYDROCHLORIDE 20 MG/ML
10 INJECTION INTRAMUSCULAR; INTRAVENOUS
Status: COMPLETED | OUTPATIENT
Start: 2020-12-17 | End: 2020-12-17

## 2020-12-17 RX ORDER — ONDANSETRON 4 MG/1
4 TABLET, ORALLY DISINTEGRATING ORAL EVERY 30 MIN PRN
Status: DISCONTINUED | OUTPATIENT
Start: 2020-12-17 | End: 2020-12-17

## 2020-12-17 RX ORDER — FENTANYL CITRATE 50 UG/ML
INJECTION, SOLUTION INTRAMUSCULAR; INTRAVENOUS PRN
Status: DISCONTINUED | OUTPATIENT
Start: 2020-12-17 | End: 2020-12-17

## 2020-12-17 RX ORDER — SODIUM BICARBONATE 650 MG/1
1300 TABLET ORAL 2 TIMES DAILY
Status: DISCONTINUED | OUTPATIENT
Start: 2020-12-17 | End: 2020-12-18

## 2020-12-17 RX ORDER — LIDOCAINE 40 MG/G
CREAM TOPICAL
Status: DISCONTINUED | OUTPATIENT
Start: 2020-12-17 | End: 2020-12-17 | Stop reason: HOSPADM

## 2020-12-17 RX ORDER — FLUMAZENIL 0.1 MG/ML
0.2 INJECTION, SOLUTION INTRAVENOUS
Status: DISCONTINUED | OUTPATIENT
Start: 2020-12-17 | End: 2020-12-17 | Stop reason: HOSPADM

## 2020-12-17 RX ORDER — LABETALOL 20 MG/4 ML (5 MG/ML) INTRAVENOUS SYRINGE
PRN
Status: DISCONTINUED | OUTPATIENT
Start: 2020-12-17 | End: 2020-12-17

## 2020-12-17 RX ADMIN — PHENYLEPHRINE HYDROCHLORIDE 100 MCG: 10 INJECTION INTRAVENOUS at 09:20

## 2020-12-17 RX ADMIN — PROPOFOL 30 MG: 10 INJECTION, EMULSION INTRAVENOUS at 10:13

## 2020-12-17 RX ADMIN — SODIUM BICARBONATE 650 MG TABLET 1300 MG: at 19:32

## 2020-12-17 RX ADMIN — DEXAMETHASONE SODIUM PHOSPHATE 6 MG: 4 INJECTION, SOLUTION INTRA-ARTICULAR; INTRALESIONAL; INTRAMUSCULAR; INTRAVENOUS; SOFT TISSUE at 08:26

## 2020-12-17 RX ADMIN — Medication 5 MG: at 10:43

## 2020-12-17 RX ADMIN — ACETAMINOPHEN 650 MG: 325 TABLET, FILM COATED ORAL at 21:29

## 2020-12-17 RX ADMIN — ONDANSETRON 4 MG: 2 INJECTION INTRAMUSCULAR; INTRAVENOUS at 10:27

## 2020-12-17 RX ADMIN — Medication 5 MG: at 09:08

## 2020-12-17 RX ADMIN — FLUOXETINE HYDROCHLORIDE 40 MG: 40 CAPSULE ORAL at 19:32

## 2020-12-17 RX ADMIN — ROCURONIUM BROMIDE 30 MG: 10 INJECTION INTRAVENOUS at 08:11

## 2020-12-17 RX ADMIN — MYCOPHENOLATE MOFETIL 750 MG: 250 CAPSULE ORAL at 19:32

## 2020-12-17 RX ADMIN — TACROLIMUS 1.5 MG: 1 CAPSULE ORAL at 06:57

## 2020-12-17 RX ADMIN — SULFAMETHOXAZOLE AND TRIMETHOPRIM 1 TABLET: 400; 80 TABLET ORAL at 18:18

## 2020-12-17 RX ADMIN — HEPARIN SODIUM 3000 UNITS: 1000 INJECTION INTRAVENOUS; SUBCUTANEOUS at 09:30

## 2020-12-17 RX ADMIN — ROCURONIUM BROMIDE 10 MG: 10 INJECTION INTRAVENOUS at 10:13

## 2020-12-17 RX ADMIN — PHENYLEPHRINE HYDROCHLORIDE 50 MCG: 10 INJECTION INTRAVENOUS at 08:50

## 2020-12-17 RX ADMIN — LIDOCAINE HYDROCHLORIDE 100 MG: 20 INJECTION, SOLUTION INFILTRATION; PERINEURAL at 08:10

## 2020-12-17 RX ADMIN — PHENYLEPHRINE HYDROCHLORIDE 50 MCG: 10 INJECTION INTRAVENOUS at 08:47

## 2020-12-17 RX ADMIN — HYDRALAZINE HYDROCHLORIDE 10 MG: 20 INJECTION, SOLUTION INTRAMUSCULAR; INTRAVENOUS at 12:45

## 2020-12-17 RX ADMIN — CARVEDILOL 12.5 MG: 12.5 TABLET, FILM COATED ORAL at 18:18

## 2020-12-17 RX ADMIN — ROCURONIUM BROMIDE 20 MG: 10 INJECTION INTRAVENOUS at 08:35

## 2020-12-17 RX ADMIN — CLINDAMYCIN PHOSPHATE 900 MG: 900 INJECTION, SOLUTION INTRAVENOUS at 08:25

## 2020-12-17 RX ADMIN — ROCURONIUM BROMIDE 10 MG: 10 INJECTION INTRAVENOUS at 09:29

## 2020-12-17 RX ADMIN — HYDROMORPHONE HYDROCHLORIDE 0.5 MG: 1 INJECTION, SOLUTION INTRAMUSCULAR; INTRAVENOUS; SUBCUTANEOUS at 13:21

## 2020-12-17 RX ADMIN — ACETAMINOPHEN 975 MG: 325 TABLET, FILM COATED ORAL at 12:16

## 2020-12-17 RX ADMIN — PROPOFOL 200 MG: 10 INJECTION, EMULSION INTRAVENOUS at 08:10

## 2020-12-17 RX ADMIN — FENTANYL CITRATE 50 MCG: 50 INJECTION, SOLUTION INTRAMUSCULAR; INTRAVENOUS at 12:14

## 2020-12-17 RX ADMIN — Medication 2000 UNITS: at 18:22

## 2020-12-17 RX ADMIN — SUGAMMADEX 200 MG: 100 INJECTION, SOLUTION INTRAVENOUS at 11:16

## 2020-12-17 RX ADMIN — LABETALOL 20 MG/4 ML (5 MG/ML) INTRAVENOUS SYRINGE 2.5 MG: at 11:44

## 2020-12-17 RX ADMIN — Medication 5 MG: at 09:20

## 2020-12-17 RX ADMIN — FENTANYL CITRATE 25 MCG: 50 INJECTION, SOLUTION INTRAMUSCULAR; INTRAVENOUS at 11:30

## 2020-12-17 RX ADMIN — FENTANYL CITRATE 50 MCG: 50 INJECTION, SOLUTION INTRAMUSCULAR; INTRAVENOUS at 10:14

## 2020-12-17 RX ADMIN — PREDNISONE 5 MG: 5 TABLET ORAL at 18:18

## 2020-12-17 RX ADMIN — HYDRALAZINE HYDROCHLORIDE 10 MG: 20 INJECTION, SOLUTION INTRAMUSCULAR; INTRAVENOUS at 12:24

## 2020-12-17 RX ADMIN — FENTANYL CITRATE 50 MCG: 50 INJECTION, SOLUTION INTRAMUSCULAR; INTRAVENOUS at 12:25

## 2020-12-17 RX ADMIN — OXYCODONE HYDROCHLORIDE 5 MG: 5 TABLET ORAL at 16:59

## 2020-12-17 RX ADMIN — Medication 5 MG: at 10:52

## 2020-12-17 RX ADMIN — FENTANYL CITRATE 25 MCG: 50 INJECTION, SOLUTION INTRAMUSCULAR; INTRAVENOUS at 11:34

## 2020-12-17 RX ADMIN — FENTANYL CITRATE 50 MCG: 50 INJECTION, SOLUTION INTRAMUSCULAR; INTRAVENOUS at 08:10

## 2020-12-17 RX ADMIN — ACETAMINOPHEN 650 MG: 325 TABLET, FILM COATED ORAL at 17:41

## 2020-12-17 RX ADMIN — FENTANYL CITRATE 50 MCG: 50 INJECTION, SOLUTION INTRAMUSCULAR; INTRAVENOUS at 08:07

## 2020-12-17 RX ADMIN — MIDAZOLAM 2 MG: 1 INJECTION INTRAMUSCULAR; INTRAVENOUS at 08:05

## 2020-12-17 RX ADMIN — AMLODIPINE BESYLATE 10 MG: 10 TABLET ORAL at 21:29

## 2020-12-17 RX ADMIN — OXYCODONE HYDROCHLORIDE 5 MG: 5 TABLET ORAL at 21:53

## 2020-12-17 RX ADMIN — MYCOPHENOLATE MOFETIL 750 MG: 250 CAPSULE ORAL at 06:57

## 2020-12-17 RX ADMIN — SODIUM CHLORIDE: 9 INJECTION, SOLUTION INTRAVENOUS at 07:57

## 2020-12-17 RX ADMIN — LABETALOL 20 MG/4 ML (5 MG/ML) INTRAVENOUS SYRINGE 2.5 MG: at 11:38

## 2020-12-17 RX ADMIN — TACROLIMUS 1.5 MG: 1 CAPSULE ORAL at 19:36

## 2020-12-17 RX ADMIN — HYDRALAZINE HYDROCHLORIDE 10 MG: 20 INJECTION, SOLUTION INTRAMUSCULAR; INTRAVENOUS at 21:24

## 2020-12-17 ASSESSMENT — MIFFLIN-ST. JEOR: SCORE: 1774.75

## 2020-12-17 ASSESSMENT — ACTIVITIES OF DAILY LIVING (ADL): ADLS_ACUITY_SCORE: 11

## 2020-12-17 NOTE — OR NURSING
Dr. Jules at bedside; she will place sign out shortly. Dr. Schwartz at bedside; plan to admit for bump in creatinine and decreasing GFR. Plan for temp HD catheter tomorrow while 1st phase AVF heals.

## 2020-12-17 NOTE — OP NOTE
Transplant Surgery  Operative Note    Date:  12/17/2020  Preop Dx:  Chronic Kidney Disease   Postop Dx: Chronic Kidney Disease   Procedure: Left BrachioBasilic Arteriovenous Fistula Creation with intraoperative ultrasound  Surgeon: Sidney Palma M.D  ASSISTANT:  BALTAZAR Schwartz MD fellow. There was no qualified general surgery resident available to assist during this procedure.   Anesthesia: General   EBL: 10 ml  Drains: no drain  Specimen: n/a.  Complications: None  Findings:  Flow: 380 ml/min. Intraoperative Ultrasound demonstrated adequate sized brachial artery and basilic vein for fistula formation    Indication: The patient has need for permanent hemodialysis access,   After discussing the risks and benefits of surgery and potential complications, the patient provided informed consent.     DETAILS OF PROCEDURE:  The patient was brought to the operating room, placed in a supine position.  Perioperative prophylactic IV antibiotics were given.  Anesthesia was adminisitered. The patient was positioned supine, and the left upper extremity was positioned at 90 , prepped and draped in the usual sterile fashion. An ultrasound was performed to assess the size, quality, and position of the artery and vein. The patient received preoperative IV antibiotics. The basilic vein chosen, was compressible, thin walled,  and chosen for venous outflow given those characteristics.    The skin over the antecubital fossa was infiltrated with 0.5 % lidocaine. A 5-cm transverse skin incision was then performed in the antecubital fossa. The basilic vein was identified and encircled with a vessel loop. The vein was dissected for a segment of 5 cm. The dissection was carried as proximal and distal as possible through that incision with ligation of branches. Attention was then directed to the brachial artery. The tendinous aponeurosis of the biceps muscle was incised. The location of the brachial artery was identified by palpation.The soft  tissue over the brachial artery was then incised, and the brachial artery was identified and encircled with a vessel loop.    The patient was given 3000U of heparin intravenously. The basilic vein was then ligated at its most distal end. Hyphet clips were applied on the brachial artery and a 5-mm incision in the anterior wall of the brachial artery was performed. The basilic vein was then allowed to lie over the arteriotomy. The end of the vein was spatulated to match the size of the arteriotomy. The anastomosis was then performed using a 7-O Prolene running suture.At the completion of the suture line, the brachial artery was forward flushed and then allowed to backbleed. The anastomosis was irrigated with heparinized solution. The sutures were then tied and the suture line evaluated for hemostasis, which was adequate.There was evidence of a palpable pulse in the basilic vein. There was evidence of strong palpable pulses in the brachial, radial, and ulnar arteries at the wrist. There was no evidence of any kinks. The wound was then irrigated. The subcutaneous tissue was closed with 3-0 Vicryl  and the skin closed with 4-0 subcuticular monocryl sutures. Sterile surgical glue sealed the incision. A debriefing checklist was completed to share information critical to postoperative care of the patient.    The patient tolerated the procedure well and was taken to the postanesthesia care unit in stable condition. Dr. Palma was present and scrubbed for the entire procedure.    I was present during the key portions of the procedure, and I was immediately available for the entire procedure.

## 2020-12-17 NOTE — ANESTHESIA PREPROCEDURE EVALUATION
Anesthesia Pre-Procedure Evaluation    Patient: Jhoan Hoffman   MRN:     [id number] Gender:   male   Age:    25 year old :      1995        Preoperative Diagnosis: Chronic kidney disease, stage 5, kidney failure (H) [N18.5]  Encounter regarding vascular access for dialysis for ESRD (H) [N18.6, Z99.2]   Procedure(s):  Right upper arteriovenous Brachiocephalic fistula creation with intraoperative ultrasound     LABS:  CBC:   Lab Results   Component Value Date    WBC 8.6 2019    WBC 11.7 (H) 2019    HGB 11.7 (L) 2019    HGB 12.2 (L) 2019    HCT 38.1 (L) 2019    HCT 36.0 (L) 2019     2019     2019     BMP:   Lab Results   Component Value Date     2019     2019    POTASSIUM 3.9 2019    POTASSIUM 3.8 2019    CHLORIDE 109 2019    CHLORIDE 108 2019    CO2 23 2019    CO2 20 2019    BUN 28 2019    BUN 32 (H) 2019    CR 2.33 (H) 2019    CR 2.37 (H) 2019    GLC 84 2019    GLC 84 2019     COAGS:   Lab Results   Component Value Date    PTT 30 2012    INR 0.95 2019    FIBR 566 (H) 2011     POC:   Lab Results   Component Value Date    BGM 89 2020     OTHER:   Lab Results   Component Value Date    PH  2012     Incorrect specimen type  PER NEY IN OR 17 AT 0544 ON 12 JA    LACT 1.6 2011    LIZZ 8.8 2019    PHOS 2.7 (L) 2012    MAG 2.0 2012    ALBUMIN 4.1 2014    PROTTOTAL 7.1 2014    ALT 33 2014    AST 17 2014    ALKPHOS 103 2014    BILITOTAL 0.4 2014    LIPASE 202 2011    AMYLASE 142 (H) 2011    TSH 0.65 2014    CRP 6.8 2011        Preop Vitals    BP Readings from Last 3 Encounters:   20 (!) 172/125   20 (!) 174/106   19 (!) 140/99    Pulse Readings from Last 3 Encounters:   20 70   20 92   19 120      Resp  "Readings from Last 3 Encounters:   07/23/19 18   06/26/19 18   02/20/17 16    SpO2 Readings from Last 3 Encounters:   12/17/20 99%   11/09/20 97%   12/05/19 96%      Temp Readings from Last 1 Encounters:   12/17/20 36.8  C (98.3  F) (Oral)    Ht Readings from Last 1 Encounters:   12/17/20 1.676 m (5' 6\")      Wt Readings from Last 1 Encounters:   12/17/20 85.2 kg (187 lb 13.3 oz)    Estimated body mass index is 30.32 kg/m  as calculated from the following:    Height as of this encounter: 1.676 m (5' 6\").    Weight as of this encounter: 85.2 kg (187 lb 13.3 oz).     LDA:  CVC Double Lumen 04/28/12 Right Internal jugular (Active)   Site Assessment WDL 05/25/12 1335   Dressing Intervention Chlorhexidine sponge;Transparent 05/03/12 1715   Dressing Change Due 05/05/12 05/02/12 1500   Number of days: 3155        Past Medical History:   Diagnosis Date     Anemia     in ESRD     Depression      History of blood transfusion      Hypertension      Kidney replaced by transplant      Peritoneal dialysis status (H)     in past     Renal failure      Sleep apnea       Past Surgical History:   Procedure Laterality Date     BIOPSY       CYSTOSCOPY, REMOVE STENT(S), COMBINED  5/25/2012    Procedure:COMBINED CYSTOSCOPY, REMOVE STENT(S); Cystoscopy, Removal Of Urinary Stent; Surgeon:FLORENTINO KNIGHT; [facility]     ENDARECTERECTOMY RENAL  5/18/2011    Procedure:ENDARECTERECTOMY RENAL; Exploration of Transplant Kidney, Back Table Flush, Biopsy of Kidney and Reanastamosis of Kidney; Surgeon:FLORENTINO KNIGHT; [facility]     EXPLANT TRANSPLANTED KIDNEY  7/12/2011    Procedure:EXPLANT TRANSPLANTED KIDNEY; Transplant Nephrectomy; Surgeon:FLORENTINO KNIGHT; [facility]     INSERT CATHETER HEMODIALYSIS  5/18/2011    Procedure:INSERT CATHETER HEMODIALYSIS; Double Lumen; Surgeon:JOE HE; [facility]     INSERT CATHETER PERITONEAL DIALYSIS  4/28/2012    Procedure:INSERT CATHETER PERITONEAL DIALYSIS; Placement " dialysis cath under fluoro, before kidney tx; Surgeon:FLORENTINO KNIGHT; [facility]     LAPAROTOMY EXPLORATORY  5/19/2011    Procedure:LAPAROTOMY EXPLORATORY; washout of kidney and closure; Surgeon:FLORENTINO KNIGHT; [facility]     PERCUTANEOUS BIOPSY KIDNEY  6/2/2011    Procedure:PERCUTANEOUS BIOPSY KIDNEY; Surgeon:GIL WILLIAM; [facility]     PERCUTANEOUS BIOPSY KIDNEY Right 6/26/2019    Procedure: Right Kidney Biopsy;  Surgeon: Peter Briggs MD;  [facility]     PERCUTANEOUS BIOPSY KIDNEY Right 7/23/2019    Procedure: Right Kidney Biopsy;  Surgeon: Pro Menard MD;  [facility]     REMOVE CATHETER PERITONEAL  5/3/2012    Procedure:REMOVE CATHETER PERITONEAL; Removal Of Peritoneal Dialysis Catheter; Surgeon:FLORENTINO KNIGHT; [facility]     TRANSPLANT KIDNEY RECIPIENT LIVING RELATED  1995     TRANSPLANT KIDNEY RECIPIENT LIVING UNRELATED  5/18/2011    Procedure:TRANSPLANT KIDNEY RECIPIENT LIVING UNRELATED; Living unrelated left kidney transplant and placement of ureteral stent into transplant ureter.; Surgeon:FLORENTINO KNIGHT; [facility]     TRANSPLANT KIDNEY RECIPIENT LIVING UNRELATED  4/28/2012    Procedure:TRANSPLANT KIDNEY RECIPIENT LIVING UNRELATED; kidney transplant -   UNOS# [id number]  Organ arrival: 2100 4/27  Cross match results: 0200 4/28  Donor blood type: A  Recipient blood type: A; Surgeon:FLORENTINO KNIGHT; [facility]      Allergies   Allergen Reactions     Amoxicillin Swelling     Azithromycin      Z pack - can't take with cyclosporine.     Vancomycin      Ruth syndrome     Cefprozil Rash        Anesthesia Evaluation     . Pt has had prior anesthetic. Type: General    No history of anesthetic complications  no malignant hyperthermia        ROS/MED HX    ENT/Pulmonary:  - neg pulmonary ROS   (+)sleep apnea, , . .    Neurologic:  - neg neurologic ROS     Cardiovascular:     (+) hypertension----. : . . . :. .       METS/Exercise Tolerance:      Hematologic:  - neg hematologic  ROS       Musculoskeletal:         GI/Hepatic:     (+) GERD       Renal/Genitourinary:     (+) chronic renal disease, type: Dialysis, Pt requires dialysis, Pt has history of transplant, date: x3.,       Endo: Comment: He is on prednisone for immunosuppression.  - neg endo ROS       Psychiatric:         Infectious Disease: Comment: H/o BK viremia        Malignancy:         Other:                         PHYSICAL EXAM:   Mental Status/Neuro: A/A/O   Airway: Facies: Feasible  Mallampati: I  Mouth/Opening: Full  TM distance: > 6 cm  Neck ROM: Full   Respiratory: Auscultation: CTAB     Resp. Rate: Normal     Resp. Effort: Normal      CV: Rhythm: Regular  Rate: Age appropriate  Heart: Normal Sounds  Edema: None   Comments:      Dental: Normal Dentition                Assessment:   ASA SCORE: 3    H&P: History and physical reviewed and following examination; no interval change.   Smoking Status:  Non-Smoker/Unknown   NPO Status: NPO Appropriate     Plan:   Anes. Type:  General   Pre-Medication: None   Induction:  IV (Standard)   Airway: ETT; Oral   Access/Monitoring: PIV   Maintenance: Balanced     Postop Plan:   Postop Pain: Opioids  Postop Sedation/Airway: Not planned  Disposition: Outpatient     PONV Management:   Adult Risk Factors:, Non-Smoker, Postop Opioids   Prevention: Ondansetron, Dexamethasone       Comments for Plan/Consent:  Past medical history of renal dyplasia s/p living donor kidney transplant X 3, He has done well with GA.                  Dillon Da Silva MD

## 2020-12-17 NOTE — OR NURSING
Care assumed by this RN at this time. Medicine MD at bedside. Reviewed plan of care and assessed patient fistula site.

## 2020-12-17 NOTE — ANESTHESIA PROCEDURE NOTES
Airway   Date/Time: 12/17/2020 8:13 AM   Patient location during procedure: OR    Staff -   CRNA: Ely Martinez APRN CRNA  Performed By: CRNA    Consent for Airway   Urgency: elective    Indications and Patient Condition  Indications for airway management: amauri-procedural  Induction type:intravenousMask difficulty assessment: 2 - vent by mask + OA or adjuvant +/- NMBA    Final Airway Details  Final airway type: endotracheal airway  Successful airway:ETT - single and Oral  Endotracheal Airway Details   ETT size (mm): 7.5  Cuffed: yes  Successful intubation technique: direct laryngoscopy  Grade View of Cords: 1  Adjucts: stylet  Measured from: lips  Secured at (cm): 23  Secured with: pink tape  Bite block used: None    Post intubation assessment   Placement verified by: capnometry, equal breath sounds and chest rise   Number of attempts at approach: 1  Secured with:pink tape  Ease of procedure: easy  Dentition: Intact and Unchanged

## 2020-12-17 NOTE — OR NURSING
The following page was sent to Dr. Schwartz: PACU 20. Pt will meet PACU discharge criteria shortly. Have you heard back from medicine team? Thanks! Tatiana JONES [phone]

## 2020-12-17 NOTE — ANESTHESIA POSTPROCEDURE EVALUATION
Anesthesia POST Procedure Evaluation    Patient: Jhoan Hoffman   MRN:     [id number] Gender:   male   Age:    25 year old :      1995        Preoperative Diagnosis: Chronic kidney disease, stage 5, kidney failure (H) [N18.5]  Encounter regarding vascular access for dialysis for ESRD (H) [N18.6, Z99.2]   Procedure(s):  Create Graft Left Arteriovenous Upper Extremi   Postop Comments: No value filed.     Anesthesia Type: General       Disposition: Outpatient   Postop Pain Control: Uneventful            Sign Out: Well controlled pain   PONV: No   Neuro/Psych: Uneventful            Sign Out: Acceptable/Baseline neuro status   Airway/Respiratory: Uneventful            Sign Out: Acceptable/Baseline resp. status   CV/Hemodynamics: Uneventful            Sign Out: Acceptable CV status   Other NRE: NONE   DID A NON-ROUTINE EVENT OCCUR? No    Event details/Postop Comments:  Possible admission dispo tbd per primary svc 2/2 rising cr         Last Anesthesia Record Vitals:  CRNA VITALS  2020 1055 - 2020 1155      2020             EKG:  Sinus rhythm          Last PACU Vitals:  Vitals Value Taken Time   /83 20 1300   Temp 36  C (96.8  F) 20 1302   Pulse 92 20 1302   Resp     SpO2 98 % 20 1302   Temp src     NIBP 178/104 20 1140   Pulse 80 20 1140   SpO2 100 % 20 1140   Resp     Temp     Ht Rate     Temp 2     Vitals shown include unvalidated device data.      Electronically Signed By: Lupe Jules MD, 2020, 1:03 PM

## 2020-12-17 NOTE — DISCHARGE INSTRUCTIONS
[facility]  Same-Day Surgery   Adult Discharge Orders & Instructions     For 24 hours after surgery    1. Get plenty of rest.  A responsible adult must stay with you for at least 24 hours after you leave the hospital.   2. Do not drive or use heavy equipment.  If you have weakness or tingling, don't drive or use heavy equipment until this feeling goes away.  3. Do not drink alcohol.  4. Avoid strenuous or risky activities.  Ask for help when climbing stairs.   5. You may feel lightheaded.  IF so, sit for a few minutes before standing.  Have someone help you get up.   6. If you have nausea (feel sick to your stomach): Drink only clear liquids such as apple juice, ginger ale, broth or 7-Up.  Rest may also help.  Be sure to drink enough fluids.  Move to a regular diet as you feel able.  7. You may have a slight fever. Call the doctor if your fever is over 100 F (37.7 C) (taken under the tongue) or lasts longer than 24 hours.  8. You may have a dry mouth, a sore throat, muscle aches or trouble sleeping.  These should go away after 24 hours.  9. Do not make important or legal decisions.   Call your doctor for any of the followin.  Signs of infection (fever, growing tenderness at the surgery site, a large amount of drainage or bleeding, severe pain, foul-smelling drainage, redness, swelling).    2. It has been over 8 to 10 hours since surgery and you are still not able to urinate (pass water).    3.  Headache for over 24 hours.      To contact a doctor, call [facility] at [phone] or:    x   [phone] and ask for the resident on call for   Transplant surgery (answered 24 hours a day)  x   Emergency Department:    [facility]: [phone]       (TTY for hearing impaired: [phone])

## 2020-12-17 NOTE — PROGRESS NOTES
Temp:  [98.3  F (36.8  C)] 98.3  F (36.8  C)  Pulse:  [70] 70  BP: (172)/(125) 172/125  Cuff Mean (mmHg):  [160] 160  SpO2:  [99 %] 99 %    Time of notification: 0650  Provider notified: Dr. Walls  Patient status: elevated BP, see above vitals  Orders received: none at this time, will wait for Dr. Jules to see if any intervention needed.        Temp:  [98.3  F (36.8  C)] 98.3  F (36.8  C)  Pulse:  [70] 70  BP: (159-172)/(115-125) 159/115  Cuff Mean (mmHg):  [132-160] 132  SpO2:  [99 %] 99 %    Time of notification: 0720  Provider notified: Dr. Jules  Patient status: BP continues to be elevated, see above  Orders received: nothing at this time, will work on lowering in OR    Will continue to monitor.

## 2020-12-17 NOTE — ANESTHESIA CARE TRANSFER NOTE
Patient: Jhoan Hoffman    Procedure(s):  Create Graft Left Arteriovenous Upper Extremi    Diagnosis: Chronic kidney disease, stage 5, kidney failure (H) [N18.5]  Encounter regarding vascular access for dialysis for ESRD (H) [N18.6, Z99.2]  Diagnosis Additional Information: No value filed.    Anesthesia Type:   General     Note:  Airway :Nasal Cannula  Patient transferred to:PACU  Comments: Patient following commands, opening eyes, breathing spontaneously, and suctioned prior to extubation. Extubated to 4LNC. Transferred to PACU. Report to RN. Vital signs stable. Awake and talking in PACU.Handoff Report: Identifed the Patient, Identified the Reponsible Provider, Reviewed the pertinent medical history, Discussed the surgical course, Reviewed Intra-OP anesthesia mangement and issues during anesthesia, Set expectations for post-procedure period and Allowed opportunity for questions and acknowledgement of understanding      Vitals: (Last set prior to Anesthesia Care Transfer)    CRNA VITALS  12/17/2020 1055 - 12/17/2020 1145      12/17/2020             EKG:  SpO2:  RR: 15  BP:  Sinus rhythm  99%  15  170/100                Electronically Signed By: JAJA Hopkins CRNA  December 17, 2020  11:45 AM

## 2020-12-17 NOTE — H&P
[facility] [place]     History and Physical - Hospitalist Service       Date of Admission:  12/17/2020    Assessment & Plan   Jhoan Hoffman is a 25 year old male with a history of HTN, CYNDEE, congenital renal dysplasia s/p multiple renal transplants (last in 2012) now with CKD V. He underwent left AV fistula creation on 12/17/20 and is admitted for expedited dialysis initiation.     CKD V  Hx of congenital renal dysplasia s/p multiple renal transplants (1995, 2011, 2012)  S/p left AV fistula creation (12/17/20)  Fistula placed today without complication. Per Vascular Surgery patient will need to wait approx 4 months until he can use the new fistula. Currently with creatinine of 9.5. Per discussion with Nephrology, patient needing expedited dialysis initiation.    - IR consult for tunneled line placement. Discussed with IR, they will get him on the schedule for tomorrow.   - Transplant Nephrology consult   - IS: tacro 1.5 mg BID (goal 4-6), MMF, prednisone 5 mg daily   - Bactrim ppx   - Continue PTA bicarb 1300 mg BID   - Trend BMP   - Renal diet   - I/Os   - Daily weights    Hypertension: PTA on amlodipine 10 mg daily, carvedilol 12.5 mg BID. BP currently hypertensive.   - Continue PTA amlodipine, carvedilol    Depression: Continue PTA fluoxetine.        Diet:   renal diet  DVT Prophylaxis: Pneumatic Compression Devices  Ordoñez Catheter: not present  Code Status:   FULL         Disposition Plan   Expected discharge: 2 - 5 days, recommended to prior living arrangement once dialysis plan in place.  Entered: Shakira Lopez PA-C 12/17/2020, 2:02 PM     The patient's care was discussed with the Attending Physician, Dr. Reed and Patient.    SRINATH [facility]   Contact information available via [facility] Paging/Directory  Please see sign in/sign out for up to date coverage  information    ______________________________________________________________________    Chief Complaint   S/p AVF    History is obtained from the patient    History of Present Illness   Jhoan Hoffman is a 25 year old male with a history of HTN, CYNDEE, congenital renal dysplasia s/p multiple renal transplants (last in 2012) now with CKD V. He underwent left AV fistula creation on 12/17/20 and is admitted for expedited dialysis initiation. AVF creation went well without apparent complication. Currently doing well overall in PACU, he has no acute concerns. Pain controlled. No nausea, vomiting, fever/chills, dyspnea, chest pain.     Review of Systems    The 10 point Review of Systems is negative other than noted in the HPI or here.     Past Medical History    I have reviewed this patient's medical history and updated it with pertinent information if needed.   Past Medical History:   Diagnosis Date     Anemia     in ESRD     Depression      History of blood transfusion      Hypertension      Kidney replaced by transplant      Peritoneal dialysis status (H)     in past     Renal failure      Sleep apnea        Past Surgical History   I have reviewed this patient's surgical history and updated it with pertinent information if needed.  Past Surgical History:   Procedure Laterality Date     BIOPSY       CYSTOSCOPY, REMOVE STENT(S), COMBINED  5/25/2012    Procedure:COMBINED CYSTOSCOPY, REMOVE STENT(S); Cystoscopy, Removal Of Urinary Stent; Surgeon:FLORENTINO KNIGHT; [facility]     ENDARECTERECTOMY RENAL  5/18/2011    Procedure:ENDARECTERECTOMY RENAL; Exploration of Transplant Kidney, Back Table Flush, Biopsy of Kidney and Reanastamosis of Kidney; Surgeon:FLORENTINO KNIGHT; [facility]     EXPLANT TRANSPLANTED KIDNEY  7/12/2011    Procedure:EXPLANT TRANSPLANTED KIDNEY; Transplant Nephrectomy; Surgeon:FLORENTINO KNIGHT; [facility]     INSERT CATHETER HEMODIALYSIS  5/18/2011    Procedure:INSERT CATHETER  HEMODIALYSIS; Double Lumen; Surgeon:JOE HE; [facility]     INSERT CATHETER PERITONEAL DIALYSIS  4/28/2012    Procedure:INSERT CATHETER PERITONEAL DIALYSIS; Placement dialysis cath under fluoro, before kidney tx; Surgeon:FLORENTINO KNIGHT; [facility]     LAPAROTOMY EXPLORATORY  5/19/2011    Procedure:LAPAROTOMY EXPLORATORY; washout of kidney and closure; Surgeon:FLORENTINO KNIGHT; [facility]     PERCUTANEOUS BIOPSY KIDNEY  6/2/2011    Procedure:PERCUTANEOUS BIOPSY KIDNEY; Surgeon:GIL WILLIAM; [facility]     PERCUTANEOUS BIOPSY KIDNEY Right 6/26/2019    Procedure: Right Kidney Biopsy;  Surgeon: Peter Briggs MD;  [facility]     PERCUTANEOUS BIOPSY KIDNEY Right 7/23/2019    Procedure: Right Kidney Biopsy;  Surgeon: Pro Menard MD;  [facility]     REMOVE CATHETER PERITONEAL  5/3/2012    Procedure:REMOVE CATHETER PERITONEAL; Removal Of Peritoneal Dialysis Catheter; Surgeon:FLORENTINO KNIGHT; [facility]     TRANSPLANT KIDNEY RECIPIENT LIVING RELATED  1995     TRANSPLANT KIDNEY RECIPIENT LIVING UNRELATED  5/18/2011    Procedure:TRANSPLANT KIDNEY RECIPIENT LIVING UNRELATED; Living unrelated left kidney transplant and placement of ureteral stent into transplant ureter.; Surgeon:FLORENTINO KNIGHT; [facility]     TRANSPLANT KIDNEY RECIPIENT LIVING UNRELATED  4/28/2012    Procedure:TRANSPLANT KIDNEY RECIPIENT LIVING UNRELATED; kidney transplant -   UNOS# [id number]  Organ arrival: 2100 4/27  Cross match results: 0200 4/28  Donor blood type: A  Recipient blood type: A; Surgeon:FLORENTINO KNIGHT; [facility]       Social History   I have reviewed this patient's social history and updated it with pertinent information if needed.  Social History     Tobacco Use     Smoking status: Never Smoker     Smokeless tobacco: Never Used     Tobacco comment: mother smokes outside   Substance Use Topics     Alcohol use: Not Currently     Drug use: No       Family  History     No significant family history.    Prior to Admission Medications   Prior to Admission Medications   Prescriptions Last Dose Informant Patient Reported? Taking?   FLUoxetine (PROZAC) 20 MG capsule 12/16/2020 at 2000  Yes Yes   Sig: Take 40 mg by mouth daily    amLODIPine (NORVASC) 10 MG tablet 12/16/2020 at 2000  No Yes   Sig: Take 1 tablet (10 mg) by mouth At Bedtime   carvedilol (COREG) 12.5 MG tablet 12/16/2020 at 2000  No Yes   Sig: Take 1 tablet (12.5 mg) by mouth 2 times daily (with meals)   cholecalciferol 2000 UNITS CAPS 12/16/2020 at 0900  Yes Yes   Sig: Take 2,000 Int'l Units by mouth daily   clonazePAM (KLONOPIN) 0.5 MG tablet   No Yes   Sig: Take 1 tablet (0.5 mg) by mouth 2 times daily as needed for anxiety   mycophenolate (GENERIC EQUIVALENT) 250 MG capsule 12/16/2020 at 2000  No Yes   Sig: Take 3 capsules (750 mg) by mouth 2 times daily   predniSONE (DELTASONE) 10 MG tablet 12/16/2020 at 0900  No Yes   Sig: TAKE 1/2 TABLET BY MOUTH ONCE DAILY   sodium bicarbonate 650 MG tablet 12/16/2020 at 2000  No Yes   Sig: Take 2 tablets (1,300 mg) by mouth 2 times daily   sulfamethoxazole-trimethoprim (BACTRIM) 400-80 MG tablet 12/16/2020 at 0900  No Yes   Sig: TAKE 1 TABLET BY MOUTH DAILY   tacrolimus (GENERIC EQUIVALENT) 0.5 MG capsule 12/16/2020 at 0900  No Yes   Sig: Take 3 capsules (1.5 mg) by mouth 2 times daily      Facility-Administered Medications: None     Allergies   Allergies   Allergen Reactions     Amoxicillin Swelling     Penicillins Swelling and Other (See Comments)     Azithromycin      Z pack - can't take with cyclosporine.     Vancomycin      Ruth syndrome     Cefprozil Rash       Physical Exam   Vital Signs: Temp: 96.6  F (35.9  C) Temp src: Core BP: (!) 157/83 Pulse: 87   Resp: 21 SpO2: 97 % O2 Device: None (Room air) Oxygen Delivery: 3 LPM  Weight: 187 lbs 13.31 oz    Constitutional: Awake and alert, in no apparent distress. Lying comfortably in bed.   Eyes: Sclera clear,  anicteric   Respiratory: Breathing non-labored. CTAB. Good air entry.   Cardiovascular:  RRR, normal S1/S2. No rubs or murmurs. Intact bilateral pedal pulses. Minimal LE edema.   GI: Soft, non-tender, non-distended. Bowel sounds present.  Skin: Warm and dry. Good color. No jaundice. L AVF intact.   Neurologic: Alert and fully oriented. No focal deficits.       Data   Data reviewed today: I reviewed all medications, new labs and imaging results over the last 24 hours.     ROUTINE IP LABS (Last four results)  Recent Labs   Lab 12/17/20 0621   POTASSIUM 4.4   GLC 96   CR 9.48*     Recent Labs   Lab 12/17/20 0621   WBC 10.7   RBC 3.42*   HGB 9.7*   HCT 30.3*   MCV 89   MCH 28.4   MCHC 32.0   RDW 13.5        Recent Labs   Lab 12/17/20 0621   INR 1.02        Glucose Values Latest Ref Rng & Units 12/17/2020 12/17/2020   Bedside Glucose (mg/dl )  - -- --   GLUCOSE 70 - 99 mg/dL 89 96   Some recent data might be hidden

## 2020-12-17 NOTE — CONSULTS
"    Interventional Radiology Consult Service Note    Patient is on IR schedule 12/18 for an image guided placement of a large bore, double lumen, tunneled CVC.   Labs WNL for procedure. COVID neg 12/14 (test done in anticipation of admission)  Orders for NPO, scrubs and antibiotics have been entered.   Medications to be held include: please do not start prophylactic AC as it will likely delay IR Procedure  Consent will be done prior to procedure.     Please contact the IR charge RN at [phone] for estimated time of procedure.     Case discussed with Dr. Evans from IR and Shakira Lopez PA-C. This is a 25 year old male with a history of HTN, CYNDEE, congenital renal dysplasia s/p multiple renal transplants (last in 2012) now with CKD V. He underwent left AV fistula creation on 12/17/20 and is admitted for expedited dialysis initiation. IR is consulted for TCVC placement for HD initiation.    Expected date of discharge: TBD. Pt is not consentable 12/17 for line placement after receiving anesthesia. Procedure requested for 12/18.    Vitals:   BP (!) 157/83 (Cuff Size: Adult Regular)   Pulse 87   Temp 96.6  F (35.9  C)   Resp 21   Ht 1.676 m (5' 6\")   Wt 85.2 kg (187 lb 13.3 oz)   SpO2 97%   BMI 30.32 kg/m      Pertinent Labs:     Lab Results   Component Value Date    WBC 10.7 12/17/2020    WBC 8.6 07/23/2019    WBC 11.7 (H) 06/26/2019       Lab Results   Component Value Date    HGB 9.7 12/17/2020    HGB 11.7 07/23/2019    HGB 12.2 07/23/2019       Lab Results   Component Value Date     12/17/2020     07/23/2019     06/26/2019       Lab Results   Component Value Date    INR 1.02 12/17/2020    PTT 29 12/17/2020       Lab Results   Component Value Date    POTASSIUM 4.4 12/17/2020        JAJA Saldaña CNP  Interventional Radiology  Pager: [phone]    "

## 2020-12-17 NOTE — BRIEF OP NOTE
[facility]     Brief Operative Note    Pre-operative diagnosis: Chronic kidney disease, stage 5, kidney failure (H) [N18.5]  Encounter regarding vascular access for dialysis for ESRD (H) [N18.6, Z99.2]  Post-operative diagnosis Same as pre-operative diagnosis    Procedure: Procedure(s):  Left Arteriovenous Upper Extremity fistula creation  Surgeon: Surgeon(s) and Role:     * Sidney Palma MD - Primary     * Zakia Schwartz MD - Fellow - Assisting  Anesthesia: * No anesthesia type entered *   Estimated blood loss: Less than 10 ml  Drains: None  Specimens: * No specimens in log *  Findings:   see op note.  Complications: None.  Implants: * No implants in log *

## 2020-12-17 NOTE — OR NURSING
Call placed to Dr. Jules re: HTN. VORB for hydralazine 10 mg IVP Q15min for SBP  > 160 mm Hg. May proceed to Phase II when SBP < 160 mm Hg.

## 2020-12-17 NOTE — PROGRESS NOTES
Jhoan's creatinine up to 9.48 from 8.27 in Nov. Getting an AVF today. Reviewed with Dr. Culp. He recommends admission for tunneled line and dialysis initiation. Patient still currently at the [place] for AVF. Spoke with Dr. Schwartz who reviewed with Dr. Palma. They approve admission with hospitalist service.     This RNCC contacted patient placement and will have them page Dr. Culp for hand-off. Dr. Schwarzt reports he will review recommendations with Jhoan and Jhoan's mother.

## 2020-12-17 NOTE — PROGRESS NOTES
[facility]  Transfer Triage Note    Date of call: 12/17/20  Time of call: 12:43 PM    Is pandemic COVID-19 a concern? :   No       Reason for transfer: Further diagnostic work up, management, and consultation for specialized care   Diagnosis: concern for needing HD    Outside Records: Available  Additional records requested to be faxed to [phone].    Stability of Patient: Patient is vitally stable, with no critical labs, and will likely remain stable throughout the transfer process  ICU: No    Expected Time of Arrival for Transfer: 8-24 hours    Arrival Location:  [address] [address] Phone: [phone]    Recommendations for Management and Stabilization: Given    Additional Comments   25-year-old male with history of congenital renal dysplasia and multiple kidney transplant last one in 2012 which is currently failing and now CKD stage V who was in OR for getting fistula placed today by surgery team.  Patient currently has creatinine to 9.5 and based on the surgery team patient will have to wait until 4 months before can use the fistula; meanwhile there is a very good chance that will get symptomatic and will likely need urgent dialysis   This case was discussed with Dr. Culp from transplant nephrology who called us and recommended admission for expediting dialysis initiation  Currently surgery team in recovery room is going to check with the patient if he is agreeable for admission and will let us know  If patient is agreeable he can get admitted to the medicine team  We will also need to consult IR for placing a tunneled catheter for initiating dialysis when he arrives to the floor    Patient is hypertensive to 178/104 in PACU rest of the vitals are stable  -We will except for a MedSurg bed  -Surgery team will confirm with patient if he is agreeable    Una Montgomery MD

## 2020-12-17 NOTE — PROGRESS NOTES
"CKD to ESRD Checklist    CKD Education:   Status: referred Date: 9/24/20   Definition/Purpose: During the encounter the patient is instructed about one or more of the following: the etiology and prognosis of their CKD; the stability (or lack thereof) of their eGFR or CrCl; medication dosing, IV contrast avoidance, or specific medications to avoid; sodium, potassium, or phosphorus restriction (or other dietary counseling).  Referral to an educator or program that offers any CKD-specific counseling is applicable. Having completed education in the previous 12 months to the encounter date is also applicable.     Modality Education:   Status: referred Date: 9/24/20   Comments: --does not want to attend, his mother is a dialysis nurse and he will ask her to educate him on what would be taught in class.  Definition/Purpose: During the encounter the patient is directly counseled and/or referred to education regarding the options for dialysis including in-center, home therapies, transplant, and/or hospice.  Patients who are at low risk of progression (by various risk calculators) may be deemed \"not a candidate.\" Completion of education in the past is also applicable.     Preferred Modality:   Status: hemodialysis    Comments: Has done PD in the past and did not like it. Prefers in center HD with plan of hopefully HHD someday.  Definition/Purpose: During the encounter the patient indicates that they have chosen a modality of dialysis, irrespective of the estimated time to ESRD.  The goal of this measure is to reflect education and understanding.  This may change visit to visit as the patient s health status and other conditions dictate.  \"Not a Candidate\" should be selected for patients choosing hospice, are at low risk for progression (based on various scoring and/or clinician judgment), or for other medically documented exclusion.     Access Surgeon Referral:   Status: referred Date: 9/23/20   Comments: Appt 11/9 with " "[facility]  Definition/Purpose: This measure indicates that the patient has been referred for discussion and/or evaluation by a dialysis access surgeon.  \"Not a Candidate\" should be selected for patients choosing hospice, are at low risk for progression (based on various scoring and/or clinician judgment), or for other medically documented exclusion.     Access Placed:   Status: AV fistula Date: 12/17/20   Comments: Dr. Culp wanting patient to initiate dialysis--see Neph comments to contact dialysis coordinators in [facility].  Definition/Purpose: This measure indicates that the patient has undergone surgery for dialysis access placement.   \"Not a Candidate\" should be selected for patients choosing hospice, are at low risk for progression (based on various scoring and/or clinician judgment), or for other medically documented exclusion.     Transplant Listing:   Status: referred    Definition/Purpose: This measure indicates that the patient has actively engaged in the transplant listing process.   \"Not Eligible\" should be selected for patients choosing hospice, are at low risk for progression (based on various scoring and/or clinician judgment), or for other medically documented exclusion.       "

## 2020-12-17 NOTE — PROGRESS NOTES
BP high pre op. 172/125. Per patient BP runs high at baseline. Did not take morning BP medication. Attempted to call Dr. Jules and Dr. Walls.

## 2020-12-18 ENCOUNTER — APPOINTMENT (OUTPATIENT)
Dept: GENERAL RADIOLOGY | Facility: CLINIC | Age: 25
DRG: 673 | End: 2020-12-18
Attending: INTERNAL MEDICINE
Payer: MEDICARE

## 2020-12-18 ENCOUNTER — APPOINTMENT (OUTPATIENT)
Dept: INTERVENTIONAL RADIOLOGY/VASCULAR | Facility: CLINIC | Age: 25
DRG: 673 | End: 2020-12-18
Attending: NURSE PRACTITIONER
Payer: MEDICARE

## 2020-12-18 LAB
ALBUMIN SERPL-MCNC: 3.1 G/DL (ref 3.4–5)
ALP SERPL-CCNC: 221 U/L (ref 40–150)
ALT SERPL W P-5'-P-CCNC: 15 U/L (ref 0–70)
ANION GAP SERPL CALCULATED.3IONS-SCNC: 17 MMOL/L (ref 3–14)
AST SERPL W P-5'-P-CCNC: 8 U/L (ref 0–45)
BILIRUB SERPL-MCNC: 0.2 MG/DL (ref 0.2–1.3)
BUN SERPL-MCNC: 103 MG/DL (ref 7–30)
CALCIUM SERPL-MCNC: 8.1 MG/DL (ref 8.5–10.1)
CHLORIDE SERPL-SCNC: 107 MMOL/L (ref 94–109)
CO2 SERPL-SCNC: 13 MMOL/L (ref 20–32)
CREAT SERPL-MCNC: 9.98 MG/DL (ref 0.66–1.25)
ERYTHROCYTE [DISTWIDTH] IN BLOOD BY AUTOMATED COUNT: 13.6 % (ref 10–15)
GFR SERPL CREATININE-BSD FRML MDRD: 6 ML/MIN/{1.73_M2}
GLUCOSE SERPL-MCNC: 109 MG/DL (ref 70–99)
HCT VFR BLD AUTO: 27 % (ref 40–53)
HGB BLD-MCNC: 8.7 G/DL (ref 13.3–17.7)
MAGNESIUM SERPL-MCNC: 2 MG/DL (ref 1.6–2.3)
MCH RBC QN AUTO: 28.8 PG (ref 26.5–33)
MCHC RBC AUTO-ENTMCNC: 32.2 G/DL (ref 31.5–36.5)
MCV RBC AUTO: 89 FL (ref 78–100)
PHOSPHATE SERPL-MCNC: 12.1 MG/DL (ref 2.5–4.5)
PLATELET # BLD AUTO: 170 10E9/L (ref 150–450)
POTASSIUM SERPL-SCNC: 4.1 MMOL/L (ref 3.4–5.3)
PROT SERPL-MCNC: 6.6 G/DL (ref 6.8–8.8)
RBC # BLD AUTO: 3.02 10E12/L (ref 4.4–5.9)
SODIUM SERPL-SCNC: 138 MMOL/L (ref 133–144)
TACROLIMUS BLD-MCNC: <3 UG/L (ref 5–15)
TME LAST DOSE: ABNORMAL H
WBC # BLD AUTO: 11.5 10E9/L (ref 4–11)

## 2020-12-18 PROCEDURE — 272N000504 HC NEEDLE CR4

## 2020-12-18 PROCEDURE — 80197 ASSAY OF TACROLIMUS: CPT | Performed by: PHYSICIAN ASSISTANT

## 2020-12-18 PROCEDURE — 83735 ASSAY OF MAGNESIUM: CPT | Performed by: PHYSICIAN ASSISTANT

## 2020-12-18 PROCEDURE — 86706 HEP B SURFACE ANTIBODY: CPT | Performed by: INTERNAL MEDICINE

## 2020-12-18 PROCEDURE — 250N000009 HC RX 250: Performed by: NURSE PRACTITIONER

## 2020-12-18 PROCEDURE — 250N000013 HC RX MED GY IP 250 OP 250 PS 637: Performed by: PHYSICIAN ASSISTANT

## 2020-12-18 PROCEDURE — 5A1D70Z PERFORMANCE OF URINARY FILTRATION, INTERMITTENT, LESS THAN 6 HOURS PER DAY: ICD-10-PCS | Performed by: INTERNAL MEDICINE

## 2020-12-18 PROCEDURE — 77001 FLUOROGUIDE FOR VEIN DEVICE: CPT

## 2020-12-18 PROCEDURE — 120N000011 HC R&B TRANSPLANT UMMC

## 2020-12-18 PROCEDURE — 99232 SBSQ HOSP IP/OBS MODERATE 35: CPT | Performed by: INTERNAL MEDICINE

## 2020-12-18 PROCEDURE — 99223 1ST HOSP IP/OBS HIGH 75: CPT | Mod: GC | Performed by: INTERNAL MEDICINE

## 2020-12-18 PROCEDURE — 250N000011 HC RX IP 250 OP 636: Performed by: HOSPITALIST

## 2020-12-18 PROCEDURE — 76937 US GUIDE VASCULAR ACCESS: CPT | Mod: 26 | Performed by: PHYSICIAN ASSISTANT

## 2020-12-18 PROCEDURE — 0JH63XZ INSERTION OF TUNNELED VASCULAR ACCESS DEVICE INTO CHEST SUBCUTANEOUS TISSUE AND FASCIA, PERCUTANEOUS APPROACH: ICD-10-PCS | Performed by: PHYSICIAN ASSISTANT

## 2020-12-18 PROCEDURE — 80053 COMPREHEN METABOLIC PANEL: CPT | Performed by: PHYSICIAN ASSISTANT

## 2020-12-18 PROCEDURE — 250N000012 HC RX MED GY IP 250 OP 636 PS 637: Performed by: PHYSICIAN ASSISTANT

## 2020-12-18 PROCEDURE — 250N000011 HC RX IP 250 OP 636: Performed by: NURSE PRACTITIONER

## 2020-12-18 PROCEDURE — 250N000013 HC RX MED GY IP 250 OP 250 PS 637: Performed by: INTERNAL MEDICINE

## 2020-12-18 PROCEDURE — 02H633Z INSERTION OF INFUSION DEVICE INTO RIGHT ATRIUM, PERCUTANEOUS APPROACH: ICD-10-PCS | Performed by: PHYSICIAN ASSISTANT

## 2020-12-18 PROCEDURE — 99152 MOD SED SAME PHYS/QHP 5/>YRS: CPT | Performed by: PHYSICIAN ASSISTANT

## 2020-12-18 PROCEDURE — 77001 FLUOROGUIDE FOR VEIN DEVICE: CPT | Mod: 26 | Performed by: PHYSICIAN ASSISTANT

## 2020-12-18 PROCEDURE — 250N000011 HC RX IP 250 OP 636: Performed by: RADIOLOGY

## 2020-12-18 PROCEDURE — 36415 COLL VENOUS BLD VENIPUNCTURE: CPT | Performed by: PHYSICIAN ASSISTANT

## 2020-12-18 PROCEDURE — 258N000003 HC RX IP 258 OP 636: Performed by: INTERNAL MEDICINE

## 2020-12-18 PROCEDURE — 87340 HEPATITIS B SURFACE AG IA: CPT | Performed by: INTERNAL MEDICINE

## 2020-12-18 PROCEDURE — 85027 COMPLETE CBC AUTOMATED: CPT | Performed by: PHYSICIAN ASSISTANT

## 2020-12-18 PROCEDURE — 250N000013 HC RX MED GY IP 250 OP 250 PS 637: Performed by: NURSE PRACTITIONER

## 2020-12-18 PROCEDURE — 250N000009 HC RX 250: Performed by: RADIOLOGY

## 2020-12-18 PROCEDURE — 90937 HEMODIALYSIS REPEATED EVAL: CPT

## 2020-12-18 PROCEDURE — 84100 ASSAY OF PHOSPHORUS: CPT | Performed by: PHYSICIAN ASSISTANT

## 2020-12-18 PROCEDURE — C1769 GUIDE WIRE: HCPCS

## 2020-12-18 PROCEDURE — 36558 INSERT TUNNELED CV CATH: CPT | Performed by: PHYSICIAN ASSISTANT

## 2020-12-18 PROCEDURE — 71045 X-RAY EXAM CHEST 1 VIEW: CPT | Mod: 26 | Performed by: RADIOLOGY

## 2020-12-18 PROCEDURE — 76937 US GUIDE VASCULAR ACCESS: CPT

## 2020-12-18 PROCEDURE — 250N000011 HC RX IP 250 OP 636: Performed by: PHYSICIAN ASSISTANT

## 2020-12-18 PROCEDURE — 71045 X-RAY EXAM CHEST 1 VIEW: CPT

## 2020-12-18 PROCEDURE — 99152 MOD SED SAME PHYS/QHP 5/>YRS: CPT

## 2020-12-18 PROCEDURE — 272N000602 HC WOUND GLUE CR1

## 2020-12-18 PROCEDURE — C1750 CATH, HEMODIALYSIS,LONG-TERM: HCPCS

## 2020-12-18 RX ORDER — CLINDAMYCIN PHOSPHATE 900 MG/50ML
900 INJECTION, SOLUTION INTRAVENOUS
Status: COMPLETED | OUTPATIENT
Start: 2020-12-18 | End: 2020-12-18

## 2020-12-18 RX ORDER — HYDROMORPHONE HYDROCHLORIDE 2 MG/1
2 TABLET ORAL EVERY 4 HOURS PRN
Status: DISCONTINUED | OUTPATIENT
Start: 2020-12-18 | End: 2020-12-21 | Stop reason: HOSPADM

## 2020-12-18 RX ORDER — FENTANYL CITRATE 50 UG/ML
25-50 INJECTION, SOLUTION INTRAMUSCULAR; INTRAVENOUS EVERY 5 MIN PRN
Status: DISCONTINUED | OUTPATIENT
Start: 2020-12-18 | End: 2020-12-18

## 2020-12-18 RX ORDER — NALOXONE HYDROCHLORIDE 0.4 MG/ML
0.4 INJECTION, SOLUTION INTRAMUSCULAR; INTRAVENOUS; SUBCUTANEOUS
Status: DISCONTINUED | OUTPATIENT
Start: 2020-12-18 | End: 2020-12-21 | Stop reason: HOSPADM

## 2020-12-18 RX ORDER — NALOXONE HYDROCHLORIDE 0.4 MG/ML
0.2 INJECTION, SOLUTION INTRAMUSCULAR; INTRAVENOUS; SUBCUTANEOUS
Status: DISCONTINUED | OUTPATIENT
Start: 2020-12-18 | End: 2020-12-21 | Stop reason: HOSPADM

## 2020-12-18 RX ORDER — HEPARIN SODIUM 1000 [USP'U]/ML
3 INJECTION, SOLUTION INTRAVENOUS; SUBCUTANEOUS ONCE
Status: COMPLETED | OUTPATIENT
Start: 2020-12-18 | End: 2020-12-18

## 2020-12-18 RX ORDER — HYDROMORPHONE HYDROCHLORIDE 1 MG/ML
0.5 INJECTION, SOLUTION INTRAMUSCULAR; INTRAVENOUS; SUBCUTANEOUS ONCE
Status: COMPLETED | OUTPATIENT
Start: 2020-12-18 | End: 2020-12-18

## 2020-12-18 RX ORDER — SULFAMETHOXAZOLE AND TRIMETHOPRIM 400; 80 MG/1; MG/1
1 TABLET ORAL
Status: DISCONTINUED | OUTPATIENT
Start: 2020-12-21 | End: 2020-12-21 | Stop reason: HOSPADM

## 2020-12-18 RX ORDER — CALCIUM CARBONATE 500 MG/1
500 TABLET, CHEWABLE ORAL DAILY PRN
Status: DISCONTINUED | OUTPATIENT
Start: 2020-12-18 | End: 2020-12-21 | Stop reason: HOSPADM

## 2020-12-18 RX ORDER — HEPARIN SODIUM 1000 [USP'U]/ML
3 INJECTION, SOLUTION INTRAVENOUS; SUBCUTANEOUS ONCE
Status: DISCONTINUED | OUTPATIENT
Start: 2020-12-18 | End: 2020-12-18

## 2020-12-18 RX ORDER — OXYCODONE HYDROCHLORIDE 5 MG/1
5 TABLET ORAL
Status: DISCONTINUED | OUTPATIENT
Start: 2020-12-18 | End: 2020-12-18

## 2020-12-18 RX ORDER — OMEPRAZOLE 40 MG/1
40 CAPSULE, DELAYED RELEASE ORAL DAILY
COMMUNITY

## 2020-12-18 RX ORDER — FLUMAZENIL 0.1 MG/ML
0.2 INJECTION, SOLUTION INTRAVENOUS
Status: DISCONTINUED | OUTPATIENT
Start: 2020-12-18 | End: 2020-12-21

## 2020-12-18 RX ADMIN — Medication 2000 UNITS: at 08:13

## 2020-12-18 RX ADMIN — ACETAMINOPHEN 650 MG: 325 TABLET, FILM COATED ORAL at 14:27

## 2020-12-18 RX ADMIN — SODIUM BICARBONATE 650 MG TABLET 1300 MG: at 08:12

## 2020-12-18 RX ADMIN — CARVEDILOL 12.5 MG: 12.5 TABLET, FILM COATED ORAL at 08:12

## 2020-12-18 RX ADMIN — HYDRALAZINE HYDROCHLORIDE 10 MG: 20 INJECTION, SOLUTION INTRAMUSCULAR; INTRAVENOUS at 23:51

## 2020-12-18 RX ADMIN — HYDROMORPHONE HYDROCHLORIDE 0.5 MG: 1 INJECTION, SOLUTION INTRAMUSCULAR; INTRAVENOUS; SUBCUTANEOUS at 06:30

## 2020-12-18 RX ADMIN — HEPARIN SODIUM 2500 UNITS: 1000 INJECTION, SOLUTION INTRAVENOUS; SUBCUTANEOUS at 11:30

## 2020-12-18 RX ADMIN — FENTANYL CITRATE 100 MCG: 50 INJECTION, SOLUTION INTRAMUSCULAR; INTRAVENOUS at 11:29

## 2020-12-18 RX ADMIN — ACETAMINOPHEN 650 MG: 325 TABLET, FILM COATED ORAL at 01:45

## 2020-12-18 RX ADMIN — FLUOXETINE HYDROCHLORIDE 40 MG: 40 CAPSULE ORAL at 19:50

## 2020-12-18 RX ADMIN — SODIUM CHLORIDE 250 ML: 9 INJECTION, SOLUTION INTRAVENOUS at 16:20

## 2020-12-18 RX ADMIN — HYDROMORPHONE HYDROCHLORIDE 2 MG: 2 TABLET ORAL at 13:43

## 2020-12-18 RX ADMIN — AMLODIPINE BESYLATE 10 MG: 10 TABLET ORAL at 21:51

## 2020-12-18 RX ADMIN — TACROLIMUS 1.5 MG: 1 CAPSULE ORAL at 19:50

## 2020-12-18 RX ADMIN — LIDOCAINE HYDROCHLORIDE 10 ML: 10 INJECTION, SOLUTION EPIDURAL; INFILTRATION; INTRACAUDAL; PERINEURAL at 11:31

## 2020-12-18 RX ADMIN — ACETAMINOPHEN 650 MG: 325 TABLET, FILM COATED ORAL at 23:57

## 2020-12-18 RX ADMIN — PREDNISONE 5 MG: 5 TABLET ORAL at 08:12

## 2020-12-18 RX ADMIN — SULFAMETHOXAZOLE AND TRIMETHOPRIM 1 TABLET: 400; 80 TABLET ORAL at 08:12

## 2020-12-18 RX ADMIN — OMEPRAZOLE 40 MG: 20 CAPSULE, DELAYED RELEASE ORAL at 12:19

## 2020-12-18 RX ADMIN — CARVEDILOL 12.5 MG: 12.5 TABLET, FILM COATED ORAL at 19:50

## 2020-12-18 RX ADMIN — OXYCODONE HYDROCHLORIDE 5 MG: 5 TABLET ORAL at 01:45

## 2020-12-18 RX ADMIN — MYCOPHENOLATE MOFETIL 750 MG: 250 CAPSULE ORAL at 19:51

## 2020-12-18 RX ADMIN — MYCOPHENOLATE MOFETIL 750 MG: 250 CAPSULE ORAL at 08:11

## 2020-12-18 RX ADMIN — ACETAMINOPHEN 650 MG: 325 TABLET, FILM COATED ORAL at 05:39

## 2020-12-18 RX ADMIN — CLINDAMYCIN IN 5 PERCENT DEXTROSE 900 MG: 18 INJECTION, SOLUTION INTRAVENOUS at 11:30

## 2020-12-18 RX ADMIN — Medication: at 16:20

## 2020-12-18 RX ADMIN — MIDAZOLAM 2 MG: 1 INJECTION INTRAMUSCULAR; INTRAVENOUS at 11:30

## 2020-12-18 RX ADMIN — TACROLIMUS 1.5 MG: 1 CAPSULE ORAL at 08:13

## 2020-12-18 RX ADMIN — SODIUM CHLORIDE 300 ML: 9 INJECTION, SOLUTION INTRAVENOUS at 16:20

## 2020-12-18 RX ADMIN — HEPARIN SODIUM 2500 UNITS: 1000 INJECTION, SOLUTION INTRAVENOUS; SUBCUTANEOUS at 11:31

## 2020-12-18 RX ADMIN — HYDRALAZINE HYDROCHLORIDE 10 MG: 20 INJECTION, SOLUTION INTRAMUSCULAR; INTRAVENOUS at 10:12

## 2020-12-18 ASSESSMENT — MIFFLIN-ST. JEOR
SCORE: 1785.75
SCORE: 1795.47

## 2020-12-18 ASSESSMENT — ACTIVITIES OF DAILY LIVING (ADL)
ADLS_ACUITY_SCORE: 11

## 2020-12-18 NOTE — PRE-PROCEDURE
GENERAL PRE-PROCEDURE:   Procedure:  Tunneled line placement    Written consent obtained?: Yes    Risks and benefits: Risks, benefits and alternatives were discussed    Consent given by:  Patient  Patient states understanding of procedure being performed: Yes    Patient's understanding of procedure matches consent: Yes    Procedure consent matches procedure scheduled: Yes    Expected level of sedation:  Moderate  Appropriately NPO:  Yes  ASA Class:  Class 3- Severe systemic disease, definite functional limitations  Mallampati  :  Grade 1- soft palate, uvula, tonsillar pillars, and posterior pharyngeal wall visible  Lungs:  Lungs clear with good breath sounds bilaterally  Heart:  Normal heart sounds and rate  History & Physical reviewed:  History and physical reviewed and no updates needed  Statement of review:  I have reviewed the lab findings, diagnostic data, medications, and the plan for sedation

## 2020-12-18 NOTE — PROGRESS NOTES
Brief Medicine Cross Cover Note  17 December 2020       Paged by RN regarding pain medication for post-procedure pain.  Ordered oxycodone 5mg Q4H x 2 doses for tonight.          Ladan Wolfe CNP, APRN  Internal Medicine [facility]  Pager [phone]

## 2020-12-18 NOTE — PROGRESS NOTES
[facility]     Medicine Progress Note - Hospitalist Service       Date of Admission:  12/17/2020  Assessment & Plan       Jhoan Hoffman is a 25 year old male with a history of HTN, CYNDEE, congenital renal dysplasia s/p multiple renal transplants (last in 2012) now with CKD V. He underwent left AV fistula creation on 12/17/20 and is admitted for expedited dialysis initiation.     CKD V with early symptoms of uremia   Hx of congenital renal dysplasia s/p multiple renal transplants (1995, 2011, 2012)  S/p left AV fistula creation (12/17/20)  Fistula placed today without complication. Per Vascular Surgery patient will need to wait approx 4 months until he can use the new fistula. Currently with creatinine of 9.5 and mild intermittent symptoms of uremia that will likely only progress in the next 4 months. Per discussion with Nephrology, patient needing expedited dialysis initiation.    - Tunneled line placed    - Nephrology consulted, HD today and tomorrow    - Continue PTA IS: tacro 1.5 mg BID (goal 4-6), MMF, prednisone 5 mg daily   - Bactrim ppx   - Discontinued PTA bicarb 1300 mg BID as now on HD    - Trend BMP   - Renal diet   - I/Os   - Daily weights  -  Care coordination assisting with HD chair placement; Ordered CXR, Hep B studies for HD center   - order iron studies, PTH, 25-OH vit D per nephrology        Hypertension: PTA on amlodipine 10 mg daily, carvedilol 12.5 mg BID. BP currently hypertensive.   - Continue PTA amlodipine, carvedilol     Depression: Continue PTA fluoxetine.        Diet: Renal Diet (non-dialysis)    DVT Prophylaxis: Pneumatic Compression Devices  Ordoñez Catheter: not present  Code Status: Full Code           Disposition Plan   Expected discharge: 2 - 3 days, recommended to prior living arrangement once nephrology recommendations, tolerating dialysis, has outpatient dialysis center.  Entered: Kimberly Reed DO 12/18/2020, 5:20 PM       The  patient's care was discussed with the Bedside Nurse and Patient.    Kimberly Reed DO  Hospitalist Service  [facility]   Contact information available via [facility] Paging/Directory  Please see sign in/sign out for up to date coverage information  ______________________________________________________________________    Interval History   Overnight had some pain at new fistula site. Ongoing this morning.   This morning tunnelled line placed by IR   Denies any nausea, vomiting, abdominal pain, dyspnea, diarrhea.   HD this afternoon     Data reviewed today: I reviewed all medications, new labs and imaging results over the last 24 hours. I personally reviewed no images or EKG's today.    Physical Exam   Vital Signs: Temp: 98.8  F (37.1  C) Temp src: Oral BP: (!) 156/88 Pulse: 94   Resp: 23 SpO2: 97 % O2 Device: None (Room air)    Weight: 192 lbs 6.4 oz  Constitutional: no apparent distress, pleasant and cooperative   Cardiovascular: regular rate and rhythm, normal S1 and S2, no murmurs, rubs, or gallops noted noted, no bilateral lower extremity edema   Respiratory: clear to auscultation bilaterally, no wheezing, rales, or rhonchi, normal work of breathing   GI: abdomen soft, non tender, non distended, bowel sounds present   Neuro: alert and oriented, no gross focal deficits noted   Skin: Left arm incision intact without erythema, arm and hand warm and well-perfused.     Data   Recent Labs   Lab 12/18/20  0614 12/17/20  0621   WBC 11.5* 10.7   HGB 8.7* 9.7*   MCV 89 89    171   INR  --  1.02    138   POTASSIUM 4.1 4.5  4.4   CHLORIDE 107 111*   CO2 13* 14*   * 93*   CR 9.98* 9.55*  9.48*   ANIONGAP 17* 13   LIZZ 8.1* 8.6   * 94  96   ALBUMIN 3.1*  --    PROTTOTAL 6.6*  --    BILITOTAL 0.2  --    ALKPHOS 221*  --    ALT 15  --    AST 8  --      Recent Results (from the past 24 hour(s))   IR CVC Tunnel Placement > 5 Yrs of Age    Narrative     PRE-PROCEDURE DIAGNOSIS: End-stage renal disease    POST-PROCEDURE DIAGNOSIS: Same    PROCEDURE: Tunneled central venous catheter placement    Impression    IMPRESSION: Completed image-guided placement of 14.5 Moroccan, 23 cm  double lumen tunneled central venous catheter via right internal  jugular vein. Catheter tip in right atrium. Aspirates and flushes  freely, heparin locked and ready for immediate use. No complication.     ----------    CLINICAL HISTORY: Patient requires central venous access for therapy.  Tunneled central venous catheter placement requested.    PERFORMED BY: Jorje Felipe PA-C    CONSENT: Written informed consent was obtained and is documented in  the patient record.    SEDATION CONSENT: It was explained to the patient that medications  used for sedation and pain relief may be administered, if needed, to  reduce anxiety and discomfort that may be associated with the  procedure. Serious side effects from the medications are rare but may  include prolonged drowsiness and difficulty breathing, possibly  requiring placement of a breathing tube to ventilate the lungs.    MEDICATIONS: Intravenous sedation was administered with 2 mg midazolam  and 100 mcg fentanyl. A 10:1 volume mixture of 1% lidocaine without  epinephrine buffered with 8.4% bicarbonate solution was available for  local anesthesia. The catheter was heparin locked upon completion of  placement.    NURSING: The patient was placed on continuous vital signs monitoring.  Vital signs and sedation were monitored by nursing staff under IR  physician staff supervision.      SEDATION TIME: 20 minutes face-to-face    FLUOROSCOPY TIME: 0.7 minutes    DESCRIPTION: The right neck and upper chest were prepped and draped in  the usual sterile fashion.      Under ultrasound guidance, the right internal jugular vein was  identified and the overlying skin was anesthetized and skin  dermatotomy was made. Under ultrasound guidance, right internal  jugular  venipuncture was made with needle. Image saved documenting  venipuncture and patency.    Needle was exchanged over guidewire for a dilator under fluoroscopic  guidance. Length to right atrium was measured with guidewire.  Guidewire and inner dilator were removed. Wire was advanced into  inferior vena cava under fluoroscopic guidance and secured.     The anterior chest skin was anesthetized and incision was made after  measurements were taken. A cuffed catheter was subcutaneously tunneled  from the anterior chest incision to the internal jugular venipuncture  site after path of tunnel was anesthetized. The dilator was exchanged  over guidewire for a peel-away sheath. Guidewire was removed. Under  fluoroscopic guidance, the catheter was placed through the peel-away  sheath. Peel-away sheath was removed.      Final catheter position saved. Both catheter lumens adequately  aspirated and flushed. Each lumen was heparin locked. A catheter  retaining suture and sterile dressing were applied. The skin  dermatotomy site overlying the internal jugular venipuncture was  closed with topical adhesive.    COMPLICATIONS: No immediate concerns, the patient remained stable  throughout the procedure and tolerated it well.    ESTIMATED BLOOD LOSS: Minimal    SPECIMENS: None    SHAR MEZA PA-C   XR Chest Port 1 View    Narrative    EXAM: XR CHEST PORT 1 VW  12/18/2020 2:22 PM      HISTORY: TB screening prior to dialysis unit placement; please  indicate in report if no evidence of tuberculosis    COMPARISON: Chest x-ray dated 4/20/2012. Fluoroscopy images dated  12/18/2020.    FINDINGS: Single view of the chest. Right IJ tunneled central venous  catheter terminates in the mid/low SVC. Trachea is midline. Cardiac  silhouette and pulmonary vasculature are within normal limits. No  abnormal airspace opacities. No pleural effusion or pneumothorax.  Partially visualized upper abdomen is unremarkable.      Impression    IMPRESSION:  No  evidence of active or latent tuberculosis.    I have personally reviewed the examination and initial interpretation  and I agree with the findings.    GIL WILLIAM MD     Medications     - MEDICATION INSTRUCTIONS -         amLODIPine  10 mg Oral At Bedtime     carvedilol  12.5 mg Oral BID w/meals     FLUoxetine  40 mg Oral Daily     gelatin absorbable  1 each Topical During Hemodialysis (from stock)     mycophenolate  750 mg Oral BID     omeprazole  40 mg Oral QAM AC     predniSONE  5 mg Oral Daily     sodium chloride (PF)  3 mL Intracatheter Q8H     [START ON 12/21/2020] sulfamethoxazole-trimethoprim  1 tablet Oral Q Mon Wed Fri AM     tacrolimus  1.5 mg Oral BID     Vitamin D3  2,000 Units Oral Daily

## 2020-12-18 NOTE — PROGRESS NOTES
Care Management Initial Consult    General Information  Assessment completed with: Patient,         Primary Care Provider verified and updated as needed: Yes   Readmission within the last 30 days:        Reason for Consult: other (see comments)(needs hemodialysis arranged as outpt)  Advance Care Planning:            Communication Assessment  Patient's communication style: spoken language (English or Bilingual)    Hearing Difficulty or Deaf: no   Wear Glasses or Blind: no    Cognitive  Cognitive/Neuro/Behavioral: WDL                      Living Environment:   People in home: alone     Current living Arrangements: apartment      Able to return to prior arrangements: yes       Family/Social Support:  Care provided by: self  Provides care for: no one  Marital Status: Single  Parent(s), Grandparent(s)          Description of Support System: Supportive, Involved    Support Assessment: Adequate family and caregiver support    Current Resources:   Skilled Home Care Services:    Community Resources:    Equipment currently used at home: none  Supplies currently used at home:  none    Employment/Financial:  Employment Status: unemployed        Financial Concerns: No concerns identified           Lifestyle & Psychosocial Needs:        Socioeconomic History     Marital status: Single     Spouse name: Not on file     Number of children: Not on file     Years of education: Not on file     Highest education level: Not on file     Tobacco Use     Smoking status: Never Smoker     Smokeless tobacco: Never Used     Tobacco comment: mother smokes outside   Substance and Sexual Activity     Alcohol use: Not Currently     Drug use: No       Functional Status:  Prior to admission patient needed assistance:              Mental Health Status:          Chemical Dependency Status:                Values/Beliefs:  Spiritual, Cultural Beliefs, Zoroastrian Practices, Values that affect care: no               Additional Information:  Pt and his  mother have called [facility] [place] [address] and they are willing to take him, but need all records. I have verified facesheet and all info is correct and I have fax'd all pertinent info except Hep B labs__,CXR___that says no TB seen, HD run notes____.  He does not have a nephrologist.  [facility]: [place] [phone] x [phone] called me and our nephrologist: Dr Meghan Tristan needs to call their nephrologsit: DR Justino Moss today and discuss this pt--I have text page Dr Meghan Tristan ([phone]) @ 2:08pm to do this.  He had his DL Tunneled line placed this morning and will begin dialysis today and will be here until Monday, 12/21.    Pt prefers afternoon but is flexible and doesn't care the days. He can drive himself or his grandparents live near him and can drive him.    Caregiver: his mother Yoanna Flores  PCP: correct on facesheet.    Mayuri Delgado RN

## 2020-12-18 NOTE — UTILIZATION REVIEW
Admission Status; Secondary Review Determination       Under the authority of the Utilization Management Committee, the utilization review process indicated a secondary review on the above patient. The review outcome is based on review of the medical records, discussions with staff, and applying clinical experience noted on the date of the review.     (x) Inpatient Status Appropriate - This patient's medical care is consistent with medical management for inpatient care and reasonable inpatient medical practice.     RATIONALE FOR DETERMINATION   25-year-old with failed kidney transplant requiring initiation of dialysis and establishing dialysis access, patient is uremic, BUN above 100. The expected length of stay at the time of admission was more than 2 nights because of the severity of illness, intensity of service provided, and risk for adverse outcome. Inpatient admission is appropriate.     This document was produced using voice recognition software       The information on this document is developed by the utilization review team in order for the business office to ensure compliance. This only denotes the appropriateness of proper admission status and does not reflect the quality of care rendered.   The definitions of Inpatient Status and Observation Status used in making the determination above are those provided in the CMS Coverage Manual, Chapter 1 and Chapter 6, section 70.4.   Sincerely,   ANGELINA GONZALES MD   System Medical Director   Utilization Management   [facility].

## 2020-12-18 NOTE — PLAN OF CARE
Jhoan appeared to sleep soundly between cares. Initially he stated he did not feel that the pain medicine was working. I helped him with repositioning. Left arm, checked bruit and thrill were positive. Dermabond to incision site is clean and dry and arm is warm to touch.. He is due for a dialysis line place some time this morning. I will do a scrub around his neck and continue with NPO status which he is fully aware and had verbalized it to me earlier when giving pain meds. Will continue to monitor and report any significant changes.    0730-Did speak to the [facility] cover concerning Oliva c/o that he is not getting adequate pain relief. Doc.Did order Dilaudid IV x1 which was given with good relief. Then he also reordered PRN oxycodone. Was not able to get to his upper body scrub due to patient sleeping. Will pass on to day nurse. Will continue to monitor and report any significant changes.

## 2020-12-18 NOTE — PROGRESS NOTES
Patient Name: Jhoan Hoffman  Medical Record Number: [id number]  Today's Date: 12/18/2020    Procedure: Tunneled central line placement.  Proceduralist: SRINATH Felipe.    Procedure Start: 1110  Procedure end: 1125  Sedation medications administered: Fentanyl: 100 mcg Versed: 2mg    Report given to: 7A, RN  : n/a    Other Notes: Pt arrived to IR room 1 from . Consent reviewed. Pt denies any questions or concerns regarding procedure. Pt positioned supine and monitored per protocol. Pt tolerated procedure without any noted complications. Pt transferred back to .    Henrietta Clark, KAREN

## 2020-12-18 NOTE — CONSULTS
[facility]   Transplant Nephrology Consult  Date of Admission:  12/17/2020  Today's Date: 12/18/2020  Requesting physician: Kimberly Reed DO    Recommendations:  - HD today after TDC placement, appreciate IR consult  - Will likely run again tomorrow then consider switching to MWF schedule  - check iron studies, PTH, 25-OH vit D  - Please arrange for outpatient dialysis placement  - Discontinue bicarb as patient now on HD  - Consent for HD obtained and scanned below    Assessment & Plan   # DDKT: Trend up, patient now with ESRD and admitted for HD initiation s/p LUE AVF 12/17. Plan for TDC per IR today.  This is likely secondary to progressive chronic changes.              - Baseline Cr ~ 3.0-3.4              - Proteinuria: Moderate (1-3 grams)              - Date DSA Last Checked: Jul/2019      Latest DSA: No              - BK Viremia: No              - Kidney Tx Biopsy: Jul 23, 2019; Result: No diagnostic evidence of acute rejection.  Transplant glomerulopathy.                                              Jun 26, 2019; Result: Acute cellular-mediated rejection, [place] 1A.     # Immunosuppression: Tacrolimus immediate release (goal 4-6), Mycophenolate mofetil (goal not followed) and Prednisone (dose 5 mg daily)              - Changes: No, continue     # Infection Prophylaxis:   - PJP: Sulfa/TMP (Bactrim)     # Hypertension: Borderline control;         Goal BP: < 130/80              - Changes: No, expect some improvement with HD and volume removal    # Anemia in Chronic Renal Disease: Hgb: Trend down      BASHIR: No              - Iron studies: Replete 6/2020, recheck iron studies     # Mineral Bone Disorder:   - Secondary renal hyperparathyroidism; PTH level: Not checked recently        On treatment: None  - Vitamin D; level: Not checked recently        On Supplement: Yes  - Calcium; level: Normal        On Supplement: No     # Electrolytes:   - Potassium; level: Normal         On Supplement: No  - Bicarbonate; level: Low        On Supplement: Yes, discontinue as patient now on HD     # Obesity: Significant intentional decrease in weight of ~ 35 lbs.              - Recommend maintaining weight loss for overall health by continuing exercise and watching caloric intake.     # GERD: Mostly well controlled with occasional use of PPI.              - Recommend using H2 blocker, such as famotidine (Pepcid) for occasional symptoms, instead of PPI.     # Depression: Symptoms appear well controlled on fluoxetine.    # Transplant History:  Etiology of Kidney Failure: Congenital dysplasia  Tx: DDKT  Transplant: 4/28/2012 (Kidney), 7/13/1995 (Kidney), 5/18/2011 (Kidney)  Donor Type: Donation after Brain Death        Donor Class: Standard Criteria Donor  Significant changes in immunosuppression: None  Significant transplant-related complications: BK Viremia    Recommendations were communicated to the primary team via this note.    Seen and discussed with Dr. Dianna Valentine MD  Pager: [phone]    Physician Attestation   I, Meghan Bustamante MD, saw this patient with the resident and agree with the resident/fellow's findings and plan of care as documented in the note.        Meghan Bustamante MD  Date of Service (when I saw the patient): 12/18/20  REASON FOR CONSULT   DDKT now ESRD    History of Present Illness   Jhoan Hoffman is a 25 year old male with above PMH who is admitted after LUE AVF creation for dialysis initiation. Notes he tolerated the procedure and pain is relatively well controlled. Appetite is okay but continues to have some nausea, no vomiting. Decreased energy which he thinks is likely related to uremia as he has been on dialysis before and had similar symptoms. Denies abdominal pain, SOB, chest pain, cough, fevers/chills, diarrhea. Still makes urine.    Review of Systems    The 10 point Review of Systems is negative other than noted in the HPI    Past Medical History    I  have reviewed this patient's medical history and updated it with pertinent information if needed.   Past Medical History:   Diagnosis Date     Anemia     in ESRD     Depression      History of blood transfusion      Hypertension      Kidney replaced by transplant      Peritoneal dialysis status (H)     in past     Renal failure      Sleep apnea        Past Surgical History   I have reviewed this patient's surgical history and updated it with pertinent information if needed.  Past Surgical History:   Procedure Laterality Date     BIOPSY       CYSTOSCOPY, REMOVE STENT(S), COMBINED  5/25/2012    Procedure:COMBINED CYSTOSCOPY, REMOVE STENT(S); Cystoscopy, Removal Of Urinary Stent; Surgeon:FLORENTINO KNIGHT; [facility]     ENDARECTERECTOMY RENAL  5/18/2011    Procedure:ENDARECTERECTOMY RENAL; Exploration of Transplant Kidney, Back Table Flush, Biopsy of Kidney and Reanastamosis of Kidney; Surgeon:FLORENTINO KNIGHT; [facility]     EXPLANT TRANSPLANTED KIDNEY  7/12/2011    Procedure:EXPLANT TRANSPLANTED KIDNEY; Transplant Nephrectomy; Surgeon:FLORENTINO KNIGHT; [facility]     INSERT CATHETER HEMODIALYSIS  5/18/2011    Procedure:INSERT CATHETER HEMODIALYSIS; Double Lumen; Surgeon:JOE HE; [facility]     INSERT CATHETER PERITONEAL DIALYSIS  4/28/2012    Procedure:INSERT CATHETER PERITONEAL DIALYSIS; Placement dialysis cath under fluoro, before kidney tx; Surgeon:FLORENTINO KNIGHT; [facility]     LAPAROTOMY EXPLORATORY  5/19/2011    Procedure:LAPAROTOMY EXPLORATORY; washout of kidney and closure; Surgeon:FLORENTINO KNIGHT; [facility]     PERCUTANEOUS BIOPSY KIDNEY  6/2/2011    Procedure:PERCUTANEOUS BIOPSY KIDNEY; Surgeon:GIL WILLIAM; [facility]     PERCUTANEOUS BIOPSY KIDNEY Right 6/26/2019    Procedure: Right Kidney Biopsy;  Surgeon: Peter Briggs MD;  [facility]     PERCUTANEOUS BIOPSY KIDNEY Right 7/23/2019    Procedure: Right Kidney Biopsy;  Surgeon:  Pro Menard MD;  [facility]     REMOVE CATHETER PERITONEAL  5/3/2012    Procedure:REMOVE CATHETER PERITONEAL; Removal Of Peritoneal Dialysis Catheter; Surgeon:FLORENTINO KNIGHT; [facility]     TRANSPLANT KIDNEY RECIPIENT LIVING RELATED  1995     TRANSPLANT KIDNEY RECIPIENT LIVING UNRELATED  5/18/2011    Procedure:TRANSPLANT KIDNEY RECIPIENT LIVING UNRELATED; Living unrelated left kidney transplant and placement of ureteral stent into transplant ureter.; Surgeon:FLORENTINO KNIGHT; [facility]     TRANSPLANT KIDNEY RECIPIENT LIVING UNRELATED  4/28/2012    Procedure:TRANSPLANT KIDNEY RECIPIENT LIVING UNRELATED; kidney transplant -   UNOS# [id number]  Organ arrival: 2100 4/27  Cross match results: 0200 4/28  Donor blood type: A  Recipient blood type: A; Surgeon:FLORENTINO KNIGHT; [facility]       Family History   I have reviewed this patient's family history and updated it with pertinent information if needed.   History reviewed. No pertinent family history.    Social History   I have reviewed this patient's social history and updated it with pertinent information if needed. Jhoan GILBERT Hoffman  reports that he has never smoked. He has never used smokeless tobacco. He reports previous alcohol use. He reports that he does not use drugs.    Allergies   Allergies   Allergen Reactions     Amoxicillin Swelling     Penicillins Swelling and Other (See Comments)     Azithromycin      Z pack - can't take with cyclosporine.     Vancomycin      Ruth syndrome     Cefprozil Rash     Prior to Admission Medications     amLODIPine  10 mg Oral At Bedtime     carvedilol  12.5 mg Oral BID w/meals     clindamycin  900 mg Intravenous Pre-Op/Pre-procedure x 1 dose     FLUoxetine  40 mg Oral Daily     mycophenolate  750 mg Oral BID     predniSONE  5 mg Oral Daily     sodium bicarbonate  1,300 mg Oral BID     sodium chloride (PF)  3 mL Intracatheter Q8H     sulfamethoxazole-trimethoprim  1 tablet Oral Daily      "tacrolimus  1.5 mg Oral BID     Vitamin D3  2,000 Units Oral Daily       - MEDICATION INSTRUCTIONS -         Physical Exam   Temp  Av.1  F (36.2  C)  Min: 96.3  F (35.7  C)  Max: 98.3  F (36.8  C)      Pulse  Av  Min: 70  Max: 98 Resp  Av.9  Min: 14  Max: 24  SpO2  Av.5 %  Min: 96 %  Max: 100 %     BP (!) 169/96 (BP Location: Right leg)   Pulse 91   Temp 97.7  F (36.5  C) (Oral)   Resp 14   Ht 1.676 m (5' 6\")   Wt 87.3 kg (192 lb 6.4 oz)   SpO2 98%   BMI 31.05 kg/m      Admit Weight: 85.2 kg (187 lb 13.3 oz)     GENERAL APPEARANCE: alert and no distress  HENT: mouth without ulcers or lesions  LYMPHATICS: no cervical or supraclavicular nodes  RESP: lungs clear to auscultation - no rales, rhonchi or wheezes  CV: regular rhythm, normal rate, no rub, no murmur  EDEMA: no LE edema bilaterally  ABDOMEN: soft, nondistended, nontender, bowel sounds normal  MS: extremities normal - no gross deformities noted  SKIN: no rash on exposed surfaces  Access: LUE AVF with thrill, incision c/d/i    Data   CMP  Recent Labs   Lab 20  0621    138   POTASSIUM 4.1 4.5  4.4   CHLORIDE 107 111*   CO2 13* 14*   ANIONGAP 17* 13   * 94  96   * 93*   CR 9.98* 9.55*  9.48*   GFRESTIMATED 6* 7*  7*   GFRESTBLACK 7* 8*  8*   LIZZ 8.1* 8.6   MAG 2.0  --    PHOS 12.1*  --    PROTTOTAL 6.6*  --    ALBUMIN 3.1*  --    BILITOTAL 0.2  --    ALKPHOS 221*  --    AST 8  --    ALT 15  --      CBC  Recent Labs   Lab 20  0621   HGB 8.7* 9.7*   WBC 11.5* 10.7   RBC 3.02* 3.42*   HCT 27.0* 30.3*   MCV 89 89   MCH 28.8 28.4   MCHC 32.2 32.0   RDW 13.6 13.5    171     INR  Recent Labs   Lab 20  0621   INR 1.02   PTT 29     ABGNo lab results found in last 7 days.   Urine Studies  Recent Labs   Lab Test 19  0600 19  0555 04/07/15  1427   COLOR Straw Straw Light Yellow   APPEARANCE Slightly Cloudy Clear Clear   URINEGLC Negative Negative Negative "   URINEBILI Negative Negative Negative   URINEKETONE Negative Negative Negative   SG 1.005 1.009 1.013   UBLD Small* Small* Negative   URINEPH 6.0 5.0 6.0   PROTEIN 100* 100* Negative   NITRITE Negative Negative Negative   LEUKEST Negative Negative Negative   RBCU 0 2 1   WBCU 0 0 <1     Recent Labs   Lab Test 07/23/19  0600 06/26/19  0555 06/07/19  1518 09/18/17  1422 04/07/15  1426   UTPG 2.82* 1.86* 1.98* 0.79* 0.15     PTH  Recent Labs   Lab Test 09/18/17  1431   PTHI 151*     Iron Studies  No lab results found.    IMAGING:  All imaging studies reviewed by me.

## 2020-12-18 NOTE — PLAN OF CARE
Pt was admitted up to 7A from the PACU after AV fistula creation. Pat is alert and oriented x4, endorses some pain around left elbow. Given tylenol & oxycodone w reported relief. Hypertensive in the 170-180s, OVSS on room air. Paged MD for prn, given hydralazine. Incision site is c/d/I w some edema and ecchymosis. Bruit & thrill present. Pt already ate dinner down in PACU. Took pills with water without incident. Up ad abelardo in the room. PIV saline locked. Plan for HD line placement tomorrow in IR & then first dialysis run. Will continue to monitor and notify team w concerns.

## 2020-12-18 NOTE — PROGRESS NOTES
"Transplant Surgery  Inpatient Daily Progress Note  12/18/2020    Assessment & Plan: 24yo with hx congenital renal dysplasia s/p KT x3, all failed. S/p left brachiobasilic AV fistula creation on 12/17/2020. Admitted post-op for uremia/ initiation of dialysis.    Access: POD #1. Incision intact. + thrill. No issues.   -May shower  -F/up with Dr. Palma in Transplant Clinic in 2 weeks    Medical Decision Making: Low    KARLIE/Fellow/Resident Provider: Debbie Medina NP [id number]    Faculty: Sidney Palma M.D.    __________________________________________________________________  Transplant History:   4/28/2012 (Kidney), 7/13/1995 (Kidney), 5/18/2011 (Kidney), Postoperative day: 3156     Interval History: History is obtained from the patient  Overnight events: Pain controlled, no issues    ROS:   A 10-point review of systems was negative except as noted above.    Meds:    sodium chloride 0.9%  250 mL Intravenous Once in dialysis     sodium chloride 0.9%  300 mL Hemodialysis Machine Once     amLODIPine  10 mg Oral At Bedtime     carvedilol  12.5 mg Oral BID w/meals     clindamycin  900 mg Intravenous Pre-Op/Pre-procedure x 1 dose     FLUoxetine  40 mg Oral Daily     gelatin absorbable  1 each Topical During Hemodialysis (from stock)     mycophenolate  750 mg Oral BID     - MEDICATION INSTRUCTIONS -   Does not apply Once     predniSONE  5 mg Oral Daily     sodium bicarbonate  1,300 mg Oral BID     sodium chloride (PF)  3 mL Intracatheter Q8H     [START ON 12/21/2020] sulfamethoxazole-trimethoprim  1 tablet Oral Q Mon Wed Fri AM     tacrolimus  1.5 mg Oral BID     Vitamin D3  2,000 Units Oral Daily       Physical Exam:     Admit Weight: 85.2 kg (187 lb 13.3 oz)    Current vitals:   BP (!) 169/96 (BP Location: Right leg)   Pulse 91   Temp 97.7  F (36.5  C) (Oral)   Resp 14   Ht 1.676 m (5' 6\")   Wt 87.3 kg (192 lb 6.4 oz)   SpO2 98%   BMI 31.05 kg/m      Vital sign ranges:    Temp:  [96.3  F (35.7  C)-97.8  F (36.6  C)] " 97.7  F (36.5  C)  Pulse:  [74-98] 91  Resp:  [14-24] 14  BP: (150-193)/() 169/96  SpO2:  [96 %-100 %] 98 %  Patient Vitals for the past 24 hrs:   BP Temp Temp src Pulse Resp SpO2 Weight   12/18/20 0928 (!) 169/96 -- -- -- -- -- --   12/18/20 0826 (!) 170/103 97.7  F (36.5  C) Oral 91 14 98 % --   12/18/20 0800 -- -- -- -- -- 100 % --   12/18/20 0400 -- -- -- -- -- -- 87.3 kg (192 lb 6.4 oz)   12/17/20 2347 -- 97.7  F (36.5  C) -- 94 14 98 % --   12/17/20 2300 (!) 158/93 -- -- 91 -- -- --   12/17/20 2200 (!) 158/87 -- -- 94 -- -- --   12/17/20 2100 (!) 166/97 -- -- 84 -- -- --   12/17/20 2000 (!) 166/92 -- -- 86 -- -- --   12/17/20 1930 (!) 171/97 97.8  F (36.6  C) -- 87 16 97 % --   12/17/20 1915 (!) 167/97 -- -- -- -- -- --   12/17/20 1900 (!) 156/90 -- -- -- -- -- --   12/17/20 1845 (!) 168/91 -- -- -- -- -- --   12/17/20 1830 (!) 181/98 -- -- -- -- -- --   12/17/20 1815 (!) 179/94 -- -- -- -- -- --   12/17/20 1800 (!) 179/93 -- -- -- -- -- --   12/17/20 1756 (!) 181/92 97.5  F (36.4  C) Oral 91 20 97 % --   12/17/20 1730 (!) 162/89 -- -- -- -- 98 % --   12/17/20 1715 -- -- -- -- -- 96 % --   12/17/20 1700 (!) 154/88 -- -- 90 18 97 % --   12/17/20 1645 (!) 158/84 -- -- 94 20 -- --   12/17/20 1615 -- -- -- -- 20 -- --   12/17/20 1600 (!) 174/107 -- -- 98 22 -- --   12/17/20 1500 (!) 150/90 96.8  F (36  C) -- 86 20 97 % --   12/17/20 1400 (!) 162/90 96.8  F (36  C) -- 86 20 96 % --   12/17/20 1342 (!) 157/83 96.6  F (35.9  C) -- 87 21 97 % --   12/17/20 1330 (!) 161/83 96.4  F (35.8  C) -- 87 20 96 % --   12/17/20 1315 (!) 176/98 96.6  F (35.9  C) -- 91 20 98 % --   12/17/20 1300 (!) 160/83 96.3  F (35.7  C) -- 84 18 96 % --   12/17/20 1245 (!) 184/102 -- -- 80 18 96 % --   12/17/20 1230 (!) 193/108 -- -- 85 24 97 % --   12/17/20 1215 (!) 185/114 -- -- 76 18 98 % --   12/17/20 1200 (!) 186/97 96.6  F (35.9  C) Core 76 18 99 % --   12/17/20 1145 (!) 178/104 -- -- 74 -- 99 % --   12/17/20 1130 (!) 159/94 -- --  82 -- 99 % --     General Appearance: in no apparent distress.   Skin: normal, warm, dry  Heart: Perfused  Lungs: Room air  Extremities: LUE incision CDI, + thrill  Neurologic: A&Ox4    Data:   CMP  Recent Labs   Lab 12/18/20  0614 12/17/20  0621    138   POTASSIUM 4.1 4.5  4.4   CHLORIDE 107 111*   CO2 13* 14*   * 94  96   * 93*   CR 9.98* 9.55*  9.48*   GFRESTIMATED 6* 7*  7*   GFRESTBLACK 7* 8*  8*   LIZZ 8.1* 8.6   MAG 2.0  --    PHOS 12.1*  --    ALBUMIN 3.1*  --    BILITOTAL 0.2  --    ALKPHOS 221*  --    AST 8  --    ALT 15  --      CBC  Recent Labs   Lab 12/18/20  0614 12/17/20  0621   HGB 8.7* 9.7*   WBC 11.5* 10.7    171

## 2020-12-18 NOTE — PHARMACY-ADMISSION MEDICATION HISTORY
Admission Medication History Completed by Pharmacy    See [facility] Admission Navigator for allergy information, preferred outpatient pharmacy, prior to admission medications and immunization status.     Medication History Sources:     Patient    Fill history (carrieiftjose e white)    Mom (Yoanna)    Changes made to PTA medication list (reason):    Added: omeprazole 40 mg cap daily (per pt, fill history)    Deleted: klonopin 0.5 mg tab bid prn anxiety (per pt did not remember this medication, no recent fill history)    Changed: None    Additional Information:    Patient confirmed medications and doses were confirmed by fill history.    Per fill history, patient has not filled carvedilol (March), MMF (May), Tacrolimus (September), prednisone (September) recently - all 30 day supplies. Patient stated was compliant with all medications. Pt mom stated that every time pt was  admitted/discharged from OSH, was sent home with medications, so pt currently has multiple bottles of immunosuppression medications, so they did not need to be filled at pharmacy.    Pt taking omeprazole PTA, currently not ordered. Consider ordering as clinically appropriate.    Prior to Admission medications    Medication Sig Last Dose Taking? Auth Provider   amLODIPine (NORVASC) 10 MG tablet Take 1 tablet (10 mg) by mouth At Bedtime 12/16/2020 at 2000 Yes Jose Culp MD   carvedilol (COREG) 12.5 MG tablet Take 1 tablet (12.5 mg) by mouth 2 times daily (with meals) 12/16/2020 at 2000 Yes Jose Culp MD   cholecalciferol 2000 UNITS CAPS Take 2,000 Int'l Units by mouth daily 12/16/2020 at 0900 Yes Jose Culp MD   FLUoxetine (PROZAC) 40 MG capsule Take 40 mg by mouth daily  12/16/2020 at 2000 Yes Mitch Anguiano MD   mycophenolate (GENERIC EQUIVALENT) 250 MG capsule Take 3 capsules (750 mg) by mouth 2 times daily 12/16/2020 at 2000 Yes Jose Culp MD   omeprazole (PRILOSEC) 40 MG DR capsule Take 40 mg by mouth daily  12/16/2020 Yes Unknown, Entered By History   predniSONE (DELTASONE) 10 MG tablet TAKE 1/2 TABLET BY MOUTH ONCE DAILY 12/16/2020 at 0900 Yes Jose Culp MD   sodium bicarbonate 650 MG tablet Take 2 tablets (1,300 mg) by mouth 2 times daily 12/16/2020 at 2000 Yes Jose Culp MD   sulfamethoxazole-trimethoprim (BACTRIM) 400-80 MG tablet TAKE 1 TABLET BY MOUTH DAILY 12/16/2020 at 0900 Yes Jose Culp MD   tacrolimus (GENERIC EQUIVALENT) 0.5 MG capsule Take 3 capsules (1.5 mg) by mouth 2 times daily 12/16/2020 at 0900 Yes Jose Culp MD       Date completed: 12/18/20    Medication history completed by: Mervat Garcia, PharmD Candidate

## 2020-12-18 NOTE — PROCEDURES
[facility]     Procedure: IR Procedure Note    Date/Time: 12/18/2020 11:35 AM  Performed by: Jorje Felipe PA-C  Authorized by: Jorje Felipe PA-C     UNIVERSAL PROTOCOL   Site Marked: NA  Prior Images Obtained and Reviewed:  Yes  Required items: Required blood products, implants, devices and special equipment available    Patient identity confirmed:  Verbally with patient, arm band, provided demographic data and hospital-assigned identification number  Patient was reevaluated immediately before administering moderate or deep sedation or anesthesia  Confirmation Checklist:  Patient's identity using two indicators, relevant allergies, procedure was appropriate and matched the consent or emergent situation and correct equipment/implants were available  Time out: Immediately prior to the procedure a time out was called    Universal Protocol: the Joint Commission Universal Protocol was followed    Preparation: Patient was prepped and draped in usual sterile fashion           ANESTHESIA    Anesthesia: Local infiltration  Local Anesthetic:  Lidocaine 1% without epinephrine  Anesthetic Total (mL):  8      SEDATION    Patient Sedated: Yes    Sedation Type:  Moderate (conscious) sedation  Sedation:  Fentanyl and midazolam  Vital signs: Vital signs monitored during sedation    Fluoroscopy Time: 1 minute(s)  See dictated procedure note for full details.  Findings: Moderate sedation for TCVC placement - 2 mg of midazolam and 100 mcg of fentanyl.     Specimens: none    Complications: None    Condition: Stable    Plan: 1 hour of bedrest.     PROCEDURE   Patient Tolerance:  Patient tolerated the procedure well with no immediate complications  Describe Procedure: Completed image-guided placement of 14.5 Macanese, 23 cm double lumen tunneled central venous catheter via right IJ. Aspirates and flushes freely, heparin locked and ready for immediate use. Tip in high right  atrium.      Length of time physician/provider present for 1:1 monitoring during sedation: 20

## 2020-12-19 LAB
ALBUMIN SERPL-MCNC: 2.9 G/DL (ref 3.4–5)
ALP SERPL-CCNC: 203 U/L (ref 40–150)
ALT SERPL W P-5'-P-CCNC: 12 U/L (ref 0–70)
ANION GAP SERPL CALCULATED.3IONS-SCNC: 14 MMOL/L (ref 3–14)
AST SERPL W P-5'-P-CCNC: 5 U/L (ref 0–45)
BILIRUB SERPL-MCNC: 0.2 MG/DL (ref 0.2–1.3)
BUN SERPL-MCNC: 76 MG/DL (ref 7–30)
CALCIUM SERPL-MCNC: 8.2 MG/DL (ref 8.5–10.1)
CHLORIDE SERPL-SCNC: 108 MMOL/L (ref 94–109)
CO2 SERPL-SCNC: 16 MMOL/L (ref 20–32)
CREAT SERPL-MCNC: 7.99 MG/DL (ref 0.66–1.25)
ERYTHROCYTE [DISTWIDTH] IN BLOOD BY AUTOMATED COUNT: 13.5 % (ref 10–15)
FERRITIN SERPL-MCNC: 143 NG/ML (ref 26–388)
GFR SERPL CREATININE-BSD FRML MDRD: 8 ML/MIN/{1.73_M2}
GLUCOSE SERPL-MCNC: 98 MG/DL (ref 70–99)
HCT VFR BLD AUTO: 24.6 % (ref 40–53)
HGB BLD-MCNC: 7.9 G/DL (ref 13.3–17.7)
IRON SATN MFR SERPL: 24 % (ref 15–46)
IRON SERPL-MCNC: 53 UG/DL (ref 35–180)
MCH RBC QN AUTO: 28.2 PG (ref 26.5–33)
MCHC RBC AUTO-ENTMCNC: 32.1 G/DL (ref 31.5–36.5)
MCV RBC AUTO: 88 FL (ref 78–100)
PLATELET # BLD AUTO: 146 10E9/L (ref 150–450)
POTASSIUM SERPL-SCNC: 3.6 MMOL/L (ref 3.4–5.3)
PROT SERPL-MCNC: 6.4 G/DL (ref 6.8–8.8)
PTH-INTACT SERPL-MCNC: 2523 PG/ML (ref 18–80)
RBC # BLD AUTO: 2.8 10E12/L (ref 4.4–5.9)
SODIUM SERPL-SCNC: 138 MMOL/L (ref 133–144)
TIBC SERPL-MCNC: 218 UG/DL (ref 240–430)
TRANSFERRIN SERPL-MCNC: 157 MG/DL (ref 210–360)
WBC # BLD AUTO: 10 10E9/L (ref 4–11)

## 2020-12-19 PROCEDURE — 85027 COMPLETE CBC AUTOMATED: CPT | Performed by: INTERNAL MEDICINE

## 2020-12-19 PROCEDURE — 83970 ASSAY OF PARATHORMONE: CPT | Performed by: INTERNAL MEDICINE

## 2020-12-19 PROCEDURE — 82728 ASSAY OF FERRITIN: CPT | Performed by: INTERNAL MEDICINE

## 2020-12-19 PROCEDURE — 120N000011 HC R&B TRANSPLANT UMMC

## 2020-12-19 PROCEDURE — 250N000013 HC RX MED GY IP 250 OP 250 PS 637: Performed by: INTERNAL MEDICINE

## 2020-12-19 PROCEDURE — 83540 ASSAY OF IRON: CPT | Performed by: INTERNAL MEDICINE

## 2020-12-19 PROCEDURE — 250N000013 HC RX MED GY IP 250 OP 250 PS 637: Performed by: PHYSICIAN ASSISTANT

## 2020-12-19 PROCEDURE — 250N000012 HC RX MED GY IP 250 OP 636 PS 637: Performed by: NURSE PRACTITIONER

## 2020-12-19 PROCEDURE — 250N000012 HC RX MED GY IP 250 OP 636 PS 637: Performed by: PHYSICIAN ASSISTANT

## 2020-12-19 PROCEDURE — 99232 SBSQ HOSP IP/OBS MODERATE 35: CPT | Performed by: INTERNAL MEDICINE

## 2020-12-19 PROCEDURE — 83550 IRON BINDING TEST: CPT | Performed by: INTERNAL MEDICINE

## 2020-12-19 PROCEDURE — 90937 HEMODIALYSIS REPEATED EVAL: CPT

## 2020-12-19 PROCEDURE — 258N000003 HC RX IP 258 OP 636: Performed by: INTERNAL MEDICINE

## 2020-12-19 PROCEDURE — 250N000011 HC RX IP 250 OP 636: Performed by: PHYSICIAN ASSISTANT

## 2020-12-19 PROCEDURE — 82306 VITAMIN D 25 HYDROXY: CPT | Performed by: INTERNAL MEDICINE

## 2020-12-19 PROCEDURE — 36415 COLL VENOUS BLD VENIPUNCTURE: CPT | Performed by: INTERNAL MEDICINE

## 2020-12-19 PROCEDURE — 84466 ASSAY OF TRANSFERRIN: CPT | Performed by: INTERNAL MEDICINE

## 2020-12-19 PROCEDURE — 80053 COMPREHEN METABOLIC PANEL: CPT | Performed by: INTERNAL MEDICINE

## 2020-12-19 RX ORDER — MYCOPHENOLATE MOFETIL 250 MG/1
500 CAPSULE ORAL 2 TIMES DAILY
Status: DISCONTINUED | OUTPATIENT
Start: 2020-12-19 | End: 2020-12-21 | Stop reason: HOSPADM

## 2020-12-19 RX ADMIN — FLUOXETINE HYDROCHLORIDE 40 MG: 40 CAPSULE ORAL at 21:04

## 2020-12-19 RX ADMIN — TACROLIMUS 1.5 MG: 1 CAPSULE ORAL at 09:01

## 2020-12-19 RX ADMIN — PREDNISONE 5 MG: 5 TABLET ORAL at 09:01

## 2020-12-19 RX ADMIN — ACETAMINOPHEN 650 MG: 325 TABLET, FILM COATED ORAL at 17:16

## 2020-12-19 RX ADMIN — TACROLIMUS 1.5 MG: 1 CAPSULE ORAL at 21:04

## 2020-12-19 RX ADMIN — OMEPRAZOLE 40 MG: 20 CAPSULE, DELAYED RELEASE ORAL at 09:01

## 2020-12-19 RX ADMIN — Medication: at 13:16

## 2020-12-19 RX ADMIN — CARVEDILOL 12.5 MG: 12.5 TABLET, FILM COATED ORAL at 18:25

## 2020-12-19 RX ADMIN — Medication 2000 UNITS: at 09:01

## 2020-12-19 RX ADMIN — SODIUM CHLORIDE 250 ML: 9 INJECTION, SOLUTION INTRAVENOUS at 13:16

## 2020-12-19 RX ADMIN — AMLODIPINE BESYLATE 10 MG: 10 TABLET ORAL at 21:04

## 2020-12-19 RX ADMIN — MYCOPHENOLATE MOFETIL 500 MG: 250 CAPSULE ORAL at 21:04

## 2020-12-19 RX ADMIN — CARVEDILOL 12.5 MG: 12.5 TABLET, FILM COATED ORAL at 09:00

## 2020-12-19 RX ADMIN — SODIUM CHLORIDE 300 ML: 9 INJECTION, SOLUTION INTRAVENOUS at 13:16

## 2020-12-19 RX ADMIN — MYCOPHENOLATE MOFETIL 750 MG: 250 CAPSULE ORAL at 09:01

## 2020-12-19 RX ADMIN — ONDANSETRON 4 MG: 4 TABLET, ORALLY DISINTEGRATING ORAL at 17:15

## 2020-12-19 ASSESSMENT — MIFFLIN-ST. JEOR
SCORE: 1787.31
SCORE: 1787.75

## 2020-12-19 ASSESSMENT — ACTIVITIES OF DAILY LIVING (ADL)
ADLS_ACUITY_SCORE: 11

## 2020-12-19 NOTE — PLAN OF CARE
"BP (!) 151/83 (BP Location: Right leg)   Pulse 87   Temp 97.4  F (36.3  C) (Oral)   Resp 17   Ht 1.676 m (5' 6\")   Wt 86.5 kg (190 lb 9.6 oz)   SpO2 97%   BMI 30.76 kg/m    REASON FOR ADMIT: initiation of hemodialysis    VS: hypertensive per baseline, 164/93 IV hydrazaline given recheck 149/79, OVSS on room air   NEURO: calm, cooperative and pleasant   BG/LABS: none   Pain/Nausea: tylenol x1 for pain and denies nausea   Diet: renal diet   LDA/IVFs: dialysis line placed yesterday, L. Fistula, R. PIV saline locked   GI/: voids adequately and spontaneously, BM on NOC shift  Skin: no new issues overnight  Mobility: up ad abelardo   Plan: continue with setting up dialysis plan outpatient    Will continue to monitor and update team with any changes  "

## 2020-12-19 NOTE — PROGRESS NOTES
HEMODIALYSIS TREATMENT NOTE    Date: 12/19/2020  Time: 4:21 PM    Data:  Pre Wt: 87.3 kg (192 lb 7.4 oz)   Desired Wt: 85.5 kg   Post Wt: 86.8 kg (191 lb 5.8 oz)  Weight change: 0.5 kg  Ultrafiltration - Post Run Net Total Removed (mL): 500 mL  Vascular Access Status: patent  Dialyzer Rinse: Streaked, Moderate  Total Blood Volume Processed: 51.47 L Liters  Total Dialysis (Treatment) Time: 3 Hours    Lab:       Interventions:  3 hours of HD. Attempted 1 kg but only took 500mls. Patient with cramps and nausea. Given 150 mls flush and stopped pulling. Felt better. VSS A+O      Assessment:  Transplant patient now with ESRD in for 2 nd run. VSS A+O     Plan:    Per renal

## 2020-12-19 NOTE — PLAN OF CARE
Hypertensive (160s/80s), scheduled Coreg and Norvasc given. Down for dialysis this shift, 1 kg removed. Up independently in room. Denies pain or nausea. Good appetite on renal diet. 800 mL void this shift.

## 2020-12-19 NOTE — PLAN OF CARE
Vitals: Hypertensive this morning, 178/83, R leg reading, 159/80 after morning Coreg dose.  Endocrine: n/a  Labs: Creatinine 7.99, had HD yesterday.  Pain: Patient denies pain.  PRN's: n/a  Diet:  Dialysis diet, good appetite.  LDA: R PIV saline locked, R DL CVC.  GI: BM 12/20.  : Oliguric, is on HD.  Skin: Left arm graft incision, mild numbness on forearm, hand is warm.  Neuro: Pleasant,, alert and oriented.  Mobility: Up ad abelardo.  Education: n/a  Plan: HD at 1330.

## 2020-12-19 NOTE — PROGRESS NOTES
[facility]     Medicine Progress Note - Hospitalist Service       Date of Admission:  12/17/2020  Assessment & Plan    Jhoan Hoffman is a 25 year old male with a history of HTN, CYNDEE, congenital renal dysplasia s/p multiple renal transplants (last in 2012) now with CKD V. He underwent left AV fistula creation on 12/17/20 and is admitted for expedited dialysis initiation.     CKD V with early symptoms of uremia   Hx of congenital renal dysplasia s/p multiple renal transplants (1995, 2011, 2012)  S/p left AV fistula creation (12/17/20)  Fistula placed 12/17/20 without complication. Per Vascular Surgery patient will need to wait approx 4 months until he can use the new fistula. Currently with creatinine of 9.5 and mild intermittent symptoms of uremia that will likely only progress in the next 4 months. Per discussion with Nephrology, patient needing expedited dialysis initiation. Admitted and tunneled line placed 12/18/2020.   Non-oliguric.    - Nephrology consulted, HD session per their recommendations. 12/17, again 12/18.     - Continue PTA IS: tacro 1.5 mg BID (goal 4-6), prednisone 5 mg daily; decrease MMF to 500mg BID per transplant nephrology    - Bactrim ppx   - Discontinued PTA bicarb 1300 mg BID as now on HD    - Trend BMP   - Renal diet   - I/Os   - Daily weights  -  Care coordination assisting with HD chair placement; Ordered CXR, Hep B studies for HD center   - ordred iron studies, PTH, 25-OH vit D per nephrology    - CXR done 12/18 documenting no evidence of active or latent TB for HD chair placement  - Appreciate care coordination assistance in HD center placement prior to discharge   - Hyperphosphatemia - will ask nephro about phos binder, check phos in AM      Hypertension: PTA on amlodipine 10 mg daily, carvedilol 12.5 mg BID. BP currently hypertensive.  - Continue PTA amlodipine, carvedilol   - As remains hypertensive, will discuss with nephrology about  adjusting antihypertensive regimen (some HTN may be due to volume overload).      Depression: Continue PTA fluoxetine.        Diet: Renal Diet (non-dialysis)    DVT Prophylaxis: Pneumatic Compression Devices  Ordoñez Catheter: not present  Code Status: Full Code           Disposition Plan   Expected discharge: 2 - 3 days, recommended to prior living arrangement once nephrology recommendations, tolerating dialysis, has outpatient dialysis center.  Entered: Kimberly Reed DO 12/19/2020, 3:42 PM       The patient's care was discussed with the Bedside Nurse and Patient.    Kimberly Reed DO  Hospitalist Service  [facility]   Contact information available via [facility] Paging/Directory  Please see sign in/sign out for up to date coverage information  ______________________________________________________________________    Interval History   Tolerated HD yesterday without issues.   Denies any nausea today, eating breakfast.     Data reviewed today: I reviewed all medications, new labs and imaging results over the last 24 hours. I personally reviewed no images or EKG's today.    Physical Exam   Vital Signs: Temp: 97.8  F (36.6  C) Temp src: Oral BP: (!) 156/89 Pulse: 101   Resp: (!) 38 SpO2: 98 % O2 Device: None (Room air)    Weight: 190 lbs 11.17 oz  Constitutional: no apparent distress, pleasant and cooperative   Cardiovascular: regular rate and rhythm, normal S1 and S2, no murmurs, rubs, or gallops noted noted, no bilateral lower extremity edema   Respiratory: clear to auscultation bilaterally, no wheezing, rales, or rhonchi, normal work of breathing   GI: abdomen soft, non tender, non distended, bowel sounds present   Neuro: alert and oriented, no gross focal deficits noted   Skin: Left arm incision intact without erythema, arm and hand warm and well-perfused.     Data   Recent Labs   Lab 12/19/20  0726 12/18/20  0614 12/17/20  0621   WBC 10.0 11.5* 10.7   HGB 7.9* 8.7* 9.7*   MCV  88 89 89   * 170 171   INR  --   --  1.02    138 138   POTASSIUM 3.6 4.1 4.5  4.4   CHLORIDE 108 107 111*   CO2 16* 13* 14*   BUN 76* 103* 93*   CR 7.99* 9.98* 9.55*  9.48*   ANIONGAP 14 17* 13   LIZZ 8.2* 8.1* 8.6   GLC 98 109* 94  96   ALBUMIN 2.9* 3.1*  --    PROTTOTAL 6.4* 6.6*  --    BILITOTAL 0.2 0.2  --    ALKPHOS 203* 221*  --    ALT 12 15  --    AST 5 8  --      No results found for this or any previous visit (from the past 24 hour(s)).  Medications     - MEDICATION INSTRUCTIONS -         amLODIPine  10 mg Oral At Bedtime     carvedilol  12.5 mg Oral BID w/meals     FLUoxetine  40 mg Oral Daily     gelatin absorbable  1 each Topical During Hemodialysis (from stock)     mycophenolate  500 mg Oral BID     omeprazole  40 mg Oral QAM AC     predniSONE  5 mg Oral Daily     sodium chloride (PF)  3 mL Intracatheter Q8H     sodium chloride (PF)  9 mL Intracatheter During Hemodialysis (from stock)     sodium chloride (PF)  9 mL Intracatheter During Hemodialysis (from stock)     [START ON 12/21/2020] sulfamethoxazole-trimethoprim  1 tablet Oral Q Mon Wed Fri AM     tacrolimus  1.5 mg Oral BID     Vitamin D3  2,000 Units Oral Daily

## 2020-12-19 NOTE — PROGRESS NOTES
HEMODIALYSIS TREATMENT NOTE    Date: 12/18/2020  Time: 7:27 PM    Data:  Pre Wt: 87.3 kg (192 lb 7.4 oz)   Desired Wt: 86.3 kg   Post Wt: 86.3 kg (190 lb 4.1 oz)  Weight change: 1 kg  Ultrafiltration - Post Run Net Total Removed (mL): 1000 mL  Vascular Access Status: CVC  patent  Dialyzer Rinse: Clear  Total Blood Volume Processed: 23.31 L   Total Dialysis (Treatment) Time: 2   Dialysate Bath: K 3, Ca 3  Heparin: None    Lab:   Hepatitis B Series    Interventions:  1st HD initiation today again after several years of HD. Pt had a stable uncomplicated 2 hours of HD @ . 1L of fluid was pulled. Pt rinsed back post tx, lumen were saline locked, and hand off report given to KAREN Holt.    Assessment:  -Pt calm & Cooperative  -A & O X 4     Plan:    Next HD per renal team

## 2020-12-19 NOTE — PROGRESS NOTES
[facility]    Transplant Nephrology Progress Note  Date of Admission:  12/17/2020  Today's Date: 12/19/2020    Recommendations:  - will dialyze today.    - will lower MMF to 500 mg BID.     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 FANGE AVF placed on 12/17. This is likely due to progressive chronic changes.    - Baseline Creatinine ~ 3.0-3.4   - Proteinuria: Moderate (1-3 grams)   - Date DSA Last Checked: Jul/2019      Latest DSA: No   - BK Viremia: No   - Kidney Tx Biopsy: Jul 23, 2019; Result: No diagnostic evidence of acute rejection.  Transplant glomerulopathy.                                              Jun 26, 2019; Result: Acute cellular-mediated rejection, Banff 1A.      # Immunosuppression: Tacrolimus immediate release (goal 4-6), Mycophenolate mofetil (dose goal not followed) and Prednisone (dose 5 mg daily)   - Changes: Start IS taper:   BID x 1 month,  250 BID x 1 month then off.  When MMF discontinue can start prednisone 5 mg every other day x 6 months followed by cosyntropin testing. At 2 month can do tacrolimus 2 mg daily for an additional 6 months.      # Infection Prophylaxis:   - PJP: Sulfa/TMP (Bactrim)    # Hypertension: Borderline control;  Goal BP: < 130/80   - Volume status: Mildly hypervolemic     - Changes: No    # Anemia in Chronic Renal Disease: Hgb: Trend down      BASHIR: No   - Iron studies: repleted 6/2020, recheck iron studies    # Mineral Bone Disorder:   - Secondary renal hyperparathyroidism; PTH level: Not checked recently        On treatment: None  - Vitamin D; level: Not checked recently        On supplement: Yes  - Calcium; level: Low, but corrected withhypoalbuminemia        On supplement: No    # Electrolytes:   - Potassium; level: Normal        On supplement: No  - Bicarbonate; level: Normal        On supplement: No    # Obesity: Significant intentional decrease in weight of ~ 35 lbs.              -  Recommend maintaining weight loss for overall health by continuing exercise and watching caloric intake.     # GERD: Mostly well controlled with occasional use of PPI.              - Recommend using H2 blocker, such as famotidine (Pepcid) for occasional symptoms, instead of PPI.     # Depression: Symptoms appear well controlled on fluoxetine.    # Transplant History:  Etiology of Kidney Failure: Congential dsyplasia  Tx: DDKT  Transplant: 2012 (Kidney), 1995 (Kidney), 2011 (Kidney)  Donor Type: Donation after Brain Death Donor Class: Standard Criteria Donor  Significant changes in immunosuppression: None  Significant transplant-related complications: BK Viremia    Recommendations were communicated to the primary team via this note.    Discussed with JAJA Covarrubias CNP   Pager: [phone]    Interval History   He received his first run of dialysis yesterday evening and tolerated that well. Will dialyze again today. He has no new complaints this morning. Denies nausea, vomiting, SOB or CP.     Review of Systems   4 point ROS was obtained and negative except as noted in the Interval History.    MEDICATIONS:    amLODIPine  10 mg Oral At Bedtime     carvedilol  12.5 mg Oral BID w/meals     FLUoxetine  40 mg Oral Daily     mycophenolate  750 mg Oral BID     omeprazole  40 mg Oral QAM AC     predniSONE  5 mg Oral Daily     sodium chloride (PF)  3 mL Intracatheter Q8H     [START ON 2020] sulfamethoxazole-trimethoprim  1 tablet Oral Q Mon Wed Fri AM     tacrolimus  1.5 mg Oral BID     Vitamin D3  2,000 Units Oral Daily       - MEDICATION INSTRUCTIONS -         Physical Exam   Temp  Av.1  F (36.7  C)  Min: 96.3  F (35.7  C)  Max: 102.3  F (39.1  C)      Pulse  Av.7  Min: 58  Max: 122 Resp  Av.1  Min: 10  Max: 36  SpO2  Av.3 %  Min: 94 %  Max: 100 %     BP (!) 151/83 (BP Location: Right leg)   Pulse 87   Temp 97.4  F (36.3  C) (Oral)   Resp 17   Ht 1.676 m (5'  "6\")   Wt 86.5 kg (190 lb 9.6 oz)   SpO2 97%   BMI 30.76 kg/m      Admit Weight: 85.2 kg (187 lb 13.3 oz)     GENERAL APPEARANCE: alert and no distress  HENT: mouth without ulcers or lesions  LYMPHATICS: no cervical or supraclavicular nodes  RESP: lungs clear to auscultation - no rales, rhonchi or wheezes  CV: regular rhythm, normal rate, no rub, no murmur  EDEMA: no LE edema bilaterally  ABDOMEN: soft, nondistended, nontender, bowel sounds normal  MS: extremities normal - no gross deformities noted, no evidence of inflammation in joints, no muscle tenderness  SKIN: no rash    Data   All labs reviewed by me.  CMP  Recent Labs   Lab 12/18/20  0614 12/17/20  0621    138   POTASSIUM 4.1 4.5  4.4   CHLORIDE 107 111*   CO2 13* 14*   ANIONGAP 17* 13   * 94  96   * 93*   CR 9.98* 9.55*  9.48*   GFRESTIMATED 6* 7*  7*   GFRESTBLACK 7* 8*  8*   LIZZ 8.1* 8.6   MAG 2.0  --    PHOS 12.1*  --    PROTTOTAL 6.6*  --    ALBUMIN 3.1*  --    BILITOTAL 0.2  --    ALKPHOS 221*  --    AST 8  --    ALT 15  --      CBC  Recent Labs   Lab 12/18/20  0614 12/17/20  0621   HGB 8.7* 9.7*   WBC 11.5* 10.7   RBC 3.02* 3.42*   HCT 27.0* 30.3*   MCV 89 89   MCH 28.8 28.4   MCHC 32.2 32.0   RDW 13.6 13.5    171     INR  Recent Labs   Lab 12/17/20  0621   INR 1.02   PTT 29     ABGNo lab results found in last 7 days.   Urine Studies  Recent Labs   Lab Test 07/23/19  0600 06/26/19  0555 04/07/15  1427   COLOR Straw Straw Light Yellow   APPEARANCE Slightly Cloudy Clear Clear   URINEGLC Negative Negative Negative   URINEBILI Negative Negative Negative   URINEKETONE Negative Negative Negative   SG 1.005 1.009 1.013   UBLD Small* Small* Negative   URINEPH 6.0 5.0 6.0   PROTEIN 100* 100* Negative   NITRITE Negative Negative Negative   LEUKEST Negative Negative Negative   RBCU 0 2 1   WBCU 0 0 <1     Recent Labs   Lab Test 07/23/19  0600 06/26/19  0555 06/07/19  1518 09/18/17  1422 04/07/15  1426   UTPG 2.82* 1.86* 1.98* " 0.79* 0.15     PTH  Recent Labs   Lab Test 09/18/17  1431   PTHI 151*     Iron Studies  No lab results found.    IMAGING:  All imaging studies reviewed by me.

## 2020-12-19 NOTE — PROGRESS NOTES
"Transplant HD access SOAP NOTE  S: Feels well, tolerated HD yesterday  Some numbness along plantar aspected of forearm  O:BP (!) 158/88   Pulse 93   Temp 97.8  F (36.6  C) (Oral)   Resp 19   Ht 1.676 m (5' 6\")   Wt 86.5 kg (190 lb 11.2 oz)   SpO2 98%   BMI 30.78 kg/m    GEN:NAD  CHEST: breathing comfortably on room air,  MSK: LEft arm: Incision c/d/i, some ecchymosis and good thril appreciated as well    A/P: 25M POD2  s/p BB AVF formation, convalescing well  -dispo per primary    "

## 2020-12-20 LAB
ANION GAP SERPL CALCULATED.3IONS-SCNC: 12 MMOL/L (ref 3–14)
BUN SERPL-MCNC: 42 MG/DL (ref 7–30)
CALCIUM SERPL-MCNC: 8.4 MG/DL (ref 8.5–10.1)
CHLORIDE SERPL-SCNC: 102 MMOL/L (ref 94–109)
CO2 SERPL-SCNC: 21 MMOL/L (ref 20–32)
CREAT SERPL-MCNC: 6.21 MG/DL (ref 0.66–1.25)
ERYTHROCYTE [DISTWIDTH] IN BLOOD BY AUTOMATED COUNT: 13.2 % (ref 10–15)
GFR SERPL CREATININE-BSD FRML MDRD: 11 ML/MIN/{1.73_M2}
GLUCOSE SERPL-MCNC: 97 MG/DL (ref 70–99)
HBV SURFACE AB SERPL IA-ACNC: 2.16 M[IU]/ML
HBV SURFACE AG SERPL QL IA: NONREACTIVE
HCT VFR BLD AUTO: 25.6 % (ref 40–53)
HGB BLD-MCNC: 8.7 G/DL (ref 13.3–17.7)
MAGNESIUM SERPL-MCNC: 1.7 MG/DL (ref 1.6–2.3)
MCH RBC QN AUTO: 29.1 PG (ref 26.5–33)
MCHC RBC AUTO-ENTMCNC: 34 G/DL (ref 31.5–36.5)
MCV RBC AUTO: 86 FL (ref 78–100)
PHOSPHATE SERPL-MCNC: 7.9 MG/DL (ref 2.5–4.5)
PLATELET # BLD AUTO: 169 10E9/L (ref 150–450)
POTASSIUM SERPL-SCNC: 3.2 MMOL/L (ref 3.4–5.3)
RBC # BLD AUTO: 2.99 10E12/L (ref 4.4–5.9)
SODIUM SERPL-SCNC: 134 MMOL/L (ref 133–144)
WBC # BLD AUTO: 10 10E9/L (ref 4–11)

## 2020-12-20 PROCEDURE — 36415 COLL VENOUS BLD VENIPUNCTURE: CPT | Performed by: INTERNAL MEDICINE

## 2020-12-20 PROCEDURE — 250N000012 HC RX MED GY IP 250 OP 636 PS 637: Performed by: PHYSICIAN ASSISTANT

## 2020-12-20 PROCEDURE — 99232 SBSQ HOSP IP/OBS MODERATE 35: CPT | Performed by: INTERNAL MEDICINE

## 2020-12-20 PROCEDURE — 250N000013 HC RX MED GY IP 250 OP 250 PS 637: Performed by: INTERNAL MEDICINE

## 2020-12-20 PROCEDURE — 250N000011 HC RX IP 250 OP 636: Performed by: NURSE PRACTITIONER

## 2020-12-20 PROCEDURE — 84100 ASSAY OF PHOSPHORUS: CPT | Performed by: INTERNAL MEDICINE

## 2020-12-20 PROCEDURE — 83735 ASSAY OF MAGNESIUM: CPT | Performed by: INTERNAL MEDICINE

## 2020-12-20 PROCEDURE — 250N000012 HC RX MED GY IP 250 OP 636 PS 637: Performed by: NURSE PRACTITIONER

## 2020-12-20 PROCEDURE — 120N000011 HC R&B TRANSPLANT UMMC

## 2020-12-20 PROCEDURE — 250N000013 HC RX MED GY IP 250 OP 250 PS 637: Performed by: PHYSICIAN ASSISTANT

## 2020-12-20 PROCEDURE — 80048 BASIC METABOLIC PNL TOTAL CA: CPT | Performed by: INTERNAL MEDICINE

## 2020-12-20 PROCEDURE — 999N000128 HC STATISTIC PERIPHERAL IV START W/O US GUIDANCE

## 2020-12-20 PROCEDURE — 85027 COMPLETE CBC AUTOMATED: CPT | Performed by: INTERNAL MEDICINE

## 2020-12-20 RX ORDER — CARVEDILOL 25 MG/1
25 TABLET ORAL 2 TIMES DAILY WITH MEALS
Status: DISCONTINUED | OUTPATIENT
Start: 2020-12-20 | End: 2020-12-21 | Stop reason: HOSPADM

## 2020-12-20 RX ORDER — ONDANSETRON 4 MG/1
4 TABLET, ORALLY DISINTEGRATING ORAL DAILY PRN
Status: DISCONTINUED | OUTPATIENT
Start: 2020-12-20 | End: 2020-12-21 | Stop reason: HOSPADM

## 2020-12-20 RX ORDER — CALCIUM ACETATE 667 MG/1
1334 CAPSULE ORAL
Status: DISCONTINUED | OUTPATIENT
Start: 2020-12-20 | End: 2020-12-21 | Stop reason: HOSPADM

## 2020-12-20 RX ADMIN — Medication 2000 UNITS: at 08:49

## 2020-12-20 RX ADMIN — ACETAMINOPHEN 650 MG: 325 TABLET, FILM COATED ORAL at 09:59

## 2020-12-20 RX ADMIN — AMLODIPINE BESYLATE 10 MG: 10 TABLET ORAL at 21:37

## 2020-12-20 RX ADMIN — TACROLIMUS 1.5 MG: 1 CAPSULE ORAL at 19:19

## 2020-12-20 RX ADMIN — ACETAMINOPHEN 650 MG: 325 TABLET, FILM COATED ORAL at 19:19

## 2020-12-20 RX ADMIN — OMEPRAZOLE 40 MG: 20 CAPSULE, DELAYED RELEASE ORAL at 08:49

## 2020-12-20 RX ADMIN — CALCIUM ACETATE 1334 MG: 667 CAPSULE ORAL at 13:03

## 2020-12-20 RX ADMIN — FLUOXETINE HYDROCHLORIDE 40 MG: 40 CAPSULE ORAL at 19:20

## 2020-12-20 RX ADMIN — MYCOPHENOLATE MOFETIL 500 MG: 250 CAPSULE ORAL at 08:48

## 2020-12-20 RX ADMIN — CARVEDILOL 25 MG: 25 TABLET, FILM COATED ORAL at 09:59

## 2020-12-20 RX ADMIN — CARVEDILOL 25 MG: 25 TABLET, FILM COATED ORAL at 17:18

## 2020-12-20 RX ADMIN — ACETAMINOPHEN 650 MG: 325 TABLET, FILM COATED ORAL at 03:57

## 2020-12-20 RX ADMIN — CALCIUM ACETATE 1334 MG: 667 CAPSULE ORAL at 17:18

## 2020-12-20 RX ADMIN — HYDRALAZINE HYDROCHLORIDE 10 MG: 20 INJECTION, SOLUTION INTRAMUSCULAR; INTRAVENOUS at 17:48

## 2020-12-20 RX ADMIN — PREDNISONE 5 MG: 5 TABLET ORAL at 08:49

## 2020-12-20 RX ADMIN — TACROLIMUS 1.5 MG: 1 CAPSULE ORAL at 08:49

## 2020-12-20 RX ADMIN — MYCOPHENOLATE MOFETIL 500 MG: 250 CAPSULE ORAL at 19:20

## 2020-12-20 ASSESSMENT — MIFFLIN-ST. JEOR: SCORE: 1780.05

## 2020-12-20 ASSESSMENT — ACTIVITIES OF DAILY LIVING (ADL)
ADLS_ACUITY_SCORE: 11

## 2020-12-20 ASSESSMENT — PAIN DESCRIPTION - DESCRIPTORS: DESCRIPTORS: HEADACHE

## 2020-12-20 NOTE — PLAN OF CARE
[id number]  Returned from hemodialysis at ~1700.  Pt moved to room 15-1.  Call light in place and pt oriented to new room.  VS:  Afebrile, HR 90s, BP 150s-160s/80-90s - continues on scheduled carvedilol, O2 sats 97% with RA. (BP checked on R-calf)  LABS: Creatinine 7.99, hemodialysis run completed this afternoon.  NEURO:  Alert, oriented x4.  Call light use appropriate.  R-forearm numbness present from fistula revision site to wrist.  CMS intact in R-hand.  Continue to assess.  PAIN:  Headache present on return from HD, resolved after prn acetaminophen x1.   DIET:  Renal - eating well, taking adequate po fluids.  GI:  Pt reported BM x1 prior to leaving for dialysis.  :  Oliguric, void x1 this afternoon.  SKIN:  R-arm incision with intact dermabond, bruising present around site.    ACTIVITY:  Up in room independently.  Resting in bed this evening.  LINE:  R-chest dialysis catheter saline locked; R-PIV saline locked.  PLAN:  Continue current plan of care.  Update Gold cross-cover prn overnight with any change in pt status or other concerns.

## 2020-12-20 NOTE — PLAN OF CARE
VS:  Afebrile, HR 80-low 90s, BPs (checked on R-calf) 160-170s/80-90s - oral Coreg dose increased to 25mg BID, O2 sats 96-98% with RA.  LABS: K+ 3.2 - MD informed, no replacement ordered, diet liberalized to increase oral potassium; Phos 7.9 - oral phos binder with meals ordered by Nephrology.  NEURO:  Alert, oriented x4, call light use appropriate.  Numbness in L-forearm persists, L-hand CMS intact.  PAIN:  Headache this AM relieved by prn acetaminophen x1.  DIET:  Renal diet, changed to regular to increase K+.  Eating well, taking po fluids with encouragement.  GI:  LBM yesterday, endorses passage of flatus.  :  Oliguric, last void yesterday.  SKIN:  L-antecubital incision with clean, dry dermabond; no increase in amauri-incisional bruising.   ACTIVITY:  Up ad abelardo, encouraged ambulation in halls.  LINE:  New PIV in RUE, saline locked.  PLAN:  Dialysis run tomorrow early AM.  Care Coordinator to finalize out-patient dialysis plans tomorrow, may discharge Monday.  Continue current plan of care.  Update Behzad Pacheco/Dr. Reed prn any change in pt status or other concerns.

## 2020-12-20 NOTE — PROGRESS NOTES
[facility]    Transplant Nephrology Progress Note  Date of Admission:  12/17/2020  Today's Date: 12/20/2020    Recommendations:  - completed 2 rounds of dialysis on 12/19 and12/20. Will give him a break today.    - if outpatient dialysis arranged, OK with discharge tomorrow.       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 LUE AVF placed on 12/17. This is likely due to progressive chronic changes.    - Baseline Creatinine ~ 3.0-3.4   - Proteinuria: Moderate (1-3 grams)   - Date DSA Last Checked: Jul/2019      Latest DSA: No   - BK Viremia: No   - Kidney Tx Biopsy: Jul 23, 2019; Result: No diagnostic evidence of acute rejection.  Transplant glomerulopathy.                                              Jun 26, 2019; Result: Acute cellular-mediated rejection, Banff 1A.      # Immunosuppression: Tacrolimus immediate release (goal 4-6), Mycophenolate mofetil (dose goal not followed) and Prednisone (dose 5 mg daily)   - Changes: Start IS taper:   BID x 1 month,  250 BID x 1 month then off.  When MMF discontinue can start prednisone 5 mg every other day x 6 months followed by cosyntropin testing. At 2 months, can do tacrolimus 2 mg daily for an additional 6 months.      # Infection Prophylaxis:   - PJP: Sulfa/TMP (Bactrim)    # Hypertension: Borderline control;  Goal BP: < 130/80   - Volume status: Mildly hypervolemic     - Changes: No    # Anemia in Chronic Renal Disease: Hgb: Trend down      BASHIR: No   - Iron studies: repleted 6/2020, recheck iron studies    # Mineral Bone Disorder:   - Secondary renal hyperparathyroidism; PTH level: Not checked recently        On treatment: None  - Vitamin D; level: Not checked recently        On supplement: Yes  - Calcium; level: Low, but corrected withhypoalbuminemia        On supplement: No    # Electrolytes:   - Potassium; level: Low        On supplement: No, liberate diet  - Bicarbonate; level: Normal        On  supplement: No              - Phosphorus level: High, will start phos binders    # Obesity: Significant intentional decrease in weight of ~ 35 lbs.              - Recommend maintaining weight loss for overall health by continuing exercise and watching caloric intake.     # GERD: Mostly well controlled with occasional use of PPI.              - Recommend using H2 blocker, such as famotidine (Pepcid) for occasional symptoms, instead of PPI.     # Depression: Symptoms appear well controlled on fluoxetine.    # Transplant History:  Etiology of Kidney Failure: Congential dsyplasia  Tx: DDKT  Transplant: 4/28/2012 (Kidney), 7/13/1995 (Kidney), 5/18/2011 (Kidney)  Donor Type: Donation after Brain Death Donor Class: Standard Criteria Donor  Significant changes in immunosuppression: None  Significant transplant-related complications: BK Viremia    Recommendations were communicated to the primary team via this note.    Discussed with JAJA Covarrubias CNP   Pager: [phone]    Interval History   He received his second run of  dialysis yesterday evening and had some nausea, leg cramping. Will give his a break today. Denies SOB or CP. Eating well. May discharge tomorrow if outpatient dialysis arranged.     Review of Systems   4 point ROS was obtained and negative except as noted in the Interval History.    MEDICATIONS:    sodium chloride 0.9%  250 mL Intravenous Once in dialysis     sodium chloride 0.9%  300 mL Hemodialysis Machine Once     amLODIPine  10 mg Oral At Bedtime     calcium acetate  1,334 mg Oral TID w/meals     carvedilol  25 mg Oral BID w/meals     FLUoxetine  40 mg Oral Daily     gelatin absorbable  1 each Topical During Hemodialysis (from stock)     mycophenolate  500 mg Oral BID     - MEDICATION INSTRUCTIONS -   Does not apply Once     omeprazole  40 mg Oral QAM AC     predniSONE  5 mg Oral Daily     sodium chloride (PF)  3 mL Intracatheter Q8H     [START ON 12/21/2020]  "sulfamethoxazole-trimethoprim  1 tablet Oral Q  AM     tacrolimus  1.5 mg Oral BID     Vitamin D3  2,000 Units Oral Daily       - MEDICATION INSTRUCTIONS -         Physical Exam   Temp  Av.1  F (36.7  C)  Min: 96.3  F (35.7  C)  Max: 102.3  F (39.1  C)      Pulse  Av.7  Min: 58  Max: 122 Resp  Av.1  Min: 10  Max: 36  SpO2  Av.3 %  Min: 94 %  Max: 100 %     BP (!) 168/85 (BP Location: Right arm)   Pulse 85   Temp 97.8  F (36.6  C) (Oral)   Resp 14   Ht 1.676 m (5' 6\")   Wt 85.7 kg (189 lb)   SpO2 97%   BMI 30.51 kg/m      Admit Weight: 85.2 kg (187 lb 13.3 oz)     GENERAL APPEARANCE: alert and no distress  HENT: mouth without ulcers or lesions  LYMPHATICS: no cervical or supraclavicular nodes  RESP: lungs clear to auscultation - no rales, rhonchi or wheezes  CV: regular rhythm, normal rate, no rub, no murmur  EDEMA: no LE edema bilaterally  ABDOMEN: soft, nondistended, nontender, bowel sounds normal  MS: extremities normal - no gross deformities noted, no evidence of inflammation in joints, no muscle tenderness  SKIN: no rash    Data   All labs reviewed by me.  CMP  Recent Labs   Lab 20  0701 20  0726 20  0614 20  0621    138 138 138   POTASSIUM 3.2* 3.6 4.1 4.5  4.4   CHLORIDE 102 108 107 111*   CO2 21 16* 13* 14*   ANIONGAP 12 14 17* 13   GLC 97 98 109* 94  96   BUN 42* 76* 103* 93*   CR 6.21* 7.99* 9.98* 9.55*  9.48*   GFRESTIMATED 11* 8* 6* 7*  7*   GFRESTBLACK 13* 10* 7* 8*  8*   LIZZ 8.4* 8.2* 8.1* 8.6   MAG 1.7  --  2.0  --    PHOS 7.9*  --  12.1*  --    PROTTOTAL  --  6.4* 6.6*  --    ALBUMIN  --  2.9* 3.1*  --    BILITOTAL  --  0.2 0.2  --    ALKPHOS  --  203* 221*  --    AST  --  5 8  --    ALT  --  12 15  --      CBC  Recent Labs   Lab 20  0701 20  0726 20  0614 20  0621   HGB 8.7* 7.9* 8.7* 9.7*   WBC 10.0 10.0 11.5* 10.7   RBC 2.99* 2.80* 3.02* 3.42*   HCT 25.6* 24.6* 27.0* 30.3*   MCV 86 88 89 89   MCH 29.1 " 28.2 28.8 28.4   MCHC 34.0 32.1 32.2 32.0   RDW 13.2 13.5 13.6 13.5    146* 170 171     INR  Recent Labs   Lab 12/17/20  0621   INR 1.02   PTT 29     ABGNo lab results found in last 7 days.   Urine Studies  Recent Labs   Lab Test 07/23/19  0600 06/26/19  0555 04/07/15  1427   COLOR Straw Straw Light Yellow   APPEARANCE Slightly Cloudy Clear Clear   URINEGLC Negative Negative Negative   URINEBILI Negative Negative Negative   URINEKETONE Negative Negative Negative   SG 1.005 1.009 1.013   UBLD Small* Small* Negative   URINEPH 6.0 5.0 6.0   PROTEIN 100* 100* Negative   NITRITE Negative Negative Negative   LEUKEST Negative Negative Negative   RBCU 0 2 1   WBCU 0 0 <1     Recent Labs   Lab Test 07/23/19  0600 06/26/19  0555 06/07/19  1518 09/18/17  1422 04/07/15  1426   UTPG 2.82* 1.86* 1.98* 0.79* 0.15     PTH  Recent Labs   Lab Test 12/19/20  0726 09/18/17  1431   PTHI 2,523* 151*     Iron Studies  Recent Labs   Lab Test 12/19/20  0726   IRON 53   *   IRONSAT 24   CLINTON 143       IMAGING:  All imaging studies reviewed by me.

## 2020-12-20 NOTE — PLAN OF CARE
"BP (!) 165/99 (BP Location: Right leg)   Pulse 98   Temp 97.6  F (36.4  C) (Oral)   Resp 14   Ht 1.676 m (5' 6\")   Wt 85.7 kg (189 lb)   SpO2 100%   BMI 30.51 kg/m    REASON FOR ADMIT: initiation of dialysis and fistula placement    VS: hypertensive per baseline, OVSS on room air   NEURO: calm and cooperative  BG/LABS: none   Pain/Nausea: tylenol x1 for headache; denies nausea  Diet: renal   LDA/IVFs: PIV saline locked   GI/: patient on hemodialysis, minimal urine output, 1 unmeasured void  Skin: no new issues   Mobility: up independently  Plan: continue finalizing outpatient dialysis plan    Will continue to monitor and update team with any changes    "

## 2020-12-20 NOTE — PROGRESS NOTES
"Transplant HD access SOAP NOTE  S: Feels well,   numbness along plantar aspect of forearm continues, unchanged  O:BP (!) 168/85 (BP Location: Right arm)   Pulse 85   Temp 97.8  F (36.6  C) (Oral)   Resp 14   Ht 1.676 m (5' 6\")   Wt 85.7 kg (189 lb)   SpO2 97%   BMI 30.51 kg/m    GEN:NAD  CHEST: breathing comfortably on room air,  MSK: LEft arm: Incision c/d/i, some ecchymosis and good thril appreciated as well    A/P: 25M POD3  s/p BB AVF formation, convalescing well  -dispo per primary    "

## 2020-12-21 ENCOUNTER — DOCUMENTATION ONLY (OUTPATIENT)
Dept: TRANSPLANT | Facility: CLINIC | Age: 25
End: 2020-12-21

## 2020-12-21 ENCOUNTER — PATIENT OUTREACH (OUTPATIENT)
Dept: CARE COORDINATION | Facility: CLINIC | Age: 25
End: 2020-12-21

## 2020-12-21 VITALS
OXYGEN SATURATION: 98 % | DIASTOLIC BLOOD PRESSURE: 99 MMHG | WEIGHT: 191.8 LBS | RESPIRATION RATE: 19 BRPM | BODY MASS INDEX: 30.82 KG/M2 | HEART RATE: 89 BPM | HEIGHT: 66 IN | TEMPERATURE: 98 F | SYSTOLIC BLOOD PRESSURE: 182 MMHG

## 2020-12-21 LAB
ANION GAP SERPL CALCULATED.3IONS-SCNC: 13 MMOL/L (ref 3–14)
BLD PROD TYP BPU: NORMAL
BLD PROD TYP BPU: NORMAL
BLD UNIT ID BPU: 0
BLD UNIT ID BPU: 0
BLOOD PRODUCT CODE: NORMAL
BLOOD PRODUCT CODE: NORMAL
BPU ID: NORMAL
BPU ID: NORMAL
BUN SERPL-MCNC: 66 MG/DL (ref 7–30)
CALCIUM SERPL-MCNC: 8.6 MG/DL (ref 8.5–10.1)
CHLORIDE SERPL-SCNC: 105 MMOL/L (ref 94–109)
CO2 SERPL-SCNC: 18 MMOL/L (ref 20–32)
CREAT SERPL-MCNC: 8.09 MG/DL (ref 0.66–1.25)
GFR SERPL CREATININE-BSD FRML MDRD: 8 ML/MIN/{1.73_M2}
GLUCOSE SERPL-MCNC: 87 MG/DL (ref 70–99)
PHOSPHATE SERPL-MCNC: 8.4 MG/DL (ref 2.5–4.5)
POTASSIUM SERPL-SCNC: 3.8 MMOL/L (ref 3.4–5.3)
SODIUM SERPL-SCNC: 137 MMOL/L (ref 133–144)
TRANSFUSION STATUS PATIENT QL: NORMAL

## 2020-12-21 PROCEDURE — 80048 BASIC METABOLIC PNL TOTAL CA: CPT | Performed by: INTERNAL MEDICINE

## 2020-12-21 PROCEDURE — 250N000013 HC RX MED GY IP 250 OP 250 PS 637

## 2020-12-21 PROCEDURE — 250N000012 HC RX MED GY IP 250 OP 636 PS 637: Performed by: NURSE PRACTITIONER

## 2020-12-21 PROCEDURE — 250N000011 HC RX IP 250 OP 636: Performed by: PHYSICIAN ASSISTANT

## 2020-12-21 PROCEDURE — 84100 ASSAY OF PHOSPHORUS: CPT | Performed by: INTERNAL MEDICINE

## 2020-12-21 PROCEDURE — 36415 COLL VENOUS BLD VENIPUNCTURE: CPT | Performed by: INTERNAL MEDICINE

## 2020-12-21 PROCEDURE — 90937 HEMODIALYSIS REPEATED EVAL: CPT

## 2020-12-21 PROCEDURE — 250N000011 HC RX IP 250 OP 636: Performed by: NURSE PRACTITIONER

## 2020-12-21 PROCEDURE — 99239 HOSP IP/OBS DSCHRG MGMT >30: CPT | Performed by: INTERNAL MEDICINE

## 2020-12-21 PROCEDURE — 250N000013 HC RX MED GY IP 250 OP 250 PS 637: Performed by: INTERNAL MEDICINE

## 2020-12-21 PROCEDURE — 250N000012 HC RX MED GY IP 250 OP 636 PS 637: Performed by: PHYSICIAN ASSISTANT

## 2020-12-21 PROCEDURE — 250N000013 HC RX MED GY IP 250 OP 250 PS 637: Performed by: PHYSICIAN ASSISTANT

## 2020-12-21 PROCEDURE — 258N000003 HC RX IP 258 OP 636: Performed by: INTERNAL MEDICINE

## 2020-12-21 RX ORDER — CALCIUM ACETATE 667 MG/1
1334 CAPSULE ORAL
Qty: 180 CAPSULE | Refills: 0 | Status: SHIPPED | OUTPATIENT
Start: 2020-12-21 | End: 2021-01-20

## 2020-12-21 RX ORDER — HYDRALAZINE HYDROCHLORIDE 20 MG/ML
INJECTION INTRAMUSCULAR; INTRAVENOUS
Status: DISCONTINUED
Start: 2020-12-21 | End: 2020-12-21 | Stop reason: HOSPADM

## 2020-12-21 RX ORDER — CARVEDILOL 12.5 MG/1
25 TABLET ORAL 2 TIMES DAILY WITH MEALS
Qty: 180 TABLET | Refills: 3 | Status: ON HOLD | COMMUNITY
Start: 2020-12-21 | End: 2022-12-13

## 2020-12-21 RX ORDER — ONDANSETRON 4 MG/1
4 TABLET, ORALLY DISINTEGRATING ORAL DAILY PRN
Qty: 30 TABLET | Refills: 0 | Status: ON HOLD | OUTPATIENT
Start: 2020-12-21 | End: 2022-12-13

## 2020-12-21 RX ORDER — MYCOPHENOLATE MOFETIL 250 MG/1
500 CAPSULE ORAL 2 TIMES DAILY
Qty: 180 CAPSULE | Refills: 11 | Status: SHIPPED | OUTPATIENT
Start: 2020-12-21 | End: 2021-01-21

## 2020-12-21 RX ADMIN — SODIUM CHLORIDE 300 ML: 9 INJECTION, SOLUTION INTRAVENOUS at 08:16

## 2020-12-21 RX ADMIN — PREDNISONE 5 MG: 5 TABLET ORAL at 07:34

## 2020-12-21 RX ADMIN — ACETAMINOPHEN 650 MG: 325 TABLET, FILM COATED ORAL at 05:41

## 2020-12-21 RX ADMIN — ACETAMINOPHEN 650 MG: 325 TABLET, FILM COATED ORAL at 10:10

## 2020-12-21 RX ADMIN — SODIUM CHLORIDE 250 ML: 9 INJECTION, SOLUTION INTRAVENOUS at 08:16

## 2020-12-21 RX ADMIN — Medication: at 08:17

## 2020-12-21 RX ADMIN — OMEPRAZOLE 40 MG: 20 CAPSULE, DELAYED RELEASE ORAL at 07:34

## 2020-12-21 RX ADMIN — SULFAMETHOXAZOLE AND TRIMETHOPRIM 1 TABLET: 400; 80 TABLET ORAL at 07:34

## 2020-12-21 RX ADMIN — CALCIUM ACETATE 1334 MG: 667 CAPSULE ORAL at 07:34

## 2020-12-21 RX ADMIN — Medication 2000 UNITS: at 07:34

## 2020-12-21 RX ADMIN — MYCOPHENOLATE MOFETIL 500 MG: 250 CAPSULE ORAL at 07:34

## 2020-12-21 RX ADMIN — CARVEDILOL 25 MG: 25 TABLET, FILM COATED ORAL at 08:57

## 2020-12-21 RX ADMIN — ACETAMINOPHEN 650 MG: 325 TABLET, FILM COATED ORAL at 00:10

## 2020-12-21 RX ADMIN — ONDANSETRON 4 MG: 2 INJECTION INTRAMUSCULAR; INTRAVENOUS at 12:58

## 2020-12-21 RX ADMIN — CALCIUM ACETATE 1334 MG: 667 CAPSULE ORAL at 12:41

## 2020-12-21 RX ADMIN — TACROLIMUS 1.5 MG: 1 CAPSULE ORAL at 07:33

## 2020-12-21 RX ADMIN — HYDRALAZINE HYDROCHLORIDE 10 MG: 20 INJECTION, SOLUTION INTRAMUSCULAR; INTRAVENOUS at 11:39

## 2020-12-21 ASSESSMENT — ACTIVITIES OF DAILY LIVING (ADL)
ADLS_ACUITY_SCORE: 11

## 2020-12-21 ASSESSMENT — MIFFLIN-ST. JEOR: SCORE: 1792.75

## 2020-12-21 NOTE — PLAN OF CARE
Problem: Adult Inpatient Plan of Care  Goal: Plan of Care Review  Outcome: No Change       Temp: 98.3  F (36.8  C) Temp src: Oral BP: (!) 169/92 Pulse: 95   Resp: 18 SpO2: 97 % O2 Device: None (Room air)      -/96, given PRN Hydralazine, /92 after intervention, given scheduled Coreg and Amlodipine  -C/O headache, given Tylenol with relief  -tolerating Regular diet with good appetite and without nausea  -last BM 12/19  -oliguric, on dialysis  -left fistula with liquid bandage, bruising, TAMMIE  -CVC and PIV saline locked  -up ad abelardo    Plan. Given prn Hydralazine at midnight if BP is still elevated ( > 160 ).  To dialyze tomorrow.  Continue with plan of care.

## 2020-12-21 NOTE — PLAN OF CARE
DISCHARGE:  Patient with orders to discharge to home.    Discharge instructions, medications & follow ups reviewed with patient. Copy of discharge summary given to Jhoan. PIV removed. Dialysis line left.     Patient in stable condition. AVSS. Jhoan had no further questions regarding discharge instructions and medications.Plan for outpatient dialysis Wednesday.

## 2020-12-21 NOTE — PROGRESS NOTES
Plan for immunosuppression taper:  # Immunosuppression: Tacrolimus immediate release (goal 4-6), Mycophenolate mofetil (dose goal not followed) and Prednisone (dose 5 mg daily)              - Changes: Start IS taper:   BID x 1 month,  250 BID x 1 month then off.  When MMF discontinue can start prednisone 5 mg every other day x 6 months followed by cosyntropin testing. At 2 months, can do tacrolimus 2 mg daily for an additional 6 months.

## 2020-12-21 NOTE — PROGRESS NOTES
[facility]     Medicine Progress Note - Hospitalist Service       Date of Admission:  12/17/2020  Assessment & Plan    Jhoan Hoffman is a 25 year old male with a history of HTN, CYNDEE, congenital renal dysplasia s/p multiple renal transplants (last in 2012) now with CKD V. He underwent left AV fistula creation on 12/17/20 and is admitted for expedited dialysis initiation.     CKD V with early symptoms of uremia   Hx of congenital renal dysplasia s/p multiple renal transplants (1995, 2011, 2012)  S/p left AV fistula creation (12/17/20)  Fistula placed 12/17/20 without complication. Per Vascular Surgery patient will need to wait approx 4 months until he can use the new fistula. Currently with mild intermittent symptoms of uremia that will likely only progress in the next 4 months. Per discussion with Nephrology, patient needing expedited dialysis initiation. Admitted and tunneled line placed 12/18/2020.   Non-oliguric.    - Nephrology consulted, HD session per their recommendations. 12/17, again 12/18.  Trial zofran before HD to prevent nausea.    - Continue PTA IS: tacro 1.5 mg BID (goal 4-6), prednisone 5 mg daily; decrease MMF to 500mg BID per transplant nephrology with plan for taper     - Bactrim ppx   - Discontinued PTA bicarb 1300 mg BID as now on HD   - Start PhosLo for phos binder    - Trend BMP   - Renal diet   - I/Os   - Daily weights  -  Care coordination assisting with HD chair placement; Ordered CXR, Hep B studies for HD center   - ordred iron studies, PTH, 25-OH vit D per nephrology - done     - CXR done 12/18 documenting no evidence of active or latent TB for HD chair placement  - Appreciate care coordination assistance in HD center placement prior to discharge      Hypertension: PTA on amlodipine 10 mg daily, carvedilol 12.5 mg BID. BP currently hypertensive.  - Continue PTA amlodipine  - Increase carvedilol to 25mg BID      Depression: Continue PTA  fluoxetine.        Diet: Regular Diet Adult    DVT Prophylaxis: Pneumatic Compression Devices  Ordoñez Catheter: not present  Code Status: Full Code           Disposition Plan   Expected discharge: 2 - 3 days, recommended to prior living arrangement once nephrology recommendations, tolerating dialysis, has outpatient dialysis center.  Entered: Kimberly Reed DO 12/20/2020, 7:51 PM       The patient's care was discussed with the Bedside Nurse and Patient.    Kimberly Reed DO  Hospitalist [facility]   Contact information available via [facility] Paging/Directory  Please see sign in/sign out for up to date coverage information  ______________________________________________________________________    Interval History   Had nausea with HD yesterday, unable to pull fluid.   Patient denies any nausea, vomiting, diarrhea, abdominal pain, dyspnea, chest pain, leg swelling today.     Data reviewed today: I reviewed all medications, new labs and imaging results over the last 24 hours. I personally reviewed no images or EKG's today.    Physical Exam   Vital Signs: Temp: 97.5  F (36.4  C) Temp src: Oral BP: (!) 174/96 Pulse: 88   Resp: 16 SpO2: 96 % O2 Device: None (Room air)    Weight: 189 lbs 0 oz  Constitutional: no apparent distress, pleasant and cooperative   Cardiovascular: regular rate and rhythm, normal S1 and S2, no murmurs, rubs, or gallops noted noted, no bilateral lower extremity edema   Respiratory: clear to auscultation bilaterally, no wheezing, rales, or rhonchi, normal work of breathing   GI: abdomen soft, non tender, non distended, bowel sounds present   Neuro: alert and oriented, no gross focal deficits noted     Data   Recent Labs   Lab 12/20/20  0701 12/19/20  0726 12/18/20  0614 12/17/20  0621   WBC 10.0 10.0 11.5* 10.7   HGB 8.7* 7.9* 8.7* 9.7*   MCV 86 88 89 89    146* 170 171   INR  --   --   --  1.02    138 138 138   POTASSIUM 3.2* 3.6 4.1 4.5   4.4   CHLORIDE 102 108 107 111*   CO2 21 16* 13* 14*   BUN 42* 76* 103* 93*   CR 6.21* 7.99* 9.98* 9.55*  9.48*   ANIONGAP 12 14 17* 13   LIZZ 8.4* 8.2* 8.1* 8.6   GLC 97 98 109* 94  96   ALBUMIN  --  2.9* 3.1*  --    PROTTOTAL  --  6.4* 6.6*  --    BILITOTAL  --  0.2 0.2  --    ALKPHOS  --  203* 221*  --    ALT  --  12 15  --    AST  --  5 8  --      No results found for this or any previous visit (from the past 24 hour(s)).  Medications     - MEDICATION INSTRUCTIONS -         sodium chloride 0.9%  250 mL Intravenous Once in dialysis     sodium chloride 0.9%  300 mL Hemodialysis Machine Once     amLODIPine  10 mg Oral At Bedtime     calcium acetate  1,334 mg Oral TID w/meals     carvedilol  25 mg Oral BID w/meals     FLUoxetine  40 mg Oral Daily     gelatin absorbable  1 each Topical During Hemodialysis (from stock)     mycophenolate  500 mg Oral BID     - MEDICATION INSTRUCTIONS -   Does not apply Once     omeprazole  40 mg Oral QAM AC     predniSONE  5 mg Oral Daily     sodium chloride (PF)  3 mL Intracatheter Q8H     [START ON 12/21/2020] sulfamethoxazole-trimethoprim  1 tablet Oral Q Mon Wed Fri AM     tacrolimus  1.5 mg Oral BID     Vitamin D3  2,000 Units Oral Daily

## 2020-12-21 NOTE — PLAN OF CARE
"BP (!) 152/84 (BP Location: Right leg)   Pulse 98   Temp 98.2  F (36.8  C) (Oral)   Resp 16   Ht 1.676 m (5' 6\")   Wt 85.7 kg (189 lb)   SpO2 97%   BMI 30.51 kg/m    REASON FOR ADMIT: initiation of dialysis     VS: hypertensive per baseline, OVSS on room air   NEURO: calm, cooperative, pleasant  BG/LABS: none   Pain/Nausea: tylenol x2 for headache   Diet: renal   LDA/IVFs: dialysis line and PIV saline locked   GI/: makes urine ~2 times per day, last BM 12/20  Skin: bruised around new fistula   Mobility: up independently  Plan: dialysis at 0810 this am and potential discharge today after dialysis    Will continue to monitor and update team with any changes  "

## 2020-12-21 NOTE — PROGRESS NOTES
Care Management Discharge Note    Discharge Date: 12/21/20       Discharge Disposition: Home    Discharge Services: (new hemodialysis)    Discharge DME:  none    Discharge Transportation: family or friend will provide    Private pay costs discussed: Not applicable    PAS Confirmation Code:    Patient/family educated on Medicare website which has current facility and service quality ratings: yes    Education Provided on the Discharge Plan:    Persons Notified of Discharge Plans: Pt  Patient/Family in Agreement with the Plan: yes    Handoff Referral Completed: Yes    Additional Information:  Dr Jose Alberto Tristan has called [facility] nephrologist: German Comer and he has accepted this pt and per Karina in Intake he will be on the schedule below.  Begin dialysis @:    [facility] [place] [address]  [address] [id number] [phone]    Days: Qhxszl-Paxneaumh-Ivkdrq    Time: please arrive @ 3:15pm on Wednesday, 12/23    Nephrologist: Dr German Comer    I have called the pt on his room phone and he agrees and is happy with above schedule. I informed him that I paged Dr Kimberly Reed([phone] @ 12:53pm) to sign his discharge orders and I have informed Carlee DALTON. I will fax discharge summary to  Intake.        Mayuri Delgado RN

## 2020-12-21 NOTE — PROGRESS NOTES
HEMODIALYSIS TREATMENT NOTE    Date: 12/21/2020  Time: 11:51 AM    Data:  Pre Wt: 87 kg (191 lb 12.8 oz)   Desired Wt: 86.5 kg   Post Wt: 86.3 kg (190 lb 4.1 oz)  Weight change: 0.7 kg  Ultrafiltration - Post Run Net Total Removed (mL): 700 mL  Vascular Access Status: patent  Dialyzer Rinse: Streaked, Light  Total Blood Volume Processed: 60.85 L Liters  Total Dialysis (Treatment) Time: 3.5 Hours    Lab:       Interventions:  3.5 hours of hd with 700mls pulled. Hydralazine 10mg given x1 2/2 htn not going down with HD     Assessment:  ESRD patient in for regular HD run.      Plan:    Per renal

## 2020-12-22 ENCOUNTER — TELEPHONE (OUTPATIENT)
Dept: TRANSPLANT | Facility: CLINIC | Age: 25
End: 2020-12-22

## 2020-12-22 ENCOUNTER — PATIENT OUTREACH (OUTPATIENT)
Dept: NEPHROLOGY | Facility: CLINIC | Age: 25
End: 2020-12-22

## 2020-12-22 NOTE — PROGRESS NOTES
Patient initiated dialysis inpatient and is now being managed by Dr. Comer:  [facility] [place] [address]  [address] [address]  [phone]     Days: Kfxedr-Mkkbogrbi-Nvjyfl     Time: please arrive @ 3:15pm on Wednesday, 12/23     Nephrologist: Dr German Comer    Will resolve CKD Journey Episode.

## 2020-12-22 NOTE — PROGRESS NOTES
Dialysis Discharge Summary Brief    [facility]  Division of Nephrology  Nephrology Discharge Dialysis Orders  Ph: [phone]  Fax: [phone]    Jhoan Hoffman  MRN: [id number]  YOB: 1995    [facility]:  [facility] [place] [address]  [address] [address]  [phone]     Days: [id number]-[id number]-Nnfqyk     Time: please arrive @ 3:15pm on Wednesday, 12/23     Nephrologist: Dr German Comer  Date of Admission: (Not on file)  Date of Discharge:   Discharge Diagnosis:  No diagnosis found.    [x] New initiation, new dialysis orders will be faxed.  Completed 3 sessions of HD initiation 12/18, 12/19, 12/21    [] Resume all previous dialysis orders with exception as noted below    New Orders (if not applicable put NA):  Estimated Dry Weight 87 kg- DWT challenge as tolerated   Dialysis Duration 4 hours   Dialysis Access R internal jugular , maturing AVF (LUE)   Antibiotics (dose per dialysis, end date) none           Labs to be drawn at dialysis Per protocol   Other major changes to dialysis prescription (e.g. 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 LUE AVF placed on 12/17. This is likely due to progressive chronic changes.               - Baseline Creatinine ~ 3.0-3.4              - Proteinuria: Moderate (1-3 grams)              - Date DSA Last Checked: Jul/2019      Latest DSA: No              - BK Viremia: No              - Kidney Tx Biopsy: Jul 23, 2019; Result: No diagnostic evidence of acute rejection.  Transplant glomerulopathy.                                              Jun 26, 2019; Result: Acute cellular-mediated rejection, [place] 1A.        # Immunosuppression: Tacrolimus  immediate release (goal 4-6), Mycophenolate mofetil (dose goal not followed) and Prednisone (dose 5 mg daily)              - Changes: Start IS taper:   BID x 1 month,  250 BID x 1 month then off.  When MMF discontinue can start prednisone 5 mg every other day x 6 months followed by cosyntropin testing. At 2 months, can do tacrolimus 2 mg daily for an additional 6 months.       # Infection Prophylaxis:   - PJP: Sulfa/TMP (Bactrim)     # Hypertension: uncontrolled;   Goal BP: < 130/80              - Volume status: Mildly hypervolemic                           - Changes: yes coreg uptitrated to 25mg po bid     # Anemia in Chronic Renal Disease: Hgb: Trend down      BASHIR: No              - Iron studies: repleted 6/2020, recheck iron studies     # Mineral Bone Disorder:   - Secondary renal hyperparathyroidism; PTH level: Not checked recently        On treatment: None  - Vitamin D; level: Not checked recently        On supplement: Yes  - Calcium; level: Low, but corrected withhypoalbuminemia        On supplement: No     # Electrolytes:   - Potassium; level: Low        On supplement: No, liberate diet  - Bicarbonate; level: Normal        On supplement: No              - Phosphorus level: High, will start phos binders     # Obesity: Significant intentional decrease in weight of ~ 35 lbs.              - Recommend maintaining weight loss for overall health by continuing exercise and watching caloric intake.     # GERD: Mostly well controlled with occasional use of PPI.              - Recommend using H2 blocker, such as famotidine (Pepcid) for occasional symptoms, instead of PPI.     # Depression: Symptoms appear well controlled on fluoxetine.     # Transplant History:  Etiology of Kidney Failure: Congential dsyplasia  Tx: DDKT  Transplant: 4/28/2012 (Kidney), 7/13/1995 (Kidney), 5/18/2011 (Kidney)  Donor Type: Donation after Brain Death        Donor Class: Standard Criteria Donor  Significant changes in  immunosuppression: None  Significant transplant-related complications: BK Viremia

## 2020-12-22 NOTE — PROGRESS NOTES
"CKD to ESRD Checklist    CKD Education:   Status: referred Date: 9/24/20   Definition/Purpose: During the encounter the patient is instructed about one or more of the following: the etiology and prognosis of their CKD; the stability (or lack thereof) of their eGFR or CrCl; medication dosing, IV contrast avoidance, or specific medications to avoid; sodium, potassium, or phosphorus restriction (or other dietary counseling).  Referral to an educator or program that offers any CKD-specific counseling is applicable. Having completed education in the previous 12 months to the encounter date is also applicable.     Modality Education:   Status: referred Date: 9/24/20   Comments: --does not want to attend, his mother is a dialysis nurse and he will ask her to educate him on what would be taught in class.  Definition/Purpose: During the encounter the patient is directly counseled and/or referred to education regarding the options for dialysis including in-center, home therapies, transplant, and/or hospice.  Patients who are at low risk of progression (by various risk calculators) may be deemed \"not a candidate.\" Completion of education in the past is also applicable.     Preferred Modality:   Status: hemodialysis    Comments: Has done PD in the past and did not like it. Prefers in center HD with plan of hopefully HHD someday.  Definition/Purpose: During the encounter the patient indicates that they have chosen a modality of dialysis, irrespective of the estimated time to ESRD.  The goal of this measure is to reflect education and understanding.  This may change visit to visit as the patient s health status and other conditions dictate.  \"Not a Candidate\" should be selected for patients choosing hospice, are at low risk for progression (based on various scoring and/or clinician judgment), or for other medically documented exclusion.     Access Surgeon Referral:   Status: referred Date: 9/23/20   Comments: Appt 11/9 with " "[facility]  Definition/Purpose: This measure indicates that the patient has been referred for discussion and/or evaluation by a dialysis access surgeon.  \"Not a Candidate\" should be selected for patients choosing hospice, are at low risk for progression (based on various scoring and/or clinician judgment), or for other medically documented exclusion.     Access Placed:   Status: AV fistula Date: 12/17/20   Comments: Dr. Culp wanting patient to initiate dialysis--see Neph comments to contact dialysis coordinators in [place] and [facility].  Also had Central Venous Catheter placed inpatient on 12/17 and started dialysis inpatient.   [facility]  [employer] [place] [address]  [address] [id number] [phone]     Days: Rmmaqw-Lqzqubwyt-Diyqti     Time: please arrive @ 3:15pm on Wednesday, 12/23     Nephrologist: Dr German Comer      Definition/Purpose: This measure indicates that the patient has undergone surgery for dialysis access placement.   \"Not a Candidate\" should be selected for patients choosing hospice, are at low risk for progression (based on various scoring and/or clinician judgment), or for other medically documented exclusion.     Transplant Listing:   Status: referred    Definition/Purpose: This measure indicates that the patient has actively engaged in the transplant listing process.   \"Not Eligible\" should be selected for patients choosing hospice, are at low risk for progression (based on various scoring and/or clinician judgment), or for other medically documented exclusion.       "

## 2020-12-22 NOTE — DISCHARGE SUMMARY
[facility]   Hospitalist Discharge Summary      Date of Admission:  12/17/2020  Date of Discharge:  12/21/2020  1:35 PM  Discharging Provider: Kimberly Reed DO  Discharge Team: Hospitalist Service, Gold 8    Discharge Diagnoses   CKD V with early symptoms of uremia --> ESRD   History of congenital renal dysplasia s/p multiple renal transplants (1995, 2011, 2012)   Left AV fistula creation 12/17/2020   HTN   Hypokalemia   Depression     Follow-ups Needed After Discharge   Follow-up Appointments     Adult [facility] Follow-up and recommended labs and tests      Follow up with primary care provider, Alexis Pabon, within 7 days for   hospital follow- up.  No follow up labs or test are needed.      Appointments on [place] and/or [facility] (with [facility] or [facility]   provider or service). Call [phone] if you haven't heard regarding   these appointments within 7 days of discharge.         Follow Up ([facility])      Follow up with Dr. Palma, at [facility] at [address] [place] [address] in 2 weeks  to evaluate after surgery. No follow up labs or test are   needed. Clinic Phone Number is [phone].     Appointments on [place] and/or [facility] (with [facility] or [facility]   provider or service). Call [phone] if you haven't heard regarding   these appointments within 7 days of discharge.         Follow Up and recommended labs and tests      Begin dialysis @:    [facility] [address]  [address] [address]  [phone]    Days: Vqgoma-Ibwmhrpje-Yiwtji    Time: please arrive @ 3:15pm on Wednesday, 12/23    Nephrologist: Dr German Comer              Unresulted Labs Ordered in the Past 30 Days of this Admission     Date and Time Order Name Status Description    12/19/2020 0001 25 Hydroxyvitamin D2 and D3 In process       These results will  be followed up by PCP, nephrology    Discharge Disposition   Discharged to home  Condition at discharge: Stable    Hospital Course   Jhoan Hoffman is a 25 year old male with a history of HTN, CYNDEE, congenital renal dysplasia s/p multiple renal transplants (last in 2012) now with CKD V. He underwent left AV fistula creation on 12/17/20 and is admitted for expedited dialysis initiation.     CKD V with early symptoms of uremia --> ESRD   Hx of congenital renal dysplasia s/p multiple renal transplants (1995, 2011, 2012)  S/p left AV fistula creation (12/17/20)  Fistula placed 12/17/20 without complication. Per Vascular Surgery patient will need to wait approx 4 months until he can use the new fistula. Currently with mild intermittent symptoms of uremia that will likely only progress in the next 4 months. Per discussion with Nephrology, patient needing expedited dialysis initiation. Admitted and tunneled line placed 12/18/2020.   Non-oliguric.    - Nephrology consulted, HD session per their recommendations. Had HD 12/17, 12/18, 12/20 as inpatient. Had nausea with session.  Plan to trial zofran before HD to prevent nausea (rx at discharge)    - Continue PTA IS: tacro 1.5 mg BID (goal 4-6), prednisone 5 mg daily; decreased MMF to 500mg BID per transplant nephrology with plan for taper.   IS taper:   BID x 1 month,  250 BID x 1 month then off.  When MMF discontinue can start prednisone 5 mg every other day x 6 months followed by cosyntropin testing. At 2 months, can do tacrolimus 2 mg daily for an additional 6 months.   - Bactrim ppx   - Discontinued PTA bicarb 1300 mg BID as now on HD   - Start PhosLo for phos binder, rxe'd at discharge    -  Care coordination assisted with HD chair placement convenient for him, which was set up prior to discahrged; Ordered CXR, Hep B studies for HD center   - ordred iron studies, PTH, 25-OH vit D per nephrology - done     - CXR done 12/18 documenting no evidence of active or  latent TB for HD chair placement       Hypertension: PTA on amlodipine 10 mg daily, carvedilol 12.5 mg BID.   - Continue PTA amlodipine  - Increased carvedilol to 25mg BID during this admission    Hypokalemia   Likely related to dialysis. Discussed with nephrology. Treated with diet liberalization.   -Recheck K after next HD session; may need K bath adjusted      Depression: Continue PTA fluoxetine.       Consultations This Hospital Stay   INTERVENTIONAL RADIOLOGY ADULT/PEDS IP CONSULT  NEPHROLOGY KIDNEY/PANCREAS TRANSPLANT ADULT IP CONSULT  MEDICATION HISTORY IP PHARMACY CONSULT  CARE MANAGEMENT / SOCIAL WORK IP CONSULT  VASCULAR ACCESS CARE ADULT IP CONSULT    Code Status   Prior    Time Spent on this Encounter   IKimberly DO, personally saw the patient today and spent greater than 30 minutes discharging this patient.       DO JO Mcdaniel [facility] [address] [address]  Phone: [phone]  ______________________________________________________________________    Physical Exam   Vital Signs: Temp: 98  F (36.7  C) Temp src: Oral BP: (!) 182/99 Pulse: 89   Resp: 19 SpO2: 98 % O2 Device: None (Room air)    Weight: 191 lbs 12.8 oz  Constitutional: no apparent distress, pleasant and cooperative   Cardiovascular: regular rate and rhythm, normal S1 and S2, no murmurs, rubs, or gallops noted noted, no bilateral lower extremity edema   Respiratory: clear to auscultation bilaterally, no wheezing, rales, or rhonchi, normal work of breathing   GI: abdomen soft, non tender, non distended, bowel sounds present   Neuro: alert and oriented, no gross focal deficits noted   Skin: Left arm AV fistula incision intact without erythema, arm and hand warm and well-perfused. Palpable thrill. R upper chest tunneled line in place without drainage or erythema at site        Primary Care Physician   Alexis Pabon    Discharge Orders      Weight bearing status - As tolerated      No driving or operating machinery     until the day after procedure     No Alcohol    For 24 hours post procedure     Diet Instructions    Resume pre-procedure diet     Shower    No shower for 24 hours post procedure. May shower Postoperative Day (POD)  1     Follow Up ([facility])    Follow up with Dr. Palma, at [facility] at [address] [place] [address] [address]rd [address], [place] [address] in 2 weeks  to evaluate after surgery. No follow up labs or test are needed. Clinic Phone Number is [phone].     Appointments on [place] and[facility]or [facility] (with [facility] or [facility] provider or service). Call [phone] if you haven't heard regarding these appointments within 7 days of discharge.     Follow Up and recommended labs and tests    Begin dialysis @:    [facility] [address]  [address] [address]  [phone]    Days: Yfycrx-Cqoeqkhzq-Rillrm    Time: please arrive @ 3:15pm on Wednesday, 12/23    Nephrologist: Dr German Comer     Reason for your hospital stay    You were hospitalized for your fistula surgery. You then stayed I the hospital to get a dialysis catheter placed and start dialysis. You will continue dialysis on Monday, Wednesday, and Friday.     Activity    Your activity upon discharge: activity as tolerated     Adult [facility] Follow-up and recommended labs and tests    Follow up with primary care provider, Alexis Pabon, within 7 days for hospital follow- up.  No follow up labs or test are needed.      Appointments on [place] and[facility]or [facility] (with [facility] or [facility] provider or service). Call [phone] if you haven't heard regarding these appointments within 7 days of discharge.     Discharge Instructions    The following changes were made to your medications:   -Increase carvedilol to 25mg twice a day. Check your blood pressure at home.   -Decrease mycofenolate (cellcept) to 500mg twice a day.  You will taper off you immunosuppression medication this way: Mycofenolate (CellCept) 500 twice a day for 1 month, then 250 twice a day for 1 month then off.  When done taking Mycofenolate, start prednisone 5 mg every other day x 6 months. At 2 months, can do tacrolimus 2 mg daily for an additional 6 months.  Talk with you kidney doctors if you have questions about this.   -Start taking PhosLo 2 tablets twice a day with meals.  -You have been prescribed a short course of oxycodone as needed for pain from you fistula surgery.   -STOP taking Sodium Bicarb.     Please attend your next dialysis session at 3:15 on Wednesday, 12/23.         Diet    Follow this diet upon discharge: Orders Placed This Encounter      Regular Diet Adult       Significant Results and Procedures   Most Recent 3 CBC's:  Recent Labs   Lab Test 12/20/20  0701 12/19/20  0726 12/18/20  0614   WBC 10.0 10.0 11.5*   HGB 8.7* 7.9* 8.7*   MCV 86 88 89    146* 170     Most Recent 3 BMP's:  Recent Labs   Lab Test 12/21/20  0610 12/20/20  0701 12/19/20  0726    134 138   POTASSIUM 3.8 3.2* 3.6   CHLORIDE 105 102 108   CO2 18* 21 16*   BUN 66* 42* 76*   CR 8.09* 6.21* 7.99*   ANIONGAP 13 12 14   LIZZ 8.6 8.4* 8.2*   GLC 87 97 98   ,   Results for orders placed or performed during the hospital encounter of 12/17/20   XR Forearm Port Left 2 Views    Narrative    EXAM: XR FOREARM PORT LT 2 VW  12/17/2020 11:07 AM      HISTORY: missing suture boot    COMPARISON: None    FINDINGS: AP intraoperative radiograph of the left elbow. For example  of intraoperative standing radiopaque foci are provided for  comparison.    Postsurgical changes about the lateral epicondyle with postsurgical  subcutaneous gas and scattered surgical staples. No radiopaque foreign  body is identified that corresponds to provided examples of radiopaque  devices. There is vague radiodensity over the radial neck,  nonspecific.      Impression    IMPRESSION: No radiopaque foreign  body in the surgical region about  the medial elbow corresponding to provided examples.         GUILLAUME RIVAS MD (Joe)   IR CVC Tunnel Placement > 5 Yrs of Age    Narrative    PRE-PROCEDURE DIAGNOSIS: End-stage renal disease    POST-PROCEDURE DIAGNOSIS: Same    PROCEDURE: Tunneled central venous catheter placement    Impression    IMPRESSION: Completed image-guided placement of 14.5 [place], 23 cm  double lumen tunneled central venous catheter via right internal  jugular vein. Catheter tip in right atrium. Aspirates and flushes  freely, heparin locked and ready for immediate use. No complication.     ----------    CLINICAL HISTORY: Patient requires central venous access for therapy.  Tunneled central venous catheter placement requested.    PERFORMED BY: Jorje Felipe PA-C    CONSENT: Written informed consent was obtained and is documented in  the patient record.    SEDATION CONSENT: It was explained to the patient that medications  used for sedation and pain relief may be administered, if needed, to  reduce anxiety and discomfort that may be associated with the  procedure. Serious side effects from the medications are rare but may  include prolonged drowsiness and difficulty breathing, possibly  requiring placement of a breathing tube to ventilate the lungs.    MEDICATIONS: Intravenous sedation was administered with 2 mg midazolam  and 100 mcg fentanyl. A 10:1 volume mixture of 1% lidocaine without  epinephrine buffered with 8.4% bicarbonate solution was available for  local anesthesia. The catheter was heparin locked upon completion of  placement.    NURSING: The patient was placed on continuous vital signs monitoring.  Vital signs and sedation were monitored by nursing staff under IR  physician staff supervision.      SEDATION TIME: 20 minutes face-to-face    FLUOROSCOPY TIME: 0.7 minutes    DESCRIPTION: The right neck and upper chest were prepped and draped in  the usual sterile fashion.      Under  ultrasound guidance, the right internal jugular vein was  identified and the overlying skin was anesthetized and skin  dermatotomy was made. Under ultrasound guidance, right internal  jugular venipuncture was made with needle. Image saved documenting  venipuncture and patency.    Needle was exchanged over guidewire for a dilator under fluoroscopic  guidance. Length to right atrium was measured with guidewire.  Guidewire and inner dilator were removed. Wire was advanced into  inferior vena cava under fluoroscopic guidance and secured.     The anterior chest skin was anesthetized and incision was made after  measurements were taken. A cuffed catheter was subcutaneously tunneled  from the anterior chest incision to the internal jugular venipuncture  site after path of tunnel was anesthetized. The dilator was exchanged  over guidewire for a peel-away sheath. Guidewire was removed. Under  fluoroscopic guidance, the catheter was placed through the peel-away  sheath. Peel-away sheath was removed.      Final catheter position saved. Both catheter lumens adequately  aspirated and flushed. Each lumen was heparin locked. A catheter  retaining suture and sterile dressing were applied. The skin  dermatotomy site overlying the internal jugular venipuncture was  closed with topical adhesive.    COMPLICATIONS: No immediate concerns, the patient remained stable  throughout the procedure and tolerated it well.    ESTIMATED BLOOD LOSS: Minimal    SPECIMENS: None    SHAR MEZA PA-C   XR Chest Port 1 View    Narrative    EXAM: XR CHEST PORT 1 VW  12/18/2020 2:22 PM      HISTORY: TB screening prior to dialysis unit placement; please  indicate in report if no evidence of tuberculosis    COMPARISON: Chest x-ray dated 4/20/2012. Fluoroscopy images dated  12/18/2020.    FINDINGS: Single view of the chest. Right IJ tunneled central venous  catheter terminates in the mid/low SVC. Trachea is midline. Cardiac  silhouette and pulmonary  vasculature are within normal limits. No  abnormal airspace opacities. No pleural effusion or pneumothorax.  Partially visualized upper abdomen is unremarkable.      Impression    IMPRESSION:  No evidence of active or latent tuberculosis.    I have personally reviewed the examination and initial interpretation  and I agree with the findings.    GIL WILLIAM MD     *Note: Due to a large number of results and/or encounters for the requested time period, some results have not been displayed. A complete set of results can be found in Results Review.       Discharge Medications   Discharge Medication List as of 12/21/2020  2:12 PM      START taking these medications    Details   calcium acetate (PHOSLO) 667 MG CAPS capsule Take 2 capsules (1,334 mg) by mouth 3 times daily (with meals), Disp-180 capsule, R-0, E-Prescribe      ondansetron (ZOFRAN-ODT) 4 MG ODT tab Take 1 tablet (4 mg) by mouth daily as needed for nausea or vomiting (take with dialysis), Disp-30 tablet, R-0, E-Prescribe      oxyCODONE (ROXICODONE) 5 MG tablet Take 1 tablet (5 mg) by mouth every 6 hours as needed for moderate to severe pain, Disp-8 tablet, R-0, Local Print      senna-docusate (SENOKOT-S/PERICOLACE) 8.6-50 MG tablet Take 1-2 tablets by mouth 2 times daily, Disp-30 tablet, R-0, Local Print         CONTINUE these medications which have CHANGED    Details   carvedilol (COREG) 12.5 MG tablet Take 2 tablets (25 mg) by mouth 2 times daily (with meals), Disp-180 tablet, R-3, Historical      mycophenolate (GENERIC EQUIVALENT) 250 MG capsule Take 2 capsules (500 mg) by mouth 2 times daily, Disp-180 capsule, R-11, [place], E-Prescribe         CONTINUE these medications which have NOT CHANGED    Details   amLODIPine (NORVASC) 10 MG tablet Take 1 tablet (10 mg) by mouth At Bedtime, Disp-90 tablet, R-3, E-Prescribe      cholecalciferol 2000 UNITS CAPS Take 2,000 Int'l Units by mouth daily, Disp-30 capsule, Historical      FLUoxetine (PROZAC) 40 MG capsule  Take 40 mg by mouth daily , Disp-90 capsule, R-0, HistoricalContinue home medications but switch to 60 mg daily.      omeprazole (PRILOSEC) 40 MG DR capsule Take 40 mg by mouth daily, Historical      predniSONE (DELTASONE) 10 MG tablet TAKE 1/2 TABLET BY MOUTH ONCE DAILY, Disp-15 tablet,R-11, E-PrescribePatient enrolled in our Rx Med Sync service to improveadherence. We are requesting a refill authorization inadvance to ensure an active prescription is on file.      sulfamethoxazole-trimethoprim (BACTRIM) 400-80 MG tablet TAKE 1 TABLET BY MOUTH DAILY, Disp-30 tablet, R-11, E-PrescribePatient enrolled in our Rx Med Sync service to improveadherence. We are requesting a refill authorization inadvance to ensure an active prescription is on file.      tacrolimus (GENERIC EQUIVALENT) 0.5 MG capsule Take 3 capsules (1.5 mg) by mouth 2 times daily, Disp-180 capsule, R-11, [place], E-PrescribePls send ASAP, thanks.         STOP taking these medications       sodium bicarbonate 650 MG tablet Comments:   Reason for Stopping:             Allergies   Allergies   Allergen Reactions     Amoxicillin Swelling     Penicillins Swelling and Other (See Comments)     Azithromycin      Z pack - can't take with cyclosporine.     Vancomycin      Ruth syndrome     Cefprozil Rash

## 2020-12-22 NOTE — TELEPHONE ENCOUNTER
Patient Call: General  Route to LPN    Reason for call: Call from pt and his mother  Pt recently inpt and was to be seen 2 wks post they need to review  When he needs to be seen  They scheduled him to see Ferris on 12/30 but he has dialysis that date and to come back 6 wks for surgery again  They are confused on what to do and when     Call back needed? Yes    Return Call Needed  Same as documented in contacts section  When to return call?: Greater than one day: Route standard priority

## 2020-12-24 LAB
DEPRECATED CALCIDIOL+CALCIFEROL SERPL-MC: <19 UG/L (ref 20–75)
VITAMIN D2 SERPL-MCNC: <5 UG/L
VITAMIN D3 SERPL-MCNC: 14 UG/L

## 2020-12-29 ENCOUNTER — OFFICE VISIT (OUTPATIENT)
Dept: TRANSPLANT | Facility: CLINIC | Age: 25
End: 2020-12-29
Attending: CLINICAL NURSE SPECIALIST
Payer: MEDICAID

## 2020-12-29 VITALS
TEMPERATURE: 98.3 F | HEART RATE: 87 BPM | DIASTOLIC BLOOD PRESSURE: 72 MMHG | WEIGHT: 191.1 LBS | BODY MASS INDEX: 30.84 KG/M2 | SYSTOLIC BLOOD PRESSURE: 127 MMHG | OXYGEN SATURATION: 97 %

## 2020-12-29 DIAGNOSIS — Z09 FOLLOW-UP EXAMINATION AFTER VASCULAR SURGERY: ICD-10-CM

## 2020-12-29 DIAGNOSIS — N18.6 ESRD ON DIALYSIS (H): Primary | ICD-10-CM

## 2020-12-29 DIAGNOSIS — Z99.2 ESRD ON DIALYSIS (H): Primary | ICD-10-CM

## 2020-12-29 DIAGNOSIS — Z48.89 ENCOUNTER FOR POST SURGICAL WOUND CHECK: ICD-10-CM

## 2020-12-29 PROCEDURE — 99024 POSTOP FOLLOW-UP VISIT: CPT | Performed by: CLINICAL NURSE SPECIALIST

## 2020-12-29 PROCEDURE — G0463 HOSPITAL OUTPT CLINIC VISIT: HCPCS

## 2020-12-29 ASSESSMENT — PAIN SCALES - GENERAL: PAINLEVEL: NO PAIN (0)

## 2020-12-29 NOTE — PROGRESS NOTES
Dialysis Access Service   Progress Note    S:  Mr. Hoffman is being seen today for surgical followup of his dialysis access.  He reports no issues with the wound, and  no steal syndrome of the distal extremity. He reports he  initiated HD with a CVC tunneled line at [facility] the day after AV fistula surgery. He is scheduled outpatient HD at [facility] on Monday, Wednesday and Friday.  Denies pain or swelling in left arm, tingling/numbness or a change of color/temperature in left hand and fingers. S/P Left upper arm BrachioBasilic Arteriovenous Fistula Creation on 12/17/2020.    O:  Temp:  [98.3  F (36.8  C)] 98.3  F (36.8  C)  Pulse:  [87] 87  BP: (127)/(72) 127/72  SpO2:  [97 %] 97 %  GENERAL: no distress, cooperative  Circulation:   Radial pulse 3+  Ulnar pulse  3+   Capillary refill:  capillary refill < 2 sec    Sensory exam:   arm: Normal   [x]           Abnormal   []          Comment:    hand: Normal   [x]           Abnormal   []          Comment:   Motor exam:   arm: Normal   [x]           Abnormal   []          Comment:    hand: Normal   [x]           Abnormal   []          Comment:    Access: L upper extremity wound(s) healed, non-tender with Derm Noland intact. Mild venous hypertension, ++ thrill and bruit via handheld present. A small fluid collection around left elbow AC fossa incision area. Mild edema in left without apparent infection.    Assessment & Plan: Mr. Hoffman's dialysis access has matured well at this time point.    1. Check thrill twice a  Day  2. Continue no venipuncture and BP from left arm  3. Elevate left arm with support as tolerated  4. May start left arm hammer curl exercise with a squeeze ball. 15 minutes a time. 3-4 times a day  5. Follow up with Dr. Palma in clinic in 4 weeks                       We would like to see the patient back in the clinic in 4 weeks time to assess progress. The patient was counselled to contact our nurse coordinator, JAJA Correa (Sum), CNS at  [phone] with any questions or concerns.  Thank you for the opportunity to participate in Mr. Hoffman's care.    TT: 20 min  CT: 15 min      Modesto (Kamlesh) Thomas MS, APRN, CNS, CNN  Dialysis Vascular Access/SOT Clinical Nurse Specialist    Solid Organ Transplant Service - [facility]

## 2020-12-29 NOTE — NURSING NOTE
Chief Complaint   Patient presents with     RECHECK     2 week f/u     Blood pressure 127/72, pulse 87, temperature 98.3  F (36.8  C), temperature source Oral, weight 86.7 kg (191 lb 1.6 oz), SpO2 97 %.    Karina Hatch, CMA

## 2020-12-29 NOTE — LETTER
12/29/2020         RE: Jhoan Hoffman  [address]  [address]  [facility] [address]      Dialysis Access Service   Progress Note    S:  Mr. Hoffman is being seen today for surgical followup of his dialysis access.  He reports no issues with the wound, and  no steal syndrome of the distal extremity. He reports he  initiated HD with a CVC tunneled line at [facility] the day after AV fistula surgery. He is scheduled outpatient HD at [facility] on Monday, Wednesday and Friday.  Denies pain or swelling in left arm, tingling/numbness or a change of color/temperature in left hand and fingers. S/P Left upper arm BrachioBasilic Arteriovenous Fistula Creation on 12/17/2020.    O:  Temp:  [98.3  F (36.8  C)] 98.3  F (36.8  C)  Pulse:  [87] 87  BP: (127)/(72) 127/72  SpO2:  [97 %] 97 %  GENERAL: no distress, cooperative  Circulation:   Radial pulse 3+  Ulnar pulse  3+   Capillary refill:  capillary refill < 2 sec    Sensory exam:   arm: Normal   [x]           Abnormal   []          Comment:    hand: Normal   [x]           Abnormal   []          Comment:   Motor exam:   arm: Normal   [x]           Abnormal   []          Comment:    hand: Normal   [x]           Abnormal   []          Comment:    Access: L upper extremity wound(s) healed, non-tender with Derm Noland intact. Mild venous hypertension, ++ thrill and bruit via handheld present. A small fluid collection around left elbow AC fossa incision area. Mild edema in left without apparent infection.    Assessment & Plan: Mr. Hoffman's dialysis access has matured well at this time point.    1. Check thrill twice a  Day  2. Continue no venipuncture and BP from left arm  3. Elevate left arm with support as tolerated  4. May start left arm hammer curl exercise with a squeeze ball. 15 minutes a time. 3-4 times a day  5. Follow up with Dr. Palma in clinic in 4 weeks                       We would like to see the patient back in the clinic in 4 weeks time to assess progress. The  patient was counselled to contact our nurse coordinator, JAJA Correa CNS (Sum) at [phone] with any questions or concerns.  Thank you for the opportunity to participate in Mr. Hoffman's care.    TT: 20 min  CT: 15 min      Modesto Guzman (Sum) MS, JAJA, ANTOINE, [facility]  Dialysis Vascular Access/SOT Clinical Nurse Specialist    Solid Organ Transplant Service - [facility]

## 2021-01-11 ENCOUNTER — TELEPHONE (OUTPATIENT)
Dept: TRANSPLANT | Facility: CLINIC | Age: 26
End: 2021-01-11

## 2021-01-11 NOTE — TELEPHONE ENCOUNTER
Received a call from Jhoan stating he will not be able to make his 1/25/21 appointment with Dr. Palma d/t GHAZALA. He dialyzes on MWF, so will need an appointment on a Tuesday or Thursday.   Message sent to donnie and Henrietta Steward.

## 2021-01-11 NOTE — TELEPHONE ENCOUNTER
Patient Call: wanted to discuss patient care        Call back needed? Yes    Return Call Needed  Same as documented in contacts section  When to return call?: Same day: Route High Priority

## 2021-01-12 ENCOUNTER — MYC MEDICAL ADVICE (OUTPATIENT)
Dept: TRANSPLANT | Facility: CLINIC | Age: 26
End: 2021-01-12

## 2021-01-14 ENCOUNTER — TELEPHONE (OUTPATIENT)
Dept: TRANSPLANT | Facility: CLINIC | Age: 26
End: 2021-01-14

## 2021-01-14 NOTE — TELEPHONE ENCOUNTER
spoke with pt and confirmed virtual neph appt on 1/25 and in person appt veronika Orlando on 2/2/21

## 2021-01-21 ENCOUNTER — TELEPHONE (OUTPATIENT)
Dept: TRANSPLANT | Facility: CLINIC | Age: 26
End: 2021-01-21

## 2021-01-21 DIAGNOSIS — Z94.0 S/P KIDNEY TRANSPLANT: ICD-10-CM

## 2021-01-21 DIAGNOSIS — Z94.0 KIDNEY REPLACED BY TRANSPLANT: ICD-10-CM

## 2021-01-21 RX ORDER — MYCOPHENOLATE MOFETIL 250 MG/1
250 CAPSULE ORAL 2 TIMES DAILY
Qty: 60 CAPSULE | Refills: 0 | Status: ON HOLD | OUTPATIENT
Start: 2021-01-21 | End: 2022-12-13

## 2021-01-21 NOTE — TELEPHONE ENCOUNTER
ISSUE  Due to decrease MMF to 250 mg BID per IS weaning plan    Plan for immunosuppression taper:  # Immunosuppression: Tacrolimus immediate release (goal 4-6), Mycophenolate mofetil (dose goal not followed) and Prednisone (dose 5 mg daily)              - Changes: Start IS taper:   BID x 1 month,  250 BID x 1 month then off.  When MMF discontinue can start prednisone 5 mg every other day x 6 months followed by cosyntropin testing. At 2 months, can do tacrolimus 2 mg daily for an additional 6 months.      PLAN  Call Jhoan:  Advise he decrease MMF to 250 mg BID.  Ensure he has discontinued his sodium bicarb supplement and is taking his PhosLo    OUTCOME    Called Jhoan to discuss above. He confirmed that he had stopped his sodium bicarb and started his PhosLo. He confirmed current dose of MMF of 500 mg BID. He will decrease dose to 250 mg BID starting this evening.  He confirmed virtual nephrology appointment on 1/25 and in person with Dr. Orlando on 2/2.

## 2021-01-25 ENCOUNTER — VIRTUAL VISIT (OUTPATIENT)
Dept: NEPHROLOGY | Facility: CLINIC | Age: 26
End: 2021-01-25
Attending: INTERNAL MEDICINE
Payer: MEDICAID

## 2021-01-25 DIAGNOSIS — Z48.298 AFTERCARE FOLLOWING ORGAN TRANSPLANT: Primary | ICD-10-CM

## 2021-01-25 DIAGNOSIS — Z94.0 S/P KIDNEY TRANSPLANT: ICD-10-CM

## 2021-01-25 DIAGNOSIS — I15.1 HTN, KIDNEY TRANSPLANT RELATED: ICD-10-CM

## 2021-01-25 DIAGNOSIS — Z94.0 HTN, KIDNEY TRANSPLANT RELATED: ICD-10-CM

## 2021-01-25 DIAGNOSIS — D84.9 IMMUNOSUPPRESSION (H): ICD-10-CM

## 2021-01-25 PROCEDURE — 99441 PR PHYSICIAN TELEPHONE EVALUATION 5-10 MIN: CPT

## 2021-01-25 NOTE — LETTER
1/25/2021       RE: Jhoan Hoffman  [address]  [address]  [facility] [address]     Dear Colleague,    Thank you for referring your patient, Jhoan Hoffman, to the [facility] [place] at [facility]. Please see a copy of my visit note below.    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  Transplant glomerulopathy.                                              Jun 26, 2019; Result: Acute cellular-mediated rejection, Banff 1A.    # Immunosuppression: Tacrolimus immediate release (goal 4-6), Mycophenolate mofetil (goal not followed) and Prednisone (dose 5 mg daily)   - Changes:yes  Weaning protocol: currently down  mg p bid x 1 month then stop; once off MMF prednisone 5 mg every other day x6 months followed by Cosyntropin testing. Tacrolimus 2mg po every daily at 2 months x 6 months    # Infection Prophylaxis:   - PJP: Sulfa/TMP (Bactrim) adjust dose to MWF    # Hypertension:monitored during HD Tx;  Goal BP: < 130/80   - Changes: none    # Anemia in Chronic Renal Disease: Hgb:       BASHIR: per HD unit protocol   - Iron studies: checked during HD    # Mineral Bone Disorder: parameters checked during HD and meds titrated per unit protocol  - Secondary renal hyperparathyroidism; PTH level:      On treatment: None  - Vitamin D; level: Not checked recently        On Supplement: Yes  - Calcium; level: Normal        On Supplement: No    # Electrolytes:   - Potassium; level: Normal        On Supplement: No  - Bicarbonate; level: normal      On Supplement: no    # GERD: Mostly well controlled with occasional use of PPI.     # Depression: Symptoms appear well controlled on  fluoxetine.    # Skin Cancer Risk:    - Discussed sun protection and recommend regular follow up with Dermatology.    # Medical Compliance: Yes     # COVID-19 Virus Review: Discussed COVID-19 virus and the potential medical risks.  Reviewed preventative health recommendations, which includes washing hands for 20 seconds, avoid touching your face, and social distancing.  Asked patient to inform the transplant center if they are exposed or diagnosed with this virus.    # Transplant History:  Etiology of Kidney Failure: Congenital dysplasia  Tx: DDKT  Transplant: 4/28/2012 (Kidney), 7/13/1995 (Kidney), 5/18/2011 (Kidney)  Donor Type: Donation after Brain Death Donor Class: Standard Criteria Donor  Significant changes in immunosuppression: None  Significant transplant-related complications: BK Viremia    Transplant Office Phone Number: [phone]    Assessment and plan was discussed with the patient and he voiced his understanding and agreement.    Return visit: No follow-ups on file.    Meghan Bustamante MD    Chief Complaint   Mr. Hoffman is a 25 year old here for kidney transplant and immunosuppression management.    History of Present Illness    Mr. Hoffman was admitted for uremic symptoms and HD initiation 12/18. A tunneled HD line was placed as he has a maturing L AVF (created 12/17/2020). He completed 3 sessions of HD initiation as inpatient. He currently dialyzes MWF at [facility]ney program and is followed by . He reports he has been tolerating HD very well so far. He denies any hypotension, shortness or breath or leg swelling. No cramps. No nausea or vomiting. He has been following IS wean and is currently on  mg po bid with plan to stop in 1 month followed by tacrolimus wean to 2 mg every day & pred taper over 6 months. He is active on the kidney transplant waitlist since 2/2020 and has no potential living donors.     Review of Systems   A comprehensive review of systems was obtained and  negative, except as noted in the HPI or PMH.    Problem List   Patient Active Problem List   Diagnosis     Kidney replaced by transplant     HTN, kidney transplant related     Depression     BK viremia     GERD (gastroesophageal reflux disease)     Secondary renal hyperparathyroidism (H)     Vitamin D deficiency     Immunosuppression (H)     Aftercare following organ transplant     Chronic kidney disease, stage 5, kidney failure (H)     Encounter regarding vascular access for dialysis for ESRD (H)     ESRD (end stage renal disease) (H)     Encounter for adjustment and management of vascular access device       Social History   Social History     Tobacco Use     Smoking status: Never Smoker     Smokeless tobacco: Never Used     Tobacco comment: mother smokes outside   Substance Use Topics     Alcohol use: Not Currently     Drug use: No       Allergies   Allergies   Allergen Reactions     Amoxicillin Swelling     Penicillins Swelling and Other (See Comments)     Azithromycin      Z pack - can't take with cyclosporine.     Vancomycin      Ruth syndrome     Cefprozil Rash       Medications   Current Outpatient Medications   Medication Sig     amLODIPine (NORVASC) 10 MG tablet Take 1 tablet (10 mg) by mouth At Bedtime     carvedilol (COREG) 12.5 MG tablet Take 2 tablets (25 mg) by mouth 2 times daily (with meals)     cholecalciferol 2000 UNITS CAPS Take 2,000 Int'l Units by mouth daily     FLUoxetine (PROZAC) 40 MG capsule Take 40 mg by mouth daily      mycophenolate (GENERIC EQUIVALENT) 250 MG capsule Take 1 capsule (250 mg) by mouth 2 times daily for 29 days     omeprazole (PRILOSEC) 40 MG DR capsule Take 40 mg by mouth daily     ondansetron (ZOFRAN-ODT) 4 MG ODT tab Take 1 tablet (4 mg) by mouth daily as needed for nausea or vomiting (take with dialysis)     oxyCODONE (ROXICODONE) 5 MG tablet Take 1 tablet (5 mg) by mouth every 6 hours as needed for moderate to severe pain     predniSONE (DELTASONE) 10 MG tablet  TAKE 1/2 TABLET BY MOUTH ONCE DAILY     senna-docusate (SENOKOT-S/PERICOLACE) 8.6-50 MG tablet Take 1-2 tablets by mouth 2 times daily     sulfamethoxazole-trimethoprim (BACTRIM) 400-80 MG tablet TAKE 1 TABLET BY MOUTH DAILY     tacrolimus (GENERIC EQUIVALENT) 0.5 MG capsule Take 3 capsules (1.5 mg) by mouth 2 times daily     No current facility-administered medications for this visit.      There are no discontinued medications.    Physical Exam   Vital signs were deferred for this telemedicine visit.  Not examined (telephone visit)    Data     Renal Latest Ref Rng & Units 12/21/2020 12/20/2020 12/19/2020   Na 133 - 144 mmol/L 137 134 138   Na (external) 134 - 145 mmol/L - - -   K 3.4 - 5.3 mmol/L 3.8 3.2(L) 3.6   K (external) 3.5 - 5.1 mmol/L - - -   Cl 94 - 109 mmol/L 105 102 108   Cl (external) 97 - 107 mmol/L - - -   CO2 20 - 32 mmol/L 18(L) 21 16(L)   CO2 (external) 22 - 29 mmol/L - - -   BUN 7 - 30 mg/dL 66(H) 42(H) 76(H)   BUN (external) 7.0 - 27.0 mg/dL - - -   Cr 0.66 - 1.25 mg/dL 8.09(H) 6.21(H) 7.99(H)   Cr (external) 0.64 - 1.34 mg/dL - - -   Glucose 70 - 99 mg/dL 87 97 98   Glucose (external) 70 - 110 mg/dL - - -   Ca  8.5 - 10.1 mg/dL 8.6 8.4(L) 8.2(L)   Ca (external) 8.6 - 10.3 mg/dL - - -   Mg 1.6 - 2.3 mg/dL - 1.7 -   Mg (external) 1.8 - 2.4 MG/DL - - -     Bone Health Latest Ref Rng & Units 12/21/2020 12/20/2020 12/19/2020   Phos 2.5 - 4.5 mg/dL 8.4(H) 7.9(H) -   Phos (external) 2.5 - 4.9 MG/DL - - -   PTHi 18 - 80 pg/mL - - 2,523(H)   PTHi (external) 20.0 - 80.0 pg/mL - - -   Vit D Def 20 - 75 ug/L - - -   Vit D Def (external) 30.0 - 100.0 ng/mL - - -     Heme Latest Ref Rng & Units 12/20/2020 12/19/2020 12/18/2020   WBC 4.0 - 11.0 10e9/L 10.0 10.0 11.5(H)   WBC (external) 4.8 - 10.8 10(3)/uL - - -   Hgb 13.3 - 17.7 g/dL 8.7(L) 7.9(L) 8.7(L)   Hgb (external) 14.0 - 18.0 g/dL - - -   Plt 150 - 450 10e9/L 169 146(L) 170   Plt (external) 150 - 350 10(3)/uL - - -   ABSOLUTE NEUTROPHIL 1.6 - 8.3 10e9/L  - - -   ABSOLUTE NEUTROPHILS (EXTERNAL) 1.2 - 8.1 10(3)/uL - - -   ABSOLUTE LYMPHOCYTES 0.8 - 5.3 10e9/L - - -   ABSOLUTE LYMPHOCYTES (EXTERNAL) 0.6 - 4.7 10(3)/uL - - -   ABSOLUTE MONOCYTES 0.0 - 1.3 10e9/L - - -   ABSOLUTE MONOCYTES (EXTERNAL) 0.0 - 1.3 10(3)/uL - - -   ABSOLUTE EOSINOPHILS 0.0 - 0.7 10e9/L - - -   ABSOLUTE EOSINOPHILS (EXTERNAL) 0.0 - 0.7 10(3)/uL - - -   ABSOLUTE BASOPHILS 0.0 - 0.2 10e9/L - - -   ABSOLUTE BASOPHILS (EXTERNAL) 0.0 - 0.2 10(3)/uL - - -   ABS IMMATURE GRANULOCYTES 0 - 0.4 10e9/L - - -   ABSOLUTE NUCLEATED RBC - - - -     Liver Latest Ref Rng & Units 12/19/2020 12/18/2020 6/17/2019   AP 40 - 150 U/L 203(H) 221(H) -   AP (external) 45 - 108 U/L - - 116(H)   TBili 0.2 - 1.3 mg/dL 0.2 0.2 -   TBili (external) 0.2 - 1.2 mg/dL - - 0.7   ALT 0 - 70 U/L 12 15 -   ALT (external) 8 - 51 U/L - - 24   AST 0 - 45 U/L 5 8 -   AST (external) 10 - 36 U/L - - 22   Tot Protein 6.8 - 8.8 g/dL 6.4(L) 6.6(L) -   Tot Protein (external) 6.3 - 8.2 g/dL - - 7.1   Albumin 3.4 - 5.0 g/dL 2.9(L) 3.1(L) -   Albumin (external) 3.4 - 5.3 g/dL - - 4.5     Pancreas Latest Ref Rng & Units 2/16/2011 10/20/2010   Amylase 30 - 110 U/L 142(H) 127(H)   Lipase 20 - 250 U/L 202 256(H)     Iron studies Latest Ref Rng & Units 12/19/2020 6/22/2020 3/21/2012   Iron 35 - 180 ug/dL 53 - 138   Iron sat 15 - 46 % 24 - 75(H)   Ferritin 26 - 388 ng/mL 143 - -   Ferritin (external) 10.0 - 381.0 ng/mL - 120.0 -     [facility] Txp Virology Latest Ref Rng & Units 7/23/2019 6/26/2019 11/7/2017   CMV IgG EU/mL - - -   CMV IgM <0.90 - - -   CMV IgM Interp <0.90 - - -   CVM DNA Quant - - - -   CMV Quant <100 Copies/mL - - -   CMV QT Log <2.0 Log copies/mL - - -   BK Spec - Plasma Plasma -   BK Res BKNEG:BK Virus DNA Not Detected copies/mL BK Virus DNA Not Detected BK Virus DNA Not Detected -   BK Log <2.7 Log copies/mL Not Calculated Not Calculated -   BK Quant Log Ext - - - -   BK Quant Result Ext None detected - - None detected   BK Quant Spec  Ext - - - -   EBV IgG - - - -   EBV VCA IGG ANTIBODY U/mL - - -   EBV VCA IGM ANTIBODY U/mL - - -   EBV DNA COPIES/ML <1000 Copies/mL - - -   EBV DNA LOG OF COPIES <3.0 Log copies/mL - - -   Hep B Core NEG - - -   Hep B Surf - - - -   HIV 1&2 NEG - - -        Recent Labs   Lab Test 10/27/20  1001 11/18/20  0919 12/18/20  0614   DOSTAC 10/26/20 1925 11/17 2000 Not Provided   TACROL 3.4* 4.3* <3.0*     Telephone visit:   Contact time: 5 min            Again, thank you for allowing me to participate in the care of your patient.      Sincerely,    Kidney/Pancreas Recipient

## 2021-01-25 NOTE — PROGRESS NOTES
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  Transplant glomerulopathy.                                              Jun 26, 2019; Result: Acute cellular-mediated rejection, Banff 1A.    # Immunosuppression: Tacrolimus immediate release (goal 4-6), Mycophenolate mofetil (goal not followed) and Prednisone (dose 5 mg daily)   - Changes:yes  Weaning protocol: currently down  mg p bid x 1 month then stop; once off MMF prednisone 5 mg every other day x6 months followed by Cosyntropin testing. Tacrolimus 2mg po every daily at 2 months x 6 months    # Infection Prophylaxis:   - PJP: Sulfa/TMP (Bactrim) adjust dose to MWF    # Hypertension:monitored during HD Tx;  Goal BP: < 130/80   - Changes: none    # Anemia in Chronic Renal Disease: Hgb:       BASHIR: per HD unit protocol   - Iron studies: checked during HD    # Mineral Bone Disorder: parameters checked during HD and meds titrated per unit protocol  - Secondary renal hyperparathyroidism; PTH level:      On treatment: None  - Vitamin D; level: Not checked recently        On Supplement: Yes  - Calcium; level: Normal        On Supplement: No    # Electrolytes:   - Potassium; level: Normal        On Supplement: No  - Bicarbonate; level: normal      On Supplement: no    # GERD: Mostly well controlled with occasional use of PPI.     # Depression: Symptoms appear well controlled on fluoxetine.    # Skin Cancer Risk:    - Discussed sun protection and recommend regular follow up with Dermatology.    # Medical Compliance: Yes     # COVID-19 Virus Review: Discussed COVID-19 virus and the potential medical risks.  Reviewed preventative health recommendations, which includes washing hands for  20 seconds, avoid touching your face, and social distancing.  Asked patient to inform the transplant center if they are exposed or diagnosed with this virus.    # Transplant History:  Etiology of Kidney Failure: Congenital dysplasia  Tx: DDKT  Transplant: 4/28/2012 (Kidney), 7/13/1995 (Kidney), 5/18/2011 (Kidney)  Donor Type: Donation after Brain Death Donor Class: Standard Criteria Donor  Significant changes in immunosuppression: None  Significant transplant-related complications: BK Viremia    Transplant Office Phone Number: [phone]    Assessment and plan was discussed with the patient and he voiced his understanding and agreement.    Return visit: No follow-ups on file.    Meghan Bustamante MD    Chief Complaint   Mr. Hoffman is a 25 year old here for kidney transplant and immunosuppression management.    History of Present Illness    Mr. Hoffman was admitted for uremic symptoms and HD initiation 12/18. A tunneled HD line was placed as he has a maturing L AVF (created 12/17/2020). He completed 3 sessions of HD initiation as inpatient. He currently dialyzes MWF at [facility] program and is followed by . He reports he has been tolerating HD very well so far. He denies any hypotension, shortness or breath or leg swelling. No cramps. No nausea or vomiting. He has been following IS wean and is currently on  mg po bid with plan to stop in 1 month followed by tacrolimus wean to 2 mg every day & pred taper over 6 months. He is active on the kidney transplant waitlist since 2/2020 and has no potential living donors.     Review of Systems   A comprehensive review of systems was obtained and negative, except as noted in the HPI or PMH.    Problem List   Patient Active Problem List   Diagnosis     Kidney replaced by transplant     HTN, kidney transplant related     Depression     BK viremia     GERD (gastroesophageal reflux disease)     Secondary renal hyperparathyroidism (H)     Vitamin D deficiency      Immunosuppression (H)     Aftercare following organ transplant     Chronic kidney disease, stage 5, kidney failure (H)     Encounter regarding vascular access for dialysis for ESRD (H)     ESRD (end stage renal disease) (H)     Encounter for adjustment and management of vascular access device       Social History   Social History     Tobacco Use     Smoking status: Never Smoker     Smokeless tobacco: Never Used     Tobacco comment: mother smokes outside   Substance Use Topics     Alcohol use: Not Currently     Drug use: No       Allergies   Allergies   Allergen Reactions     Amoxicillin Swelling     Penicillins Swelling and Other (See Comments)     Azithromycin      Z pack - can't take with cyclosporine.     Vancomycin      Ruth syndrome     Cefprozil Rash       Medications   Current Outpatient Medications   Medication Sig     amLODIPine (NORVASC) 10 MG tablet Take 1 tablet (10 mg) by mouth At Bedtime     carvedilol (COREG) 12.5 MG tablet Take 2 tablets (25 mg) by mouth 2 times daily (with meals)     cholecalciferol 2000 UNITS CAPS Take 2,000 Int'l Units by mouth daily     FLUoxetine (PROZAC) 40 MG capsule Take 40 mg by mouth daily      mycophenolate (GENERIC EQUIVALENT) 250 MG capsule Take 1 capsule (250 mg) by mouth 2 times daily for 29 days     omeprazole (PRILOSEC) 40 MG DR capsule Take 40 mg by mouth daily     ondansetron (ZOFRAN-ODT) 4 MG ODT tab Take 1 tablet (4 mg) by mouth daily as needed for nausea or vomiting (take with dialysis)     oxyCODONE (ROXICODONE) 5 MG tablet Take 1 tablet (5 mg) by mouth every 6 hours as needed for moderate to severe pain     predniSONE (DELTASONE) 10 MG tablet TAKE 1/2 TABLET BY MOUTH ONCE DAILY     senna-docusate (SENOKOT-S/PERICOLACE) 8.6-50 MG tablet Take 1-2 tablets by mouth 2 times daily     sulfamethoxazole-trimethoprim (BACTRIM) 400-80 MG tablet TAKE 1 TABLET BY MOUTH DAILY     tacrolimus (GENERIC EQUIVALENT) 0.5 MG capsule Take 3 capsules (1.5 mg) by mouth 2 times  daily     No current facility-administered medications for this visit.      There are no discontinued medications.    Physical Exam   Vital signs were deferred for this telemedicine visit.  Not examined (telephone visit)    Data     Renal Latest Ref Rng & Units 12/21/2020 12/20/2020 12/19/2020   Na 133 - 144 mmol/L 137 134 138   Na (external) 134 - 145 mmol/L - - -   K 3.4 - 5.3 mmol/L 3.8 3.2(L) 3.6   K (external) 3.5 - 5.1 mmol/L - - -   Cl 94 - 109 mmol/L 105 102 108   Cl (external) 97 - 107 mmol/L - - -   CO2 20 - 32 mmol/L 18(L) 21 16(L)   CO2 (external) 22 - 29 mmol/L - - -   BUN 7 - 30 mg/dL 66(H) 42(H) 76(H)   BUN (external) 7.0 - 27.0 mg/dL - - -   Cr 0.66 - 1.25 mg/dL 8.09(H) 6.21(H) 7.99(H)   Cr (external) 0.64 - 1.34 mg/dL - - -   Glucose 70 - 99 mg/dL 87 97 98   Glucose (external) 70 - 110 mg/dL - - -   Ca  8.5 - 10.1 mg/dL 8.6 8.4(L) 8.2(L)   Ca (external) 8.6 - 10.3 mg/dL - - -   Mg 1.6 - 2.3 mg/dL - 1.7 -   Mg (external) 1.8 - 2.4 MG/DL - - -     Bone Health Latest Ref Rng & Units 12/21/2020 12/20/2020 12/19/2020   Phos 2.5 - 4.5 mg/dL 8.4(H) 7.9(H) -   Phos (external) 2.5 - 4.9 MG/DL - - -   PTHi 18 - 80 pg/mL - - 2,523(H)   PTHi (external) 20.0 - 80.0 pg/mL - - -   Vit D Def 20 - 75 ug/L - - -   Vit D Def (external) 30.0 - 100.0 ng/mL - - -     Heme Latest Ref Rng & Units 12/20/2020 12/19/2020 12/18/2020   WBC 4.0 - 11.0 10e9/L 10.0 10.0 11.5(H)   WBC (external) 4.8 - 10.8 10(3)/uL - - -   Hgb 13.3 - 17.7 g/dL 8.7(L) 7.9(L) 8.7(L)   Hgb (external) 14.0 - 18.0 g/dL - - -   Plt 150 - 450 10e9/L 169 146(L) 170   Plt (external) 150 - 350 10(3)/uL - - -   ABSOLUTE NEUTROPHIL 1.6 - 8.3 10e9/L - - -   ABSOLUTE NEUTROPHILS (EXTERNAL) 1.2 - 8.1 10(3)/uL - - -   ABSOLUTE LYMPHOCYTES 0.8 - 5.3 10e9/L - - -   ABSOLUTE LYMPHOCYTES (EXTERNAL) 0.6 - 4.7 10(3)/uL - - -   ABSOLUTE MONOCYTES 0.0 - 1.3 10e9/L - - -   ABSOLUTE MONOCYTES (EXTERNAL) 0.0 - 1.3 10(3)/uL - - -   ABSOLUTE EOSINOPHILS 0.0 - 0.7 10e9/L - - -    ABSOLUTE EOSINOPHILS (EXTERNAL) 0.0 - 0.7 10(3)/uL - - -   ABSOLUTE BASOPHILS 0.0 - 0.2 10e9/L - - -   ABSOLUTE BASOPHILS (EXTERNAL) 0.0 - 0.2 10(3)/uL - - -   ABS IMMATURE GRANULOCYTES 0 - 0.4 10e9/L - - -   ABSOLUTE NUCLEATED RBC - - - -     Liver Latest Ref Rng & Units 12/19/2020 12/18/2020 6/17/2019   AP 40 - 150 U/L 203(H) 221(H) -   AP (external) 45 - 108 U/L - - 116(H)   TBili 0.2 - 1.3 mg/dL 0.2 0.2 -   TBili (external) 0.2 - 1.2 mg/dL - - 0.7   ALT 0 - 70 U/L 12 15 -   ALT (external) 8 - 51 U/L - - 24   AST 0 - 45 U/L 5 8 -   AST (external) 10 - 36 U/L - - 22   Tot Protein 6.8 - 8.8 g/dL 6.4(L) 6.6(L) -   Tot Protein (external) 6.3 - 8.2 g/dL - - 7.1   Albumin 3.4 - 5.0 g/dL 2.9(L) 3.1(L) -   Albumin (external) 3.4 - 5.3 g/dL - - 4.5     Pancreas Latest Ref Rng & Units 2/16/2011 10/20/2010   Amylase 30 - 110 U/L 142(H) 127(H)   Lipase 20 - 250 U/L 202 256(H)     Iron studies Latest Ref Rng & Units 12/19/2020 6/22/2020 3/21/2012   Iron 35 - 180 ug/dL 53 - 138   Iron sat 15 - 46 % 24 - 75(H)   Ferritin 26 - 388 ng/mL 143 - -   Ferritin (external) 10.0 - 381.0 ng/mL - 120.0 -     [facility] Txp Virology Latest Ref Rng & Units 7/23/2019 6/26/2019 11/7/2017   CMV IgG EU/mL - - -   CMV IgM <0.90 - - -   CMV IgM Interp <0.90 - - -   CVM DNA Quant - - - -   CMV Quant <100 Copies/mL - - -   CMV QT Log <2.0 Log copies/mL - - -   BK Spec - Plasma Plasma -   BK Res BKNEG:BK Virus DNA Not Detected copies/mL BK Virus DNA Not Detected BK Virus DNA Not Detected -   BK Log <2.7 Log copies/mL Not Calculated Not Calculated -   BK Quant Log Ext - - - -   BK Quant Result Ext None detected - - None detected   BK Quant Spec Ext - - - -   EBV IgG - - - -   EBV VCA IGG ANTIBODY U/mL - - -   EBV VCA IGM ANTIBODY U/mL - - -   EBV DNA COPIES/ML <1000 Copies/mL - - -   EBV DNA LOG OF COPIES <3.0 Log copies/mL - - -   Hep B Core NEG - - -   Hep B Surf - - - -   HIV 1&2 NEG - - -        Recent Labs   Lab Test 10/27/20  1001 11/18/20  0943  12/18/20  0614   DOSTAC 10/26/20 1925 11/17 2000 Not Provided   TACROL 3.4* 4.3* <3.0*     Telephone visit:   Contact time: 5 min

## 2021-01-29 ENCOUNTER — TELEPHONE (OUTPATIENT)
Dept: TRANSPLANT | Facility: CLINIC | Age: 26
End: 2021-01-29

## 2021-01-29 NOTE — TELEPHONE ENCOUNTER
[facility] called regarding the patient AVF site facility has a question regarding another procedure.

## 2021-02-02 ENCOUNTER — OFFICE VISIT (OUTPATIENT)
Dept: TRANSPLANT | Facility: CLINIC | Age: 26
End: 2021-02-02
Attending: SURGERY
Payer: MEDICAID

## 2021-02-02 VITALS
OXYGEN SATURATION: 98 % | DIASTOLIC BLOOD PRESSURE: 100 MMHG | HEART RATE: 113 BPM | WEIGHT: 187.6 LBS | BODY MASS INDEX: 30.28 KG/M2 | SYSTOLIC BLOOD PRESSURE: 144 MMHG

## 2021-02-02 DIAGNOSIS — Z45.2 ENCOUNTER FOR ADJUSTMENT AND MANAGEMENT OF VASCULAR ACCESS DEVICE: Primary | ICD-10-CM

## 2021-02-02 PROCEDURE — 99024 POSTOP FOLLOW-UP VISIT: CPT | Performed by: SURGERY

## 2021-02-02 ASSESSMENT — PAIN SCALES - GENERAL: PAINLEVEL: NO PAIN (0)

## 2021-02-02 NOTE — LETTER
2/2/2021         RE: Jhoan Hoffman  [address]  [address]  [facility] [address]        Dear Colleague,    Thank you for referring your patient, Jhoan Hoffman, to the [facility]. Please see a copy of my visit note below.    Dialysis Access Service   Progress Note    S:  Mr. Hoffman is a 25 year male, being seen today for surgical followup of his dialysis access. He reports no issues with the wound, and  no steal syndrome of the distal extremity. He reports he  initiated HD with a CVC tunneled line at [facility] the day after AV fistula surgery. He is scheduled outpatient HD at [facility] on Monday, Wednesday and Friday. Denies pain or swelling in left arm, tingling/numbness or a change of color/temperature in left hand and fingers. S/P Left upper arm BrachioBasilic Arteriovenous Fistula Creation on 12/17/2020.    O:  Pulse:  [113] 113  BP: (144)/(100) 144/100  SpO2:  [98 %] 98 %  GENERAL: alert, cooperative  Circulation:   Radial pulse 3+  Ulnar pulse  3+   Capillary refill:  capillary refill < 2 sec    Sensory exam:   arm: Normal   [x]           Abnormal   []          Comment:    hand: Normal   [x]           Abnormal   []          Comment:   Motor exam:   arm: Normal   [x]           Abnormal   []          Comment:    hand: Normal   [x]           Abnormal   []          Comment:    Access: L upper extremity wound(s) healed. non-tender. no venous hypertension ++ thrill present with good caliber. No edema in left arm.    Assessment & Plan: Mr. Hoffman's dialysis access has matured well at this time point.    1. Schedule left upper arm AV fistula BrachioBasilic revision (transposition) under GA/Block  2. Pre op H & P and COVID test prior to surgery  3. Continue left arm hammer curl exercise with a squeeze ball. 15 minutes a time. 3-4 times a day  4. Follow up in clinic 2 weeks after surgery    We would like to see the patient back in the clinic in 2 weeks after surgery to assess progress. The  patient was counselled to contact our nurse coordinator, JAJA Correa (Sum), CNS at [phone] with any questions or concerns.  Thank you for the opportunity to participate in Mr. Hoffman's care.    TT: 15 min  CT: 15 min    Physician Attestation   I, Nataliia Orlando MD, saw and evaluated Jhoan Hoffman as part of a shared visit.  I have reviewed and discussed with the advanced practice provider their history, physical and plan.    I personally reviewed the vital signs, medications and labs.    My key history or physical exam findings: Brachial basilic AVF with excellent thrill and maturing well    Key management decisions made by me: Fistula ready for transposition.  Surgery discussed with pt.  Risks and benefits detailed.  Dr. Palma or I will perform the surgery based on who has the first availability    Nataliia Orlando MD  Date of Service (when I saw the patient): 2/2/2021      Nataliia Orlando MD FACS  Assistant Professor of Surgery  Director, Living Kidney Donor Program.      Again, thank you for allowing me to participate in the care of your patient.        Sincerely,        Nataliia Orlando MD, MD

## 2021-02-02 NOTE — PROGRESS NOTES
Dialysis Access Service   Progress Note    S:  Mr. Hoffman is a 25 year male, being seen today for surgical followup of his dialysis access. He reports no issues with the wound, and  no steal syndrome of the distal extremity. He reports he  initiated HD with a CVC tunneled line at [facility] the day after AV fistula surgery. He is scheduled outpatient HD at [facility] on Monday, Wednesday and Friday. Denies pain or swelling in left arm, tingling/numbness or a change of color/temperature in left hand and fingers. S/P Left upper arm BrachioBasilic Arteriovenous Fistula Creation on 12/17/2020.    O:  Pulse:  [113] 113  BP: (144)/(100) 144/100  SpO2:  [98 %] 98 %  GENERAL: alert, cooperative  Circulation:   Radial pulse 3+  Ulnar pulse  3+   Capillary refill:  capillary refill < 2 sec    Sensory exam:   arm: Normal   [x]           Abnormal   []          Comment:    hand: Normal   [x]           Abnormal   []          Comment:   Motor exam:   arm: Normal   [x]           Abnormal   []          Comment:    hand: Normal   [x]           Abnormal   []          Comment:    Access: L upper extremity wound(s) healed. non-tender. no venous hypertension ++ thrill present with good caliber. No edema in left arm.    Assessment & Plan: Mr. Cobb dialysis access has matured well at this time point.    1. Schedule left upper arm AV fistula BrachioBasilic revision (transposition) under GA/Block  2. Pre op H & P and COVID test prior to surgery  3. Continue left arm hammer curl exercise with a squeeze ball. 15 minutes a time. 3-4 times a day  4. Follow up in clinic 2 weeks after surgery    We would like to see the patient back in the clinic in 2 weeks after surgery to assess progress. The patient was counselled to contact our nurse coordinator, JAJA Correa (Sum), ANTOINE at [phone] with any questions or concerns.  Thank you for the opportunity to participate in Mr. Hoffman's care.    TT: 15 min  CT: 15 min    Physician Attestation    I, Nataliia Orlando MD, saw and evaluated Jhoan Hoffman as part of a shared visit.  I have reviewed and discussed with the advanced practice provider their history, physical and plan.    I personally reviewed the vital signs, medications and labs.    My key history or physical exam findings: Brachial basilic AVF with excellent thrill and maturing well    Key management decisions made by me: Fistula ready for transposition.  Surgery discussed with pt.  Risks and benefits detailed.  Dr. Palma or I will perform the surgery based on who has the first availability    Nataliia Orlando MD  Date of Service (when I saw the patient): 2/2/2021      Nataliia Orlando MD FACS  Assistant Professor of Surgery  Director, Living Kidney Donor Program.

## 2021-02-02 NOTE — NURSING NOTE
Chief Complaint   Patient presents with     RECHECK     Fistula site f/u     Blood pressure (!) 144/100, pulse 113, weight 85.1 kg (187 lb 9.6 oz), SpO2 98 %.    Karina Hatch, CMA

## 2021-02-04 ENCOUNTER — PREP FOR PROCEDURE (OUTPATIENT)
Dept: TRANSPLANT | Facility: CLINIC | Age: 26
End: 2021-02-04

## 2021-02-04 DIAGNOSIS — Z99.2 ENCOUNTER REGARDING VASCULAR ACCESS FOR DIALYSIS FOR END-STAGE RENAL DISEASE (H): Primary | ICD-10-CM

## 2021-02-04 DIAGNOSIS — Z11.59 ENCOUNTER FOR SCREENING FOR OTHER VIRAL DISEASES: ICD-10-CM

## 2021-02-04 DIAGNOSIS — N18.6 ENCOUNTER REGARDING VASCULAR ACCESS FOR DIALYSIS FOR END-STAGE RENAL DISEASE (H): Primary | ICD-10-CM

## 2021-02-10 DIAGNOSIS — N18.6 ESRD ON DIALYSIS (H): Primary | ICD-10-CM

## 2021-02-10 DIAGNOSIS — Z45.2 ENCOUNTER FOR ADJUSTMENT AND MANAGEMENT OF VASCULAR ACCESS DEVICE: ICD-10-CM

## 2021-02-10 DIAGNOSIS — Z99.2 ESRD ON DIALYSIS (H): Primary | ICD-10-CM

## 2021-02-10 RX ORDER — CLINDAMYCIN PHOSPHATE 900 MG/50ML
900 INJECTION, SOLUTION INTRAVENOUS
Status: CANCELLED | OUTPATIENT
Start: 2021-02-16

## 2021-02-15 ENCOUNTER — ANESTHESIA EVENT (OUTPATIENT)
Dept: SURGERY | Facility: CLINIC | Age: 26
End: 2021-02-15
Payer: MEDICAID

## 2021-02-16 ENCOUNTER — ANESTHESIA (OUTPATIENT)
Dept: SURGERY | Facility: CLINIC | Age: 26
End: 2021-02-16
Payer: MEDICAID

## 2021-02-16 ENCOUNTER — HOSPITAL ENCOUNTER (OUTPATIENT)
Facility: CLINIC | Age: 26
Discharge: HOME | End: 2021-02-16
Attending: SURGERY | Admitting: SURGERY
Payer: MEDICAID

## 2021-02-16 ENCOUNTER — ANCILLARY PROCEDURE (OUTPATIENT)
Dept: ULTRASOUND IMAGING | Facility: CLINIC | Age: 26
End: 2021-02-16
Payer: MEDICAID

## 2021-02-16 VITALS
WEIGHT: 190.26 LBS | HEIGHT: 66 IN | TEMPERATURE: 97.9 F | BODY MASS INDEX: 30.58 KG/M2 | HEART RATE: 97 BPM | RESPIRATION RATE: 16 BRPM | SYSTOLIC BLOOD PRESSURE: 125 MMHG | OXYGEN SATURATION: 97 % | DIASTOLIC BLOOD PRESSURE: 78 MMHG

## 2021-02-16 DIAGNOSIS — N18.6 ENCOUNTER REGARDING VASCULAR ACCESS FOR DIALYSIS FOR END-STAGE RENAL DISEASE (H): ICD-10-CM

## 2021-02-16 DIAGNOSIS — Z99.2 ENCOUNTER REGARDING VASCULAR ACCESS FOR DIALYSIS FOR END-STAGE RENAL DISEASE (H): ICD-10-CM

## 2021-02-16 DIAGNOSIS — Z99.2 ESRD ON DIALYSIS (H): ICD-10-CM

## 2021-02-16 DIAGNOSIS — N18.6 ESRD ON DIALYSIS (H): ICD-10-CM

## 2021-02-16 DIAGNOSIS — Z45.2 ENCOUNTER FOR ADJUSTMENT AND MANAGEMENT OF VASCULAR ACCESS DEVICE: ICD-10-CM

## 2021-02-16 LAB
APTT PPP: 26 SEC (ref 22–37)
BASOPHILS # BLD AUTO: 0.1 10E9/L (ref 0–0.2)
BASOPHILS NFR BLD AUTO: 0.7 %
CREAT SERPL-MCNC: 5.91 MG/DL (ref 0.66–1.25)
DIFFERENTIAL METHOD BLD: ABNORMAL
EOSINOPHIL # BLD AUTO: 0.3 10E9/L (ref 0–0.7)
EOSINOPHIL NFR BLD AUTO: 2.7 %
ERYTHROCYTE [DISTWIDTH] IN BLOOD BY AUTOMATED COUNT: 14.2 % (ref 10–15)
GFR SERPL CREATININE-BSD FRML MDRD: 12 ML/MIN/{1.73_M2}
GLUCOSE BLDC GLUCOMTR-MCNC: 122 MG/DL (ref 70–99)
GLUCOSE SERPL-MCNC: 104 MG/DL (ref 70–99)
HCT VFR BLD AUTO: 34 % (ref 40–53)
HGB BLD-MCNC: 11.2 G/DL (ref 13.3–17.7)
IMM GRANULOCYTES # BLD: 0.1 10E9/L (ref 0–0.4)
IMM GRANULOCYTES NFR BLD: 0.4 %
INR PPP: 0.99 (ref 0.86–1.14)
LABORATORY COMMENT REPORT: NORMAL
LYMPHOCYTES # BLD AUTO: 3.7 10E9/L (ref 0.8–5.3)
LYMPHOCYTES NFR BLD AUTO: 32.7 %
MCH RBC QN AUTO: 30.6 PG (ref 26.5–33)
MCHC RBC AUTO-ENTMCNC: 32.9 G/DL (ref 31.5–36.5)
MCV RBC AUTO: 93 FL (ref 78–100)
MONOCYTES # BLD AUTO: 1 10E9/L (ref 0–1.3)
MONOCYTES NFR BLD AUTO: 8.6 %
NEUTROPHILS # BLD AUTO: 6.2 10E9/L (ref 1.6–8.3)
NEUTROPHILS NFR BLD AUTO: 54.9 %
NRBC # BLD AUTO: 0 10*3/UL
NRBC BLD AUTO-RTO: 0 /100
PLATELET # BLD AUTO: 218 10E9/L (ref 150–450)
POTASSIUM SERPL-SCNC: 3.8 MMOL/L (ref 3.4–5.3)
RBC # BLD AUTO: 3.66 10E12/L (ref 4.4–5.9)
SARS-COV-2 RNA RESP QL NAA+PROBE: NEGATIVE
SPECIMEN SOURCE: NORMAL
WBC # BLD AUTO: 11.3 10E9/L (ref 4–11)

## 2021-02-16 PROCEDURE — 360N000076 HC SURGERY LEVEL 3, PER MIN: Performed by: SURGERY

## 2021-02-16 PROCEDURE — 250N000011 HC RX IP 250 OP 636: Performed by: STUDENT IN AN ORGANIZED HEALTH CARE EDUCATION/TRAINING PROGRAM

## 2021-02-16 PROCEDURE — 82565 ASSAY OF CREATININE: CPT | Performed by: CLINICAL NURSE SPECIALIST

## 2021-02-16 PROCEDURE — 258N000003 HC RX IP 258 OP 636: Performed by: ANESTHESIOLOGY

## 2021-02-16 PROCEDURE — 258N000003 HC RX IP 258 OP 636: Performed by: NURSE ANESTHETIST, CERTIFIED REGISTERED

## 2021-02-16 PROCEDURE — 370N000017 HC ANESTHESIA TECHNICAL FEE, PER MIN: Performed by: SURGERY

## 2021-02-16 PROCEDURE — 999N001017 HC STATISTIC GLUCOSE BY METER IP

## 2021-02-16 PROCEDURE — 82947 ASSAY GLUCOSE BLOOD QUANT: CPT | Performed by: CLINICAL NURSE SPECIALIST

## 2021-02-16 PROCEDURE — U0005 INFEC AGEN DETEC AMPLI PROBE: HCPCS | Performed by: SURGERY

## 2021-02-16 PROCEDURE — 999N000141 HC STATISTIC PRE-PROCEDURE NURSING ASSESSMENT: Performed by: SURGERY

## 2021-02-16 PROCEDURE — 250N000009 HC RX 250: Performed by: NURSE ANESTHETIST, CERTIFIED REGISTERED

## 2021-02-16 PROCEDURE — 85610 PROTHROMBIN TIME: CPT | Performed by: CLINICAL NURSE SPECIALIST

## 2021-02-16 PROCEDURE — 250N000009 HC RX 250: Performed by: CLINICAL NURSE SPECIALIST

## 2021-02-16 PROCEDURE — 250N000009 HC RX 250: Performed by: STUDENT IN AN ORGANIZED HEALTH CARE EDUCATION/TRAINING PROGRAM

## 2021-02-16 PROCEDURE — 84132 ASSAY OF SERUM POTASSIUM: CPT | Performed by: CLINICAL NURSE SPECIALIST

## 2021-02-16 PROCEDURE — U0003 INFECTIOUS AGENT DETECTION BY NUCLEIC ACID (DNA OR RNA); SEVERE ACUTE RESPIRATORY SYNDROME CORONAVIRUS 2 (SARS-COV-2) (CORONAVIRUS DISEASE [COVID-19]), AMPLIFIED PROBE TECHNIQUE, MAKING USE OF HIGH THROUGHPUT TECHNOLOGIES AS DESCRIBED BY CMS-2020-01-R: HCPCS | Performed by: SURGERY

## 2021-02-16 PROCEDURE — 36415 COLL VENOUS BLD VENIPUNCTURE: CPT | Performed by: CLINICAL NURSE SPECIALIST

## 2021-02-16 PROCEDURE — 710N000010 HC RECOVERY PHASE 1, LEVEL 2, PER MIN: Performed by: SURGERY

## 2021-02-16 PROCEDURE — 250N000011 HC RX IP 250 OP 636: Performed by: NURSE ANESTHETIST, CERTIFIED REGISTERED

## 2021-02-16 PROCEDURE — 250N000009 HC RX 250: Performed by: SURGERY

## 2021-02-16 PROCEDURE — 250N000013 HC RX MED GY IP 250 OP 250 PS 637: Performed by: ANESTHESIOLOGY

## 2021-02-16 PROCEDURE — 85730 THROMBOPLASTIN TIME PARTIAL: CPT | Performed by: CLINICAL NURSE SPECIALIST

## 2021-02-16 PROCEDURE — 710N000012 HC RECOVERY PHASE 2, PER MINUTE: Performed by: SURGERY

## 2021-02-16 PROCEDURE — 250N000011 HC RX IP 250 OP 636: Performed by: ANESTHESIOLOGY

## 2021-02-16 PROCEDURE — 272N000001 HC OR GENERAL SUPPLY STERILE: Performed by: SURGERY

## 2021-02-16 PROCEDURE — 250N000025 HC SEVOFLURANE, PER MIN: Performed by: SURGERY

## 2021-02-16 PROCEDURE — 85025 COMPLETE CBC W/AUTO DIFF WBC: CPT | Performed by: CLINICAL NURSE SPECIALIST

## 2021-02-16 RX ORDER — FENTANYL CITRATE 50 UG/ML
25-50 INJECTION, SOLUTION INTRAMUSCULAR; INTRAVENOUS
Status: DISCONTINUED | OUTPATIENT
Start: 2021-02-16 | End: 2021-02-16 | Stop reason: HOSPADM

## 2021-02-16 RX ORDER — LIDOCAINE HYDROCHLORIDE 20 MG/ML
INJECTION, SOLUTION INFILTRATION; PERINEURAL PRN
Status: DISCONTINUED | OUTPATIENT
Start: 2021-02-16 | End: 2021-02-16

## 2021-02-16 RX ORDER — SODIUM CHLORIDE, SODIUM LACTATE, POTASSIUM CHLORIDE, CALCIUM CHLORIDE 600; 310; 30; 20 MG/100ML; MG/100ML; MG/100ML; MG/100ML
INJECTION, SOLUTION INTRAVENOUS CONTINUOUS
Status: DISCONTINUED | OUTPATIENT
Start: 2021-02-16 | End: 2021-02-16 | Stop reason: HOSPADM

## 2021-02-16 RX ORDER — FENTANYL CITRATE 50 UG/ML
INJECTION, SOLUTION INTRAMUSCULAR; INTRAVENOUS PRN
Status: DISCONTINUED | OUTPATIENT
Start: 2021-02-16 | End: 2021-02-16

## 2021-02-16 RX ORDER — SODIUM CHLORIDE 9 MG/ML
INJECTION, SOLUTION INTRAVENOUS CONTINUOUS
Status: DISCONTINUED | OUTPATIENT
Start: 2021-02-16 | End: 2021-02-16 | Stop reason: HOSPADM

## 2021-02-16 RX ORDER — NALOXONE HYDROCHLORIDE 0.4 MG/ML
0.2 INJECTION, SOLUTION INTRAMUSCULAR; INTRAVENOUS; SUBCUTANEOUS
Status: DISCONTINUED | OUTPATIENT
Start: 2021-02-16 | End: 2021-02-16 | Stop reason: HOSPADM

## 2021-02-16 RX ORDER — HYDROMORPHONE HYDROCHLORIDE 2 MG/1
2 TABLET ORAL EVERY 6 HOURS PRN
Qty: 20 TABLET | Refills: 0 | Status: SHIPPED | OUTPATIENT
Start: 2021-02-16 | End: 2021-02-16

## 2021-02-16 RX ORDER — HYDROMORPHONE HYDROCHLORIDE 1 MG/ML
.3-.5 INJECTION, SOLUTION INTRAMUSCULAR; INTRAVENOUS; SUBCUTANEOUS EVERY 5 MIN PRN
Status: DISCONTINUED | OUTPATIENT
Start: 2021-02-16 | End: 2021-02-16 | Stop reason: HOSPADM

## 2021-02-16 RX ORDER — HYDROMORPHONE HYDROCHLORIDE 2 MG/1
2 TABLET ORAL EVERY 6 HOURS PRN
Qty: 20 TABLET | Refills: 0 | Status: ON HOLD | OUTPATIENT
Start: 2021-02-16 | End: 2022-12-13

## 2021-02-16 RX ORDER — ONDANSETRON 2 MG/ML
4 INJECTION INTRAMUSCULAR; INTRAVENOUS EVERY 30 MIN PRN
Status: DISCONTINUED | OUTPATIENT
Start: 2021-02-16 | End: 2021-02-16 | Stop reason: HOSPADM

## 2021-02-16 RX ORDER — SENNA AND DOCUSATE SODIUM 50; 8.6 MG/1; MG/1
1 TABLET, FILM COATED ORAL AT BEDTIME
Qty: 30 TABLET | Refills: 0 | Status: ON HOLD | OUTPATIENT
Start: 2021-02-16 | End: 2022-12-13

## 2021-02-16 RX ORDER — BUPIVACAINE HYDROCHLORIDE 2.5 MG/ML
INJECTION, SOLUTION EPIDURAL; INFILTRATION; INTRACAUDAL PRN
Status: DISCONTINUED | OUTPATIENT
Start: 2021-02-16 | End: 2021-02-16

## 2021-02-16 RX ORDER — FLUMAZENIL 0.1 MG/ML
0.2 INJECTION, SOLUTION INTRAVENOUS
Status: DISCONTINUED | OUTPATIENT
Start: 2021-02-16 | End: 2021-02-16 | Stop reason: HOSPADM

## 2021-02-16 RX ORDER — EPHEDRINE SULFATE 50 MG/ML
INJECTION, SOLUTION INTRAMUSCULAR; INTRAVENOUS; SUBCUTANEOUS PRN
Status: DISCONTINUED | OUTPATIENT
Start: 2021-02-16 | End: 2021-02-16

## 2021-02-16 RX ORDER — CALCIUM ACETATE 667 MG/1
1334 CAPSULE ORAL
Status: ON HOLD | COMMUNITY
End: 2022-12-13

## 2021-02-16 RX ORDER — ONDANSETRON 2 MG/ML
INJECTION INTRAMUSCULAR; INTRAVENOUS PRN
Status: DISCONTINUED | OUTPATIENT
Start: 2021-02-16 | End: 2021-02-16

## 2021-02-16 RX ORDER — OXYCODONE HYDROCHLORIDE 5 MG/1
5 TABLET ORAL EVERY 6 HOURS PRN
Qty: 20 TABLET | Refills: 0 | Status: SHIPPED | OUTPATIENT
Start: 2021-02-16 | End: 2021-02-16

## 2021-02-16 RX ORDER — NALOXONE HYDROCHLORIDE 0.4 MG/ML
0.4 INJECTION, SOLUTION INTRAMUSCULAR; INTRAVENOUS; SUBCUTANEOUS
Status: DISCONTINUED | OUTPATIENT
Start: 2021-02-16 | End: 2021-02-16 | Stop reason: HOSPADM

## 2021-02-16 RX ORDER — HYDROMORPHONE HYDROCHLORIDE 2 MG/1
2 TABLET ORAL
Status: DISCONTINUED | OUTPATIENT
Start: 2021-02-16 | End: 2021-02-16 | Stop reason: HOSPADM

## 2021-02-16 RX ORDER — SODIUM CHLORIDE, SODIUM LACTATE, POTASSIUM CHLORIDE, CALCIUM CHLORIDE 600; 310; 30; 20 MG/100ML; MG/100ML; MG/100ML; MG/100ML
INJECTION, SOLUTION INTRAVENOUS CONTINUOUS PRN
Status: DISCONTINUED | OUTPATIENT
Start: 2021-02-16 | End: 2021-02-16

## 2021-02-16 RX ORDER — CLINDAMYCIN PHOSPHATE 900 MG/50ML
900 INJECTION, SOLUTION INTRAVENOUS
Status: COMPLETED | OUTPATIENT
Start: 2021-02-16 | End: 2021-02-16

## 2021-02-16 RX ORDER — MAGNESIUM HYDROXIDE 1200 MG/15ML
LIQUID ORAL PRN
Status: DISCONTINUED | OUTPATIENT
Start: 2021-02-16 | End: 2021-02-16 | Stop reason: HOSPADM

## 2021-02-16 RX ORDER — ONDANSETRON 4 MG/1
4 TABLET, ORALLY DISINTEGRATING ORAL EVERY 30 MIN PRN
Status: DISCONTINUED | OUTPATIENT
Start: 2021-02-16 | End: 2021-02-16 | Stop reason: HOSPADM

## 2021-02-16 RX ORDER — LIDOCAINE 40 MG/G
CREAM TOPICAL
Status: DISCONTINUED | OUTPATIENT
Start: 2021-02-16 | End: 2021-02-16 | Stop reason: HOSPADM

## 2021-02-16 RX ORDER — PROPOFOL 10 MG/ML
INJECTION, EMULSION INTRAVENOUS PRN
Status: DISCONTINUED | OUTPATIENT
Start: 2021-02-16 | End: 2021-02-16

## 2021-02-16 RX ADMIN — FENTANYL CITRATE 25 MCG: 50 INJECTION, SOLUTION INTRAMUSCULAR; INTRAVENOUS at 15:43

## 2021-02-16 RX ADMIN — PHENYLEPHRINE HYDROCHLORIDE 100 MCG: 10 INJECTION INTRAVENOUS at 13:01

## 2021-02-16 RX ADMIN — HYDROMORPHONE HYDROCHLORIDE 2 MG: 2 TABLET ORAL at 18:39

## 2021-02-16 RX ADMIN — PHENYLEPHRINE HYDROCHLORIDE 100 MCG: 10 INJECTION INTRAVENOUS at 12:34

## 2021-02-16 RX ADMIN — FENTANYL CITRATE 50 MCG: 50 INJECTION, SOLUTION INTRAMUSCULAR; INTRAVENOUS at 10:51

## 2021-02-16 RX ADMIN — Medication 5 MG: at 13:01

## 2021-02-16 RX ADMIN — PHENYLEPHRINE HYDROCHLORIDE 100 MCG: 10 INJECTION INTRAVENOUS at 12:25

## 2021-02-16 RX ADMIN — MIDAZOLAM 1 MG: 1 INJECTION INTRAMUSCULAR; INTRAVENOUS at 12:14

## 2021-02-16 RX ADMIN — LIDOCAINE HYDROCHLORIDE 100 MG: 20 INJECTION, SOLUTION INFILTRATION; PERINEURAL at 12:20

## 2021-02-16 RX ADMIN — CLINDAMYCIN PHOSPHATE 900 MG: 900 INJECTION, SOLUTION INTRAVENOUS at 12:25

## 2021-02-16 RX ADMIN — ONDANSETRON 4 MG: 2 INJECTION INTRAMUSCULAR; INTRAVENOUS at 12:55

## 2021-02-16 RX ADMIN — PHENYLEPHRINE HYDROCHLORIDE 100 MCG: 10 INJECTION INTRAVENOUS at 13:29

## 2021-02-16 RX ADMIN — PHENYLEPHRINE HYDROCHLORIDE 100 MCG: 10 INJECTION INTRAVENOUS at 16:06

## 2021-02-16 RX ADMIN — PHENYLEPHRINE HYDROCHLORIDE 0.1 MCG/KG/MIN: 10 INJECTION INTRAVENOUS at 14:29

## 2021-02-16 RX ADMIN — Medication 10 MG: at 12:37

## 2021-02-16 RX ADMIN — PHENYLEPHRINE HYDROCHLORIDE 100 MCG: 10 INJECTION INTRAVENOUS at 12:31

## 2021-02-16 RX ADMIN — BUPIVACAINE HYDROCHLORIDE 25 ML: 2.5 INJECTION, SOLUTION EPIDURAL; INFILTRATION; INTRACAUDAL; PERINEURAL at 11:30

## 2021-02-16 RX ADMIN — PHENYLEPHRINE HYDROCHLORIDE 100 MCG: 10 INJECTION INTRAVENOUS at 12:40

## 2021-02-16 RX ADMIN — PHENYLEPHRINE HYDROCHLORIDE 100 MCG: 10 INJECTION INTRAVENOUS at 14:13

## 2021-02-16 RX ADMIN — PHENYLEPHRINE HYDROCHLORIDE 100 MCG: 10 INJECTION INTRAVENOUS at 15:51

## 2021-02-16 RX ADMIN — LIDOCAINE HYDROCHLORIDE 5 ML: 20 INJECTION, SOLUTION INFILTRATION; PERINEURAL at 11:30

## 2021-02-16 RX ADMIN — PHENYLEPHRINE HYDROCHLORIDE 100 MCG: 10 INJECTION INTRAVENOUS at 12:28

## 2021-02-16 RX ADMIN — PHENYLEPHRINE HYDROCHLORIDE 100 MCG: 10 INJECTION INTRAVENOUS at 13:12

## 2021-02-16 RX ADMIN — Medication 10 MG: at 12:48

## 2021-02-16 RX ADMIN — PHENYLEPHRINE HYDROCHLORIDE 100 MCG: 10 INJECTION INTRAVENOUS at 13:20

## 2021-02-16 RX ADMIN — FENTANYL CITRATE 50 MCG: 50 INJECTION, SOLUTION INTRAMUSCULAR; INTRAVENOUS at 12:20

## 2021-02-16 RX ADMIN — FENTANYL CITRATE 25 MCG: 50 INJECTION, SOLUTION INTRAMUSCULAR; INTRAVENOUS at 12:10

## 2021-02-16 RX ADMIN — PROPOFOL 200 MG: 10 INJECTION, EMULSION INTRAVENOUS at 12:20

## 2021-02-16 RX ADMIN — MIDAZOLAM 1 MG: 1 INJECTION INTRAMUSCULAR; INTRAVENOUS at 10:51

## 2021-02-16 RX ADMIN — FENTANYL CITRATE 25 MCG: 50 INJECTION, SOLUTION INTRAMUSCULAR; INTRAVENOUS at 16:51

## 2021-02-16 RX ADMIN — MIDAZOLAM 1 MG: 1 INJECTION INTRAMUSCULAR; INTRAVENOUS at 12:10

## 2021-02-16 RX ADMIN — PHENYLEPHRINE HYDROCHLORIDE 100 MCG: 10 INJECTION INTRAVENOUS at 16:18

## 2021-02-16 RX ADMIN — PHENYLEPHRINE HYDROCHLORIDE 200 MCG: 10 INJECTION INTRAVENOUS at 13:46

## 2021-02-16 RX ADMIN — PHENYLEPHRINE HYDROCHLORIDE 200 MCG: 10 INJECTION INTRAVENOUS at 13:53

## 2021-02-16 RX ADMIN — SODIUM CHLORIDE: 9 INJECTION, SOLUTION INTRAVENOUS at 12:11

## 2021-02-16 RX ADMIN — PHENYLEPHRINE HYDROCHLORIDE 100 MCG: 10 INJECTION INTRAVENOUS at 14:23

## 2021-02-16 RX ADMIN — FENTANYL CITRATE 25 MCG: 50 INJECTION, SOLUTION INTRAMUSCULAR; INTRAVENOUS at 16:00

## 2021-02-16 RX ADMIN — FENTANYL CITRATE 25 MCG: 50 INJECTION, SOLUTION INTRAMUSCULAR; INTRAVENOUS at 12:15

## 2021-02-16 ASSESSMENT — MIFFLIN-ST. JEOR: SCORE: 1785.75

## 2021-02-16 NOTE — PROGRESS NOTES
"Dr Mariee paged in regards to discharge script. Pt states \"oxycodone does not work for me.\" Pt is requesting a different discharge script. Awaiting return call.    1742 Obtained a verbal signout from Dr Ornelas  1745 Surgeon to return to facility to give a different script.  "

## 2021-02-16 NOTE — ANESTHESIA PREPROCEDURE EVALUATION
Anesthesia Pre-Procedure Evaluation    Patient: Jhoan Hoffman   MRN: [id number] : 1995        Preoperative Diagnosis: Encounter regarding vascular access for dialysis for end-stage renal disease (H) [N18.6, Z99.2]   Procedure : Procedure(s):  Left upper AV fistula revision (transposition) with intraoperative ultrasound     Past Medical History:   Diagnosis Date     Anemia     in ESRD     Depression      History of blood transfusion      Hypertension      Kidney replaced by transplant      Peritoneal dialysis status (H)     in past     Renal failure      Sleep apnea       Past Surgical History:   Procedure Laterality Date     BIOPSY       CREATE GRAFT LOOP ARTERIOVENOUS UPPER EXTREMITY  2020    Procedure: Create Graft Left Arteriovenous Upper Extremity;  Surgeon: Sidney Palma MD;  Location: [facility] OR     CYSTOSCOPY, REMOVE STENT(S), COMBINED  2012    Procedure:COMBINED CYSTOSCOPY, REMOVE STENT(S); Cystoscopy, Removal Of Urinary Stent; Surgeon:FLORENTINO KNIGHT; Location:[facility] OR     ENDARECTERECTOMY RENAL  2011    Procedure:ENDARECTERECTOMY RENAL; Exploration of Transplant Kidney, Back Table Flush, Biopsy of Kidney and Reanastamosis of Kidney; Surgeon:FLORENTINO KNIGHT; Location:[facility] OR     EXPLANT TRANSPLANTED KIDNEY  2011    Procedure:EXPLANT TRANSPLANTED KIDNEY; Transplant Nephrectomy; Surgeon:FLORENTINO KNIGHT; Location:[facility]     INSERT CATHETER HEMODIALYSIS  2011    Procedure:INSERT CATHETER HEMODIALYSIS; Double Lumen; Surgeon:JOE HE; Location:[facility]     INSERT CATHETER PERITONEAL DIALYSIS  2012    Procedure:INSERT CATHETER PERITONEAL DIALYSIS; Placement dialysis cath under fluoro, before kidney tx; Surgeon:FLORENTINO KNIGHT; [facility]     IR CVC TUNNEL PLACEMENT > 5 YRS OF AGE  2020     LAPAROTOMY EXPLORATORY  2011    Procedure:LAPAROTOMY EXPLORATORY; washout of kidney and closure; Surgeon:FLORENTINO KNIGHT; [facility]      PERCUTANEOUS BIOPSY KIDNEY  6/2/2011    Procedure:PERCUTANEOUS BIOPSY KIDNEY; Surgeon:GIL WILLIAM; [facility]     PERCUTANEOUS BIOPSY KIDNEY Right 6/26/2019    Procedure: Right Kidney Biopsy;  Surgeon: Peter Briggs MD;  [facility]     PERCUTANEOUS BIOPSY KIDNEY Right 7/23/2019    Procedure: Right Kidney Biopsy;  Surgeon: Pro Menard MD;  [facility]: [facility]     REMOVE CATHETER PERITONEAL  5/3/2012    Procedure:REMOVE CATHETER PERITONEAL; Removal Of Peritoneal Dialysis Catheter; Surgeon:FLORENTINO KNIGHT; [facility]     TRANSPLANT KIDNEY RECIPIENT LIVING RELATED  1995     TRANSPLANT KIDNEY RECIPIENT LIVING UNRELATED  5/18/2011    Procedure:TRANSPLANT KIDNEY RECIPIENT LIVING UNRELATED; Living unrelated left kidney transplant and placement of ureteral stent into transplant ureter.; Surgeon:FLORENTINO KNIGHT; [facility]     TRANSPLANT KIDNEY RECIPIENT LIVING UNRELATED  4/28/2012    Procedure:TRANSPLANT KIDNEY RECIPIENT LIVING UNRELATED; kidney transplant -   UNOS# [id number]  Organ arrival: 2100 4/27  Cross match results: 0200 4/28  Donor blood type: A  Recipient blood type: A; Surgeon:FLORENTINO KNIGHT; [facility]      Allergies   Allergen Reactions     Amoxicillin Swelling     Penicillins Swelling and Other (See Comments)     Azithromycin      Z pack - can't take with cyclosporine.     Vancomycin      Ruth syndrome     Cefprozil Rash      Social History     Tobacco Use     Smoking status: Never Smoker     Smokeless tobacco: Never Used     Tobacco comment: mother smokes outside   Substance Use Topics     Alcohol use: Not Currently      Wt Readings from Last 1 Encounters:   02/16/21 86.3 kg (190 lb 4.1 oz)        Anesthesia Evaluation   Pt has had prior anesthetic. Type: General.        ROS/MED HX  ENT/Pulmonary:     (+) sleep apnea,     Neurologic:       Cardiovascular:     (+) hypertension-----    METS/Exercise Tolerance:     Hematologic:       Musculoskeletal:       GI/Hepatic:      (+) GERD,     Renal/Genitourinary:     (+) renal disease, type: ESRD, Pt requires dialysis, type: Hemodialysis, Pt has history of transplant,     Endo:       Psychiatric/Substance Use:       Infectious Disease:       Malignancy:       Other:            Physical Exam    Airway  airway exam normal           Respiratory Devices and Support         Dental  no notable dental history         Cardiovascular   cardiovascular exam normal          Pulmonary   pulmonary exam normal                OUTSIDE LABS:  CBC:   Lab Results   Component Value Date    WBC 11.3 (H) 02/16/2021    WBC 10.0 12/20/2020    HGB 11.2 (L) 02/16/2021    HGB 8.7 (L) 12/20/2020    HCT 34.0 (L) 02/16/2021    HCT 25.6 (L) 12/20/2020     02/16/2021     12/20/2020     BMP:   Lab Results   Component Value Date     12/21/2020     12/20/2020    POTASSIUM 3.8 02/16/2021    POTASSIUM 3.8 12/21/2020    CHLORIDE 105 12/21/2020    CHLORIDE 102 12/20/2020    CO2 18 (L) 12/21/2020    CO2 21 12/20/2020    BUN 66 (H) 12/21/2020    BUN 42 (H) 12/20/2020    CR 5.91 (H) 02/16/2021    CR 8.09 (H) 12/21/2020     (H) 02/16/2021    GLC 87 12/21/2020     COAGS:   Lab Results   Component Value Date    PTT 26 02/16/2021    INR 0.99 02/16/2021    FIBR 566 (H) 06/03/2011     POC:   Lab Results   Component Value Date     (H) 02/16/2021     HEPATIC:   Lab Results   Component Value Date    ALBUMIN 2.9 (L) 12/19/2020    PROTTOTAL 6.4 (L) 12/19/2020    ALT 12 12/19/2020    AST 5 12/19/2020    ALKPHOS 203 (H) 12/19/2020    BILITOTAL 0.2 12/19/2020     OTHER:   Lab Results   Component Value Date    PH  04/28/2012     Incorrect specimen type  PER NEY IN OR 17 AT 0544 ON 4/28/12 JA    LACT 1.6 05/19/2011    LIZZ 8.6 12/21/2020    PHOS 8.4 (H) 12/21/2020    MAG 1.7 12/20/2020    LIPASE 202 02/16/2011    AMYLASE 142 (H) 02/16/2011    TSH 0.65 07/21/2014    CRP 6.8 11/01/2011       Anesthesia Plan    ASA Status:  4      Anesthesia Type:  General.     - Airway: LMA   Induction: Intravenous.   Maintenance: Inhalation.        Consents    Anesthesia Plan(s) and associated risks, benefits, and realistic alternatives discussed. Questions answered and patient/representative(s) expressed understanding.     - Discussed with:  Patient      - Extended Intubation/Ventilatory Support Discussed: no Extended Intubation.      - Patient is DNR/DNI Status: No    Use of blood products discussed: No .     Postoperative Care    Pain management: IV analgesics.   PONV prophylaxis: Ondansetron (or other 5HT-3)     Comments:                Kvng Cox MD

## 2021-02-16 NOTE — OP NOTE
07/05/17 1506   Medicare Message   Important Message from Medicare regarding Discharge Appeal Rights Given to patient/caregiver;Explained to patient/caregiver;Signed/date by patient/caregiver        Transplant Surgery  Operative Note  PREOP DIAGNOSIS: End Stage Renal Failure   POSTOP DIAGNOSIS: Same  OPERATION: Arteriovenous Fistula revision, left basilic vein transposition (2nd stage procedure). Intraoperative ultrasound  FACULTY: Sidney Palma M.D.  FELLOWs: Lonnie Mariee MD Fellow. There was no qualified resident available for the case  ANESTHESIA: General  EBL: 10 ml.  SPECIMEN: none     INDICATION: The patient was referred  for permanent dialysis access creation due to renal failure. The indications, benefits, and risks of permanent vascular access creation were discussed, questions answered and informed consent was provided.   FINDINGS:   Vein quality was: Normal with diameter of 4 mm.   Blood flow was 700 ml/min as measured by Transonic flow probe.  COMPLICATIONS: None.     PROCEDURE: The patient was positioned supine, and the left upper extremity was positioned, prepped and draped in the usual sterile fashion. An ultrasound was performed to assess the size, quality, and position of the basilic vein. The patient received preoperative IV antibiotics. An incision was made and extended through the subcutaneous tissues and we found the basilic vein. Three separate step incisions were performed proximally and used to dissect the basilic vein circumferentially up to the armpit. We then marked the anterior surface of the basilic vein and clamped the vein proximally and distally. The vein was transected 2 cm proximal to the anastomosis and a Briana Wick tunneler was used to superficialize the basilic vein. We then created an end to end anastomosis at the basilic vein transection point. The clamps were removed sequentially. Hemostasis was obtained.  Fistula flow was a bit pulsatile. The distal radial artery pulse was excellent and capillary refill excellent. The wound was closed in layers with absorbable suture and dressed with Dermabond. Counts were correct. Faculty was present for the key portions of  the procedure. The patient was then awakened and transferred to PACU in good condition.     I was present during the key portions of the procedure, and I was immediately available for the entire procedure.

## 2021-02-16 NOTE — ANESTHESIA CARE TRANSFER NOTE
Patient: Jhoan Hoffman    Procedure(s):  Left upper AV fistula revision (transposition) with intraoperative ultrasound    Diagnosis: Encounter regarding vascular access for dialysis for end-stage renal disease (H) [N18.6, Z99.2]  Diagnosis Additional Information: No value filed.    Anesthesia Type:   General     Note:    Oropharynx: oropharynx clear of all foreign objects and spontaneously breathing  Level of Consciousness: awake  Oxygen Supplementation: face mask  Level of Supplemental Oxygen (L/min / FiO2): 6  Independent Airway: airway patency satisfactory and stable  Dentition: dentition unchanged  Vital Signs Stable: post-procedure vital signs reviewed and stable  Report to RN Given: handoff report given  Patient transferred to: PACU    Handoff Report: Identified the Reponsible Provider, Reviewed the pertinent medical history, Discussed the surgical course, Reviewed Intra-OP anesthesia mangement and issues during anesthesia, Set expectations for post-procedure period, Allowed opportunity for questions and acknowledgement of understanding and Identifed the Patient      Vitals: (Last set prior to Anesthesia Care Transfer)  CRNA VITALS  2/16/2021 1556 - 2/16/2021 1639      2/16/2021             Resp Rate (observed):  18        Electronically Signed By: JAJA Farmer CRNA  February 16, 2021  4:39 PM

## 2021-02-16 NOTE — ANESTHESIA PROCEDURE NOTES
Pre-Procedure   Staff -   Anesthesiologist:  Isha Hagen MD  Resident/Fellow: Jonathon Stevens III, MD  Performed By: resident  Location: pre-op  Procedure Start/Stop Times: 2/16/2021 10:45 AM and 2/16/2021 11:01 AM  Pre-Anesthestic Checklist: patient identified, IV checked, site marked, risks and benefits discussed, informed consent, monitors and equipment checked, pre-op evaluation, at physician/surgeon's request and post-op pain management  Timeout:  Correct Patient: Yes   Correct Procedure: Yes   Correct Site: Yes   Correct Position: Yes   Correct Laterality: Yes   Site Marked: Yes    Procedure Documentation  Procedure: Supraclavicular  Diagnosis: POST OPERATIVE PAIN  Laterality: left  Patient Position:sitting  Patient Prep/Sterile Barriers: sterile gloves, mask, Chloraprep  Needle type: short bevel  Needle Gauge: 21.   Needle Length (millimeters) 110   Ultrasound guided, Ultrasound used to identify targeted nerve, plexus, vascular marker, or fascial plane and place a needle adjacent to it in real-time, Ultrasound was used to visualize the spread of anesthetic in close proximity to the above referenced structure  A permanent image is entered into the patient's record.    Assessment/Narrative      The placement was negative for: blood aspirated, painful injection and site bleeding  Paresthesias: Yes and Resolved.  Bolus given via needle..   Secured via.   Insertion/Infusion Method: Single Shot  Complications: none  Injection made incrementally with aspirations every 5 mL.  Comments:  Routine left supraclavicular block. Patient with brachial plexus in more superior lateral position to subclavian artery than usual, so plexus was confirmed by scanning through interscalene view to supraclavicular view.

## 2021-02-17 NOTE — DISCHARGE INSTRUCTIONS
May start pre-hospital diet immediately.    No lifting >10 pounds for 6 weeks.  No rigorous physical activity.  No activity restrictions.    You may remove bandage around left arm on 21    May shower now. No bathing for two weeks.    Call [phone] to schedule or with questions during the week days.      Call [phone] and ask to speak with surgery resident if you are having troubles in the evenings, at night, or on weekends. Please call if you experience increasing abdominal pain, nausea, vomiting, increasing drainage from your wounds, chills, or fever >101.5    Take stool softener while taking narcotic pain medication.    No driving for at least 12 hours after taking narcotic pain medication.    [facility], [place]  Same-Day Surgery   Adult Discharge Orders & Instructions     For 24 hours after surgery    1. Get plenty of rest.  A responsible adult must stay with you for at least 24 hours after you leave the hospital.   2. Do not drive or use heavy equipment.  If you have weakness or tingling, don't drive or use heavy equipment until this feeling goes away.  3. Do not drink alcohol.  4. Avoid strenuous or risky activities.  Ask for help when climbing stairs.   5. You may feel lightheaded.  IF so, sit for a few minutes before standing.  Have someone help you get up.   6. If you have nausea (feel sick to your stomach): Drink only clear liquids such as apple juice, ginger ale, broth or 7-Up.  Rest may also help.  Be sure to drink enough fluids.  Move to a regular diet as you feel able.  7. You may have a slight fever. Call the doctor if your fever is over 100 F (37.7 C) (taken under the tongue) or lasts longer than 24 hours.  8. You may have a dry mouth, a sore throat, muscle aches or trouble sleeping.  These should go away after 24 hours.  9. Do not make important or legal decisions.   Call your doctor for any of the followin.  Signs of infection (fever, growing  tenderness at the surgery site, a large amount of drainage or bleeding, severe pain, foul-smelling drainage, redness, swelling).    2. It has been over 8 to 10 hours since surgery and you are still not able to urinate (pass water).    3.  Headache for over 24 hours.    4.  Numbness, tingling or weakness the day after surgery (if you had spinal anesthesia).  To contact a doctor, call [facility] at [phone] or:        [phone] and ask for the resident on call for   Transplant (answered 24 hours a day)      Emergency Department:    [facility]: [phone]       (TTY for hearing impaired: [phone])    [facility]: [phone]       (TTY for hearing impaired: [phone])

## 2021-02-18 ENCOUNTER — TELEPHONE (OUTPATIENT)
Dept: TRANSPLANT | Facility: CLINIC | Age: 26
End: 2021-02-18

## 2021-02-18 DIAGNOSIS — T86.11 ACUTE REJECTION OF KIDNEY TRANSPLANT: ICD-10-CM

## 2021-02-18 DIAGNOSIS — Z94.0 KIDNEY TRANSPLANTED: ICD-10-CM

## 2021-02-18 DIAGNOSIS — Z94.0 KIDNEY REPLACED BY TRANSPLANT: ICD-10-CM

## 2021-02-18 DIAGNOSIS — Z94.0 S/P KIDNEY TRANSPLANT: ICD-10-CM

## 2021-02-18 DIAGNOSIS — N18.1 CHRONIC KIDNEY DISEASE, STAGE I: ICD-10-CM

## 2021-02-18 RX ORDER — PREDNISONE 10 MG/1
5 TABLET ORAL EVERY OTHER DAY
Qty: 8 TABLET | Refills: 11 | Status: SHIPPED | OUTPATIENT
Start: 2021-02-18 | End: 2021-10-28

## 2021-02-18 RX ORDER — TACROLIMUS 0.5 MG/1
2 CAPSULE ORAL DAILY
Qty: 120 CAPSULE | Refills: 11 | Status: ON HOLD | OUTPATIENT
Start: 2021-02-18 | End: 2022-12-13

## 2021-02-18 RX ORDER — SULFAMETHOXAZOLE AND TRIMETHOPRIM 400; 80 MG/1; MG/1
1 TABLET ORAL
Qty: 13 TABLET | Refills: 11 | Status: ON HOLD | OUTPATIENT
Start: 2021-02-19 | End: 2022-12-13

## 2021-02-18 NOTE — LETTER
Jhoan Hoffman  [address]  [address]  [place] [address]                February 18, 2021    Dear Mr. Hoffman,    You were referred for kidney transplant by Dr. Cupl, your nephrologist with the [facility] in December 2019. Several attempts have been made to schedule your pre-kidney evaluation in hopes of re-transplant in the near future. In our conversation today 2/18/2021, you discussed that you would like the referral closed as you do not wish to pursue re-transplant at this time. Please know that if your wishes change, you can be re-referred to our center and begin the evaluation process.     Sincerely,       Nelsy Estevez  Pre-Kidney/Pancreas Transplant Coordinator  Solid Organ Transplant  [facility], [facility]

## 2021-02-18 NOTE — ANESTHESIA POSTPROCEDURE EVALUATION
Patient: Jhoan Hoffman    Procedure(s):  Left upper AV fistula revision (transposition) with intraoperative ultrasound    Diagnosis:Encounter regarding vascular access for dialysis for end-stage renal disease (H) [N18.6, Z99.2]  Diagnosis Additional Information: No value filed.    Anesthesia Type:  General    Note:  Disposition: Admission   Postop Pain Control: Uneventful            Sign Out: Well controlled pain   PONV: No   Neuro/Psych: Uneventful            Sign Out: Acceptable/Baseline neuro status   Airway/Respiratory: Uneventful            Sign Out: Acceptable/Baseline resp. status   CV/Hemodynamics: Uneventful            Sign Out: Acceptable CV status   Other NRE: NONE   DID A NON-ROUTINE EVENT OCCUR? No         Last vitals:  Vitals:    02/16/21 1800 02/16/21 1806 02/16/21 1815   BP: 122/68 113/71 125/78   Pulse: 105 102 97   Resp:  18 16   Temp:  36.6  C (97.9  F)    SpO2: 96% 95% 97%       Last vitals prior to Anesthesia Care Transfer:  CRNA VITALS  2/16/2021 1556 - 2/16/2021 1656      2/16/2021             Resp Rate (observed):  18          Electronically Signed By: Philippe Cabrera MD  February 18, 2021  10:01 AM

## 2021-02-18 NOTE — TELEPHONE ENCOUNTER
ISSUE  Due to stop MMF, decrease Prednisone 5 mg to every other day and decrease Tacrolimus to 2 mg daily per IS wean plan.    Plan for immunosuppression taper:  # Immunosuppression: Tacrolimus immediate release (goal 4-6), Mycophenolate mofetil (dose goal not followed) and Prednisone (dose 5 mg daily)              - Changes: Start IS taper:   BID x 1 month,  250 BID x 1 month then off.  When MMF discontinue can start prednisone 5 mg every other day x 6 months followed by cosyntropin testing. At 2 months, can do tacrolimus 2 mg daily for an additional 6 months.      OUTCOME  Called Jhoan to discuss above. He verbalized understanding of next step of weaning IS.   He prefers to continue to use Tacrolimus 0.5 mg pills, rather than changing to 1.0 mg pills.

## 2021-02-18 NOTE — TELEPHONE ENCOUNTER
Spoke with patient after conversation with his post-transplant coordinator regarding PKE. Patient has no-showed on 2 PKE dates and recently had a fistula placed for dialysis. RNCC discussed that he has been referred for another kidney transplant and would like to set up an evaluation for him, patient reported that he was not interested in a kidney transplant at this time. When asked for more information, he stated he simply did not want to undergo another transplant right now. We discussed that if he changed his mind, he could be re-referred. Patient stated he understood.

## 2021-02-26 ENCOUNTER — TELEPHONE (OUTPATIENT)
Dept: TRANSPLANT | Facility: CLINIC | Age: 26
End: 2021-02-26

## 2021-03-04 ENCOUNTER — OFFICE VISIT (OUTPATIENT)
Dept: TRANSPLANT | Facility: CLINIC | Age: 26
End: 2021-03-04
Attending: CLINICAL NURSE SPECIALIST
Payer: MEDICAID

## 2021-03-04 VITALS
OXYGEN SATURATION: 98 % | DIASTOLIC BLOOD PRESSURE: 92 MMHG | SYSTOLIC BLOOD PRESSURE: 139 MMHG | WEIGHT: 191.8 LBS | HEART RATE: 102 BPM | BODY MASS INDEX: 30.96 KG/M2

## 2021-03-04 DIAGNOSIS — Z48.89 ENCOUNTER FOR POST SURGICAL WOUND CHECK: ICD-10-CM

## 2021-03-04 DIAGNOSIS — N18.6 ESRD ON DIALYSIS (H): Primary | ICD-10-CM

## 2021-03-04 DIAGNOSIS — Z99.2 ESRD ON DIALYSIS (H): Primary | ICD-10-CM

## 2021-03-04 DIAGNOSIS — Z09 FOLLOW-UP EXAMINATION AFTER VASCULAR SURGERY: ICD-10-CM

## 2021-03-04 PROCEDURE — 99024 POSTOP FOLLOW-UP VISIT: CPT | Performed by: CLINICAL NURSE SPECIALIST

## 2021-03-04 ASSESSMENT — PAIN SCALES - GENERAL: PAINLEVEL: NO PAIN (0)

## 2021-03-04 NOTE — PROGRESS NOTES
Dialysis Access Service   Progress Note    S:  Mr. Hoffman is a 25 year male, being seen today for surgical followup of his dialysis access. He reports no issues with the wound, and  no steal syndrome of the distal extremity. He reports he  initiated HD with a CVC tunneled line at [facility] the day after AV fistula surgery. He is scheduled outpatient HD at Health [facility] on Monday, Wednesday and Friday. He reports the mid incision of left upper arm racked open a couple days ago with some drainage from site. Dialysis staff took culture from incision site, and result was negative. A small amount drainage on dressing that he changed yesterday. Denies pain or swelling in left arm, tingling/numbness or a change of color/temperature in left hand and fingers. S/P Left upper arm Arteriovenous Fistula revision, basilic vein transposition (2nd stage procedure) on 2/16/2021.      O:  Pulse:  [102] 102  BP: (139)/(92) 139/92  SpO2:  [98 %] 98 %  GENERAL: alert, cooperative  Circulation:   Radial pulse 3+  Ulnar pulse  3+   Capillary refill:  capillary refill < 2 sec    Sensory exam:   arm: Normal   [x]           Abnormal   []          Comment:    hand: Normal   [x]           Abnormal   []          Comment:   Motor exam:   arm: Normal   [x]           Abnormal   []          Comment:    hand: Normal   [x]           Abnormal   []          Comment:    Access: L upper extremity wound(s) healing, non-tender, 2/3 incisions with derma bond intact. Mild venous hypertension, ++ thrill and bruit via hand held doppler present. Mild edema in left upper arm. The mid incision of left upper arm is open without drema bond covered. A small amount drainage noted on dressing guaze. No redness, drainage noted. Non-tender, dry crust at edges of skin.     Assessment & Plan: Mr. Hoffman's dialysis access has matured well at this time point.    1. Keep left upper arm incision wound clean and dry  2. May apply topical antibiotic ointment as needed  3.  Elevate left arm with support as tolerated  4. Continue left arm exercise with a squeeze ball as tolerated  5. Follow up in clinic in 2 weeks     We would like to see the patient back in the clinic in 2 weeks time to assess progress. The patient was counselled to contact our nurse coordinator, JAJA Correa (Sum), ANTOINE at [phone] with any questions or concerns.  Thank you for the opportunity to participate in Mr. Hoffman's care.        Modesto Guzman (Sum) MS, JAJA, CNS, [facility]  Dialysis Vascular Access/SOT Clinical Nurse Specialist    Solid Organ Transplant Service - [facility]

## 2021-03-04 NOTE — PROGRESS NOTES
Chief Complaint   Patient presents with     RECHECK     2 WEEK F/U     Blood pressure (!) 139/92, pulse 102, weight 87 kg (191 lb 12.8 oz), SpO2 98 %.    Karina Hatch, CMA

## 2021-03-25 ENCOUNTER — ANCILLARY PROCEDURE (OUTPATIENT)
Dept: ULTRASOUND IMAGING | Facility: CLINIC | Age: 26
End: 2021-03-25
Attending: CLINICAL NURSE SPECIALIST
Payer: MEDICAID

## 2021-03-25 ENCOUNTER — OFFICE VISIT (OUTPATIENT)
Dept: TRANSPLANT | Facility: CLINIC | Age: 26
End: 2021-03-25
Attending: INTERNAL MEDICINE
Payer: MEDICAID

## 2021-03-25 VITALS
BODY MASS INDEX: 30.72 KG/M2 | SYSTOLIC BLOOD PRESSURE: 136 MMHG | WEIGHT: 190.3 LBS | DIASTOLIC BLOOD PRESSURE: 96 MMHG | OXYGEN SATURATION: 97 % | HEART RATE: 125 BPM

## 2021-03-25 DIAGNOSIS — N18.6 ESRD ON DIALYSIS (H): ICD-10-CM

## 2021-03-25 DIAGNOSIS — T82.9XXA COMPLICATION OF DIALYSIS ACCESS INSERTION, INITIAL ENCOUNTER: ICD-10-CM

## 2021-03-25 DIAGNOSIS — Z99.2 ESRD ON DIALYSIS (H): Primary | ICD-10-CM

## 2021-03-25 DIAGNOSIS — N18.6 ESRD ON DIALYSIS (H): Primary | ICD-10-CM

## 2021-03-25 DIAGNOSIS — Z99.2 ESRD ON DIALYSIS (H): ICD-10-CM

## 2021-03-25 PROCEDURE — 93990 DOPPLER FLOW TESTING: CPT | Mod: V7 | Performed by: RADIOLOGY

## 2021-03-25 PROCEDURE — 99024 POSTOP FOLLOW-UP VISIT: CPT | Performed by: CLINICAL NURSE SPECIALIST

## 2021-03-25 NOTE — LETTER
3/25/2021         RE: Jhoan Hoffman  [address]  [address]  [facility] [address]        Dear Colleague,    Thank you for referring your patient, Jhoan Hoffman, to the [facility]. Please see a copy of my visit note below.    Dialysis Access Service   Progress Note    S:   Mr. Hoffman is a 25 year male, being seen today for surgical followup of his dialysis access. He reports no issues with the wound, and  no steal syndrome of the distal extremity. He reports he  initiated HD with a CVC tunneled line at [facility] the day after AV fistula surgery. He is scheduled outpatient HD at [facility] on Monday, Wednesday and Friday. He reports that LUE AV fistula has been used for dialysis with single needle once yesterday. Dialysis staff cannulated AV fistula with single needle x 2, dialysis run with AV fistula was successful. Dialysis staff would like to remove CVC tunneled line after 2 cannulations with single needle. Incision wound healed after last clinic visit. Denies pain or swelling in left arm, tingling/numbness or a change of color/temperature in left hand and fingers. S/P Left upper arm Arteriovenous Fistula revision, basilic vein transposition (2nd stage procedure) on 2/16/2021.    O:  Pulse:  [125] 125  BP: (136)/(96) 136/96  SpO2:  [97 %] 97 %  GENERAL: alert, cooperative  Circulation:   Radial pulse 3+  Ulnar pulse  3+   Capillary refill:  capillary refill < 2 sec    Sensory exam:   arm: Normal   [x]           Abnormal   []          Comment:    hand: Normal   [x]           Abnormal   []          Comment:   Motor exam:   arm: Normal   [x]           Abnormal   []          Comment:    hand: Normal   [x]           Abnormal   []          Comment:       Access: L upper extremity wound(s) healed. non-tender. no venous hypertension,  ++ thrill and bruit via hand held doppler present. High pitch bruit heard from mid AV fistula. Incision wound healed with dry crust. No edema in left arm  without apparent infection  LUE US of AV fistula today  1. No arterial inflow stenosis demonstrated.  2. Arteriovenous anastomosis stenosis. 4.6 x 3.3 mm. 614/388 cm/s. Velocity ratio 2.97.  3. Outflow basilic vein measures 4.8 mm in the distal arm. Outflow basilic vein is larger than 6 mm in the mid and proximal arm.  4. Flow volume 1208 mL/min in the outflow basilic vein in the distal arm.  5. No central venous stenosis demonstrated.     Assessment & Plan: Mr. Cobb dialysis access has matured well at this time point.    1. LUE US of AV fistula today  2. Continue left arm exercise with a squeeze ball as tolerated  3. May cannulate LUE AV fistula with single needle 2 more times and then cannulate 17 G needles x 2 for 3 dialysis runs.   4. Once LUE AV fistula cannulated with 2 needles without problems, may schedule CVC tunneled line removal  5. Follow up in clinic as needed    We would like to see the patient back in the clinic as needed to assess progress. The patient was counselled to contact our nurse coordinator, JAJA Correa CNS (Sum) at [phone] with any questions or concerns.  Thank you for the opportunity to participate in Mr. Hoffman's care.        Modesto Guzman MS (Sum), JAJA, ANTOINE, [facility]  Dialysis Vascular Access/SOT Clinical Nurse Specialist    Solid Organ Transplant Service - [facility]       Again, thank you for allowing me to participate in the care of your patient.        Sincerely,        JAJA Arizmendi

## 2021-03-25 NOTE — PROGRESS NOTES
Dialysis Access Service   Progress Note    S:   Mr. Hoffman is a 25 year male, being seen today for surgical followup of his dialysis access. He reports no issues with the wound, and  no steal syndrome of the distal extremity. He reports he  initiated HD with a CVC tunneled line at [facility] the day after AV fistula surgery. He is scheduled outpatient HD at [facility] on Monday, Wednesday and Friday. He reports that LUE AV fistula has been used for dialysis with single needle once yesterday. Dialysis staff cannulated AV fistula with single needle x 2, dialysis run with AV fistula was successful. Dialysis staff would like to remove CVC tunneled line after 2 cannulations with single needle. Incision wound healed after last clinic visit. Denies pain or swelling in left arm, tingling/numbness or a change of color/temperature in left hand and fingers. S/P Left upper arm Arteriovenous Fistula revision, basilic vein transposition (2nd stage procedure) on 2/16/2021.    O:  Pulse:  [125] 125  BP: (136)/(96) 136/96  SpO2:  [97 %] 97 %  GENERAL: alert, cooperative  Circulation:   Radial pulse 3+  Ulnar pulse  3+   Capillary refill:  capillary refill < 2 sec    Sensory exam:   arm: Normal   [x]           Abnormal   []          Comment:    hand: Normal   [x]           Abnormal   []          Comment:   Motor exam:   arm: Normal   [x]           Abnormal   []          Comment:    hand: Normal   [x]           Abnormal   []          Comment:       Access: L upper extremity wound(s) healed. non-tender. no venous hypertension,  ++ thrill and bruit via hand held doppler present. High pitch bruit heard from mid AV fistula. Incision wound healed with dry crust. No edema in left arm without apparent infection  LUE US of AV fistula today  1. No arterial inflow stenosis demonstrated.  2. Arteriovenous anastomosis stenosis. 4.6 x 3.3 mm. 614/388 cm/s. Velocity ratio 2.97.  3. Outflow basilic vein measures 4.8 mm in the distal arm. Outflow  basilic vein is larger than 6 mm in the mid and proximal arm.  4. Flow volume 1208 mL/min in the outflow basilic vein in the distal arm.  5. No central venous stenosis demonstrated.     Assessment & Plan: Mr. Hoffman's dialysis access has matured well at this time point.    1. LUE US of AV fistula today  2. Continue left arm exercise with a squeeze ball as tolerated  3. May cannulate LUE AV fistula with single needle 2 more times and then cannulate 17 G needles x 2 for 3 dialysis runs.   4. Once LUE AV fistula cannulated with 2 needles without problems, may schedule CVC tunneled line removal  5. Follow up in clinic as needed    We would like to see the patient back in the clinic as needed to assess progress. The patient was counselled to contact our nurse coordinator, JAJA Correa (Sum), ANTOINE at [phone] with any questions or concerns.  Thank you for the opportunity to participate in Mr. Hoffman's care.        Modesto Guzman (Sum) MS, JAJA, CNS, [facility]  Dialysis Vascular Access/SOT Clinical Nurse Specialist    Solid Organ Transplant Service - [facility]

## 2021-03-25 NOTE — NURSING NOTE
Chief Complaint   Patient presents with     RECHECK     Fistula follow up     Blood pressure (!) 136/96, pulse 125, weight 86.3 kg (190 lb 4.8 oz), SpO2 97 %.    Ann-Marie Wolf on 3/25/2021 at 11:54 AM

## 2021-06-08 ENCOUNTER — DOCUMENTATION ONLY (OUTPATIENT)
Dept: TRANSPLANT | Facility: CLINIC | Age: 26
End: 2021-06-08

## 2021-09-01 ENCOUNTER — TRANSFERRED RECORDS (OUTPATIENT)
Dept: HEALTH INFORMATION MANAGEMENT | Facility: CLINIC | Age: 26
End: 2021-09-01

## 2021-09-11 ENCOUNTER — HEALTH MAINTENANCE LETTER (OUTPATIENT)
Age: 26
End: 2021-09-11

## 2021-09-23 ENCOUNTER — TELEPHONE (OUTPATIENT)
Dept: TRANSPLANT | Facility: CLINIC | Age: 26
End: 2021-09-23

## 2021-09-23 NOTE — LETTER
PHYSICIAN ORDERS      DATE & TIME ISSUED: 2021 9:03 AM  PATIENT NAME: Jhoan Hoffman   : 1995     [facility] MR# [if applicable]: [id number]     DIAGNOSIS:  kidney transplant/Long term use of high risk medications  ICD-10 CODE: Z94.0/Z79.899    For Jhoan Hoffman, please do a cosyntropin stimulation test in the next 1-2 weeks:  Please call patient to schedule [phone].  -Cosyntropin Stimulation Test               1) Check baseline serum cortisol, adrenal corticotropin and renin activity levels        at time zero.               2) Administer cosyntropin 250 mcg IV once over 2 minutes.               3. Check serum cortisol levels 30 minutes and 60 minutes after cosyntropin       injection.      Any questions please call: [phone].    Please fax results to [phone]      .

## 2021-09-23 NOTE — TELEPHONE ENCOUNTER
ISSUE  Kidney failed, on HD.  Due for Cosyntropin to determine if Prednisone can be stopped.    OUTCOME  Jhoan confirmed that he is choosing to put his transplant work up on hold. He does not want another transplant at this time. He states dialysis is going well.  He verbalized understanding to stop Tacrolimus.  Confirmed he is still taking Prednisone 5 mg every other day. He is agreeable to Cosyntropin Stimulation test.      Called [facility] lab and [facility]. They are able to do the Cosyntropin Stim.  Orders faxed.

## 2021-10-14 ENCOUNTER — TELEPHONE (OUTPATIENT)
Dept: TRANSPLANT | Facility: CLINIC | Age: 26
End: 2021-10-14

## 2021-10-14 NOTE — LETTER
PHYSICIAN ORDERS      DATE & TIME ISSUED: 10/14/2021  8:05 AM  PATIENT NAME: Jhoan Hoffman   : 1995     [facility] MR# [if applicable]: [id number]     DIAGNOSIS:  kidney transplant/Long term use of high risk medications  ICD-10 CODE: Z94.0/Z79.899    For Jhoan Hoffman, please do a cosyntropin stimulation test in the next 1-2 weeks:  Please call patient to schedule [phone].  -Cosyntropin Stimulation Test               1) Check baseline serum cortisol, adrenal corticotropin and renin activity levels        at time zero.               2) Administer cosyntropin 250 mcg IV once over 2 minutes.               3. Check serum cortisol levels 30 minutes and 60 minutes after cosyntropin       injection.      Any questions please call: [phone].    Please fax results to [phone]      .

## 2021-10-14 NOTE — TELEPHONE ENCOUNTER
ISSUE  Due for a cosyntropin test to determine if Prednisone can be stopped.  ORders sent to [facility] lab on 9/23.    PLAN  Call Jhoan:  Determine if Cosyntropin testing has been done, or scheduled?  If he has not scheduled advise him to call his lab to schedule in the next week.    LPN task:  Please call with above plan.  Thanks!

## 2021-10-14 NOTE — LETTER
PHYSICIAN ORDERS      DATE & TIME ISSUED: 10/14/2021  8:05 AM  PATIENT NAME: Jhoan Hoffman   : 1995     [facility] MR# [if applicable]: [id number]     DIAGNOSIS:  kidney transplant/Long term use of high risk medications  ICD-10 CODE: Z94.0/Z79.899    For Jhoan Hoffman, please do a cosyntropin stimulation test in the next 1-2 weeks:  Please call patient to schedule [phone].  -Cosyntropin Stimulation Test               1) Check baseline serum cortisol, adrenal corticotropin and renin activity levels        at time zero.               2) Administer cosyntropin 250 mcg IV once over 2 minutes.               3. Check serum cortisol levels 30 minutes and 60 minutes after cosyntropin       injection.      Any questions please call: [phone].    Please fax results to [phone]      .

## 2021-10-14 NOTE — TELEPHONE ENCOUNTER
Left message for patient.  Faxed current orders to local lab and sent to patient via mychart/mail.

## 2021-10-20 ENCOUNTER — TELEPHONE (OUTPATIENT)
Dept: TRANSPLANT | Facility: CLINIC | Age: 26
End: 2021-10-20

## 2021-10-20 NOTE — TELEPHONE ENCOUNTER
Call returned to [facility]. Instructed to hold ONLY prednisone for this test.  For 1 day prior and day of and to resume prednisone the day after the test.    Provider Call: Transplant Provider  Does Jhoan need to take his AM meds prior to Cosyntropin treatment ?     Candis from [facility]  10/26/2021   # [phone]  Callback needed? Yes    Return Call Needed  Same as documented in contacts section  When to return call?: Greater than one day: Route standard priority    
ambulatory

## 2021-10-28 ENCOUNTER — TELEPHONE (OUTPATIENT)
Dept: TRANSPLANT | Facility: CLINIC | Age: 26
End: 2021-10-28

## 2021-10-28 NOTE — TELEPHONE ENCOUNTER
Good response to cosyntropin test and patient should be able to stop oral prednisone and continue on tacrolimus.   Written by Jose Culp MD on 10/28/2021 11:31 AM CDT      Plan:   Call patient to review Dr. Culp's recommendations to discontinue oral prednisone and continue taking tacrolimus.    Outcome:  Spoke with patient. Jhoan Hoffman verbalized understanding of discontinuing oral prednisone and will continue taking tacrolimus. Will update medication list.    Gege Salas RN   Transplant Coordinator  [phone]

## 2022-01-01 ENCOUNTER — HEALTH MAINTENANCE LETTER (OUTPATIENT)
Age: 27
End: 2022-01-01

## 2022-03-02 ENCOUNTER — TELEPHONE (OUTPATIENT)
Dept: TRANSPLANT | Facility: CLINIC | Age: 27
End: 2022-03-02
Payer: MEDICARE

## 2022-03-02 NOTE — TELEPHONE ENCOUNTER
ISSUE  Kidney failed, on HD since December 2020.   Recommended he stop Pred and continue Tac.    PLAN  Call Jhoan:  Assess if he is still taking Tac 2mg daily.  Does he plan on being listed for another transplant.  Discuss with provider if Jhoan should continue Tac.      OUTCOME  Jhoan confirmed that he is still taking Tacrolimus 2 mg daily. Confirmed that he is no longer taking Mycophenolate, and stopped his Pred last Oct after his Cosyntropin test. Jhoan states he is not interested in being listed for another transplant, says he is doing very well on dialysis and does not think he will want another transplant at all.  He has the contact info for SOT and will call if he would like to be referred.  Message sent to provider to see if Tac should be weaned.        ADDENDUM:    Jose Culp MD Harris, Kathleen, RN  If he still has some urine output, I would be inclined to continue it for another year.  If no urine output, I would be okay with stopping it.       OUTCOME  Jhoan states he is still making urine. Does not know how much, but says he goes at least a few times a day. He will continue Tacrolimus 2 mg daily. Verbalized understanding to call SOT if urine out put stops and we can discuss weaning Tac.

## 2022-10-30 ENCOUNTER — HEALTH MAINTENANCE LETTER (OUTPATIENT)
Age: 27
End: 2022-10-30

## 2022-11-16 LAB
CREATININE (EXTERNAL): 9.77 MG/DL (ref 0.6–1.3)
POTASSIUM (EXTERNAL): 4.5 MEQ/L (ref 3.5–5.1)

## 2022-12-07 ENCOUNTER — TRANSFERRED RECORDS (OUTPATIENT)
Dept: HEALTH INFORMATION MANAGEMENT | Facility: CLINIC | Age: 27
End: 2022-12-07

## 2022-12-09 ENCOUNTER — TRANSFERRED RECORDS (OUTPATIENT)
Dept: HEALTH INFORMATION MANAGEMENT | Facility: CLINIC | Age: 27
End: 2022-12-09

## 2022-12-11 ENCOUNTER — HOSPITAL ENCOUNTER (INPATIENT)
Facility: CLINIC | Age: 27
LOS: 4 days | Discharge: HOME | DRG: 314 | End: 2022-12-15
Attending: STUDENT IN AN ORGANIZED HEALTH CARE EDUCATION/TRAINING PROGRAM | Admitting: INTERNAL MEDICINE
Payer: MEDICARE

## 2022-12-11 ENCOUNTER — APPOINTMENT (OUTPATIENT)
Dept: CARDIOLOGY | Facility: CLINIC | Age: 27
DRG: 314 | End: 2022-12-11
Payer: MEDICARE

## 2022-12-11 DIAGNOSIS — I31.39 PERICARDIAL EFFUSION WITH CARDIAC TAMPONADE: Primary | ICD-10-CM

## 2022-12-11 DIAGNOSIS — I31.4 PERICARDIAL EFFUSION WITH CARDIAC TAMPONADE: Primary | ICD-10-CM

## 2022-12-11 LAB
ALBUMIN SERPL BCG-MCNC: 3.5 G/DL (ref 3.5–5.2)
ALP SERPL-CCNC: 93 U/L (ref 40–129)
ALT SERPL W P-5'-P-CCNC: <5 U/L (ref 10–50)
ANION GAP SERPL CALCULATED.3IONS-SCNC: 20 MMOL/L (ref 7–15)
APTT PPP: 31 SECONDS (ref 22–38)
AST SERPL W P-5'-P-CCNC: 9 U/L (ref 10–50)
BILIRUB DIRECT SERPL-MCNC: <0.2 MG/DL (ref 0–0.3)
BILIRUB SERPL-MCNC: 0.5 MG/DL
BUN SERPL-MCNC: 47.4 MG/DL (ref 6–20)
CALCIUM SERPL-MCNC: 9.9 MG/DL (ref 8.6–10)
CHLORIDE SERPL-SCNC: 98 MMOL/L (ref 98–107)
CHOLEST SERPL-MCNC: 120 MG/DL
CK SERPL-CCNC: 56 U/L (ref 39–308)
CREAT SERPL-MCNC: 9.86 MG/DL (ref 0.67–1.17)
CRP SERPL HS-MCNC: 266 MG/L
CRP SERPL-MCNC: 238 MG/L
DEPRECATED HCO3 PLAS-SCNC: 23 MMOL/L (ref 22–29)
ERYTHROCYTE [DISTWIDTH] IN BLOOD BY AUTOMATED COUNT: 15 % (ref 10–15)
ERYTHROCYTE [SEDIMENTATION RATE] IN BLOOD BY WESTERGREN METHOD: 100 MM/HR (ref 0–15)
GFR SERPL CREATININE-BSD FRML MDRD: 7 ML/MIN/1.73M2
GLUCOSE SERPL-MCNC: 90 MG/DL (ref 70–99)
HCT VFR BLD AUTO: 23.6 % (ref 40–53)
HDLC SERPL-MCNC: 24 MG/DL
HGB BLD-MCNC: 7.2 G/DL (ref 13.3–17.7)
INR PPP: 1.46 (ref 0.85–1.15)
LDLC SERPL CALC-MCNC: 68 MG/DL
LVEF ECHO: NORMAL
MCH RBC QN AUTO: 27.3 PG (ref 26.5–33)
MCHC RBC AUTO-ENTMCNC: 30.5 G/DL (ref 31.5–36.5)
MCV RBC AUTO: 89 FL (ref 78–100)
NONHDLC SERPL-MCNC: 96 MG/DL
NT-PROBNP SERPL-MCNC: 9767 PG/ML (ref 0–450)
PLATELET # BLD AUTO: 245 10E3/UL (ref 150–450)
POTASSIUM SERPL-SCNC: 5.2 MMOL/L (ref 3.4–5.3)
PROT SERPL-MCNC: 7.1 G/DL (ref 6.4–8.3)
RBC # BLD AUTO: 2.64 10E6/UL (ref 4.4–5.9)
SODIUM SERPL-SCNC: 141 MMOL/L (ref 136–145)
TRIGL SERPL-MCNC: 138 MG/DL
TROPONIN T SERPL HS-MCNC: 13 NG/L
TSH SERPL DL<=0.005 MIU/L-ACNC: 0.67 UIU/ML (ref 0.3–4.2)
WBC # BLD AUTO: 10.9 10E3/UL (ref 4–11)

## 2022-12-11 PROCEDURE — 85610 PROTHROMBIN TIME: CPT

## 2022-12-11 PROCEDURE — 82550 ASSAY OF CK (CPK): CPT

## 2022-12-11 PROCEDURE — 82248 BILIRUBIN DIRECT: CPT

## 2022-12-11 PROCEDURE — 82310 ASSAY OF CALCIUM: CPT

## 2022-12-11 PROCEDURE — 85027 COMPLETE CBC AUTOMATED: CPT

## 2022-12-11 PROCEDURE — 93306 TTE W/DOPPLER COMPLETE: CPT

## 2022-12-11 PROCEDURE — 84484 ASSAY OF TROPONIN QUANT: CPT

## 2022-12-11 PROCEDURE — 86140 C-REACTIVE PROTEIN: CPT | Performed by: STUDENT IN AN ORGANIZED HEALTH CARE EDUCATION/TRAINING PROGRAM

## 2022-12-11 PROCEDURE — 80061 LIPID PANEL: CPT

## 2022-12-11 PROCEDURE — 250N000012 HC RX MED GY IP 250 OP 636 PS 637

## 2022-12-11 PROCEDURE — 250N000013 HC RX MED GY IP 250 OP 250 PS 637

## 2022-12-11 PROCEDURE — 83880 ASSAY OF NATRIURETIC PEPTIDE: CPT

## 2022-12-11 PROCEDURE — 200N000002 HC R&B ICU UMMC

## 2022-12-11 PROCEDURE — 84443 ASSAY THYROID STIM HORMONE: CPT

## 2022-12-11 PROCEDURE — 86141 C-REACTIVE PROTEIN HS: CPT

## 2022-12-11 PROCEDURE — 85730 THROMBOPLASTIN TIME PARTIAL: CPT

## 2022-12-11 PROCEDURE — 85652 RBC SED RATE AUTOMATED: CPT | Performed by: STUDENT IN AN ORGANIZED HEALTH CARE EDUCATION/TRAINING PROGRAM

## 2022-12-11 PROCEDURE — 36415 COLL VENOUS BLD VENIPUNCTURE: CPT

## 2022-12-11 PROCEDURE — 93306 TTE W/DOPPLER COMPLETE: CPT | Mod: 26 | Performed by: STUDENT IN AN ORGANIZED HEALTH CARE EDUCATION/TRAINING PROGRAM

## 2022-12-11 PROCEDURE — 250N000013 HC RX MED GY IP 250 OP 250 PS 637: Performed by: INTERNAL MEDICINE

## 2022-12-11 RX ORDER — LIDOCAINE 40 MG/G
CREAM TOPICAL
Status: DISCONTINUED | OUTPATIENT
Start: 2022-12-11 | End: 2022-12-15 | Stop reason: HOSPADM

## 2022-12-11 RX ORDER — TACROLIMUS 1 MG/1
2 CAPSULE ORAL DAILY
Status: DISCONTINUED | OUTPATIENT
Start: 2022-12-11 | End: 2022-12-15

## 2022-12-11 RX ORDER — AMOXICILLIN 250 MG
1 CAPSULE ORAL 2 TIMES DAILY
Status: DISCONTINUED | OUTPATIENT
Start: 2022-12-11 | End: 2022-12-15 | Stop reason: HOSPADM

## 2022-12-11 RX ORDER — ACETAMINOPHEN 325 MG/1
650 TABLET ORAL EVERY 4 HOURS PRN
Status: DISCONTINUED | OUTPATIENT
Start: 2022-12-11 | End: 2022-12-15 | Stop reason: HOSPADM

## 2022-12-11 RX ORDER — IBUPROFEN 800 MG/1
800 TABLET, FILM COATED ORAL 3 TIMES DAILY
Status: DISCONTINUED | OUTPATIENT
Start: 2022-12-11 | End: 2022-12-15 | Stop reason: HOSPADM

## 2022-12-11 RX ORDER — PANTOPRAZOLE SODIUM 40 MG/1
40 TABLET, DELAYED RELEASE ORAL
Status: DISCONTINUED | OUTPATIENT
Start: 2022-12-12 | End: 2022-12-15 | Stop reason: HOSPADM

## 2022-12-11 RX ORDER — CALCIUM ACETATE 667 MG/1
1334 CAPSULE ORAL
Status: DISCONTINUED | OUTPATIENT
Start: 2022-12-11 | End: 2022-12-15 | Stop reason: HOSPADM

## 2022-12-11 RX ORDER — ACETAMINOPHEN 650 MG/1
650 SUPPOSITORY RECTAL EVERY 4 HOURS PRN
Status: DISCONTINUED | OUTPATIENT
Start: 2022-12-11 | End: 2022-12-15 | Stop reason: HOSPADM

## 2022-12-11 RX ORDER — MAGNESIUM HYDROXIDE/ALUMINUM HYDROXICE/SIMETHICONE 120; 1200; 1200 MG/30ML; MG/30ML; MG/30ML
30 SUSPENSION ORAL EVERY 4 HOURS PRN
Status: DISCONTINUED | OUTPATIENT
Start: 2022-12-11 | End: 2022-12-15 | Stop reason: HOSPADM

## 2022-12-11 RX ORDER — COLCHICINE 0.6 MG/1
0.6 TABLET ORAL DAILY
Status: DISCONTINUED | OUTPATIENT
Start: 2022-12-11 | End: 2022-12-13

## 2022-12-11 RX ORDER — AMOXICILLIN 250 MG
2 CAPSULE ORAL 2 TIMES DAILY
Status: DISCONTINUED | OUTPATIENT
Start: 2022-12-11 | End: 2022-12-15 | Stop reason: HOSPADM

## 2022-12-11 RX ORDER — ONDANSETRON 4 MG/1
4 TABLET, ORALLY DISINTEGRATING ORAL EVERY 6 HOURS PRN
Status: DISCONTINUED | OUTPATIENT
Start: 2022-12-11 | End: 2022-12-15 | Stop reason: HOSPADM

## 2022-12-11 RX ORDER — ONDANSETRON 2 MG/ML
4 INJECTION INTRAMUSCULAR; INTRAVENOUS EVERY 6 HOURS PRN
Status: DISCONTINUED | OUTPATIENT
Start: 2022-12-11 | End: 2022-12-15 | Stop reason: HOSPADM

## 2022-12-11 RX ORDER — NITROGLYCERIN 0.4 MG/1
0.4 TABLET SUBLINGUAL EVERY 5 MIN PRN
Status: DISCONTINUED | OUTPATIENT
Start: 2022-12-11 | End: 2022-12-15 | Stop reason: HOSPADM

## 2022-12-11 RX ADMIN — ACETAMINOPHEN 650 MG: 325 TABLET, FILM COATED ORAL at 20:24

## 2022-12-11 RX ADMIN — IBUPROFEN 800 MG: 800 TABLET ORAL at 21:22

## 2022-12-11 RX ADMIN — TACROLIMUS 2 MG: 1 CAPSULE ORAL at 18:09

## 2022-12-11 RX ADMIN — COLCHICINE 0.6 MG: 0.6 TABLET, FILM COATED ORAL at 21:22

## 2022-12-11 RX ADMIN — FLUOXETINE 40 MG: 20 CAPSULE ORAL at 18:09

## 2022-12-11 ASSESSMENT — ACTIVITIES OF DAILY LIVING (ADL)
ADLS_ACUITY_SCORE: 35
DIFFICULTY_EATING/SWALLOWING: NO
DRESSING/BATHING_DIFFICULTY: NO
DOING_ERRANDS_INDEPENDENTLY_DIFFICULTY: NO
TOILETING_ISSUES: NO
CONCENTRATING,_REMEMBERING_OR_MAKING_DECISIONS_DIFFICULTY: NO
ADLS_ACUITY_SCORE: 18
ADLS_ACUITY_SCORE: 18
CHANGE_IN_FUNCTIONAL_STATUS_SINCE_ONSET_OF_CURRENT_ILLNESS/INJURY: NO
WEAR_GLASSES_OR_BLIND: NO
FALL_HISTORY_WITHIN_LAST_SIX_MONTHS: NO
DIFFICULTY_COMMUNICATING: NO
HEARING_DIFFICULTY_OR_DEAF: NO
WALKING_OR_CLIMBING_STAIRS_DIFFICULTY: NO

## 2022-12-11 NOTE — Clinical Note
Called to [facility]ONES on 4E. All questions answered. All belongings sent with patient. Patient transported to 4E via ICU bed with CCL RN and transport.

## 2022-12-11 NOTE — H&P
Cardiology ICU Progress Note  Jhoan Hoffman 27 year old  [id number]  (Not on file)    Primary care provider: Alexis Pabon  CC: Shortness of breath    Brief HPI:   Mr Pabon is a 27 year old male with a history of congenital renal dysplasia, history of 3 failed renal transplants, now with stage V renal disease requiring HD. He was transferred to [facility] for evaluation for cardiac tamponade. Patient had an upper respiratory tract infection 2 weeks ago with the main symptoms being cough. Yesterday he started having shortness of breath on exertion. Although he was able to sleep last night he had shortness of breath on laying completely flat. Also endorsed chest pain on laying flat or taking deep breaths. Chest pain improved on sitting and leaning forward. Has never had these symptoms before. He presented at [facility] with dyspnea, tachycardia 100-110, a narrow pulse pressure (110/90), and pericardial effusion on imaging (unsure which type of imaging). Transferred to [facility] due to concerns for early signs of tamponade.      Past Medical History:   Past Medical History:   Diagnosis Date     Anemia     in ESRD     Depression      History of blood transfusion      Hypertension      Kidney replaced by transplant      Peritoneal dialysis status (H)     in past     Renal failure      Sleep apnea    Past Surgical History:  Past Surgical History:   Procedure Laterality Date     BIOPSY       CREATE GRAFT LOOP ARTERIOVENOUS UPPER EXTREMITY  12/17/2020    Procedure: Create Graft Left Arteriovenous Upper Extremity;  Surgeon: Sidney Palma MD;  [facility]: [facility]     CYSTOSCOPY, REMOVE STENT(S), COMBINED  5/25/2012    Procedure:COMBINED CYSTOSCOPY, REMOVE STENT(S); Cystoscopy, Removal Of Urinary Stent; Surgeon:FLORENTINO KNIGHT; [facility]     ENDARECTERECTOMY RENAL  5/18/2011    Procedure:ENDARECTERECTOMY RENAL; Exploration of Transplant Kidney, Back Table Flush, Biopsy of Kidney and Reanastamosis of Kidney;  Surgeon:FLORENTINO KNIGHT; [facility]     EXPLANT TRANSPLANTED KIDNEY  7/12/2011    Procedure:EXPLANT TRANSPLANTED KIDNEY; Transplant Nephrectomy; Surgeon:FLORENTINO KNIGHT; [facility]     INSERT CATHETER HEMODIALYSIS  5/18/2011    Procedure:INSERT CATHETER HEMODIALYSIS; Double Lumen; Surgeon:JOE HE; [facility]     INSERT CATHETER PERITONEAL DIALYSIS  4/28/2012    Procedure:INSERT CATHETER PERITONEAL DIALYSIS; Placement dialysis cath under fluoro, before kidney tx; Surgeon:FLORENTINO KNIGHT; [facility]     IR CVC TUNNEL PLACEMENT > 5 YRS OF AGE  12/18/2020     LAPAROTOMY EXPLORATORY  5/19/2011    Procedure:LAPAROTOMY EXPLORATORY; washout of kidney and closure; Surgeon:FLORENTINO KNIGHT; [facility]     PERCUTANEOUS BIOPSY KIDNEY  6/2/2011    Procedure:PERCUTANEOUS BIOPSY KIDNEY; Surgeon:GIL WILLIAM; [facility]     PERCUTANEOUS BIOPSY KIDNEY Right 6/26/2019    Procedure: Right Kidney Biopsy;  Surgeon: Peter Briggs MD;  [facility]     PERCUTANEOUS BIOPSY KIDNEY Right 7/23/2019    Procedure: Right Kidney Biopsy;  Surgeon: Pro Menard MD;  [facility]     REMOVE CATHETER PERITONEAL  5/3/2012    Procedure:REMOVE CATHETER PERITONEAL; Removal Of Peritoneal Dialysis Catheter; Surgeon:FLORENTINO KNIGHT; [facility]     REVISION FISTULA ARTERIOVENOUS UPPER EXTREMITY Left 2/16/2021    Procedure: Left upper AV fistula revision (transposition) with intraoperative ultrasound;  Surgeon: Sidney Palma MD;  [facility]     TRANSPLANT KIDNEY RECIPIENT LIVING RELATED  1995     TRANSPLANT KIDNEY RECIPIENT LIVING UNRELATED  5/18/2011    Procedure:TRANSPLANT KIDNEY RECIPIENT LIVING UNRELATED; Living unrelated left kidney transplant and placement of ureteral stent into transplant ureter.; Surgeon:FLORENTINO KNIGHT; [facility]     TRANSPLANT KIDNEY RECIPIENT LIVING UNRELATED  4/28/2012    Procedure:TRANSPLANT KIDNEY RECIPIENT LIVING UNRELATED;  kidney transplant -   UNOS# [id number]  Organ arrival: 2100 4/27  Cross match results: 0200 4/28  Donor blood type: A  Recipient blood type: A; Surgeon:FLORENTINO KNIGHT; Location[facility]     No current facility-administered medications for this encounter.     Current Outpatient Medications   Medication     amLODIPine (NORVASC) 10 MG tablet     Aspirin Buf,CaCarb-MgCarb-MgO, 81 MG TABS     calcium acetate (PHOSLO) 667 MG CAPS capsule     carvedilol (COREG) 12.5 MG tablet     cholecalciferol 2000 UNITS CAPS     FLUoxetine (PROZAC) 40 MG capsule     HYDROmorphone (DILAUDID) 2 MG tablet     mycophenolate (GENERIC EQUIVALENT) 250 MG capsule     omeprazole (PRILOSEC) 40 MG DR capsule     ondansetron (ZOFRAN-ODT) 4 MG ODT tab     oxyCODONE (ROXICODONE) 5 MG tablet     senna-docusate (SENOKOT-S/PERICOLACE) 8.6-50 MG tablet     SENNA-docusate sodium (SENNA S) 8.6-50 MG tablet     sulfamethoxazole-trimethoprim (BACTRIM) 400-80 MG tablet     tacrolimus (GENERIC EQUIVALENT) 0.5 MG capsule     ASSESSMENT AND PLAN BY SYSTEM:  Mr Pabon is a 27 year old male with a history of congenital renal dysplasia, history of 3 failed renal transplants, now with stage V renal disease requiring HD. He was transferred to [facility] for evaluation for cardiac tamponade.      Neurology   # Depression  Plan:  Continue PTA prozac    Cardiovascular  # Pericardial effusion with concerns for tamponade  # Hypertension  Concerns for early signs of tamponade based on clinical picture. Will obtain stat echo and decide if cath lab needs to be activated for pericardiocentesis. Hold home blood pressure meds. This is likely viral pericarditis with pericardial effusion. Will obtain ESR/CRP. Will likely need to start treatment for pericardial effusion since clinical picture is consistent with it. However since pt has known anemia, may get pericardiocentesis tonight and platelet value is unknown, will hold off on starting high dose ASA immediately.   - STAT ECHO  -  Hold PTA Coreg  - Hold PTA Norvasc  - Will likely require treatment for pericarditis    Pulmonary  # Obstructive sleep apnea  -O2 if needed  -Consider scheduled duonebs if signs of lung dz, currently PRN      Gastrointestinal, Nutrition  -Monitor LFTs  -NPO in case pt needs urgent/emergent pericardiocentesis based on echo  -Bowel regimen   -GI Prophylaxis: PPI; Protonix 40 mg daily    Renal, Electrolytes  # ESRD  # 3 failed kidney transplants  # dialysis dependent- M-W-F  # old peritoneal dialysis site  Per chart review and per patient Tacrolimus therapy is ongoing. Unsure about Bactrim thrice weekly. Will need to clarify with Nephrology.   -Nephrology consult to clarify post transplant medications and for assistance with HD  -Monitor urine output  -I/O goal    Infectious Disease  # no acitve issues  -Monitor for signs of infection    Hematology  # Anemia of critical illness  Required transfusion a few days ago  -Monitor Hb    Endocrinology  No acute issues    Integumentary:  - No skin issues    Clinically Significant Risk Factors Present on Admission                                     ICU Cares:  CXR: daily  Fluids/Feeds: NPO  DVT Prophylaxis: SCD for now pending plan for pericardiocentesis  GI Prophylaxis: Protonix  Bowel Regimen: senna  Anticipated Floor Transfer: NA    Lines/Tubes/Drains:  L AV fistula     Plan of care to be discussed with Dr. Meyer who agrees with the above assessment and plan.    Deandre Conde MD and JAJA Garcia CNP         Objective Data:     There were no vitals taken for this visit.    I/O:  No intake or output data in the 24 hours ending 12/11/22 1417    Labs:  No lab results found in last 7 days.  No lab results found in last 7 days.  No lab results found in last 7 days.  No lab results found in last 7 days.  Arterial Blood Gas No lab results found in last 7 days.    Physical exam:  General: In bed, in NAD  HEENT: PERRL, no scleral icterus or injection  CARDIAC:  tachycardic, distant heart sounds, no m/r/g appreciated.   RESP:  CTAB, no wheezes, rhonchi or crackles appreciated.  GI: soft, BS hypoactive  : No Ordoñez  EXTREMITIES: No LE edema, pulses 2+.c/d/i. No bruits appreciated.   SKIN: No acute lesions appreciated  NEURO: A&Ox3    Imaging/procedure results:  US Ext Arterial Venous Dialys Acs Graft  Narrative: ULTRASOUND EXTREMITY ARTERIAL VENOUS DIALYSIS ACCESS GRAFT 3/25/2021  12:57 PM    CLINICAL HISTORY: To evaluate velocity and flow volume of LUE AV  fistula to determine maturation; ESRD on dialysis (H); ESRD on  dialysis (H); Complication of dialysis access insertion, initial  encounter. Left brachial transposed basilic fistula vein transposition  performed 2/16/2021. By surgical report, the vein was 4 mm in diameter  and flow volume was 700 mL/m.    COMPARISONS: None available.    REFERRING PROVIDER: GRETCHEN KINSEY    TECHNIQUE: Left brachial transposed basilic dialysis fistula evaluated  with grayscale, color Doppler, Doppler waveform ultrasound. Flow  volumes obtained.    FINDINGS: Left:  Subclavian artery: 241/138 cm/s    Brachial artery, proximal to anastomosis: 207/142 cm/s  Brachial artery, proximal to anastomosis: 5.3 mm, 1193 mL/min VolFlow    Brachial artery, distal to anastomosis: 80/10 cm/s, antegrade    Anastomosis: 4.6 x 3.3 mm, 614/388 cm/s, 377/207 cm/s    Basilic vein, distal arm: 517/335 cm/s, 4.8 mm  Basilic vein, distal arm: 4.0 mm, 1208 mL/min VolFlow    Basilic vein, mid arm: 215/139 cm/s, 6.5 mm  Basilic vein, proximal arm: 117/77 cm/s, 234/164 cm/s, 7.1 mm    Axillary vein: 191 cm/s  Subclavian vein, lateral: 135 cm/s  Subclavian vein, mid: 96 cm/s  Innominate vein: 103 cm/s  Impression: IMPRESSION: LEFT BRACHIAL TRANSPOSED BASILIC DIALYSIS FISTULA:  1. No arterial inflow stenosis demonstrated.    2. Arteriovenous anastomosis stenosis. 4.6 x 3.3 mm. 614/388 cm/s.  Velocity ratio 2.97.    3. Outflow basilic vein measures 4.8 mm in the distal  arm. Outflow  basilic vein is larger than 6 mm in the mid and proximal arm.    4. Flow volume 1208 mL/min in the outflow basilic vein in the distal  arm.    5. No central venous stenosis demonstrated.     PORTIA AGUERO MD

## 2022-12-11 NOTE — Clinical Note
dry, intact, no bleeding and no hematoma. Pericardial drain sutured in place by MD. [place]. Tegaderm. Zip tied to drainage container.

## 2022-12-12 ENCOUNTER — APPOINTMENT (OUTPATIENT)
Dept: CARDIOLOGY | Facility: CLINIC | Age: 27
DRG: 314 | End: 2022-12-12
Attending: INTERNAL MEDICINE
Payer: MEDICARE

## 2022-12-12 LAB
ALBUMIN BODY FLUID SOURCE: NORMAL
ALBUMIN FLD-MCNC: 2.6 G/DL
ALBUMIN SERPL BCG-MCNC: 3.3 G/DL (ref 3.5–5.2)
ALP SERPL-CCNC: 95 U/L (ref 40–129)
ALT SERPL W P-5'-P-CCNC: <5 U/L (ref 10–50)
AMYLASE BODY FLUID SOURCE: NORMAL
AMYLASE FLD-CCNC: 45 U/L
ANION GAP SERPL CALCULATED.3IONS-SCNC: 22 MMOL/L (ref 7–15)
APPEARANCE FLD: ABNORMAL
AST SERPL W P-5'-P-CCNC: 10 U/L (ref 10–50)
BILIRUB SERPL-MCNC: 0.4 MG/DL
BUN SERPL-MCNC: 61.7 MG/DL (ref 6–20)
C PNEUM DNA SPEC QL NAA+PROBE: ABNORMAL
C PNEUM DNA SPEC QL NAA+PROBE: NOT DETECTED
CALCIUM SERPL-MCNC: 9.8 MG/DL (ref 8.6–10)
CELL COUNT BODY FLUID SOURCE: ABNORMAL
CHLORIDE SERPL-SCNC: 96 MMOL/L (ref 98–107)
COLOR FLD: ABNORMAL
CREAT SERPL-MCNC: 11.2 MG/DL (ref 0.67–1.17)
CRP SERPL-MCNC: 323 MG/L
DEPRECATED HCO3 PLAS-SCNC: 21 MMOL/L (ref 22–29)
ERYTHROCYTE [DISTWIDTH] IN BLOOD BY AUTOMATED COUNT: 14.2 % (ref 10–15)
ERYTHROCYTE [DISTWIDTH] IN BLOOD BY AUTOMATED COUNT: 14.3 % (ref 10–15)
ERYTHROCYTE [SEDIMENTATION RATE] IN BLOOD BY WESTERGREN METHOD: 97 MM/HR (ref 0–15)
FLUAV H1 2009 PAND RNA SPEC QL NAA+PROBE: ABNORMAL
FLUAV H1 2009 PAND RNA SPEC QL NAA+PROBE: NOT DETECTED
FLUAV H1 RNA SPEC QL NAA+PROBE: ABNORMAL
FLUAV H1 RNA SPEC QL NAA+PROBE: NOT DETECTED
FLUAV H3 RNA SPEC QL NAA+PROBE: ABNORMAL
FLUAV H3 RNA SPEC QL NAA+PROBE: NOT DETECTED
FLUAV RNA SPEC QL NAA+PROBE: ABNORMAL
FLUAV RNA SPEC QL NAA+PROBE: NOT DETECTED
FLUBV RNA SPEC QL NAA+PROBE: ABNORMAL
FLUBV RNA SPEC QL NAA+PROBE: NOT DETECTED
GFR SERPL CREATININE-BSD FRML MDRD: 6 ML/MIN/1.73M2
GLUCOSE BODY FLUID SOURCE: NORMAL
GLUCOSE FLD-MCNC: 61 MG/DL
GLUCOSE SERPL-MCNC: 93 MG/DL (ref 70–99)
GRAM STAIN RESULT: NORMAL
GRAM STAIN RESULT: NORMAL
HADV DNA SPEC QL NAA+PROBE: ABNORMAL
HADV DNA SPEC QL NAA+PROBE: NOT DETECTED
HCOV PNL SPEC NAA+PROBE: ABNORMAL
HCOV PNL SPEC NAA+PROBE: NOT DETECTED
HCT VFR BLD AUTO: 24.8 % (ref 40–53)
HCT VFR BLD AUTO: 27.1 % (ref 40–53)
HGB BLD-MCNC: 7.6 G/DL (ref 13.3–17.7)
HGB BLD-MCNC: 8.5 G/DL (ref 13.3–17.7)
HMPV RNA SPEC QL NAA+PROBE: ABNORMAL
HMPV RNA SPEC QL NAA+PROBE: NOT DETECTED
HPIV1 RNA SPEC QL NAA+PROBE: ABNORMAL
HPIV1 RNA SPEC QL NAA+PROBE: NOT DETECTED
HPIV2 RNA SPEC QL NAA+PROBE: ABNORMAL
HPIV2 RNA SPEC QL NAA+PROBE: NOT DETECTED
HPIV3 RNA SPEC QL NAA+PROBE: ABNORMAL
HPIV3 RNA SPEC QL NAA+PROBE: NOT DETECTED
HPIV4 RNA SPEC QL NAA+PROBE: ABNORMAL
HPIV4 RNA SPEC QL NAA+PROBE: NOT DETECTED
KOH PREPARATION: NORMAL
KOH PREPARATION: NORMAL
LD BODY BODY FLUID SOURCE: NORMAL
LDH FLD L TO P-CCNC: 293 U/L
LYMPHOCYTES NFR FLD MANUAL: 10 %
M PNEUMO DNA SPEC QL NAA+PROBE: ABNORMAL
M PNEUMO DNA SPEC QL NAA+PROBE: NOT DETECTED
MCH RBC QN AUTO: 27 PG (ref 26.5–33)
MCH RBC QN AUTO: 27.1 PG (ref 26.5–33)
MCHC RBC AUTO-ENTMCNC: 30.6 G/DL (ref 31.5–36.5)
MCHC RBC AUTO-ENTMCNC: 31.4 G/DL (ref 31.5–36.5)
MCV RBC AUTO: 86 FL (ref 78–100)
MCV RBC AUTO: 88 FL (ref 78–100)
MONOS+MACROS NFR FLD MANUAL: 5 %
NEUTS BAND NFR FLD MANUAL: 86 %
PLATELET # BLD AUTO: 215 10E3/UL (ref 150–450)
PLATELET # BLD AUTO: 254 10E3/UL (ref 150–450)
POTASSIUM SERPL-SCNC: 5.4 MMOL/L (ref 3.4–5.3)
PROT FLD-MCNC: 4.7 G/DL
PROT SERPL-MCNC: 7 G/DL (ref 6.4–8.3)
PROTEIN BODY FLUID SOURCE: NORMAL
RBC # BLD AUTO: 2.81 10E6/UL (ref 4.4–5.9)
RBC # BLD AUTO: 3.14 10E6/UL (ref 4.4–5.9)
RSV RNA SPEC QL NAA+PROBE: ABNORMAL
RSV RNA SPEC QL NAA+PROBE: ABNORMAL
RSV RNA SPEC QL NAA+PROBE: NOT DETECTED
RSV RNA SPEC QL NAA+PROBE: NOT DETECTED
RV+EV RNA SPEC QL NAA+PROBE: ABNORMAL
RV+EV RNA SPEC QL NAA+PROBE: NOT DETECTED
SODIUM SERPL-SCNC: 139 MMOL/L (ref 136–145)
TACROLIMUS BLD-MCNC: 26.5 UG/L (ref 5–15)
TME LAST DOSE: ABNORMAL H
TME LAST DOSE: ABNORMAL H
TRIGL FLD-MCNC: 80 MG/DL
TRIGLYCERIDE BODY FLUID SOURCE: NORMAL
WBC # BLD AUTO: 9.5 10E3/UL (ref 4–11)
WBC # BLD AUTO: 9.7 10E3/UL (ref 4–11)
WBC # FLD AUTO: 380 /UL

## 2022-12-12 PROCEDURE — 87633 RESP VIRUS 12-25 TARGETS: CPT

## 2022-12-12 PROCEDURE — 250N000012 HC RX MED GY IP 250 OP 636 PS 637

## 2022-12-12 PROCEDURE — 250N000011 HC RX IP 250 OP 636

## 2022-12-12 PROCEDURE — C1894 INTRO/SHEATH, NON-LASER: HCPCS | Performed by: INTERNAL MEDICINE

## 2022-12-12 PROCEDURE — 99153 MOD SED SAME PHYS/QHP EA: CPT | Performed by: INTERNAL MEDICINE

## 2022-12-12 PROCEDURE — 80197 ASSAY OF TACROLIMUS: CPT

## 2022-12-12 PROCEDURE — 83615 LACTATE (LD) (LDH) ENZYME: CPT

## 2022-12-12 PROCEDURE — 36415 COLL VENOUS BLD VENIPUNCTURE: CPT

## 2022-12-12 PROCEDURE — 84157 ASSAY OF PROTEIN OTHER: CPT | Performed by: STUDENT IN AN ORGANIZED HEALTH CARE EDUCATION/TRAINING PROGRAM

## 2022-12-12 PROCEDURE — 87486 CHLMYD PNEUM DNA AMP PROBE: CPT | Performed by: INTERNAL MEDICINE

## 2022-12-12 PROCEDURE — 87075 CULTR BACTERIA EXCEPT BLOOD: CPT | Performed by: STUDENT IN AN ORGANIZED HEALTH CARE EDUCATION/TRAINING PROGRAM

## 2022-12-12 PROCEDURE — 272N000001 HC OR GENERAL SUPPLY STERILE: Performed by: INTERNAL MEDICINE

## 2022-12-12 PROCEDURE — 87486 CHLMYD PNEUM DNA AMP PROBE: CPT

## 2022-12-12 PROCEDURE — 82150 ASSAY OF AMYLASE: CPT | Performed by: STUDENT IN AN ORGANIZED HEALTH CARE EDUCATION/TRAINING PROGRAM

## 2022-12-12 PROCEDURE — 86140 C-REACTIVE PROTEIN: CPT

## 2022-12-12 PROCEDURE — 82945 GLUCOSE OTHER FLUID: CPT

## 2022-12-12 PROCEDURE — 93005 ELECTROCARDIOGRAM TRACING: CPT

## 2022-12-12 PROCEDURE — 250N000013 HC RX MED GY IP 250 OP 250 PS 637

## 2022-12-12 PROCEDURE — C1769 GUIDE WIRE: HCPCS | Performed by: INTERNAL MEDICINE

## 2022-12-12 PROCEDURE — 87210 SMEAR WET MOUNT SALINE/INK: CPT | Performed by: STUDENT IN AN ORGANIZED HEALTH CARE EDUCATION/TRAINING PROGRAM

## 2022-12-12 PROCEDURE — 85027 COMPLETE CBC AUTOMATED: CPT

## 2022-12-12 PROCEDURE — 33017 PRCRD DRG 6YR+ W/O CGEN CAR: CPT | Mod: GC | Performed by: INTERNAL MEDICINE

## 2022-12-12 PROCEDURE — 86431 RHEUMATOID FACTOR QUANT: CPT

## 2022-12-12 PROCEDURE — 85652 RBC SED RATE AUTOMATED: CPT

## 2022-12-12 PROCEDURE — 99152 MOD SED SAME PHYS/QHP 5/>YRS: CPT | Mod: GC | Performed by: INTERNAL MEDICINE

## 2022-12-12 PROCEDURE — 0W9D3ZZ DRAINAGE OF PERICARDIAL CAVITY, PERCUTANEOUS APPROACH: ICD-10-PCS | Performed by: INTERNAL MEDICINE

## 2022-12-12 PROCEDURE — 87205 SMEAR GRAM STAIN: CPT | Performed by: STUDENT IN AN ORGANIZED HEALTH CARE EDUCATION/TRAINING PROGRAM

## 2022-12-12 PROCEDURE — 250N000011 HC RX IP 250 OP 636: Performed by: INTERNAL MEDICINE

## 2022-12-12 PROCEDURE — 80053 COMPREHEN METABOLIC PANEL: CPT

## 2022-12-12 PROCEDURE — 88305 TISSUE EXAM BY PATHOLOGIST: CPT | Mod: TC | Performed by: STUDENT IN AN ORGANIZED HEALTH CARE EDUCATION/TRAINING PROGRAM

## 2022-12-12 PROCEDURE — 87252 VIRUS INOCULATION TISSUE: CPT | Performed by: STUDENT IN AN ORGANIZED HEALTH CARE EDUCATION/TRAINING PROGRAM

## 2022-12-12 PROCEDURE — 999N000064 ECHOCARDIOGRAM LIMITED

## 2022-12-12 PROCEDURE — 250N000013 HC RX MED GY IP 250 OP 250 PS 637: Performed by: INTERNAL MEDICINE

## 2022-12-12 PROCEDURE — 83615 LACTATE (LD) (LDH) ENZYME: CPT | Performed by: STUDENT IN AN ORGANIZED HEALTH CARE EDUCATION/TRAINING PROGRAM

## 2022-12-12 PROCEDURE — 99233 SBSQ HOSP IP/OBS HIGH 50: CPT | Mod: 24 | Performed by: INTERNAL MEDICINE

## 2022-12-12 PROCEDURE — 250N000009 HC RX 250: Performed by: INTERNAL MEDICINE

## 2022-12-12 PROCEDURE — 89051 BODY FLUID CELL COUNT: CPT | Performed by: STUDENT IN AN ORGANIZED HEALTH CARE EDUCATION/TRAINING PROGRAM

## 2022-12-12 PROCEDURE — 82945 GLUCOSE OTHER FLUID: CPT | Performed by: STUDENT IN AN ORGANIZED HEALTH CARE EDUCATION/TRAINING PROGRAM

## 2022-12-12 PROCEDURE — 84157 ASSAY OF PROTEIN OTHER: CPT

## 2022-12-12 PROCEDURE — 99152 MOD SED SAME PHYS/QHP 5/>YRS: CPT | Performed by: INTERNAL MEDICINE

## 2022-12-12 PROCEDURE — 93010 ELECTROCARDIOGRAM REPORT: CPT | Performed by: INTERNAL MEDICINE

## 2022-12-12 PROCEDURE — 33017 PRCRD DRG 6YR+ W/O CGEN CAR: CPT | Performed by: INTERNAL MEDICINE

## 2022-12-12 PROCEDURE — 634N000001 HC RX 634: Performed by: CLINICAL NURSE SPECIALIST

## 2022-12-12 PROCEDURE — 258N000003 HC RX IP 258 OP 636: Performed by: CLINICAL NURSE SPECIALIST

## 2022-12-12 PROCEDURE — 200N000002 HC R&B ICU UMMC

## 2022-12-12 PROCEDURE — 99223 1ST HOSP IP/OBS HIGH 75: CPT | Mod: FS | Performed by: CLINICAL NURSE SPECIALIST

## 2022-12-12 PROCEDURE — 90937 HEMODIALYSIS REPEATED EVAL: CPT

## 2022-12-12 PROCEDURE — 89050 BODY FLUID CELL COUNT: CPT

## 2022-12-12 PROCEDURE — 84478 ASSAY OF TRIGLYCERIDES: CPT | Performed by: STUDENT IN AN ORGANIZED HEALTH CARE EDUCATION/TRAINING PROGRAM

## 2022-12-12 PROCEDURE — 88112 CYTOPATH CELL ENHANCE TECH: CPT | Mod: TC | Performed by: STUDENT IN AN ORGANIZED HEALTH CARE EDUCATION/TRAINING PROGRAM

## 2022-12-12 PROCEDURE — 87633 RESP VIRUS 12-25 TARGETS: CPT | Performed by: INTERNAL MEDICINE

## 2022-12-12 PROCEDURE — 82042 OTHER SOURCE ALBUMIN QUAN EA: CPT | Performed by: STUDENT IN AN ORGANIZED HEALTH CARE EDUCATION/TRAINING PROGRAM

## 2022-12-12 PROCEDURE — 86038 ANTINUCLEAR ANTIBODIES: CPT

## 2022-12-12 PROCEDURE — 87102 FUNGUS ISOLATION CULTURE: CPT | Performed by: STUDENT IN AN ORGANIZED HEALTH CARE EDUCATION/TRAINING PROGRAM

## 2022-12-12 PROCEDURE — 87070 CULTURE OTHR SPECIMN AEROBIC: CPT | Performed by: STUDENT IN AN ORGANIZED HEALTH CARE EDUCATION/TRAINING PROGRAM

## 2022-12-12 RX ORDER — HYDROMORPHONE HCL IN WATER/PF 6 MG/30 ML
0.2 PATIENT CONTROLLED ANALGESIA SYRINGE INTRAVENOUS ONCE
Status: COMPLETED | OUTPATIENT
Start: 2022-12-12 | End: 2022-12-12

## 2022-12-12 RX ORDER — FENTANYL CITRATE 50 UG/ML
INJECTION, SOLUTION INTRAMUSCULAR; INTRAVENOUS
Status: DISCONTINUED | OUTPATIENT
Start: 2022-12-12 | End: 2022-12-12 | Stop reason: HOSPADM

## 2022-12-12 RX ORDER — NALOXONE HYDROCHLORIDE 0.4 MG/ML
0.4 INJECTION, SOLUTION INTRAMUSCULAR; INTRAVENOUS; SUBCUTANEOUS
Status: DISCONTINUED | OUTPATIENT
Start: 2022-12-12 | End: 2022-12-15 | Stop reason: HOSPADM

## 2022-12-12 RX ORDER — NALOXONE HYDROCHLORIDE 0.4 MG/ML
0.2 INJECTION, SOLUTION INTRAMUSCULAR; INTRAVENOUS; SUBCUTANEOUS
Status: DISCONTINUED | OUTPATIENT
Start: 2022-12-12 | End: 2022-12-15 | Stop reason: HOSPADM

## 2022-12-12 RX ORDER — BENZONATATE 100 MG/1
100 CAPSULE ORAL 3 TIMES DAILY PRN
Status: DISCONTINUED | OUTPATIENT
Start: 2022-12-12 | End: 2022-12-15 | Stop reason: HOSPADM

## 2022-12-12 RX ORDER — LIDOCAINE 4 G/G
1 PATCH TOPICAL
Status: DISCONTINUED | OUTPATIENT
Start: 2022-12-12 | End: 2022-12-13

## 2022-12-12 RX ORDER — LIDOCAINE 4 G/G
1 PATCH TOPICAL
Status: DISCONTINUED | OUTPATIENT
Start: 2022-12-12 | End: 2022-12-15 | Stop reason: HOSPADM

## 2022-12-12 RX ORDER — GABAPENTIN 300 MG/1
300 CAPSULE ORAL
Status: DISCONTINUED | OUTPATIENT
Start: 2022-12-12 | End: 2022-12-15 | Stop reason: HOSPADM

## 2022-12-12 RX ORDER — HYDROCODONE BITARTRATE AND ACETAMINOPHEN 5; 325 MG/1; MG/1
1 TABLET ORAL EVERY 6 HOURS PRN
Status: DISCONTINUED | OUTPATIENT
Start: 2022-12-12 | End: 2022-12-15 | Stop reason: HOSPADM

## 2022-12-12 RX ADMIN — ACETAMINOPHEN 650 MG: 325 TABLET, FILM COATED ORAL at 09:22

## 2022-12-12 RX ADMIN — FLUOXETINE 40 MG: 20 CAPSULE ORAL at 08:28

## 2022-12-12 RX ADMIN — EPOETIN ALFA-EPBX 6000 UNITS: 10000 INJECTION, SOLUTION INTRAVENOUS; SUBCUTANEOUS at 18:58

## 2022-12-12 RX ADMIN — TACROLIMUS 2 MG: 1 CAPSULE ORAL at 08:28

## 2022-12-12 RX ADMIN — HYDROCODONE BITARTRATE AND ACETAMINOPHEN 1 TABLET: 5; 325 TABLET ORAL at 11:11

## 2022-12-12 RX ADMIN — CALCIUM ACETATE 1334 MG: 667 CAPSULE ORAL at 17:50

## 2022-12-12 RX ADMIN — LIDOCAINE PATCH 4% 1 PATCH: 40 PATCH TOPICAL at 11:23

## 2022-12-12 RX ADMIN — ACETAMINOPHEN 650 MG: 325 TABLET, FILM COATED ORAL at 04:55

## 2022-12-12 RX ADMIN — LIDOCAINE PATCH 4% 1 PATCH: 40 PATCH TOPICAL at 20:05

## 2022-12-12 RX ADMIN — SENNOSIDES AND DOCUSATE SODIUM 1 TABLET: 8.6; 5 TABLET ORAL at 20:05

## 2022-12-12 RX ADMIN — IBUPROFEN 800 MG: 800 TABLET ORAL at 20:05

## 2022-12-12 RX ADMIN — SODIUM CHLORIDE 250 ML: 9 INJECTION, SOLUTION INTRAVENOUS at 17:06

## 2022-12-12 RX ADMIN — SODIUM CHLORIDE 300 ML: 9 INJECTION, SOLUTION INTRAVENOUS at 17:06

## 2022-12-12 RX ADMIN — HYDROMORPHONE HYDROCHLORIDE 0.2 MG: 0.2 INJECTION, SOLUTION INTRAMUSCULAR; INTRAVENOUS; SUBCUTANEOUS at 18:19

## 2022-12-12 RX ADMIN — COLCHICINE 0.6 MG: 0.6 TABLET, FILM COATED ORAL at 08:28

## 2022-12-12 RX ADMIN — HYDROCODONE BITARTRATE AND ACETAMINOPHEN 1 TABLET: 5; 325 TABLET ORAL at 17:50

## 2022-12-12 RX ADMIN — IBUPROFEN 800 MG: 800 TABLET ORAL at 08:28

## 2022-12-12 RX ADMIN — Medication: at 17:07

## 2022-12-12 RX ADMIN — ALUMINUM HYDROXIDE, MAGNESIUM HYDROXIDE, AND SIMETHICONE 30 ML: 200; 200; 20 SUSPENSION ORAL at 20:24

## 2022-12-12 RX ADMIN — BENZONATATE 100 MG: 100 CAPSULE ORAL at 11:28

## 2022-12-12 RX ADMIN — HYDROMORPHONE HYDROCHLORIDE 0.2 MG: 0.2 INJECTION, SOLUTION INTRAMUSCULAR; INTRAVENOUS; SUBCUTANEOUS at 11:18

## 2022-12-12 RX ADMIN — PANTOPRAZOLE SODIUM 40 MG: 40 TABLET, DELAYED RELEASE ORAL at 08:28

## 2022-12-12 ASSESSMENT — ACTIVITIES OF DAILY LIVING (ADL)
ADLS_ACUITY_SCORE: 18

## 2022-12-12 NOTE — CONSULTS
Nephrology Initial Consult  December 12, 2022      Jhoan Hoffman MRN:[id number] YOB: 1995  Date of Admission:12/11/2022  Primary care provider: Alexis Pabon  Requesting physician: Sea Flores*    ASSESSMENT AND RECOMMENDATIONS:   ESRD-Due to congenital renal dysplasia, had kidney tx x3 in 1995, 2011 and 2012 which lasted until early 2020, has been on HD since.  Follow with [facility] under care of Dr Severino, getting rounding report for details.  Running HD today on his usual MWF schedule, will try to pull 2-3L after pericardial tap which should improve his hemodynamics.   -HD today post pericardial tap.  3h/2-3L.    -Access is L arm fistula, +/+ bruit/thrill.     -Getting details regarding HD and immunosuppression from outside HD unit.      Hx renal tx-Still on cellcept 250mg BID, bactrim MWF and tacro (goal 4-6 last tx clinic visit).  Plan in transplant clinic in 1/2021 was to taper off of cellcept and start Prednisone 5mg daily which was again the plan from outside clinic visit 8/12/2022, unclear why this has not occurred.     -Continue tacro 2mg daily   -Continue cellcept and MWF bactrim for now, getting outside records.      Pericardial effusion-Not consistent with uremic pericarditis as he has not missed any runs and BUN is reasonable after 2 days off of HD.  Planning pericardial tap then will run HD to avoid dropping preload with tamponade physiology.      Volume-He is unsure of his EDW, has stable BP's despite pericardial effusion, running after pericardicentesis and will try to pull 1-2L on run.      Electrolytes-K 5.4, bicarb 21, Na 139.  Running HD today.      BMD-Ca 9.8, Mg and Phos not checked today.      Anemia-Hgb 7.6, acute management per team, will check iron stores, will give 6k epo with runs.      Nutrition-Taking PO, deferred.     Time spent: 30 minutes on this date of encounter for chart review, physical exam, medical decision making and  co-ordination of care.     Seen and discussed with Dr Campa    Recommendations were communicated to primary team via verbal communication.     JAJA Miranda CNS  Clinical Nurse Specialist  [phone]    REASON FOR CONSULT: Requested to evaluate 27 yom with ESRD for management of dialysis while admitted for pericardial effusion.     HISTORY OF PRESENT ILLNESS:  Jhoan Hoffman is a 27 yom with hx of congenital renal dysplasia with 3x failed renal tx, now on HD since early 2020.  Tx to [facility] for evaluation of pericardial effusion with early tamponade.  Had upper resp tract infection ~2 weeks ago with worsening SOB since.  Presented to [facility] where ECHO showed pericardial effusion, sent to [facility] to manage.  Nephrology consulted for management of dialysis while admitted.     PAST MEDICAL HISTORY:  Past Medical History:   Diagnosis Date     Anemia     in ESRD     Depression      History of blood transfusion      Hypertension      Kidney replaced by transplant      Peritoneal dialysis status (H)     in past     Renal failure      Sleep apnea        Past Surgical History:   Procedure Laterality Date     BIOPSY       CREATE GRAFT LOOP ARTERIOVENOUS UPPER EXTREMITY  12/17/2020    Procedure: Create Graft Left Arteriovenous Upper Extremity;  Surgeon: Sidney Palma MD;  [facility]     CYSTOSCOPY, REMOVE STENT(S), COMBINED  5/25/2012    Procedure:COMBINED CYSTOSCOPY, REMOVE STENT(S); Cystoscopy, Removal Of Urinary Stent; Surgeon:FLORENTINO KNIGHT; [facility]     ENDARECTERECTOMY RENAL  5/18/2011    Procedure:ENDARECTERECTOMY RENAL; Exploration of Transplant Kidney, Back Table Flush, Biopsy of Kidney and Reanastamosis of Kidney; Surgeon:FLORENTINO KNIGHT; [facility]     EXPLANT TRANSPLANTED KIDNEY  7/12/2011    Procedure:EXPLANT TRANSPLANTED KIDNEY; Transplant Nephrectomy; Surgeon:FLORENTINO KNIGHT; [facility]     INSERT CATHETER HEMODIALYSIS  5/18/2011    Procedure:INSERT  CATHETER HEMODIALYSIS; Double Lumen; Surgeon:JOE HE; [facility]     INSERT CATHETER PERITONEAL DIALYSIS  4/28/2012    Procedure:INSERT CATHETER PERITONEAL DIALYSIS; Placement dialysis cath under fluoro, before kidney tx; Surgeon:FLORENTINO KNIGHT; [facility]     IR CVC TUNNEL PLACEMENT > 5 YRS OF AGE  12/18/2020     LAPAROTOMY EXPLORATORY  5/19/2011    Procedure:LAPAROTOMY EXPLORATORY; washout of kidney and closure; Surgeon:FLORENTINO KNIGHT; [facility]     PERCUTANEOUS BIOPSY KIDNEY  6/2/2011    Procedure:PERCUTANEOUS BIOPSY KIDNEY; Surgeon:GIL WILLIAM; [facility]     PERCUTANEOUS BIOPSY KIDNEY Right 6/26/2019    Procedure: Right Kidney Biopsy;  Surgeon: Peter Briggs MD;  [facility]     PERCUTANEOUS BIOPSY KIDNEY Right 7/23/2019    Procedure: Right Kidney Biopsy;  Surgeon: Pro Menard MD;  [facility]     REMOVE CATHETER PERITONEAL  5/3/2012    Procedure:REMOVE CATHETER PERITONEAL; Removal Of Peritoneal Dialysis Catheter; Surgeon:FLORENTINO KNIGHT; [facility]     REVISION FISTULA ARTERIOVENOUS UPPER EXTREMITY Left 2/16/2021    Procedure: Left upper AV fistula revision (transposition) with intraoperative ultrasound;  Surgeon: Sidney Palma MD;  [facility]     TRANSPLANT KIDNEY RECIPIENT LIVING RELATED  1995     TRANSPLANT KIDNEY RECIPIENT LIVING UNRELATED  5/18/2011    Procedure:TRANSPLANT KIDNEY RECIPIENT LIVING UNRELATED; Living unrelated left kidney transplant and placement of ureteral stent into transplant ureter.; Surgeon:FLORENTINO KNIGHT; [facility]     TRANSPLANT KIDNEY RECIPIENT LIVING UNRELATED  4/28/2012    Procedure:TRANSPLANT KIDNEY RECIPIENT LIVING UNRELATED; kidney transplant -   UNOS# [id number]  Organ arrival: 2100 4/27  Cross match results: 0200 4/28  Donor blood type: A  Recipient blood type: A; Surgeon:FLORENTINO KNIGHT; [facility]        MEDICATIONS:  PTA Meds  Prior to Admission medications    Medication Sig  Last Dose Taking? Auth Provider Long Term End Date   amLODIPine (NORVASC) 10 MG tablet Take 1 tablet (10 mg) by mouth At Bedtime   Jose Culp MD Yes    Aspirin Buf,CaCarb-MgCarb-MgO, 81 MG TABS Take 81 mg by mouth   Reported, Patient     calcium acetate (PHOSLO) 667 MG CAPS capsule Take 1,334 mg by mouth   Reported, Patient     carvedilol (COREG) 12.5 MG tablet Take 2 tablets (25 mg) by mouth 2 times daily (with meals)   Kimberly Reed DO Yes    cholecalciferol 2000 UNITS CAPS Take 2,000 Int'l Units by mouth daily   Jose Culp MD     FLUoxetine (PROZAC) 40 MG capsule Take 40 mg by mouth daily    Mitch Anguiano MD Yes    HYDROmorphone (DILAUDID) 2 MG tablet Take 1 tablet (2 mg) by mouth every 6 hours as needed for pain   Lonnie Mariee MD     mycophenolate (GENERIC EQUIVALENT) 250 MG capsule Take 1 capsule (250 mg) by mouth 2 times daily for 29 days   Jose Culp MD Yes 2/19/21   omeprazole (PRILOSEC) 40 MG DR capsule Take 40 mg by mouth daily   Unknown, Entered By History No    ondansetron (ZOFRAN-ODT) 4 MG ODT tab Take 1 tablet (4 mg) by mouth daily as needed for nausea or vomiting (take with dialysis)   Kimberly Reed DO     oxyCODONE (ROXICODONE) 5 MG tablet Take 1 tablet (5 mg) by mouth every 6 hours as needed for moderate to severe pain   Zakia Schwartz MD     senna-docusate (SENOKOT-S/PERICOLACE) 8.6-50 MG tablet Take 1-2 tablets by mouth 2 times daily   Zakia Schwartz MD     SENNA-docusate sodium (SENNA S) 8.6-50 MG tablet Take 1 tablet by mouth At Bedtime   Lonnie Mariee MD     sulfamethoxazole-trimethoprim (BACTRIM) 400-80 MG tablet Take 1 tablet by mouth Every Mon, Wed, Fri Morning   Meghan Gilbert MD Yes    tacrolimus (GENERIC EQUIVALENT) 0.5 MG capsule Take 4 capsules (2 mg) by mouth daily   Jose Culp MD        Current Meds    sodium chloride 0.9%  250 mL Intravenous Once in dialysis/CRRT     sodium chloride 0.9%  300 mL  Hemodialysis Machine Once     calcium acetate  1,334 mg Oral TID w/meals     colchicine  0.6 mg Oral Daily     epoetin mago-epbx  6,000 Units Intravenous Once in dialysis/CRRT     FLUoxetine  40 mg Oral Daily     ibuprofen  800 mg Oral TID     lidocaine  1 patch Transdermal Q24H     lidocaine   Transdermal Q8H ISAAC     - MEDICATION INSTRUCTIONS -   Does not apply Once     pantoprazole  40 mg Oral QAM AC     senna-docusate  1 tablet Oral BID    Or     senna-docusate  2 tablet Oral BID     sodium chloride (PF)  3 mL Intracatheter Q8H     tacrolimus  2 mg Oral Daily     Infusion Meds    - MEDICATION INSTRUCTIONS -       - MEDICATION INSTRUCTIONS -         ALLERGIES:    Allergies   Allergen Reactions     Amoxicillin Swelling     Penicillins Swelling and Other (See Comments)     Azithromycin      Z pack - can't take with cyclosporine.     Vancomycin      Ruth syndrome     Cefprozil Rash       REVIEW OF SYSTEMS:  A 10 point review of systems was negative except as noted above.    SOCIAL HISTORY:   Social History     Socioeconomic History     Marital status: Single     Spouse name: Not on file     Number of children: Not on file     Years of education: Not on file     Highest education level: Not on file   Occupational History     Not on file   Tobacco Use     Smoking status: Never     Smokeless tobacco: Never     Tobacco comments:     mother smokes outside   Substance and Sexual Activity     Alcohol use: Not Currently     Drug use: No     Sexual activity: Not on file   Other Topics Concern     Parent/sibling w/ CABG, MI or angioplasty before 65F 55M? Not Asked   Social History Narrative     Not on file     Social Determinants of Health     Financial Resource Strain: Not on file   Food Insecurity: Not on file   Transportation Needs: Not on file   Physical Activity: Not on file   Stress: Not on file   Social Connections: Not on file   Intimate Partner Violence: Not on file   Housing Stability: Not on file     Reviewed with  "patient, noncontributory    FAMILY MEDICAL HISTORY:   Reviewed with pt, noncontributory family hx.      PHYSICAL EXAM:   Temp  Av.9  F (36.6  C)  Min: 97.2  F (36.2  C)  Max: 98.1  F (36.7  C)      Pulse  Av.3  Min: 84  Max: 101 Resp  Av  Min: 15  Max: 44  SpO2  Av.9 %  Min: 93 %  Max: 100 %       /82 (BP Location: Right arm)   Pulse 89   Temp 97.2  F (36.2  C) (Axillary)   Resp 15   Ht 1.676 m (5' 6\")   Wt 87.9 kg (193 lb 12.6 oz)   SpO2 94%   BMI 31.28 kg/m     Date 22 07 - 22 0659   Shift 2385-8327 5374-7207 7709-9131 24 Hour Total   INTAKE   P.O. 80   80   Shift Total(mL/kg) 80(0.91)   80(0.91)   OUTPUT   Chest Tube(mL/kg) 800(9.1)   800(9.1)   Shift Total(mL/kg) 800(9.1)   800(9.1)   Weight (kg) 87.9 87.9 87.9 87.9      Admit Weight: 87.9 kg (193 lb 12.6 oz)     GENERAL APPEARANCE: alert, in mild distress.    EYES: No scleral icterus  NECK:  + JVD  Pulmonary: lungs clear to auscultation with equal breath sounds bilaterally, no clubbing or cyanosis  CV: Regular rhythm, normal rate, no rub   - Edema +1 generalized.    GI: soft, nontender, normal bowel sounds  MS: no evidence of inflammation in joints, no muscle tenderness  : No Ordoñez  SKIN: no rash, warm, dry  NEURO: mentation intact and speech normal  Access: L arm fistula, +/+ bruit/thrill.      LABS:   CMP  Recent Labs   Lab 22  1150 22  1757    141   POTASSIUM 5.4* 5.2   CHLORIDE 96* 98   CO2 21* 23   ANIONGAP 22* 20*   GLC 93 90   BUN 61.7* 47.4*   CR 11.20* 9.86*   GFRESTIMATED 6* 7*   LIZZ 9.8 9.9   PROTTOTAL 7.0 7.1   ALBUMIN 3.3* 3.5   BILITOTAL 0.4 0.5   ALKPHOS 95 93   AST 10 9*   ALT <5* <5*     CBC  Recent Labs   Lab 22  1150 22  1757   HGB 7.6* 7.2*   WBC 9.7 10.9   RBC 2.81* 2.64*   HCT 24.8* 23.6*   MCV 88 89   MCH 27.0 27.3   MCHC 30.6* 30.5*   RDW 14.2 15.0    245     INR  Recent Labs   Lab 22  1757   INR 1.46*   PTT 31     ABGNo lab results found in last " 7 days.   URINE STUDIES  Recent Labs   Lab Test 07/23/19  0600 06/26/19  0555 04/07/15  1427   COLOR Straw Straw Light Yellow   APPEARANCE Slightly Cloudy Clear Clear   URINEGLC Negative Negative Negative   URINEBILI Negative Negative Negative   URINEKETONE Negative Negative Negative   SG 1.005 1.009 1.013   UBLD Small* Small* Negative   URINEPH 6.0 5.0 6.0   PROTEIN 100* 100* Negative   NITRITE Negative Negative Negative   LEUKEST Negative Negative Negative   RBCU 0 2 1   WBCU 0 0 <1     Recent Labs   Lab Test 07/23/19  0600 06/26/19  0555 06/07/19  1518 09/18/17  1422 04/07/15  1426   UTPG 2.82* 1.86* 1.98* 0.79* 0.15     PTH  Recent Labs   Lab Test 12/19/20  0726 09/18/17  1431   PTHI 2,523* 151*     IRON STUDIES  Recent Labs   Lab Test 12/19/20  0726   IRON 53   *   IRONSAT 24   CLINTON 143

## 2022-12-12 NOTE — PROGRESS NOTES
Cardiology ICU Progress Note  Jhoan Hoffman 27 year old  [id number]  (Not on file)    Primary care provider: Alexis Pabon  CC: Shortness of breath    Brief HPI:   Mr Pabon is a 27 year old male with a history of congenital renal dysplasia, history of 3 failed renal transplants, now with stage V renal disease requiring HD. He was transferred to [facility] for evaluation for pericardial effusion with early signs of tamponade    Mr Hoffman had an upper respiratory tract infection 2 weeks ago with the main symptoms being cough. Yesterday he started having shortness of breath on exertion. Although he was able to sleep last night he had shortness of breath on laying completely flat. Also endorsed chest pain on laying flat or taking deep breaths. Chest pain improved on sitting and leaning forward. Has never had these symptoms before. He presented at [facility] with dyspnea, tachycardia 100-110, a narrow pulse pressure (110/90), and pericardial effusion on imaging (unsure which type of imaging). Transferred to [facility] due to concerns for early signs of tamponade.    Interval Changes:  On arrival to [facility] ECHO shows large circumferential pericardial effusion without high risk sign of tamponade. Effusion is 3.2 cm deep near the mid-inferolateral segments. RV pressure mildly elevated at 8. Pain managed over the night with initiation of Colchicine and ibuprofen. This morning pain is worse, 8/10. Will adjust pain medication.     Plan:  - to cath lab for Pericardiocentesis  - send fluid for cultures  - HD after pericardiocentesis  - add Norcor, lidocaine patches, and one time dose of IV dilaudid for better pain control    ASSESSMENT AND PLAN BY SYSTEM:   Neurology   # Depression  # Pain  Plan:  -Continue PTA prozac  - ibuprofen and colchicine scheduled  - PRN Norcor  - one time dose of dilaudid    Cardiovascular  # Pericardial effusion with concerns for tamponade  # Hypertension  Concerns for early signs of tamponade based on  clinical picture. Will obtain stat echo and decide if cath lab needs to be activated for pericardiocentesis. Hold home blood pressure meds. This is likely viral pericarditis with pericardial effusion. Will obtain ESR/CRP. Will likely need to start treatment for pericardial effusion since clinical picture is consistent with it. However since pt has known anemia, may get pericardiocentesis tonight and platelet value is unknown, will hold off on starting high dose ASA immediately.  -ECHO with large circumferential pericardial effusion without high risk sign of tamponade. Effusion is 3.2 cm deep near the mid-inferolateral segments. RV pressure mildly elevated at 8.  .  Plan:  - Hold PTA Coreg  - Hold PTA Norvasc  - colchicine and ibuprofen  - pericardiocentesis   - send fluid for cultures    Pulmonary  # Obstructive sleep apnea  Plan:  -O2 if needed  -Consider scheduled duonebs if signs of lung dz, currently PRN      Gastrointestinal, Nutrition  -Renal diet-NPO currently for procedure  -last BM: not known  Plan:  -NPO in case pt needs urgent/emergent pericardiocentesis based on echo  -Bowel regimen   -GI Prophylaxis: PPI; Protonix 40 mg daily    Renal, Electrolytes  # ESRD  # 3 failed kidney transplants  # dialysis dependent- M-W-F  # old peritoneal dialysis site  Per chart review and per patient Tacrolimus therapy is ongoing. Unsure about Bactrim thrice weekly. Will need to clarify with Nephrology.   -Nephrology consult to clarify post transplant medications and for assistance with HD  -Monitor urine output  -I/O goal    Infectious Disease  # no acitve issues  -Monitor for signs of infection    Hematology  # Anemia of critical illness  Required transfusion a few days ago  -Monitor Hb    Endocrinology  No acute issues    Integumentary:  - No skin issues    Clinically Significant Risk Factors Present on Admission             # Anion Gap Metabolic Acidosis: Highest Anion Gap = 20 mmol/L in last 2 days, will monitor and  "treat as appropriate   # Coagulation Defect: INR = 1.46 (Ref range: 0.85 - 1.15) and/or PTT = 31 Seconds (Ref range: 22 - 38 Seconds), will monitor for bleeding         # Obesity: Estimated body mass index is 31.28 kg/m  as calculated from the following:    Height as of this encounter: 1.676 m (5' 6\").    Weight as of this encounter: 87.9 kg (193 lb 12.6 oz).        # Anemia: based on hgb <11           ICU Cares:  CXR: daily  Fluids/Feeds: NPO  DVT Prophylaxis: SCD for now pending plan for pericardiocentesis  GI Prophylaxis: Protonix  Bowel Regimen: senna  Anticipated Floor Transfer: NA    Lines/Tubes/Drains:  L AV fistula     Plan of care to be discussed with Dr. Cruz who agrees with the above assessment and plan.    Mary Cross, APRN DNP CNP  [facility] Cardiology      Objective Data:     /78   Pulse 84   Temp 98.1  F (36.7  C) (Oral)   Resp 24   Ht 1.676 m (5' 6\")   Wt 87.9 kg (193 lb 12.6 oz)   SpO2 93%   BMI 31.28 kg/m      I/O:  No intake or output data in the 24 hours ending 12/11/22 1417    Labs:  Recent Labs   Lab 12/11/22 1757      POTASSIUM 5.2   CHLORIDE 98   CO2 23   ANIONGAP 20*   GLC 90   BUN 47.4*   CR 9.86*   GFRESTIMATED 7*   LIZZ 9.9     Recent Labs   Lab 12/11/22 1757   WBC 10.9   RBC 2.64*   HGB 7.2*   HCT 23.6*   MCV 89   MCH 27.3   MCHC 30.5*   RDW 15.0        Recent Labs   Lab 12/11/22 1757   INR 1.46*     No lab results found in last 7 days.  Arterial Blood Gas No lab results found in last 7 days.    Physical exam:  General: In bed, in NAD  HEENT: PERRL, no scleral icterus or injection  CARDIAC: tachycardic, distant heart sounds, no m/r/g appreciated.   RESP:  CTAB, no wheezes, rhonchi or crackles appreciated.  GI: soft, BS hypoactive  : No Ordoñez  EXTREMITIES: No LE edema, pulses 2+.c/d/i. No bruits appreciated.   SKIN: No acute lesions appreciated  NEURO: A&Ox3    Imaging/procedure results:  Echo Complete  [id number]  [id number]  [id number]  [id number]^JEAN-CLAUDE^MAGGIE^JO   "   [facility],[place]  Echocardiography Laboratory  [address] [place] [address]     Name: RADHA OLEARY  MRN: [id number]  : 1995  Study Date: 2022 06:26 PM  Age: 27 yrs  Gender: Male  Patient Location: [facility]  Reason For Study: Pericardial Effusion  Ordering Physician: MAGGIE BERMEO  Referring Physician: GLENNY PEREZ  Performed By: Galdino Mishra RDCS     BSA: 2.0 m2  Height: 66 in  Weight: 190 lb  BP: 157/97 mmHg  ______________________________________________________________________________  Procedure  Complete Portable Echo Adult. The final echo results were communicated to the  ordering physician by pager . The final echo results were communicated to Dr. Irvin..  ______________________________________________________________________________  Interpretation Summary  There is a large circumferential pericardial effusion with a maximal depth of  3.2 cm near the LV mid inferolateral segment. There are no high-risk signs of  tamponade such as RA/RV diastolic collapse. The RA pressure is mildly elevated  (8 mmHg).  Global and regional left ventricular function is normal with an EF of 60-65%.  Global right ventricular function is normal. The right ventricle is normal  size.  No significant valvular abnormalities.  This study was compared with the study from 10/20/2014. The pericardial  effusion is new.  ______________________________________________________________________________  Left Ventricle  Global and regional left ventricular function is normal with an EF of 60-65%.  Left ventricular size is normal. Mild concentric wall thickening consistent  with left ventricular hypertrophy is present. Left ventricular diastolic  function is not assessable.     Right Ventricle  Global right ventricular function is normal. The right ventricle is normal  size.     Mitral Valve  The mitral valve is normal. Trace mitral insufficiency is present.     Aortic Valve  The  valve leaflets are not well visualized. On Doppler interrogation, there is  no significant stenosis or regurgitation.     Tricuspid Valve  The valve leaflets are not well visualized. Trace tricuspid insufficiency is  present. Pulmonary artery systolic pressure cannot be assessed.     Pulmonic Valve  The valve leaflets are not well visualized. Trace pulmonic insufficiency is  present.     Vessels  The aorta root is normal. The thoracic aorta cannot be assessed. IVC diameter  and respiratory changes fall into an intermediate range suggesting an RA  pressure of 8 mmHg.     Pericardium  There is a large circumferential pericardial effusion with a maximal depth of  3.2 cm near the LV mid inferolateral segment. There are no high-risk signs of  tamponade such as RA/RV diastolic collapse. The RA pressure is mildly elevated  (8 mmHg).     Compared to Previous Study  This study was compared with the study from 10/20/2014 . The pericardial  effusion is new.     ______________________________________________________________________________  MMode/2D Measurements & Calculations  IVSd: 1.3 cm  LVIDd: 4.8 cm  LVIDs: 3.4 cm  LVPWd: 1.3 cm  FS: 30.3 %     LV mass(C)d: 245.9 grams  LV mass(C)dI: 125.7 grams/m2  RWT: 0.52     ______________________________________________________________________________  Report approved by: Yahir Salazar 12/11/2022 07:03 PM

## 2022-12-12 NOTE — PLAN OF CARE
Major Shift Events:  Pt A&O x4 and c/o chest pain managed well w/ PRN and scheduled pain meds. Pt slept for most of shift.  Plan: floor orders and awaiting for bed availability.  For vital signs and complete assessments, please see documentation flowsheets.

## 2022-12-13 ENCOUNTER — APPOINTMENT (OUTPATIENT)
Dept: CARDIOLOGY | Facility: CLINIC | Age: 27
DRG: 314 | End: 2022-12-13
Payer: MEDICARE

## 2022-12-13 ENCOUNTER — APPOINTMENT (OUTPATIENT)
Dept: OCCUPATIONAL THERAPY | Facility: CLINIC | Age: 27
DRG: 314 | End: 2022-12-13
Payer: MEDICARE

## 2022-12-13 ENCOUNTER — APPOINTMENT (OUTPATIENT)
Dept: GENERAL RADIOLOGY | Facility: CLINIC | Age: 27
DRG: 314 | End: 2022-12-13
Attending: STUDENT IN AN ORGANIZED HEALTH CARE EDUCATION/TRAINING PROGRAM
Payer: MEDICARE

## 2022-12-13 LAB
ALBUMIN SERPL BCG-MCNC: 3.2 G/DL (ref 3.5–5.2)
ALP SERPL-CCNC: 88 U/L (ref 40–129)
ALT SERPL W P-5'-P-CCNC: <5 U/L (ref 10–50)
ANA SER QL IF: NEGATIVE
ANION GAP SERPL CALCULATED.3IONS-SCNC: 19 MMOL/L (ref 7–15)
AST SERPL W P-5'-P-CCNC: 10 U/L (ref 10–50)
BILIRUB SERPL-MCNC: 0.3 MG/DL
BUN SERPL-MCNC: 36.9 MG/DL (ref 6–20)
CA-I BLD-MCNC: 4.6 MG/DL (ref 4.4–5.2)
CALCIUM SERPL-MCNC: 9.6 MG/DL (ref 8.6–10)
CHLORIDE SERPL-SCNC: 97 MMOL/L (ref 98–107)
CREAT SERPL-MCNC: 7.74 MG/DL (ref 0.67–1.17)
CRP SERPL-MCNC: 277 MG/L
DEPRECATED HCO3 PLAS-SCNC: 23 MMOL/L (ref 22–29)
ERYTHROCYTE [DISTWIDTH] IN BLOOD BY AUTOMATED COUNT: 14.5 % (ref 10–15)
GFR SERPL CREATININE-BSD FRML MDRD: 9 ML/MIN/1.73M2
GLUCOSE BLDC GLUCOMTR-MCNC: 77 MG/DL (ref 70–99)
GLUCOSE SERPL-MCNC: 78 MG/DL (ref 70–99)
HCT VFR BLD AUTO: 27 % (ref 40–53)
HGB BLD-MCNC: 8.3 G/DL (ref 13.3–17.7)
LDH SERPL L TO P-CCNC: 195 U/L (ref 0–250)
LVEF ECHO: NORMAL
MCH RBC QN AUTO: 26.6 PG (ref 26.5–33)
MCHC RBC AUTO-ENTMCNC: 30.7 G/DL (ref 31.5–36.5)
MCV RBC AUTO: 87 FL (ref 78–100)
PHOSPHATE SERPL-MCNC: 7.3 MG/DL (ref 2.5–4.5)
PLATELET # BLD AUTO: 267 10E3/UL (ref 150–450)
POTASSIUM SERPL-SCNC: 4.5 MMOL/L (ref 3.4–5.3)
PROT SERPL-MCNC: 7 G/DL (ref 6.4–8.3)
RBC # BLD AUTO: 3.12 10E6/UL (ref 4.4–5.9)
RHEUMATOID FACT SER NEPH-ACNC: 15 IU/ML
SODIUM SERPL-SCNC: 139 MMOL/L (ref 136–145)
TACROLIMUS BLD-MCNC: 10.8 UG/L (ref 5–15)
TME LAST DOSE: NORMAL H
TME LAST DOSE: NORMAL H
WBC # BLD AUTO: 7.8 10E3/UL (ref 4–11)

## 2022-12-13 PROCEDURE — 250N000013 HC RX MED GY IP 250 OP 250 PS 637

## 2022-12-13 PROCEDURE — 200N000002 HC R&B ICU UMMC

## 2022-12-13 PROCEDURE — 80197 ASSAY OF TACROLIMUS: CPT

## 2022-12-13 PROCEDURE — 99233 SBSQ HOSP IP/OBS HIGH 50: CPT | Mod: GC | Performed by: INTERNAL MEDICINE

## 2022-12-13 PROCEDURE — 93325 DOPPLER ECHO COLOR FLOW MAPG: CPT | Mod: 26 | Performed by: INTERNAL MEDICINE

## 2022-12-13 PROCEDURE — 80180 DRUG SCRN QUAN MYCOPHENOLATE: CPT

## 2022-12-13 PROCEDURE — 93321 DOPPLER ECHO F-UP/LMTD STD: CPT

## 2022-12-13 PROCEDURE — 86140 C-REACTIVE PROTEIN: CPT

## 2022-12-13 PROCEDURE — 36415 COLL VENOUS BLD VENIPUNCTURE: CPT

## 2022-12-13 PROCEDURE — 93321 DOPPLER ECHO F-UP/LMTD STD: CPT | Mod: 26 | Performed by: INTERNAL MEDICINE

## 2022-12-13 PROCEDURE — 71045 X-RAY EXAM CHEST 1 VIEW: CPT

## 2022-12-13 PROCEDURE — 82330 ASSAY OF CALCIUM: CPT

## 2022-12-13 PROCEDURE — 93325 DOPPLER ECHO COLOR FLOW MAPG: CPT

## 2022-12-13 PROCEDURE — 250N000013 HC RX MED GY IP 250 OP 250 PS 637: Performed by: INTERNAL MEDICINE

## 2022-12-13 PROCEDURE — 71045 X-RAY EXAM CHEST 1 VIEW: CPT | Mod: 26 | Performed by: RADIOLOGY

## 2022-12-13 PROCEDURE — 99233 SBSQ HOSP IP/OBS HIGH 50: CPT | Performed by: CLINICAL NURSE SPECIALIST

## 2022-12-13 PROCEDURE — 85027 COMPLETE CBC AUTOMATED: CPT

## 2022-12-13 PROCEDURE — 84100 ASSAY OF PHOSPHORUS: CPT | Performed by: INTERNAL MEDICINE

## 2022-12-13 PROCEDURE — 80053 COMPREHEN METABOLIC PANEL: CPT

## 2022-12-13 PROCEDURE — 97530 THERAPEUTIC ACTIVITIES: CPT | Mod: GO

## 2022-12-13 PROCEDURE — 99356 PR PROLONGED SERV,INPATIENT,1ST HR: CPT | Performed by: CLINICAL NURSE SPECIALIST

## 2022-12-13 PROCEDURE — 93308 TTE F-UP OR LMTD: CPT | Mod: 26 | Performed by: INTERNAL MEDICINE

## 2022-12-13 PROCEDURE — 97165 OT EVAL LOW COMPLEX 30 MIN: CPT | Mod: GO

## 2022-12-13 RX ORDER — COLCHICINE 0.6 MG/1
0.6 TABLET ORAL
Status: DISCONTINUED | OUTPATIENT
Start: 2022-12-15 | End: 2022-12-15 | Stop reason: HOSPADM

## 2022-12-13 RX ORDER — SULFAMETHOXAZOLE/TRIMETHOPRIM 800-160 MG
1 TABLET ORAL
Status: DISCONTINUED | OUTPATIENT
Start: 2022-12-14 | End: 2022-12-13

## 2022-12-13 RX ORDER — ASPIRIN 81 MG/1
81 TABLET ORAL DAILY
COMMUNITY

## 2022-12-13 RX ORDER — VIT B COMP NO.3/FOLIC/C/BIOTIN 1 MG-60 MG
TABLET ORAL DAILY
Status: DISCONTINUED | OUTPATIENT
Start: 2022-12-13 | End: 2022-12-15 | Stop reason: HOSPADM

## 2022-12-13 RX ORDER — LOSARTAN POTASSIUM 25 MG/1
25 TABLET ORAL DAILY
COMMUNITY

## 2022-12-13 RX ORDER — MYCOPHENOLATE MOFETIL 250 MG/1
250 CAPSULE ORAL 2 TIMES DAILY
Status: DISCONTINUED | OUTPATIENT
Start: 2022-12-13 | End: 2022-12-13

## 2022-12-13 RX ORDER — CARVEDILOL 25 MG/1
25 TABLET ORAL 2 TIMES DAILY
Status: ON HOLD | COMMUNITY
End: 2022-12-15

## 2022-12-13 RX ORDER — AMLODIPINE BESYLATE 5 MG/1
5 TABLET ORAL DAILY
COMMUNITY

## 2022-12-13 RX ORDER — TACROLIMUS 0.5 MG/1
1.5 CAPSULE ORAL 2 TIMES DAILY
COMMUNITY
End: 2024-05-15

## 2022-12-13 RX ADMIN — CALCIUM ACETATE 1334 MG: 667 CAPSULE ORAL at 17:49

## 2022-12-13 RX ADMIN — ALUMINUM HYDROXIDE, MAGNESIUM HYDROXIDE, AND SIMETHICONE 30 ML: 200; 200; 20 SUSPENSION ORAL at 18:41

## 2022-12-13 RX ADMIN — IBUPROFEN 800 MG: 800 TABLET ORAL at 08:25

## 2022-12-13 RX ADMIN — Medication: at 14:16

## 2022-12-13 RX ADMIN — PANTOPRAZOLE SODIUM 40 MG: 40 TABLET, DELAYED RELEASE ORAL at 08:25

## 2022-12-13 RX ADMIN — FLUOXETINE 40 MG: 20 CAPSULE ORAL at 08:25

## 2022-12-13 RX ADMIN — ALUMINUM HYDROXIDE, MAGNESIUM HYDROXIDE, AND SIMETHICONE 30 ML: 200; 200; 20 SUSPENSION ORAL at 13:13

## 2022-12-13 RX ADMIN — SENNOSIDES AND DOCUSATE SODIUM 2 TABLET: 8.6; 5 TABLET ORAL at 08:29

## 2022-12-13 RX ADMIN — LIDOCAINE PATCH 4% 1 PATCH: 40 PATCH TOPICAL at 19:46

## 2022-12-13 RX ADMIN — IBUPROFEN 800 MG: 800 TABLET ORAL at 19:46

## 2022-12-13 RX ADMIN — CALCIUM ACETATE 1334 MG: 667 CAPSULE ORAL at 12:44

## 2022-12-13 RX ADMIN — CALCIUM ACETATE 1334 MG: 667 CAPSULE ORAL at 08:25

## 2022-12-13 RX ADMIN — IBUPROFEN 800 MG: 800 TABLET ORAL at 14:16

## 2022-12-13 ASSESSMENT — ACTIVITIES OF DAILY LIVING (ADL)
ADLS_ACUITY_SCORE: 18
PREVIOUS_RESPONSIBILITIES: MEAL PREP;LAUNDRY;HOUSEKEEPING;MEDICATION MANAGEMENT;FINANCES;DRIVING
ADLS_ACUITY_SCORE: 18

## 2022-12-13 NOTE — PROGRESS NOTES
HEMODIALYSIS TREATMENT NOTE    Date: 12/12/2022  Time: 6:46 PM    Data:  Pre Wt: 87.9 kg    Desired Wt:  85.9 kg   Post Wt:  85.9 kg (Estimated)  Weight change: 2 kg  Ultrafiltration - Post Run Net Total Removed (mL):  2000mL  Vascular Access Status: Fistula  Patent, +B/T  Dialyzer Rinse:  Streaked  Total Blood Volume Processed: 53.8 L    Total Dialysis (Treatment) Time: 3 hours    Dialysate Bath: K 2, Ca 2.5  Heparin: None    Lab:   No    Interventions/Assessment:  Stable 3 hours HD via left AVF at . Cannulated w/o difficulty using 15g needles, +B/T, good flow. 2L net fluid removed without complication. AVF stasis achieved in under 10 min, dressing C/D/I.  Epo administered during HD. Patient ate dinner during tx.    Patient c/o sudden increase in left shoulder pain during treatment. Pain jumped from 4/10 to 9/10. PCN and PCP notified, examined patient. Dilaudid ordered and administered by PCN. Patient resting with eyes closed within 15 minutes of medication admin.    Report given to PCN.     Plan:    Per renal team.

## 2022-12-13 NOTE — PLAN OF CARE
Major Shift Events:  Patient is alert and oriented, able to make needs known. Pericardiocentesis with drain placement done today. Drain is to water seal, 106ml of serosanguinous output since return from cath lab. Currently receiving HD at bedside. At approx. 1800, patient began c/o sudden 9/10 left shoulder pain. MD at bedside to assess, one time dose of dilaudid ordered, given with relief per patient. No further c/o of CP since procedure done. Remains on 2L nasal cannula. No BM this shift, oliguric with no output today (patient's mom reports that patient normally voids once a day in the morning). Awaiting dinner order from kitchen (on renal diet).     Plan: Transfer to floor when bed becomes available    For vital signs and complete assessments, please see documentation flowsheets.

## 2022-12-13 NOTE — PROGRESS NOTES
Cardiology ICU Progress Note  Jhoan Hoffman 27 year old  [id number]  (Not on file)    Primary care provider: Alexis Pabon  CC: Shortness of breath    Brief HPI:   Mr Pabon is a 27 year old male with a history of congenital renal dysplasia, history of 3 failed renal transplants, now with stage V renal disease requiring HD. He was transferred to [facility] for evaluation for pericardial effusion with early signs of tamponade    Mr Hoffman had an upper respiratory tract infection 2 weeks ago with the main symptoms being cough. Yesterday he started having shortness of breath on exertion. Although he was able to sleep last night he had shortness of breath on laying completely flat. Also endorsed chest pain on laying flat or taking deep breaths. Chest pain improved on sitting and leaning forward. Has never had these symptoms before. He presented at [facility] with dyspnea, tachycardia 100-110, a narrow pulse pressure (110/90), and pericardial effusion on imaging (unsure which type of imaging). Transferred to [facility] due to concerns for early signs of tamponade.    Interval Changes:  Shoulder pain, chest pain and pain with coughing has subsided. He feels much better and is in good spirits today. He has a good appetite, had a BM today, and has been up walking with cardiac rehab. His heart is very enlarged looking on CXR today. Will repeat ECHO to evaluate effusion. Pericardial drain remains in place with about 10 ml an hour of serosanguinous fluid. After talking to renal transplant team-will keep in Tacrolimus, colchicine, and ibuprofen. Will not restart Cellcept or bactrim. Pericardial fluid predominantly neutrophils.    Plan:  - Echo today to evaluate effusion  - Start Tacrolimus after level back today  - Monitor for fever, productive cough, and WBC  - cultures pending  - HD [facility]      ASSESSMENT AND PLAN BY SYSTEM:   Neurology   # Depression  # Pain  Plan:  -Continue PTA prozac  - ibuprofen and colchicine scheduled  - PRN  Norcor  - lidocaine patches    Cardiovascular  # Pericardial effusion with concerns for tamponade  # Hypertension  Concerns for early signs of tamponade based on clinical picture. Will obtain stat echo and decide if cath lab needs to be activated for pericardiocentesis. Hold home blood pressure meds. This is likely viral pericarditis with pericardial effusion. Will obtain ESR/CRP. Will likely need to start treatment for pericardial effusion since clinical picture is consistent with it. However since pt has known anemia, may get pericardiocentesis tonight and platelet value is unknown, will hold off on starting high dose ASA immediately.  -ECHO with large circumferential pericardial effusion without high risk sign of tamponade. Effusion is 3.2 cm deep near the mid-inferolateral segments. RV pressure mildly elevated at 8.  -Pericardial fluid: Red, Cloudy, 380 cells  Trig 80  pericardial fluid predominantly neutrophils 86%, Lymphocytes 10%  Drain output last 24: 990 ml  Drain output last 8: 110 ml  Plan:  - Hold PTA Coreg  - Hold PTA Norvasc  - colchicine and ibuprofen  - pericardiac drain to water seal  - send fluid for cultures    Pulmonary  # Obstructive sleep apnea  Plan:  -O2 if needed    Gastrointestinal, Nutrition  -Renal diet  -last BM: 12/13  Plan:  -Bowel regimen   -GI Prophylaxis: PPI; Protonix 40 mg daily    Renal, Electrolytes  # ESRD  # 3 failed kidney transplants  # dialysis dependent- M-W-F  # old peritoneal dialysis site  Per transplant nephrology will continue tacrolimus, will not restart Cellcept or bactrim. Will continue colchicine and ibuprofen  -Nephrology consult to clarify post transplant medications and for assistance with HD  -Tacrolimus 2 mg daily  - tacrolimus level, will start tac   -I/O goal no HD today    Infectious Disease  # no acitve issues  -Monitor for signs of infection    Hematology  # Anemia of critical illness  Required transfusion a few days ago  HGB 8.5 (7.6),    "(215)  -Monitor Hb    Endocrinology  No acute issues    Integumentary:  - No skin issues    Clinically Significant Risk Factors        # Hyperkalemia: Highest K = 5.4 mmol/L in last 2 days, will monitor as appropriate    # Hypercalcemia: corrected calcium is >10.1, will monitor as appropriate   # Anion Gap Metabolic Acidosis: Highest Anion Gap = 22 mmol/L in last 2 days, will monitor and treat as appropriate  # Hypoalbuminemia: Lowest albumin = 3.2 g/dL at 12/13/2022  8:12 AM, will monitor as appropriate            # Obesity: Estimated body mass index is 30.46 kg/m  as calculated from the following:    Height as of this encounter: 1.676 m (5' 6\").    Weight as of this encounter: 85.6 kg (188 lb 11.4 oz)., PRESENT ON ADMISSION                ICU Cares:  CXR: as needed  Fluids/Feeds: renal  DVT Prophylaxis: SCD for now pending plan for pericardiocentesis, ambulating with cardiac rehab  GI Prophylaxis: Protonix  Bowel Regimen: senna  Anticipated Floor Transfer: NA    Lines/Tubes/Drains:  L AV fistula     Plan of care to be discussed with Dr. Cruz who agrees with the above assessment and plan.    JAJA Craven DNP CNP  [facility] Cardiology      Objective Data:     /82   Pulse 109   Temp 96.8  F (36  C) (Axillary)   Resp 18   Ht 1.676 m (5' 6\")   Wt 85.6 kg (188 lb 11.4 oz)   SpO2 95%   BMI 30.46 kg/m      I/O:  No intake or output data in the 24 hours ending 12/11/22 1417    Labs:  Recent Labs   Lab 12/13/22  0829 12/13/22  0812 12/12/22  1150 12/11/22  1757   NA  --  139 139 141   POTASSIUM  --  4.5 5.4* 5.2   CHLORIDE  --  97* 96* 98   CO2  --  23 21* 23   ANIONGAP  --  19* 22* 20*   GLC 77 78 93 90   BUN  --  36.9* 61.7* 47.4*   CR  --  7.74* 11.20* 9.86*   GFRESTIMATED  --  9* 6* 7*   LIZZ  --  9.6 9.8 9.9   PHOS  --  7.3*  --   --      Recent Labs   Lab 12/13/22  0812 12/12/22 2021 12/12/22  1150 12/11/22  1757   WBC 7.8 9.5 9.7 10.9   RBC 3.12* 3.14* 2.81* 2.64*   HGB 8.3* 8.5* 7.6* 7.2*   HCT " 27.0* 27.1* 24.8* 23.6*   MCV 87 86 88 89   MCH 26.6 27.1 27.0 27.3   MCHC 30.7* 31.4* 30.6* 30.5*   RDW 14.5 14.3 14.2 15.0    254 215 245     Recent Labs   Lab 12/11/22  1757   INR 1.46*     No lab results found in last 7 days.  Arterial Blood Gas No lab results found in last 7 days.    Physical exam:  General: In bed, in NAD  HEENT: PERRL, no scleral icterus or injection  CARDIAC: tachycardic, distant heart sounds, pericardial friction rub  RESP:  CTAB, no wheezes, rhonchi or crackles appreciated.  GI: soft, BS hypoactive  : No Ordoñez  EXTREMITIES: No LE edema, pulses 2+.c/d/i. No bruits appreciated.   SKIN: No acute lesions appreciated  NEURO: A&Ox3    Imaging/procedure results:  XR Chest Port 1 View  Narrative: Exam: XR CHEST PORT 1 VIEW, 12/13/2022 1:01 AM    Indication: chest tube s/p pericardiocentesis    Comparison: 12/18/2020    Findings:   Normal course of the trachea. The cardiac silhouette is enlarged,  pericardial drain in place. Pulmonary vasculature is mildly  indistinct. Left basilar opacity, which obscures the left  hemidiaphragm and an opacity within the medial right lung base. Left  costophrenic angle is not completely visualized, although there is a  probable small left-sided left pleural effusion. No right pleural  effusion. No pneumothorax. Upper abdomen within normal limits. No  acute osseous abnormality.  Impression: Impression:  1. Cardiomegaly with pericardial drain.  2. Left basilar opacity obscuring the left hemidiaphragm, atelectasis  versus consolidation. Probable small left-sided pleural effusion  although left costophrenic angle was excluded from the field-of-view.  3. Right basilar opacity, atelectasis versus pneumonitis.  4. Pulmonary vascular congestion.    I have personally reviewed the examination and initial interpretation  and I agree with the findings.    ALYCE HENDERSON MD         SYSTEM ID:  [id number]  Cardiac Catheterization  Pericardicentesis via subxiphoid  approach (800 mL of serosanguinous fluid   drained)  Pigtail catheter secured and sutured in place

## 2022-12-13 NOTE — PROGRESS NOTES
Major Shift Events:   -A&Ox4, all VSS on RA   -Shoulder pain is significantly better; 1-2/10  -Ambulates independently (SBA for line management); walked in halls today with OT   -Tolerating a renal diet and mindful of fluid intake  -30-40mL/hr of output into pericardial drain   -x3 BMs this shift; reports small amounts of urine each time       Plan: Transfer to [facility] when a bed is available. Potentially trial clamping pericardial drain tomorrow.     For vital signs and complete assessments, please see documentation flowsheets.

## 2022-12-13 NOTE — PHARMACY-ADMISSION MEDICATION HISTORY
Admission Medication History Completed by Pharmacy    See [facility] Admission Navigator for allergy information, preferred outpatient pharmacy, prior to admission medications and immunization status.     Medication History Sources:     Interview with patient and mother in room, surescripts    Changes made to PTA medication list (reason):    Added: losartan, dialyvite, velphro    Deleted:  o Sulfamethoxazole-trimethoprim 400-80 mg qMWF  o Mycophenolate 250 mg BID  o Post-op orders for hydromorphone, ondansetron, oxycodone, senna-docusate    Changed:   o amlodipine from 10 mg to 5 mg daily  o Aspirin/CaCarb/MgCarb/MgO to aspirin 81 EC  o Calcium acetate to sucroferric oxyhydroxide for phos binder agent  o Carvedilol from 12.5 mg tablets to 25 mg tablets (dose stayed the same)  o Tacrolimus 2 mg once daily to 1.5 mg twice daily    Additional Information:    None    Prior to Admission medications    Medication Sig Last Dose Taking? Auth Provider Long Term End Date   amLODIPine (NORVASC) 5 MG tablet Take 5 mg by mouth daily  Yes Unknown, Entered By History Yes    aspirin 81 MG EC tablet Take 81 mg by mouth daily  Yes Unknown, Entered By History     B Complex-C-Folic Acid (DIALYVITE PO) Take 1 tablet by mouth daily  Yes Unknown, Entered By History     carvedilol (COREG) 25 MG tablet Take 25 mg by mouth 2 times daily  Yes Unknown, Entered By History No    losartan (COZAAR) 25 MG tablet Take 25 mg by mouth daily  Yes Unknown, Entered By History Yes    sucroferric oxyhydroxide (VELPHORO) 500 MG CHEW chewable tablet Take 1,000 mg by mouth 3 times daily (with meals)  Yes Unknown, Entered By History     tacrolimus (GENERIC EQUIVALENT) 0.5 MG capsule Take 1.5 mg by mouth 2 times daily  Yes Unknown, Entered By History     cholecalciferol 2000 UNITS CAPS Take 2,000 Int'l Units by mouth daily  Yes Jose Culp MD     FLUoxetine (PROZAC) 40 MG capsule Take 40 mg by mouth daily   Yes Mitch Anguiano MD Yes    omeprazole  (PRILOSEC) 40 MG DR capsule Take 40 mg by mouth daily  Yes Unknown, Entered By History No        Date completed: 12/13/22    Medication history completed by: Anne Harris RPH

## 2022-12-13 NOTE — PROGRESS NOTES
Nephrology Progress Note  12/13/2022       Jhoan Hoffman is a 27 yom with hx of congenital renal dysplasia with 3x failed renal tx, now on HD since early 2020.  Tx to [facility] for evaluation of pericardial effusion with early tamponade.  Had upper resp tract infection ~2 weeks ago with worsening SOB since.  Presented to [facility] where ECHO showed pericardial effusion, sent to [facility] to manage.  Nephrology consulted for management of dialysis while admitted.      Interval History :   Mr Hoffman had HD yesterday after pericardial tap, feeling much better today.  Clarified transplant regimen with pt and transplant neph, plan to continue tacro at current dose but stop bactrim and cellcept, encouraged him to consider another transplant as he would have large survival benefit based on his age but he is clear he is not currently interested.  Plan for HD tomorrow on MWF schedule.      Assessment & Recommendations:   ESRD-Due to congenital renal dysplasia, had kidney tx x3 in 1995, 2011 and 2012 which lasted until early 2020, has been on HD since.  Follow with [facility] under care of Dr Severino, getting rounding report for details.  Running HD today on his usual MWF schedule, will try to pull 2-3L after pericardial tap which should improve his hemodynamics.       -HD on MWF schedule, plan for day off today and run tomorrow.                 -Access is L arm fistula, +/+ bruit/thrill.                  -Planning on stopping bactrim and cellcept, remaining on tacro 1.5mg BID with goal level 4-6 as he still does make some UOP     -Encouraged him to reconsider renal transplant workup (not wanting to pursue another after 3x failed although initial kidney lasted ~14 years).        Hx renal tx-Discussed with transplant neph and with pt, planning to continue tacro.                  -Continue tacro 1.5mg daily                -Stopping cellcept and bactrim.       Pericardial effusion-Not consistent with uremic  "pericarditis as he has not missed any runs and BUN was reasonable after 2 days off of HD.  Had pericardial tap with ~1L of drainage and pigtail left in.         Volume-He is unsure of his EDW, has stable BP's despite pericardial effusion, running after pericardicentesis and will try to pull 1-2L on run.       Electrolytes-K 4.5, bicarb 23, Na 139.         BMD-Ca 9.6, Mg and Phos not checked today.       Anemia-Hgb 8.3, acute management per team, gets iron weekly at HD but will hold with ? ID issue causing pericarditis, will give 6k epo with runs.       Nutrition-Taking PO, deferred.      Time spent: 30 minutes on this date of encounter for chart review, physical exam, medical decision making and co-ordination of care.      Seen and discussed with Dr Campa     Recommendations were communicated to primary team via verbal communication.        JAJA Miranda CNS  Clinical Nurse Specialist  [phone]      Review of Systems:   I reviewed the following systems:  Gen: No fevers or chills  CV: No CP at rest  Resp: No SOB at rest  GI: No N/V      Physical Exam:   I/O last 3 completed shifts:  In: 95 [P.O.:80; I.V.:15]  Out: 3090 [Other:2000; Chest Tube:1090]   /85 (BP Location: Right arm)   Pulse 102   Temp 97.3  F (36.3  C) (Tympanic)   Resp 18   Ht 1.676 m (5' 6\")   Wt 85.6 kg (188 lb 11.4 oz)   SpO2 98%   BMI 30.46 kg/m       GENERAL APPEARANCE: alert, in no distress.    EYES: No scleral icterus  NECK:  + JVD  Pulmonary: lungs clear to auscultation with equal breath sounds bilaterally, no clubbing or cyanosis  CV: Regular rhythm, normal rate, no rub   - Edema +1 generalized.    GI: soft, nontender, normal bowel sounds  MS: no evidence of inflammation in joints, no muscle tenderness  : No Ordoñez  SKIN: no rash, warm, dry  NEURO: mentation intact and speech normal  Access: L arm fistula, +/+ bruit/thrill.      Labs:   All labs reviewed by me  Electrolytes/Renal - Recent Labs   Lab Test 12/13/22  0829 " 12/13/22  0812 12/12/22  1150 12/11/22  1757 02/16/21  0954 12/21/20  0610 12/20/20  0701 12/19/20  0726 12/18/20  0614   NA  --  139 139 141  --  137 134   < > 138   POTASSIUM  --  4.5 5.4* 5.2   < > 3.8 3.2*   < > 4.1   CHLORIDE  --  97* 96* 98  --  105 102   < > 107   CO2  --  23 21* 23  --  18* 21   < > 13*   BUN  --  36.9* 61.7* 47.4*  --  66* 42*   < > 103*   CR  --  7.74* 11.20* 9.86*   < > 8.09* 6.21*   < > 9.98*   GLC 77 78 93 90   < > 87 97   < > 109*   LIZZ  --  9.6 9.8 9.9  --  8.6 8.4*   < > 8.1*   MAG  --   --   --   --   --   --  1.7  --  2.0   PHOS  --  7.3*  --   --   --  8.4* 7.9*  --  12.1*    < > = values in this interval not displayed.       CBC -   Recent Labs   Lab Test 12/13/22  0812 12/12/22 2021 12/12/22  1150   WBC 7.8 9.5 9.7   HGB 8.3* 8.5* 7.6*    254 215       LFTs -   Recent Labs   Lab Test 12/13/22  0812 12/12/22  1150 12/11/22  1757   ALKPHOS 88 95 93   BILITOTAL 0.3 0.4 0.5   ALT <5* <5* <5*   AST 10 10 9*   PROTTOTAL 7.0 7.0 7.1   ALBUMIN 3.2* 3.3* 3.5       Iron Panel -   Recent Labs   Lab Test 12/19/20  0726   IRON 53   IRONSAT 24   CLINTON 143           Current Medications:    calcium acetate  1,334 mg Oral TID w/meals     [START ON 12/15/2022] colchicine  0.6 mg Oral Once per day on Mon Thu     FLUoxetine  40 mg Oral Daily     ibuprofen  800 mg Oral TID     lidocaine  1 patch Transdermal Q24h    And     lidocaine   Transdermal Q8H ISAAC     lidocaine   Transdermal Daily     pantoprazole  40 mg Oral QAM AC     senna-docusate  1 tablet Oral BID    Or     senna-docusate  2 tablet Oral BID     sodium chloride (PF)  3 mL Intracatheter Q8H     [Held by provider] tacrolimus  2 mg Oral Daily       - MEDICATION INSTRUCTIONS -

## 2022-12-13 NOTE — PLAN OF CARE
Major Shift Events:  Pt A&O x4 and c/o left shoulder pain managed well w/ PRN and scheduled pain meds. Pericardiocentesis drain avg 30 ml/hr output. Pt on 2L NC w/ sp02 >92%. Pt able to turned self in bed.   Plan: awaiting floor bed availability   For vital signs and complete assessments, please see documentation flowsheets.

## 2022-12-13 NOTE — PROGRESS NOTES
12/13/22 1000   Appointment Info   Signing Clinician's Name / Credentials (OT) Barbara Schaeffer, OTR/L       Present no   Language english   Living Environment   People in Home alone   Current Living Arrangements apartment   Home Accessibility no concerns   Transportation Anticipated car, drives self   Living Environment Comments Pt lives in an apartment by herself. Pt has a tub/showe combo in the bathroom   Self-Care   Usual Activity Tolerance good   Current Activity Tolerance moderate   Regular Exercise No   Equipment Currently Used at Home none   Fall history within last six months no   Activity/Exercise/Self-Care Comment Pt was previously independent with ADL completion   Instrumental Activities of Daily Living (IADL)   Previous Responsibilities meal prep;laundry;housekeeping;medication management;finances;driving   IADL Comments Pt was previously independent with IADL completion.   General Information   Onset of Illness/Injury or Date of Surgery 12/11/22   Referring Physician Kelli Cross APRN   Patient/Family Therapy Goal Statement (OT) To return home   Additional Occupational Profile Info/Pertinent History of Current Problem Mr Pabon is a 27 year old male with a history of congenital renal dysplasia, history of 3 failed renal transplants, now with stage V renal disease requiring HD. He was transferred to [facility] for evaluation for pericardial effusion with early signs of tamponade   Existing Precautions/Restrictions cardiac   Left Upper Extremity (Weight-bearing Status) full weight-bearing (FWB)   Right Upper Extremity (Weight-bearing Status) full weight-bearing (FWB)   Left Lower Extremity (Weight-bearing Status) full weight-bearing (FWB)   Right Lower Extremity (Weight-bearing Status) full weight-bearing (FWB)   Heart Disease Risk Factors Lack of physical activity;Medical history   General Observations and Info Activity: ambulate every shift   Cognitive Status Examination    Orientation Status orientation to person, place and time   Affect/Mental Status (Cognitive) WNL   Follows Commands WNL   Cognitive Status Comments Pt is alert, oriented and appropriate in conversation   Visual Perception   Visual Impairment/Limitations WNL   Sensory   Sensory Quick Adds sensation intact   Pain Assessment   Patient Currently in Pain No   Posture   Posture not impaired   Range of Motion Comprehensive   General Range of Motion no range of motion deficits identified   Comment, General Range of Motion BUE ROM WFL   Strength Comprehensive (MMT)   General Manual Muscle Testing (MMT) Assessment no strength deficits identified   Comment, General Manual Muscle Testing (MMT) Assessment BUE grossly 5/5   Transfers   Transfers sit-stand transfer   Sit-Stand Transfer   Sit-Stand [place] (Transfers) supervision   Balance   Balance Comments Pt ambulated ~5ft with SBA   Clinical Impression   Criteria for Skilled Therapeutic Interventions Met (OT) Yes, treatment indicated   OT Diagnosis decreased activity tolerance and cardiovascular endurance   Influenced by the following impairments fatigue, deconditioning   OT Problem List-Impairments impacting ADL problems related to;activity tolerance impaired;strength   Assessment of Occupational Performance 1-3 Performance Deficits   Planned Therapy Interventions (OT) IADL retraining;strengthening;home program guidelines;progressive activity/exercise   Clinical Decision Making Complexity (OT) low complexity   Anticipated Equipment Needs Upon Discharge (OT) other (see comments)  (TBD)   Risk & Benefits of therapy have been explained evaluation/treatment results reviewed;care plan/treatment goals reviewed;risks/benefits reviewed;current/potential barriers reviewed;participants voiced agreement with care plan;participants included;patient   OT Total Evaluation Time   OT Eval, Low Complexity Minutes (60694) 10   OT Goals   Therapy Frequency (OT) 3 times/wk   OT Predicted  Duration/Target Date for Goal Attainment 01/06/23   OT Goals Home Management;Cardiac Phase 1   OT: Home Management Independent;with moderate demand household tasks;ambulatory level   OT: Understanding of cardiac education to maximize quality of life, condition management, and health outcomes Patient   OT: Perform aerobic activity with stable cardiovascular response continuous;30 minutes   OT: Navigation of stairs simulating home set up with stable cardiovascular response in order to return to home and community environment Greater than 10 stairs;Independent

## 2022-12-14 ENCOUNTER — APPOINTMENT (OUTPATIENT)
Dept: GENERAL RADIOLOGY | Facility: CLINIC | Age: 27
DRG: 314 | End: 2022-12-14
Attending: STUDENT IN AN ORGANIZED HEALTH CARE EDUCATION/TRAINING PROGRAM
Payer: MEDICARE

## 2022-12-14 LAB
ALBUMIN SERPL BCG-MCNC: 3.3 G/DL (ref 3.5–5.2)
ALP SERPL-CCNC: 87 U/L (ref 40–129)
ALT SERPL W P-5'-P-CCNC: <5 U/L (ref 10–50)
ANION GAP SERPL CALCULATED.3IONS-SCNC: 19 MMOL/L (ref 7–15)
AST SERPL W P-5'-P-CCNC: 11 U/L (ref 10–50)
ATRIAL RATE - MUSE: 94 BPM
BILIRUB SERPL-MCNC: 0.2 MG/DL
BUN SERPL-MCNC: 54.9 MG/DL (ref 6–20)
CALCIUM SERPL-MCNC: 9.4 MG/DL (ref 8.6–10)
CHLORIDE SERPL-SCNC: 97 MMOL/L (ref 98–107)
CREAT SERPL-MCNC: 9.36 MG/DL (ref 0.67–1.17)
DEPRECATED HCO3 PLAS-SCNC: 23 MMOL/L (ref 22–29)
DIASTOLIC BLOOD PRESSURE - MUSE: NORMAL MMHG
ERYTHROCYTE [DISTWIDTH] IN BLOOD BY AUTOMATED COUNT: 14.4 % (ref 10–15)
GFR SERPL CREATININE-BSD FRML MDRD: 7 ML/MIN/1.73M2
GLUCOSE BODY FLUID SOURCE: NORMAL
GLUCOSE FLD-MCNC: 61 MG/DL
GLUCOSE SERPL-MCNC: 78 MG/DL (ref 70–99)
HBV SURFACE AB SERPL IA-ACNC: >1000 M[IU]/ML
HBV SURFACE AB SERPL IA-ACNC: REACTIVE M[IU]/ML
HBV SURFACE AG SERPL QL IA: NONREACTIVE
HCT VFR BLD AUTO: 27.1 % (ref 40–53)
HGB BLD-MCNC: 8.5 G/DL (ref 13.3–17.7)
INTERPRETATION ECG - MUSE: NORMAL
LD BODY BODY FLUID SOURCE: NORMAL
LDH FLD L TO P-CCNC: 293 U/L
MCH RBC QN AUTO: 27.2 PG (ref 26.5–33)
MCHC RBC AUTO-ENTMCNC: 31.4 G/DL (ref 31.5–36.5)
MCV RBC AUTO: 87 FL (ref 78–100)
MYCOPHENOLATE SERPL LC/MS/MS-MCNC: <0.25 MG/L (ref 1–3.5)
MYCOPHENOLATE-G SERPL LC/MS/MS-MCNC: <6.5 MG/L (ref 30–95)
P AXIS - MUSE: 25 DEGREES
PATH REPORT.COMMENTS IMP SPEC: NORMAL
PATH REPORT.FINAL DX SPEC: NORMAL
PATH REPORT.GROSS SPEC: NORMAL
PATH REPORT.MICROSCOPIC SPEC OTHER STN: NORMAL
PATH REPORT.RELEVANT HX SPEC: NORMAL
PLATELET # BLD AUTO: 288 10E3/UL (ref 150–450)
POTASSIUM SERPL-SCNC: 4.3 MMOL/L (ref 3.4–5.3)
PR INTERVAL - MUSE: 152 MS
PROT FLD-MCNC: 4.9 G/DL
PROT SERPL-MCNC: 6.7 G/DL (ref 6.4–8.3)
PROTEIN BODY FLUID SOURCE: NORMAL
QRS DURATION - MUSE: 76 MS
QT - MUSE: 378 MS
QTC - MUSE: 472 MS
R AXIS - MUSE: 32 DEGREES
RBC # BLD AUTO: 3.13 10E6/UL (ref 4.4–5.9)
SODIUM SERPL-SCNC: 139 MMOL/L (ref 136–145)
SYSTOLIC BLOOD PRESSURE - MUSE: NORMAL MMHG
T AXIS - MUSE: 42 DEGREES
TACROLIMUS BLD-MCNC: 6.9 UG/L (ref 5–15)
TME LAST DOSE: ABNORMAL H
TME LAST DOSE: ABNORMAL H
TME LAST DOSE: NORMAL H
TME LAST DOSE: NORMAL H
VENTRICULAR RATE- MUSE: 94 BPM
WBC # BLD AUTO: 8.8 10E3/UL (ref 4–11)

## 2022-12-14 PROCEDURE — 88305 TISSUE EXAM BY PATHOLOGIST: CPT | Mod: 26 | Performed by: PATHOLOGY

## 2022-12-14 PROCEDURE — 71045 X-RAY EXAM CHEST 1 VIEW: CPT

## 2022-12-14 PROCEDURE — 85027 COMPLETE CBC AUTOMATED: CPT

## 2022-12-14 PROCEDURE — 99233 SBSQ HOSP IP/OBS HIGH 50: CPT | Mod: GC | Performed by: INTERNAL MEDICINE

## 2022-12-14 PROCEDURE — 258N000003 HC RX IP 258 OP 636: Performed by: CLINICAL NURSE SPECIALIST

## 2022-12-14 PROCEDURE — 634N000001 HC RX 634: Performed by: CLINICAL NURSE SPECIALIST

## 2022-12-14 PROCEDURE — 90937 HEMODIALYSIS REPEATED EVAL: CPT

## 2022-12-14 PROCEDURE — 86706 HEP B SURFACE ANTIBODY: CPT | Performed by: INTERNAL MEDICINE

## 2022-12-14 PROCEDURE — 200N000002 HC R&B ICU UMMC

## 2022-12-14 PROCEDURE — 80197 ASSAY OF TACROLIMUS: CPT

## 2022-12-14 PROCEDURE — 250N000013 HC RX MED GY IP 250 OP 250 PS 637: Performed by: INTERNAL MEDICINE

## 2022-12-14 PROCEDURE — 88112 CYTOPATH CELL ENHANCE TECH: CPT | Mod: 26 | Performed by: PATHOLOGY

## 2022-12-14 PROCEDURE — 5A1D70Z PERFORMANCE OF URINARY FILTRATION, INTERMITTENT, LESS THAN 6 HOURS PER DAY: ICD-10-PCS | Performed by: CLINICAL NURSE SPECIALIST

## 2022-12-14 PROCEDURE — 71045 X-RAY EXAM CHEST 1 VIEW: CPT | Mod: 26 | Performed by: STUDENT IN AN ORGANIZED HEALTH CARE EDUCATION/TRAINING PROGRAM

## 2022-12-14 PROCEDURE — 250N000013 HC RX MED GY IP 250 OP 250 PS 637

## 2022-12-14 PROCEDURE — 99233 SBSQ HOSP IP/OBS HIGH 50: CPT | Performed by: CLINICAL NURSE SPECIALIST

## 2022-12-14 PROCEDURE — 80053 COMPREHEN METABOLIC PANEL: CPT

## 2022-12-14 PROCEDURE — 250N000011 HC RX IP 250 OP 636

## 2022-12-14 PROCEDURE — 36415 COLL VENOUS BLD VENIPUNCTURE: CPT

## 2022-12-14 PROCEDURE — 87340 HEPATITIS B SURFACE AG IA: CPT | Performed by: INTERNAL MEDICINE

## 2022-12-14 RX ORDER — CALCIUM CARBONATE 500 MG/1
500 TABLET, CHEWABLE ORAL DAILY PRN
Status: DISCONTINUED | OUTPATIENT
Start: 2022-12-14 | End: 2022-12-15 | Stop reason: HOSPADM

## 2022-12-14 RX ADMIN — EPOETIN ALFA-EPBX 6000 UNITS: 10000 INJECTION, SOLUTION INTRAVENOUS; SUBCUTANEOUS at 11:14

## 2022-12-14 RX ADMIN — FLUOXETINE 40 MG: 20 CAPSULE ORAL at 07:39

## 2022-12-14 RX ADMIN — IBUPROFEN 800 MG: 800 TABLET ORAL at 14:17

## 2022-12-14 RX ADMIN — Medication: at 07:39

## 2022-12-14 RX ADMIN — CALCIUM ACETATE 1334 MG: 667 CAPSULE ORAL at 07:40

## 2022-12-14 RX ADMIN — CALCIUM CARBONATE (ANTACID) CHEW TAB 500 MG 500 MG: 500 CHEW TAB at 14:17

## 2022-12-14 RX ADMIN — IBUPROFEN 800 MG: 800 TABLET ORAL at 19:33

## 2022-12-14 RX ADMIN — PANTOPRAZOLE SODIUM 40 MG: 40 TABLET, DELAYED RELEASE ORAL at 07:40

## 2022-12-14 RX ADMIN — IBUPROFEN 800 MG: 800 TABLET ORAL at 07:39

## 2022-12-14 RX ADMIN — SODIUM CHLORIDE 250 ML: 9 INJECTION, SOLUTION INTRAVENOUS at 10:51

## 2022-12-14 RX ADMIN — Medication: at 10:52

## 2022-12-14 RX ADMIN — ONDANSETRON 4 MG: 2 INJECTION INTRAMUSCULAR; INTRAVENOUS at 01:40

## 2022-12-14 RX ADMIN — SODIUM CHLORIDE 300 ML: 9 INJECTION, SOLUTION INTRAVENOUS at 10:52

## 2022-12-14 RX ADMIN — CALCIUM ACETATE 1334 MG: 667 CAPSULE ORAL at 12:12

## 2022-12-14 RX ADMIN — CALCIUM ACETATE 1334 MG: 667 CAPSULE ORAL at 17:36

## 2022-12-14 ASSESSMENT — ACTIVITIES OF DAILY LIVING (ADL)
ADLS_ACUITY_SCORE: 18

## 2022-12-14 NOTE — PROGRESS NOTES
Cardiology ICU Progress Note  Jhoan Hoffman 27 year old  [id number]  (Not on file)    Primary care provider: Alexis Pabon  CC: Shortness of breath    Brief HPI:   Mr Pabon is a 27 year old male with a history of congenital renal dysplasia, history of 3 failed renal transplants, now with stage V renal disease requiring HD. He was transferred to [facility] for evaluation for pericardial effusion with early signs of tamponade    Mr Hoffman had an upper respiratory tract infection 2 weeks ago with the main symptoms being cough. Yesterday he started having shortness of breath on exertion. Although he was able to sleep last night he had shortness of breath on laying completely flat. Also endorsed chest pain on laying flat or taking deep breaths. Chest pain improved on sitting and leaning forward. Has never had these symptoms before. He presented at [facility] with dyspnea, tachycardia 100-110, a narrow pulse pressure (110/90), and pericardial effusion on imaging (unsure which type of imaging). Transferred to [facility] due to concerns for early signs of tamponade.    Interval Changes: Feeling good this morning, sitting up in chair, walking with therapy in the halls, good appetite. Pericardial drain put out 500 ml over the last 24 hours and 200 ml over the night. Will leave unclamped to water seal. Denies CP, sob, or cough today. This afternoon having GERD. Taking protonix already, tums available. Dialysis completed and 2000 ml removed.    Plan:  - Monitor for fever, productive cough, and WBC  - cultures pending  - HD MWF      ASSESSMENT AND PLAN BY SYSTEM:   Neurology   # Depression  # Pain  Plan:  -Continue PTA prozac  - ibuprofen and colchicine scheduled  - PRN Norcor  - lidocaine patches    Cardiovascular  # Pericardial effusion with concerns for tamponade  # Hypertension  Concerns for early signs of tamponade based on clinical picture. Will obtain stat echo and decide if cath lab needs to be activated for  pericardiocentesis. Hold home blood pressure meds. This is likely viral pericarditis with pericardial effusion. Will obtain ESR/CRP. Will likely need to start treatment for pericardial effusion since clinical picture is consistent with it. However since pt has known anemia, may get pericardiocentesis tonight and platelet value is unknown, will hold off on starting high dose ASA immediately.  -ECHO with large circumferential pericardial effusion without high risk sign of tamponade. Effusion is 3.2 cm deep near the mid-inferolateral segments. RV pressure mildly elevated at 8.  -Pericardial fluid: Red, Cloudy, 380 cells  Trig 80  pericardial fluid predominantly neutrophils 86%, Lymphocytes 10%  Drain output last 24: 990 ml  Drain output last 8: 110 ml  Plan:  - Hold PTA Coreg  - Hold PTA Norvasc  - colchicine and ibuprofen  - pericardiac drain to water seal  - send fluid for cultures    Pulmonary  # Obstructive sleep apnea  Plan:  -O2 if needed    Gastrointestinal, Nutrition  -Renal diet  -last BM: 12/13  Plan:  -Bowel regimen   -GI Prophylaxis: PPI; Protonix 40 mg daily    Renal, Electrolytes  # ESRD  # 3 failed kidney transplants  # dialysis dependent- M-W-F  # old peritoneal dialysis site  Per transplant nephrology will continue tacrolimus, will not restart Cellcept or bactrim. Will continue colchicine and ibuprofen  -Nephrology consult to clarify post transplant medications and for assistance with HD  -Tacrolimus 2 mg daily  - tacrolimus level, will start tac   -I/O goal no HD today    Infectious Disease  # no acitve issues  -Monitor for signs of infection    Hematology  # Anemia of critical illness  Required transfusion a few days ago  HGB 8.5 (7.6),   (215)  -Monitor Hb    Endocrinology  No acute issues    Integumentary:  - No skin issues    Clinically Significant Risk Factors        # Hyperkalemia: Highest K = 5.4 mmol/L in last 2 days, will monitor as appropriate    # Hypercalcemia: corrected calcium is  ">10.1, will monitor as appropriate   # Anion Gap Metabolic Acidosis: Highest Anion Gap = 22 mmol/L in last 2 days, will monitor and treat as appropriate  # Hypoalbuminemia: Lowest albumin = 3.2 g/dL at 12/13/2022  8:12 AM, will monitor as appropriate            # Obesity: Estimated body mass index is 30.6 kg/m  as calculated from the following:    Height as of this encounter: 1.676 m (5' 6\").    Weight as of this encounter: 86 kg (189 lb 9.5 oz)., PRESENT ON ADMISSION                ICU Cares:  CXR: as needed  Fluids/Feeds: renal  DVT Prophylaxis: SCD for now pending plan for pericardiocentesis, ambulating with cardiac rehab  GI Prophylaxis: Protonix  Bowel Regimen: senna  Anticipated Floor Transfer: NA    Lines/Tubes/Drains:  L AV fistula     Plan of care to be discussed with Dr. Cruz who agrees with the above assessment and plan.    JAJA Craven DNP CNP  [facility] Cardiology      Objective Data:     /89 (BP Location: Right arm)   Pulse 96   Temp 97.8  F (36.6  C) (Tympanic)   Resp 26   Ht 1.676 m (5' 6\")   Wt 86 kg (189 lb 9.5 oz)   SpO2 95%   BMI 30.60 kg/m      I/O:    Intake/Output Summary (Last 24 hours) at 12/14/2022 1518  Last data filed at 12/14/2022 1500  Gross per 24 hour   Intake 1090 ml   Output 2485 ml   Net -1395 ml         Labs:  Recent Labs   Lab 12/14/22  0650 12/13/22  0829 12/13/22  0812 12/12/22  1150 12/11/22  1757     --  139 139 141   POTASSIUM 4.3  --  4.5 5.4* 5.2   CHLORIDE 97*  --  97* 96* 98   CO2 23  --  23 21* 23   ANIONGAP 19*  --  19* 22* 20*   GLC 78 77 78 93 90   BUN 54.9*  --  36.9* 61.7* 47.4*   CR 9.36*  --  7.74* 11.20* 9.86*   GFRESTIMATED 7*  --  9* 6* 7*   LIZZ 9.4  --  9.6 9.8 9.9   PHOS  --   --  7.3*  --   --      Recent Labs   Lab 12/14/22  0650 12/13/22  0812 12/12/22 2021 12/12/22  1150   WBC 8.8 7.8 9.5 9.7   RBC 3.13* 3.12* 3.14* 2.81*   HGB 8.5* 8.3* 8.5* 7.6*   HCT 27.1* 27.0* 27.1* 24.8*   MCV 87 87 86 88   MCH 27.2 26.6 27.1 27.0   MCHC " 31.4* 30.7* 31.4* 30.6*   RDW 14.4 14.5 14.3 14.2    267 254 215     Recent Labs   Lab 22  1757   INR 1.46*     No lab results found in last 7 days.  Arterial Blood Gas No lab results found in last 7 days.    Physical exam:  General: In bed, in NAD  HEENT: PERRL, no scleral icterus or injection  CARDIAC: tachycardic, distant heart sounds, pericardial friction rub  RESP:  CTAB, no wheezes, rhonchi or crackles appreciated.  GI: soft, BS hypoactive  : No Ordoñez  EXTREMITIES: No LE edema, pulses 2+.c/d/i. No bruits appreciated.   SKIN: No acute lesions appreciated  NEURO: A&Ox3    Imaging/procedure results:  Echocardiogram Limited  [id number]  [id number]  [id number]  [id number]-CLAUDE^MAGGIE^JO                                                                       Version 2     [facility],[place]  Echocardiography Laboratory  [address] [place], [place] [address]     Name: RADHA OLEARY  MRN: [id number]  : 1995  Study Date: 2022 01:29 PM  Age: 27 yrs  Gender: Male  Patient [facility]: [facility]  Reason For Study: Pericardial Effusion  Ordering Physician: MAGGIE BERMEO  Referring Physician: GLENNY PEREZ  Performed By: Shadia Wellington RDCS     BSA: 1.9 m2  Height: 66 in  Weight: 188 lb  BP: 129/85 mmHg  ______________________________________________________________________________  Procedure  Limited Echocardiogram with portions of two-dimensional, color and spectral  Doppler performed.  ______________________________________________________________________________  Interpretation Summary  Left ventricular function is normal.The ejection fraction is 55-60%.  Global right ventricular function is normal.  Small and organizing pericardial effusion is present with a small amount of  serous component present inferolaterally. There is a septal bounce present.  The estimated mean right atrial pressure is normal.     Overall, there are some early features of effusive  constrictive pericarditis,  new compared to study from 22.     ______________________________________________________________________________  Left Ventricle  Left ventricular size is normal. Left ventricular function is normal.The  ejection fraction is 55-60%.     Right Ventricle  The right ventricle is normal size. Global right ventricular function is  normal.     Vessels  The inferior vena cava was normal in size with preserved respiratory  variability. Estimated mean right atrial pressure is normal.     Pericardium  Small and organizing pericardial effusion is present with a small amount of  serous component present inferolaterally. There is a septal bounce present.  Overall, features of effusive constrictive pericarditis present.     ______________________________________________________________________________  MMode/2D Measurements & Calculations  LVOT diam: 2.2 cm  LVOT area: 3.8 cm2     Doppler Measurements & Calculations  LV V1 max P.4 mmHg  LV V1 max: 126.0 cm/sec  LV V1 VTI: 18.8 cm  SV(LVOT): 71.5 ml  SI(LVOT): 36.7 ml/m2     ______________________________________________________________________________  Report approved by: Yahir Leone 2022 02:47 PM        XR Chest Port 1 View  Narrative: Exam: XR CHEST PORT 1 VIEW, 2022 1:01 AM    Indication: chest tube s/p pericardiocentesis    Comparison: 2020    Findings:   Normal course of the trachea. The cardiac silhouette is enlarged,  pericardial drain in place. Pulmonary vasculature is mildly  indistinct. Left basilar opacity, which obscures the left  hemidiaphragm and an opacity within the medial right lung base. Left  costophrenic angle is not completely visualized, although there is a  probable small left-sided left pleural effusion. No right pleural  effusion. No pneumothorax. Upper abdomen within normal limits. No  acute osseous abnormality.  Impression: Impression:  1. Cardiomegaly with pericardial drain.  2. Left  basilar opacity obscuring the left hemidiaphragm, atelectasis  versus consolidation. Probable small left-sided pleural effusion  although left costophrenic angle was excluded from the field-of-view.  3. Right basilar opacity, atelectasis versus pneumonitis.  4. Pulmonary vascular congestion.    I have personally reviewed the examination and initial interpretation  and I agree with the findings.    ALYCE HENDERSON MD         SYSTEM ID:  [id number]  Cardiac Catheterization  Pericardicentesis via subxiphoid approach (800 mL of serosanguinous fluid   drained)  Pigtail catheter secured and sutured in place

## 2022-12-14 NOTE — PROGRESS NOTES
Jhoan Hoffman is a 27 year old male with a pmhx sig for congenital renal dysplasia s/p 3 failed renal transplants with ckd on HD but makes some urine, depression and obesity who was presented to [facility] 12/11/2022 with uri symptoms a few weeks ago and 1 day sob/fried worsened by laying flat coughing or taking deep breaths, as well as chest pain (improved with leaning forward). ON presentation he was tachy and had a narrow pulse pressure (110/90) with imaging concerning for tamponade --> tx to [facility] on same date --> TTE with large pericardial effusion no tamponade --> pain reported this am. Pts mother reports recent drop in hgb as an out pt, no signs of overt bleeding, per her report had a transfusion last week and there were plans to move forward with hematologic work up as an out pt. Note pt has soft rub on exam at 3rd intercostal space while leaning forward. Pericardial drain on 12/12.      Events since last note: pericardial drain remains in place with 180cc out since midnight, resolution of pain +BM + CRRT     I personally examined the patient, reviewed vital signs, medications, laboratory values, imaging studies, and consults from the past 24 hours. This patient was seen as part of a shared visit type. Please link our documentation The care plan was developed with the fellow/KARLIE and I agree with the findings and plan out lined below with the following additions or exceptions.      Major points of decision making     -will check in afternoon, if drain output decreases to ~25cc in 12 hrs can consider clamp trial overnight, but for now not ready.   -clamp trial = 12 hrs with clamping and repeat tte   -will need crp and tte as an out pt for follow up  -follow up pericardial effusion cultures  -12/12 echo looks consistent with effusive constrictive (likely inflammatory cells still in pericardium) with lots of echogenic material in pericardial space, continue treatment with colchine as tolerated (nausea overnight),  continue  ibuprofen --> for at least 2 weeks with slow taper (~200/week) if crp is low at the time and remains low on each week titration  -hgb stable  -tacro held last night for elevated level  Ok to transfer to floor     I have personally spent a total of 47 minutes providing care excluding procedure, teaching and ECMO management time for Jhoan Hoffman.        Brayan Cruz MD  Critical Care Cardiology  [phone] (c)      December 14, 2022

## 2022-12-14 NOTE — PROGRESS NOTES
Major Shift Events:   HD today and took 2L off. Pt up to the chair multiple times. Denies pain. Tums given x1 for GERD symptoms.     Plan: Clamp pericardial drain if CT output 25mls or less at 2200. Move to stepdown unit.    For vital signs and complete assessments, please see documentation flowsheets.

## 2022-12-14 NOTE — PLAN OF CARE
Major Shift Events:  one small emesis overnight-zofran given, waiting on step down bed, 190 ml out of pericardial drain  Plan: move to stepdown unit  For vital signs and complete assessments, please see documentation flowsheets.      Goal Outcome Evaluation:      Plan of Care Reviewed With: patient    Overall Patient Progress: improvingOverall Patient Progress: improving    Outcome Evaluation: move to stepdown unit today

## 2022-12-14 NOTE — PROGRESS NOTES
Nephrology Progress Note  12/14/2022       Jhoan Hoffman is a 27 yom with hx of congenital renal dysplasia with 3x failed renal tx, now on HD since early 2020.  Tx to [facility] for evaluation of pericardial effusion with early tamponade.  Had upper resp tract infection ~2 weeks ago with worsening SOB since.  Presented to [facility] where ECHO showed pericardial effusion, sent to [facility] to manage.  Nephrology consulted for management of dialysis while admitted.      Interval History :   Mr Hoffman had day off of HD yesterday, seen on run this am and is doing well.  Pulling 1-2L of UF as long as BP's remain stable.  Feels much better after pericardial tap 12/12, long term would strongly recommend renal transplant eval but he is not currently interested.        Assessment & Recommendations:   ESRD-Due to congenital renal dysplasia, had kidney tx x3 in 1995, 2011 and 2012 which lasted until early 2020, has been on HD since.  Follow with [facility] under care of Dr Severino, getting rounding report for details.  Running HD today on his usual MWF schedule, will try to pull 2-3L after pericardial tap which should improve his hemodynamics.       -HD on MWF schedule, plan for day off today and run tomorrow.                 -Access is L arm fistula, +/+ bruit/thrill.                  -Planning on stopping bactrim and cellcept, remaining on tacro 1.5mg BID with goal level 4-6 as he still does make some UOP     -Encouraged him to reconsider renal transplant workup (not wanting to pursue another after 3x failed although initial kidney lasted ~14 years).        Hx renal tx-Discussed with transplant neph and with pt, planning to continue tacro.                  -Continue tacro 1.5mg daily                -Stopping cellcept and bactrim.       Pericardial effusion-Not consistent with uremic pericarditis as he has not missed any runs and BUN was reasonable after 2 days off of HD.  Had pericardial tap with ~1L of  "drainage and pigtail left in.         Volume-He is unsure of his EDW, has stable BP's despite pericardial effusion, running after pericardicentesis and will try to pull 1-2L on run.       Electrolytes-K 4.3, bicarb 23, Na 139.         BMD-Ca 9.4, Mg and Phos not checked today.       Anemia-Hgb 8.5, acute management per team, gets iron weekly at HD but will hold with ? ID issue causing pericarditis, will give 6k epo with runs.       Nutrition-Taking PO, deferred.      Time spent: 30 minutes on this date of encounter for chart review, physical exam, medical decision making and co-ordination of care.      Seen and discussed with Dr Campa     Recommendations were communicated to primary team via verbal communication.        JAJA Miranda CNS  Clinical Nurse Specialist  [phone]      Review of Systems:   I reviewed the following systems:  Gen: No fevers or chills  CV: No CP at rest  Resp: No SOB at rest  GI: No N/V      Physical Exam:   I/O last 3 completed shifts:  In: 830 [P.O.:830]  Out: 580 [Emesis/NG output:10; Chest Tube:570]   /77 (BP Location: Right arm)   Pulse 112   Temp (!) 96.6  F (35.9  C) (Oral)   Resp 26   Ht 1.676 m (5' 6\")   Wt 86 kg (189 lb 9.5 oz)   SpO2 96%   BMI 30.60 kg/m       GENERAL APPEARANCE: alert, in no distress.    EYES: No scleral icterus  NECK:  + JVD  Pulmonary: lungs clear to auscultation with equal breath sounds bilaterally, no clubbing or cyanosis  CV: Regular rhythm, normal rate, no rub   - Edema +1 generalized.    GI: soft, nontender, normal bowel sounds  MS: no evidence of inflammation in joints, no muscle tenderness  : No Ordoñez  SKIN: no rash, warm, dry  NEURO: mentation intact and speech normal  Access: L arm fistula, +/+ bruit/thrill.      Labs:   All labs reviewed by me  Electrolytes/Renal -   Recent Labs   Lab Test 12/14/22  0650 12/13/22  0829 12/13/22  0812 12/12/22  1150 02/16/21  0954 12/21/20  0610 12/20/20  0701 12/19/20  0726 12/18/20  0614 "     --  139 139   < > 137 134   < > 138   POTASSIUM 4.3  --  4.5 5.4*   < > 3.8 3.2*   < > 4.1   CHLORIDE 97*  --  97* 96*   < > 105 102   < > 107   CO2 23  --  23 21*   < > 18* 21   < > 13*   BUN 54.9*  --  36.9* 61.7*   < > 66* 42*   < > 103*   CR 9.36*  --  7.74* 11.20*   < > 8.09* 6.21*   < > 9.98*   GLC 78 77 78 93   < > 87 97   < > 109*   LIZZ 9.4  --  9.6 9.8   < > 8.6 8.4*   < > 8.1*   MAG  --   --   --   --   --   --  1.7  --  2.0   PHOS  --   --  7.3*  --   --  8.4* 7.9*  --  12.1*    < > = values in this interval not displayed.       CBC -   Recent Labs   Lab Test 12/14/22 0650 12/13/22  0812 12/12/22 2021   WBC 8.8 7.8 9.5   HGB 8.5* 8.3* 8.5*    267 254       LFTs -   Recent Labs   Lab Test 12/14/22 0650 12/13/22  0812 12/12/22  1150   ALKPHOS 87 88 95   BILITOTAL 0.2 0.3 0.4   ALT <5* <5* <5*   AST 11 10 10   PROTTOTAL 6.7 7.0 7.0   ALBUMIN 3.3* 3.2* 3.3*       Iron Panel -   Recent Labs   Lab Test 12/19/20  0726   IRON 53   IRONSAT 24   CLINTON 143           Current Medications:    calcium acetate  1,334 mg Oral TID w/meals     [START ON 12/15/2022] colchicine  0.6 mg Oral Once per day on Mon Thu     FLUoxetine  40 mg Oral Daily     ibuprofen  800 mg Oral TID     lidocaine  1 patch Transdermal Q24h    And     lidocaine   Transdermal Q8H ISAAC     lidocaine   Transdermal Daily     multivitamin RENAL   Oral Daily     pantoprazole  40 mg Oral QAM AC     senna-docusate  1 tablet Oral BID    Or     senna-docusate  2 tablet Oral BID     sodium chloride (PF)  3 mL Intracatheter Q8H     [Held by provider] tacrolimus  2 mg Oral Daily       - MEDICATION INSTRUCTIONS -       - MEDICATION INSTRUCTIONS -

## 2022-12-14 NOTE — PROGRESS NOTES
HEMODIALYSIS TREATMENT NOTE    Date: 12/14/2022  Time: 2:01 PM    Data:  Pre Wt: 86    Desired Wt: 84  kg   Post Wt:  84  Weight change:  2.0 kg  Ultrafiltration - Post Run Net Total Removed (mL): 2000 mL  Vascular Access Status: patent  Dialyzer Rinse: Streaked  Total Blood Volume Processed: 72.4 L Liters  Total Dialysis (Treatment) Time: 3.5 Hours  K3Ca2.25  -450   AVF used   15g x 2 w/o complications.     Lab:   Yes    Interventions:  Hep B status obtained. Pending results. Pt tolerated tx of 3.5Hr and 2.0L UF pull. epo admin r/t HD. B/t+. Hemostasis obtained within 10 minutes post tx. Bandages changed post tx. Pt c/o cont' heart burn non related to iHD.     Assessment:  A&Ox4. EQ-LYP-iivuk- asymptomatic. 20rpm. Lung sounds diminished ant & post BUL/bLL. No edema present in ble. Pt denies complaints since last tx r/t iHD. Pt denies complaints of pain. B/t+  Plan:    Follow-up w renal and Renate RN

## 2022-12-15 ENCOUNTER — APPOINTMENT (OUTPATIENT)
Dept: CARDIOLOGY | Facility: CLINIC | Age: 27
DRG: 314 | End: 2022-12-15
Attending: STUDENT IN AN ORGANIZED HEALTH CARE EDUCATION/TRAINING PROGRAM
Payer: MEDICARE

## 2022-12-15 ENCOUNTER — APPOINTMENT (OUTPATIENT)
Dept: GENERAL RADIOLOGY | Facility: CLINIC | Age: 27
DRG: 314 | End: 2022-12-15
Attending: STUDENT IN AN ORGANIZED HEALTH CARE EDUCATION/TRAINING PROGRAM
Payer: MEDICARE

## 2022-12-15 VITALS
TEMPERATURE: 98.4 F | RESPIRATION RATE: 26 BRPM | BODY MASS INDEX: 29.41 KG/M2 | DIASTOLIC BLOOD PRESSURE: 95 MMHG | WEIGHT: 183 LBS | HEIGHT: 66 IN | HEART RATE: 100 BPM | OXYGEN SATURATION: 98 % | SYSTOLIC BLOOD PRESSURE: 139 MMHG

## 2022-12-15 LAB
ALBUMIN SERPL BCG-MCNC: 3.4 G/DL (ref 3.5–5.2)
ALP SERPL-CCNC: 90 U/L (ref 40–129)
ALT SERPL W P-5'-P-CCNC: <5 U/L (ref 10–50)
ANION GAP SERPL CALCULATED.3IONS-SCNC: 15 MMOL/L (ref 7–15)
AST SERPL W P-5'-P-CCNC: 13 U/L (ref 10–50)
BILIRUB SERPL-MCNC: 0.3 MG/DL
BUN SERPL-MCNC: 27 MG/DL (ref 6–20)
CALCIUM SERPL-MCNC: 9.6 MG/DL (ref 8.6–10)
CHLORIDE SERPL-SCNC: 90 MMOL/L (ref 98–107)
CREAT SERPL-MCNC: 6.23 MG/DL (ref 0.67–1.17)
DEPRECATED HCO3 PLAS-SCNC: 29 MMOL/L (ref 22–29)
ERYTHROCYTE [DISTWIDTH] IN BLOOD BY AUTOMATED COUNT: 14.3 % (ref 10–15)
GFR SERPL CREATININE-BSD FRML MDRD: 12 ML/MIN/1.73M2
GLUCOSE SERPL-MCNC: 84 MG/DL (ref 70–99)
HCT VFR BLD AUTO: 28.5 % (ref 40–53)
HGB BLD-MCNC: 9 G/DL (ref 13.3–17.7)
LVEF ECHO: NORMAL
MCH RBC QN AUTO: 26.8 PG (ref 26.5–33)
MCHC RBC AUTO-ENTMCNC: 31.6 G/DL (ref 31.5–36.5)
MCV RBC AUTO: 85 FL (ref 78–100)
PHOSPHATE SERPL-MCNC: 4.9 MG/DL (ref 2.5–4.5)
PLATELET # BLD AUTO: 260 10E3/UL (ref 150–450)
POTASSIUM SERPL-SCNC: 3.7 MMOL/L (ref 3.4–5.3)
PROT SERPL-MCNC: 6.8 G/DL (ref 6.4–8.3)
RBC # BLD AUTO: 3.36 10E6/UL (ref 4.4–5.9)
SODIUM SERPL-SCNC: 134 MMOL/L (ref 136–145)
WBC # BLD AUTO: 9 10E3/UL (ref 4–11)

## 2022-12-15 PROCEDURE — 93325 DOPPLER ECHO COLOR FLOW MAPG: CPT | Mod: 26 | Performed by: INTERNAL MEDICINE

## 2022-12-15 PROCEDURE — 250N000013 HC RX MED GY IP 250 OP 250 PS 637: Performed by: INTERNAL MEDICINE

## 2022-12-15 PROCEDURE — 250N000013 HC RX MED GY IP 250 OP 250 PS 637

## 2022-12-15 PROCEDURE — 71045 X-RAY EXAM CHEST 1 VIEW: CPT | Mod: 26 | Performed by: RADIOLOGY

## 2022-12-15 PROCEDURE — 93325 DOPPLER ECHO COLOR FLOW MAPG: CPT

## 2022-12-15 PROCEDURE — 36415 COLL VENOUS BLD VENIPUNCTURE: CPT

## 2022-12-15 PROCEDURE — 250N000012 HC RX MED GY IP 250 OP 636 PS 637: Performed by: INTERNAL MEDICINE

## 2022-12-15 PROCEDURE — 93308 TTE F-UP OR LMTD: CPT | Mod: 26 | Performed by: INTERNAL MEDICINE

## 2022-12-15 PROCEDURE — 93321 DOPPLER ECHO F-UP/LMTD STD: CPT

## 2022-12-15 PROCEDURE — 99233 SBSQ HOSP IP/OBS HIGH 50: CPT | Performed by: CLINICAL NURSE SPECIALIST

## 2022-12-15 PROCEDURE — 80053 COMPREHEN METABOLIC PANEL: CPT

## 2022-12-15 PROCEDURE — 71045 X-RAY EXAM CHEST 1 VIEW: CPT

## 2022-12-15 PROCEDURE — 93321 DOPPLER ECHO F-UP/LMTD STD: CPT | Mod: 26 | Performed by: INTERNAL MEDICINE

## 2022-12-15 PROCEDURE — 85014 HEMATOCRIT: CPT

## 2022-12-15 PROCEDURE — 99239 HOSP IP/OBS DSCHRG MGMT >30: CPT | Mod: GC | Performed by: INTERNAL MEDICINE

## 2022-12-15 PROCEDURE — 84100 ASSAY OF PHOSPHORUS: CPT | Performed by: STUDENT IN AN ORGANIZED HEALTH CARE EDUCATION/TRAINING PROGRAM

## 2022-12-15 RX ORDER — COLCHICINE 0.6 MG/1
0.6 TABLET ORAL
Qty: 26 TABLET | Refills: 0 | Status: SHIPPED | OUTPATIENT
Start: 2022-12-19

## 2022-12-15 RX ORDER — AMLODIPINE BESYLATE 2.5 MG/1
5 TABLET ORAL DAILY
Status: DISCONTINUED | OUTPATIENT
Start: 2022-12-15 | End: 2022-12-15 | Stop reason: HOSPADM

## 2022-12-15 RX ORDER — IBUPROFEN 800 MG/1
800 TABLET, FILM COATED ORAL 3 TIMES DAILY
Qty: 90 TABLET | Refills: 0 | Status: SHIPPED | OUTPATIENT
Start: 2022-12-15 | End: 2023-01-14

## 2022-12-15 RX ADMIN — IBUPROFEN 800 MG: 800 TABLET ORAL at 08:12

## 2022-12-15 RX ADMIN — CALCIUM ACETATE 1334 MG: 667 CAPSULE ORAL at 08:13

## 2022-12-15 RX ADMIN — Medication: at 08:09

## 2022-12-15 RX ADMIN — AMLODIPINE BESYLATE 5 MG: 2.5 TABLET ORAL at 08:31

## 2022-12-15 RX ADMIN — IBUPROFEN 800 MG: 800 TABLET ORAL at 14:06

## 2022-12-15 RX ADMIN — FLUOXETINE 40 MG: 20 CAPSULE ORAL at 08:12

## 2022-12-15 RX ADMIN — TACROLIMUS 1.5 MG: 1 CAPSULE ORAL at 08:09

## 2022-12-15 RX ADMIN — COLCHICINE 0.6 MG: 0.6 TABLET, FILM COATED ORAL at 08:13

## 2022-12-15 RX ADMIN — PANTOPRAZOLE SODIUM 40 MG: 40 TABLET, DELAYED RELEASE ORAL at 08:13

## 2022-12-15 RX ADMIN — CALCIUM ACETATE 1334 MG: 667 CAPSULE ORAL at 11:41

## 2022-12-15 RX ADMIN — CALCIUM CARBONATE (ANTACID) CHEW TAB 500 MG 500 MG: 500 CHEW TAB at 09:11

## 2022-12-15 ASSESSMENT — ACTIVITIES OF DAILY LIVING (ADL)
ADLS_ACUITY_SCORE: 18

## 2022-12-15 NOTE — PROGRESS NOTES
SPIRITUAL HEALTH SERVICES  SPIRITUAL ASSESSMENT Progress Note  [facility] ([place]) 4E     REFERRAL SOURCE: Length of Stay     Pt and family were present at time of visit.  No needs at this time but  oriented them to the role of SHS in the hospital and they said they would reach out if they wanted support.    PLAN: SHS will remain available     Joan Wallace  Pager: [phone]

## 2022-12-15 NOTE — DISCHARGE SUMMARY
Dialysis Discharge Summary Brief    [facility]  Division of Nephrology  Nephrology Discharge Dialysis Orders  Ph: [phone]  Fax: [phone]    Jhoan Hoffman  MRN: [id number]  YOB: 1995    Dialysis Unit: [facility]  Primary Nephrologist: Dr Severino    Date of Admission: 12/11/2022  Date of Discharge: 12/15/2022  Discharge Diagnosis: S/P pericardiocentesis     Mr Hoffman was admitted 12/11 with pericardial effusion with tamponade physiology, was brought to cath lab and had ~1L drained and pigtail left in which is now pulled.  Thought to be due to viral myocarditis, no signs pointing to uremic issue.      No fundamental changes in HD plan, ran on his usual MWF while here.  Planning to discharge today and return to his unit tomorrow on his usual MWF schedule.  I strongly encouraged him to consider another kidney transplant as at his age it would have a large mortality benefit but he currently is not interested.  If there are any questions please do not hesitate to page me at the number below.        JAJA Miranda CNS  Clinical Nurse Specialist  [phone]

## 2022-12-15 NOTE — PLAN OF CARE
Occupational Therapy Discharge Summary    Reason for therapy discharge:    Discharged to home with outpatient therapy.    Progress towards therapy goal(s). See goals on Care Plan in [facility] electronic health record for goal details.  Goals partially met.  Barriers to achieving goals:   discharge from facility.    Therapy recommendation(s):    Continued therapy is recommended.  Rationale/Recommendations:  Recommend OP CR to progress cardiopulmonary health.

## 2022-12-15 NOTE — PROGRESS NOTES
Discharged to: home at 1430  Belongings: Sent with pt   AVS (After Visit Summary) discussed with: family and patient

## 2022-12-15 NOTE — DISCHARGE SUMMARY
[facility]    Cardiology Discharge Summary- CSI         Date of Admission:  12/11/2022  Date of Discharge:  12/15/2022  Discharging Provider: Dr. Brayan Cruz MD    Discharge Diagnoses   # Depression  # Pericardial effusion without features of tamponade  # Hypertension  # Obstructive sleep apnea  # GERD  # ESRD  # 3 failed kidney transplants  # dialysis dependent- M-W-F  # old peritoneal dialysis site  #Anemia of critical illness    Follow-ups Needed After Discharge   Unresulted Labs Ordered in the Past 30 Days of this Admission     Date and Time Order Name Status Description    12/12/2022  1:16 PM Pericardial Fluid Aerobic Bacterial Culture Routine Preliminary     12/12/2022  1:16 PM Viral Culture Non-respiratory Preliminary     12/12/2022  1:16 PM Fungal or Yeast Culture Routine Preliminary     12/12/2022  1:16 PM Anaerobic Bacterial Culture Routine Preliminary       These results will be followed up by Cardiology and PCP    Discharge Disposition   Discharged to home  Condition at discharge: Stable    Hospital Course   Mr Pabon is a 27 year old male with a history of congenital renal dysplasia, history of 3 failed renal transplants, now with stage V renal disease requiring HD, HTN, depression, CYNDEE who presented on 12/11 for evaluation of a large pericardial effusion c/f tamponade.     The patient reportedly had URI symptoms 2 weeks prior to admission, with the main symptom being a cough. The day prior to admission, he started having worsening CABEZAS, orthopnea. He also had chest pains worse with taking deep breaths, improved with leaning forward. He initially presented to [facility] for evaluation of these. VS with tachycardia and a narrow pulse pressure (110/90). Chest imaging there (not echo) demonstrated findings of a large pericardial effusion. Due to concern for tamponade, he was transferred to the [facility] on 12/11.     On admission, echocardiogram  demonstrated LVEF 60-65%, circumfrential pericardial effusion max depth 3.2 cm without features of cardiac tamponade. Otherwise remained HDS. Nephrology consulted for IS mgmt and HD needs. He underwent a pericardiocentesis on 12/12, 800 ml serosanginous fluid removed with further drainage overnight. He was started on colchicine 0.6 mg biweekly and high doses of ibuprofen. Serial echocardiograms demonstrated decreasing size of pericardial effusion. 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 He will discharge on ibuprofen 800 mg three times daily and colchicine 0.6 mg biweekly for approximately 3 months. He should have a CRP, TTE, and Cardiology follow up in 2 weeks. Further tapering of ibuprofen can be decided at that time. Coreg was discontinued given recent significant pericardial effusion and concern for tamponade.     Nephrology recommended continuing tacro. Further discussions regarding retransplantation were discussed, but the patient was not interested at this time due to prior complications. Recommended to take tacro 1.5 mg BID and continue follow up with his Nephrologist at his HD center. They recommended removing cellcept and bactrim from med list, as patient was not taking and is not required.     Hospital Problems  # Depression  - Continue PTA prozac     # Pericardial effusion without features of tamponade  # Hypertension  Concerns for early signs of tamponade based on clinical picture. Will obtain stat echo and decide if cath lab needs to be activated for pericardiocentesis. Hold home blood pressure meds. This is likely viral pericarditis with pericardial effusion. Will obtain ESR/CRP. Will likely need to start treatment for pericardial effusion since clinical picture is consistent with it. However since pt has known anemia, may get pericardiocentesis tonight and platelet value is unknown, will hold off on starting high dose ASA immediately.  -RF mildly elevated and  CRP elevated on admission.   -ECHO with large circumferential pericardial effusion without high risk sign of tamponade. Effusion is 3.2 cm deep near the mid-inferolateral segments. RV pressure mildly elevated at 8.  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 Consider restarting as outpatient   - f/u drain cultures  - Cardiology follow up, TTE, CRP as outpatient in 2 weeks. Ibuprofen taper can be decided at that time.      Pulmonary  # Obstructive sleep apnea     # GERD  - Resume home PPI    # ESRD  # 3 failed kidney transplants  # dialysis dependent- M-W-F  # old peritoneal dialysis site  Per transplant nephrology will continue tacrolimus, will not restart Cellcept or bactrim. Will continue colchicine and ibuprofen  - Tacrolimus 1.5 mg BID per Nephro  - Continue Nephrology outpatient follow up at regular HD MWF     #Anemia of critical illness  Anemia stable during admission    Consultations This Hospital Stay   CARDIAC REHAB IP CONSULT  NEPHROLOGY ICU IP CONSULT  SMOKING CESSATION PROGRAM IP CONSULT    Code Status   Full Code      Raúl Mora MD  [facility]  ______________________________________________________________________    Physical Exam   Vital Signs: Temp: 98.4  F (36.9  C) Temp src: Axillary BP: (!) 139/95 Pulse: 100   Resp: 26 SpO2: 98 %      Weight: 183 lbs 0 oz  General: In bed, in NAD  HEENT: PERRL, no scleral icterus or injection  CARDIAC: RRR, no murmurs  RESP:  CTAB, no wheezes, rhonchi or crackles appreciated.  GI: soft, BS hypoactive  : No Ordoñez  EXTREMITIES: No LE edema, pulses 2+.c/d/i. No bruits appreciated.   SKIN: No acute lesions appreciated  NEURO: A&Ox3       Primary Care Physician   Alexis  Pepper    Discharge Orders      CRP inflammation     Adult Cardiology Eval  Referral      Reason for your hospital stay    You had a large pericardial effusion. This was drained with a pericardiocentesis which significantly reduced the size of the effusion. You will need cardiology follow up and a repeat echocardiogram to follow this.     Activity    Your activity upon discharge: activity as tolerated     Discharge Instructions    Follow up with cardiology with labs and echocardiogram     Adult [facility] Follow-up and recommended labs and tests    Please follow up with your PCP in 2 weeks  Follow up with Cardiology here in 2 weeks  Continue regular follow up with your HD center/Nephrology    Appointments on [place] and/or [facility] (with [facility] or [facility] provider or service). Call [phone] if you haven't heard regarding these appointments within 7 days of discharge.     Follow Up and recommended labs and tests    Recommend transthoracic echocardiogram and CRP blood test prior to your 2 week appointment with cardiology     Echocardiogram Complete    Administration of IV contrast will be tailored to this examination per the appropriate written protocol listed in the Echocardiography department Protocol Book, or by the supervising Cardiologist. This may result in an order change.    Use of contrast is at the discretion of the supervising Cardiologist.    Follow up pericardial effusion     Diet    Follow this diet upon discharge: Renal (dialysis)       Significant Results and Procedures   Results for orders placed or performed during the hospital encounter of 12/11/22   XR Chest Port 1 View    Narrative    Exam: XR CHEST PORT 1 VIEW, 12/13/2022 1:01 AM    Indication: chest tube s/p pericardiocentesis    Comparison: 12/18/2020    Findings:   Normal course of the trachea. The cardiac silhouette is enlarged,  pericardial drain in place. Pulmonary vasculature is mildly  indistinct. Left basilar opacity,  which obscures the left  hemidiaphragm and an opacity within the medial right lung base. Left  costophrenic angle is not completely visualized, although there is a  probable small left-sided left pleural effusion. No right pleural  effusion. No pneumothorax. Upper abdomen within normal limits. No  acute osseous abnormality.      Impression    Impression:  1. Cardiomegaly with pericardial drain.  2. Left basilar opacity obscuring the left hemidiaphragm, atelectasis  versus consolidation. Probable small left-sided pleural effusion  although left costophrenic angle was excluded from the field-of-view.  3. Right basilar opacity, atelectasis versus pneumonitis.  4. Pulmonary vascular congestion.    I have personally reviewed the examination and initial interpretation  and I agree with the findings.    ALYCE HENDERSON MD         SYSTEM ID:  [id number]   XR Chest Port 1 View    Narrative    Exam: XR CHEST PORT 1 VIEW, 12/14/2022 6:14 AM    Indication: chest tube s/p pericardiocentesis    Comparison: Chest x-ray 12/13/2022    Findings: Frontal chest x-ray. Decrease in enlarged cardiomediastinal  silhouette with mediastinal/pericardial drain. Persistent left basilar  opacities involving the diaphragm. No discernible pneumothorax.  Possible small left pleural effusion. Slight decrease in streaky  pulmonary opacities.      Impression    Impression:     1. Decrease in enlarged cardiomediastinal silhouette with pericardial  drain.  2. Persistent retrocardiac opacities representing  atelectasis/consolidation.  3. Possible small left pleural effusion.  4. Decrease in the streaky perihilar and basilar opacities  representing atelectasis and/or pulmonary edema.    DEBBIE LANDIS MD         SYSTEM ID:  [id number]   XR Chest Port 1 View    Narrative    Exam: XR CHEST PORT 1 VIEW, 12/15/2022 1:02 AM    Indication: chest tube s/p pericardiocentesis    Comparison: Chest x-ray 12/13/2022    Findings: Frontal chest x-ray. Unchanged in  enlarged cardiomediastinal  silhouette with mediastinal/pericardial drain. Persistent left basilar  opacities involving the diaphragm. No discernible pneumothorax.  Possible small left pleural effusion. Stable perihilar and basilar  streaky pulmonary opacities.      Impression    Impression:   1. Stable enlarged cardiomediastinal silhouette with pericardial  drain.  2. Persistent retrocardiac opacities representing  atelectasis/consolidation.  3. Left lower lobe atelectasis small left pleural effusion.  4. Stable streaky perihilar and basilar opacities representing  atelectasis and/or pulmonary edema.    I have personally reviewed the examination and initial interpretation  and I agree with the findings.    GIL WILLIAM MD         SYSTEM ID:  [id number]   Echo Complete     Value    LVEF  60-65%    Narrative    [id number]  [id number]  [id number]  [id number]^JEAN-CLAUDE^MAGGIE^JO     [facility],[place]  Echocardiography Laboratory  [address] [place] [id number]     Name: RADHA OLEARY  MRN: [id number]  : 1995  Study Date: 2022 06:26 PM  Age: 27 yrs  Gender: Male  Patient Location: [facility]  Reason For Study: Pericardial Effusion  Ordering Physician: MAGGIE BERMEO  Referring Physician: GLENNY PEREZ  Performed By: Galdino Mishra RDCS     BSA: 2.0 m2  Height: 66 in  Weight: 190 lb  BP: 157/97 mmHg  ______________________________________________________________________________  Procedure  Complete Portable Echo Adult. The final echo results were communicated to the  ordering physician by pager . The final echo results were communicated to Dr. Irvin..  ______________________________________________________________________________  Interpretation Summary  There is a large circumferential pericardial effusion with a maximal depth of  3.2 cm near the LV mid inferolateral segment. There are no high-risk signs of  tamponade such as RA/RV diastolic collapse. The RA pressure is mildly  elevated  (8 mmHg).  Global and regional left ventricular function is normal with an EF of 60-65%.  Global right ventricular function is normal. The right ventricle is normal  size.  No significant valvular abnormalities.  This study was compared with the study from 10/20/2014. The pericardial  effusion is new.  ______________________________________________________________________________  Left Ventricle  Global and regional left ventricular function is normal with an EF of 60-65%.  Left ventricular size is normal. Mild concentric wall thickening consistent  with left ventricular hypertrophy is present. Left ventricular diastolic  function is not assessable.     Right Ventricle  Global right ventricular function is normal. The right ventricle is normal  size.     Mitral Valve  The mitral valve is normal. Trace mitral insufficiency is present.     Aortic Valve  The valve leaflets are not well visualized. On Doppler interrogation, there is  no significant stenosis or regurgitation.     Tricuspid Valve  The valve leaflets are not well visualized. Trace tricuspid insufficiency is  present. Pulmonary artery systolic pressure cannot be assessed.     Pulmonic Valve  The valve leaflets are not well visualized. Trace pulmonic insufficiency is  present.     Vessels  The aorta root is normal. The thoracic aorta cannot be assessed. IVC diameter  and respiratory changes fall into an intermediate range suggesting an RA  pressure of 8 mmHg.     Pericardium  There is a large circumferential pericardial effusion with a maximal depth of  3.2 cm near the LV mid inferolateral segment. There are no high-risk signs of  tamponade such as RA/RV diastolic collapse. The RA pressure is mildly elevated  (8 mmHg).     Compared to Previous Study  This study was compared with the study from 10/20/2014 . The pericardial  effusion is new.     ______________________________________________________________________________  MMode/2D Measurements &  Calculations  IVSd: 1.3 cm  LVIDd: 4.8 cm  LVIDs: 3.4 cm  LVPWd: 1.3 cm  FS: 30.3 %     LV mass(C)d: 245.9 grams  LV mass(C)dI: 125.7 grams/m2  RWT: 0.52     ______________________________________________________________________________  Report approved by: Yahir Salazar 2022 07:03 PM         Echo Limited    Narrative    [id number]  [id number]  [id number]  [id number]^HELADIO^KYLAH     [facility],[place]  Echocardiography Laboratory  [address] [place] [id number]     Name: RADHA OLEARY GILBERT  MRN: [id number]  : 1995  Study Date: 2022 01:21 PM  Age: 27 yrs  Gender: Male  Patient Location: [facility]  Reason For Study: Pericardiocentesis  Ordering Physician: KYLAH LEIJA  Referring Physician: GLENNY PEREZ  Performed By: Timi Figueroa RDCS     ______________________________________________________________________________  Procedure  Echo Guided Pericardialcentesis.  ______________________________________________________________________________  Interpretation Summary  Echo Guided Pericardialcentesis.  Trivial pericardial effusion is present post procedure.  ______________________________________________________________________________  Pericardium  Trivial pericardial effusion is present.  ______________________________________________________________________________  Report approved by: MD Magdy Snow 2022 02:28 PM     ______________________________________________________________________________      Echocardiogram Limited     Value    LVEF  55-60%    Narrative    [id number]  [id number]  [id number]  [id number]^JEAN-CLAUDE^MAGGIE^JO                                                                       Version 2     [facility],[place]  Echocardiography Laboratory  [address] [place] [id number]     Name: ANIRUDH RADHA J  MRN: [id number]  : 1995  Study Date: 2022 01:29 PM  Age: 27  yrs  Gender: Male  [facility]  Reason For Study: Pericardial Effusion  Ordering Physician: MAGGIE BERMEO  Referring Physician: GLENNY PEREZ  Performed By: Shadia Wellington RDCS     BSA: 1.9 m2  Height: 66 in  Weight: 188 lb  BP: 129/85 mmHg  ______________________________________________________________________________  Procedure  Limited Echocardiogram with portions of two-dimensional, color and spectral  Doppler performed.  ______________________________________________________________________________  Interpretation Summary  Left ventricular function is normal.The ejection fraction is 55-60%.  Global right ventricular function is normal.  Small and organizing pericardial effusion is present with a small amount of  serous component present inferolaterally. There is a septal bounce present.  The estimated mean right atrial pressure is normal.     Overall, there are some early features of effusive constrictive pericarditis,  new compared to study from 22.     ______________________________________________________________________________  Left Ventricle  Left ventricular size is normal. Left ventricular function is normal.The  ejection fraction is 55-60%.     Right Ventricle  The right ventricle is normal size. Global right ventricular function is  normal.     Vessels  The inferior vena cava was normal in size with preserved respiratory  variability. Estimated mean right atrial pressure is normal.     Pericardium  Small and organizing pericardial effusion is present with a small amount of  serous component present inferolaterally. There is a septal bounce present.  Overall, features of effusive constrictive pericarditis present.     ______________________________________________________________________________  MMode/2D Measurements & Calculations  LVOT diam: 2.2 cm  LVOT area: 3.8 cm2     Doppler Measurements & Calculations  LV V1 max P.4 mmHg  LV V1 max: 126.0 cm/sec  LV V1 VTI: 18.8  cm  SV(LVOT): 71.5 ml  SI(LVOT): 36.7 ml/m2     ______________________________________________________________________________  Report approved by: Yahir Leone 2022 02:47 PM         Echo Limited     Value    LVEF  55-60%    Narrative    [id number]  [id number]  [id number]  [id number]^NIKOLAI^KRISTY     [facility],[place]  Echocardiography Laboratory  [address] [place] [id number]     Name: RADHA OLEARY  MRN: [id number]  : 1995  Study Date: 12/15/2022 10:18 AM  Age: 27 yrs  Gender: Male  Patient Location: [facility]  Reason For Study: Pericardial Effusion  Ordering Physician: KRISTY MENCHACA  Referring Physician: GLENNY PEREZ  Performed By: Timi Figueroa RDCS     BSA: 1.9 m2  Height: 66 in  Weight: 183 lb  HR: 102  BP: 146/85 mmHg  ______________________________________________________________________________  Procedure  Limited Portable Echo Adult.  ______________________________________________________________________________  Interpretation Summary  Global and regional left ventricular function is normal with an EF of 55-60%.  Global right ventricular function is normal.  IVC diameter and respiratory changes fall into an intermediate range  suggesting an RA pressure of 8 mmHg.  There is a moderate, organized pericaridial effusion, 1.4 cm maximum depth  along anterolateral wall. Pericardial thickness is increased. IVC findings,  medial e' value, and hepatic vein diastolic reversal ratio suggest possible  constriction.     This study was compared with the study from 2022. No significant changes  noted.  ______________________________________________________________________________  Left Ventricle  Global and regional left ventricular function is normal with an EF of 55-60%.     Right Ventricle  Global right ventricular function is normal.     Mitral Valve  The mitral valve is normal.     Vessels  IVC diameter and respiratory changes fall into an intermediate  range  suggesting an RA pressure of 8 mmHg.     Pericardium  There is a moderate, organized pericaridial effusion, 1.4 cm maximum depth  along anterolateral wall. Pericardial thickness is increased. IVC findings,  medial e' value, and hepatic vein diastolic reversal ratio suggest possible  constriction.     Compared to Previous Study  This study was compared with the study from 12/13/2022 . No significant  changes noted.     Attestation  I have personally viewed the imaging and agree with the interpretation and  report as documented by the fellow, Wilner Tineo, and/or edited by me.  ______________________________________________________________________________  Report approved by: Yahir Benton 12/15/2022 11:28 AM     ______________________________________________________________________________      Cardiac Catheterization    Narrative    Pericardicentesis via subxiphoid approach (800 mL of serosanguinous fluid   drained)  Pigtail catheter secured and sutured in place     *Note: Due to a large number of results and/or encounters for the requested time period, some results have not been displayed. A complete set of results can be found in Results Review.       Discharge Medications   Current Discharge Medication List      START taking these medications    Details   colchicine (COLCYRS) 0.6 MG tablet Take 1 tablet (0.6 mg) by mouth twice a week  Qty: 26 tablet, Refills: 0    Associated Diagnoses: Pericardial effusion with cardiac tamponade      ibuprofen (ADVIL/MOTRIN) 800 MG tablet Take 1 tablet (800 mg) by mouth 3 times daily for 30 days  Qty: 90 tablet, Refills: 0    Associated Diagnoses: Pericardial effusion with cardiac tamponade         CONTINUE these medications which have NOT CHANGED    Details   amLODIPine (NORVASC) 5 MG tablet Take 5 mg by mouth daily      aspirin 81 MG EC tablet Take 81 mg by mouth daily      B Complex-C-Folic Acid (DIALYVITE PO) Take 1 tablet by mouth daily      losartan (COZAAR)  25 MG tablet Take 25 mg by mouth daily      sucroferric oxyhydroxide (VELPHORO) 500 MG CHEW chewable tablet Take 1,000 mg by mouth 3 times daily (with meals)      tacrolimus (GENERIC EQUIVALENT) 0.5 MG capsule Take 1.5 mg by mouth 2 times daily      cholecalciferol 2000 UNITS CAPS Take 2,000 Int'l Units by mouth daily  Qty: 30 capsule      FLUoxetine (PROZAC) 40 MG capsule Take 40 mg by mouth daily   Qty: 90 capsule, Refills: 0    Comments: Continue home medications but switch to 60 mg daily.  Associated Diagnoses: Depression with anxiety      omeprazole (PRILOSEC) 40 MG DR capsule Take 40 mg by mouth daily         STOP taking these medications       calcium acetate (PHOSLO) 667 MG CAPS capsule Comments:   Reason for Stopping:         carvedilol (COREG) 25 MG tablet Comments:   Reason for Stopping:             Allergies   Allergies   Allergen Reactions     Amoxicillin Swelling     Penicillins Swelling and Other (See Comments)     Azithromycin      Z pack - can't take with cyclosporine.     Vancomycin      Ruth syndrome     Cefprozil Rash

## 2022-12-15 NOTE — PROGRESS NOTES
Nephrology Progress Note  12/15/2022       Jhoan Hoffman is a 27 yom with hx of congenital renal dysplasia with 3x failed renal tx, now on HD since early 2020.  Tx to [facility] for evaluation of pericardial effusion with early tamponade.  Had upper resp tract infection ~2 weeks ago with worsening SOB since.  Presented to [facility] where ECHO showed pericardial effusion, sent to [facility] to manage.  Nephrology consulted for management of dialysis while admitted.      Interval History :   Mr Hoffman had HD yesterday, pulled 2L of UF without issue.  Holding on run today, stable on dialysis, continuing ibuprofen to treat pericarditis (elected for this over ASA due to bleeding risk and understands this may decrease his minimal UOP).  Once drain output is low enough the plan is to clamp for ~12h and have TTE to see if it can be removed.      Assessment & Recommendations:   ESRD-Due to congenital renal dysplasia, had kidney tx x3 in 1995, 2011 and 2012 which lasted until early 2020, has been on HD since.  Follow with [facility] under care of Dr Severino, getting rounding report for details.  Running HD today on his usual MWF schedule, will try to pull 2-3L after pericardial tap which should improve his hemodynamics.       -HD on MWF schedule, plan for day off today and run tomorrow.                 -Access is L arm fistula, +/+ bruit/thrill.                  -Stopped bactrim and cellcept, remaining on tacro 1.5mg BID with goal level 4-6 as he still does make some UOP     -Encouraged him to reconsider renal transplant workup (not wanting to pursue another after 3x failed although initial kidney lasted ~14 years).        Hx renal tx-Discussed with transplant neph and with pt, planning to continue tacro.                  -Continue tacro 1.5mg daily                -Stopping cellcept and bactrim.       Pericardial effusion-Not consistent with uremic pericarditis as he has not missed any runs and BUN was  "reasonable after 2 days off of HD.  Had pericardial tap with ~1L of drainage and pigtail left in.         Volume-He is unsure of his EDW, pulled 2L of UF without issue.       Electrolytes-K 3.7, bicarb 29, Na 134.         BMD-Ca 9.6, Mg WNL last check, Phos 4.9.       Anemia-Hgb 9.0, acute management per team, gets iron weekly at HD but will hold with ? ID issue causing pericarditis, will give 6k epo with runs.       Nutrition-Taking PO, deferred.      Time spent: 30 minutes on this date of encounter for chart review, physical exam, medical decision making and co-ordination of care.      Seen and discussed with Dr Campa     Recommendations were communicated to primary team via verbal communication.        JAJA Miranda CNS  Clinical Nurse Specialist  [phone]      Review of Systems:   I reviewed the following systems:  Gen: No fevers or chills  CV: No CP at rest  Resp: No SOB at rest  GI: No N/V      Physical Exam:   I/O last 3 completed shifts:  In: 920 [P.O.:920]  Out: 2072 [Other:2000; Chest Tube:72]   BP (!) 146/85   Pulse 109   Temp 98.3  F (36.8  C) (Oral)   Resp 18   Ht 1.676 m (5' 6\")   Wt 83 kg (183 lb)   SpO2 97%   BMI 29.54 kg/m       GENERAL APPEARANCE: alert, in no distress.    EYES: No scleral icterus  NECK:  + JVD  Pulmonary: lungs clear to auscultation with equal breath sounds bilaterally, no clubbing or cyanosis  CV: Regular rhythm, normal rate, no rub   - Edema +1 generalized.    GI: soft, nontender, normal bowel sounds  MS: no evidence of inflammation in joints, no muscle tenderness  : No Ordoñez  SKIN: no rash, warm, dry  NEURO: mentation intact and speech normal  Access: L arm fistula, +/+ bruit/thrill.      Labs:   All labs reviewed by me  Electrolytes/Renal -   Recent Labs   Lab Test 12/15/22  0611 12/14/22  0650 12/13/22  0829 12/13/22  0812 02/16/21  0954 12/21/20  0610 12/20/20  0701 12/19/20  0726 12/18/20  0614   * 139  --  139   < > 137 134   < > 138   POTASSIUM " 3.7 4.3  --  4.5   < > 3.8 3.2*   < > 4.1   CHLORIDE 90* 97*  --  97*   < > 105 102   < > 107   CO2 29 23  --  23   < > 18* 21   < > 13*   BUN 27.0* 54.9*  --  36.9*   < > 66* 42*   < > 103*   CR 6.23* 9.36*  --  7.74*   < > 8.09* 6.21*   < > 9.98*   GLC 84 78 77 78   < > 87 97   < > 109*   LIZZ 9.6 9.4  --  9.6   < > 8.6 8.4*   < > 8.1*   MAG  --   --   --   --   --   --  1.7  --  2.0   PHOS 4.9*  --   --  7.3*  --  8.4* 7.9*  --  12.1*    < > = values in this interval not displayed.       CBC -   Recent Labs   Lab Test 12/15/22  0611 12/14/22  0650 12/13/22  0812   WBC 9.0 8.8 7.8   HGB 9.0* 8.5* 8.3*    288 267       LFTs -   Recent Labs   Lab Test 12/15/22  0611 12/14/22  0650 12/13/22  0812   ALKPHOS 90 87 88   BILITOTAL 0.3 0.2 0.3   ALT <5* <5* <5*   AST 13 11 10   PROTTOTAL 6.8 6.7 7.0   ALBUMIN 3.4* 3.3* 3.2*       Iron Panel -   Recent Labs   Lab Test 12/19/20  0726   IRON 53   IRONSAT 24   CLINTON 143           Current Medications:    amLODIPine  5 mg Oral Daily     calcium acetate  1,334 mg Oral TID w/meals     colchicine  0.6 mg Oral Once per day on Mon Thu     FLUoxetine  40 mg Oral Daily     ibuprofen  800 mg Oral TID     lidocaine  1 patch Transdermal Q24h    And     lidocaine   Transdermal Q8H ISAAC     lidocaine   Transdermal Daily     multivitamin RENAL   Oral Daily     pantoprazole  40 mg Oral QAM AC     senna-docusate  1 tablet Oral BID    Or     senna-docusate  2 tablet Oral BID     sodium chloride (PF)  3 mL Intracatheter Q8H     tacrolimus  1.5 mg Oral BID IS       - MEDICATION INSTRUCTIONS -

## 2022-12-17 LAB — BACTERIA PCAR CULT: NO GROWTH

## 2022-12-18 NOTE — PROGRESS NOTES
Called by lab today (12/18) for a culture positive from the broth only on the pericardial drain fluid. The cultures sent from 12/12 at 136 grew gram pos bacilli resembling diphtheroids from the BROTH ONLY.    I spoke with ID attending, Dr. Mccabe regarding this case.  It was felt the cultures most likely represented contamination and did not require any change in plans or addition of abx at this time.

## 2022-12-20 LAB — BACTERIA PCAR CULT: ABNORMAL

## 2022-12-21 LAB — HEP C HIM: NORMAL

## 2023-01-02 ENCOUNTER — HOSPITAL ENCOUNTER (OUTPATIENT)
Age: 28
End: 2023-01-02
Attending: STUDENT IN AN ORGANIZED HEALTH CARE EDUCATION/TRAINING PROGRAM | Admitting: STUDENT IN AN ORGANIZED HEALTH CARE EDUCATION/TRAINING PROGRAM
Payer: MEDICARE

## 2023-01-02 ENCOUNTER — TRANSFERRED RECORDS (OUTPATIENT)
Dept: MULTI SPECIALTY CLINIC | Facility: CLINIC | Age: 28
End: 2023-01-02

## 2023-01-02 ENCOUNTER — DOCUMENTATION ONLY (OUTPATIENT)
Dept: FAMILY MEDICINE | Facility: CLINIC | Age: 28
End: 2023-01-02

## 2023-01-02 LAB
ALT SERPL-CCNC: 12 U/L (ref 4–50)
AST SERPL-CCNC: 21 U/L (ref 17–59)
CREATININE (EXTERNAL): 7.4 MG/DL (ref 0.66–1.25)
GFR ESTIMATED (EXTERNAL): 10 ML/MIN/1.73M2
GLUCOSE (EXTERNAL): 153 MG/DL (ref 70–100)
POTASSIUM (EXTERNAL): 4.6 MMOL/L (ref 3.5–5.1)

## 2023-01-02 NOTE — PROGRESS NOTES
[facility]  Transfer Triage Note    Date of call: 01/02/23  Time of call: 11:11 AM    Current Patient Location: [place]  Current Level of Care: ED    Vitals: See below  Diagnosis: neutropenia, infection  Is COVID-19 a concern? No  Reason for requested transfer: Patient has established care here  Procedure can be done here and not at referring hospital   Isolation Needs: Contact    Outside Records: Available  Additional records may be faxed to [phone].    Transfer accepted: Yes  Stability of Patient: Patient is at risk for instability, however patient requires urgent transfer and does not meet ICU criteria   Level of Care Needed: Med Surg  Telemetry Needed:  None  Expected Time of Arrival for Transfer: greater than 24 hours  Arrival Location:  [facility]    Recommendations for Management and Stabilization: Given    Additional Comments:     S/p kidney transplant x3 on IS, now on HD.  Has been neutrpenic with low grade temps. Procal mildly elevated with ?infiltrate.  Received meropenem in ED.  He is 83% on RA, on 2L to stay above 90%. /105, tachycardic 130s. ECHO done a few days ago, without recurrence of pericardial effusion. Typically on a MWF HD schedule. Has mild volume overload, but no other acute indication.  He [facility] receives [facility] through [facility]. Advised OSH provider that we would have delays in securing bed and he should continue to look elsewhere.  Also advised for him to connect with nephrology (primary neph [facility]) to discuss ongoing IS use with neutropenia, infection, etc.  Provider expressed understanding. Will place on waitlist here.    Víctor Whitmore MD    01/04/23 Addendum:    Patient discharged from . Transfer cancelled.    Sourav Hernandez MD

## 2023-01-03 ENCOUNTER — TELEPHONE (OUTPATIENT)
Dept: CARDIOLOGY | Facility: CLINIC | Age: 28
End: 2023-01-03

## 2023-01-03 NOTE — TELEPHONE ENCOUNTER
Health Call Center    Phone Message    May a detailed message be left on voicemail: yes     Reason for Call: Other:     Yoanna is calling to inform that Pt was transferred to [facility] for cardiac treatment.  Please call to discuss.    Action Taken: Other: cardio    Travel Screening: Not Applicable     Thank you!  [facility]

## 2023-01-04 NOTE — TELEPHONE ENCOUNTER
[facility]    Phone Message    May a detailed message be left on voicemail: yes     Reason for Call: Other:     Yaonna is returning Hayde's call please call back asap.    Action Taken: Other: cardio    Travel Screening: Not Applicable     Thank you!  [facility]

## 2023-01-04 NOTE — TELEPHONE ENCOUNTER
Spoke to patient and patient's mother on speaker phone.  Patient was transferred to [facility] from [facility] due to pneumonia and respiratory distress.  They are reporting that the attending physician in the hospital advised for them to call the cardiologist to review labs and echo results and advise on medication titration.  Writer discussed in detail that patient needs to establish care with Dr. Mcdaniels after hospitalization. Advised caller that it is best to follow with a cardiologist in hospital at this time.  Patient and mother verbalized understanding.    Patient is scheduled for consultation with Dr. mcdaniels on 1/19.    Hayde Belle RN  Cardiology Care Coordinator  [facility]  [phone] option 1

## 2023-01-09 LAB — BACTERIA PCAR CULT: NO GROWTH

## 2023-02-08 ENCOUNTER — MEDICAL CORRESPONDENCE (OUTPATIENT)
Dept: HEALTH INFORMATION MANAGEMENT | Facility: CLINIC | Age: 28
End: 2023-02-08
Payer: MEDICARE

## 2023-02-08 ENCOUNTER — TRANSFERRED RECORDS (OUTPATIENT)
Dept: HEALTH INFORMATION MANAGEMENT | Facility: CLINIC | Age: 28
End: 2023-02-08
Payer: MEDICARE

## 2023-02-09 ENCOUNTER — REFERRAL (OUTPATIENT)
Dept: TRANSPLANT | Facility: CLINIC | Age: 28
End: 2023-02-09

## 2023-02-09 DIAGNOSIS — T86.12 FAILED KIDNEY TRANSPLANT: ICD-10-CM

## 2023-02-09 DIAGNOSIS — Q61.4 RENAL DYSPLASIA: ICD-10-CM

## 2023-02-09 DIAGNOSIS — Q60.5 CONGENITAL RENAL AGENESIS AND DYSGENESIS: ICD-10-CM

## 2023-02-09 DIAGNOSIS — T86.12 KIDNEY TRANSPLANT FAILURE: ICD-10-CM

## 2023-02-09 DIAGNOSIS — I25.10 CARDIOVASCULAR DISEASE: ICD-10-CM

## 2023-02-09 DIAGNOSIS — N18.5 CHRONIC KIDNEY DISEASE, STAGE V (H): ICD-10-CM

## 2023-02-09 DIAGNOSIS — N18.6 ESRD (END STAGE RENAL DISEASE) (H): Primary | ICD-10-CM

## 2023-02-09 DIAGNOSIS — Z01.818 PRE-TRANSPLANT EVALUATION FOR KIDNEY TRANSPLANT: ICD-10-CM

## 2023-02-09 DIAGNOSIS — Q60.2 CONGENITAL RENAL AGENESIS AND DYSGENESIS: ICD-10-CM

## 2023-02-09 NOTE — LETTER
February 16, 2023      Jhoan Hoffman  [address]  [address]  [facility] [address]      Dear Jhoan,    Thank you for your interest in the Transplant Center at [facility]. We look forward to being a part of your care team and assisting you through the transplant process.    As we discussed, your transplant coordinator is Nelsy Estevez (Kidney).  You may call your coordinator at any time with questions or concerns.  Your first scheduled call will be on 02/21/2023 between 12:00pm-3:00pm. If this needs to change, please call [phone].    Please complete the following.    1. Fill out and return the enclosed forms    Authorization for Electronic Communication    Authorization to Discuss Protected Health Information    Authorization for Release of Protected Health Information    2. Sign up for:    [employer], access to your electronic medical record (see enclosed pamphlet)    [email or web address], a transplant education website    You can use these tools to learn more about your transplant, communicate with your care team, and track your medical details      Sincerely,      Solid Organ Transplant  [facility]    cc: Referring Physician

## 2023-02-09 NOTE — LETTER
Jhoan Hoffman  [address] [place] [address]  [address]  [facility] [id number]              February 16, 2023                                          MEDICAL RECORDS REQUEST    [facility] Records Request                      Facility: [facility]     Thank you for referring your patient to the [facility] Program, in order to process the referral we will need the following information;    1. Demographics  2. 2728 form   3. Immunizations records  4. Providers Progress notes, (last 3 note)      Please call our office at [phone] if you have any questions or concerns.                Please fax all paper records to [phone] within 1-3 business days.      Thank you,   The SOT Referral Intake Team     [facility]  Solid Organ Transplant Office  [address]  [place] [address]

## 2023-02-16 VITALS — HEIGHT: 67 IN | BODY MASS INDEX: 27.65 KG/M2 | WEIGHT: 176.15 LBS

## 2023-02-16 NOTE — TELEPHONE ENCOUNTER
PCP: no PCP per patient  Referring Provider: Sourav Ames MD  Referring Diagnosis: ESRD/Renal Dysplasia/Failed kidney transplant X3    Is patient under the age of 65? Yes  Is patient diabetic? No  Is patient on insulin? No  Was patient offered a pancreas transplant referral? No    Is patient in a group home/assisted living? No  Does patient have a guardian? No    Referral intake process completed.  Patient is aware that after financial approval is received, medical records will be requested.   Patient confirmed for a callback from transplant coordinator on 02/21/23. (within 2 weeks)  Tentative evaluation date 04/06/23  (within 4 weeks) if appointment is virtual, does patient have capabilities of setting this up? N/A    Confirmed coordinator will discuss evaluation process in more detail at the time of their call.   Patient is aware of the need to arrange age appropriate cancer screening, vaccinations, and dental care.  Reminded patient to complete questionnaire, complete medical records release, and review packet prior to evaluation visit .    Assessed patient for special needs (ie-wheelchair, assistance, guardian, and ): No  Patient instructed to call [phone] with questions.     Patient gave verbal consent during intake call to obtain medical records and documents outside of [facility]: Yes

## 2023-02-17 ENCOUNTER — DOCUMENTATION ONLY (OUTPATIENT)
Dept: TRANSPLANT | Facility: CLINIC | Age: 28
End: 2023-02-17
Payer: MEDICARE

## 2023-02-21 NOTE — TELEPHONE ENCOUNTER
"Reviewed pt's chart for pre-kidney transplant evaluation planning. Pt lives in [place], [place]. Referred to our center by Dr. Galdino mAes. Pt has ESRD due to renal dysplasia, s/p kidney transplant x3. First transplanted in 1995, LDKT from his mother, then May 2011 he underwent a living unrelated kidney transplant that was explanted July 2011, failed due to \"inadequate blood flow\". Third kidney transplanted in 2012 failed December 2020 due to rejection. Biopsy results available through [facility]. He was originally referred in 2019, but was not interested in pursuing transplant at that time.    Pt is on hemodialysis. Pt is not diabetic. Other hx includes depression.  Heart hx: admitted December 2022 with large pericardial effusion s/p pericardiocentesis with 800 mL fluid removed, he is following with local cardiology.  Lung status: admitted in January to [place] for pneumonia and fluid overload, states that his symptoms have resolved.   BMI 28 on 1/16/23.  Discussed BMI requirement for transplant with patient: meets criteria. Colonoscopy completed January 2023 during admission due to guaiac positive stools; findings were polyps and friability with contact bleeding in the entire colon. Ibuprofen discontinued. Dental: encouraged update. Pt is not a smoker, does not consume alcohol, and does not use recreational drugs. Pt is independent w/ ADLs.  Pt lives alone and has family support following transplant.     I also introduced [employer] and asked pt to create an account and view pre-kidney transplant videos for review with me following evaluation. Confirmed STD 4/6/23.       "

## 2023-04-03 ENCOUNTER — TELEPHONE (OUTPATIENT)
Dept: TRANSPLANT | Facility: CLINIC | Age: 28
End: 2023-04-03
Payer: MEDICARE

## 2023-04-03 NOTE — TELEPHONE ENCOUNTER
Spoke with patient. Confirmed upcoming PKE appointments at [facility] [place] on 4/6/23 starting at 0730. Instructed patient may eat breakfast, and take regularly scheduled medications.  Patient states he has contact information for any further questions/concerns/need to reschedule.

## 2023-04-04 NOTE — PROGRESS NOTES
[facility] Solid Organ Transplant  Outpatient MNT: Kidney Transplant Evaluation    Current BMI: 29 (HT 66.5 in,  lbs/83 kg)  BMI guideline for kidney transplant up to a BMI of 40 / per surgeon discretion     Frailty Assessment -- Not Frail (1/5 points)- low activity level       Time Spent: 15 minutes  Visit Type: Initial   Referring Physician: Thad   Pt accompanied by: self     History of previous txp: kidney #1 1995, kidney #2 2011, kidney #3 2012  Dialysis: yes    Dialysis Info: HD 2020 7 am   Protein supplement: no     Nutrition Assessment  - Appetite: good/baseline   - Food allergies/intolerances: no  - Meal prep & grocery shopping: pt does   - Previous RD education: yes  - Issues chewing or swallowing: no  - N/V/D/C: no  - Food access concerns: no     Vitamins, Supplements, Pertinent Meds: vit D, phos binder (takes most of the time)  Herbal Medicines/Supplements: none     Edema: facial edema     Weight hx: stable     Diet Recall  Breakfast Toast or skips    Lunch Blueberry waffles, chicken, burritos (frozen)   Dinner Pasta (from a bag) w/ cream sauce; corn dogs w/ mustard    Snacks None    Beverages Mountain Dew/Sprite (2/day), occasional water    Alcohol None    Dining out 2x/week      Physical Activity  Lifts weights 2x/week- 15 min- new in the past month   Energy level ok      Labs  2/26/23 K 4 Jan 2023 Phos 8.2     Nutrition Diagnosis  No nutrition diagnosis identified at this time     Nutrition Intervention  Nutrition education provided:  Discussed sodium intake (low sodium foods and drinks, seasoning food without salt and tips for low sodium diet).  Reviewed K/Phos labs. Reviewed protein needs w/ dialysis.     Reviewed post txp diet guidelines in brief (will review in further detail post txp):  (1) Review of proper food safety measures d/t immunosuppressant therapy post-op and increased risk for food-borne illness    (2) Avoid the following post txp d/t risk for rejection, unknown  effects on the organs, and/or potential interactions with immunosuppressants:  - Herbal, Chinese, holistic, chiropractic, natural, alternative medicines and supplements  - Detoxes and cleanses  - Weight loss pills  - Protein powders or other products with extracts or herbs (ie green tea extract)    (3) Med regimen and possible side effects    Patient Understanding: Pt verbalized understanding of education provided.  Expected Engagement: Good  Follow-Up Plans: PRN     Nutrition Goals  No nutrition goals identified at this time     Paige Martínez, RD, LD, CCTD

## 2023-04-05 LAB
ABO/RH(D): NORMAL
ANTIBODY SCREEN: NEGATIVE
SPECIMEN EXPIRATION DATE: NORMAL

## 2023-04-05 NOTE — PROGRESS NOTES
TRANSPLANT NEPHROLOGY RECIPIENT EVALUATION NOTE    Assessment and Plan:  # Kidney Transplant Evaluation: Patient is a good candidate overall. Benefits of a living donor transplant were discussed.    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 Doing OK on dialysis since December 2020, but may benefit from a kidney transplant. He is ABO-A, cPRA 100% (2019, repeat pending).    # Cardiac Risk: no significant history prior to December 2022 admit for pericardial effusion without tamponade with evidence of constrictive pericarditis s/p pericardiocentesis and drain placement and further treatment with colchicine and ibuprofen (viral etiology?, not thought to be uremic due to good compliance with dialysis). Repeat ECHO shows resolution, but CT abd/pelv from Feb 2023 mentions findings of pericardial thickening and developing calcification. Will need outpatient cardiology follow up if not already arranged.    # PAD Screening: obtain images from Feb 2023 CT abdomen/pelvis without contrast from [facility].    # Anemia: hemoglobin decreased from ~10 g/dl to 7-8 g/dl in December 2022 and found to have positive stool guiaic. Required admission Jan 2023 with 1 unit pRBC and work up included EGD with reactive gastritis and colonoscopy showing hemorrhoids, two tubular adenomas, and friability with contact bleeding in the entire colon. Ibuprofen was discontinued. Hemoglobin since ~ 9-11 g/dl.     # Recent C Diff: Feb 2023. [facility] ED notes report that he was transferred to [facility] for admission, but record not available for review. Will need to determine if patient was treated.      # CYNDEE: need to determine if he is using CPAP.     # Obesity: BMI 29.    # Anxiety and Depression: previously required admission, but almost 10 years ago. Appreciate  social work input.    # Health Maintenance: Due for derm (no h/o skin cancer) and dental.    Discussed the risks and benefits of a transplant, including the risk of surgery and immunosuppression medications.  Patient's overall evaluation will be discussed in the Transplant Program's regular meeting with a final recommendation on the patients suitability for transplant to be made at that time.    Pending completion of the full evaluation, patient presently appears to be enough of an acceptable kidney transplant recipient candidate to have any potential kidney donors start the evaluation process.    Evaluation:  Jhoan Hoffman was seen in consultation at the request of Dr. Garrett Oneil for evaluation as a potential kidney transplant recipient.    Reason for Visit:  Jhoan Hoffman is a 28 year old male with ESKD from congenital renal dysplasia, who presents for kidney transplant evaluation.    History of Present Illness:         Kidney Disease Hx: history of ESKD from congenital renal dysplasia, s/p kidney transplant x 3. 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 2) LDKT 5/18/2011 complicated by and explanted shortly after. 3) DDKT 4/28/2012 complicated by acute cellular rejection, [place] 1A June 2019, BK viremia (waxing and waning starting in 2013, but negative since at least 2015, negative today). He started back on dialysis in December 2020.  He was previously referred back for transplant in 2020, but didn't feel ready to proceed until now. Doing OK on HD, LUE AVF, small urine output, still on tacrolimus 1.5 mg bid. Previously on tacrolimus, MMF, and prednisone without issue.       Primary Nephrologist: Dr. Ames       H/o Kidney Stones: No       H/o Recurrent/Frequent UTI: No         Diabetic Hx: None           Cardiac/Vascular Disease Risk Factors:        Cardiac Risk Factors: CKD       Known CAD: No       Known PAD/Caludication Symptoms: No       Known Heart Failure: No        "Arrhythmia: No       Pulmonary Hypertension: No       Valvular Disease: No       Other: pericarditis Dec 2022         Viral Serology Status       CMV IgG Antibody: Negative       EBV IgG Antibody: Positive         Volume Status/Weight:        Volume status: Euvolemic       BMI: Body mass index is 29.06 kg/m .         Functional Capacity/Frailty:        He stays active with liftong weights twice weekly. No chest pain or SOB. Denies physical limitations.    Fatigue/Decreased Energy: [x] No [] Yes    Chest Pain or SOB with Exertion: [x] No [] Yes    Significant Weight Change: [x] No [] Yes    Nausea, Vomiting or Diarrhea: [x] No [] Yes    Fever, Sweats or Chills:  [x] No [] Yes    Leg Swelling [x] No [] Yes      Allergy Testing Questions:   Medication that caused a reaction Penicillin (Amoxicillin, Amoxicillin with clavulanic acid, Dicloxacillin) and Other antibiotics:  vancomycin   Antibiotics used that didn't give an allergic reaction?  Patient doesn't know    COVID Vaccination Up To Date: Not sure    Potential Living Kidney Donors: Not sure    Review of Systems:  A comprehensive review of systems was obtained and negative, except as noted in the HPI or PMH.    Past Medical History:   Medical record was reviewed and PMH was discussed with patient and noted below.  Past Medical History:   Diagnosis Date     Anemia     in ESRD     Depression      History of blood transfusion     per patient has had multiple     Hypertension     per patient \"under control\"     Kidney replaced by transplant      Peritoneal dialysis status (H)     in past     Renal failure      Sleep apnea      Thyroid disease     per patient \"taking medication to control hyperparathyroidism\"       Past Social History:   Past Surgical History:   Procedure Laterality Date     BIOPSY       CREATE GRAFT LOOP ARTERIOVENOUS UPPER EXTREMITY  12/17/2020    Procedure: Create Graft Left Arteriovenous Upper Extremity;  Surgeon: Sidney Palma MD;  [facility]" OR     CV PERICARDIOCENTESIS N/A 12/12/2022    Procedure: Pericardiocentesis;  Surgeon: Dennis Rodriguez MD;  Location: [facility]     CYSTOSCOPY, REMOVE STENT(S), COMBINED  5/25/2012    Procedure:COMBINED CYSTOSCOPY, REMOVE STENT(S); Cystoscopy, Removal Of Urinary Stent; Surgeon:FLORENTINO KNIGHT; Location:[facility] OR     ENDARECTERECTOMY RENAL  5/18/2011    Procedure:ENDARECTERECTOMY RENAL; Exploration of Transplant Kidney, Back Table Flush, Biopsy of Kidney and Reanastamosis of Kidney; Surgeon:FLORENTINO KNIGHT; Location:[facility] OR     EXPLANT TRANSPLANTED KIDNEY  7/12/2011    Procedure:EXPLANT TRANSPLANTED KIDNEY; Transplant Nephrectomy; Surgeon:FLORENTINO KNIGHT; Location:[facility] OR     INSERT CATHETER HEMODIALYSIS  5/18/2011    Procedure:INSERT CATHETER HEMODIALYSIS; Double Lumen; Surgeon:JOE HE; Location:[facility] OR     INSERT CATHETER PERITONEAL DIALYSIS  4/28/2012    Procedure:INSERT CATHETER PERITONEAL DIALYSIS; Placement dialysis cath under fluoro, before kidney tx; Surgeon:FLORENTINO KNIGHT; Location:[facility] OR     IR CVC TUNNEL PLACEMENT > 5 YRS OF AGE  12/18/2020     LAPAROTOMY EXPLORATORY  5/19/2011    Procedure:LAPAROTOMY EXPLORATORY; washout of kidney and closure; Surgeon:FLORENTINO KNIGHT; [facility]     PERCUTANEOUS BIOPSY KIDNEY  6/2/2011    Procedure:PERCUTANEOUS BIOPSY KIDNEY; Surgeon:GIL WILLIAM; [facility] OR     PERCUTANEOUS BIOPSY KIDNEY Right 6/26/2019    Procedure: Right Kidney Biopsy;  Surgeon: Peter Briggs MD;  [facility]: [facility]     PERCUTANEOUS BIOPSY KIDNEY Right 7/23/2019    Procedure: Right Kidney Biopsy;  Surgeon: Pro Menard MD;  Location: [facility] OR     REMOVE CATHETER PERITONEAL  5/3/2012    Procedure:REMOVE CATHETER PERITONEAL; Removal Of Peritoneal Dialysis Catheter; Surgeon:FLORENTINO KNIGHT; [facility]     REVISION FISTULA ARTERIOVENOUS UPPER EXTREMITY Left 2/16/2021    Procedure: Left upper AV fistula revision  (transposition) with intraoperative ultrasound;  Surgeon: Sidney Palma MD;  Location: [facility] OR     TRANSPLANT KIDNEY RECIPIENT LIVING RELATED  1995     TRANSPLANT KIDNEY RECIPIENT LIVING UNRELATED  5/18/2011    Procedure:TRANSPLANT KIDNEY RECIPIENT LIVING UNRELATED; Living unrelated left kidney transplant and placement of ureteral stent into transplant ureter.; Surgeon:FLORENTINO KNIGHT; [facility]     TRANSPLANT KIDNEY RECIPIENT LIVING UNRELATED  4/28/2012    Procedure:TRANSPLANT KIDNEY RECIPIENT LIVING UNRELATED; kidney transplant -   UNOS# [id number]  Organ arrival: 2100 4/27  Cross match results: 0200 4/28  Donor blood type: A  Recipient blood type: A; Surgeon:FLORENTINO KNIGHT; Location:[facility] OR     Personal history of bleeding or anesthesia problems: No    Family History:  Family History   Problem Relation Age of Onset     Kidney Disease No family hx of        Personal History:   Social History     Socioeconomic History     Marital status: Single     Spouse name: Not on file     Number of children: Not on file     Years of education: Not on file     Highest education level: Not on file   Occupational History     Not on file   Tobacco Use     Smoking status: Never     Smokeless tobacco: Never     Tobacco comments:     mother smokes outside   Vaping Use     Vaping status: Not on file   Substance and Sexual Activity     Alcohol use: Not Currently     Drug use: No     Sexual activity: Not on file   Other Topics Concern     Parent/sibling w/ CABG, MI or angioplasty before 65F 55M? Not Asked   Social History Narrative     Not on file     Social Determinants of Health     Financial Resource Strain: Not on file   Food Insecurity: Not on file   Transportation Needs: Not on file   Physical Activity: Not on file   Stress: Not on file   Social Connections: Not on file   Intimate Partner Violence: Not on file   Housing Stability: Not on file       Allergies:  Allergies   Allergen Reactions     Amoxicillin Swelling      "Penicillins Swelling and Other (See Comments)     Azithromycin      Z pack - can't take with cyclosporine.     Vancomycin      Ruth syndrome     Cefprozil Rash       Medications:  Current Outpatient Medications   Medication Sig     aspirin 81 MG EC tablet Take 81 mg by mouth daily     B Complex-C-Folic Acid (DIALYVITE PO) Take 1 tablet by mouth daily     cholecalciferol 2000 UNITS CAPS Take 2,000 Int'l Units by mouth daily     colchicine (COLCYRS) 0.6 MG tablet Take 1 tablet (0.6 mg) by mouth twice a week     FLUoxetine (PROZAC) 40 MG capsule Take 40 mg by mouth daily      losartan (COZAAR) 25 MG tablet Take 25 mg by mouth daily     omeprazole (PRILOSEC) 40 MG DR capsule Take 40 mg by mouth daily     sucroferric oxyhydroxide (VELPHORO) 500 MG CHEW chewable tablet Take 1,000 mg by mouth 3 times daily (with meals)     tacrolimus (GENERIC EQUIVALENT) 0.5 MG capsule Take 1.5 mg by mouth 2 times daily     amLODIPine (NORVASC) 5 MG tablet Take 5 mg by mouth daily (Patient not taking: Reported on 4/6/2023)     No current facility-administered medications for this visit.       Vitals:  /73   Pulse 85   Ht 1.689 m (5' 6.5\")   Wt 82.9 kg (182 lb 12.8 oz)   SpO2 97%   BMI 29.06 kg/m      Exam:  GENERAL APPEARANCE: alert and no distress  HENT: mouth without ulcers or lesions  LYMPHATICS: no cervical or supraclavicular nodes  RESP: lungs clear to auscultation - no rales, rhonchi or wheezes  CV: regular rhythm, normal rate, no rub, no murmur  EDEMA: no LE edema bilaterally  ABDOMEN: soft, nondistended, nontender, bowel sounds normal  MS: extremities normal - no gross deformities noted, no evidence of inflammation in joints, no muscle tenderness  SKIN: no rash    Results:   No results found for this or any previous visit (from the past 336 hour(s)).      "

## 2023-04-06 ENCOUNTER — OFFICE VISIT (OUTPATIENT)
Dept: TRANSPLANT | Facility: CLINIC | Age: 28
End: 2023-04-06
Attending: NURSE PRACTITIONER
Payer: MEDICARE

## 2023-04-06 ENCOUNTER — ANCILLARY PROCEDURE (OUTPATIENT)
Dept: GENERAL RADIOLOGY | Facility: CLINIC | Age: 28
End: 2023-04-06
Attending: NURSE PRACTITIONER
Payer: MEDICARE

## 2023-04-06 ENCOUNTER — ANCILLARY PROCEDURE (OUTPATIENT)
Dept: CARDIOLOGY | Facility: CLINIC | Age: 28
End: 2023-04-06
Attending: NURSE PRACTITIONER
Payer: MEDICARE

## 2023-04-06 ENCOUNTER — DOCUMENTATION ONLY (OUTPATIENT)
Dept: TRANSPLANT | Facility: CLINIC | Age: 28
End: 2023-04-06

## 2023-04-06 ENCOUNTER — LAB (OUTPATIENT)
Dept: LAB | Facility: CLINIC | Age: 28
End: 2023-04-06
Payer: MEDICARE

## 2023-04-06 VITALS
DIASTOLIC BLOOD PRESSURE: 73 MMHG | HEART RATE: 85 BPM | SYSTOLIC BLOOD PRESSURE: 117 MMHG | OXYGEN SATURATION: 97 % | WEIGHT: 182.8 LBS | BODY MASS INDEX: 28.69 KG/M2 | HEIGHT: 67 IN

## 2023-04-06 DIAGNOSIS — N18.5 CHRONIC KIDNEY DISEASE, STAGE V (H): ICD-10-CM

## 2023-04-06 DIAGNOSIS — Q61.4 RENAL DYSPLASIA: ICD-10-CM

## 2023-04-06 DIAGNOSIS — T86.12 KIDNEY TRANSPLANT FAILURE: ICD-10-CM

## 2023-04-06 DIAGNOSIS — Z76.82 ORGAN TRANSPLANT CANDIDATE: Primary | ICD-10-CM

## 2023-04-06 DIAGNOSIS — N18.6 ESRD (END STAGE RENAL DISEASE) (H): ICD-10-CM

## 2023-04-06 DIAGNOSIS — Q60.5 CONGENITAL RENAL AGENESIS AND DYSGENESIS: ICD-10-CM

## 2023-04-06 DIAGNOSIS — Z01.818 PRE-TRANSPLANT EVALUATION FOR KIDNEY TRANSPLANT: ICD-10-CM

## 2023-04-06 DIAGNOSIS — T86.12 FAILED KIDNEY TRANSPLANT: ICD-10-CM

## 2023-04-06 DIAGNOSIS — Q60.2 CONGENITAL RENAL AGENESIS AND DYSGENESIS: ICD-10-CM

## 2023-04-06 DIAGNOSIS — I25.10 CARDIOVASCULAR DISEASE: ICD-10-CM

## 2023-04-06 DIAGNOSIS — I31.4 PERICARDIAL EFFUSION WITH CARDIAC TAMPONADE: ICD-10-CM

## 2023-04-06 DIAGNOSIS — I31.39 PERICARDIAL EFFUSION WITH CARDIAC TAMPONADE: ICD-10-CM

## 2023-04-06 LAB
A1 AG RBC QL: NEGATIVE
ABO/RH(D): NORMAL
ALBUMIN SERPL BCG-MCNC: 4.7 G/DL (ref 3.5–5.2)
ALBUMIN UR-MCNC: 50 MG/DL
ALP SERPL-CCNC: 152 U/L (ref 40–129)
ALT SERPL W P-5'-P-CCNC: 23 U/L (ref 10–50)
ANION GAP SERPL CALCULATED.3IONS-SCNC: 18 MMOL/L (ref 7–15)
ANTIBODY TITER IGM SCREEN: NEGATIVE
APPEARANCE UR: ABNORMAL
APTT PPP: 26 SECONDS (ref 22–38)
AST SERPL W P-5'-P-CCNC: 18 U/L (ref 10–50)
B IGG TITR SERPL: 8 {TITER}
B IGM TITR SERPL: 4 {TITER}
BASOPHILS # BLD AUTO: 0.1 10E3/UL (ref 0–0.2)
BASOPHILS NFR BLD AUTO: 1 %
BILIRUB SERPL-MCNC: 0.4 MG/DL
BILIRUB UR QL STRIP: NEGATIVE
BUN SERPL-MCNC: 38.9 MG/DL (ref 6–20)
CALCIUM SERPL-MCNC: 9.6 MG/DL (ref 8.6–10)
CHLORIDE SERPL-SCNC: 96 MMOL/L (ref 98–107)
COLOR UR AUTO: YELLOW
CREAT SERPL-MCNC: 8.92 MG/DL (ref 0.67–1.17)
CRP SERPL-MCNC: 9.1 MG/L
DEPRECATED HCO3 PLAS-SCNC: 28 MMOL/L (ref 22–29)
EOSINOPHIL # BLD AUTO: 0.2 10E3/UL (ref 0–0.7)
EOSINOPHIL NFR BLD AUTO: 3 %
ERYTHROCYTE [DISTWIDTH] IN BLOOD BY AUTOMATED COUNT: 13.2 % (ref 10–15)
FACTOR 2 INTERPRETATION: NORMAL
FACTOR V INTERPRETATION: NORMAL
GFR SERPL CREATININE-BSD FRML MDRD: 8 ML/MIN/1.73M2
GLUCOSE SERPL-MCNC: 102 MG/DL (ref 70–99)
GLUCOSE UR STRIP-MCNC: 30 MG/DL
HBV CORE AB SERPL QL IA: NONREACTIVE
HBV SURFACE AB SERPL IA-ACNC: >1000 M[IU]/ML
HBV SURFACE AB SERPL IA-ACNC: REACTIVE M[IU]/ML
HBV SURFACE AG SERPL QL IA: NONREACTIVE
HCT VFR BLD AUTO: 33.9 % (ref 40–53)
HCV AB SERPL QL IA: NONREACTIVE
HGB BLD-MCNC: 11.3 G/DL (ref 13.3–17.7)
HGB UR QL STRIP: ABNORMAL
HIV 1+2 AB+HIV1 P24 AG SERPL QL IA: NONREACTIVE
HYALINE CASTS: 18 /LPF
IMM GRANULOCYTES # BLD: 0 10E3/UL
IMM GRANULOCYTES NFR BLD: 0 %
INR PPP: 1.02 (ref 0.85–1.15)
KETONES UR STRIP-MCNC: NEGATIVE MG/DL
LAB DIRECTOR COMMENTS: NORMAL
LAB DIRECTOR DISCLAIMER: NORMAL
LAB DIRECTOR INTERPRETATION: NORMAL
LAB DIRECTOR METHODOLOGY: NORMAL
LAB DIRECTOR RESULTS: NORMAL
LEUKOCYTE ESTERASE UR QL STRIP: NEGATIVE
LVEF ECHO: NORMAL
LYMPHOCYTES # BLD AUTO: 2.1 10E3/UL (ref 0.8–5.3)
LYMPHOCYTES NFR BLD AUTO: 29 %
MCH RBC QN AUTO: 30.1 PG (ref 26.5–33)
MCHC RBC AUTO-ENTMCNC: 33.3 G/DL (ref 31.5–36.5)
MCV RBC AUTO: 90 FL (ref 78–100)
MONOCYTES # BLD AUTO: 0.6 10E3/UL (ref 0–1.3)
MONOCYTES NFR BLD AUTO: 8 %
MUCOUS THREADS #/AREA URNS LPF: PRESENT /LPF
NEUTROPHILS # BLD AUTO: 4.3 10E3/UL (ref 1.6–8.3)
NEUTROPHILS NFR BLD AUTO: 59 %
NITRATE UR QL: NEGATIVE
NRBC # BLD AUTO: 0 10E3/UL
NRBC BLD AUTO-RTO: 0 /100
PH UR STRIP: 6 [PH] (ref 5–7)
PLATELET # BLD AUTO: 143 10E3/UL (ref 150–450)
POTASSIUM SERPL-SCNC: 4.3 MMOL/L (ref 3.4–5.3)
PROT SERPL-MCNC: 7.8 G/DL (ref 6.4–8.3)
RBC # BLD AUTO: 3.75 10E6/UL (ref 4.4–5.9)
RBC URINE: 10 /HPF
SODIUM SERPL-SCNC: 142 MMOL/L (ref 136–145)
SP GR UR STRIP: 1.01 (ref 1–1.03)
SPECIMEN DESCRIPTION: NORMAL
SPECIMEN EXPIRATION DATE: NORMAL
SQUAMOUS EPITHELIAL: 3 /HPF
T PALLIDUM AB SER QL: NONREACTIVE
UROBILINOGEN UR STRIP-MCNC: NORMAL MG/DL
WBC # BLD AUTO: 7.3 10E3/UL (ref 4–11)
WBC URINE: 9 /HPF

## 2023-04-06 PROCEDURE — 36415 COLL VENOUS BLD VENIPUNCTURE: CPT | Performed by: PATHOLOGY

## 2023-04-06 PROCEDURE — 86704 HEP B CORE ANTIBODY TOTAL: CPT | Performed by: PHYSICIAN ASSISTANT

## 2023-04-06 PROCEDURE — 86886 COOMBS TEST INDIRECT TITER: CPT | Performed by: PHYSICIAN ASSISTANT

## 2023-04-06 PROCEDURE — 86833 HLA CLASS II HIGH DEFIN QUAL: CPT | Performed by: PHYSICIAN ASSISTANT

## 2023-04-06 PROCEDURE — 93306 TTE W/DOPPLER COMPLETE: CPT | Performed by: INTERNAL MEDICINE

## 2023-04-06 PROCEDURE — 86832 HLA CLASS I HIGH DEFIN QUAL: CPT | Performed by: PHYSICIAN ASSISTANT

## 2023-04-06 PROCEDURE — 86481 TB AG RESPONSE T-CELL SUSP: CPT | Performed by: PHYSICIAN ASSISTANT

## 2023-04-06 PROCEDURE — 86665 EPSTEIN-BARR CAPSID VCA: CPT | Performed by: PHYSICIAN ASSISTANT

## 2023-04-06 PROCEDURE — 99207 PR NO CHARGE COORDINATED CARE PS: CPT

## 2023-04-06 PROCEDURE — 87340 HEPATITIS B SURFACE AG IA: CPT | Performed by: PHYSICIAN ASSISTANT

## 2023-04-06 PROCEDURE — 86780 TREPONEMA PALLIDUM: CPT | Performed by: PHYSICIAN ASSISTANT

## 2023-04-06 PROCEDURE — 85730 THROMBOPLASTIN TIME PARTIAL: CPT | Performed by: PATHOLOGY

## 2023-04-06 PROCEDURE — 80053 COMPREHEN METABOLIC PANEL: CPT | Performed by: PATHOLOGY

## 2023-04-06 PROCEDURE — 85670 THROMBIN TIME PLASMA: CPT | Performed by: PHYSICIAN ASSISTANT

## 2023-04-06 PROCEDURE — 86803 HEPATITIS C AB TEST: CPT | Performed by: PHYSICIAN ASSISTANT

## 2023-04-06 PROCEDURE — G0463 HOSPITAL OUTPT CLINIC VISIT: HCPCS

## 2023-04-06 PROCEDURE — 86706 HEP B SURFACE ANTIBODY: CPT | Performed by: PHYSICIAN ASSISTANT

## 2023-04-06 PROCEDURE — 85025 COMPLETE CBC W/AUTO DIFF WBC: CPT | Performed by: PATHOLOGY

## 2023-04-06 PROCEDURE — 81382 HLA II TYPING 1 LOC HR: CPT | Mod: XU | Performed by: PHYSICIAN ASSISTANT

## 2023-04-06 PROCEDURE — 86901 BLOOD TYPING SEROLOGIC RH(D): CPT | Performed by: PHYSICIAN ASSISTANT

## 2023-04-06 PROCEDURE — 99214 OFFICE O/P EST MOD 30 MIN: CPT | Performed by: TRANSPLANT SURGERY

## 2023-04-06 PROCEDURE — 86147 CARDIOLIPIN ANTIBODY EA IG: CPT | Performed by: PHYSICIAN ASSISTANT

## 2023-04-06 PROCEDURE — 71046 X-RAY EXAM CHEST 2 VIEWS: CPT | Mod: GC | Performed by: RADIOLOGY

## 2023-04-06 PROCEDURE — 86140 C-REACTIVE PROTEIN: CPT | Performed by: PATHOLOGY

## 2023-04-06 PROCEDURE — 81001 URINALYSIS AUTO W/SCOPE: CPT | Performed by: PATHOLOGY

## 2023-04-06 PROCEDURE — 85610 PROTHROMBIN TIME: CPT | Performed by: PATHOLOGY

## 2023-04-06 PROCEDURE — 85390 FIBRINOLYSINS SCREEN I&R: CPT | Mod: 26 | Performed by: PATHOLOGY

## 2023-04-06 PROCEDURE — 86644 CMV ANTIBODY: CPT | Performed by: PHYSICIAN ASSISTANT

## 2023-04-06 PROCEDURE — 87799 DETECT AGENT NOS DNA QUANT: CPT | Mod: XU | Performed by: PHYSICIAN ASSISTANT

## 2023-04-06 PROCEDURE — G0452 MOLECULAR PATHOLOGY INTERPR: HCPCS | Mod: 26 | Performed by: PATHOLOGY

## 2023-04-06 PROCEDURE — 85613 RUSSELL VIPER VENOM DILUTED: CPT | Performed by: PHYSICIAN ASSISTANT

## 2023-04-06 PROCEDURE — 99215 OFFICE O/P EST HI 40 MIN: CPT

## 2023-04-06 PROCEDURE — 81240 F2 GENE: CPT | Performed by: PHYSICIAN ASSISTANT

## 2023-04-06 PROCEDURE — 86850 RBC ANTIBODY SCREEN: CPT | Performed by: PHYSICIAN ASSISTANT

## 2023-04-06 PROCEDURE — 86905 BLOOD TYPING RBC ANTIGENS: CPT | Performed by: PHYSICIAN ASSISTANT

## 2023-04-06 PROCEDURE — 86787 VARICELLA-ZOSTER ANTIBODY: CPT | Performed by: PHYSICIAN ASSISTANT

## 2023-04-06 NOTE — LETTER
"    4/6/2023         RE: Jhoan Hoffman  [address]  [address]  [place] [place] [facility] [address]        Dear Colleague,    Thank you for referring your patient, Jhoan Hoffman, to the [facility]. Please see a copy of my visit note below.    Patient is well-known to me.  He is primarily here to understand the risk benefits and alternatives of renal retransplantation.  He started off with renal dysplasia and received the first kidney transplant about 27 years ago.  He received a renal retransplantation on the May 2011 unfortunately he developed renal artery thrombosis.  He was revascularized but the kidney did not function well.  He was read transplanted on 28 April 2012.  He did very well initially and then subsequently lost the kidney function due to transplant from neuropathy.  His PRA is currently 100%.  He does have some cardiac issues particularly pericardial effusion.  He is currently not working and he is on disability.  He has been on dialysis for almost 2 years and he is tolerating dialysis well.  No pulmonary problems.    Vital signs:                         Estimated body mass index is 29.06 kg/m  as calculated from the following:    Height as of an earlier encounter on 4/6/23: 1.689 m (5' 6.5\").    Weight as of an earlier encounter on 4/6/23: 82.9 kg (182 lb 12.8 oz).    Examination the abdomen: The abdomen is mildly obese.  I could not feel the right femoral pulse.      Clinical impression:    End-stage renal disease secondary to failed kidney transplant.    Recommendations:    #1.  LEONCIO on the right side to evaluate the femoral pulse.  #2.  I would get a CT scan of the abdomen to evaluate the anatomy particularly placed to put the fourth kidney.  #3.  Cardiology consult in view of the pleural effusion.      Overall Jhoan Hoffman is an excellent candidate and would recommend kidney transplantation without any hesitation.    I had a long discussion with the patient regarding kidney " transplantation in general and the following points in particular:    Survival statistics at one, five and ten years following kidney transplantation both for living-related and cadaveric allografts.  Okay the kidney transplant selection committee process.  The complications following kidney transplant that included but were not limited to wound infection, vascular complications, ureter leak, ureteral strictures, and bowel obstruction  The need for lifelong immunosuppressive therapy and the side effects of these medications including specifically the risk of cancer and lymphoma.  The waiting list time of approximately a year or more for cadaveric transplants.  The statistical superiority of a living-related donor and the compelling reasons to encourage that therapy.    The patient understands these issues quite well and is eager to proceed with our recommendation and with transplantation.  Time spent direct face to face counsellin min          Again, thank you for allowing me to participate in the care of your patient.        Sincerely,        MASOOD

## 2023-04-06 NOTE — LETTER
4/6/2023         RE: Jhoan Hoffman  [address]  [address]  [facility] [address]        Dear Colleague,    Thank you for referring your patient, Jhoan Hoffman, to the [facility]. Please see a copy of my visit note below.    TRANSPLANT NEPHROLOGY RECIPIENT EVALUATION NOTE    Assessment and Plan:  # Kidney Transplant Evaluation: Patient is a good candidate overall. Benefits of a living donor transplant were discussed.    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 Doing OK on dialysis since December 2020, but may benefit from a kidney transplant. He is ABO-A, cPRA 100% (2019, repeat pending).    # Cardiac Risk: no significant history prior to December 2022 admit for pericardial effusion without tamponade with evidence of constrictive pericarditis s/p pericardiocentesis and drain placement and further treatment with colchicine and ibuprofen (viral etiology?, not thought to be uremic due to good compliance with dialysis). Repeat ECHO shows resolution, but CT abd/pelv from Feb 2023 mentions findings of pericardial thickening and developing calcification. Will need outpatient cardiology follow up if not already arranged.    # PAD Screening: obtain images from Feb 2023 CT abdomen/pelvis without contrast from [facility].    # Anemia: hemoglobin decreased from ~10 g/dl to 7-8 g/dl in December 2022 and found to have positive stool guiaic. Required admission Jan 2023 with 1 unit pRBC and work up included EGD with reactive gastritis and colonoscopy showing hemorrhoids, two tubular adenomas, and friability with contact bleeding in the entire colon. Ibuprofen was discontinued. Hemoglobin since ~ 9-11 g/dl.     # Recent C Diff: Feb 2023. [facility] ED notes report that he was transferred to [facility] for admission, but  record not available for review. Will need to determine if patient was treated.      # CYNDEE: need to determine if he is using CPAP.     # Obesity: BMI 29.    # Anxiety and Depression: previously required admission, but almost 10 years ago. Appreciate social work input.    # Health Maintenance: Due for derm (no h/o skin cancer) and dental.    Discussed the risks and benefits of a transplant, including the risk of surgery and immunosuppression medications.  Patient's overall evaluation will be discussed in the Transplant Program's regular meeting with a final recommendation on the patients suitability for transplant to be made at that time.    Pending completion of the full evaluation, patient presently appears to be enough of an acceptable kidney transplant recipient candidate to have any potential kidney donors start the evaluation process.    Evaluation:  Jhoan Hoffman was seen in consultation at the request of Dr. Garrett Oneil for evaluation as a potential kidney transplant recipient.    Reason for Visit:  Jhoan Hoffman is a 28 year old male with ESKD from congenital renal dysplasia, who presents for kidney transplant evaluation.    History of Present Illness:         Kidney Disease Hx: history of ESKD from congenital renal dysplasia, s/p kidney transplant x 3. 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 2) LDKT 5/18/2011 complicated by and explanted shortly after. 3) DDKT 4/28/2012 complicated by acute cellular rejection, [place] 1A June 2019, BK viremia (waxing and waning starting in 2013, but negative since at least 2015, negative today). He started back on dialysis in December 2020.  He was previously referred back for transplant in 2020, but didn't feel ready to proceed until now. Doing OK on HD, LUE AVF, small urine output, still on tacrolimus 1.5 mg bid. Previously on tacrolimus, MMF, and prednisone without issue.       Primary Nephrologist: Dr. Ames       H/o Kidney Stones:  "No       H/o Recurrent/Frequent UTI: No         Diabetic Hx: None           Cardiac/Vascular Disease Risk Factors:        Cardiac Risk Factors: CKD       Known CAD: No       Known PAD/Caludication Symptoms: No       Known Heart Failure: No       Arrhythmia: No       Pulmonary Hypertension: No       Valvular Disease: No       Other: pericarditis Dec 2022         Viral Serology Status       CMV IgG Antibody: Negative       EBV IgG Antibody: Positive         Volume Status/Weight:        Volume status: Euvolemic       BMI: Body mass index is 29.06 kg/m .         Functional Capacity/Frailty:        He stays active with liftong weights twice weekly. No chest pain or SOB. Denies physical limitations.    Fatigue/Decreased Energy: [x] No [] Yes    Chest Pain or SOB with Exertion: [x] No [] Yes    Significant Weight Change: [x] No [] Yes    Nausea, Vomiting or Diarrhea: [x] No [] Yes    Fever, Sweats or Chills:  [x] No [] Yes    Leg Swelling [x] No [] Yes      Allergy Testing Questions:   Medication that caused a reaction Penicillin (Amoxicillin, Amoxicillin with clavulanic acid, Dicloxacillin) and Other antibiotics:  vancomycin   Antibiotics used that didn't give an allergic reaction?  Patient doesn't know    COVID Vaccination Up To Date: Not sure    Potential Living Kidney Donors: Not sure    Review of Systems:  A comprehensive review of systems was obtained and negative, except as noted in the HPI or PMH.    Past Medical History:   Medical record was reviewed and PMH was discussed with patient and noted below.  Past Medical History:   Diagnosis Date     Anemia     in ESRD     Depression      History of blood transfusion     per patient has had multiple     Hypertension     per patient \"under control\"     Kidney replaced by transplant      Peritoneal dialysis status (H)     in past     Renal failure      Sleep apnea      Thyroid disease     per patient \"taking medication to control hyperparathyroidism\"       Past Social " History:   Past Surgical History:   Procedure Laterality Date     BIOPSY       CREATE GRAFT LOOP ARTERIOVENOUS UPPER EXTREMITY  12/17/2020    Procedure: Create Graft Left Arteriovenous Upper Extremity;  Surgeon: Sidney Palma MD;  Location: [facility] OR     CV PERICARDIOCENTESIS N/A 12/12/2022    Procedure: Pericardiocentesis;  Surgeon: Dennis Rodriguez MD;  Location: [facility]     CYSTOSCOPY, REMOVE STENT(S), COMBINED  5/25/2012    Procedure:COMBINED CYSTOSCOPY, REMOVE STENT(S); Cystoscopy, Removal Of Urinary Stent; Surgeon:FLORENTINO KNIGHT; [facility]     ENDARECTERECTOMY RENAL  5/18/2011    Procedure:ENDARECTERECTOMY RENAL; Exploration of Transplant Kidney, Back Table Flush, Biopsy of Kidney and Reanastamosis of Kidney; Surgeon:FLORENTINO KNIGHT; [facility]     EXPLANT TRANSPLANTED KIDNEY  7/12/2011    Procedure:EXPLANT TRANSPLANTED KIDNEY; Transplant Nephrectomy; Surgeon:FLORENTINO KNIGHT; [facility]     INSERT CATHETER HEMODIALYSIS  5/18/2011    Procedure:INSERT CATHETER HEMODIALYSIS; Double Lumen; Surgeon:JOE HE; [facility]     INSERT CATHETER PERITONEAL DIALYSIS  4/28/2012    Procedure:INSERT CATHETER PERITONEAL DIALYSIS; Placement dialysis cath under fluoro, before kidney tx; Surgeon:FLORENTINO KNIGHT; [facility]     IR CVC TUNNEL PLACEMENT > 5 YRS OF AGE  12/18/2020     LAPAROTOMY EXPLORATORY  5/19/2011    Procedure:LAPAROTOMY EXPLORATORY; washout of kidney and closure; Surgeon:FLORENTINO KNIGHT; [facility]     PERCUTANEOUS BIOPSY KIDNEY  6/2/2011    Procedure:PERCUTANEOUS BIOPSY KIDNEY; Surgeon:GIL WILLIAM; [facility]     PERCUTANEOUS BIOPSY KIDNEY Right 6/26/2019    Procedure: Right Kidney Biopsy;  Surgeon: Peter Briggs MD;  [facility]     PERCUTANEOUS BIOPSY KIDNEY Right 7/23/2019    Procedure: Right Kidney Biopsy;  Surgeon: Pro Menard MD;  [facility]     REMOVE CATHETER PERITONEAL  5/3/2012     Procedure:REMOVE CATHETER PERITONEAL; Removal Of Peritoneal Dialysis Catheter; Surgeon:FLORENTINO KNIGHT; [facility]     REVISION FISTULA ARTERIOVENOUS UPPER EXTREMITY Left 2/16/2021    Procedure: Left upper AV fistula revision (transposition) with intraoperative ultrasound;  Surgeon: Sidney Palma MD;  Location: [facility]     TRANSPLANT KIDNEY RECIPIENT LIVING RELATED  1995     TRANSPLANT KIDNEY RECIPIENT LIVING UNRELATED  5/18/2011    Procedure:TRANSPLANT KIDNEY RECIPIENT LIVING UNRELATED; Living unrelated left kidney transplant and placement of ureteral stent into transplant ureter.; Surgeon:FLORENTINO KNIGHT; [facility]     TRANSPLANT KIDNEY RECIPIENT LIVING UNRELATED  4/28/2012    Procedure:TRANSPLANT KIDNEY RECIPIENT LIVING UNRELATED; kidney transplant -   UNOS# [id number]  Organ arrival: 2100 4/27  Cross match results: 0200 4/28  Donor blood type: A  Recipient blood type: A; Surgeon:FLORENTINO KNIGHT; [facility]     Personal history of bleeding or anesthesia problems: No    Family History:  Family History   Problem Relation Age of Onset     Kidney Disease No family hx of        Personal History:   Social History     Socioeconomic History     Marital status: Single     Spouse name: Not on file     Number of children: Not on file     Years of education: Not on file     Highest education level: Not on file   Occupational History     Not on file   Tobacco Use     Smoking status: Never     Smokeless tobacco: Never     Tobacco comments:     mother smokes outside   Vaping Use     Vaping status: Not on file   Substance and Sexual Activity     Alcohol use: Not Currently     Drug use: No     Sexual activity: Not on file   Other Topics Concern     Parent/sibling w/ CABG, MI or angioplasty before 65F 55M? Not Asked   Social History Narrative     Not on file     Social Determinants of Health     Financial Resource Strain: Not on file   Food Insecurity: Not on file   Transportation Needs: Not on file  "  Physical Activity: Not on file   Stress: Not on file   Social Connections: Not on file   Intimate Partner Violence: Not on file   Housing Stability: Not on file       Allergies:  Allergies   Allergen Reactions     Amoxicillin Swelling     Penicillins Swelling and Other (See Comments)     Azithromycin      Z pack - can't take with cyclosporine.     Vancomycin      Ruth syndrome     Cefprozil Rash       Medications:  Current Outpatient Medications   Medication Sig     aspirin 81 MG EC tablet Take 81 mg by mouth daily     B Complex-C-Folic Acid (DIALYVITE PO) Take 1 tablet by mouth daily     cholecalciferol 2000 UNITS CAPS Take 2,000 Int'l Units by mouth daily     colchicine (COLCYRS) 0.6 MG tablet Take 1 tablet (0.6 mg) by mouth twice a week     FLUoxetine (PROZAC) 40 MG capsule Take 40 mg by mouth daily      losartan (COZAAR) 25 MG tablet Take 25 mg by mouth daily     omeprazole (PRILOSEC) 40 MG DR capsule Take 40 mg by mouth daily     sucroferric oxyhydroxide (VELPHORO) 500 MG CHEW chewable tablet Take 1,000 mg by mouth 3 times daily (with meals)     tacrolimus (GENERIC EQUIVALENT) 0.5 MG capsule Take 1.5 mg by mouth 2 times daily     amLODIPine (NORVASC) 5 MG tablet Take 5 mg by mouth daily (Patient not taking: Reported on 4/6/2023)     No current facility-administered medications for this visit.       Vitals:  /73   Pulse 85   Ht 1.689 m (5' 6.5\")   Wt 82.9 kg (182 lb 12.8 oz)   SpO2 97%   BMI 29.06 kg/m      Exam:  GENERAL APPEARANCE: alert and no distress  HENT: mouth without ulcers or lesions  LYMPHATICS: no cervical or supraclavicular nodes  RESP: lungs clear to auscultation - no rales, rhonchi or wheezes  CV: regular rhythm, normal rate, no rub, no murmur  EDEMA: no LE edema bilaterally  ABDOMEN: soft, nondistended, nontender, bowel sounds normal  MS: extremities normal - no gross deformities noted, no evidence of inflammation in joints, no muscle tenderness  SKIN: no rash    Results:   No " results found for this or any previous visit (from the past 336 hour(s)).        Again, thank you for allowing me to participate in the care of your patient.        Sincerely,        MASOOD

## 2023-04-06 NOTE — PROGRESS NOTES
Kidney Transplant Referral - 2/9/2023  Patient attended appointments alone  Patient completed AM appointments with all [facility] providers.  Time and location of PM appointments reviewed with patient.  Patient instructed next contact from Transplant Coordinator will be following Selection Committee  Patient stated understanding  Patient states Receipt of Information for Organ Transplant Recipient form signed via [facility] [facility] form signed and faxed to HIM  Patient has not watched [facility] Pre Kidney Transplant videos 1&2.  Demonstrated accessing [facility] and watching Pre Kidney Transplant videos 1&2.        Summary    Team s concerns/comments:   1) Cardiac risk assessment  2) PAD assessment/S/P 3rd Renal TX  3) CYNDEE  4) Recent C diff  5) Anemia  6) Anxiety/Depression  7) Health Maintenance    Candidacy category: Yellow    Action/Plan:   1) EKG, Echo today. Cardiology consult and clearance  2) Request images of  2/23 A/P CT ([facility]), Iliac US  3) Confirm CPAP  4) Confirm treatment  5) GI work up, stable after discontinuing Ibuporfen  6) SW input  7) Dental and Dermatology due    Expected Selection Meeting Discussion: 4/12/23

## 2023-04-06 NOTE — PROGRESS NOTES
Psychosocial Assessment For Kidney  Transplantation  Patient Name/ Age: Jhaon Hoffman 28 year old   Medical Record #: [id number]  Duration of Interview:     30 min  Process:   Face-to-Face Interview                (counseling < 50%)   Present at Appointment: Jhoan         : ELICEO Jim  Date:  April 6, 2023        Type of transplant: Kidney     Donor type:      relative and friend   Prior Transplants:    Yes  Status of Transplant:    Jhoan was diagnosed with ESRD at birth. He obtained a transplant in 1995, 2011 and 2012.        Current Living Situation    Location:   [address] [place] [id number]  With Whom: lives alone       Family/ Social Support:    Jhoan resides in [place] ([place] hours from [facility]) alone. His parents Yoanna and Jose Juan live nearby, His grandparents Jonathon and Brenda are in the area. He has two step sisters- Dominique and Eron. Jhoan is single, no children. He reports that he feels like he has a good support system.    His mother and grandmother will be his post transplant supports.  available, helpful   Committed Relationship:     Single   Other Supports:    available, occasional       Activities/ Functional Ability    Current Level: independent with ADL's     Transportation drives self       Vocational/Employment/Financial     Employment   disabled   Job Description  Jhoan has been on disability for several years due to depression and ESRD. SW discussed potential impacts of disability with transplant.    Income   SSDI   Insurance      At this time, patient can afford medication costs:  Yes  Medicare and MA       Medical Status    Current Mode of Treatment for ESRD Dialysis   Complications None       Behavioral    Tobacco Use No Chemical Dependency No         Psychiatric Impairment Yes     Jhoan has been stable on the same depression medication for several years. He has seen a therapist in the past. He was hospitalized for mental health several years ago. Jhoan  indicated that his medication is very effective and his symptoms are stable. He is mindful when symptoms arise and will obtain assistance, as needed.     Reading Ability: Good  Education Level: High School Recent Legal History No      Coping Style/Strategies: donna, relaxing        Ability to Adhere to Complex Medical Regime: Yes     Adherence History:        Education  _X_ Medicare  _X_ Rehabilitation  _X_ Donor issues  _X_ Community resources  _X_ Post discharge housing  _X_ Financial resources  _X_ Medical insurance options  _X_ Psych adjustment  _X_ Family adjustment  _X_ Health Care Directive Yes, Will Provide and Provided Education   Psychosocial Risks of Transplant Reviewed and Discussed:  _X_ Increased stress related to emotional,            family, social, employment or financial           situation  _X_ Effect on work and/or disability benefits  _X_ Effect on future health and life           insurance  _X_ Transplant outcome expectations may           not be met  _X_ Mental Health Risks: anxiety,           depression, PTSD, guilt, grief and           chronic fatigue     Notable Items:   None noted.       Final Evaluation/Assessment   Patient seemed to process information well. Appeared well informed, motivated and able to follow post transplant requirements. Behavior was appropriate during interview. Has adequate income and insurance coverage. Adequate social support. No major contraindications noted for transplant.  At this time patient appears to understand the risks and benefits of transplant.      Recommendation  Acceptable    Selection Criteria Met:  Plan for support Yes   Chemical Dependence Yes   Smoking Yes   Mental Health Yes   Adequate Finances Yes    Signature: ELICEO Jim   Title: Clinical

## 2023-04-06 NOTE — PROGRESS NOTES
"Patient is well-known to me.  He is primarily here to understand the risk benefits and alternatives of renal retransplantation.  He started off with renal dysplasia and received the first kidney transplant about 27 years ago.  He received a renal retransplantation on the May 2011 unfortunately he developed renal artery thrombosis.  He was revascularized but the kidney did not function well.  He was read transplanted on 28 April 2012.  He did very well initially and then subsequently lost the kidney function due to transplant from neuropathy.  His PRA is currently 100%.  He does have some cardiac issues particularly pericardial effusion.  He is currently not working and he is on disability.  He has been on dialysis for almost 2 years and he is tolerating dialysis well.  No pulmonary problems.    Vital signs:                         Estimated body mass index is 29.06 kg/m  as calculated from the following:    Height as of an earlier encounter on 4/6/23: 1.689 m (5' 6.5\").    Weight as of an earlier encounter on 4/6/23: 82.9 kg (182 lb 12.8 oz).    Examination the abdomen: The abdomen is mildly obese.  I could not feel the right femoral pulse.      Clinical impression:    End-stage renal disease secondary to failed kidney transplant.    Recommendations:    #1.  LEONCIO on the right side to evaluate the femoral pulse.  #2.  I would get a CT scan of the abdomen to evaluate the anatomy particularly placed to put the fourth kidney.  #3.  Cardiology consult in view of the pleural effusion.      Overall Jhoan Hoffman is an excellent candidate and would recommend kidney transplantation without any hesitation.    I had a long discussion with the patient regarding kidney transplantation in general and the following points in particular:    1. Survival statistics at one, five and ten years following kidney transplantation both for living-related and cadaveric allografts.  Okay the kidney transplant selection committee " process.  2. The complications following kidney transplant that included but were not limited to wound infection, vascular complications, ureter leak, ureteral strictures, and bowel obstruction  3. The need for lifelong immunosuppressive therapy and the side effects of these medications including specifically the risk of cancer and lymphoma.  4. The waiting list time of approximately a year or more for cadaveric transplants.  5. The statistical superiority of a living-related donor and the compelling reasons to encourage that therapy.    The patient understands these issues quite well and is eager to proceed with our recommendation and with transplantation.  Time spent direct face to face counsellin min

## 2023-04-07 LAB
BKV DNA # SPEC NAA+PROBE: NOT DETECTED COPIES/ML
CMV IGG SERPL IA-ACNC: <0.2 U/ML
CMV IGG SERPL IA-ACNC: NORMAL
DRVVT CONFIRM NORMALIZED RATIO: 1.09
DRVVT SCREEN MIX RATIO: 1.05
DRVVT SCREEN RATIO: 1.21
EBV VCA IGG SER IA-ACNC: >750 U/ML
EBV VCA IGG SER IA-ACNC: POSITIVE
LA PPP-IMP: NEGATIVE
LUPUS INTERPRETATION: ABNORMAL
PTT RATIO: 1.03
THROMBIN TIME: 16.6 SECONDS (ref 13–19)
VZV IGG SER QL IA: <10 INDEX
VZV IGG SER QL IA: NORMAL

## 2023-04-08 LAB
CARDIOLIPIN IGG SER IA-ACNC: 2.7 GPL-U/ML
CARDIOLIPIN IGG SER IA-ACNC: NEGATIVE
CARDIOLIPIN IGM SER IA-ACNC: <2 MPL-U/ML
CARDIOLIPIN IGM SER IA-ACNC: NEGATIVE
GAMMA INTERFERON BACKGROUND BLD IA-ACNC: 0.01 IU/ML
M TB IFN-G BLD-IMP: NEGATIVE
M TB IFN-G CD4+ BCKGRND COR BLD-ACNC: 9.99 IU/ML
MITOGEN IGNF BCKGRD COR BLD-ACNC: -0.01 IU/ML
MITOGEN IGNF BCKGRD COR BLD-ACNC: -0.01 IU/ML
QUANTIFERON MITOGEN: 10 IU/ML
QUANTIFERON NIL TUBE: 0.01 IU/ML
QUANTIFERON TB1 TUBE: 0 IU/ML
QUANTIFERON TB2 TUBE: 0

## 2023-04-10 LAB
ATRIAL RATE - MUSE: 85 BPM
DIASTOLIC BLOOD PRESSURE - MUSE: NORMAL MMHG
INTERPRETATION ECG - MUSE: NORMAL
P AXIS - MUSE: 42 DEGREES
PR INTERVAL - MUSE: 146 MS
QRS DURATION - MUSE: 90 MS
QT - MUSE: 400 MS
QTC - MUSE: 476 MS
R AXIS - MUSE: 40 DEGREES
SYSTOLIC BLOOD PRESSURE - MUSE: NORMAL MMHG
T AXIS - MUSE: 66 DEGREES
VENTRICULAR RATE- MUSE: 85 BPM

## 2023-04-12 ENCOUNTER — COMMITTEE REVIEW (OUTPATIENT)
Dept: TRANSPLANT | Facility: CLINIC | Age: 28
End: 2023-04-12
Payer: MEDICARE

## 2023-04-12 DIAGNOSIS — Z01.818 PRE-TRANSPLANT EVALUATION FOR KIDNEY TRANSPLANT: Primary | ICD-10-CM

## 2023-04-12 DIAGNOSIS — N18.6 ESRD (END STAGE RENAL DISEASE) (H): ICD-10-CM

## 2023-04-12 DIAGNOSIS — T86.12 KIDNEY TRANSPLANT FAILURE: ICD-10-CM

## 2023-04-12 LAB
A*: NORMAL
A*LOCUS SEROLOGIC EQUIVALENT: 2
A*LOCUS: NORMAL
A*SEROLOGIC EQUIVALENT: 30
ABTEST METHOD: NORMAL
B*: NORMAL
B*LOCUS SEROLOGIC EQUIVALENT: 7
B*LOCUS: NORMAL
B*SEROLOGIC EQUIVALENT: 18
BW-1: NORMAL
C*: NORMAL
C*LOCUS SEROLOGIC EQUIVALENT: 5
C*LOCUS: NORMAL
C*SEROLOGIC EQUIVALENT: 7
DPA1*: NORMAL
DPB1*: NORMAL
DPB1*LOCUS: NORMAL
DQA1*: NORMAL
DQA1*LOCUS: NORMAL
DQB1*: NORMAL
DQB1*LOCUS SEROLOGIC EQUIVALENT: 2
DQB1*LOCUS: NORMAL
DQB1*SEROLOGIC EQUIVALENT: 8
DRB1*: NORMAL
DRB1*LOCUS SEROLOGIC EQUIVALENT: 17
DRB1*LOCUS: NORMAL
DRB1*SEROLOGIC EQUIVALENT: 4
DRB3*LOCUS SEROLOGIC EQUIVALENT: 52
DRB3*LOCUS: NORMAL
DRB4*: NORMAL
DRB4*SEROLOGIC EQUIVALENT: 53
DRSSO TEST METHOD: NORMAL

## 2023-04-12 NOTE — COMMITTEE REVIEW
Abdominal Committee Review Note     Evaluation Date:   Committee Review Date: 4/12/2023    Organ being evaluated for: Kidney    Transplant Phase: Referral  Transplant Status: Active    Transplant Coordinator: Jadyn Stahl  Transplant Surgeon:       Referring Physician: Sourav Salinas    Primary Diagnosis: Hypoplasia/Dysplasia/Dysgenesis/Agenesis  Secondary Diagnosis:     Committee Review Members:  Nephrology Presley Marcum MD, Jose Culp MD   Nutrition Paige Martínez, TORI   Pharmacist Katt Duran, [facility]    - Clinical Zeynep Devora Ramírez, [facility], Dary Sandy [facility]   Transplant WONG HUNG, RN, Amairani Long, RN, Priyanka Giron, RN, Yoanna Venegas, RN, Alma Swanson, KAREN, Gina Posada, KAREN, Rachel Bower, KAREN, Sidney Palma MD   Transplant Surgery Sidney Palma MD       Transplant Eligibility: Irreversible chronic kidney disease treated w/dialysis or expected need for dialysis    Committee Review Decision: Needs Re-presentation    Relative Contraindications: None    Absolute Contraindications:     Committee Chair Sidney Palma MD verbally attested to the committee's decision.    Committee Discussion Details:     Patient is a good candidate for kidney transplant. Team determined patient will need the following consults, imaging, health maintenance, and treatment plan of the following; cardiac risk assessment, which can be done with established cardiologist. Up to date health maintenance of dental and dermatology clearance. Images of CT abdomen/pelvis to evaluate room for 4th kidney and iliac US to determine adequate vasculature. Finally, verification of treatment from recent c. Diff infection.

## 2023-04-13 LAB
PROTOCOL CUTOFF: NORMAL
SA 1 CELL: NORMAL
SA 1 TEST METHOD: NORMAL
SA 2 CELL: NORMAL
SA 2 TEST METHOD: NORMAL
SA1 HI RISK ABY: NORMAL
SA1 MOD RISK ABY: NORMAL
SA2 HI RISK ABY: NORMAL
SA2 MOD RISK ABY: NORMAL
UNACCEPTABLE ANTIGENS: NORMAL
UNOS CPRA: 100
ZZZSA 1  COMMENTS: NORMAL
ZZZSA 2 COMMENTS: NORMAL

## 2023-04-20 ENCOUNTER — TELEPHONE (OUTPATIENT)
Dept: TRANSPLANT | Facility: CLINIC | Age: 28
End: 2023-04-20
Payer: MEDICARE

## 2023-04-20 DIAGNOSIS — Q61.4 RENAL DYSPLASIA: Primary | ICD-10-CM

## 2023-04-20 NOTE — TELEPHONE ENCOUNTER
Contacted patient to review outcome of selection committee meeting (See selection committee encounter).   Explained to patient that he/she needs to complete all components of the evaluation to be eligible for active status on the waiting list or to proceed with a live donor kidney transplant.   Reviewed next steps based on outcomes:   Patient will not be listed (patient is on dialysis and evaluation is not complete), patient will receive:    - An Evaluation Summary Letter indicating what is needed to complete evaluation-discussed with patient if they would like to have testing done with [facility] or locally  Confirmed with patient that on successful completion of outstanding components, patient is eligible for active status and they will receive a follow-up call.   Confirmed that patient has contact information for additional questions or concerns.     Discussed with Jhoan his next steps after selection committee meetin. Cardiology  2. Dermatology  3. Dental  4. Iliac US and review of CT from [facility]   5. Confirmation of C diff treatment (at [facility], awaiting care everywhere update)    He is in good agreement with the plan and would like to complete testing closer to home. Orders sent to PCP and nephrology for facilitation of evaluation through established care system.

## 2023-04-20 NOTE — LETTER
04/20/23        Jhoan Hoffman  [address]  [address]  [facility] [address]        Dear Jhoan,    It was a pleasure to see you recently for consideration of kidney transplantation. Your pre-transplant evaluation results were reviewed at our Multidisciplinary Selection Committee. The committee is requesting the following items are completed before determining your candidacy:    Cardiology consult for risk assessment for transplant surgery  Dermatology consult for full body skin exam, as you are at higher risk for skin cancer due to your history of immunosuppression  Updated dental screening. The transplant team requests that your dentist sends a letter of clearance stating that you are free from infections or disease.  Iliac ultrasound to assess the blood vessels in your abdomen to plan for transplant surgery. The surgery team will also review your most recent abdominal/pelvic CT completed through [facility] to plan for surgery.    You can schedule locally if you wish for these items, orders have been sent to your nephrologist to help set them up closer to home. Please work with your regular providers to arrange for these locally.   If you wish to schedule any appointments at our clinic, please let your transplant coordinator know who will assist with scheduling.     Please notify your transplant coordinator when all of the above items are successfully completed, at this point your results will be reviewed at the Multidisciplinary Selection Committee for approval. If your results indicate that your health is adequate for transplant, you will be eligible to be changed to ACTIVE status on the wait list and also to proceed with a live kidney donor transplant in the event of an approved live donor.     Please have any potential live kidney donors register online at [facility] to initiate their evaluations through our program's living donor screening tool at [email or web address]. Please note that potential donors  will get an e-mail response once they submit their form within 1-2 business days. Potential donors may call our Main Office Number at [phone] and ask to speak with a donor coordinator if they have questions about the process. You will be notified by your transplant coordinator if a live donor has been approved for donation.       For any questions, please contact the Transplant Office at [phone].      Sincerely,    Nelsy Estevez RN BSN CCTN  Solid Organ Transplant  [facility], [facility]

## 2023-04-20 NOTE — LETTER
PHYSICIAN ORDERS      DATE & TIME ISSUED: 2023 3:44 PM  PATIENT NAME: Jhoan Hoffman   : 1995     [facility] MR# [if applicable]: [id number]     DIAGNOSIS:  Pre-transplant evaluation for kidney transplant; renal dysplasia  ICD-10 CODE: Z01.818; Q61.4     Please complete the following as a part of patient's pre-kidney transplant evaluation:  Cardiology consult for risk assessment for transplant  Dermatology consult for full body skin exam due to history of immunosuppression  Aorto/Iliac ultrasound    Any questions please call: [phone] option [phone] to reach patient's pre-kidney transplant coordinator Nelsy Estevez.     .

## 2023-04-20 NOTE — LETTER
Jhoan Hoffman  [address]  [address]  [facility] [address]                April 20, 2023    Dear Dental Provider,     You may or may not know that the above patient in your care is in evaluation for an organ transplant. In order to be a candidate for transplant, our center requests that the patient provide a letter of clearance from their dentist stating that they are free from disease or infections. At your earliest convenience, please fax this information to [phone]. Your help is greatly appreciated.    Sincerely,     Nelsy Estevez RN BSN CCTN  Pre-Kidney/Pancreas Transplant Coordinator  [facility] Solid Organ Transplant  [facility]

## 2023-05-08 PROBLEM — N18.5 CHRONIC KIDNEY DISEASE, STAGE 5, KIDNEY FAILURE (H): Status: RESOLVED | Noted: 2020-11-12 | Resolved: 2023-05-08

## 2024-01-04 ENCOUNTER — TELEPHONE (OUTPATIENT)
Dept: TRANSPLANT | Facility: CLINIC | Age: 29
End: 2024-01-04
Payer: MEDICARE

## 2024-01-04 NOTE — TELEPHONE ENCOUNTER
Patient calling to update coordinator on items completed for transplant evaluation. Patient saw cardiology in June, and was cleared for transplant. He just had his aortic US completed through [facility] in [place], sees the dentist next week, and then sees dermatology next month. He reports that he was treated for his c-diff infection at the beginning of last year.     Patient given new coordinator name and contact information, asked that he call me when he finishes his dermatology visit and will work on obtaining records and reviewing the US with surgery.

## 2024-02-16 ENCOUNTER — DOCUMENTATION ONLY (OUTPATIENT)
Dept: TRANSPLANT | Facility: CLINIC | Age: 29
End: 2024-02-16

## 2024-05-15 ENCOUNTER — MEDICAL CORRESPONDENCE (OUTPATIENT)
Dept: HEALTH INFORMATION MANAGEMENT | Facility: CLINIC | Age: 29
End: 2024-05-15
Payer: MEDICARE

## 2024-05-15 ENCOUNTER — TELEPHONE (OUTPATIENT)
Dept: TRANSPLANT | Facility: CLINIC | Age: 29
End: 2024-05-15
Payer: MEDICARE

## 2024-05-15 DIAGNOSIS — Z94.0 KIDNEY TRANSPLANTED: ICD-10-CM

## 2024-05-15 DIAGNOSIS — Z76.82 ORGAN TRANSPLANT CANDIDATE: Primary | ICD-10-CM

## 2024-05-15 RX ORDER — TACROLIMUS 0.5 MG/1
1.5 CAPSULE ORAL 2 TIMES DAILY
Qty: 180 CAPSULE | Refills: 3 | Status: SHIPPED | OUTPATIENT
Start: 2024-05-15

## 2024-05-15 NOTE — TELEPHONE ENCOUNTER
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  Previously was still taking Tac while being worked up for another transplant.      PLAN  Call Jhoan:  Determine if he is still taking Tacrolimus? Dose?  Still making urine?  Still being worked up for another transplant?      OUTCOME  Jhoan confirms he is still taking Tac 1.5 mg BID. States he is still making a very small amount of urine, typically once a day.   He confirmed he is still working towards another transplant and has just finished everything on his pre-transplant check list.   New Tac Rx sent.

## 2024-05-28 ENCOUNTER — TELEPHONE (OUTPATIENT)
Dept: TRANSPLANT | Facility: CLINIC | Age: 29
End: 2024-05-28
Payer: MEDICARE

## 2024-05-28 ENCOUNTER — TEAM CONFERENCE (OUTPATIENT)
Dept: TRANSPLANT | Facility: CLINIC | Age: 29
End: 2024-05-28
Payer: MEDICARE

## 2024-05-28 NOTE — TELEPHONE ENCOUNTER
"Returned patient call after patient left a message stating he has finished all tasks for his eval. Will request records following patient's wisdom tooth extraction from [facility] and discuss patient at our selection committee meeting tomorrow, 24. Additionally, patient stated he has watched all pre-kidney transplant class videos. We went through the video content and he had no further questions.     Kidney Transplant Referral   Video content reviewed:  The Evaluation Process  Our Immune System  Finding a donor   donation  How organs are allocated  Multiple listings  Disease transmission  While you wait  Surgical risks    Participants were informed of the benefits of transplant as well as potential risks such as infection, cancer, and death.  The need for total adherence with immunosuppression medications and following transplant regimens was stressed.       The patient was provided with the following documents:  What You Need to Know About a Kidney Transplant  Adult Kidney Transplant - A Guide for Patients  SRTR Data Sheet - Kidney  Brochure - Kidney Allocation  Brochure - Multiple Listing and Waiting Time Transfer  What Every Patient Needs to Know (UNOS)  UNOS Facts and Figures  Finding a Donor  My Transplant Place - Quick Start Guide  KDPI Consent  Receipt of Information form    Jhoan has signed the  Receipt of Information for Organ Transplant Recipient.\" He was instructed to call coordinator with additional questions. I introduced myself as his new coordinator and provided my direct number.          "

## 2024-05-28 NOTE — TELEPHONE ENCOUNTER
Patient Call: General  Route to LPN    Reason for call: Jhoan would like to update Coordinator, that he's completed all his work up, and would like to know the next steps, and is requesting to be called back today.    Call back needed? Yes    Return Call Needed  Same as documented in contacts section  When to return call?: Same day: Route High Priority

## 2024-05-29 ENCOUNTER — COMMITTEE REVIEW (OUTPATIENT)
Dept: TRANSPLANT | Facility: CLINIC | Age: 29
End: 2024-05-29
Payer: MEDICARE

## 2024-05-29 ENCOUNTER — DOCUMENTATION ONLY (OUTPATIENT)
Dept: TRANSPLANT | Facility: CLINIC | Age: 29
End: 2024-05-29
Payer: MEDICARE

## 2024-05-29 NOTE — TELEPHONE ENCOUNTER
Image Review Meeting    ATTENDEES: Dr. Omar Osullivan, Priyanka Giron, Rachel Bower, Nelsy Liriano    IMAGES REVIEWED: CT Abdomen Pelvis w/o contrast (2/26/2023) and aorta iliac US (12/20/23)    DECISION: Approved for left-side kidney transplant.     INCIDENTALS: No

## 2024-05-29 NOTE — COMMITTEE REVIEW
Kidney/Pancreas Committee Review Note     Evaluation Date: 4/6/2023  Committee Review Date: 5/29/2024    Organ being evaluated for: Kidney    Transplant Phase: Evaluation  Transplant Status: Active    Transplant Coordinator: Leann Owen  Transplant Surgeon: Dr. Sierra       Referring Physician: Sourav Salinas    Primary Diagnosis: Hypoplasia/Dysplasia/Dysgenesis/Agenesis  Secondary Diagnosis:     Committee Review Members:  Nephrology Jayla Garcia MD, Jose Culp MD   Nutrition Paige Martínez, TORI   Pharmacist Caitlin Villalobos, [facility]    - Clinical Afia Jack, MSW, Skip Gillespie, MSW, Tali Sepulveda, MSW   Transplant WONG HUNG, RN, Tere Blanchard, KAREN, Amairani Long, KAREN, Priyanka Giron, RN, Dary Phipps, RN, Yoanna Castelan, NP, Yoanna Venegas, RN, Gina Posada, RN, Rachel Bower, RN   Transplant Surgery Kashif Sierra MD       Transplant Eligibility: Irreversible chronic kidney disease treated w/dialysis or expected need for dialysis    Committee Review Decision: Needs Re-presentation    Relative Contraindications: None    Absolute Contraindications: None     Committee Chair Kashif Sierra MD verbally attested to the committee's decision.    Committee Discussion Details: Reviewed pt's medical status and evaluation results to date with multidisciplinary committee. Discussed that patient completed all items asked of him for evaluation. Jhoan saw cardiology 6/5/24 but did not have a stress test done. The committee is requesting an exercise stress test to be performed prior to being considered for active listing. I also discussed that his primary nephrologist referred him to general surgery for refractory secondary hyperparathyroidism (7/11/23). The committee stated they do not believe patient needs to have a parathyroidectomy prior to transplant. Discussed that patient is now UTD on dental and dermatology visits.      Recommended the following evaluation items prior to consideration for active listing:  Exercise Stress Test to be performed    Patient will be called and summary letter will be sent.

## 2024-05-30 ENCOUNTER — TELEPHONE (OUTPATIENT)
Dept: TRANSPLANT | Facility: CLINIC | Age: 29
End: 2024-05-30
Payer: MEDICARE

## 2024-05-30 DIAGNOSIS — N18.6 ESRD (END STAGE RENAL DISEASE) (H): ICD-10-CM

## 2024-05-30 DIAGNOSIS — Z76.82 ORGAN TRANSPLANT CANDIDATE: Primary | ICD-10-CM

## 2024-05-30 DIAGNOSIS — Q61.4 RENAL DYSPLASIA: ICD-10-CM

## 2024-05-30 DIAGNOSIS — I25.10 CARDIOVASCULAR DISEASE: ICD-10-CM

## 2024-05-30 DIAGNOSIS — Z01.818 PRE-TRANSPLANT EVALUATION FOR KIDNEY TRANSPLANT: ICD-10-CM

## 2024-05-30 NOTE — LETTER
05/30/24        Jhoan Hoffman  [address]  [address]  [facility] [address]        Dear Jhoan,    It was a pleasure to see you recently for consideration of kidney transplantation. Your pre-transplant evaluation results were reviewed at our Multidisciplinary Selection Committee on 5/29/24. The committee is requesting the following items are completed before determining your candidacy:    Exercise Stress Test. Our schedulers will call you to schedule this.   Updated PRA lab test. We will schedule this lab appointment the same date you come in for the exercise stress test.     For any questions, please contact the Transplant Office at [phone].      Sincerely,  Leann Owen RN Pre-Kidney Transplant Coordinator    Solid Organ Transplant  [facility], [facility]

## 2024-05-30 NOTE — TELEPHONE ENCOUNTER
Spoke with patient to update him on the outcomes of the selection committee meeting. Patient stated he would prefer to come to the [facility] to have the stress test performed and we will obtain an updated PRA at that time. A letter will be sent to patient's MyChart and he had no further questions at this time.

## 2024-06-04 ENCOUNTER — HOSPITAL ENCOUNTER (OUTPATIENT)
Dept: CARDIOLOGY | Facility: CLINIC | Age: 29
Discharge: HOME | End: 2024-06-04
Attending: NURSE PRACTITIONER
Payer: MEDICARE

## 2024-06-04 DIAGNOSIS — Q61.4 RENAL DYSPLASIA: ICD-10-CM

## 2024-06-04 DIAGNOSIS — Z01.818 PRE-TRANSPLANT EVALUATION FOR KIDNEY TRANSPLANT: ICD-10-CM

## 2024-06-04 DIAGNOSIS — I25.10 CARDIOVASCULAR DISEASE: ICD-10-CM

## 2024-06-04 DIAGNOSIS — Z76.82 ORGAN TRANSPLANT CANDIDATE: ICD-10-CM

## 2024-06-04 DIAGNOSIS — N18.6 ESRD (END STAGE RENAL DISEASE) (H): ICD-10-CM

## 2024-06-04 PROCEDURE — 93018 CV STRESS TEST I&R ONLY: CPT | Performed by: INTERNAL MEDICINE

## 2024-06-04 PROCEDURE — 93321 DOPPLER ECHO F-UP/LMTD STD: CPT | Mod: 26 | Performed by: INTERNAL MEDICINE

## 2024-06-04 PROCEDURE — 255N000002 HC RX 255 OP 636: Performed by: INTERNAL MEDICINE

## 2024-06-04 PROCEDURE — 93350 STRESS TTE ONLY: CPT | Mod: 26 | Performed by: INTERNAL MEDICINE

## 2024-06-04 PROCEDURE — C8928 TTE W OR W/O FOL W/CON,STRES: HCPCS

## 2024-06-04 PROCEDURE — 93325 DOPPLER ECHO COLOR FLOW MAPG: CPT | Mod: 26 | Performed by: INTERNAL MEDICINE

## 2024-06-04 PROCEDURE — 93016 CV STRESS TEST SUPVJ ONLY: CPT | Performed by: INTERNAL MEDICINE

## 2024-06-04 PROCEDURE — 93325 DOPPLER ECHO COLOR FLOW MAPG: CPT | Mod: TC

## 2024-06-04 RX ADMIN — PERFLUTREN 10 ML: 6.52 INJECTION, SUSPENSION INTRAVENOUS at 11:30

## 2024-06-04 NOTE — DISCHARGE INSTRUCTIONS
[address] [place] [address]    Jhoan was at the [facility] for an appointment on 6/4/2024.

## 2024-06-05 ENCOUNTER — TELEPHONE (OUTPATIENT)
Dept: TRANSPLANT | Facility: CLINIC | Age: 29
End: 2024-06-05
Payer: MEDICARE

## 2024-06-05 DIAGNOSIS — Z01.818 ENCOUNTER FOR PRE-TRANSPLANT EVALUATION FOR LUNG TRANSPLANT: ICD-10-CM

## 2024-06-05 DIAGNOSIS — R94.39 ABNORMAL RESULT OF OTHER CARDIOVASCULAR FUNCTION STUDY: ICD-10-CM

## 2024-06-05 DIAGNOSIS — Z01.818 PRE-TRANSPLANT EVALUATION FOR KIDNEY TRANSPLANT: Primary | ICD-10-CM

## 2024-06-05 NOTE — CONFIDENTIAL NOTE
Stress test non-diagnostic due to not reaching max predicted heart rate. Plan for coronary CT angiogram. JAJA Obregon CNP on 6/5/2024 at 11:44 AM

## 2024-06-05 NOTE — TELEPHONE ENCOUNTER
Called patient to let him know that due to him not being able to finish the exercise stress echo and not reaching maximum HR, we are requesting he completes a coronary angiogram. Mervat Castillo looked at results of stress test and will put order in for angiogram. He had no further questions.

## 2024-06-10 ENCOUNTER — TELEPHONE (OUTPATIENT)
Dept: TRANSPLANT | Facility: CLINIC | Age: 29
End: 2024-06-10
Payer: MEDICARE

## 2024-06-10 NOTE — TELEPHONE ENCOUNTER
Jhoan called as he has not been able to schedule the angiogram yet. I provided him with a direct number to schedule it and told him to call if he has any problems.

## 2024-06-21 ENCOUNTER — TELEPHONE (OUTPATIENT)
Dept: TRANSPLANT | Facility: CLINIC | Age: 29
End: 2024-06-21
Payer: MEDICARE

## 2024-06-21 NOTE — TELEPHONE ENCOUNTER
Jhoan called to inquire about having a form filled out for his mom by a transplant surgeon. I let him know that an FMLA form would need to be filled out once a date was set for transplant since we are not sure when he will be having surgery. He had no further questions.

## 2024-07-16 ENCOUNTER — TELEPHONE (OUTPATIENT)
Dept: TRANSPLANT | Facility: CLINIC | Age: 29
End: 2024-07-16

## 2024-07-16 ENCOUNTER — HOSPITAL ENCOUNTER (OUTPATIENT)
Dept: CT IMAGING | Facility: CLINIC | Age: 29
Discharge: HOME | End: 2024-07-16
Payer: MEDICARE

## 2024-07-16 VITALS — RESPIRATION RATE: 16 BRPM | SYSTOLIC BLOOD PRESSURE: 132 MMHG | HEART RATE: 63 BPM | DIASTOLIC BLOOD PRESSURE: 88 MMHG

## 2024-07-16 DIAGNOSIS — Z01.818 PRE-TRANSPLANT EVALUATION FOR KIDNEY TRANSPLANT: ICD-10-CM

## 2024-07-16 DIAGNOSIS — R94.39 ABNORMAL RESULT OF OTHER CARDIOVASCULAR FUNCTION STUDY: ICD-10-CM

## 2024-07-16 PROCEDURE — 250N000011 HC RX IP 250 OP 636: Performed by: INTERNAL MEDICINE

## 2024-07-16 PROCEDURE — 250N000009 HC RX 250: Performed by: INTERNAL MEDICINE

## 2024-07-16 PROCEDURE — 75574 CT ANGIO HRT W/3D IMAGE: CPT | Mod: MF

## 2024-07-16 PROCEDURE — G1010 CDSM STANSON: HCPCS | Performed by: INTERNAL MEDICINE

## 2024-07-16 PROCEDURE — 75574 CT ANGIO HRT W/3D IMAGE: CPT | Mod: 26 | Performed by: INTERNAL MEDICINE

## 2024-07-16 PROCEDURE — 250N000013 HC RX MED GY IP 250 OP 250 PS 637: Performed by: INTERNAL MEDICINE

## 2024-07-16 RX ORDER — IVABRADINE 5 MG/1
5-15 TABLET, FILM COATED ORAL
Status: COMPLETED | OUTPATIENT
Start: 2024-07-16 | End: 2024-07-16

## 2024-07-16 RX ORDER — METOPROLOL TARTRATE 1 MG/ML
5-15 INJECTION, SOLUTION INTRAVENOUS
Status: DISCONTINUED | OUTPATIENT
Start: 2024-07-16 | End: 2024-07-17 | Stop reason: HOSPADM

## 2024-07-16 RX ORDER — DILTIAZEM HYDROCHLORIDE 5 MG/ML
10-15 INJECTION INTRAVENOUS
Status: DISCONTINUED | OUTPATIENT
Start: 2024-07-16 | End: 2024-07-17 | Stop reason: HOSPADM

## 2024-07-16 RX ORDER — DILTIAZEM HYDROCHLORIDE 120 MG/1
120 TABLET, FILM COATED ORAL
Status: DISCONTINUED | OUTPATIENT
Start: 2024-07-16 | End: 2024-07-17 | Stop reason: HOSPADM

## 2024-07-16 RX ORDER — METHYLPREDNISOLONE SODIUM SUCCINATE 125 MG/2ML
125 INJECTION, POWDER, LYOPHILIZED, FOR SOLUTION INTRAMUSCULAR; INTRAVENOUS
Status: DISCONTINUED | OUTPATIENT
Start: 2024-07-16 | End: 2024-07-17 | Stop reason: HOSPADM

## 2024-07-16 RX ORDER — DIPHENHYDRAMINE HYDROCHLORIDE 50 MG/ML
25-50 INJECTION INTRAMUSCULAR; INTRAVENOUS
Status: DISCONTINUED | OUTPATIENT
Start: 2024-07-16 | End: 2024-07-17 | Stop reason: HOSPADM

## 2024-07-16 RX ORDER — ONDANSETRON 2 MG/ML
4 INJECTION INTRAMUSCULAR; INTRAVENOUS
Status: DISCONTINUED | OUTPATIENT
Start: 2024-07-16 | End: 2024-07-17 | Stop reason: HOSPADM

## 2024-07-16 RX ORDER — NITROGLYCERIN 0.4 MG/1
.4-.8 TABLET SUBLINGUAL
Status: DISCONTINUED | OUTPATIENT
Start: 2024-07-16 | End: 2024-07-17 | Stop reason: HOSPADM

## 2024-07-16 RX ORDER — DIPHENHYDRAMINE HCL 25 MG
25 CAPSULE ORAL
Status: DISCONTINUED | OUTPATIENT
Start: 2024-07-16 | End: 2024-07-17 | Stop reason: HOSPADM

## 2024-07-16 RX ORDER — LIDOCAINE 40 MG/G
CREAM TOPICAL
OUTPATIENT
Start: 2024-07-16

## 2024-07-16 RX ORDER — METOPROLOL TARTRATE 25 MG/1
25-100 TABLET, FILM COATED ORAL
Status: COMPLETED | OUTPATIENT
Start: 2024-07-16 | End: 2024-07-16

## 2024-07-16 RX ORDER — IOPAMIDOL 755 MG/ML
110 INJECTION, SOLUTION INTRAVASCULAR ONCE
Status: COMPLETED | OUTPATIENT
Start: 2024-07-16 | End: 2024-07-16

## 2024-07-16 RX ADMIN — IVABRADINE 15 MG: 5 TABLET, FILM COATED ORAL at 07:59

## 2024-07-16 RX ADMIN — METOPROLOL TARTRATE 5 MG: 5 INJECTION INTRAVENOUS at 09:07

## 2024-07-16 RX ADMIN — IOPAMIDOL 110 ML: 755 INJECTION, SOLUTION INTRAVENOUS at 09:08

## 2024-07-16 RX ADMIN — METOPROLOL TARTRATE 100 MG: 100 TABLET, FILM COATED ORAL at 07:59

## 2024-07-16 RX ADMIN — METOPROLOL TARTRATE 10 MG: 5 INJECTION INTRAVENOUS at 09:04

## 2024-07-16 RX ADMIN — NITROGLYCERIN 0.8 MG: 0.4 TABLET SUBLINGUAL at 09:08

## 2024-07-16 NOTE — DISCHARGE INSTRUCTIONS
[address] [address]    Jhoan was at the [facility] on 7/16/2024.    06-May-2023

## 2024-07-16 NOTE — TELEPHONE ENCOUNTER
Attempted to call patient's dialysis unit x2 to verify fax number for PRA orders but no answer, left a message to return call.

## 2024-07-16 NOTE — PROGRESS NOTES
Pt arrived for Coronary CT angiogram. Test, meds and side effects reviewed with pt. Resting HR 90 bpm. Given 100 mg PO Metoprolol + 15 mg PO Ivabradine per verbal order. Administered 0.8 mg SL nitro and 15 mg IV metoprolol on CTA table per order. CTA completed. Patient tolerated procedure well and denies symptoms of allergic reaction. Post monitoring completed and VSS. D/C instructions reviewed with pt whom verbalized understanding of need to increase PO fluids today. D/C to gold waiting room accompanied by staff.

## 2024-07-17 ENCOUNTER — TELEPHONE (OUTPATIENT)
Dept: TRANSPLANT | Facility: CLINIC | Age: 29
End: 2024-07-17
Payer: MEDICARE

## 2024-07-17 DIAGNOSIS — Q60.2 CONGENITAL RENAL AGENESIS AND DYSGENESIS: ICD-10-CM

## 2024-07-17 DIAGNOSIS — Q60.5 CONGENITAL RENAL AGENESIS AND DYSGENESIS: ICD-10-CM

## 2024-07-17 DIAGNOSIS — Z76.82 ORGAN TRANSPLANT CANDIDATE: Primary | ICD-10-CM

## 2024-07-17 DIAGNOSIS — N18.6 ESRD (END STAGE RENAL DISEASE) (H): ICD-10-CM

## 2024-07-17 NOTE — LETTER
07/17/24        Jhoan Hofmfan  [address]  [address]  [facility] [address]        Dear Jhoan,    It was a pleasure to see you recently for consideration of kidney transplantation. Your pre-transplant evaluation results were reviewed at our Multidisciplinary Selection Committee on 7/17/24. The committee has approved you for being listed ACTIVE on the kidney waitlist once the following item is completed:    Updated PRA. This order has been faxed to your dialysis unit and the PRA mailers will be mailed today.   Please also work with your PCP to increase your physical activity. This is not required for active status, but will help with recovery post-transplant.     For any questions, please contact the Transplant Office at [phone].  My direct number is [phone].    Sincerely,  Leann Owen RN, Pre-Kidney Transplant Coordinator    Solid Organ Transplant  [facility], [facility]

## 2024-07-19 ENCOUNTER — LAB (OUTPATIENT)
Dept: LAB | Facility: CLINIC | Age: 29
End: 2024-07-19
Payer: MEDICARE

## 2024-07-19 DIAGNOSIS — Q60.5 CONGENITAL RENAL AGENESIS AND DYSGENESIS: ICD-10-CM

## 2024-07-19 DIAGNOSIS — N18.6 ESRD (END STAGE RENAL DISEASE) (H): ICD-10-CM

## 2024-07-19 DIAGNOSIS — Q60.2 CONGENITAL RENAL AGENESIS AND DYSGENESIS: ICD-10-CM

## 2024-07-19 DIAGNOSIS — Z76.82 ORGAN TRANSPLANT CANDIDATE: ICD-10-CM

## 2024-07-19 PROCEDURE — 86832 HLA CLASS I HIGH DEFIN QUAL: CPT

## 2024-07-19 PROCEDURE — 86833 HLA CLASS II HIGH DEFIN QUAL: CPT

## 2024-07-24 LAB
PROTOCOL CUTOFF: NORMAL
SA 1  COMMENTS: NORMAL
SA 1 CELL: NORMAL
SA 1 TEST METHOD: NORMAL
SA 2 CELL: NORMAL
SA 2 COMMENTS: NORMAL
SA 2 TEST METHOD: NORMAL
SA1 HI RISK ABY: NORMAL
SA1 MOD RISK ABY: NORMAL
SA2 HI RISK ABY: NORMAL
SA2 MOD RISK ABY: NORMAL
UNACCEPTABLE ANTIGENS: NORMAL
UNOS CPRA: 100

## 2024-07-25 ENCOUNTER — DOCUMENTATION ONLY (OUTPATIENT)
Dept: TRANSPLANT | Facility: CLINIC | Age: 29
End: 2024-07-25
Payer: MEDICARE

## 2024-07-25 ENCOUNTER — TELEPHONE (OUTPATIENT)
Dept: TRANSPLANT | Facility: CLINIC | Age: 29
End: 2024-07-25
Payer: MEDICARE

## 2024-07-25 ASSESSMENT — ENCOUNTER SYMPTOMS: NEW SYMPTOMS OF CORONARY ARTERY DISEASE: 0

## 2024-07-25 NOTE — LETTER
2024    Jhoan Hfofman  [address]  [address]  [facility] [address]      Dear Mr. Hoffman,    This letter is sent to confirm that you have completed your transplant work-up and you are a candidate in the kidney transplant program at the [facility].  You were placed on the kidney ACTIVE waitlist on 2024 with a dialysis start date of 2020.      When you are active on the waitlist and an organ becomes available, a coordinator will need to speak to you immediately.  You could be contacted at any time during the day or night as an organ could become available at any time.  Please make certain our office always has your current telephone numbers and address.      Items we will need from you:    We have received approval from your insurance company for the transplant procedure.  It is critical that you notify us if there is any change in your insurance.  It is also important that you familiarize yourself with the details of your specific insurance policy.  Our patient  is available to assist you if you should have any questions regarding your coverage.    An ALA or PRA blood sample needs to be sent here every 3 months to match you with  donors or any potential living donors. Your next PRA sample is due 10/19/2024.  If you need this testing, special mailing boxes (called mailers) will be sent to you directly from the Outreach Department.  You should take the physician order form and the  to your home laboratory when it is time to for this testing to be done.  Additional mailers can be obtained by calling the Transplant Office and asking to speak to a kidney .    During this waiting period, we may request additional periodic laboratory tests with your primary physician.  It will be your responsibility to remind your physician to forward your results to the Transplant Office.    We need to be kept  informed of any changes in your medical condition such as:    changes in your medications,   significant changes in your health  significant infections (such as pneumonia or abscesses)  blood transfusions  any condition which requires hospitalization  any surgery    Remember to complete any routine cancer screening tests required before your transplant.  This includes colonoscopy; prostate screening for men, and mammogram and gynecologic testing for women, as well as dental work.  Your primary care clinic can assist you with arranging for these exams.  Remind your caregivers to forward copies of the records and final reports.      We want you to know that our program has physician and surgeon coverage 24 hours a day, 365 days a year. In addition, our transplant surgeons and physicians will not be on call for two or more transplant programs more than 30 miles apart unless the circumstances have been reviewed and approved by the United Network for Organ Sharing (UNOS) Membership and Professional Standards Committee (MPSC). Finally, our primary physician and primary surgeons are not designated as the primary surgeon or primary physician at more than 1 transplant hospital. If this coverage changes or there are substantial program changes, you will be notified in writing by letter.     Attached is a letter from UNOS that describes the services and information offered to patients by UNOS and the Organ Procurement and Transplantation Network (OPTN).    We appreciate having had the opportunity to participate in your care.  If you have questions, please feel free to call the Transplant Office at [phone] or [phone].    Per our routine office workflow, you will now be transferred to the kidney waitlist transplant coordinator team. Your new coordinator is Yoanna Venegas assisted by Lore Orta. Either can be reached by calling our main office number at [phone].     Sincerely,  Leann Owen RN, BSN; Pre Kidney  Transplant Coordinator    Solid Organ Transplant  [facility]      Enclosures: UNOS Letter  cc: Care Team                  The Organ Procurement and Transplantation Network Toll-free patient services line:  [phone]  Your resource for organ transplant information    Staffed 8:30 am - 5:00 pm ET Monday - Friday Leave a message 24/7 to receive a call back    The Organ Procurement and Transplantation Network (OPTN) is the national transplant system. It makes the policies that decide how donated organs are matched to patients waiting for a transplant. The OPTN:    Makes sure donated organs get matched to people on the transplant waiting list  Tells people about the donation and transplant processes  Makes sure that the public knows about the need for more organ and tissue donations    The OPTN has a free patient services line that you can call to:  Get more information about:  Organ donation and organ transplants  Donation and transplant policies  Get an information kit with:  A list of transplant hospitals  Waiting list information  Talk about any questions you may have about your transplant hospital or organ procurement organization. The staff will do their best to help you or point you to others who may help.  Find out how you can volunteer with the OPTN and help shape transplant policy     The patient services line number is: 7-980-662-0222    Patient services line staff CANNOT answer questions about your own medical care, including:  Waiting list status  Test results  Medical records  You will need to call your transplant hospital for this information.    The following websites have more information about transplantation and donation:    OPTN: https://optn.transplant.hrsa.gov/    For potential living donors and transplant recipients:    Living with transplant: https://www.transplantliving.org/    Living donation process:  https://optn.transplant.hrsa.gov/living-donation/    Financial assistance: https://www.livingdonorassistance.org/    Transplantation data: https://www.srtr.org/    Organ donation: https://www.organdonor.gov/    Volunteer with the OPTN: https://optn.transplant.hrsa.gov/get-involved/

## 2024-07-25 NOTE — TELEPHONE ENCOUNTER
Called patient to inform them of status change to ACTIVE on the Kidney alone list today 2024. Status change letter and PRA orders to be mailed. Patient is in agreement with listing ACTIVE status.      Discussed:   - Pt is eligible for  donor offers and pt can receive calls 24 hours a day, 7 days a week, and on call staff need to be able to reach pt or one of emergency contacts within 30 minutes or they might skip over to next recipient on list.   -Please make sure your phone is charged at all times and your voicemail is not full   -Your transplant on call coordinator will give you directions about when and where you will need to come to the hospital for transplant  -The transplant coordinator will call you to discuss your current health status, the offer, and plans to move forward.  Some organ offer calls will require you to come to the hospital immediately; some may not be as time sensitive.  It may be possible for you to come to the hospital and, for various reasons, the transplant could be cancelled.  This is often called a  dry run .  - Reviewed the right to accept or decline offer, without penalty  - Expected length of surgical procedure is about 4-6 hours, expected inpatient length of stay post transplant is 3-4 days, and potential to stay locally post transplant. Offered resources for lodging in the area  - Verified contact information as documented in Epic. Confirmed emergency contact information  -Instructed patient about importance of having PRAs drawn every 3 months via: (Mailers/[facility]). Encouraged them to set reminder in their preferred calendar to stay on top of their quarterly labs  -Reminded pt to stay up on health maintenance and to call with any updates on their health status, insurance or contact information.   -Reminded pt to inform RNCC as soon as possible if they test positive for COVID.  -Donor website was provided   -You may be contacted regarding ongoing research studies if  deemed a candidate for a trial. I did let him know this is completely voluntary and will not change his transplant status.     -Last PRA was 100 on 7/19/24. Jhoan is aware his PRA is 100.      -Provided name and contact information for additional questions or concerns.  Pt expressed excellent understanding of all and was in good agreement with the plan.      Discussed with Jhoan per our typical office routine, his new coordinator will be Yoanna Venegas assisted by Lore Orta.

## 2024-07-26 ENCOUNTER — TELEPHONE (OUTPATIENT)
Dept: TRANSPLANT | Facility: CLINIC | Age: 29
End: 2024-07-26
Payer: MEDICARE

## 2024-10-09 ENCOUNTER — LAB (OUTPATIENT)
Dept: LAB | Facility: CLINIC | Age: 29
End: 2024-10-09
Payer: MEDICARE

## 2024-10-09 DIAGNOSIS — N18.6 ESRD (END STAGE RENAL DISEASE) (H): ICD-10-CM

## 2024-10-09 DIAGNOSIS — Q60.5 CONGENITAL RENAL AGENESIS AND DYSGENESIS: ICD-10-CM

## 2024-10-09 DIAGNOSIS — Z76.82 ORGAN TRANSPLANT CANDIDATE: ICD-10-CM

## 2024-10-09 DIAGNOSIS — Q60.2 CONGENITAL RENAL AGENESIS AND DYSGENESIS: ICD-10-CM

## 2024-10-09 PROCEDURE — 86832 HLA CLASS I HIGH DEFIN QUAL: CPT

## 2024-10-09 PROCEDURE — 86833 HLA CLASS II HIGH DEFIN QUAL: CPT

## 2024-10-21 DIAGNOSIS — Z94.0 KIDNEY TRANSPLANTED: Primary | ICD-10-CM

## 2024-10-21 DIAGNOSIS — Z76.82 ORGAN TRANSPLANT CANDIDATE: ICD-10-CM

## 2024-10-21 RX ORDER — TACROLIMUS 0.5 MG/1
1.5 CAPSULE ORAL 2 TIMES DAILY
Qty: 180 CAPSULE | Refills: 3 | Status: SHIPPED | OUTPATIENT
Start: 2024-10-21

## 2025-01-21 ENCOUNTER — DOCUMENTATION ONLY (OUTPATIENT)
Dept: TRANSPLANT | Facility: CLINIC | Age: 30
End: 2025-01-21
Payer: MEDICARE

## 2025-01-21 VITALS — BODY MASS INDEX: 28.82 KG/M2 | HEIGHT: 67 IN | WEIGHT: 183.64 LBS

## 2025-02-18 ENCOUNTER — LAB (OUTPATIENT)
Dept: LAB | Facility: CLINIC | Age: 30
End: 2025-02-18
Payer: MEDICARE

## 2025-02-18 DIAGNOSIS — N18.6 ESRD (END STAGE RENAL DISEASE) (H): ICD-10-CM

## 2025-02-18 DIAGNOSIS — Q60.5 CONGENITAL RENAL AGENESIS AND DYSGENESIS: ICD-10-CM

## 2025-02-18 DIAGNOSIS — Z76.82 ORGAN TRANSPLANT CANDIDATE: ICD-10-CM

## 2025-02-18 DIAGNOSIS — Q60.2 CONGENITAL RENAL AGENESIS AND DYSGENESIS: ICD-10-CM

## 2025-05-07 ENCOUNTER — ORGAN (OUTPATIENT)
Dept: TRANSPLANT | Facility: CLINIC | Age: 30
End: 2025-05-07
Payer: MEDICARE

## 2025-05-07 DIAGNOSIS — Z76.82 AWAITING ORGAN TRANSPLANT: Primary | ICD-10-CM

## 2025-05-08 ENCOUNTER — APPOINTMENT (OUTPATIENT)
Dept: GENERAL RADIOLOGY | Facility: CLINIC | Age: 30
DRG: 652 | End: 2025-05-08
Attending: NURSE PRACTITIONER
Payer: MEDICARE

## 2025-05-08 ENCOUNTER — HOSPITAL ENCOUNTER (OUTPATIENT)
Facility: CLINIC | Age: 30
End: 2025-05-08
Attending: TRANSPLANT SURGERY | Admitting: TRANSPLANT SURGERY
Payer: MEDICARE

## 2025-05-08 VITALS
WEIGHT: 173.6 LBS | OXYGEN SATURATION: 98 % | SYSTOLIC BLOOD PRESSURE: 141 MMHG | HEART RATE: 87 BPM | HEIGHT: 66 IN | TEMPERATURE: 98 F | DIASTOLIC BLOOD PRESSURE: 89 MMHG | RESPIRATION RATE: 18 BRPM | BODY MASS INDEX: 27.9 KG/M2

## 2025-05-08 DIAGNOSIS — I15.1 HTN, KIDNEY TRANSPLANT RELATED: ICD-10-CM

## 2025-05-08 DIAGNOSIS — Z94.0 KIDNEY REPLACED BY TRANSPLANT: Primary | ICD-10-CM

## 2025-05-08 DIAGNOSIS — Z94.0 HTN, KIDNEY TRANSPLANT RELATED: ICD-10-CM

## 2025-05-08 PROBLEM — Z76.82 KIDNEY TRANSPLANT CANDIDATE: Status: ACTIVE | Noted: 2025-05-08

## 2025-05-08 LAB
ABO + RH BLD: NORMAL
ALBUMIN SERPL BCG-MCNC: 4.5 G/DL (ref 3.5–5.2)
ALP SERPL-CCNC: 98 U/L (ref 40–150)
ALT SERPL W P-5'-P-CCNC: 9 U/L (ref 0–70)
ANION GAP SERPL CALCULATED.3IONS-SCNC: 16 MMOL/L (ref 7–15)
APTT PPP: 27 SECONDS (ref 22–38)
AST SERPL W P-5'-P-CCNC: 16 U/L (ref 0–45)
ATRIAL RATE - MUSE: 75 BPM
BASOPHILS # BLD AUTO: 0.1 10E3/UL (ref 0–0.2)
BASOPHILS NFR BLD AUTO: 1 %
BILIRUB SERPL-MCNC: 0.4 MG/DL
BLD GP AB SCN SERPL QL: NEGATIVE
BUN SERPL-MCNC: 24.4 MG/DL (ref 6–20)
CALCIUM SERPL-MCNC: 10.4 MG/DL (ref 8.8–10.4)
CHLORIDE SERPL-SCNC: 102 MMOL/L (ref 98–107)
CHOLEST SERPL-MCNC: 171 MG/DL
CREAT SERPL-MCNC: 10.1 MG/DL (ref 0.67–1.17)
DIASTOLIC BLOOD PRESSURE - MUSE: NORMAL MMHG
EGFRCR SERPLBLD CKD-EPI 2021: 6 ML/MIN/1.73M2
EOSINOPHIL # BLD AUTO: 0.3 10E3/UL (ref 0–0.7)
EOSINOPHIL NFR BLD AUTO: 4 %
ERYTHROCYTE [DISTWIDTH] IN BLOOD BY AUTOMATED COUNT: 12.8 % (ref 10–15)
FASTING STATUS PATIENT QL REPORTED: ABNORMAL
FASTING STATUS PATIENT QL REPORTED: ABNORMAL
GLUCOSE BLDC GLUCOMTR-MCNC: 83 MG/DL (ref 70–99)
GLUCOSE SERPL-MCNC: 77 MG/DL (ref 70–99)
HBV CORE AB SERPL QL IA: NONREACTIVE
HBV SURFACE AB SERPL IA-ACNC: >1000 M[IU]/ML
HBV SURFACE AB SERPL IA-ACNC: REACTIVE M[IU]/ML
HBV SURFACE AG SERPL QL IA: NONREACTIVE
HCO3 SERPL-SCNC: 22 MMOL/L (ref 22–29)
HCT VFR BLD AUTO: 26.2 % (ref 40–53)
HCV AB SERPL QL IA: NONREACTIVE
HDLC SERPL-MCNC: 32 MG/DL
HGB BLD-MCNC: 8.9 G/DL (ref 13.3–17.7)
HIV 1+2 AB+HIV1 P24 AG SERPL QL IA: NONREACTIVE
IMM GRANULOCYTES # BLD: 0 10E3/UL
IMM GRANULOCYTES NFR BLD: 0 %
INR PPP: 0.99 (ref 0.85–1.15)
INTERPRETATION ECG - MUSE: NORMAL
LDLC SERPL CALC-MCNC: 95 MG/DL
LYMPHOCYTES # BLD AUTO: 2 10E3/UL (ref 0.8–5.3)
LYMPHOCYTES NFR BLD AUTO: 31 %
MCH RBC QN AUTO: 30 PG (ref 26.5–33)
MCHC RBC AUTO-ENTMCNC: 34 G/DL (ref 31.5–36.5)
MCV RBC AUTO: 88 FL (ref 78–100)
MONOCYTES # BLD AUTO: 0.5 10E3/UL (ref 0–1.3)
MONOCYTES NFR BLD AUTO: 7 %
NEUTROPHILS # BLD AUTO: 3.8 10E3/UL (ref 1.6–8.3)
NEUTROPHILS NFR BLD AUTO: 57 %
NONHDLC SERPL-MCNC: 139 MG/DL
NRBC # BLD AUTO: 0 10E3/UL
NRBC BLD AUTO-RTO: 0 /100
P AXIS - MUSE: 41 DEGREES
PLATELET # BLD AUTO: 156 10E3/UL (ref 150–450)
POTASSIUM SERPL-SCNC: 4.3 MMOL/L (ref 3.4–5.3)
PR INTERVAL - MUSE: 162 MS
PROT SERPL-MCNC: 7.2 G/DL (ref 6.4–8.3)
PROTHROMBIN TIME: 13.4 SECONDS (ref 11.8–14.8)
QRS DURATION - MUSE: 86 MS
QT - MUSE: 368 MS
QTC - MUSE: 410 MS
R AXIS - MUSE: 33 DEGREES
RBC # BLD AUTO: 2.97 10E6/UL (ref 4.4–5.9)
SARS-COV-2 RNA RESP QL NAA+PROBE: NEGATIVE
SODIUM SERPL-SCNC: 140 MMOL/L (ref 135–145)
SPECIMEN EXP DATE BLD: NORMAL
SYSTOLIC BLOOD PRESSURE - MUSE: NORMAL MMHG
T AXIS - MUSE: 45 DEGREES
TRIGL SERPL-MCNC: 218 MG/DL
VENTRICULAR RATE- MUSE: 75 BPM
WBC # BLD AUTO: 6.6 10E3/UL (ref 4–11)

## 2025-05-08 PROCEDURE — 80061 LIPID PANEL: CPT | Performed by: NURSE PRACTITIONER

## 2025-05-08 PROCEDURE — 86704 HEP B CORE ANTIBODY TOTAL: CPT | Performed by: NURSE PRACTITIONER

## 2025-05-08 PROCEDURE — 80053 COMPREHEN METABOLIC PANEL: CPT | Performed by: NURSE PRACTITIONER

## 2025-05-08 PROCEDURE — 87521 HEPATITIS C PROBE&RVRS TRNSC: CPT | Performed by: NURSE PRACTITIONER

## 2025-05-08 PROCEDURE — 85610 PROTHROMBIN TIME: CPT | Performed by: NURSE PRACTITIONER

## 2025-05-08 PROCEDURE — 83945 ASSAY OF OXALATE: CPT | Performed by: NURSE PRACTITIONER

## 2025-05-08 PROCEDURE — 83036 HEMOGLOBIN GLYCOSYLATED A1C: CPT | Performed by: NURSE PRACTITIONER

## 2025-05-08 PROCEDURE — 87799 DETECT AGENT NOS DNA QUANT: CPT | Performed by: NURSE PRACTITIONER

## 2025-05-08 PROCEDURE — 86832 HLA CLASS I HIGH DEFIN QUAL: CPT | Performed by: NURSE PRACTITIONER

## 2025-05-08 PROCEDURE — 85004 AUTOMATED DIFF WBC COUNT: CPT | Performed by: NURSE PRACTITIONER

## 2025-05-08 PROCEDURE — 87389 HIV-1 AG W/HIV-1&-2 AB AG IA: CPT | Performed by: NURSE PRACTITIONER

## 2025-05-08 PROCEDURE — 87340 HEPATITIS B SURFACE AG IA: CPT | Performed by: NURSE PRACTITIONER

## 2025-05-08 PROCEDURE — 86706 HEP B SURFACE ANTIBODY: CPT | Performed by: NURSE PRACTITIONER

## 2025-05-08 PROCEDURE — 87635 SARS-COV-2 COVID-19 AMP PRB: CPT | Performed by: NURSE PRACTITIONER

## 2025-05-08 PROCEDURE — 86825 HLA X-MATH NON-CYTOTOXIC: CPT | Performed by: NURSE PRACTITIONER

## 2025-05-08 PROCEDURE — 86644 CMV ANTIBODY: CPT | Performed by: NURSE PRACTITIONER

## 2025-05-08 PROCEDURE — 36415 COLL VENOUS BLD VENIPUNCTURE: CPT | Performed by: NURSE PRACTITIONER

## 2025-05-08 PROCEDURE — 86665 EPSTEIN-BARR CAPSID VCA: CPT | Performed by: NURSE PRACTITIONER

## 2025-05-08 PROCEDURE — 86833 HLA CLASS II HIGH DEFIN QUAL: CPT | Performed by: NURSE PRACTITIONER

## 2025-05-08 PROCEDURE — 86803 HEPATITIS C AB TEST: CPT | Performed by: NURSE PRACTITIONER

## 2025-05-08 PROCEDURE — 82962 GLUCOSE BLOOD TEST: CPT

## 2025-05-08 PROCEDURE — 999N000248 HC STATISTIC IV INSERT WITH US BY RN

## 2025-05-08 PROCEDURE — 93005 ELECTROCARDIOGRAM TRACING: CPT

## 2025-05-08 PROCEDURE — 71046 X-RAY EXAM CHEST 2 VIEWS: CPT

## 2025-05-08 PROCEDURE — 86900 BLOOD TYPING SEROLOGIC ABO: CPT | Performed by: NURSE PRACTITIONER

## 2025-05-08 PROCEDURE — 86790 VIRUS ANTIBODY NOS: CPT | Performed by: NURSE PRACTITIONER

## 2025-05-08 PROCEDURE — 71046 X-RAY EXAM CHEST 2 VIEWS: CPT | Mod: 26 | Performed by: RADIOLOGY

## 2025-05-08 PROCEDURE — 86922 COMPATIBILITY TEST ANTIGLOB: CPT

## 2025-05-08 PROCEDURE — 250N000012 HC RX MED GY IP 250 OP 636 PS 637: Performed by: NURSE PRACTITIONER

## 2025-05-08 PROCEDURE — 85730 THROMBOPLASTIN TIME PARTIAL: CPT | Performed by: NURSE PRACTITIONER

## 2025-05-08 RX ORDER — ACETAMINOPHEN 325 MG/1
975 TABLET ORAL ONCE
OUTPATIENT
Start: 2025-05-08

## 2025-05-08 RX ORDER — ACETAMINOPHEN 650 MG/1
650 SUPPOSITORY RECTAL ONCE
OUTPATIENT
Start: 2025-05-08

## 2025-05-08 RX ORDER — OMEPRAZOLE 20 MG/1
40 CAPSULE, DELAYED RELEASE ORAL DAILY
Status: DISCONTINUED | OUTPATIENT
Start: 2025-05-08 | End: 2025-05-08

## 2025-05-08 RX ORDER — CARVEDILOL 25 MG/1
25 TABLET ORAL 2 TIMES DAILY WITH MEALS
Status: ON HOLD | COMMUNITY

## 2025-05-08 RX ORDER — PANTOPRAZOLE SODIUM 40 MG/1
40 TABLET, DELAYED RELEASE ORAL
Status: ACTIVE | OUTPATIENT
Start: 2025-05-09

## 2025-05-08 RX ORDER — LORATADINE 10 MG/1
10 TABLET ORAL DAILY PRN
Status: ON HOLD | COMMUNITY

## 2025-05-08 RX ORDER — LEVOFLOXACIN 5 MG/ML
500 INJECTION, SOLUTION INTRAVENOUS ONCE
OUTPATIENT
Start: 2025-05-08

## 2025-05-08 RX ORDER — LIDOCAINE 40 MG/G
CREAM TOPICAL
OUTPATIENT
Start: 2025-05-08

## 2025-05-08 RX ADMIN — TACROLIMUS 1.5 MG: 1 CAPSULE ORAL at 21:05

## 2025-05-08 ASSESSMENT — COLUMBIA-SUICIDE SEVERITY RATING SCALE - C-SSRS
6. HAVE YOU EVER DONE ANYTHING, STARTED TO DO ANYTHING, OR PREPARED TO DO ANYTHING TO END YOUR LIFE?: NO
2. HAVE YOU ACTUALLY HAD ANY THOUGHTS OF KILLING YOURSELF IN THE PAST MONTH?: NO
1. IN THE PAST MONTH, HAVE YOU WISHED YOU WERE DEAD OR WISHED YOU COULD GO TO SLEEP AND NOT WAKE UP?: NO

## 2025-05-08 ASSESSMENT — ACTIVITIES OF DAILY LIVING (ADL)
ADLS_ACUITY_SCORE: 50
ADLS_ACUITY_SCORE: 24
ADLS_ACUITY_SCORE: 24
ADLS_ACUITY_SCORE: 50
ADLS_ACUITY_SCORE: 24

## 2025-05-08 NOTE — H&P
[facility]    History and Physical  Transplant Surgery     Date of Admission:  2025    Assessment & Plan   Jhoan Hoffman is a 30 year old male with pertinent PMH including ESRD secondary to congenital renal dysplasia status post KT x 3 (LDKT , LDKT  complicated by ischemic necrosis and organized thrombus with subsequent explantation) and DDKT in ) now on iHD via LUE AVF since , pericardial thickening and pericardial effusion, anemia of chronic disease, reactive gastritis, CDI in , CYNDEE with use of CPAP, obesity, anxiety and depression who was notified on 2025 that a potentially suitable  donor kidney has became available and presented for further pre-operative work-up. Patient was informed of the risks and benefits regarding  donor organ transplantation, and has elected to proceed with possible kidney transplant.     Plan:   - admit to OP status   - final crossmatch and pre-op KT orders entered    - NPO at midnight    - Consent (In media tab)   - home medications ordered: omeprazole, Velphoro vitamins with meals for tonight, tacrolimus    - tacrolimus trough ordered for      Son Franklin MD  Adult Solid Organ Transplant   Contact: Vocera Web Console    History of Present Illness   Jhoan Hoffman is a 30 year old male with pertinent PMH including ESRD secondary to congenital renal dysplasia status post KT x 3 (LDKT , LDKT  complicated by ischemic necrosis and organized thrombus with subsequent explantation) and DDKT in ) now on iHD via LUE AVF since , pericardial thickening and pericardial effusion, anemia of chronic disease, reactive gastritis, CDI in , CYNDEE with use of CPAP, obesity, anxiety and depression who was notified on 2025 that a potentially suitable  donor kidney has became available and presented for further pre-operative work-up. Patient was informed of the risks and benefits  "regarding  donor organ transplantation, and has elected to proceed with possible kidney transplant.     cPRA: 100   Dialysis access: LUE AVF  Daily urine production: \"Very small amount once a day\"  Dry weight: 78.7kg  Last PO intake: Afternoon    Past Medical History    I have reviewed this patient's medical history and updated it with pertinent information if needed.   Past Medical History:   Diagnosis Date    Anemia     in ESRD    Depression     History of blood transfusion     per patient has had multiple    Hypertension     per patient \"under control\"    Kidney replaced by transplant     Peritoneal dialysis status     in past    Renal failure     Sleep apnea     Thyroid disease     per patient \"taking medication to control hyperparathyroidism\"       Past Surgical History   I have reviewed this patient's surgical history and updated it with pertinent information if needed.  Past Surgical History:   Procedure Laterality Date    BIOPSY      CREATE GRAFT LOOP ARTERIOVENOUS UPPER EXTREMITY  2020    Procedure: Create Graft Left Arteriovenous Upper Extremity;  Surgeon: Sidney aPlma MD;  Location: [facility] OR    CV PERICARDIOCENTESIS N/A 2022    Procedure: Pericardiocentesis;  Surgeon: Dennis Rodriguez MD;  Location: [facility]    CYSTOSCOPY, REMOVE STENT(S), COMBINED  2012    Procedure:COMBINED CYSTOSCOPY, REMOVE STENT(S); Cystoscopy, Removal Of Urinary Stent; Surgeon:FLORENTINO KNIGHT; Location:[facility] OR    ENDARECTERECTOMY RENAL  2011    Procedure:ENDARECTERECTOMY RENAL; Exploration of Transplant Kidney, Back Table Flush, Biopsy of Kidney and Reanastamosis of Kidney; Surgeon:FLORENTINO KNIGHT; Location:[facility] OR    EXPLANT TRANSPLANTED KIDNEY  2011    Procedure:EXPLANT TRANSPLANTED KIDNEY; Transplant Nephrectomy; Surgeon:FLORENTINO KNIGHT; Location:[facility]    INSERT CATHETER HEMODIALYSIS  2011    Procedure:INSERT CATHETER HEMODIALYSIS; Double Lumen; " Surgeon:JOE HE; Location:[facility]    INSERT CATHETER PERITONEAL DIALYSIS  4/28/2012    Procedure:INSERT CATHETER PERITONEAL DIALYSIS; Placement dialysis cath under fluoro, before kidney tx; Surgeon:FLORENTINO KNIGHT; [facility]    IR CVC TUNNEL PLACEMENT > 5 YRS OF AGE  12/18/2020    LAPAROTOMY EXPLORATORY  5/19/2011    Procedure:LAPAROTOMY EXPLORATORY; washout of kidney and closure; Surgeon:LFORENTINO KNIGHT; [facility]    PERCUTANEOUS BIOPSY KIDNEY  6/2/2011    Procedure:PERCUTANEOUS BIOPSY KIDNEY; Surgeon:GIL WILLIAM; [facility]    PERCUTANEOUS BIOPSY KIDNEY Right 6/26/2019    Procedure: Right Kidney Biopsy;  Surgeon: Peter Briggs MD;  [facility]: [facility]    PERCUTANEOUS BIOPSY KIDNEY Right 7/23/2019    Procedure: Right Kidney Biopsy;  Surgeon: Pro Menard MD;  [facility]: [facility]    REMOVE CATHETER PERITONEAL  5/3/2012    Procedure:REMOVE CATHETER PERITONEAL; Removal Of Peritoneal Dialysis Catheter; Surgeon:FLORENTINO KNIGHT; [facility]    REVISION FISTULA ARTERIOVENOUS UPPER EXTREMITY Left 2/16/2021    Procedure: Left upper AV fistula revision (transposition) with intraoperative ultrasound;  Surgeon: Sidney Palma MD;  Location: [facility] OR    TRANSPLANT KIDNEY RECIPIENT LIVING RELATED  1995    TRANSPLANT KIDNEY RECIPIENT LIVING UNRELATED  5/18/2011    Procedure:TRANSPLANT KIDNEY RECIPIENT LIVING UNRELATED; Living unrelated left kidney transplant and placement of ureteral stent into transplant ureter.; Surgeon:FLORENTINO KNIGHT; [facility]    TRANSPLANT KIDNEY RECIPIENT LIVING UNRELATED  4/28/2012    Procedure:TRANSPLANT KIDNEY RECIPIENT LIVING UNRELATED; kidney transplant -   UNOS# [id number]  Organ arrival: 2100 4/27  Cross match results: 0200 4/28  Donor blood type: A  Recipient blood type: A; Surgeon:FLORENTINO KNIGHT; Location:[facility]       Prior to Admission Medications   Cannot display prior to admission medications because the patient has not been admitted in  this contact.     Allergies   Allergies   Allergen Reactions    Amoxicillin Swelling    Penicillins Swelling and Other (See Comments)    Azithromycin      Z pack - can't take with cyclosporine.    Vancomycin      Ruth syndrome    Cefprozil Rash       Social History   I have reviewed this patient's social history and updated it with pertinent information if needed. Jhoan Hoffman  reports that he has never smoked. He has never used smokeless tobacco. He reports that he does not currently use alcohol. He reports that he does not use drugs.    Family History   I have reviewed this patient's family history and updated it with pertinent information if needed.   Family History   Problem Relation Age of Onset    Kidney Disease No family hx of        Review of Systems   The 10 point Review of Systems is negative other than noted in the HPI or here.     Physical Exam                      Vital Signs with Ranges  BP: ()/()   Arterial Line BP: ()/()   0 lbs 0 oz    Constitutional: A&Ox4, NAD  Eyes: Pupils equal and reactive, white sclera, no conjunctivitis  ENT: No septal deviation, no rhinorrhea  Respiratory: Non-labored breathing on room air  Cardiovascular: RRR  GI: Soft, compressible, non-distended, non-tender, well healed incisional scars.  Lymph/Hematologic: Normal lymph nodes  Genitourinary: Normal male genitalia  Skin: Normal pallor  Musculoskeletal: Full range of motion, motor strength 5/5  Neurologic: CN 3-12 grossly intact, neuromuscular exam intact, fine motor exam intact  Neuropsychiatric: Normal affect    Data   Results for orders placed or performed during the hospital encounter of 05/08/25 (from the past 24 hours)   COVID-19 Virus (Coronavirus) by PCR Nose    Specimen: Nose; Swab   Result Value Ref Range    SARS CoV2 PCR Negative Negative    Narrative    Testing was performed using the Xpert Xpress SARS-CoV-2 Assay on the Cepheid Gene-Xpert Instrument Systems. Additional information about this assay can be  found via the Test Directory. This [place] FDA cleared test should be ordered for the detection of SARS-CoV-2 in individuals with signs and symptoms of respiratory tract infection. This test is for in vitro diagnostic use under the [place] FDA for laboratories certified under CLIA to perform high complexity testing. A negative result does not rule out the presence of PCR inhibitors in the specimen or target RNA concentration below the limit of detection for the assay. The possibility of a false negative should be considered if the patient's recent exposure or clinical presentation suggests COVID-19. This test was validated by [facility] [employer]. These Laboratories are certified under the  Clinical Laboratory Improvement Amendments (CLIA) as qualified to perform high complexity testing.   CBC with platelets differential    Narrative    The following orders were created for panel order CBC with platelets differential.  Procedure                               Abnormality         Status                     ---------                               -----------         ------                     CBC with platelets and ...[[id number]]  Abnormal            Final result                 Please view results for these tests on the individual orders.   INR   Result Value Ref Range    INR 0.99 0.85 - 1.15    PT 13.4 11.8 - 14.8 Seconds   Partial thromboplastin time   Result Value Ref Range    aPTT 27 22 - 38 Seconds   Comprehensive metabolic panel   Result Value Ref Range    Sodium 140 135 - 145 mmol/L    Potassium 4.3 3.4 - 5.3 mmol/L    Carbon Dioxide (CO2) 22 22 - 29 mmol/L    Anion Gap 16 (H) 7 - 15 mmol/L    Urea Nitrogen 24.4 (H) 6.0 - 20.0 mg/dL    Creatinine 10.10 (H) 0.67 - 1.17 mg/dL    GFR Estimate 6 (L) >60 mL/min/1.73m2    Calcium 10.4 8.8 - 10.4 mg/dL    Chloride 102 98 - 107 mmol/L    Glucose 77 70 - 99 mg/dL    Alkaline Phosphatase 98 40 - 150 U/L    AST 16 0 - 45 U/L    ALT 9 0 - 70 U/L    Protein Total 7.2 6.4 -  8.3 g/dL    Albumin 4.5 3.5 - 5.2 g/dL    Bilirubin Total 0.4 <=1.2 mg/dL    Patient Fasting > 8hrs? Unknown    HLA Final Crossmatch Recipient    Narrative    The following orders were created for panel order HLA Final Crossmatch Recipient.  Procedure                               Abnormality         Status                     ---------                               -----------         ------                     HLA Final Crossmatch Re...[[id number]]                      In process                   Please view results for these tests on the individual orders.   Lipid Profile   Result Value Ref Range    Cholesterol 171 <200 mg/dL    Triglycerides 218 (H) <150 mg/dL    Direct Measure HDL 32 (L) >=40 mg/dL    LDL Cholesterol Calculated 95 <100 mg/dL    Non HDL Cholesterol 139 (H) <130 mg/dL    Patient Fasting > 8hrs? Unknown     Narrative    Cholesterol  Desirable: < 200 mg/dL  Borderline High: 200 - 239 mg/dL  High: >= 240 mg/dL    Triglycerides  Normal: < 150 mg/dL  Borderline High: 150 - 199 mg/dL  High: 200-499 mg/dL  Very High: >= 500 mg/dL    Direct Measure HDL  Female: >= 50 mg/dL   Male: >= 40 mg/dL    LDL Cholesterol  Desirable: < 100 mg/dL  Above Desirable: 100 - 129 mg/dL   Borderline High: 130 - 159 mg/dL   High:  160 - 189 mg/dL   Very High: >= 190 mg/dL    Non HDL Cholesterol  Desirable: < 130 mg/dL  Above Desirable: 130 - 159 mg/dL  Borderline High: 160 - 189 mg/dL  High: 190 - 219 mg/dL  Very High: >= 220 mg/dL   ABO/Rh type and screen    Narrative    The following orders were created for panel order ABO/Rh type and screen.  Procedure                               Abnormality         Status                     ---------                               -----------         ------                     Adult Type and Screen[[id number]]                           Preliminary result           Please view results for these tests on the individual orders.   CBC with platelets and differential   Result Value Ref  Range    WBC Count 6.6 4.0 - 11.0 10e3/uL    RBC Count 2.97 (L) 4.40 - 5.90 10e6/uL    Hemoglobin 8.9 (L) 13.3 - 17.7 g/dL    Hematocrit 26.2 (L) 40.0 - 53.0 %    MCV 88 78 - 100 fL    MCH 30.0 26.5 - 33.0 pg    MCHC 34.0 31.5 - 36.5 g/dL    RDW 12.8 10.0 - 15.0 %    Platelet Count 156 150 - 450 10e3/uL    % Neutrophils 57 %    % Lymphocytes 31 %    % Monocytes 7 %    % Eosinophils 4 %    % Basophils 1 %    % Immature Granulocytes 0 %    NRBCs per 100 WBC 0 <1 /100    Absolute Neutrophils 3.8 1.6 - 8.3 10e3/uL    Absolute Lymphocytes 2.0 0.8 - 5.3 10e3/uL    Absolute Monocytes 0.5 0.0 - 1.3 10e3/uL    Absolute Eosinophils 0.3 0.0 - 0.7 10e3/uL    Absolute Basophils 0.1 0.0 - 0.2 10e3/uL    Absolute Immature Granulocytes 0.0 <=0.4 10e3/uL    Absolute NRBCs 0.0 10e3/uL   Adult Type and Screen   Result Value Ref Range    ABO/RH(D) A NEG     SPECIMEN EXPIRATION DATE [id number]    EKG 12-lead, tracing only   Result Value Ref Range    Systolic Blood Pressure  mmHg    Diastolic Blood Pressure  mmHg    Ventricular Rate 75 BPM    Atrial Rate 75 BPM    KY Interval 162 ms    QRS Duration 86 ms     ms    QTc 410 ms    P Axis 41 degrees    R AXIS 33 degrees    T Axis 45 degrees    Interpretation ECG       Sinus rhythm with sinus arrhythmia  Normal ECG  When compared with ECG of 06-Apr-2023 10:50,  T wave inversion no longer evident in Anterior leads  QT has shortened     XR Chest 2 Views    Narrative    EXAM: XR CHEST 2 VIEWS  5/8/2025 8:07 PM     HISTORY:  pre-transplant screening       COMPARISON:  4/6/2023    FINDINGS:     Portable upright view of the chest. Trachea is midline.  Cardiomediastinal silhouette and pulmonary vasculature are within  normal limits. No focal airspace opacity, pleural effusion or  appreciable pneumothorax.    No acute osseous abnormality. Visualized upper abdomen is  unremarkable.        Impression    IMPRESSION:   Clear chest.     I have personally reviewed the examination and initial  interpretation  and I agree with the findings.    GALINA MALLOY MD         SYSTEM ID:  [id number]     *Note: Due to a large number of results and/or encounters for the requested time period, some results have not been displayed. A complete set of results can be found in Results Review.

## 2025-05-09 PROBLEM — N18.6 ESRD (END STAGE RENAL DISEASE) ON DIALYSIS (H): Status: ACTIVE | Noted: 2025-05-09

## 2025-05-09 PROBLEM — Z99.2 ESRD (END STAGE RENAL DISEASE) ON DIALYSIS (H): Status: ACTIVE | Noted: 2025-05-09

## 2025-05-09 LAB
ALBUMIN MFR UR ELPH: 124 MG/DL
ALBUMIN UR-MCNC: 100 MG/DL
APPEARANCE UR: CLEAR
BILIRUB UR QL STRIP: NEGATIVE
BK VIRUS SPECIMEN TYPE: NORMAL
BKV DNA # SPEC NAA+PROBE: NOT DETECTED IU/ML
BLD PROD TYP BPU: NORMAL
BLOOD COMPONENT TYPE: NORMAL
CMV DNA SPEC NAA+PROBE-ACNC: NOT DETECTED IU/ML
CMV IGG SERPL IA-ACNC: <0.2 U/ML
CMV IGG SERPL IA-ACNC: NORMAL
CODING SYSTEM: NORMAL
COLOR UR AUTO: ABNORMAL
CREAT UR-MCNC: 201 MG/DL
CROSSMATCH: NORMAL
EBV VCA IGG SER IA-ACNC: 720 U/ML
EBV VCA IGG SER IA-ACNC: POSITIVE
EBV VCA IGM SER IA-ACNC: <10 U/ML
EBV VCA IGM SER IA-ACNC: NORMAL
EST. AVERAGE GLUCOSE BLD GHB EST-MCNC: 94 MG/DL
GLUCOSE BLDC GLUCOMTR-MCNC: 69 MG/DL (ref 70–99)
GLUCOSE BLDC GLUCOMTR-MCNC: 78 MG/DL (ref 70–99)
GLUCOSE UR STRIP-MCNC: 30 MG/DL
HBA1C MFR BLD: 4.9 %
HGB UR QL STRIP: ABNORMAL
HYALINE CASTS: 7 /LPF
ISSUE DATE AND TIME: NORMAL
ISSUE DATE AND TIME: NORMAL
KETONES UR STRIP-MCNC: NEGATIVE MG/DL
LEUKOCYTE ESTERASE UR QL STRIP: NEGATIVE
MUCOUS THREADS #/AREA URNS LPF: PRESENT /LPF
NITRATE UR QL: NEGATIVE
PH UR STRIP: 6 [PH] (ref 5–7)
POTASSIUM SERPL-SCNC: 4 MMOL/L (ref 3.4–5.3)
PROT/CREAT 24H UR: 0.62 MG/MG CR (ref 0–0.2)
RBC URINE: 13 /HPF
SP GR UR STRIP: 1.01 (ref 1–1.03)
SPECIMEN TYPE: NORMAL
SQUAMOUS EPITHELIAL: 1 /HPF
TACROLIMUS BLD-MCNC: 2.7 UG/L (ref 5–15)
TME LAST DOSE: ABNORMAL H
TME LAST DOSE: ABNORMAL H
TRANSITIONAL EPI: <1 /HPF
UNIT ABO/RH: NORMAL
UNIT NUMBER: NORMAL
UNIT STATUS: NORMAL
UNIT TYPE ISBT: 9500
UROBILINOGEN UR STRIP-MCNC: NORMAL MG/DL
WBC URINE: 3 /HPF

## 2025-05-09 PROCEDURE — 250N000013 HC RX MED GY IP 250 OP 250 PS 637: Performed by: PHYSICIAN ASSISTANT

## 2025-05-09 PROCEDURE — 120N000011 HC R&B TRANSPLANT UMMC

## 2025-05-09 PROCEDURE — 250N000013 HC RX MED GY IP 250 OP 250 PS 637: Performed by: TRANSPLANT SURGERY

## 2025-05-09 PROCEDURE — 84132 ASSAY OF SERUM POTASSIUM: CPT | Performed by: NURSE PRACTITIONER

## 2025-05-09 PROCEDURE — 99223 1ST HOSP IP/OBS HIGH 75: CPT | Mod: FS | Performed by: INTERNAL MEDICINE

## 2025-05-09 PROCEDURE — 250N000013 HC RX MED GY IP 250 OP 250 PS 637: Performed by: NURSE PRACTITIONER

## 2025-05-09 PROCEDURE — 250N000012 HC RX MED GY IP 250 OP 636 PS 637: Performed by: NURSE PRACTITIONER

## 2025-05-09 PROCEDURE — 36415 COLL VENOUS BLD VENIPUNCTURE: CPT | Performed by: NURSE PRACTITIONER

## 2025-05-09 PROCEDURE — 5A1D70Z PERFORMANCE OF URINARY FILTRATION, INTERMITTENT, LESS THAN 6 HOURS PER DAY: ICD-10-PCS | Performed by: TRANSPLANT SURGERY

## 2025-05-09 PROCEDURE — 81001 URINALYSIS AUTO W/SCOPE: CPT | Performed by: NURSE PRACTITIONER

## 2025-05-09 PROCEDURE — 87086 URINE CULTURE/COLONY COUNT: CPT | Performed by: NURSE PRACTITIONER

## 2025-05-09 PROCEDURE — 84156 ASSAY OF PROTEIN URINE: CPT | Performed by: NURSE PRACTITIONER

## 2025-05-09 PROCEDURE — 82962 GLUCOSE BLOOD TEST: CPT

## 2025-05-09 PROCEDURE — 258N000003 HC RX IP 258 OP 636: Performed by: INTERNAL MEDICINE

## 2025-05-09 PROCEDURE — 80197 ASSAY OF TACROLIMUS: CPT | Performed by: NURSE PRACTITIONER

## 2025-05-09 PROCEDURE — 90935 HEMODIALYSIS ONE EVALUATION: CPT

## 2025-05-09 RX ORDER — CARVEDILOL 12.5 MG/1
12.5 TABLET ORAL 2 TIMES DAILY WITH MEALS
Status: COMPLETED | OUTPATIENT
Start: 2025-05-09 | End: 2025-05-09

## 2025-05-09 RX ADMIN — TACROLIMUS 1.5 MG: 1 CAPSULE ORAL at 09:07

## 2025-05-09 RX ADMIN — SODIUM CHLORIDE 250 ML: 0.9 INJECTION, SOLUTION INTRAVENOUS at 14:24

## 2025-05-09 RX ADMIN — CARVEDILOL 12.5 MG: 12.5 TABLET, FILM COATED ORAL at 18:01

## 2025-05-09 RX ADMIN — PANTOPRAZOLE SODIUM 40 MG: 40 TABLET, DELAYED RELEASE ORAL at 17:59

## 2025-05-09 RX ADMIN — SUCROFERRIC OXYHYDROXIDE 1000 MG: 500 TABLET, CHEWABLE ORAL at 18:01

## 2025-05-09 RX ADMIN — Medication: at 18:00

## 2025-05-09 RX ADMIN — SODIUM CHLORIDE 200 ML: 0.9 INJECTION, SOLUTION INTRAVENOUS at 14:23

## 2025-05-09 RX ADMIN — TACROLIMUS 1.5 MG: 1 CAPSULE ORAL at 17:59

## 2025-05-09 ASSESSMENT — ACTIVITIES OF DAILY LIVING (ADL)
ADLS_ACUITY_SCORE: 26
ADLS_ACUITY_SCORE: 24
ADLS_ACUITY_SCORE: 26
ADLS_ACUITY_SCORE: 24
ADLS_ACUITY_SCORE: 26
ADLS_ACUITY_SCORE: 24
ADLS_ACUITY_SCORE: 26
ADLS_ACUITY_SCORE: 24
ADLS_ACUITY_SCORE: 24
ADLS_ACUITY_SCORE: 26
ADLS_ACUITY_SCORE: 24
ADLS_ACUITY_SCORE: 24

## 2025-05-09 NOTE — TELEPHONE ENCOUNTER
TRANSPLANT OR REPORT    Organ: kidney  Laterality (if known): right  Organ Location: import    UNOS ID: [id number]   Donor OR Time: 1400  Expected/Actual Cross Clamp Time: 1532  Expected Organ Arrival Time: 5/10    Surgeon: Finger  Time in OR:   Time in 3C:     Recipient Details  Admission ETA: on 7a  Unit: 7a  Isolation: none  Latex Allergy: no  : no  Diagnosis: ESRD      Kidney/Panc Transplants  XM Status (Need to wait for XM?): no        Transplant Coordinator Contact Info: haylee      Vessel Bank Information  Transplant hospitals must not store a donor s extra vessels if the donor has tested positive for any of the following:   - HIV by antibody, antigen, or nucleic acid test (FAUSTINA)   - Hepatitis B surface antigen (HBsAg)   - Hepatitis B (HBV) by FAUSTINA   - Hepatitis C (HCV) by antibody or FAUSTINA     Extra vessels from donors that do not test positive for HIV, HBV, or HCV as above may be stored

## 2025-05-09 NOTE — CONSULTS
[facility]  Transplant Nephrology Consult Note  Date of Admission:  5/8/2025  Today's Date: 05/09/2025  Requesting physician: Omar Osullivan MD    Reason for Consult:  DDKT    Recommendations:   - OR planned for 05/10.  - HD today prior to OR tomorrow.     Assessment & Plan   # DDKT: Stable prior to txp.    - Baseline Creatinine: ~ TBD   - Proteinuria: Not checked post transplant   - DSA Hx: Not checked recently due to time from transplant   - Last cPRA: 100%   - BK Viremia: Not checked post transplant   - Kidney Tx Biopsy Hx: No biopsy history.    # Immunosuppression: Tacrolimus immediate release (goal will adjust after DDKT)    - Induction with Recent Transplant:  High Intensity Protocol   - Continue with intensive monitoring of immunosuppression for efficacy and toxicity.   - Historical Changes in Immunosuppression: None    - Changes: Not at this time    # Infection Prevention:   Last CD4 Level: Not checked recently      - CMV IgG Ab High Risk Discordance (D+/R-) at time of transplant: TBD  Present CMV Serostatus: Negative  - EBV IgG Ab High Risk Discordance (D+/R-) at time of transplant: TBD  Present EBV Serostatus: Positive    # Hypertension: Controlled;  Goal BP: < 140/90 (Hospitalization goal)   - Changes: Not at this time   - PTA: Carvedilol 25mg BID    # Anemia in Chronic Renal Disease: Hgb: Stable      BASHIR: No   - Iron studies: Replete    # Mineral Bone Disorder:    - Secondary renal hyperparathyroidism; PTH level: Not checked recently        On treatment: None  - Vitamin D; level: Not checked recently        On supplement: No  - Calcium; level: Normal        On supplement: No  - Phosphorus; level: Not checked recently        On supplement: No    # Electrolytes:   - Potassium; level: Normal        On supplement: No  - Magnesium; level: Not checked recently, but was normal last check        On supplement: No  - Bicarbonate; level: Normal        On  supplement: No  - Sodium; level: Normal    # Other Significant PMH:   - Pericardial Effusion: December 2022, pericardial effusion without tamponade, 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 Repeat ECHO showed resolution. CT A/P from 2/2023 mentions pericardial thickening.    - CYNDEE: using CPAP  - C-Diff: February 2023.   - Obesity: BMI 29.   - Anxiety and Depression: previously required admission, almost 10 years ago. Now managed on fluoxetine.      # Transplant History:  Etiology of Kidney Failure: Congenital renal dysplasia  Tx: DDKT  Transplant: 4/28/2012 (Kidney), 7/13/1995 (Kidney), 5/18/2011 (Kidney)  Significant transplant-related complications: None    Recommendations were communicated to the primary team verbally.    Seen and discussed with Dr. Culp.     JAJA Downs CNP  Transplant Nephrology  Contact information via Vocera Web Console         Physician Attestation     I saw and evaluated Jhoan Hoffman as part of a shared APRN/PA visit.     I personally reviewed the vital signs, medications, and labs.    I personally provided a substantive portion of care for this patient and I approve the care plan as written by the KARLIE.  I was involved with Medical Decision Making including: Please see A&P for additional details of medical decision making.  MANAGEMENT DISCUSSED with the following over the past 24 hours: Will plan dialysis today per usual schedule and in preparation for transplant tomorrow.  Recommend high intensity induction and leave patient on prednisone 5 mg daily.     Jose Culp MD  Date of Service (when I saw the patient): 05/09/25       History of Present Illness  Mr Hoffman is a 30 year old male with a past medical history of congenital renal dysplasia s/p kidney transplant x3 (LDKT 07/1995, LDKT 05/2011- complicated by ischemic necrosis and organized thrombus,  explanted 07/2011, and most recently DDKT in 2012). Other past medical history includes pericardial thickening, resolved pericardial effusion, anemia, reactive gastritis, CDI in 2023, CYNDEE (uses CPAP) and obesity.     He was doing OK on dialysis since 12/2020. cPRA 100%. Access is a LUE AVF, reports a small amount of urine output prior to transplant (once a day), and reports EDW of 78.5kg. Last dialyzed Wednesday 05/07.  Will plan HD today prior to DDKT tomorrow. Denies N/V/D. Denies SOA, CP or cough. Denies any LE swelling.     Review of Systems   The 10 point Review of Systems is negative other than noted in the HPI or here.      MEDICATIONS:  Current Facility-Administered Medications   Medication Dose Route Frequency Provider Last Rate Last Admin    acetaminophen (TYLENOL) tablet 975 mg  975 mg Oral Once Nelsy Ledesma NP        Or    acetaminophen (TYLENOL) Suppository 650 mg  650 mg Rectal Once Nelsy Ledesma NP        anti-thymocyte globulin (THYMOGLOBULIN - Rabbit) 2 mg/kg in sodium chloride 0.9 % intermittent infusion  2 mg/kg (Dosing Weight) Intravenous Once Nelsy Ledesma NP        levofloxacin (LEVAQUIN) infusion 500 mg  500 mg Intravenous Once Nelsy Ledesma NP        methylPREDNISolone sodium succinate (solu-MEDROL) 500 mg in sodium chloride 0.9 % 104 mL intermittent infusion  500 mg Intravenous Once Nelsy Ledesma NP        mycophenolate mofetil (CELLCEPT) 1,000 mg in D5W intra-operative intermittent infusion  1,000 mg Intravenous Once Nelsy Ledesma NP        pantoprazole (PROTONIX) EC tablet 40 mg  40 mg Oral BID AC Shi, Omar Renee MD        sodium chloride (PF) 0.9% PF flush 3 mL  3 mL Intracatheter Q8H ISAAC Nelsy Ledesma NP   3 mL at 05/08/25 2159    sucroferric oxyhydroxide (VELPHORO) chewable tablet 1,000 mg  1,000 mg Oral TID w/meals Nelsy Ledesma NP        tacrolimus (GENERIC EQUIVALENT) capsule 1.5 mg  1.5 mg Oral BID IS Nelsy Ledesma NP   1.5 mg at  "25 0907     Current Facility-Administered Medications   Medication Dose Route Frequency Provider Last Rate Last Admin       Physical Exam   Temp  Av.9  F (36.6  C)  Min: 97.6  F (36.4  C)  Max: 98.2  F (36.8  C)      Pulse  Av  Min: 82  Max: 87 Resp  Av  Min: 18  Max: 18  SpO2  Av.2 %  Min: 97 %  Max: 100 %     BP (!) 140/100 (BP Location: Right arm)   Pulse 84   Temp 98.2  F (36.8  C) (Oral)   Resp 18   Ht 1.676 m (5' 6\")   Wt 78.7 kg (173 lb 9.6 oz)   SpO2 98%   BMI 28.02 kg/m     Date 25 - 05/10/25 0659   Shift 9252-4175 7803-2856 3004-9359 24 Hour Total   INTAKE   P.O. 120   120   Shift Total(mL/kg) 120(1.52)   120(1.52)   OUTPUT   Shift Total(mL/kg)       Weight (kg) 78.74 78.74 78.74 78.74      Admit Weight: 78.7 kg (173 lb 9.6 oz)     GENERAL APPEARANCE: alert and no distress  HENT: mouth without ulcers or lesions  RESP: lungs clear to auscultation - no rales, rhonchi or wheezes  CV: regular rhythm, normal rate, no rub, no murmur  EDEMA: no LE edema bilaterally  ABDOMEN: soft, nondistended, nontender, bowel sounds normal  MS: extremities normal - no gross deformities noted, no evidence of inflammation in joints, no muscle tenderness  SKIN: no rash  TX KIDNEY: normal  DIALYSIS ACCESS:  LUE AV fistula ++    Data   All labs reviewed by me.  CMP  Recent Labs   Lab 25  0649 25  0154 25  2148 25  1922   NA  --   --   --  140   POTASSIUM  --   --   --  4.3   CHLORIDE  --   --   --  102   CO2  --   --   --  22   ANIONGAP  --   --   --  16*   GLC 78 69* 83 77   BUN  --   --   --  24.4*   CR  --   --   --  10.10*   GFRESTIMATED  --   --   --  6*   LIZZ  --   --   --  10.4   PROTTOTAL  --   --   --  7.2   ALBUMIN  --   --   --  4.5   BILITOTAL  --   --   --  0.4   ALKPHOS  --   --   --  98   AST  --   --   --  16   ALT  --   --   --  9     CBC  Recent Labs   Lab 25   HGB 8.9*   WBC 6.6   RBC 2.97*   HCT 26.2*   MCV 88   MCH 30.0   MCHC 34.0 "   RDW 12.8        INR  Recent Labs   Lab 05/08/25  1922   INR 0.99   PTT 27     ABGNo lab results found in last 7 days.   Urine Studies  Recent Labs   Lab Test 05/09/25  0641 04/06/23  1100 07/23/19  0600 06/26/19  0555   COLOR Light Yellow Yellow Straw Straw   APPEARANCE Clear Slightly Cloudy* Slightly Cloudy Clear   URINEGLC 30* 30* Negative Negative   URINEBILI Negative Negative Negative Negative   URINEKETONE Negative Negative Negative Negative   SG 1.012 1.015 1.005 1.009   UBLD Small* Small* Small* Small*   URINEPH 6.0 6.0 6.0 5.0   PROTEIN 100* 50* 100* 100*   NITRITE Negative Negative Negative Negative   LEUKEST Negative Negative Negative Negative   RBCU 13* 10* 0 2   WBCU 3 9* 0 0     Recent Labs   Lab Test 07/23/19  0600 06/26/19  0555 06/07/19  1518 09/18/17  1422   UTPG 2.82* 1.86* 1.98* 0.79*     PTH  Recent Labs   Lab Test 12/19/20  0726 09/18/17  1431   PTHI 2,523* 151*     Iron Studies  Recent Labs   Lab Test 12/19/20  0726   IRON 53   *   IRONSAT 24   CLINTON 143       IMAGING:  All imaging studies reviewed by me.    Past Medical History    I have reviewed this patient's medical history and updated it with pertinent information if needed.   Past Medical History:   Diagnosis Date    Anemia     in ESRD    Anxiety 2012    C. difficile diarrhea 2023    Depression     Depression 2012    Hypertension     Kidney replaced by transplant 1995 Failed 2010    Kidney replaced by transplant 2011    Failed 2011, ischemic necrosis and organized thrombus    Kidney replaced by transplant 2012 Failed 2020    CYNDEE (obstructive sleep apnea)     Pericarditis 2022    Peritoneal dialysis status        Past Surgical History   I have reviewed this patient's surgical history and updated it with pertinent information if needed.  Past Surgical History:   Procedure Laterality Date    BIOPSY      CREATE GRAFT LOOP ARTERIOVENOUS UPPER EXTREMITY  12/17/2020    Procedure: Create Graft Left Arteriovenous Upper  Extremity;  Surgeon: Sidney Palma MD;  Location: [facility] OR    CV PERICARDIOCENTESIS N/A 12/12/2022    Procedure: Pericardiocentesis;  Surgeon: Dennis Rodriguez MD;  Location: [facility]    CYSTOSCOPY, REMOVE STENT(S), COMBINED  5/25/2012    Procedure:COMBINED CYSTOSCOPY, REMOVE STENT(S); Cystoscopy, Removal Of Urinary Stent; Surgeon:FLORENTINO KNIGHT; Location:[facility] OR    ENDARECTERECTOMY RENAL  5/18/2011    Procedure:ENDARECTERECTOMY RENAL; Exploration of Transplant Kidney, Back Table Flush, Biopsy of Kidney and Reanastamosis of Kidney; Surgeon:FLORENTINO KNIGHT; Location:[facility] OR    EXPLANT TRANSPLANTED KIDNEY  7/12/2011    Procedure:EXPLANT TRANSPLANTED KIDNEY; Transplant Nephrectomy; Surgeon:FLORENTINO KNIGHT; Location:[facility]    INSERT CATHETER HEMODIALYSIS  5/18/2011    Procedure:INSERT CATHETER HEMODIALYSIS; Double Lumen; Surgeon:JOE HE; Location:[facility] OR    INSERT CATHETER PERITONEAL DIALYSIS  4/28/2012    Procedure:INSERT CATHETER PERITONEAL DIALYSIS; Placement dialysis cath under fluoro, before kidney tx; Surgeon:FLORENTINO KNIGHT; Location:[facility] OR    IR CVC TUNNEL PLACEMENT > 5 YRS OF AGE  12/18/2020    LAPAROTOMY EXPLORATORY  5/19/2011    Procedure:LAPAROTOMY EXPLORATORY; washout of kidney and closure; Surgeon:FLORENTINO KNIGHT; [facility]    PERCUTANEOUS BIOPSY KIDNEY  6/2/2011    Procedure:PERCUTANEOUS BIOPSY KIDNEY; Surgeon:GIL WILLIAM; [facility]    PERCUTANEOUS BIOPSY KIDNEY Right 6/26/2019    Procedure: Right Kidney Biopsy;  Surgeon: Peter Briggs MD;  [facility]    PERCUTANEOUS BIOPSY KIDNEY Right 7/23/2019    Procedure: Right Kidney Biopsy;  Surgeon: Pro Menard MD;  Location: [facility]    REMOVE CATHETER PERITONEAL  5/3/2012    Procedure:REMOVE CATHETER PERITONEAL; Removal Of Peritoneal Dialysis Catheter; Surgeon:FLORENTINO KNIGHT; [facility]    REVISION FISTULA ARTERIOVENOUS UPPER EXTREMITY Left 2/16/2021    Procedure:  Left upper AV fistula revision (transposition) with intraoperative ultrasound;  Surgeon: Sidney Palma MD;  Location: [facility] OR    TRANSPLANT KIDNEY RECIPIENT LIVING RELATED  1995    TRANSPLANT KIDNEY RECIPIENT LIVING UNRELATED  5/18/2011    Procedure:TRANSPLANT KIDNEY RECIPIENT LIVING UNRELATED; Living unrelated left kidney transplant and placement of ureteral stent into transplant ureter.; Surgeon:FLORENTINO KNIGHT; [facility]    TRANSPLANT KIDNEY RECIPIENT LIVING UNRELATED  4/28/2012    Procedure:TRANSPLANT KIDNEY RECIPIENT LIVING UNRELATED; kidney transplant -   UNOS# [id number]  Organ arrival: 2100 4/27  Cross match results: 0200 4/28  Donor blood type: A  Recipient blood type: A; Surgeon:FLORENTINO KNIGHT; Location:[facility]       Family History   I have reviewed this patient's family history and updated it with pertinent information if needed.   Family History   Problem Relation Age of Onset    Kidney Disease No family hx of        Social History   I have reviewed this patient's social history and updated it with pertinent information if needed. Jhoan Hoffman  reports that he has never smoked. He has never used smokeless tobacco. He reports that he does not currently use alcohol. He reports that he does not use drugs.    Prior to Admission Medications   Medications Prior to Admission   Medication Sig Dispense Refill Last Dose/Taking    B Complex-C-Folic Acid (DIALYVITE PO) Take 1 tablet by mouth daily   5/8/2025 Morning    carvedilol (COREG) 25 MG tablet Take 25 mg by mouth 2 times daily (with meals).   5/8/2025 Morning    cholecalciferol 2000 UNITS CAPS Take 2,000 Int'l Units by mouth daily 30 capsule  5/8/2025 Morning    FLUoxetine (PROZAC) 40 MG capsule Take 40 mg by mouth at bedtime. 90 capsule 0 5/7/2025 Bedtime    loratadine (CLARITIN) 10 MG tablet Take 10 mg by mouth daily as needed for allergies.   Taking As Needed    omeprazole (PRILOSEC) 40 MG DR capsule Take 40 mg by mouth daily   5/8/2025  Morning    sucroferric oxyhydroxide (VELPHORO) 500 MG CHEW chewable tablet Take 1,000 mg by mouth 3 times daily (with meals)   5/7/2025    tacrolimus (GENERIC EQUIVALENT) 0.5 MG capsule Take 3 capsules (1.5 mg) by mouth 2 times daily. 180 capsule 3 5/8/2025 Morning    aspirin 81 MG EC tablet Take 81 mg by mouth daily   Unknown

## 2025-05-09 NOTE — PROGRESS NOTES
HEMODIALYSIS TREATMENT NOTE    Date: 5/9/2025  Time: 1715     Data:  Pre Wt:   78.7 kg   Desired Wt:   To be established  Post Wt:  78.7 kg  Weight change: - 0 kg  Ultrafiltration - Post Run Net Total Removed (mL):  0 ml  Vascular Access Status: Fistula patent  Dialyzer Rinse:  Light  Total Blood Volume Processed: 69.2 L   Total Dialysis (Treatment) Time:   3 Hrs  Dialysate Bath: K 3, Ca 2.25  Heparin: Heparin: None     Lab:   Yes    Interventions:  Dialysis done through L AV Fistula using 15 gauge needles. ,   UF set to 0.3 Liters, accommodating priming and rinse back volumes  Meds administered per MAR  See Flowsheet for Crit Profile throughout the run  Treatment has ended safely and  blood is rinsed back completely  Decannulation done post HD, hemostasis is achieved in 10 minutes  Pressure dressing is applied, to be removed after 4-6 hours  Post Tx assessment done. Patient is sent back to [facility] in stable condition    Report given to KAREN Gunn     Assessment:  A/O x 4, calm and cooperative, denies pain  L arm AV Fistula has good thrill and bruit                Plan:    Per Renal team

## 2025-05-09 NOTE — PLAN OF CARE
"Goal Outcome Evaluation:      Plan of Care Reviewed With: patient    Overall Patient Progress: no changeOverall Patient Progress: no change    Outcome Evaluation: OR has been changed to tomorrow (5/10) with OR time now at 10AM    BP (!) 139/97 (BP Location: Right arm)   Pulse 89   Temp 98  F (36.7  C) (Oral)   Resp 18   Ht 1.676 m (5' 6\")   Wt 78.7 kg (173 lb 9.6 oz)   SpO2 95%   BMI 28.02 kg/m        7227-1658  AVSS on RA. Alert and oriented x4- calls appropriately. Denies pain and nausea. Oliguric; saving urine in hat. Left arm fistula- used for dialysis MWF. He completed a 3hr dialysis run this afternoon; tolerated run very well. No fluid was removed. BM today (5/9). Regular diet- good appetite and PO intake. Pre-op checklist has been completed. Will have patient take another pre-op shower tomorrow AM; closer to OR time. Anticipated to be NPO at midnight for an OR time tomorrow (5/10) at 1300. PIV saline locked. Family by bedside- have been updated with plan. Continue plan of care, please notify MD with any changes.       "

## 2025-05-09 NOTE — PROGRESS NOTES
"Vitals: stable room air.   BP (!) 141/89 (BP Location: Right arm)   Pulse 87   Temp 98  F (36.7  C) (Oral)   Resp 18   Ht 1.676 m (5' 6\")   Wt 78.7 kg (173 lb 9.6 oz)   SpO2 98%   BMI 28.02 kg/m  '    Pt stated he never used cipap and does not have sleep apnea.   Blood glucose: not diabetic per pt: labs bg 77, recheck is 83.   Pain/nausea: denies.   Diet: NPO  Lines: PIV RUE saline locked. Fistula LUE thrill, bruit.   : oliguric. Voided this am. On [facility] Dialysis.   GI: last bm yesterday. Passing gas.   Drains: none.   Skin: Admitted/transferred from:   Time of arrival on unit 1800  2 RN full  skin assessment completed by Alka DUONG  Skin assessment finding: skin intact, no problems   Interventions/actions: fistula LUE thrill. Brill. Healed boil on left thigh.    Mobility: up ad abelardo.     OR time: afternoon. Needs second pre op shower, first is completed.     Pre op checklist completed.   "

## 2025-05-09 NOTE — PHARMACY-ADMISSION MEDICATION HISTORY
Pharmacist Admission Medication History    Admission medication history is complete. The information provided in this note is only as accurate as the sources available at the time of the update.    Information Source(s): Patient, Family member, Prescription bottles (carvedilol, fluoxetine, omeprazole, tacrolimus), and CareEverywhere/SureScripts via in-person    Pertinent Information:   - Patient reports not taking aspirin recently, he believes he was told to take in the past due to a small artery with a transplanted kidney   - Patient reports being taken off all blood pressure medications except carvedilol due to hypotension with dialysis   - Cinacalcet recently filled but patient denies taking, appears to have been followed outpatient by Endocrinology    Changes made to PTA medication list:  Added:   Loratadine as needed  Deleted:   Amlodipine  Colchicine   Losartan  Changed: None    Allergies reviewed with patient and updates made in EHR: yes    Medication History Completed By: Asia Lloyd, PharmD 5/8/2025 7:27 PM    PTA Med List   Medication Sig Last Dose/Taking    B Complex-C-Folic Acid (DIALYVITE PO) Take 1 tablet by mouth daily 5/8/2025 Morning    carvedilol (COREG) 25 MG tablet Take 25 mg by mouth 2 times daily (with meals). 5/8/2025 Morning    cholecalciferol 2000 UNITS CAPS Take 2,000 Int'l Units by mouth daily 5/8/2025 Morning    FLUoxetine (PROZAC) 40 MG capsule Take 40 mg by mouth at bedtime. 5/7/2025 Bedtime    loratadine (CLARITIN) 10 MG tablet Take 10 mg by mouth daily as needed for allergies. Taking As Needed    omeprazole (PRILOSEC) 40 MG DR capsule Take 40 mg by mouth daily 5/8/2025 Morning    sucroferric oxyhydroxide (VELPHORO) 500 MG CHEW chewable tablet Take 1,000 mg by mouth 3 times daily (with meals) 5/7/2025    tacrolimus (GENERIC EQUIVALENT) 0.5 MG capsule Take 3 capsules (1.5 mg) by mouth 2 times daily. 5/8/2025 Morning

## 2025-05-09 NOTE — PLAN OF CARE
Goal Outcome Evaluation:      Plan of Care Reviewed With: patient    Overall Patient Progress: no change    Outcome Evaluation: Plan for OR today at 12pm.    2300 - 0730     Pt is AOX4. All pre op labs and imaging completed, UA sent this AM. Pt slept well overnight. BG 69 overnight, apple juice given. BG up to 78 this AM. Denies pain or nausea. Plan for OR at 12pm today. Continue with current POC and update MD with any changes.

## 2025-05-10 ENCOUNTER — APPOINTMENT (OUTPATIENT)
Dept: ULTRASOUND IMAGING | Facility: CLINIC | Age: 30
DRG: 652 | End: 2025-05-10
Attending: STUDENT IN AN ORGANIZED HEALTH CARE EDUCATION/TRAINING PROGRAM
Payer: MEDICARE

## 2025-05-10 ENCOUNTER — RESULTS FOLLOW-UP (OUTPATIENT)
Dept: SURGERY | Facility: CLINIC | Age: 30
End: 2025-05-10

## 2025-05-10 ENCOUNTER — ANESTHESIA EVENT (OUTPATIENT)
Dept: SURGERY | Facility: CLINIC | Age: 30
DRG: 652 | End: 2025-05-10
Payer: MEDICARE

## 2025-05-10 ENCOUNTER — APPOINTMENT (OUTPATIENT)
Dept: GENERAL RADIOLOGY | Facility: CLINIC | Age: 30
DRG: 652 | End: 2025-05-10
Attending: STUDENT IN AN ORGANIZED HEALTH CARE EDUCATION/TRAINING PROGRAM
Payer: MEDICARE

## 2025-05-10 ENCOUNTER — DOCUMENTATION ONLY (OUTPATIENT)
Dept: TRANSPLANT | Facility: CLINIC | Age: 30
End: 2025-05-10

## 2025-05-10 ENCOUNTER — ANESTHESIA (OUTPATIENT)
Dept: SURGERY | Facility: CLINIC | Age: 30
DRG: 652 | End: 2025-05-10
Payer: MEDICARE

## 2025-05-10 LAB
ANION GAP SERPL CALCULATED.3IONS-SCNC: 14 MMOL/L (ref 7–15)
ANION GAP SERPL CALCULATED.3IONS-SCNC: 14 MMOL/L (ref 7–15)
BACTERIA UR CULT: NO GROWTH
BUN SERPL-MCNC: 20.6 MG/DL (ref 6–20)
BUN SERPL-MCNC: 22.4 MG/DL (ref 6–20)
CALCIUM SERPL-MCNC: 10.8 MG/DL (ref 8.8–10.4)
CALCIUM SERPL-MCNC: 9.3 MG/DL (ref 8.8–10.4)
CHLORIDE SERPL-SCNC: 97 MMOL/L (ref 98–107)
CHLORIDE SERPL-SCNC: 98 MMOL/L (ref 98–107)
CREAT SERPL-MCNC: 7.73 MG/DL (ref 0.67–1.17)
CREAT SERPL-MCNC: 8.38 MG/DL (ref 0.67–1.17)
EGFRCR SERPLBLD CKD-EPI 2021: 8 ML/MIN/1.73M2
EGFRCR SERPLBLD CKD-EPI 2021: 9 ML/MIN/1.73M2
ERYTHROCYTE [DISTWIDTH] IN BLOOD BY AUTOMATED COUNT: 12.4 % (ref 10–15)
ERYTHROCYTE [DISTWIDTH] IN BLOOD BY AUTOMATED COUNT: 12.7 % (ref 10–15)
GLUCOSE SERPL-MCNC: 148 MG/DL (ref 70–99)
GLUCOSE SERPL-MCNC: 88 MG/DL (ref 70–99)
HCO3 SERPL-SCNC: 22 MMOL/L (ref 22–29)
HCO3 SERPL-SCNC: 25 MMOL/L (ref 22–29)
HCT VFR BLD AUTO: 21.2 % (ref 40–53)
HCT VFR BLD AUTO: 24.7 % (ref 40–53)
HGB BLD-MCNC: 7.3 G/DL (ref 13.3–17.7)
HGB BLD-MCNC: 7.3 G/DL (ref 13.3–17.7)
HGB BLD-MCNC: 8 G/DL (ref 13.3–17.7)
HGB BLD-MCNC: 8.4 G/DL (ref 13.3–17.7)
MAGNESIUM SERPL-MCNC: 2 MG/DL (ref 1.7–2.3)
MAGNESIUM SERPL-MCNC: 2.3 MG/DL (ref 1.7–2.3)
MCH RBC QN AUTO: 29.4 PG (ref 26.5–33)
MCH RBC QN AUTO: 30.1 PG (ref 26.5–33)
MCHC RBC AUTO-ENTMCNC: 34 G/DL (ref 31.5–36.5)
MCHC RBC AUTO-ENTMCNC: 34.6 G/DL (ref 31.5–36.5)
MCV RBC AUTO: 86 FL (ref 78–100)
MCV RBC AUTO: 89 FL (ref 78–100)
PHOSPHATE SERPL-MCNC: 5 MG/DL (ref 2.5–4.5)
PLATELET # BLD AUTO: 147 10E3/UL (ref 150–450)
PLATELET # BLD AUTO: 87 10E3/UL (ref 150–450)
POTASSIUM SERPL-SCNC: 3.8 MMOL/L (ref 3.4–5.3)
POTASSIUM SERPL-SCNC: 4.1 MMOL/L (ref 3.4–5.3)
POTASSIUM SERPL-SCNC: 4.2 MMOL/L (ref 3.4–5.3)
POTASSIUM SERPL-SCNC: 4.2 MMOL/L (ref 3.4–5.3)
RBC # BLD AUTO: 2.39 10E6/UL (ref 4.4–5.9)
RBC # BLD AUTO: 2.86 10E6/UL (ref 4.4–5.9)
SODIUM SERPL-SCNC: 133 MMOL/L (ref 135–145)
SODIUM SERPL-SCNC: 137 MMOL/L (ref 135–145)
WBC # BLD AUTO: 5.3 10E3/UL (ref 4–11)
WBC # BLD AUTO: 6.7 10E3/UL (ref 4–11)

## 2025-05-10 PROCEDURE — 250N000009 HC RX 250: Performed by: NURSE ANESTHETIST, CERTIFIED REGISTERED

## 2025-05-10 PROCEDURE — 250N000025 HC SEVOFLURANE, PER MIN: Performed by: TRANSPLANT SURGERY

## 2025-05-10 PROCEDURE — 360N000077 HC SURGERY LEVEL 4, PER MIN: Performed by: TRANSPLANT SURGERY

## 2025-05-10 PROCEDURE — 85027 COMPLETE CBC AUTOMATED: CPT | Performed by: STUDENT IN AN ORGANIZED HEALTH CARE EDUCATION/TRAINING PROGRAM

## 2025-05-10 PROCEDURE — 84100 ASSAY OF PHOSPHORUS: CPT | Performed by: STUDENT IN AN ORGANIZED HEALTH CARE EDUCATION/TRAINING PROGRAM

## 2025-05-10 PROCEDURE — 120N000011 HC R&B TRANSPLANT UMMC

## 2025-05-10 PROCEDURE — 36415 COLL VENOUS BLD VENIPUNCTURE: CPT | Performed by: STUDENT IN AN ORGANIZED HEALTH CARE EDUCATION/TRAINING PROGRAM

## 2025-05-10 PROCEDURE — 250N000011 HC RX IP 250 OP 636: Mod: JZ | Performed by: TRANSPLANT SURGERY

## 2025-05-10 PROCEDURE — 76776 US EXAM K TRANSPL W/DOPPLER: CPT

## 2025-05-10 PROCEDURE — 250N000013 HC RX MED GY IP 250 OP 250 PS 637: Performed by: STUDENT IN AN ORGANIZED HEALTH CARE EDUCATION/TRAINING PROGRAM

## 2025-05-10 PROCEDURE — 250N000011 HC RX IP 250 OP 636: Mod: JZ | Performed by: INTERNAL MEDICINE

## 2025-05-10 PROCEDURE — 258N000003 HC RX IP 258 OP 636: Performed by: STUDENT IN AN ORGANIZED HEALTH CARE EDUCATION/TRAINING PROGRAM

## 2025-05-10 PROCEDURE — 250N000012 HC RX MED GY IP 250 OP 636 PS 637: Performed by: STUDENT IN AN ORGANIZED HEALTH CARE EDUCATION/TRAINING PROGRAM

## 2025-05-10 PROCEDURE — 258N000002 HC RX IP 258 OP 250: Performed by: STUDENT IN AN ORGANIZED HEALTH CARE EDUCATION/TRAINING PROGRAM

## 2025-05-10 PROCEDURE — 258N000003 HC RX IP 258 OP 636: Performed by: TRANSPLANT SURGERY

## 2025-05-10 PROCEDURE — 250N000011 HC RX IP 250 OP 636: Mod: JZ | Performed by: NURSE PRACTITIONER

## 2025-05-10 PROCEDURE — 30243S1 TRANSFUSION OF NONAUTOLOGOUS GLOBULIN INTO CENTRAL VEIN, PERCUTANEOUS APPROACH: ICD-10-PCS | Performed by: STUDENT IN AN ORGANIZED HEALTH CARE EDUCATION/TRAINING PROGRAM

## 2025-05-10 PROCEDURE — 85027 COMPLETE CBC AUTOMATED: CPT | Performed by: PHYSICIAN ASSISTANT

## 2025-05-10 PROCEDURE — 250N000011 HC RX IP 250 OP 636: Mod: JW | Performed by: NURSE ANESTHETIST, CERTIFIED REGISTERED

## 2025-05-10 PROCEDURE — 999N000065 XR CHEST PORT 1 VIEW

## 2025-05-10 PROCEDURE — 258N000003 HC RX IP 258 OP 636

## 2025-05-10 PROCEDURE — 0TY00Z0 TRANSPLANTATION OF RIGHT KIDNEY, ALLOGENEIC, OPEN APPROACH: ICD-10-PCS | Performed by: STUDENT IN AN ORGANIZED HEALTH CARE EDUCATION/TRAINING PROGRAM

## 2025-05-10 PROCEDURE — 710N000010 HC RECOVERY PHASE 1, LEVEL 2, PER MIN: Performed by: TRANSPLANT SURGERY

## 2025-05-10 PROCEDURE — 250N000013 HC RX MED GY IP 250 OP 250 PS 637

## 2025-05-10 PROCEDURE — 272N000001 HC OR GENERAL SUPPLY STERILE: Performed by: TRANSPLANT SURGERY

## 2025-05-10 PROCEDURE — 0T7D0DZ DILATION OF URETHRA WITH INTRALUMINAL DEVICE, OPEN APPROACH: ICD-10-PCS | Performed by: STUDENT IN AN ORGANIZED HEALTH CARE EDUCATION/TRAINING PROGRAM

## 2025-05-10 PROCEDURE — 36415 COLL VENOUS BLD VENIPUNCTURE: CPT | Performed by: PHYSICIAN ASSISTANT

## 2025-05-10 PROCEDURE — 76776 US EXAM K TRANSPL W/DOPPLER: CPT | Mod: 26 | Performed by: RADIOLOGY

## 2025-05-10 PROCEDURE — 250N000011 HC RX IP 250 OP 636: Performed by: STUDENT IN AN ORGANIZED HEALTH CARE EDUCATION/TRAINING PROGRAM

## 2025-05-10 PROCEDURE — 250N000013 HC RX MED GY IP 250 OP 250 PS 637: Performed by: NURSE PRACTITIONER

## 2025-05-10 PROCEDURE — 83735 ASSAY OF MAGNESIUM: CPT | Performed by: PHYSICIAN ASSISTANT

## 2025-05-10 PROCEDURE — 84132 ASSAY OF SERUM POTASSIUM: CPT | Performed by: STUDENT IN AN ORGANIZED HEALTH CARE EDUCATION/TRAINING PROGRAM

## 2025-05-10 PROCEDURE — C2617 STENT, NON-COR, TEM W/O DEL: HCPCS | Performed by: TRANSPLANT SURGERY

## 2025-05-10 PROCEDURE — 370N000017 HC ANESTHESIA TECHNICAL FEE, PER MIN: Performed by: TRANSPLANT SURGERY

## 2025-05-10 PROCEDURE — 999N000141 HC STATISTIC PRE-PROCEDURE NURSING ASSESSMENT: Performed by: TRANSPLANT SURGERY

## 2025-05-10 PROCEDURE — 258N000003 HC RX IP 258 OP 636: Performed by: NURSE ANESTHETIST, CERTIFIED REGISTERED

## 2025-05-10 PROCEDURE — 71045 X-RAY EXAM CHEST 1 VIEW: CPT | Mod: 26 | Performed by: RADIOLOGY

## 2025-05-10 PROCEDURE — 250N000009 HC RX 250: Performed by: INTERNAL MEDICINE

## 2025-05-10 PROCEDURE — 83735 ASSAY OF MAGNESIUM: CPT | Performed by: STUDENT IN AN ORGANIZED HEALTH CARE EDUCATION/TRAINING PROGRAM

## 2025-05-10 PROCEDURE — 258N000003 HC RX IP 258 OP 636: Performed by: NURSE PRACTITIONER

## 2025-05-10 PROCEDURE — 85018 HEMOGLOBIN: CPT | Performed by: STUDENT IN AN ORGANIZED HEALTH CARE EDUCATION/TRAINING PROGRAM

## 2025-05-10 PROCEDURE — 80048 BASIC METABOLIC PNL TOTAL CA: CPT | Performed by: STUDENT IN AN ORGANIZED HEALTH CARE EDUCATION/TRAINING PROGRAM

## 2025-05-10 PROCEDURE — 80048 BASIC METABOLIC PNL TOTAL CA: CPT | Performed by: PHYSICIAN ASSISTANT

## 2025-05-10 PROCEDURE — 812N000003 HC ACQUISITION KIDNEY CADAVER

## 2025-05-10 DEVICE — SOF-FLEX DOUBLE PIGTAIL URETERAL STENT SET
Type: IMPLANTABLE DEVICE | Site: ABDOMEN | Status: FUNCTIONAL
Brand: SOF-FLEX

## 2025-05-10 RX ORDER — MAGNESIUM HYDROXIDE/ALUMINUM HYDROXICE/SIMETHICONE 120; 1200; 1200 MG/30ML; MG/30ML; MG/30ML
30 SUSPENSION ORAL ONCE
Status: COMPLETED | OUTPATIENT
Start: 2025-05-10 | End: 2025-05-10

## 2025-05-10 RX ORDER — ONDANSETRON 4 MG/1
4 TABLET, ORALLY DISINTEGRATING ORAL EVERY 6 HOURS PRN
Status: DISCONTINUED | OUTPATIENT
Start: 2025-05-10 | End: 2025-05-13 | Stop reason: HOSPADM

## 2025-05-10 RX ORDER — AMOXICILLIN 250 MG
1 CAPSULE ORAL 2 TIMES DAILY
Status: DISCONTINUED | OUTPATIENT
Start: 2025-05-10 | End: 2025-05-13 | Stop reason: HOSPADM

## 2025-05-10 RX ORDER — MAGNESIUM OXIDE 400 MG/1
400 TABLET ORAL
Status: DISCONTINUED | OUTPATIENT
Start: 2025-05-12 | End: 2025-05-13 | Stop reason: HOSPADM

## 2025-05-10 RX ORDER — NALOXONE HYDROCHLORIDE 0.4 MG/ML
0.4 INJECTION, SOLUTION INTRAMUSCULAR; INTRAVENOUS; SUBCUTANEOUS
Status: DISCONTINUED | OUTPATIENT
Start: 2025-05-10 | End: 2025-05-13 | Stop reason: HOSPADM

## 2025-05-10 RX ORDER — MYCOPHENOLATE MOFETIL 250 MG/1
750 CAPSULE ORAL
Status: DISCONTINUED | OUTPATIENT
Start: 2025-05-10 | End: 2025-05-13 | Stop reason: HOSPADM

## 2025-05-10 RX ORDER — HYDROMORPHONE HYDROCHLORIDE 1 MG/ML
0.4 INJECTION, SOLUTION INTRAMUSCULAR; INTRAVENOUS; SUBCUTANEOUS
Refills: 0 | Status: DISCONTINUED | OUTPATIENT
Start: 2025-05-10 | End: 2025-05-10

## 2025-05-10 RX ORDER — HYDROMORPHONE HCL IN WATER/PF 6 MG/30 ML
0.2 PATIENT CONTROLLED ANALGESIA SYRINGE INTRAVENOUS
Refills: 0 | Status: DISCONTINUED | OUTPATIENT
Start: 2025-05-10 | End: 2025-05-11

## 2025-05-10 RX ORDER — ONDANSETRON 2 MG/ML
4 INJECTION INTRAMUSCULAR; INTRAVENOUS EVERY 6 HOURS PRN
Status: DISCONTINUED | OUTPATIENT
Start: 2025-05-10 | End: 2025-05-13 | Stop reason: HOSPADM

## 2025-05-10 RX ORDER — BISACODYL 10 MG
10 SUPPOSITORY, RECTAL RECTAL DAILY PRN
Status: DISCONTINUED | OUTPATIENT
Start: 2025-05-13 | End: 2025-05-13 | Stop reason: HOSPADM

## 2025-05-10 RX ORDER — OXYCODONE HYDROCHLORIDE 10 MG/1
10 TABLET ORAL EVERY 4 HOURS PRN
Refills: 0 | Status: DISCONTINUED | OUTPATIENT
Start: 2025-05-10 | End: 2025-05-13

## 2025-05-10 RX ORDER — SODIUM CHLORIDE 9 MG/ML
1000 INJECTION, SOLUTION INTRAVENOUS CONTINUOUS PRN
Status: DISCONTINUED | OUTPATIENT
Start: 2025-05-10 | End: 2025-05-11

## 2025-05-10 RX ORDER — NALOXONE HYDROCHLORIDE 0.4 MG/ML
0.2 INJECTION, SOLUTION INTRAMUSCULAR; INTRAVENOUS; SUBCUTANEOUS
Status: DISCONTINUED | OUTPATIENT
Start: 2025-05-10 | End: 2025-05-13 | Stop reason: HOSPADM

## 2025-05-10 RX ORDER — ATORVASTATIN CALCIUM 10 MG/1
10 TABLET, FILM COATED ORAL DAILY
Status: DISCONTINUED | OUTPATIENT
Start: 2025-05-11 | End: 2025-05-13 | Stop reason: HOSPADM

## 2025-05-10 RX ORDER — PROPOFOL 10 MG/ML
INJECTION, EMULSION INTRAVENOUS PRN
Status: DISCONTINUED | OUTPATIENT
Start: 2025-05-10 | End: 2025-05-10

## 2025-05-10 RX ORDER — ONDANSETRON 2 MG/ML
INJECTION INTRAMUSCULAR; INTRAVENOUS PRN
Status: DISCONTINUED | OUTPATIENT
Start: 2025-05-10 | End: 2025-05-10

## 2025-05-10 RX ORDER — HYDROMORPHONE HCL IN WATER/PF 6 MG/30 ML
0.4 PATIENT CONTROLLED ANALGESIA SYRINGE INTRAVENOUS
Refills: 0 | Status: DISCONTINUED | OUTPATIENT
Start: 2025-05-10 | End: 2025-05-11

## 2025-05-10 RX ORDER — SODIUM CHLORIDE, SODIUM LACTATE, POTASSIUM CHLORIDE, CALCIUM CHLORIDE 600; 310; 30; 20 MG/100ML; MG/100ML; MG/100ML; MG/100ML
INJECTION, SOLUTION INTRAVENOUS CONTINUOUS
Status: DISCONTINUED | OUTPATIENT
Start: 2025-05-10 | End: 2025-05-10 | Stop reason: HOSPADM

## 2025-05-10 RX ORDER — MANNITOL 20 G/100ML
INJECTION, SOLUTION INTRAVENOUS PRN
Status: DISCONTINUED | OUTPATIENT
Start: 2025-05-10 | End: 2025-05-10

## 2025-05-10 RX ORDER — LIDOCAINE HYDROCHLORIDE 20 MG/ML
INJECTION, SOLUTION INFILTRATION; PERINEURAL PRN
Status: DISCONTINUED | OUTPATIENT
Start: 2025-05-10 | End: 2025-05-10

## 2025-05-10 RX ORDER — POLYETHYLENE GLYCOL 3350 17 G/17G
17 POWDER, FOR SOLUTION ORAL DAILY
Status: DISCONTINUED | OUTPATIENT
Start: 2025-05-11 | End: 2025-05-13 | Stop reason: HOSPADM

## 2025-05-10 RX ORDER — ACETAMINOPHEN 325 MG/1
975 TABLET ORAL EVERY 8 HOURS
Status: DISCONTINUED | OUTPATIENT
Start: 2025-05-10 | End: 2025-05-13 | Stop reason: HOSPADM

## 2025-05-10 RX ORDER — OXYCODONE HYDROCHLORIDE 5 MG/1
5 TABLET ORAL EVERY 4 HOURS PRN
Refills: 0 | Status: DISCONTINUED | OUTPATIENT
Start: 2025-05-10 | End: 2025-05-13

## 2025-05-10 RX ORDER — PROCHLORPERAZINE MALEATE 10 MG
10 TABLET ORAL EVERY 6 HOURS PRN
Status: DISCONTINUED | OUTPATIENT
Start: 2025-05-10 | End: 2025-05-13 | Stop reason: HOSPADM

## 2025-05-10 RX ORDER — VALGANCICLOVIR 450 MG/1
450 TABLET, FILM COATED ORAL
Status: DISCONTINUED | OUTPATIENT
Start: 2025-05-12 | End: 2025-05-12

## 2025-05-10 RX ORDER — CALCIUM CARBONATE 500 MG/1
500 TABLET, CHEWABLE ORAL 2 TIMES DAILY PRN
Status: DISCONTINUED | OUTPATIENT
Start: 2025-05-10 | End: 2025-05-13 | Stop reason: HOSPADM

## 2025-05-10 RX ORDER — SODIUM CHLORIDE 450 MG/100ML
INJECTION, SOLUTION INTRAVENOUS CONTINUOUS PRN
Status: DISCONTINUED | OUTPATIENT
Start: 2025-05-10 | End: 2025-05-11

## 2025-05-10 RX ORDER — BUMETANIDE 0.25 MG/ML
INJECTION, SOLUTION INTRAMUSCULAR; INTRAVENOUS PRN
Status: DISCONTINUED | OUTPATIENT
Start: 2025-05-10 | End: 2025-05-10

## 2025-05-10 RX ORDER — SULFAMETHOXAZOLE AND TRIMETHOPRIM 400; 80 MG/1; MG/1
1 TABLET ORAL
Status: DISCONTINUED | OUTPATIENT
Start: 2025-05-11 | End: 2025-05-12

## 2025-05-10 RX ORDER — SODIUM CHLORIDE, SODIUM GLUCONATE, SODIUM ACETATE, POTASSIUM CHLORIDE AND MAGNESIUM CHLORIDE 526; 502; 368; 37; 30 MG/100ML; MG/100ML; MG/100ML; MG/100ML; MG/100ML
INJECTION, SOLUTION INTRAVENOUS CONTINUOUS PRN
Status: DISCONTINUED | OUTPATIENT
Start: 2025-05-10 | End: 2025-05-10

## 2025-05-10 RX ORDER — DEXTROSE, SODIUM CHLORIDE, SODIUM LACTATE, POTASSIUM CHLORIDE, AND CALCIUM CHLORIDE 5; .6; .31; .03; .02 G/100ML; G/100ML; G/100ML; G/100ML; G/100ML
INJECTION, SOLUTION INTRAVENOUS CONTINUOUS
Status: DISCONTINUED | OUTPATIENT
Start: 2025-05-10 | End: 2025-05-12

## 2025-05-10 RX ORDER — HYDROMORPHONE HYDROCHLORIDE 1 MG/ML
0.2 INJECTION, SOLUTION INTRAMUSCULAR; INTRAVENOUS; SUBCUTANEOUS
Refills: 0 | Status: DISCONTINUED | OUTPATIENT
Start: 2025-05-10 | End: 2025-05-10

## 2025-05-10 RX ORDER — FUROSEMIDE 10 MG/ML
INJECTION INTRAMUSCULAR; INTRAVENOUS PRN
Status: DISCONTINUED | OUTPATIENT
Start: 2025-05-10 | End: 2025-05-10

## 2025-05-10 RX ORDER — FENTANYL CITRATE 50 UG/ML
INJECTION, SOLUTION INTRAMUSCULAR; INTRAVENOUS PRN
Status: DISCONTINUED | OUTPATIENT
Start: 2025-05-10 | End: 2025-05-10

## 2025-05-10 RX ADMIN — ANTI-THYMOCYTE GLOBULIN (RABBIT) 150 MG: 5 INJECTION, POWDER, LYOPHILIZED, FOR SOLUTION INTRAVENOUS at 13:26

## 2025-05-10 RX ADMIN — HYDROMORPHONE HYDROCHLORIDE 0.4 MG: 0.2 INJECTION, SOLUTION INTRAMUSCULAR; INTRAVENOUS; SUBCUTANEOUS at 20:16

## 2025-05-10 RX ADMIN — DEXMEDETOMIDINE HYDROCHLORIDE 8 MCG: 100 INJECTION, SOLUTION INTRAVENOUS at 17:13

## 2025-05-10 RX ADMIN — SODIUM CHLORIDE 1000 ML: 0.45 INJECTION, SOLUTION INTRAVENOUS at 19:06

## 2025-05-10 RX ADMIN — ONDANSETRON 4 MG: 2 INJECTION, SOLUTION INTRAMUSCULAR; INTRAVENOUS at 18:53

## 2025-05-10 RX ADMIN — MYCOPHENOLATE MOFETIL 1000 MG: 500 INJECTION, POWDER, LYOPHILIZED, FOR SOLUTION INTRAVENOUS at 12:43

## 2025-05-10 RX ADMIN — SODIUM CHLORIDE 1000 ML: 0.9 INJECTION, SOLUTION INTRAVENOUS at 18:23

## 2025-05-10 RX ADMIN — CALCIUM CARBONATE (ANTACID) CHEW TAB 500 MG 500 MG: 500 CHEW TAB at 05:47

## 2025-05-10 RX ADMIN — ALUMINUM HYDROXIDE, MAGNESIUM HYDROXIDE, AND SIMETHICONE 30 ML: 200; 200; 20 SUSPENSION ORAL at 05:47

## 2025-05-10 RX ADMIN — FENTANYL CITRATE 50 MCG: 50 INJECTION INTRAMUSCULAR; INTRAVENOUS at 13:23

## 2025-05-10 RX ADMIN — FENTANYL CITRATE 100 MCG: 50 INJECTION INTRAMUSCULAR; INTRAVENOUS at 12:12

## 2025-05-10 RX ADMIN — HYDROMORPHONE HYDROCHLORIDE 0.5 MG: 1 INJECTION, SOLUTION INTRAMUSCULAR; INTRAVENOUS; SUBCUTANEOUS at 17:32

## 2025-05-10 RX ADMIN — SENNOSIDES AND DOCUSATE SODIUM 1 TABLET: 50; 8.6 TABLET ORAL at 22:09

## 2025-05-10 RX ADMIN — FUROSEMIDE 100 MG: 10 INJECTION, SOLUTION INTRAVENOUS at 15:56

## 2025-05-10 RX ADMIN — PROCHLORPERAZINE EDISYLATE 10 MG: 5 INJECTION INTRAMUSCULAR; INTRAVENOUS at 21:06

## 2025-05-10 RX ADMIN — MANNITOL 25 G: 20 INJECTION, SOLUTION INTRAVENOUS at 15:56

## 2025-05-10 RX ADMIN — HYDROMORPHONE HYDROCHLORIDE 0.2 MG: 0.2 INJECTION, SOLUTION INTRAMUSCULAR; INTRAVENOUS; SUBCUTANEOUS at 22:10

## 2025-05-10 RX ADMIN — Medication 100 MG: at 12:12

## 2025-05-10 RX ADMIN — CALCIUM CARBONATE (ANTACID) CHEW TAB 500 MG 500 MG: 500 CHEW TAB at 10:57

## 2025-05-10 RX ADMIN — OXYCODONE HYDROCHLORIDE 5 MG: 5 TABLET ORAL at 21:07

## 2025-05-10 RX ADMIN — ONDANSETRON 4 MG: 2 INJECTION INTRAMUSCULAR; INTRAVENOUS at 17:12

## 2025-05-10 RX ADMIN — MYCOPHENOLATE MOFETIL 750 MG: 250 CAPSULE ORAL at 22:08

## 2025-05-10 RX ADMIN — SODIUM CHLORIDE 1000 ML: 0.9 INJECTION, SOLUTION INTRAVENOUS at 23:40

## 2025-05-10 RX ADMIN — SODIUM CHLORIDE 1000 ML: 0.45 INJECTION, SOLUTION INTRAVENOUS at 19:13

## 2025-05-10 RX ADMIN — Medication 100 MG: at 17:20

## 2025-05-10 RX ADMIN — SODIUM CHLORIDE 500 MG: 9 INJECTION, SOLUTION INTRAVENOUS at 12:40

## 2025-05-10 RX ADMIN — FENTANYL CITRATE 50 MCG: 50 INJECTION INTRAMUSCULAR; INTRAVENOUS at 13:07

## 2025-05-10 RX ADMIN — DEXMEDETOMIDINE HYDROCHLORIDE 8 MCG: 100 INJECTION, SOLUTION INTRAVENOUS at 17:32

## 2025-05-10 RX ADMIN — LIDOCAINE HYDROCHLORIDE 100 MG: 20 INJECTION, SOLUTION INFILTRATION; PERINEURAL at 12:12

## 2025-05-10 RX ADMIN — FENTANYL CITRATE 50 MCG: 50 INJECTION INTRAMUSCULAR; INTRAVENOUS at 17:49

## 2025-05-10 RX ADMIN — SODIUM CHLORIDE, SODIUM GLUCONATE, SODIUM ACETATE, POTASSIUM CHLORIDE AND MAGNESIUM CHLORIDE: 526; 502; 368; 37; 30 INJECTION, SOLUTION INTRAVENOUS at 12:00

## 2025-05-10 RX ADMIN — TACROLIMUS 2.5 MG: 1 CAPSULE ORAL at 22:08

## 2025-05-10 RX ADMIN — ACETAMINOPHEN 975 MG: 325 TABLET ORAL at 11:54

## 2025-05-10 RX ADMIN — HYDROMORPHONE HYDROCHLORIDE 0.5 MG: 1 INJECTION, SOLUTION INTRAMUSCULAR; INTRAVENOUS; SUBCUTANEOUS at 17:40

## 2025-05-10 RX ADMIN — FENTANYL CITRATE 50 MCG: 50 INJECTION INTRAMUSCULAR; INTRAVENOUS at 14:23

## 2025-05-10 RX ADMIN — HYDROMORPHONE HYDROCHLORIDE 0.5 MG: 1 INJECTION, SOLUTION INTRAMUSCULAR; INTRAVENOUS; SUBCUTANEOUS at 17:13

## 2025-05-10 RX ADMIN — ACETAMINOPHEN 975 MG: 325 TABLET ORAL at 22:08

## 2025-05-10 RX ADMIN — HYDROMORPHONE HYDROCHLORIDE 0.2 MG: 0.2 INJECTION, SOLUTION INTRAMUSCULAR; INTRAVENOUS; SUBCUTANEOUS at 19:49

## 2025-05-10 RX ADMIN — Medication 10 MG: at 18:46

## 2025-05-10 RX ADMIN — PROPOFOL 200 MG: 10 INJECTION, EMULSION INTRAVENOUS at 12:12

## 2025-05-10 RX ADMIN — Medication 100 MG: at 17:29

## 2025-05-10 RX ADMIN — MIDAZOLAM 2 MG: 1 INJECTION INTRAMUSCULAR; INTRAVENOUS at 12:00

## 2025-05-10 RX ADMIN — DEXTROSE, SODIUM CHLORIDE, SODIUM LACTATE, POTASSIUM CHLORIDE, AND CALCIUM CHLORIDE: 5; .6; .31; .03; .02 INJECTION, SOLUTION INTRAVENOUS at 18:13

## 2025-05-10 RX ADMIN — Medication 20 MG: at 15:19

## 2025-05-10 RX ADMIN — LEVOFLOXACIN 500 MG: 5 INJECTION, SOLUTION INTRAVENOUS at 12:52

## 2025-05-10 RX ADMIN — BUMETANIDE 5 MG: 0.25 INJECTION INTRAMUSCULAR; INTRAVENOUS at 16:50

## 2025-05-10 RX ADMIN — Medication 5 MG: at 16:57

## 2025-05-10 ASSESSMENT — ACTIVITIES OF DAILY LIVING (ADL)
ADLS_ACUITY_SCORE: 26

## 2025-05-10 NOTE — ANESTHESIA PREPROCEDURE EVALUATION
Anesthesia Pre-Procedure Evaluation    Patient: Jhoan Hoffman   MRN: [id number] : 1995          Procedure : Procedure(s):  Transplant kidney recipient  donor         Past Medical History:   Diagnosis Date    Anemia     in ESRD    Anxiety     C. difficile diarrhea     Depression     Depression     Hypertension     Kidney replaced by transplant     Failed     Kidney replaced by transplant     Failed , ischemic necrosis and organized thrombus    Kidney replaced by transplant 2012    CYNDEE (obstructive sleep apnea)     Pericarditis     Peritoneal dialysis status       Past Surgical History:   Procedure Laterality Date    BIOPSY      CREATE GRAFT LOOP ARTERIOVENOUS UPPER EXTREMITY  2020    Procedure: Create Graft Left Arteriovenous Upper Extremity;  Surgeon: Sidney Palma MD;  Location: [facility]    CV PERICARDIOCENTESIS N/A 2022    Procedure: Pericardiocentesis;  Surgeon: Dennis Rodriguez MD;  Location: [facility]    CYSTOSCOPY, REMOVE STENT(S), COMBINED  2012    Procedure:COMBINED CYSTOSCOPY, REMOVE STENT(S); Cystoscopy, Removal Of Urinary Stent; Surgeon:FLORENTINO KNIGHT; Location:[facility]    ENDARECTERECTOMY RENAL  2011    Procedure:ENDARECTERECTOMY RENAL; Exploration of Transplant Kidney, Back Table Flush, Biopsy of Kidney and Reanastamosis of Kidney; Surgeon:FLORENTINO KNIGHT; Location:[facility]    EXPLANT TRANSPLANTED KIDNEY  2011    Procedure:EXPLANT TRANSPLANTED KIDNEY; Transplant Nephrectomy; Surgeon:FLORENTINO KNIGHT; [facility]    INSERT CATHETER HEMODIALYSIS  2011    Procedure:INSERT CATHETER HEMODIALYSIS; Double Lumen; Surgeon:JOE HE; Location:[facility]    INSERT CATHETER PERITONEAL DIALYSIS  2012    Procedure:INSERT CATHETER PERITONEAL DIALYSIS; Placement dialysis cath under fluoro, before kidney tx; Surgeon:FLORENTINO KNIGHT; [facility]    IR CVC TUNNEL  "PLACEMENT > 5 YRS OF AGE  12/18/2020    LAPAROTOMY EXPLORATORY  5/19/2011    Procedure:LAPAROTOMY EXPLORATORY; washout of kidney and closure; Surgeon:FLORENTINO KNIGHT; [facility]    PERCUTANEOUS BIOPSY KIDNEY  6/2/2011    Procedure:PERCUTANEOUS BIOPSY KIDNEY; Surgeon:GIL WILLIAM; [facility]    PERCUTANEOUS BIOPSY KIDNEY Right 6/26/2019    Procedure: Right Kidney Biopsy;  Surgeon: Peter Briggs MD;  [facility]    PERCUTANEOUS BIOPSY KIDNEY Right 7/23/2019    Procedure: Right Kidney Biopsy;  Surgeon: Pro Menard MD;  [facility]: [facility]    REMOVE CATHETER PERITONEAL  5/3/2012    Procedure:REMOVE CATHETER PERITONEAL; Removal Of Peritoneal Dialysis Catheter; Surgeon:FLORENTINO KNIGHT; [facility]    REVISION FISTULA ARTERIOVENOUS UPPER EXTREMITY Left 2/16/2021    Procedure: Left upper AV fistula revision (transposition) with intraoperative ultrasound;  Surgeon: Sidney Palma MD;  Location: [facility]    TRANSPLANT KIDNEY RECIPIENT LIVING RELATED  1995    TRANSPLANT KIDNEY RECIPIENT LIVING UNRELATED  5/18/2011    Procedure:TRANSPLANT KIDNEY RECIPIENT LIVING UNRELATED; Living unrelated left kidney transplant and placement of ureteral stent into transplant ureter.; Surgeon:FLORENTINO KNIGHT; [facility]    TRANSPLANT KIDNEY RECIPIENT LIVING UNRELATED  4/28/2012    Procedure:TRANSPLANT KIDNEY RECIPIENT LIVING UNRELATED; kidney transplant -   UNOS# [id number]  Organ arrival: 2100 4/27  Cross match results: 0200 4/28  Donor blood type: A  Recipient blood type: A; Surgeon:FLORENTINO KNIGHT; [facility]      Allergies   Allergen Reactions    Amoxicillin Swelling     Facial swelling, \"very massive swelling\"    Penicillins Other (See Comments) and Swelling     Facial swelling, \"very massive swelling\"    Azithromycin      Z pack - can't take with cyclosporine.    Vancomycin      Vancomycin infusion reaction     Cefprozil Rash      Social History     Tobacco Use    Smoking status: Never    " Smokeless tobacco: Never    Tobacco comments:     mother smokes outside   Substance Use Topics    Alcohol use: Not Currently      Wt Readings from Last 1 Encounters:   05/08/25 78.7 kg (173 lb 9.6 oz)        Anesthesia Evaluation   Pt has had prior anesthetic.         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 (LDKT 1995, LDKT 2011 complicated by ischemic necrosis and organized thrombus with subsequent explantation) and DDKT in 2012) now on iHD via LUE AVF since 2020    (+) renal disease, type: ESRD,            Endo:       Psychiatric/Substance Use:     (+) psychiatric history depression       Infectious Disease:       Malignancy:       Other:              Physical Exam  Airway  Cardiovascular  Comments: pericardial thickening and pericardial effusion  Dental     Pulmonary       Neurological   Other Findings       OUTSIDE LABS:  CBC:   Lab Results   Component Value Date    WBC 6.6 05/08/2025    WBC 7.3 04/06/2023    HGB 8.9 (L) 05/08/2025    HGB 11.3 (L) 04/06/2023    HCT 26.2 (L) 05/08/2025    HCT 33.9 (L) 04/06/2023     05/08/2025     (L) 04/06/2023     BMP:   Lab Results   Component Value Date     05/08/2025     04/06/2023    POTASSIUM 4.0 05/09/2025    POTASSIUM 4.3 05/08/2025    CHLORIDE 102 05/08/2025    CHLORIDE 96 (L) 04/06/2023    CO2 22 05/08/2025    CO2 28 04/06/2023    BUN 24.4 (H) 05/08/2025    BUN 38.9 (H)  "04/06/2023    CR 10.10 (H) 05/08/2025    CR 8.92 (H) 04/06/2023    GLC 78 05/09/2025    GLC 69 (L) 05/09/2025     COAGS:   Lab Results   Component Value Date    PTT 27 05/08/2025    INR 0.99 05/08/2025    FIBR 566 (H) 06/03/2011     POC:   Lab Results   Component Value Date     (H) 02/16/2021     HEPATIC:   Lab Results   Component Value Date    ALBUMIN 4.5 05/08/2025    PROTTOTAL 7.2 05/08/2025    ALT 9 05/08/2025    AST 16 05/08/2025    ALKPHOS 98 05/08/2025    BILITOTAL 0.4 05/08/2025     OTHER:   Lab Results   Component Value Date    PH  04/28/2012     Incorrect specimen type  PER NEY IN OR 17 AT 0544 ON 4/28/12 JA    LACT 1.6 05/19/2011    A1C 4.9 05/08/2025    LIZZ 10.4 05/08/2025    PHOS 4.9 (H) 12/15/2022    MAG 1.7 12/20/2020    LIPASE 202 02/16/2011    AMYLASE 142 (H) 02/16/2011    TSH 0.67 12/11/2022    CRP 6.8 11/01/2011    SED 97 (H) 12/12/2022       Anesthesia Plan    ASA Status:  3       Anesthesia Type: General.   Induction: intravenous.   Techniques and Equipment:     - Airway:    Arterial Line Kit: Radial     Consents            Postoperative Care            Comments:                   Jonathon Guardado MD    I have reviewed the pertinent notes and labs in the chart from the past 30 days and (re)examined the patient.  Any updates or changes from those notes are reflected in this note.    Clinically Significant Risk Factors Present on Admission                 # Drug Induced Platelet Defect: home medication list includes an antiplatelet medication   # Hypertension: Noted on problem list           # Overweight: Estimated body mass index is 28.02 kg/m  as calculated from the following:    Height as of this encounter: 1.676 m (5' 6\").    Weight as of this encounter: 78.7 kg (173 lb 9.6 oz).                    "

## 2025-05-10 NOTE — ANESTHESIA POSTPROCEDURE EVALUATION
Patient: Jhoan Hoffman    Procedure: Procedure(s):  Right Kidney Transplanbt Intraperitoneal       Anesthesia Type:  General    Note:  Disposition: Inpatient   Postop Pain Control: Uneventful            Sign Out: Well controlled pain   PONV: No   Neuro/Psych: Uneventful            Sign Out: Acceptable/Baseline neuro status   Airway/Respiratory: Uneventful            Sign Out: Acceptable/Baseline resp. status   CV/Hemodynamics: Uneventful            Sign Out: Acceptable CV status; No obvious hypovolemia; No obvious fluid overload   Other NRE: NONE   DID A NON-ROUTINE EVENT OCCUR? No           Last vitals:  Vitals Value Taken Time   /83 05/10/25 18:37   Temp 36.7  C (98  F) 05/10/25 18:30   Pulse 93 05/10/25 18:41   Resp 9 05/10/25 18:41   SpO2 100 % 05/10/25 18:41   Vitals shown include unfiled device data.    Electronically Signed By: Leandro Cisse MD  May 10, 2025  6:41 PM

## 2025-05-10 NOTE — PLAN OF CARE
"Goal Outcome Evaluation:      Plan of Care Reviewed With: patient    Overall Patient Progress: no change    Outcome Evaluation: OR time 5/10 1300    /89 (BP Location: Right arm)   Pulse 87   Temp 97.7  F (36.5  C) (Oral)   Resp 18   Ht 1.676 m (5' 6\")   Wt 78.7 kg (173 lb 9.6 oz)   SpO2 94%   BMI 28.02 kg/m      Shift: 8760-8062  Isolation Status: n/a  VS: Stable on RA, afebrile  Neuro: Aox4  Behaviors: calm and cooperative  BG: n/a   Labs: pending AM lab draw  Respiratory: WDL  Cardiac: WDL  Pain/Nausea: denies pain and nausea  Diet: NPO at midnight  IV Access: R PIV SL  GI/: oliguric, LBM this morning  Skin: WDL  Mobility: Up ad abelardo  Plan: K tx tomorrow        "

## 2025-05-10 NOTE — ANESTHESIA PROCEDURE NOTES
Airway       Patient location during procedure: OR       Procedure Start/Stop Times: 5/10/2025 12:15 PM  Staff -        CRNA: Rayne Newman APRN CRNA       Performed By: CRNA  Consent for Airway        Urgency: elective  Indications and Patient Condition       Indications for airway management: amauri-procedural       Induction type:RSI       Mask difficulty assessment: 0 - not attempted    Final Airway Details       Final airway type: endotracheal airway       Successful airway: ETT - single  Endotracheal Airway Details        ETT size (mm): 7.0       Cuffed: yes       Successful intubation technique: direct laryngoscopy       DL Blade Type: Nettles 2       Grade View of Cords: 1       Adjucts: stylet       Position: Right       Measured from: gums/teeth       Secured at (cm): 22       Bite block used: None    Post intubation assessment        Placement verified by: capnometry, equal breath sounds and chest rise        Number of attempts at approach: 1       Secured with: tape       Ease of procedure: easy       Dentition: Intact and Unchanged    Medication(s) Administered   Medication Administration Time: 5/10/2025 12:15 PM

## 2025-05-10 NOTE — PLAN OF CARE
"Goal Outcome Evaluation:      Plan of Care Reviewed With: patient, spouse    Overall Patient Progress: no changeOverall Patient Progress: no change    Outcome Evaluation: Patient taken to pre-op at 1130 this AM. OR for kidney transplant at 1300.    BP (!) 135/96 (BP Location: Right arm)   Pulse 82   Temp 97.7  F (36.5  C) (Oral)   Resp 18   Ht 1.676 m (5' 6\")   Wt 78.7 kg (173 lb 9.6 oz)   SpO2 92%   BMI 28.02 kg/m        4020-1154  Patient received PRN tums this morning. AVSS on RA. Completed pre-op checklist. Anticipated patient to return to [facility] after kidney transplant. Mother, Yoanna in room this evening resting before patient returns and has been receiving updates. Continue plan of care, please notify MD with any changes.   "

## 2025-05-10 NOTE — ANESTHESIA CARE TRANSFER NOTE
Patient: Jhoan Hoffman    Procedure: Procedure(s):  Right Kidney Transplanbt Intraperitoneal       Diagnosis: End stage renal disease (H) [N18.6]  Diagnosis Additional Information: No value filed.    Anesthesia Type:   General     Note:    Oropharynx: oropharynx clear of all foreign objects and spontaneously breathing  Level of Consciousness: awake  Oxygen Supplementation: nasal cannula  Level of Supplemental Oxygen (L/min / FiO2): 4  Independent Airway: airway patency satisfactory and stable  Dentition: dentition unchanged  Vital Signs Stable: post-procedure vital signs reviewed and stable  Report to RN Given: handoff report given  Patient transferred to: PACU    Handoff Report: Identifed the Patient, Identified the Reponsible Provider, Reviewed the pertinent medical history, Discussed the surgical course, Reviewed Intra-OP anesthesia mangement and issues during anesthesia, Set expectations for post-procedure period and Allowed opportunity for questions and acknowledgement of understanding      Vitals:  Vitals Value Taken Time   /88 05/10/25 17:46   Temp 36.4  C (97.5  F) 05/10/25 17:45   Pulse 88 05/10/25 17:56   Resp 9 05/10/25 17:56   SpO2 100 % 05/10/25 17:56   Vitals shown include unfiled device data.    Electronically Signed By: JAJA Treviño CRNA  May 10, 2025  5:57 PM

## 2025-05-10 NOTE — ANESTHESIA PROCEDURE NOTES
Arterial Line Procedure Note    Pre-Procedure   Staff -        Anesthesiologist:  Leandro Cisse MD       Performed By: anesthesiologist       Location: OR       Pre-Anesthestic Checklist: patient identified, IV checked, risks and benefits discussed, informed consent, monitors and equipment checked, pre-op evaluation and at physician/surgeon's request  Timeout:       Correct Patient: Yes        Correct Procedure: Yes        Correct Site: Yes        Correct Position: Yes   Line Placement:   This line was placed Post Induction starting at 5/10/2025 12:24 PM and ending at 5/10/2025 12:25 PM  Procedure   Procedure: arterial line       Laterality: right       Insertion Site: radial.  Sterile Prep        Standard elements of sterile barrier followed       Skin prep: Chloraprep  Insertion/Injection        Technique: ultrasound guided        1. Ultrasound was used to evaluate the access site.       2. Artery evaluated via ultrasound for patency/adequacy.       3. Using real-time ultrasound the needle/catheter was observed entering the artery/vein.       Catheter Type/Size: 20 G, 1.75 in/4.5 cm quick cath (integral wire)  Narrative         Secured by: anchor securement device       Tegaderm dressing used.       Complications: None apparent,        Arterial waveform: Yes

## 2025-05-10 NOTE — ANESTHESIA PROCEDURE NOTES
Central Line/PA Catheter Placement    Pre-Procedure   Staff -        Performed By: anesthesiologist       Location: OR       Pre-Anesthestic Checklist: patient identified, IV checked, site marked, risks and benefits discussed, informed consent, monitors and equipment checked, pre-op evaluation and at physician/surgeon's request  Timeout:       Correct Patient: Yes        Correct Procedure: Yes        Correct Site: Yes        Correct Position: Yes        Correct Laterality: Yes   Line Placement:   This line was placed Post Induction starting at 5/10/2025 12:30 PM and ending at 5/10/2025 12:35 PM    Procedure   Procedure: central line       Laterality: left       Insertion Site: internal jugular.  Sterile Prep        All elements of maximal sterile barrier technique followed       Patient Prep/Sterile Barriers: draped, hand hygiene, gloves , hat , mask , draped, gown, sterile gel and probe cover  Insertion/Injection        Technique: ultrasound guided and Seldinger Technique        1. Ultrasound was used to evaluate the access site.       2. Vein evaluated via ultrasound for patency/adequacy.       3. Using real-time ultrasound the needle/catheter was observed entering the artery/vein.       Type: CVC       Catheter Size: 7 Fr       Number of Lumens: triple lumen  Narrative         Secured by: suture and anchor securement device       Tegaderm and Biopatch dressing used.       Complications: None apparent,        blood aspirated from all lumens,        All lumens flushed: Yes       Verification method: Ultrasound and Placement to be verified post-op

## 2025-05-10 NOTE — PLAN OF CARE
"Goal Outcome Evaluation:      Plan of Care Reviewed With: patient    Overall Patient Progress: no changeOverall Patient Progress: no change    Outcome Evaluation: OR 5/10 @ 1300      /84 (BP Location: Right arm)   Pulse 86   Temp 97.8  F (36.6  C) (Oral)   Resp 18   Ht 1.676 m (5' 6\")   Wt 78.7 kg (173 lb 9.6 oz)   SpO2 97%   BMI 28.02 kg/m      Shift: 5911-3547  VS: VSS on RA, afebrile  Neuro: Aox4  BG: N/A  Respiratory: WDL  Cardiac: WDL  Pain/Nausea: Denied  PRN: MAALOX and TUMS given for acid reflux.   Diet: NPO  IV Access: R PIV   Infusion(s): SL  Lines/Drains: LUE fistula   GI/: Oliguric. LBM 5/9  Skin: WDL  Mobility: UAL  Events/Education: OR 5/10 @ 1300   Plan: Continue with POC       "

## 2025-05-10 NOTE — BRIEF OP NOTE
[facility] Brief Operative Note    Pre-operative diagnosis: End stage renal disease (H) [N18.6]   Post-operative diagnosis * No post-op diagnosis entered *  congenital renal dysplasia   Procedure: Procedure(s):  Right Kidney Transplanbt Intraperitoneal   Surgeon(s): Surgeons and Role:     * Omar Osullivan MD - Primary     * Leandro Licea MD - Fellow - Assisting   Estimated blood loss: 100 mL    Specimens: * No specimens in log *   Findings: Exploratory laparotomy. Kidney transplant, intraperitoneal. Single single single. Artery to left common iliac artery, vein to left iliac vein 2-3cm from confluence with cava. Ureteral stent placed. No urine production prior to implantation.

## 2025-05-11 LAB
ANION GAP SERPL CALCULATED.3IONS-SCNC: 13 MMOL/L (ref 7–15)
ATRIAL RATE - MUSE: 75 BPM
BASOPHILS # BLD AUTO: 0 10E3/UL (ref 0–0.2)
BASOPHILS NFR BLD AUTO: 0 %
BUN SERPL-MCNC: 22.7 MG/DL (ref 6–20)
CALCIUM SERPL-MCNC: 9.5 MG/DL (ref 8.8–10.4)
CHLORIDE SERPL-SCNC: 99 MMOL/L (ref 98–107)
CREAT SERPL-MCNC: 6.14 MG/DL (ref 0.67–1.17)
DIASTOLIC BLOOD PRESSURE - MUSE: NORMAL MMHG
EGFRCR SERPLBLD CKD-EPI 2021: 12 ML/MIN/1.73M2
EOSINOPHIL # BLD AUTO: 0 10E3/UL (ref 0–0.7)
EOSINOPHIL NFR BLD AUTO: 0 %
ERYTHROCYTE [DISTWIDTH] IN BLOOD BY AUTOMATED COUNT: 12.9 % (ref 10–15)
GLUCOSE SERPL-MCNC: 183 MG/DL (ref 70–99)
HBV DNA SERPL QL NAA+PROBE: NORMAL
HCO3 SERPL-SCNC: 24 MMOL/L (ref 22–29)
HCT VFR BLD AUTO: 21.1 % (ref 40–53)
HCV RNA SERPL QL NAA+PROBE: NORMAL
HGB BLD-MCNC: 6.8 G/DL (ref 13.3–17.7)
HGB BLD-MCNC: 7.2 G/DL (ref 13.3–17.7)
HGB BLD-MCNC: 7.4 G/DL (ref 13.3–17.7)
HGB BLD-MCNC: 7.7 G/DL (ref 13.3–17.7)
HIV1+2 RNA SERPL QL NAA+PROBE: NORMAL
IMM GRANULOCYTES # BLD: 0 10E3/UL
IMM GRANULOCYTES NFR BLD: 0 %
INTERPRETATION ECG - MUSE: NORMAL
LYMPHOCYTES # BLD AUTO: 0.1 10E3/UL (ref 0.8–5.3)
LYMPHOCYTES NFR BLD AUTO: 0 %
MAGNESIUM SERPL-MCNC: 1.9 MG/DL (ref 1.7–2.3)
MCH RBC QN AUTO: 30.3 PG (ref 26.5–33)
MCHC RBC AUTO-ENTMCNC: 35.1 G/DL (ref 31.5–36.5)
MCV RBC AUTO: 87 FL (ref 78–100)
MONOCYTES # BLD AUTO: 0.3 10E3/UL (ref 0–1.3)
MONOCYTES NFR BLD AUTO: 3 %
NEUTROPHILS # BLD AUTO: 11.2 10E3/UL (ref 1.6–8.3)
NEUTROPHILS NFR BLD AUTO: 97 %
NRBC # BLD AUTO: 0 10E3/UL
NRBC BLD AUTO-RTO: 0 /100
P AXIS - MUSE: 41 DEGREES
PHOSPHATE SERPL-MCNC: 6.2 MG/DL (ref 2.5–4.5)
PLATELET # BLD AUTO: 130 10E3/UL (ref 150–450)
POTASSIUM SERPL-SCNC: 3.8 MMOL/L (ref 3.4–5.3)
POTASSIUM SERPL-SCNC: 3.9 MMOL/L (ref 3.4–5.3)
POTASSIUM SERPL-SCNC: 4.5 MMOL/L (ref 3.4–5.3)
POTASSIUM SERPL-SCNC: 4.5 MMOL/L (ref 3.4–5.3)
PR INTERVAL - MUSE: 162 MS
QRS DURATION - MUSE: 86 MS
QT - MUSE: 368 MS
QTC - MUSE: 410 MS
R AXIS - MUSE: 33 DEGREES
RBC # BLD AUTO: 2.44 10E6/UL (ref 4.4–5.9)
SODIUM SERPL-SCNC: 136 MMOL/L (ref 135–145)
SYSTOLIC BLOOD PRESSURE - MUSE: NORMAL MMHG
T AXIS - MUSE: 45 DEGREES
VENTRICULAR RATE- MUSE: 75 BPM
WBC # BLD AUTO: 11.6 10E3/UL (ref 4–11)

## 2025-05-11 PROCEDURE — 250N000013 HC RX MED GY IP 250 OP 250 PS 637: Performed by: STUDENT IN AN ORGANIZED HEALTH CARE EDUCATION/TRAINING PROGRAM

## 2025-05-11 PROCEDURE — 85018 HEMOGLOBIN: CPT | Performed by: STUDENT IN AN ORGANIZED HEALTH CARE EDUCATION/TRAINING PROGRAM

## 2025-05-11 PROCEDURE — 250N000013 HC RX MED GY IP 250 OP 250 PS 637: Performed by: PHYSICIAN ASSISTANT

## 2025-05-11 PROCEDURE — 250N000011 HC RX IP 250 OP 636: Mod: JZ | Performed by: PHYSICIAN ASSISTANT

## 2025-05-11 PROCEDURE — 250N000011 HC RX IP 250 OP 636: Performed by: STUDENT IN AN ORGANIZED HEALTH CARE EDUCATION/TRAINING PROGRAM

## 2025-05-11 PROCEDURE — 36415 COLL VENOUS BLD VENIPUNCTURE: CPT | Performed by: STUDENT IN AN ORGANIZED HEALTH CARE EDUCATION/TRAINING PROGRAM

## 2025-05-11 PROCEDURE — 250N000013 HC RX MED GY IP 250 OP 250 PS 637: Performed by: TRANSPLANT SURGERY

## 2025-05-11 PROCEDURE — 36592 COLLECT BLOOD FROM PICC: CPT | Performed by: STUDENT IN AN ORGANIZED HEALTH CARE EDUCATION/TRAINING PROGRAM

## 2025-05-11 PROCEDURE — 99233 SBSQ HOSP IP/OBS HIGH 50: CPT | Performed by: STUDENT IN AN ORGANIZED HEALTH CARE EDUCATION/TRAINING PROGRAM

## 2025-05-11 PROCEDURE — 85025 COMPLETE CBC W/AUTO DIFF WBC: CPT | Performed by: STUDENT IN AN ORGANIZED HEALTH CARE EDUCATION/TRAINING PROGRAM

## 2025-05-11 PROCEDURE — 250N000012 HC RX MED GY IP 250 OP 636 PS 637: Performed by: STUDENT IN AN ORGANIZED HEALTH CARE EDUCATION/TRAINING PROGRAM

## 2025-05-11 PROCEDURE — 80048 BASIC METABOLIC PNL TOTAL CA: CPT | Performed by: STUDENT IN AN ORGANIZED HEALTH CARE EDUCATION/TRAINING PROGRAM

## 2025-05-11 PROCEDURE — 84100 ASSAY OF PHOSPHORUS: CPT | Performed by: STUDENT IN AN ORGANIZED HEALTH CARE EDUCATION/TRAINING PROGRAM

## 2025-05-11 PROCEDURE — 82310 ASSAY OF CALCIUM: CPT | Performed by: STUDENT IN AN ORGANIZED HEALTH CARE EDUCATION/TRAINING PROGRAM

## 2025-05-11 PROCEDURE — 84132 ASSAY OF SERUM POTASSIUM: CPT | Performed by: STUDENT IN AN ORGANIZED HEALTH CARE EDUCATION/TRAINING PROGRAM

## 2025-05-11 PROCEDURE — 36592 COLLECT BLOOD FROM PICC: CPT | Performed by: PHYSICIAN ASSISTANT

## 2025-05-11 PROCEDURE — 120N000011 HC R&B TRANSPLANT UMMC

## 2025-05-11 PROCEDURE — 258N000003 HC RX IP 258 OP 636: Performed by: STUDENT IN AN ORGANIZED HEALTH CARE EDUCATION/TRAINING PROGRAM

## 2025-05-11 PROCEDURE — P9016 RBC LEUKOCYTES REDUCED: HCPCS

## 2025-05-11 PROCEDURE — 83735 ASSAY OF MAGNESIUM: CPT | Performed by: STUDENT IN AN ORGANIZED HEALTH CARE EDUCATION/TRAINING PROGRAM

## 2025-05-11 PROCEDURE — 85018 HEMOGLOBIN: CPT | Performed by: PHYSICIAN ASSISTANT

## 2025-05-11 PROCEDURE — 258N000003 HC RX IP 258 OP 636: Performed by: PHYSICIAN ASSISTANT

## 2025-05-11 RX ORDER — HYDROMORPHONE HCL IN WATER/PF 6 MG/30 ML
0.2 PATIENT CONTROLLED ANALGESIA SYRINGE INTRAVENOUS
Status: ACTIVE | OUTPATIENT
Start: 2025-05-11 | End: 2025-05-11

## 2025-05-11 RX ORDER — PREDNISONE 5 MG/1
5 TABLET ORAL DAILY
Status: DISCONTINUED | OUTPATIENT
Start: 2025-05-13 | End: 2025-05-13 | Stop reason: HOSPADM

## 2025-05-11 RX ORDER — ACETAMINOPHEN 325 MG/1
650 TABLET ORAL ONCE
Status: COMPLETED | OUTPATIENT
Start: 2025-05-11 | End: 2025-05-11

## 2025-05-11 RX ORDER — DIPHENHYDRAMINE HCL 25 MG
25-50 CAPSULE ORAL ONCE
Status: COMPLETED | OUTPATIENT
Start: 2025-05-11 | End: 2025-05-11

## 2025-05-11 RX ORDER — DIPHENHYDRAMINE HCL 12.5MG/5ML
25-50 LIQUID (ML) ORAL ONCE
Status: COMPLETED | OUTPATIENT
Start: 2025-05-11 | End: 2025-05-11

## 2025-05-11 RX ADMIN — SENNOSIDES AND DOCUSATE SODIUM 1 TABLET: 50; 8.6 TABLET ORAL at 08:38

## 2025-05-11 RX ADMIN — OXYCODONE HYDROCHLORIDE 5 MG: 5 TABLET ORAL at 06:06

## 2025-05-11 RX ADMIN — TACROLIMUS 2.5 MG: 1 CAPSULE ORAL at 08:39

## 2025-05-11 RX ADMIN — POLYETHYLENE GLYCOL 3350 17 G: 17 POWDER, FOR SOLUTION ORAL at 08:38

## 2025-05-11 RX ADMIN — OXYCODONE HYDROCHLORIDE 10 MG: 10 TABLET ORAL at 14:40

## 2025-05-11 RX ADMIN — PANTOPRAZOLE SODIUM 40 MG: 40 TABLET, DELAYED RELEASE ORAL at 08:38

## 2025-05-11 RX ADMIN — SODIUM CHLORIDE 1000 ML: 0.9 INJECTION, SOLUTION INTRAVENOUS at 00:54

## 2025-05-11 RX ADMIN — PANTOPRAZOLE SODIUM 40 MG: 40 TABLET, DELAYED RELEASE ORAL at 16:31

## 2025-05-11 RX ADMIN — MYCOPHENOLATE MOFETIL 750 MG: 250 CAPSULE ORAL at 08:39

## 2025-05-11 RX ADMIN — SODIUM CHLORIDE 1000 ML: 0.9 INJECTION, SOLUTION INTRAVENOUS at 03:05

## 2025-05-11 RX ADMIN — MYCOPHENOLATE MOFETIL 750 MG: 250 CAPSULE ORAL at 17:49

## 2025-05-11 RX ADMIN — TACROLIMUS 2.5 MG: 1 CAPSULE ORAL at 17:49

## 2025-05-11 RX ADMIN — ONDANSETRON 4 MG: 4 TABLET, ORALLY DISINTEGRATING ORAL at 08:38

## 2025-05-11 RX ADMIN — ACETAMINOPHEN 650 MG: 325 TABLET ORAL at 10:12

## 2025-05-11 RX ADMIN — ANTI-THYMOCYTE GLOBULIN (RABBIT) 175 MG: 5 INJECTION, POWDER, LYOPHILIZED, FOR SOLUTION INTRAVENOUS at 11:10

## 2025-05-11 RX ADMIN — SODIUM CHLORIDE 1000 ML: 0.9 INJECTION, SOLUTION INTRAVENOUS at 10:20

## 2025-05-11 RX ADMIN — FLUOXETINE HYDROCHLORIDE 40 MG: 20 CAPSULE ORAL at 22:11

## 2025-05-11 RX ADMIN — METHYLPREDNISOLONE SODIUM SUCCINATE 250 MG: 125 INJECTION, POWDER, LYOPHILIZED, FOR SOLUTION INTRAMUSCULAR; INTRAVENOUS at 10:13

## 2025-05-11 RX ADMIN — ACETAMINOPHEN 975 MG: 325 TABLET ORAL at 06:06

## 2025-05-11 RX ADMIN — DEXTROSE, SODIUM CHLORIDE, SODIUM LACTATE, POTASSIUM CHLORIDE, AND CALCIUM CHLORIDE: 5; .6; .31; .03; .02 INJECTION, SOLUTION INTRAVENOUS at 23:25

## 2025-05-11 RX ADMIN — ACETAMINOPHEN 975 MG: 325 TABLET ORAL at 22:10

## 2025-05-11 RX ADMIN — OXYCODONE HYDROCHLORIDE 10 MG: 10 TABLET ORAL at 10:11

## 2025-05-11 RX ADMIN — SULFAMETHOXAZOLE AND TRIMETHOPRIM 1 TABLET: 400; 80 TABLET ORAL at 08:38

## 2025-05-11 RX ADMIN — OXYCODONE HYDROCHLORIDE 5 MG: 5 TABLET ORAL at 00:55

## 2025-05-11 RX ADMIN — DEXTROSE, SODIUM CHLORIDE, SODIUM LACTATE, POTASSIUM CHLORIDE, AND CALCIUM CHLORIDE: 5; .6; .31; .03; .02 INJECTION, SOLUTION INTRAVENOUS at 13:44

## 2025-05-11 RX ADMIN — SENNOSIDES AND DOCUSATE SODIUM 1 TABLET: 50; 8.6 TABLET ORAL at 20:30

## 2025-05-11 RX ADMIN — ACETAMINOPHEN 975 MG: 325 TABLET ORAL at 14:39

## 2025-05-11 RX ADMIN — DIPHENHYDRAMINE HCL 50 MG: 25 CAPSULE ORAL at 10:19

## 2025-05-11 RX ADMIN — DEXTROSE, SODIUM CHLORIDE, SODIUM LACTATE, POTASSIUM CHLORIDE, AND CALCIUM CHLORIDE: 5; .6; .31; .03; .02 INJECTION, SOLUTION INTRAVENOUS at 04:05

## 2025-05-11 RX ADMIN — ATORVASTATIN CALCIUM 10 MG: 10 TABLET, FILM COATED ORAL at 08:38

## 2025-05-11 ASSESSMENT — ACTIVITIES OF DAILY LIVING (ADL)
ADLS_ACUITY_SCORE: 28
ADLS_ACUITY_SCORE: 35
ADLS_ACUITY_SCORE: 26
ADLS_ACUITY_SCORE: 35
ADLS_ACUITY_SCORE: 28
ADLS_ACUITY_SCORE: 35
ADLS_ACUITY_SCORE: 28
ADLS_ACUITY_SCORE: 28
ADLS_ACUITY_SCORE: 35
ADLS_ACUITY_SCORE: 28
ADLS_ACUITY_SCORE: 26
ADLS_ACUITY_SCORE: 28
ADLS_ACUITY_SCORE: 35
DEPENDENT_IADLS:: INDEPENDENT
ADLS_ACUITY_SCORE: 28

## 2025-05-11 NOTE — PROGRESS NOTES
"  Transplant Surgery Progress Note  Surgery Cross-Cover  Post Op Check    05/10/2025    Jhoan Hoffman is a 30 year old male POD#0 s/p Procedure(s):  Right Kidney Transplanbt Intraperitoneal for Pre-Op Diagnosis Codes:      * End stage renal disease (H) [N18.6]    Pt reports their pain is controlled with current regimen. Denies nausea, SOB, chest pain, or dizziness. Patient is not passing flatus or having bowel movements and Is voiding via urinary catheter.     /85   Pulse 103   Temp 98  F (36.7  C) (Oral)   Resp 17   Ht 1.676 m (5' 6\")   Wt 78.7 kg (173 lb 9.6 oz)   SpO2 100%   BMI 28.02 kg/m      Gen: A&O x4, NAD. Face pale.  Chest: breathing non-labored on RA   Abdomen: soft, appropriately tender, non-distended. OLGA with dark serosanguinous output  Incision: dressing with mild strike through   : mcdaniels with yellow output  Extremities: warm and well perfused      A/P:  Patient is mildly pale and tachycardic to 105, without hypotension. Most recent hgb  8.0 from 7.3.     -Continue q4hr hgb   -Uop 250ml in the last hour. Discussed with fellow. Will give 1L NS.  -Continue plan of care per primary team. Please call with any questions.    Memo Portillo MD  Urology, PGY-1         "

## 2025-05-11 NOTE — PROGRESS NOTES
[facility]  Transplant Nephrology Progress Note  Date of Admission:  5/8/2025  Today's Date: 05/11/2025    Recommendations:   - High intensity induction.  - Prednisone 5 mg every day long term after taper.   - High risk CMV, Valcyte for 6 months.    Assessment & Plan   # DDKT: Trend down in creatinine. Good urine output. Normal transplant kidney doppler ultrasound 5/10.    - Baseline Creatinine: ~ TBD   - Proteinuria: Not checked post transplant   - DSA Hx: Not checked recently due to time from transplant   - Last cPRA: 100%   - BK Viremia: Not checked post transplant   - Kidney Tx Biopsy Hx: No biopsy history.    # Immunosuppression: Tacrolimus immediate release (goal 8-10), Mycophenolate mofetil (dose 750 mg every 12 hours), and Methylprednisolone (dose taper)    - Induction with Recent Transplant:  High Intensity Protocol   - Continue with intensive monitoring of immunosuppression for efficacy and toxicity.   - Historical Changes in Immunosuppression: None    - Changes: Not at this time    # Infection Prevention:   Last CD4 Level: Not checked recently      - CMV IgG Ab High Risk Discordance (D+/R-) at time of transplant: TBD  Present CMV Serostatus: Negative  - EBV IgG Ab High Risk Discordance (D+/R-) at time of transplant: TBD  Present EBV Serostatus: Positive    # Hypertension: Controlled;  Goal BP: < 140/90 (Hospitalization goal)   - Changes: Not at this time   - PTA: Carvedilol 25mg BID    # Anemia in Chronic Renal Disease: Hgb: Stable      BASHIR: No   - Iron studies: Replete    # Mineral Bone Disorder:    - Secondary renal hyperparathyroidism; PTH level: Not checked recently        On treatment: None  - Vitamin D; level: Not checked recently        On supplement: No  - Calcium; level: Normal        On supplement: No  - Phosphorus; level: Not checked recently        On supplement: No    # Electrolytes:   - Potassium; level: Normal        On supplement: No  -  Magnesium; level: Not checked recently, but was normal last check        On supplement: No  - Bicarbonate; level: Normal        On supplement: No  - Sodium; level: Normal    # Other Significant PMH:   - Pericardial Effusion: December 2022, pericardial effusion without tamponade, 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 Repeat ECHO showed resolution. CT A/P from 2/2023 mentions pericardial thickening.    - CYNDEE: using CPAP  - C-Diff: February 2023.   - Obesity: BMI 29.   - Anxiety and Depression: previously required admission, almost 10 years ago. Now managed on fluoxetine.      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    Panchito Mayer MD  Transplant Nephrology  Contact information via Vocera Web Console       Interval History  POD 1 from kidney transplant. No events overnight. Pain is well controlled. Good urine output.  No chest pain or shortness of breath. No leg swelling. No nausea and vomiting. No fever, sweats or chills.    Review of Systems   4 point ROS was obtained and negative except as noted in the Interval History.    MEDICATIONS:  Current Facility-Administered Medications   Medication Dose Route Frequency Provider Last Rate Last Admin    acetaminophen (TYLENOL) tablet 650 mg  650 mg Oral Once Maria Antonia Pham PA-C        acetaminophen (TYLENOL) tablet 975 mg  975 mg Oral Q8H Leandro Licea MD   975 mg at 05/11/25 0606    anti-thymocyte globulin (THYMOGLOBULIN - Rabbit) 150 mg in sodium chloride 0.9 % 280 mL intermittent infusion  2 mg/kg Intravenous Central line Once Maria Antonia Pham PA-C        atorvastatin (LIPITOR) tablet  10 mg  10 mg Oral Daily Leandro Licea MD        diphenhydrAMINE (BENADRYL) capsule 25-50 mg  25-50 mg Oral Once Maria Antonia Pham PA-C        Or    diphenhydrAMINE (BENADRYL) elixir 25-50 mg  25-50 mg Per NG tube Once Maria Antonia Pham PA-C        FLUoxetine (PROzac) capsule 40 mg  40 mg Oral At Bedtime Maria Antonia Pham PA-C        [START ON 5/12/2025] magnesium oxide (MAG-OX) tablet 400 mg  400 mg Oral Daily with lunch Leandro Licea MD        methylPREDNISolone Na Suc (solu-MEDROL) 250 mg in sodium chloride 0.9 % 54 mL intermittent infusion  250 mg Intravenous Once Maria Antonia Pham PA-C        mycophenolate (GENERIC EQUIVALENT) capsule 750 mg  750 mg Oral BID IS Leandro Licea MD   750 mg at 05/10/25 2208    pantoprazole (PROTONIX) EC tablet 40 mg  40 mg Oral BID AC Omar Osullivan MD   40 mg at 05/09/25 1759    polyethylene glycol (MIRALAX) Packet 17 g  17 g Oral Daily Leandro Licea MD        senna-docusate (SENOKOT-S/PERICOLACE) 8.6-50 MG per tablet 1 tablet  1 tablet Oral BID Leandro Licea MD   1 tablet at 05/10/25 2209    sodium chloride (PF) 0.9% PF flush 10 mL  10 mL Intracatheter Q8H Leandro Licea MD   10 mL at 05/10/25 2212    sulfamethoxazole-trimethoprim (BACTRIM) 400-80 MG per tablet 1 tablet  1 tablet Oral Q Mon Wed Fri AM Leandro Licea MD        tacrolimus (GENERIC EQUIVALENT) capsule 2.5 mg  0.03 mg/kg Oral BID IS Leandro Licea MD   2.5 mg at 05/10/25 2208    [START ON 5/12/2025] valGANciclovir (VALCYTE) tablet 450 mg  450 mg Oral Once per day on Monday Thursday Leandro Licea MD         Current Facility-Administered Medications   Medication Dose Route Frequency Provider Last Rate Last Admin    dextrose 5% in lactated ringers infusion   Intravenous Continuous Leandro Licea  mL/hr at 05/11/25 0405 New Bag at 05/11/25 0405    sodium chloride 0.45% infusion   Intravenous Continuous PRN Leandro Licea MD   Stopped at 05/11/25 0500    sodium chloride 0.9 %  "infusion  1,000 mL Intravenous Continuous PRN Leandro Licea MD 50 mL/hr at 25 0701 Rate Change at 25 0701       Physical Exam   Temp  Av.9  F (36.6  C)  Min: 97.6  F (36.4  C)  Max: 98.2  F (36.8  C)      Pulse  Av  Min: 82  Max: 87 Resp  Av  Min: 18  Max: 18  SpO2  Av.2 %  Min: 97 %  Max: 100 %     BP (!) 129/92   Pulse 89   Temp 97.9  F (36.6  C) (Axillary)   Resp 17   Ht 1.676 m (5' 6\")   Wt 78.7 kg (173 lb 9.6 oz)   SpO2 96%   BMI 28.02 kg/m     Date 25 07 - 05/10/25 0659   Shift 1241-5165 6412-4960 7840-8688 24 Hour Total   INTAKE   P.O. 120   120   Shift Total(mL/kg) 120(1.52)   120(1.52)   OUTPUT   Shift Total(mL/kg)       Weight (kg) 78.74 78.74 78.74 78.74      Admit Weight: 78.7 kg (173 lb 9.6 oz)     GENERAL APPEARANCE: alert and no distress  HENT: mouth without ulcers or lesions  RESP: lungs clear to auscultation - no rales, rhonchi or wheezes  CV: regular rhythm, normal rate, no rub, no murmur  EDEMA: no LE edema bilaterally  ABDOMEN: soft, nondistended, nontender, bowel sounds normal  MS: extremities normal - no gross deformities noted, no evidence of inflammation in joints, no muscle tenderness  SKIN: no rash  TX KIDNEY: normal  DIALYSIS ACCESS:  LUE AV fistula with bruit and thrill    Data   All labs reviewed by me.  CMP  Recent Labs   Lab 25  0610 25  0222 05/10/25  2230 05/10/25  1810 05/10/25  1120 25  1405 25  0649 25  2148 25  1922     --   --  133* 137  --   --   --  140   POTASSIUM 4.5  4.5 3.8 3.8 4.2  4.2 4.1   < >  --   --  4.3   CHLORIDE 99  --   --  97* 98  --   --   --  102   CO2 24  --   --  22 25  --   --   --  22   ANIONGAP 13  --   --  14 14  --   --   --  16*   *  --   --  148* 88  --  78   < > 77   BUN 22.7*  --   --  22.4* 20.6*  --   --   --  24.4*   CR 6.14*  --   --  7.73* 8.38*  --   --   --  10.10*   GFRESTIMATED 12*  --   --  9* 8*  --   --   --  6*   LIZZ 9.5  --   --  9.3 " 10.8*  --   --   --  10.4   MAG 1.9  --   --  2.0 2.3  --   --   --   --    PHOS 6.2*  --   --  5.0*  --   --   --   --   --    PROTTOTAL  --   --   --   --   --   --   --   --  7.2   ALBUMIN  --   --   --   --   --   --   --   --  4.5   BILITOTAL  --   --   --   --   --   --   --   --  0.4   ALKPHOS  --   --   --   --   --   --   --   --  98   AST  --   --   --   --   --   --   --   --  16   ALT  --   --   --   --   --   --   --   --  9    < > = values in this interval not displayed.     CBC  Recent Labs   Lab 05/11/25  0610 05/11/25  0222 05/10/25  2230 05/10/25  1810 05/10/25  1120 05/08/25 1922   HGB 7.4* 7.2* 8.0* 7.3*  7.3* 8.4* 8.9*   WBC 11.6*  --   --  6.7 5.3 6.6   RBC 2.44*  --   --  2.39* 2.86* 2.97*   HCT 21.1*  --   --  21.2* 24.7* 26.2*   MCV 87  --   --  89 86 88   MCH 30.3  --   --  30.1 29.4 30.0   MCHC 35.1  --   --  34.6 34.0 34.0   RDW 12.9  --   --  12.7 12.4 12.8   *  --   --  87* 147* 156     INR  Recent Labs   Lab 05/08/25 1922   INR 0.99   PTT 27     ABGNo lab results found in last 7 days.   Urine Studies  Recent Labs   Lab Test 05/09/25  0641 04/06/23  1100 07/23/19  0600 06/26/19  0555   COLOR Light Yellow Yellow Straw Straw   APPEARANCE Clear Slightly Cloudy* Slightly Cloudy Clear   URINEGLC 30* 30* Negative Negative   URINEBILI Negative Negative Negative Negative   URINEKETONE Negative Negative Negative Negative   SG 1.012 1.015 1.005 1.009   UBLD Small* Small* Small* Small*   URINEPH 6.0 6.0 6.0 5.0   PROTEIN 100* 50* 100* 100*   NITRITE Negative Negative Negative Negative   LEUKEST Negative Negative Negative Negative   RBCU 13* 10* 0 2   WBCU 3 9* 0 0     Recent Labs   Lab Test 07/23/19  0600 06/26/19  0555 06/07/19  1518 09/18/17  1422   UTPG 2.82* 1.86* 1.98* 0.79*     PTH  Recent Labs   Lab Test 12/19/20  0726 09/18/17  1431   PTHI 2,523* 151*     Iron Studies  Recent Labs   Lab Test 12/19/20  0726   IRON 53   *   IRONSAT 24   CLINTON 143       IMAGING:  All imaging  studies reviewed by me.

## 2025-05-11 NOTE — DISCHARGE INSTRUCTIONS
Diet recommendations post-transplant: High protein diet x 8 weeks.  Heart healthy dietary habits long term (low saturated/trans fat, low sodium). Practice food safety precautions. See nutrition handout for more information.            [facility] RN Care Coordinator:  Lupe Guerrero P: [phone]

## 2025-05-11 NOTE — PROGRESS NOTES
Patient removed from [facility] waitlist after  donor right kidney transplant. [facility] ID [id number].    Donor Has Risk Criteria for Transmission of HIV/HCV/HBV: no  Recipient Notified of Risk Criteria: stormy Peralta RN

## 2025-05-11 NOTE — PROGRESS NOTES
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 Patient is now status post DDKT with ureteral stent placement on 5/10/25 with Dr. Omar Osullivan    Graft function: POD #1  Kidney: Cr 6.1 (7.7). UOP 5.2L. Post-op US patent flow, no perinephritic fluid collection seen.    Immunosuppressed status secondary to medications: cPRA 100  Thymo: 150 mg in OR. 150 mg on POD 1. Plan for 6 mg/kg  Steroids:  Taper per protocol.  MMF: 750 mg BID  Tacro: Goal level 8-10.  Prednisone: 5mg daily. Due to 4th kidney transplant Start after steroid taper on 5/13 (ordered)    Neuro:  Acute surgical pain: Scheduled tylenol. Oxycodone to 5-10 mg every 4 hours PRN. Add methocarbamol scheduled,  lidoderm patch, and binder. Stop IV dilaudid today.   Depression/anxiety: PTA Prozac. Restart    Hematology:   Anemia of chronic disease: Pre tx 8.9. Hgb 7.4->6.8. Transfusion 1 unit RBC now. Recheck Hgb after transfusion.  Thrombocytopenia: . Monitor    Cardiorespiratory:   HTN: PTA carvedilol 25 mg BID. BP acceptable. No antihypertensive needed.   CYNDEE: CPAP at home  Vasculopathy ppx: atorvastatin started    GI/Nutrition:   Diet: Regular  Bowel regimen: Senna, PEG    Endocrine:   Stress/steroid hyperglycemia, mild: 5/8 HA1c 4.9% . Monitor    Fluid/Electrolytes: MIVF: Straight rate. Stop CVP. Electrolytes replete    : Ordoñez to remain due to new surgical anastomosis x 3 days.    Infectious disease: Afebrile    Prophylaxis: DVT, fall, GI, viral (Valcyte),  pneumocystis (Bactrim)    Disposition: 7A     KARLIE/Fellow/Resident Provider: RIGOBERTO Hammond    Medically Ready for Discharge: Anticipated in 2-4 Days     Faculty: Omar Osullivan M.D., Ph.D.      _________________________________________________________________    Interval History: History from patient and/or EMR  Overnight events: No c/o. Pain controlled. Denies SOB or nausea.  Tolerating diet. No flatus or BM.    ROS:   A 10-point review of systems was negative except as noted above.    Meds:  Current Facility-Administered Medications   Medication Dose Route Frequency Provider Last Rate Last Admin    acetaminophen (TYLENOL) tablet 975 mg  975 mg Oral Q8H Leandro Licea MD   975 mg at 05/11/25 0606    atorvastatin (LIPITOR) tablet 10 mg  10 mg Oral Daily Leandro Licea MD        [START ON 5/12/2025] magnesium oxide (MAG-OX) tablet 400 mg  400 mg Oral Daily with lunch Leandro Licea MD        mycophenolate (GENERIC EQUIVALENT) capsule 750 mg  750 mg Oral BID IS Leandro Licea MD   750 mg at 05/10/25 2208    pantoprazole (PROTONIX) EC tablet 40 mg  40 mg Oral BID AC Omar Osullivan MD   40 mg at 05/09/25 1759    polyethylene glycol (MIRALAX) Packet 17 g  17 g Oral Daily Leandro Licea MD        senna-docusate (SENOKOT-S/PERICOLACE) 8.6-50 MG per tablet 1 tablet  1 tablet Oral BID Leandro Licea MD   1 tablet at 05/10/25 2209    sodium chloride (PF) 0.9% PF flush 10 mL  10 mL Intracatheter Q8H Leandro Licea MD   10 mL at 05/10/25 2212    sulfamethoxazole-trimethoprim (BACTRIM) 400-80 MG per tablet 1 tablet  1 tablet Oral Q Mon Wed Fri AM Leandro Licea MD        tacrolimus (GENERIC EQUIVALENT) capsule 2.5 mg  0.03 mg/kg Oral BID IS Leandro Licea MD   2.5 mg at 05/10/25 2208    [START ON 5/12/2025] valGANciclovir (VALCYTE) tablet 450 mg  450 mg Oral Once per day on Monday Thursday Leandro Licea MD           Physical Exam:     Admit Weight: 78.7 kg (173 lb 9.6 oz)    Current vitals:   BP  "(!) 129/92   Pulse 89   Temp 97.9  F (36.6  C) (Axillary)   Resp 17   Ht 1.676 m (5' 6\")   Wt 78.7 kg (173 lb 9.6 oz)   SpO2 96%   BMI 28.02 kg/m      Vital sign ranges:    Temp:  [97.5  F (36.4  C)-98  F (36.7  C)] 97.9  F (36.6  C)  Pulse:  [] 89  Resp:  [10-20] 17  BP: (103-135)/(69-96) 129/92  MAP:  [42 mmHg-107 mmHg] 101 mmHg  Arterial Line BP: (122-143)/(59-89) 143/76  SpO2:  [92 %-100 %] 96 %    General Appearance: in no apparent distress.   Skin: normal, warm  Heart: NSR  Lungs: NLB on RA.   Abdomen:  Soft, non distended.  The midline incision covered with surgical dressing. OLGA x 1 serosanguinous   :  mcdaniels  Extremities: no edema  Neurologic:axo x 3     Data:   CMP  Recent Labs   Lab 05/11/25  0610 05/11/25  0222 05/10/25  2230 05/10/25  1810 05/08/25  2148 05/08/25  1922     --   --  133*   < > 140   POTASSIUM 4.5  4.5 3.8   < > 4.2  4.2   < > 4.3   CHLORIDE 99  --   --  97*   < > 102   CO2 24  --   --  22   < > 22   *  --   --  148*   < > 77   BUN 22.7*  --   --  22.4*   < > 24.4*   CR 6.14*  --   --  7.73*   < > 10.10*   GFRESTIMATED 12*  --   --  9*   < > 6*   LIZZ 9.5  --   --  9.3   < > 10.4   MAG 1.9  --   --  2.0   < >  --    PHOS 6.2*  --   --  5.0*  --   --    ALBUMIN  --   --   --   --   --  4.5   BILITOTAL  --   --   --   --   --  0.4   ALKPHOS  --   --   --   --   --  98   AST  --   --   --   --   --  16   ALT  --   --   --   --   --  9    < > = values in this interval not displayed.     CBC  Recent Labs   Lab 05/11/25  0610 05/11/25  0222 05/10/25  2230 05/10/25  1810 05/10/25  1120 05/08/25  2324   HGB 7.4* 7.2*   < > 7.3*  7.3*   < >  --    WBC 11.6*  --   --  6.7   < >  --    *  --   --  87*   < >  --    A1C  --   --   --   --   --  4.9    < > = values in this interval not displayed.      "

## 2025-05-11 NOTE — PLAN OF CARE
Goal Outcome Evaluation:      Plan of Care Reviewed With: patient    Overall Patient Progress: improvingOverall Patient Progress: improving    Outcome Evaluation: Discharge to local hot with parents when med ready

## 2025-05-11 NOTE — CONSULTS
Care Management Initial Consult    General Information  Assessment completed with: Jhoan White  Type of CM/SW Visit: Initial Assessment    Primary Care Provider verified and updated as needed: Yes   Readmission within the last 30 days: no previous admission in last 30 days      Reason for Consult: discharge planning  Advance Care Planning: Advance Care Planning Reviewed: no concerns identified          Communication Assessment  Patient's communication style: spoken language (English or Bilingual)    Hearing Difficulty or Deaf: no   Wear Glasses or Blind: no    Cognitive  Cognitive/Neuro/Behavioral: WDL  Level of Consciousness: alert  Arousal Level: opens eyes spontaneously  Orientation: oriented x 4  Mood/Behavior: calm, cooperative     Speech: clear, spontaneous, logical    Living Environment:   People in home: alone     Current living Arrangements: apartment      Able to return to prior arrangements: yes       Family/Social Support:  Care provided by: self  Provides care for: no one  Marital Status: Single  Support system: Parent(s), Friend, Grandparent(s)          Description of Support System: Supportive, Involved    Support Assessment: Adequate social supports, Adequate family and caregiver support    Current Resources:   Patient receiving home care services: No        Community Resources: Dialysis Services  Equipment currently used at home: none  Supplies currently used at home: None    Employment/Financial:  Employment Status: unemployed        Financial Concerns: none           Does the patient's insurance plan have a 3 day qualifying hospital stay waiver?  No    Lifestyle & Psychosocial Needs:  Social Drivers of Health     Food Insecurity: Low Risk  (5/8/2025)    Food Insecurity     Within the past 12 months, did you worry that your food would run out before you got money to buy more?: No     Within the past 12 months, did the food you bought just not last and you didn t have money to get more?: No    Depression: None Depression (3/5/2025)    Received from [facility]    Depression (PHQ-9)     Last PHQ-9 Score: 2     Thoughts of self harm: Not at all   Housing Stability: High Risk (5/8/2025)    Housing Stability     Do you have housing? : No     Are you worried about losing your housing?: No   Tobacco Use: Low Risk  (5/10/2025)    Patient History     Smoking Tobacco Use: Never     Smokeless Tobacco Use: Never     Passive Exposure: Not on file   Financial Resource Strain: Low Risk  (5/8/2025)    Financial Resource Strain     Within the past 12 months, have you or your family members you live with been unable to get utilities (heat, electricity) when it was really needed?: No   Alcohol Use: Not At Risk (5/11/2021)    Received from [facility]    AUDIT-C     Frequency of Alcohol Consumption: Never     Average Number of Drinks: Not on file     Frequency of Binge Drinking: Not on file   Transportation Needs: Low Risk  (5/8/2025)    Transportation Needs     Within the past 12 months, has lack of transportation kept you from medical appointments, getting your medicines, non-medical meetings or appointments, work, or from getting things that you need?: No   Physical Activity: Not on File (11/8/2023)    Received from [facility]    Physical Activity     Physical Activity: 0   Interpersonal Safety: Low Risk  (5/10/2025)    Interpersonal Safety     Do you feel physically and emotionally safe where you currently live?: Yes     Within the past 12 months, have you been hit, slapped, kicked or otherwise physically hurt by someone?: No     Within the past 12 months, have you been humiliated or emotionally abused in other ways by your partner or ex-partner?: No   Recent Concern: Interpersonal Safety - High Risk (5/9/2025)    Interpersonal Safety     Do you feel physically and emotionally safe where you currently live?: No     Within the past 12 months, have you been hit, slapped, kicked or  "otherwise physically hurt by someone?: No     Within the past 12 months, have you been humiliated or emotionally abused in other ways by your partner or ex-partner?: No   Stress: Not on File (2023)    Received from [facility]    Stress     Stress: 0   Social Connections: Not on File (10/8/2024)    Received from [facility]    Social Connections     Connectedness: 0   Health Literacy: Not on file       Functional Status:  Prior to admission patient needed assistance:   Dependent ADLs:: Independent  Dependent IADLs:: Independent  Assesssment of Functional Status: Not at  functional baseline    Mental Health Status:  Mental Health Status: No Current Concerns       Chemical Dependency Status:  Chemical Dependency Status: No Current Concerns             Values/Beliefs:  Spiritual, Cultural Beliefs, Anabaptism Practices, Values that affect care: no               Discussed  Partnership in Safe Discharge Planning  document with patient/family: Yes: Pt in agreement with collaborative safe discharge planning.     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 Patient was informed of the risks and benefits regarding  donor organ transplantation, and has elected to proceed with possible kidney transplant. \"- per H&P.     Met with patient to introduce self/role and complete initial assessment. Confirmed patient's address, PCP, emergency contacts and insurance.    Pt " reports he currently lives alone in an apartment in [place] where he is independent in all ADL's and IADL's. He does not use any medical equipment or in-home medical services. He does go to outpt dialysis. Pt reported he would be able to stay locally after discharge with his parents, who will rent a local hotel.    Pt denied any mental health, chem dep, or financial concerns. Writer provided education on HCD and hospital [facility] decision making policies. Pt reported understanding and declined completing a HCD at this time.     Writer discussed kidney transplant after care, including ATC appts on day 1 & day 2 after discharge, their time, and location, and the need for ongoing labs on Mon/Thur. Writer offered lab work can be done in-clinic or a home care referral can be made to see if home care would be able to provide labs. Pt declined home care, reported in person labs at clinic would be preference.     Pt reported no further Care Management questions or concerns at this time. Care Management will continue to follow.     Discharge Resources  [facility]  [place] [address]  Ph: [phone]   Fax: [phone]    Next Steps: ATC appts, EHO, & IMM at discharge.     Dima Gomez RNCC  Covering for 7A  Phone [phone]      RN Care Coordinator     Social Work and Care Management Department      SEARCHABLE in [facility] - search CARE COORDINATOR       [employer] [place] ([phone]) Saturday & Sunday; ([phone]) [facility] Recognized Holidays     Units: 5A Onc Vocera & 5C Vocera    Units: 6B Vocera & 6C Vocera    Units: 7A SOT RNCC Vocera, 7B Med Surg Vocera, & 7C Med Surg Vocera    Units: 6A Vocera & 4A CVICU Vocera, 4C MICU Vocera, and 4E SICU Vocera    Units: 5 Ortho Vocera & 5 Med Surg Vocera    Units: 6 Med Surg Vocera & 8 Med Surg Vocera

## 2025-05-11 NOTE — TELEPHONE ENCOUNTER
Organ Offer Encounter Information    Organ Offer Information  Organ offer date & time: 5/7/2025 12:41 PM  Coordinator/Fellow/Attending name: Nelsy Peralta RN   Organ(s):  Organ UNOS ID Match Run ID Comment Organ Laterality   Kidney [id number] [id number] LAOP, backup         Recent infections?: No      New medications?: No Recent pregnancy?: No     Angicoagulation medications?: No Recent vaccinations?: No     Recent blood transfusions?: No Recent hospitalizations?: No   Has your insurance changed in the last 6-12 months?: Neg    Patient last dialyzed: 5/7/2025  5:00 AM  Dialysis type: Hemo  Discussed organ offer with: Patient  Patient/Caregiver name: Jhoan  Discussed risk category with Patient/Other: N/A  Patient/Other asked to speak to a surgeon?: No  Discussed program-specific outcomes: Asked questions regarding SRTR, verbalized understanding  Right to decline organ offer without penalty, Patient/Other: Aware of option to decline without penalty  Organ offer decision status Patient/Other: Accepted Offer  Organ disposition: Transplanted  Additional Comments: 5/7/2025 12:43 PM  Kidney: import DBD kidney offer, back up  Called to discuss back up kidney offer with patient. Patient aware VXM is compatible, final acceptance would be based on FXM. Patient is also aware this is currently a back up offer and would need to be made primary. Patient confirmed no sensitizing events. Patient will prepare for admission- plan to admit patient once donor OR time is set.   MD: Shi  [facility] Contact: [phone] Isabel  VXM Results: no dsa, compatible  XM Plan (FXM must be done with serum no older than 10 days from transplant): final xm on admit   Donor/Recip HCV Status: neg/neg  (HCV+ Donors - Send Epic in basket message to [facility] HCV POOL - Include Donor UNOS ID)  Is this an ABO A2 donor to ABO B Recipient: no  (In the event of A2 to B transplant, Anti-A titers need to be collected at the time of the crossmatch or admission  - whichever is first.  Use [place] .A2toB for instructions.)  Plan (Admission, NPO, Donor OR): pending donor OR time  - - -   COVID Screening  In the past month, have you:  Or anyone close to you had a positive COVID test or suspected to have COVID: no   Had any COVID symptoms (Fever, Cough, Short of Breath, Loss of Taste/Smell, Rash): no  Nelsy Peralta RN    Donor blood requested, confirmed by Isabel BALBUENA.  notified to coordinate blood delivery. Job #[id number], no tracking link available. Delivery ETA 5/8 at 0800.   Nelsy Peralta RN    Spoke with 7a charge and gave her heads up of potential admit, ETA unknown.   Nelsy Peralta RN    5/8/2025 6:54 AM:   Called Jhoan and introduced myself as the coordinator taking over, provided him my contact information. Informed him that we are waiting for a donor OR time and I will reach out to him with further information when that happens. Answered all questions.  Yoanna Barron RN  Transplant Coordinator    5/8/2025 3:13 PM:   Donor OR set for 5/8 1100 CST. Per Dr Licea, admit patient this evening. ETA ~1830.    Admissions: 1524, Maria, confirmed admission for ~1830  Unit: 1514, Kamilla, confirmed bed & nurse availability  Update Provider Entering Orders (XM Plan & COVID Testing): 1527, Nelsy Barrera NP  Immunology: 1530, Deepika  KIDNEY Research ([phone]): No, redo  Yoanna Barron RN  Transplant Coordinator    Donor OR Time: 5/9 1100 CST  Procuring MD: Dr Aguilera  Contact in the OR:   Organs Being Procured: HR, FANG, LALO, PA, KIs  Expected Delays: No  Flush Solution: UW  Biopsy: No  Pump: Pending flight availability  Special Requests (Special blood tubes, nodes, waivers-XM and/or anatomical): No   MD for Visualization: Finger  Transportation Details:     5/8/2025 4:11 PM:   Spoke to ESHA Eddy, flight options [employer] 4145, depart 1800 arrival 2245 with 1 hr layover in [place]. Second option is to pump kidney overnight and take the [employer] flight 0919,  departure 5/10 0500, arrival 1248 with a 3.5 hour layover. [place] confirmed they can pump kidney overnight at [facility] if it makes the 0500 flight. Eddie with [facility] aware we may need an SRC to place kidney on pump if we make the 1800 flight.   Yoanna Barron RN  Transplant Coordinator    5/9/2025 12:47 AM:   FXM negative. Dr Licea notified.   Yoanna Barron RN  Transplant Coordinator    5/9/2025 6:35 AM:   Spoke to Raúl at [facility] regarding SRC availability for tonight to put the kidney on pump. Raúl will recheck with staffing and let us know.   Yoanna Barron RN  Transplant Coordinator    5:43 PM  Primary offer received on RKI. Anatomy reviewed with Dr. Licea. RKI accepted per Dr. Licea. LS notified to coordinate with [facility]PO for transport and delivery. KI to be pumped at OPO until needs to go to the airport. Transport pending. 7a Hayde notified of OR time for NPO status.   Nelsy Peralta RN    Book OR:1845, Aza. OR set for 5/10 at 1300  Vessel Storage Confirmation (PA/KP/LI): yes, ok to bank   Blood Bank: 1847, [facility]  TransNet/ABO Verification: 1842, labels printed   Add Organ: 1846, RKI added     Nelsy Peralta RN  _ _ _  5/9/2025 9:10 PM;  Received incoming call from Dr Licea advising to  move recipient OR time  to 1200. OR Loraine notified.     Jose Enrique Bowie RN  On-Call Transplant Coordinator   _ _ _  5/9/2025 9:48 PM;    Transportation Details: ESHA Bui set up transportation for right kidney. TESS job #[id number].  Right Kidney departs [facility] @ 0700. [facility] @ 1245. [place] [facility] [place]  ESHA Bui notified to place kidney on pump over night until they are ready to be shipped. Kidney will be placed back in cold storage prior to shipping. KI is pumping very well at this time. Dr Bolanos/Finger notified.   Jose Enrique Bowie RN  On-Call Transplant Coordinator    05/10/2015  7:44 AM  Flight information:   [facility]BER[facility] 89[address] departing [facility] at 0700, arriving [facility] at 0920, 1.5 hr layaway, [place]WA 1971 leaving [facility] at 1120 landing  [facility] at 1245, guaranteed deliver by 1400.   First flight this AM departed early/on time. Confirmed kidney is on plane and en route.   Nelsy Peralta RN               Attestation I have discussed all of the above with the Patient/Legal Guardian/Caregiver regarding this organ offer.: Yes  Coordinator/Fellow/Attending name: Nelsy Peralta, RN

## 2025-05-11 NOTE — PLAN OF CARE
"Goal Outcome Evaluation:      Plan of Care Reviewed With: patient, family    Overall Patient Progress: no changeOverall Patient Progress: no change    Outcome Evaluation: Patient taken to pre-op at 1130 this AM. OR for kidney transplant at 1300.      BP (!) 140/89 (BP Location: Right arm)   Pulse 91   Temp 98.6  F (37  C) (Oral)   Resp 16   Ht 1.676 m (5' 6\")   Wt 78.7 kg (173 lb 9.6 oz)   SpO2 97%   BMI 28.02 kg/m        7056-0800  AVSS on RA. Alert and oriented x4. CVPs and IV fluid replacement stopped this afternoon. Left CVC infusing with maintenance fluid D5LR @ 100 ml/hr and patient is currently receiving his 2nd dose of thymo. Critical hemoglobin 6.8- will need to give a unit of blood this evening- after thymo is completed (eta 1600). Midline incision- remains covered with dried serosanguinous drainage. Left OLGA drain; to bulb suction. About 120 mL output- dark red. Not passing flatus yet. Progressed to regular diet- appetite has been OK- did better with PO intake as day went on. Denies nausea. Incisional pain managed with PRN oxy (10 mg) given x2. Ordoñez in place until POD #3- patient and family are aware of plan. Adequate output- darker yellow in appearance. Continue plan of care, please notify MD with any changes.        "

## 2025-05-11 NOTE — PLAN OF CARE
"Goal Outcome Evaluation:    /83   Pulse 89   Temp 97.9  F (36.6  C) (Axillary)   Resp 17   Ht 1.676 m (5' 6\")   Wt 78.7 kg (173 lb 9.6 oz)   SpO2 93%   BMI 28.02 kg/m      Shift: 3854-8818    VS: VSS, CVPs 7-11  Neuro: Aox4  Respiratory: WDL on RA  : Ordoñez, output ~200ml/hr  GI: No BM, not passing gas  Skin: abd incision with op dressing on  Diet: Regular  BG: Daily with lab  LDA: R PIV x2, L AVF, L internal jugular   Infusions: D5LR @100ml/hr, replacement 1 and 2  Mobility: not OOB  Pain/Nausea: intermittent nausea, minimal incisional pain  PRN medications: oxy x3, dilaudid x1, compazine x1                "

## 2025-05-11 NOTE — PROGRESS NOTES
Admitted/transferred from: PACU    Time of arrival on unit: 2100    2 RN full  skin assessment completed by Shaina ISABEL and Yessy AZEVEDO    Skin assessment finding: midline incision with post op dressing on, small amount of drainage, L OLGA drain, LAVF, R forearm art line dressing  CDI    Interventions/actions: monitor/change dressings per orders

## 2025-05-11 NOTE — PROGRESS NOTES
CLINICAL NUTRITION SERVICES - ASSESSMENT NOTE    RECOMMENDATIONS FOR MDs/PROVIDERS TO ORDER:  None at this time    Registered Dietitian Interventions:  Nutrition Education:  1. Provided instruction on post-transplant (tx or txp) diet with discussion regarding protein sources and high protein needs in acute post-tx phase.  Reviewed recommendations to follow low fat/low sodium diet long term and discussed heart healthy diet tips.  Discussed monitoring of K+/Phos lab values with possible need for adjustment of these in the diet as necessary. Reviewed need for food safety precautions to prevent food borne illness.  2. Provided & reviewed handout: Post-transplant diet guidelines briefly as pt had nutrition education in the past. Patient receptive to information provided. Expected diet compliance is good.     Offered oral supplements and snacks. Pt politely declined the need but is aware these may be ordered via room service if desired. Explained need for increased kcals/protein post-op transplant. Rec well-tolerated options post-op. Encouraged pt to ask for bowel meds and antiemetics/antinauseants as needed.     Future/Additional Recommendations:  -Minimize diet restrictions as able d/t high calorie/protein needs post-transplant. Oral supplements as needed to help meet nutritional needs.   -High protein food choices with meals to help meet high needs post-transplant over the next 6-8 weeks.   -Heart-healthy diet (low saturated fat, low sodium, high fiber) and food safety precautions long term due to immunosuppression regimen post-transplant   -Check vitamin D status with a CRP lab, when appropriate. Vitamin D deficiency hx. Pt's 25 vitamin D total lab was less than 19 on 12/19/2020.   -Monitor K+ and phos trends. K+ WNL (4.5) and phos elevated (6.2) on 5/11.   -Monitor stooling patterns and potential need to adjust scheduled bowel regimen. Per RN 5/10, no stool recently and not passing gas.  -Continue multivitamin per  "provider.  -Iron supplementation if/when appropriate.  -Monitor lytes (Phos, Mg++, and K+) for refeeding syndrome. If lytes trend low, aggressively replace. If refeeding, rec 100 mg thiamine x 5-7 days.      REASON FOR ASSESSMENT  Provider order - Post-Op Kidney Transplant assess and educate     SUBJECTIVE INFORMATION  Assessed patient in room.    NUTRITION HISTORY  Per pt, initially stated his appetite was ok. Then, pt mentioned he has been eating less. Pt estimates he was eating about 50% of his usual intake for the past couple of months. Pt unsure of why he has been eating less.    Per outpatient RD for kidney transplant evaluation 4/6/2023: \"History of previous txp: kidney #1 1995, kidney #2 2011, kidney #3 2012. Appetite: good/baseline. Pertinent Meds: vit D, phos binder (takes most of the time). Reviewed post txp diet guidelines in brief.\"     Diet Recall per RD 4/6/2023  Breakfast Toast or skips    Lunch Blueberry waffles, chicken, burritos (frozen)   Dinner Pasta (from a bag) w/ cream sauce; corn dogs w/ mustard    Snacks None    Beverages Mountain Dew/Sprite (2/day), occasional water    Alcohol None    Dining out 2x/week      CURRENT NUTRITION ORDERS  Diet: Adv diet as tolerated, Regular    CURRENT INTAKE/TOLERANCE  Per nursing flowsheets (% intake), pt consuming 0-100% with a fair to good appetite.     LABS  Nutrition-relevant labs: Reviewed    MEDICATIONS  Nutrition-relevant medications: Ordered to receive D5 in LR IVFs    ANTHROPOMETRICS  Height: 167.6 cm (5' 6\")  Most Recent Weight: 78.7 kg (173 lb 9.6 oz)  IBW: 64.5 kg   BMI (kg/m ): Body mass index is 28.02 kg/m .   Weight History: 82.9 kg (4/6/2023), 83.9 kg (2/27/2024), 83.3 kg (11/22/2024, estimated dry wt at dialysis), 83.3 kg (1/20/2025), 81.5 kg (2/21/2025, estimated dry wt at dialysis), 81.2 kg (3/5/2025), 79.7 kg (4/14/2025) -  Pt has lost 4.3% of body wt over the last approximate four months.  Dosing Weight: 68 kg (adjusted, based on only wt " "of 78.7 kg on 5/8)    ASSESSED NUTRITION NEEDS (for inpatient hospital stay):  Estimated Energy Needs: 8833-7221 kcals (30-35 Kcal/Kg)  Justification: increased needs post-op SOT  Estimated Protein Needs:  grams protein (1.3-2 gm/Kg)  Justification: increased needs post op SOT  Estimated Fluid Needs: per MD pending fluid status and adequate UOP    SYSTEM FINDINGS    Skin/wounds:  No indication of pressure injury  GI symptoms: Per RN 5/11, \"No BM, not passing gas.\" Intermittent nausea, has received compazine and zofran     MALNUTRITION  % Intake: </=50% for >/= 1 month (severe)  % Weight Loss: Weight loss does not meet criteria   Subcutaneous Fat Loss: None observed  Muscle Loss: None observed  Fluid Accumulation/Edema: None noted  Malnutrition Diagnosis: Patient does not meet two of the established criteria necessary for diagnosing malnutrition but is at risk for malnutrition  Malnutrition Present on Admission: No    NUTRITION DIAGNOSIS  Inadequate oral intake related to decreased appetite PTA and now GI sx post-op transplant as evidenced by pt consuming 50% of usual intake for a couple months PTA.     INTERVENTIONS  See nutrition interventions above    Goals  1. Patient will verbalize understanding of 3 important aspects of post-transplant diet guidelines.   2. PO intake >50% meals TID.     Monitoring/Evaluation  Progress toward goals will be monitored and evaluated per policy.       Nutrition will continue to follow.      Leonela Arana, MS, RD, LD, CNSC     No longer available via pager  Weekend/Holiday: Vocera \"Weekend Clinical Dietitian\"    Monday-Friday: Vocera \"7A Clinical Dietitian\"  "

## 2025-05-11 NOTE — PHARMACY-TRANSPLANT NOTE
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      Planned immunosuppression regimen per kidney transplant protocol:    INDUCTION: High intensity,   Thymoglobulin 6 mg/kg total as divided below:  5/10 (POD 0): thymoglobulin 2 mg/kg (150 mg), methylprednisolone 500 mg IV   (POD 1): thymoglobulin 2 mg/kg (175 mg), methylprednisolone 250 mg IV   (POD 2): thymoglobulin 2 mg/kg (150 mg), methylprednisolone 100 mg IV     MAINTENANCE:   - Mycophenolate 750 mg BID  - Tacrolimus with goal trough levels of 8-10 mcg/L for first 6 months post-transplant     Opportunistic pathogen prophylaxis includes:  - PJP: trimethoprim/sulfamethoxazole  - CMV D?/R-: Valganciclovir for 6 months duration tentatively      Patient is not enrolled in medication study.    Pharmacy will monitor for medication interactions and immunosuppression levels in conjunction with the team. Medication therapy needs for discharge planning will continue to be addressed throughout the current admission via multidisciplinary rounds and order review.  Pharmacy will make recommendations as appropriate.

## 2025-05-12 ENCOUNTER — TELEPHONE (OUTPATIENT)
Dept: PHARMACY | Facility: CLINIC | Age: 30
End: 2025-05-12
Payer: MEDICARE

## 2025-05-12 LAB
ABO + RH BLD: NORMAL
ANION GAP SERPL CALCULATED.3IONS-SCNC: 9 MMOL/L (ref 7–15)
BASOPHILS # BLD AUTO: 0 10E3/UL (ref 0–0.2)
BASOPHILS NFR BLD AUTO: 0 %
BLD GP AB SCN SERPL QL: NEGATIVE
BLD PROD TYP BPU: NORMAL
BLOOD COMPONENT TYPE: NORMAL
BUN SERPL-MCNC: 29.3 MG/DL (ref 6–20)
CALCIUM SERPL-MCNC: 9.6 MG/DL (ref 8.8–10.4)
CHLORIDE SERPL-SCNC: 102 MMOL/L (ref 98–107)
CODING SYSTEM: NORMAL
CREAT SERPL-MCNC: 4.1 MG/DL (ref 0.67–1.17)
CROSSMATCH: NORMAL
EGFRCR SERPLBLD CKD-EPI 2021: 19 ML/MIN/1.73M2
EOSINOPHIL # BLD AUTO: 0 10E3/UL (ref 0–0.7)
EOSINOPHIL NFR BLD AUTO: 0 %
ERYTHROCYTE [DISTWIDTH] IN BLOOD BY AUTOMATED COUNT: 13.1 % (ref 10–15)
GLUCOSE SERPL-MCNC: 129 MG/DL (ref 70–99)
HCO3 SERPL-SCNC: 25 MMOL/L (ref 22–29)
HCT VFR BLD AUTO: 19.8 % (ref 40–53)
HGB BLD-MCNC: 6.9 G/DL (ref 13.3–17.7)
HGB BLD-MCNC: 7.7 G/DL (ref 13.3–17.7)
IMM GRANULOCYTES # BLD: 0 10E3/UL
IMM GRANULOCYTES NFR BLD: 0 %
ISSUE DATE AND TIME: NORMAL
LYMPHOCYTES # BLD AUTO: 0.2 10E3/UL (ref 0.8–5.3)
LYMPHOCYTES NFR BLD AUTO: 2 %
MAGNESIUM SERPL-MCNC: 2 MG/DL (ref 1.7–2.3)
MCH RBC QN AUTO: 30 PG (ref 26.5–33)
MCHC RBC AUTO-ENTMCNC: 34.8 G/DL (ref 31.5–36.5)
MCV RBC AUTO: 86 FL (ref 78–100)
MONOCYTES # BLD AUTO: 0.4 10E3/UL (ref 0–1.3)
MONOCYTES NFR BLD AUTO: 5 %
NEUTROPHILS # BLD AUTO: 7.4 10E3/UL (ref 1.6–8.3)
NEUTROPHILS NFR BLD AUTO: 93 %
NRBC # BLD AUTO: 0 10E3/UL
NRBC BLD AUTO-RTO: 0 /100
OXALATE SERPL-SCNC: 22.3 UMOL/L
PHOSPHATE SERPL-MCNC: 4 MG/DL (ref 2.5–4.5)
PLATELET # BLD AUTO: 95 10E3/UL (ref 150–450)
POTASSIUM SERPL-SCNC: 4 MMOL/L (ref 3.4–5.3)
RBC # BLD AUTO: 2.3 10E6/UL (ref 4.4–5.9)
SODIUM SERPL-SCNC: 136 MMOL/L (ref 135–145)
SPECIMEN EXP DATE BLD: NORMAL
TACROLIMUS BLD-MCNC: 5.4 UG/L (ref 5–15)
TME LAST DOSE: NORMAL H
TME LAST DOSE: NORMAL H
UNIT ABO/RH: NORMAL
UNIT NUMBER: NORMAL
UNIT STATUS: NORMAL
UNIT TYPE ISBT: 9500
WBC # BLD AUTO: 7.9 10E3/UL (ref 4–11)

## 2025-05-12 PROCEDURE — 36592 COLLECT BLOOD FROM PICC: CPT | Performed by: STUDENT IN AN ORGANIZED HEALTH CARE EDUCATION/TRAINING PROGRAM

## 2025-05-12 PROCEDURE — 83735 ASSAY OF MAGNESIUM: CPT | Performed by: STUDENT IN AN ORGANIZED HEALTH CARE EDUCATION/TRAINING PROGRAM

## 2025-05-12 PROCEDURE — 250N000011 HC RX IP 250 OP 636

## 2025-05-12 PROCEDURE — 120N000011 HC R&B TRANSPLANT UMMC

## 2025-05-12 PROCEDURE — 250N000013 HC RX MED GY IP 250 OP 250 PS 637

## 2025-05-12 PROCEDURE — 99233 SBSQ HOSP IP/OBS HIGH 50: CPT | Mod: FS | Performed by: NURSE PRACTITIONER

## 2025-05-12 PROCEDURE — 86900 BLOOD TYPING SEROLOGIC ABO: CPT | Performed by: TRANSPLANT SURGERY

## 2025-05-12 PROCEDURE — P9016 RBC LEUKOCYTES REDUCED: HCPCS

## 2025-05-12 PROCEDURE — 258N000003 HC RX IP 258 OP 636

## 2025-05-12 PROCEDURE — 86922 COMPATIBILITY TEST ANTIGLOB: CPT

## 2025-05-12 PROCEDURE — 85018 HEMOGLOBIN: CPT

## 2025-05-12 PROCEDURE — 250N000013 HC RX MED GY IP 250 OP 250 PS 637: Performed by: STUDENT IN AN ORGANIZED HEALTH CARE EDUCATION/TRAINING PROGRAM

## 2025-05-12 PROCEDURE — 250N000012 HC RX MED GY IP 250 OP 636 PS 637

## 2025-05-12 PROCEDURE — 85025 COMPLETE CBC W/AUTO DIFF WBC: CPT | Performed by: STUDENT IN AN ORGANIZED HEALTH CARE EDUCATION/TRAINING PROGRAM

## 2025-05-12 PROCEDURE — 80197 ASSAY OF TACROLIMUS: CPT | Performed by: PHYSICIAN ASSISTANT

## 2025-05-12 PROCEDURE — 85004 AUTOMATED DIFF WBC COUNT: CPT | Performed by: STUDENT IN AN ORGANIZED HEALTH CARE EDUCATION/TRAINING PROGRAM

## 2025-05-12 PROCEDURE — 84100 ASSAY OF PHOSPHORUS: CPT | Performed by: STUDENT IN AN ORGANIZED HEALTH CARE EDUCATION/TRAINING PROGRAM

## 2025-05-12 PROCEDURE — 80048 BASIC METABOLIC PNL TOTAL CA: CPT | Performed by: STUDENT IN AN ORGANIZED HEALTH CARE EDUCATION/TRAINING PROGRAM

## 2025-05-12 PROCEDURE — 250N000012 HC RX MED GY IP 250 OP 636 PS 637: Performed by: STUDENT IN AN ORGANIZED HEALTH CARE EDUCATION/TRAINING PROGRAM

## 2025-05-12 PROCEDURE — 36592 COLLECT BLOOD FROM PICC: CPT

## 2025-05-12 PROCEDURE — 250N000013 HC RX MED GY IP 250 OP 250 PS 637: Performed by: TRANSPLANT SURGERY

## 2025-05-12 RX ORDER — DIPHENHYDRAMINE HCL 25 MG
25-50 CAPSULE ORAL ONCE
Status: COMPLETED | OUTPATIENT
Start: 2025-05-12 | End: 2025-05-12

## 2025-05-12 RX ORDER — SULFAMETHOXAZOLE AND TRIMETHOPRIM 400; 80 MG/1; MG/1
1 TABLET ORAL DAILY
Status: DISCONTINUED | OUTPATIENT
Start: 2025-05-13 | End: 2025-05-13 | Stop reason: HOSPADM

## 2025-05-12 RX ORDER — ACETAMINOPHEN 325 MG/1
650 TABLET ORAL ONCE
Status: COMPLETED | OUTPATIENT
Start: 2025-05-12 | End: 2025-05-12

## 2025-05-12 RX ORDER — TACROLIMUS 1 MG/1
4 CAPSULE ORAL
Status: DISCONTINUED | OUTPATIENT
Start: 2025-05-12 | End: 2025-05-13 | Stop reason: HOSPADM

## 2025-05-12 RX ORDER — PANTOPRAZOLE SODIUM 40 MG/1
40 TABLET, DELAYED RELEASE ORAL 2 TIMES DAILY
Status: DISCONTINUED | OUTPATIENT
Start: 2025-05-12 | End: 2025-05-13 | Stop reason: HOSPADM

## 2025-05-12 RX ORDER — DIPHENHYDRAMINE HCL 12.5MG/5ML
25-50 LIQUID (ML) ORAL ONCE
Status: COMPLETED | OUTPATIENT
Start: 2025-05-12 | End: 2025-05-12

## 2025-05-12 RX ORDER — METHOCARBAMOL 500 MG/1
500 TABLET, FILM COATED ORAL 4 TIMES DAILY PRN
Status: DISCONTINUED | OUTPATIENT
Start: 2025-05-12 | End: 2025-05-13

## 2025-05-12 RX ORDER — VALGANCICLOVIR 450 MG/1
450 TABLET, FILM COATED ORAL EVERY OTHER DAY
Status: DISCONTINUED | OUTPATIENT
Start: 2025-05-13 | End: 2025-05-13 | Stop reason: HOSPADM

## 2025-05-12 RX ORDER — METHYLPREDNISOLONE SODIUM SUCCINATE 125 MG/2ML
100 INJECTION INTRAMUSCULAR; INTRAVENOUS ONCE
Status: COMPLETED | OUTPATIENT
Start: 2025-05-12 | End: 2025-05-12

## 2025-05-12 RX ADMIN — METHYLPREDNISOLONE SODIUM SUCCINATE 100 MG: 125 INJECTION, POWDER, FOR SOLUTION INTRAMUSCULAR; INTRAVENOUS at 13:25

## 2025-05-12 RX ADMIN — SULFAMETHOXAZOLE AND TRIMETHOPRIM 1 TABLET: 400; 80 TABLET ORAL at 08:28

## 2025-05-12 RX ADMIN — TACROLIMUS 4 MG: 1 CAPSULE ORAL at 18:00

## 2025-05-12 RX ADMIN — MYCOPHENOLATE MOFETIL 750 MG: 250 CAPSULE ORAL at 08:28

## 2025-05-12 RX ADMIN — DIPHENHYDRAMINE HYDROCHLORIDE 50 MG: 25 CAPSULE ORAL at 13:25

## 2025-05-12 RX ADMIN — PANTOPRAZOLE SODIUM 40 MG: 40 TABLET, DELAYED RELEASE ORAL at 08:28

## 2025-05-12 RX ADMIN — MYCOPHENOLATE MOFETIL 750 MG: 250 CAPSULE ORAL at 18:00

## 2025-05-12 RX ADMIN — SENNOSIDES AND DOCUSATE SODIUM 1 TABLET: 50; 8.6 TABLET ORAL at 20:23

## 2025-05-12 RX ADMIN — MAGNESIUM HYDROXIDE 30 ML: 400 SUSPENSION ORAL at 15:50

## 2025-05-12 RX ADMIN — MAGNESIUM OXIDE TAB 400 MG (241.3 MG ELEMENTAL MG) 400 MG: 400 (241.3 MG) TAB at 13:26

## 2025-05-12 RX ADMIN — SENNOSIDES AND DOCUSATE SODIUM 1 TABLET: 50; 8.6 TABLET ORAL at 08:28

## 2025-05-12 RX ADMIN — PANTOPRAZOLE SODIUM 40 MG: 40 TABLET, DELAYED RELEASE ORAL at 20:23

## 2025-05-12 RX ADMIN — ACETAMINOPHEN 650 MG: 325 TABLET ORAL at 13:25

## 2025-05-12 RX ADMIN — ANTI-THYMOCYTE GLOBULIN (RABBIT) 150 MG: 5 INJECTION, POWDER, LYOPHILIZED, FOR SOLUTION INTRAVENOUS at 14:16

## 2025-05-12 RX ADMIN — FLUOXETINE HYDROCHLORIDE 40 MG: 20 CAPSULE ORAL at 20:23

## 2025-05-12 RX ADMIN — TACROLIMUS 2.5 MG: 1 CAPSULE ORAL at 08:28

## 2025-05-12 RX ADMIN — ATORVASTATIN CALCIUM 10 MG: 10 TABLET, FILM COATED ORAL at 08:28

## 2025-05-12 RX ADMIN — VALGANCICLOVIR 450 MG: 450 TABLET, FILM COATED ORAL at 08:28

## 2025-05-12 RX ADMIN — POLYETHYLENE GLYCOL 3350 17 G: 17 POWDER, FOR SOLUTION ORAL at 08:28

## 2025-05-12 RX ADMIN — ACETAMINOPHEN 975 MG: 325 TABLET ORAL at 22:04

## 2025-05-12 NOTE — PLAN OF CARE
"Goal Outcome Evaluation:      Plan of Care Reviewed With: patient    Overall Patient Progress: no changeOverall Patient Progress: no change    Outcome Evaluation: One unit RBCs given for Hgb of 6.9. Patient tolerated well - no s/s of reaction. Thymo scheduled for this afternoon. Patient SBA, VSS on room air, A/O x4. Family at bedside      BP (!) 143/102   Pulse 92   Temp 97.9  F (36.6  C) (Oral)   Resp 20   Ht 1.676 m (5' 6\")   Wt 83.4 kg (183 lb 12.8 oz)   SpO2 98%   BMI 29.67 kg/m      Shift: 9353-6098  Isolation Status: None  VS: Stable on room air, afebrile  Neuro: Aox4  Behaviors: Calm, cooperative  BG: None  Labs: Creat=4.10 , Hgb=6.9  Respiratory: WDL  Cardiac: WDL  Pain/Nausea: Mild pain - pt declined pain medications. Denies nausea   PRN: None given   Diet: Regular  IV Access: Left triple lumen IJ  Infusion(s): 1 Unit packed red blood cells.   Lines/Drains: Left triple lumen internal jugular, Mcdaniels catheter. Left OLGA, Right PIV x2  GI/: Last BM 5/10 (day of surgery), started passing flatus/mcdaniels catheter with adequate output   Skin: Midline abdominal incision covered with op dressing (GABRIELA). Drainage marked/extended.   Mobility: SBA  Plan: Thymo this afternoon.      "

## 2025-05-12 NOTE — TELEPHONE ENCOUNTER
A pharmacist spent 15 minutes providing medication teaching with Jhoan Hoffman and Yoanna (mom)for discharge with a focus on new medications/dose changes.  The discharge medication list was reviewed with the patient/family and the following points were discussed, as applicable: Name, description, purpose, dose/strength, duration of medications, common side effects, food/medications to avoid, action to be taken if dose is missed, when to call MD, and how to obtain refills.  The patient will be responsible for managing medications. Additionally, the following transplant related education was covered: Purpose of medication card, Medication videos, Timing of medications and day of lab draw considerations , and Discharge process for receiving meds   Patient will  transplant supplies including 7 day pill organizer, thermometer, and BP monitor at the discharge pharmacy along with medications.  Patient will use local pharmacy or other mail order for outpatient medications.   Clinical Pharmacy Consult:                                                      Transplant Specific:   Date of Transplant: 5/10/25  Type of Transplant: kidney  First Transplant: no  History of rejection: yes    Immunosuppression Regimen   TAC 4mg qAM & 4mg qPM, Prednisone 5mg qAM, and MMF 750mg qAM & 750mg qPM  Patient specific goal: 8-10  Most recent level: 5.3, date 5/12  Immunosuppressant Levels:  Subtherapeutic  Pt adherent to lab draws: yes  Scr:   Lab Results   Component Value Date    CR 4.10 05/12/2025    CR 5.91 02/16/2021     Side effects: Tremors     Prophylactic Medications  PJP Prophylactic: Bactrim SS daily  Scheduled Discontinue Date: Lifelong     Antifungal: Not needed thus far  Scheduled Discontinue Date: N/A     CMV Prophylactic: CrCl 25 to 39 mL/minute: Valcyte 450 mg every other day   Scheduled Discontinue Date: 3 to 6 months Anticipated date: TBD    Acid Reducer: Protonix (pantoprazole)  Scheduled Reviewed Date: [facility] med      Vascular Prophylactic: Not needed thus far   Scheduled Discontinue Date: N/A    Reminders:  Bring to first clinic appt: med box, med card, bp monitor, all medications being taken, and lab book.  2.   MTM pharmacist visit on first clinic appt and if ok, again in 3 to 4 months during follow up appt.  3.   Avoid Grapefruit and Grapefruit juice.   4.   Avoid herbal supplements. If wish to take other medications or supplements, call your coordinator.   5.   Keep lab appts.   6.   Can use apps on phone like [employer] to help manage medication lists and reminders.   7.   Make sure you are protecting your skin by wearing long sleeves and applying sunscreen to exposed skin, for any significant time in the sun.     Transplant Coordinator is Yoanna De León [facility]

## 2025-05-12 NOTE — PROGRESS NOTES
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 Patient is now status post DDKT with ureteral stent placement on 5/10/25 with Dr. Omar Osullivan    Today:   - Last dose of Thymo and SM   - 1 unit pRBC for Hgb 6.9. Recheck Hgb this afternoon.   - Stop MIVF   - Tac level pending                                                                                                                                Graft function: POD #2  DDKT with stent 5/10/25: Cr 4.1 (6.1). UOP 4.2L yesterday. Post-op US patent flow, no perinephritic fluid collection seen.    Immunosuppressed status secondary to medications: cPRA 100  Thymo: 150 mg in OR. 175 mg on POD 1. 150 mg on POD 2. 6 mg/kg total.  Steroids:  Taper per protocol.  MMF: 750 mg BID  Tacro: Goal level 8-10.  Prednisone: 5mg daily. Due to 4th kidney transplant Start after steroid taper on 5/13 (ordered)    Neuro:  Acute surgical pain: Scheduled tylenol. Oxycodone to 5-10 mg every 4 hours PRN. PRN methocarbamol, and abd binder.  Depression/anxiety: PTA Prozac. Restarted.    Hematology:   Anemia of chronic disease: Hgb 6.9 (from 7.7). Last transfused 1 unit pRBC 5/11.   - 1 unit pRBC today. Recheck Hgb this afternoon.  Thrombocytopenia: PLT 95. Monitor    Cardiorespiratory:   HTN: PTA carvedilol 25 mg BID. BP acceptable. No antihypertensive needed at this time.   CYNDEE: CPAP at home  Vasculopathy ppx: atorvastatin started    GI/Nutrition:   Diet: Regular  Bowel regimen: Senna, PEG    Endocrine:    Stress/steroid hyperglycemia, mild: 5/8 HA1c 4.9% . Monitor    Fluid/Electrolytes: MIVF: stop today    : Ordoñez to remain due to new surgical anastomosis x 3 days.    Infectious disease: Afebrile    Prophylaxis: DVT, fall, GI, viral (Valcyte), pneumocystis (Bactrim)    Disposition: 7A     KARLIE/Fellow/Resident Provider: JAJA Chamorro CNP, Vocera/pager [phone]    Medically Ready for Discharge: Anticipated in 2-4 Days     Faculty: Kashif Sierra MD      _________________________________________________________________    Interval History: History from patient and/or EMR  Overnight events: No acute events overnight. Up ambulating in the osullivan this morning. States he's feeling well.   ROS:   A 10-point review of systems was negative except as noted above.    Meds:  Current Facility-Administered Medications   Medication Dose Route Frequency Provider Last Rate Last Admin    acetaminophen (TYLENOL) tablet 650 mg  650 mg Oral Once Briana Dior APRN CNP        acetaminophen (TYLENOL) tablet 975 mg  975 mg Oral Q8H Leandro Licea MD   975 mg at 05/11/25 2210    anti-thymocyte globulin (THYMOGLOBULIN - Rabbit) 150 mg in sodium chloride 0.9 % 305 mL intermittent infusion  150 mg Intravenous Central line Once Briana Dior APRN CNP        atorvastatin (LIPITOR) tablet 10 mg  10 mg Oral Daily Leandro Licea MD   10 mg at 05/12/25 0828    diphenhydrAMINE (BENADRYL) capsule 25-50 mg  25-50 mg Oral Once Briana Dior APRN CNP        Or    diphenhydrAMINE (BENADRYL) elixir 25-50 mg  25-50 mg Per NG tube Once Briana Dior APRN CNP        FLUoxetine (PROzac) capsule 40 mg  40 mg Oral QPM Omar Osullivan MD        magnesium oxide (MAG-OX) tablet 400 mg  400 mg Oral Daily with lunch Leandro Licea MD        methylPREDNISolone Na Suc (solu-MEDROL) injection 100 mg  100 mg Intravenous Once Briana Dior APRN CNP        mycophenolate (GENERIC EQUIVALENT) capsule 750 mg  750 mg Oral BID IS Vinayak  "MD Leandro   750 mg at 05/12/25 0828    pantoprazole (PROTONIX) EC tablet 40 mg  40 mg Oral BID Omar Osullivan MD        polyethylene glycol (MIRALAX) Packet 17 g  17 g Oral Daily Leandro Licea MD   17 g at 05/12/25 0828    [START ON 5/13/2025] predniSONE (DELTASONE) tablet 5 mg  5 mg Oral Daily Maria Antonia Pham PA-C        senna-docusate (SENOKOT-S/PERICOLACE) 8.6-50 MG per tablet 1 tablet  1 tablet Oral BID Leandro Licea MD   1 tablet at 05/12/25 0828    sodium chloride (PF) 0.9% PF flush 10 mL  10 mL Intracatheter Q8H Leandro Licea MD   10 mL at 05/11/25 2211    [START ON 5/13/2025] sulfamethoxazole-trimethoprim (BACTRIM) 400-80 MG per tablet 1 tablet  1 tablet Oral Daily Omar Osullivan MD        tacrolimus (GENERIC EQUIVALENT) capsule 2.5 mg  0.03 mg/kg Oral BID IS Leandro Licea MD   2.5 mg at 05/12/25 0828    [START ON 5/13/2025] valGANciclovir (VALCYTE) tablet 450 mg  450 mg Oral Every Other Day Omar Osullivan MD           Physical Exam:     Admit Weight: 78.7 kg (173 lb 9.6 oz)    Current vitals:   BP (!) 147/95 (BP Location: Right arm)   Pulse 87   Temp 98.4  F (36.9  C) (Oral)   Resp 16   Ht 1.676 m (5' 6\")   Wt 83.4 kg (183 lb 12.8 oz)   SpO2 97%   BMI 29.67 kg/m      Vital sign ranges:    Temp:  [98  F (36.7  C)-98.6  F (37  C)] 98.4  F (36.9  C)  Pulse:  [] 87  Resp:  [16-19] 16  BP: (131-154)/() 147/95  SpO2:  [94 %-100 %] 97 %    General Appearance: in no apparent distress.   Skin: normal, warm  Heart: NSR  Lungs: NLB on RA.   Abdomen:  Soft, non distended.  The midline incision covered with surgical dressing. OLGA x 1 serosanguinous   :  mcdaniels present  Extremities: no edema  Neurologic: axo x 3     Data:   CMP  Recent Labs   Lab 05/12/25  0639 05/11/25  1022 05/11/25  0610 05/08/25  2148 05/08/25  1922     --  136   < > 140   POTASSIUM 4.0 3.9 4.5  4.5   < > 4.3   CHLORIDE 102  --  99   < > 102   CO2 25  --  24   < > 22   *  --  183*   < > " 77   BUN 29.3*  --  22.7*   < > 24.4*   CR 4.10*  --  6.14*   < > 10.10*   GFRESTIMATED 19*  --  12*   < > 6*   LIZZ 9.6  --  9.5   < > 10.4   MAG 2.0  --  1.9   < >  --    PHOS 4.0  --  6.2*   < >  --    ALBUMIN  --   --   --   --  4.5   BILITOTAL  --   --   --   --  0.4   ALKPHOS  --   --   --   --  98   AST  --   --   --   --  16   ALT  --   --   --   --  9    < > = values in this interval not displayed.     CBC  Recent Labs   Lab 05/12/25  0639 05/11/25  1929 05/11/25  1022 05/11/25  0610 05/10/25  1120 05/08/25  2324   HGB 6.9* 7.7*   < > 7.4*   < >  --    WBC 7.9  --   --  11.6*   < >  --    PLT 95*  --   --  130*   < >  --    A1C  --   --   --   --   --  4.9    < > = values in this interval not displayed.

## 2025-05-12 NOTE — PROGRESS NOTES
[facility]  Transplant Nephrology Progress Note  Date of Admission:  5/8/2025  Today's Date: 05/12/2025    Recommendations:   - Agree with 1 unit PRBC.   - Agree with increase in tacrolimus to 4mg.   - No acute indication for dialysis.   - Prednisone 5 mg every day long term after taper.   - High risk CMV, Valcyte for 6 months.    Assessment & Plan   # DDKT: Trend down in creatinine. Good urine output. Normal transplant kidney doppler ultrasound 5/10.    - Baseline Creatinine: ~ TBD   - Proteinuria: Not checked post transplant   - DSA Hx: Not checked recently due to time from transplant   - Last cPRA: 100%   - BK Viremia: Not checked post transplant   - Kidney Tx Biopsy Hx: No biopsy history.    # Immunosuppression: Tacrolimus immediate release (goal 8-10), Mycophenolate mofetil (dose 750 mg every 12 hours), and Methylprednisolone (dose taper)    - Induction with Recent Transplant:  High Intensity Protocol   - Continue with intensive monitoring of immunosuppression for efficacy and toxicity.   - Historical Changes in Immunosuppression: None    - Changes: Not at this time    # Infection Prevention:   Last CD4 Level: Not checked recently      - CMV IgG Ab High Risk Discordance (D+/R-) at time of transplant: TBD  Present CMV Serostatus: Negative  - EBV IgG Ab High Risk Discordance (D+/R-) at time of transplant: TBD  Present EBV Serostatus: Positive    # Hypertension: Controlled;  Goal BP: < 140/90 (Hospitalization goal)   - Changes: Not at this time   - PTA: Carvedilol 25mg BID    # Anemia in Chronic Renal Disease: Hgb: Stable      BASHIR: No   - Iron studies: Replete    # Mineral Bone Disorder:    - Secondary renal hyperparathyroidism; PTH level: Not checked recently        On treatment: None  - Vitamin D; level: Not checked recently        On supplement: No  - Calcium; level: Normal        On supplement: No  - Phosphorus; level: Normal        On supplement: No    #  Electrolytes:   - Potassium; level: Normal        On supplement: No  - Magnesium; level: Normal        On supplement: No  - Bicarbonate; level: Normal        On supplement: No  - Sodium; level: Normal    # Other Significant PMH:   - Pericardial Effusion: December 2022, pericardial effusion without tamponade, 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 Repeat ECHO showed resolution. CT A/P from 2/2023 mentions pericardial thickening.    - CYNDEE: using CPAP  - C-Diff: February 2023.   - Obesity: BMI 29.   - Anxiety and Depression: previously required admission, almost 10 years ago. Now managed on fluoxetine.      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    JAJA Downs CNP  Transplant Nephrology  Contact information via Vocera Web Console         Physician Attestation     I saw and evaluated Jhoan Hoffman as part of a shared APRN/PA visit.     I personally reviewed the vital signs, medications, labs, and imaging.    I personally provided a substantive portion of care for this patient and I approve the care plan as written by the KARLIE.  I was involved with Medical Decision Making including: Please see A&P for additional details of medical decision making.  MANAGEMENT DISCUSSED with the following over the past 24 hours: CR down-trending good, UOP, receiving 3rd dose of r-ATG 2mg/kg today, uptitrate tacrolimus to goal 8-10 and continue on prednisone 5mg po long term once off iv steroid. Agree with prbc       Meghan Bustamante MD  Date of Service (when I saw the patient): 05/12/25    Interval History  Mr. Hoffman's creatinine is 4.10 (05/12 0639); Trend  down.  Great urine output.  Other significant labs/tests/vitals: VSS. Eating and drinking well.   No events overnight.  No chest pain or shortness of breath.  Trace leg swelling.  No nausea and vomiting.  Passing gas, no bowel movement yet.  No fever, sweats or chills.     Review of Systems   4 point ROS was obtained and negative except as noted in the Interval History.    MEDICATIONS:  Current Facility-Administered Medications   Medication Dose Route Frequency Provider Last Rate Last Admin    acetaminophen (TYLENOL) tablet 975 mg  975 mg Oral Q8H Leandro Licea MD   975 mg at 05/11/25 2210    atorvastatin (LIPITOR) tablet 10 mg  10 mg Oral Daily Leandro Licea MD   10 mg at 05/11/25 0838    FLUoxetine (PROzac) capsule 40 mg  40 mg Oral At Bedtime Maria Antonia Pham PA-C   40 mg at 05/11/25 2211    magnesium oxide (MAG-OX) tablet 400 mg  400 mg Oral Daily with lunch Leandro Licea MD        mycophenolate (GENERIC EQUIVALENT) capsule 750 mg  750 mg Oral BID IS Leandro Licea MD   750 mg at 05/11/25 1749    pantoprazole (PROTONIX) EC tablet 40 mg  40 mg Oral BID AC Omar Osullivan MD   40 mg at 05/11/25 1631    polyethylene glycol (MIRALAX) Packet 17 g  17 g Oral Daily Leandro Licea MD   17 g at 05/11/25 0838    [START ON 5/13/2025] predniSONE (DELTASONE) tablet 5 mg  5 mg Oral Daily Maria Antonia Pham PA-C        senna-docusate (SENOKOT-S/PERICOLACE) 8.6-50 MG per tablet 1 tablet  1 tablet Oral BID Leandro Licea MD   1 tablet at 05/11/25 2030    sodium chloride (PF) 0.9% PF flush 10 mL  10 mL Intracatheter Q8H Leandro Licea MD   10 mL at 05/11/25 2211    sulfamethoxazole-trimethoprim (BACTRIM) 400-80 MG per tablet 1 tablet  1 tablet Oral Q Mon Wed Fri AM Leandro Licea MD   1 tablet at 05/11/25 0838    tacrolimus (GENERIC EQUIVALENT) capsule 2.5 mg  0.03 mg/kg Oral BID IS Leandro iLcea MD   2.5 mg at 05/11/25 1749    valGANciclovir (VALCYTE) tablet 450 mg  450 mg Oral Once per day on Monday  "Thursday Leandro Licea MD         Current Facility-Administered Medications   Medication Dose Route Frequency Provider Last Rate Last Admin       Physical Exam   Temp  Av.9  F (36.6  C)  Min: 97.6  F (36.4  C)  Max: 98.2  F (36.8  C)      Pulse  Av  Min: 82  Max: 87 Resp  Av  Min: 18  Max: 18  SpO2  Av.2 %  Min: 97 %  Max: 100 %     BP (!) 147/95 (BP Location: Right arm)   Pulse 87   Temp 98.4  F (36.9  C) (Oral)   Resp 16   Ht 1.676 m (5' 6\")   Wt 83.4 kg (183 lb 12.8 oz)   SpO2 97%   BMI 29.67 kg/m     Date 25 07 - 05/10/25 0659   Shift 8865-2952 6574-4851 7284-7284 24 Hour Total   INTAKE   P.O. 120   120   Shift Total(mL/kg) 120(1.52)   120(1.52)   OUTPUT   Shift Total(mL/kg)       Weight (kg) 78.74 78.74 78.74 78.74      Admit Weight: 78.7 kg (173 lb 9.6 oz)     GENERAL APPEARANCE: alert and no distress  HENT: mouth without ulcers or lesions  RESP: lungs clear to auscultation - no rales, rhonchi or wheezes  CV: regular rhythm, normal rate, no rub, no murmur  EDEMA: no LE edema bilaterally  ABDOMEN: soft, nondistended, nontender, bowel sounds normal  MS: extremities normal - no gross deformities noted, no evidence of inflammation in joints, no muscle tenderness  SKIN: no rash  TX KIDNEY: normal  DIALYSIS ACCESS:  LUE AV fistula with bruit and thrill    Data   All labs reviewed by me.  CMP  Recent Labs   Lab 25  0639 25  1022 25  0610 25  0222 05/10/25  2230 05/10/25  1810 05/10/25  1120 25  2148 25  1922     --  136  --   --  133* 137  --  140   POTASSIUM 4.0 3.9 4.5  4.5 3.8   < > 4.2  4.2 4.1   < > 4.3   CHLORIDE 102  --  99  --   --  97* 98  --  102   CO2 25  --  24  --   --  22 25  --  22   ANIONGAP 9  --  13  --   --  14 14  --  16*   *  --  183*  --   --  148* 88   < > 77   BUN 29.3*  --  22.7*  --   --  22.4* 20.6*  --  24.4*   CR 4.10*  --  6.14*  --   --  7.73* 8.38*  --  10.10*   GFRESTIMATED 19*  --  12*  --   --  " 9* 8*  --  6*   LIZZ 9.6  --  9.5  --   --  9.3 10.8*  --  10.4   MAG 2.0  --  1.9  --   --  2.0 2.3  --   --    PHOS 4.0  --  6.2*  --   --  5.0*  --   --   --    PROTTOTAL  --   --   --   --   --   --   --   --  7.2   ALBUMIN  --   --   --   --   --   --   --   --  4.5   BILITOTAL  --   --   --   --   --   --   --   --  0.4   ALKPHOS  --   --   --   --   --   --   --   --  98   AST  --   --   --   --   --   --   --   --  16   ALT  --   --   --   --   --   --   --   --  9    < > = values in this interval not displayed.     CBC  Recent Labs   Lab 05/12/25  0639 05/11/25  1929 05/11/25  1022 05/11/25  0610 05/10/25  2230 05/10/25  1810 05/10/25  1120   HGB 6.9* 7.7* 6.8* 7.4*   < > 7.3*  7.3* 8.4*   WBC 7.9  --   --  11.6*  --  6.7 5.3   RBC 2.30*  --   --  2.44*  --  2.39* 2.86*   HCT 19.8*  --   --  21.1*  --  21.2* 24.7*   MCV 86  --   --  87  --  89 86   MCH 30.0  --   --  30.3  --  30.1 29.4   MCHC 34.8  --   --  35.1  --  34.6 34.0   RDW 13.1  --   --  12.9  --  12.7 12.4   PLT 95*  --   --  130*  --  87* 147*    < > = values in this interval not displayed.     INR  Recent Labs   Lab 05/08/25 1922   INR 0.99   PTT 27     ABGNo lab results found in last 7 days.   Urine Studies  Recent Labs   Lab Test 05/09/25  0641 04/06/23  1100 07/23/19  0600 06/26/19  0555   COLOR Light Yellow Yellow Straw Straw   APPEARANCE Clear Slightly Cloudy* Slightly Cloudy Clear   URINEGLC 30* 30* Negative Negative   URINEBILI Negative Negative Negative Negative   URINEKETONE Negative Negative Negative Negative   SG 1.012 1.015 1.005 1.009   UBLD Small* Small* Small* Small*   URINEPH 6.0 6.0 6.0 5.0   PROTEIN 100* 50* 100* 100*   NITRITE Negative Negative Negative Negative   LEUKEST Negative Negative Negative Negative   RBCU 13* 10* 0 2   WBCU 3 9* 0 0     Recent Labs   Lab Test 07/23/19  0600 06/26/19  0555 06/07/19  1518 09/18/17  1422   UTPG 2.82* 1.86* 1.98* 0.79*     PTH  Recent Labs   Lab Test 12/19/20  0726 09/18/17  1431    PTHI 2,523* 151*     Iron Studies  Recent Labs   Lab Test 12/19/20  0726   IRON 53   *   IRONSAT 24   CLINTON 143       IMAGING:  All imaging studies reviewed by me.

## 2025-05-12 NOTE — PLAN OF CARE
Goal Outcome Evaluation:      Plan of Care Reviewed With: patient, family    Overall Patient Progress: improvingOverall Patient Progress: improving    Outcome Evaluation: One unit RBCs given for Hmg of 6.8, recheck 7.7.    BPs 130-140s/90s, other vital signs within normal limits. Thymo dose #2 given uneventfully. Incisional pain controlled with scheduled Tylenol. Fair appetite on regular diet. 375 mL pink/baljeet urine output via Ordoñez. 125 mL dark red output via OLGA. Patient up with assist of one for short walk in hallway. Patient's family at bedside supportive of patient.

## 2025-05-12 NOTE — PLAN OF CARE
"Goal Outcome Evaluation:    BP (!) 150/91 (BP Location: Right arm)   Pulse 83   Temp 98.1  F (36.7  C) (Oral)   Resp 18   Ht 1.676 m (5' 6\")   Wt 83.4 kg (183 lb 12.8 oz)   SpO2 96%   BMI 29.67 kg/m      Shift: 7953-2959    VS: hypertensive  Neuro: Aox4  Respiratory: WDL on RA  : Ordoñez, adequate urine output  GI: no BM since surgery   Skin: post op dressing on small amount of drainage  Diet: Regular    BG: none  LDA: L HD, L AVF, R PIV x2, L IJ  Infusions: D5LR @100ml/hr  Mobility: SBA  Pain/Nausea: minimal pain  PRN medications: none given           "

## 2025-05-12 NOTE — PROGRESS NOTES
Care Management Follow Up    Length of Stay (days): 3    Expected Discharge Date: 05/14/2025     Concerns to be Addressed: discharge planning     Patient plan of care discussed at interdisciplinary rounds: Yes    Anticipated Discharge Disposition: Home (local stay in hotel with parents)        Anticipated Discharge Services: None  Anticipated Discharge DME: None    Patient/family educated on Medicare website which has current facility and service quality ratings:    Education Provided on the Discharge Plan:    Patient/Family in Agreement with the Plan:      Referrals Placed by CM/SW:    Private pay costs discussed: Not applicable    Discussed  Partnership in Safe Discharge Planning  document with patient/family: No     Handoff Completed: No, handoff not indicated or clinically appropriate    Additional Information:  Patient underwent DDKT on 05/10/2025.  Met with patient and family at bedside to update psychosocial assessment and provide brief education about SW role while inpatient, as well as expectations/requirements and follow up needs post-transplant. SW also provided education about need for compliance with transplant medications, and explained ESRD Medicare benefits and medication coverage under Medicare part B.  Medicare 2728 forms completed and signed by patient.    MSW engaged Jhoan and family in a conversation specific to psychosocial well being following transplant. No psychosocial concerns identified. Mom plans to book hotel and stay with Jhoan for 2 weeks post discharge. MSW provided list of [place] lodging options with reduced hospital rates. Jhoan denied any mental/chemical health concerns and notes no financial barriers at this time. Family actively involved in his care and appear very supportive.       Next Steps: SOT SW team to continue to follow for post transplant support and facilitation of safe discharge planning.     Afia Jack, VERONICA, LGSW  Clinical       JO  [facility]  Kidney, Pancreas, Auto-Islet   [address]  [place] [address]  [email or web address]  Office: [phone]  Fax: [phone]  Gender pronouns: she/her  Employed by [employer]

## 2025-05-13 VITALS
WEIGHT: 181.1 LBS | HEIGHT: 66 IN | HEART RATE: 80 BPM | OXYGEN SATURATION: 98 % | DIASTOLIC BLOOD PRESSURE: 91 MMHG | BODY MASS INDEX: 29.11 KG/M2 | SYSTOLIC BLOOD PRESSURE: 145 MMHG | RESPIRATION RATE: 16 BRPM | TEMPERATURE: 98 F

## 2025-05-13 PROBLEM — G89.18 ACUTE POST-OPERATIVE PAIN: Status: ACTIVE | Noted: 2025-05-13

## 2025-05-13 PROBLEM — D62 ANEMIA DUE TO BLOOD LOSS, ACUTE: Status: ACTIVE | Noted: 2025-05-13

## 2025-05-13 LAB
ANION GAP SERPL CALCULATED.3IONS-SCNC: 11 MMOL/L (ref 7–15)
BASOPHILS # BLD AUTO: 0 10E3/UL (ref 0–0.2)
BASOPHILS NFR BLD AUTO: 0 %
BUN SERPL-MCNC: 27.9 MG/DL (ref 6–20)
CALCIUM SERPL-MCNC: 9.8 MG/DL (ref 8.8–10.4)
CD3 CELLS # BLD: <1 CELLS/UL (ref 603–2990)
CD3 CELLS NFR BLD: <1 % (ref 49–84)
CD3+CD4+ CELLS # BLD: <1 CELLS/UL (ref 441–2156)
CD3+CD4+ CELLS NFR BLD: <1 % (ref 28–63)
CD3+CD4+ CELLS/CD3+CD8+ CLL BLD: 1 % (ref 1.4–2.6)
CD3+CD8+ CELLS # BLD: <1 CELLS/UL (ref 125–1312)
CD3+CD8+ CELLS NFR BLD: <1 % (ref 10–40)
CELL TYPE ALLO: NORMAL
CELL TYPE AUTO: NORMAL
CHANNELSHIFTALLOB1: -69
CHANNELSHIFTALLOB2: -47
CHANNELSHIFTALLOT1: -47
CHANNELSHIFTALLOT2: -22
CHANNELSHIFTAUTOB1: -51
CHANNELSHIFTAUTOB2: -46
CHANNELSHIFTAUTOT1: -46
CHANNELSHIFTAUTOT2: -8
CHLORIDE SERPL-SCNC: 104 MMOL/L (ref 98–107)
COMMENT ALLOB2: NORMAL
CREAT SERPL-MCNC: 3.01 MG/DL (ref 0.67–1.17)
CROSSMATCHDATEALLO: NORMAL
CROSSMATCHDATEAUTO: NORMAL
DONOR ALLO: NORMAL
DONOR AUTO: NORMAL
DONOR IDENTIFICATION: NORMAL
DONORCELLDATE ALLO: NORMAL
DONORCELLDATE AUTO: NORMAL
DSA COMMENTS: NORMAL
DSA PRESENT: NO
DSA TEST METHOD: NORMAL
EGFRCR SERPLBLD CKD-EPI 2021: 28 ML/MIN/1.73M2
EOSINOPHIL # BLD AUTO: 0 10E3/UL (ref 0–0.7)
EOSINOPHIL NFR BLD AUTO: 0 %
ERYTHROCYTE [DISTWIDTH] IN BLOOD BY AUTOMATED COUNT: 13 % (ref 10–15)
GLUCOSE SERPL-MCNC: 101 MG/DL (ref 70–99)
HCO3 SERPL-SCNC: 22 MMOL/L (ref 22–29)
HCT VFR BLD AUTO: 24.3 % (ref 40–53)
HGB BLD-MCNC: 8.3 G/DL (ref 13.3–17.7)
IMM GRANULOCYTES # BLD: 0 10E3/UL
IMM GRANULOCYTES NFR BLD: 0 %
LYMPHOCYTES # BLD AUTO: 0.2 10E3/UL (ref 0.8–5.3)
LYMPHOCYTES NFR BLD AUTO: 4 %
MAGNESIUM SERPL-MCNC: 2.3 MG/DL (ref 1.7–2.3)
MCH RBC QN AUTO: 30.1 PG (ref 26.5–33)
MCHC RBC AUTO-ENTMCNC: 34.2 G/DL (ref 31.5–36.5)
MCV RBC AUTO: 88 FL (ref 78–100)
MONOCYTES # BLD AUTO: 0.3 10E3/UL (ref 0–1.3)
MONOCYTES NFR BLD AUTO: 7 %
NEUTROPHILS # BLD AUTO: 4.1 10E3/UL (ref 1.6–8.3)
NEUTROPHILS NFR BLD AUTO: 89 %
NRBC # BLD AUTO: 0 10E3/UL
NRBC BLD AUTO-RTO: 0 /100
ORGAN: NORMAL
PHOSPHATE SERPL-MCNC: 2.6 MG/DL (ref 2.5–4.5)
PLATELET # BLD AUTO: 99 10E3/UL (ref 150–450)
POS CUT OFF ALLO B: >69
POS CUT OFF ALLO T: >53
POS CUT OFF AUTO B: >69
POS CUT OFF AUTO T: >53
POTASSIUM SERPL-SCNC: 4.6 MMOL/L (ref 3.4–5.3)
RBC # BLD AUTO: 2.76 10E6/UL (ref 4.4–5.9)
RESULT ALLO B1: NORMAL
RESULT ALLO B2: NORMAL
RESULT ALLO T1: NORMAL
RESULT ALLO T2: NORMAL
RESULT AUTO B1: NORMAL
RESULT AUTO B2: NORMAL
RESULT AUTO T1: NORMAL
RESULT AUTO T2: NORMAL
SA 1  COMMENTS: NORMAL
SA 1 CELL: NORMAL
SA 1 TEST METHOD: NORMAL
SA 2 CELL: NORMAL
SA 2 COMMENTS: NORMAL
SA 2 TEST METHOD: NORMAL
SA1 HI RISK ABY: NORMAL
SA1 MOD RISK ABY: NORMAL
SA2 HI RISK ABY: NORMAL
SA2 MOD RISK ABY: NORMAL
SERUM DATE ALLO B1: NORMAL
SERUM DATE ALLO B2: NORMAL
SERUM DATE ALLO T1: NORMAL
SERUM DATE ALLO T2: NORMAL
SERUM DATE AUTO B1: NORMAL
SERUM DATE AUTO B2: NORMAL
SERUM DATE AUTO T1: NORMAL
SERUM DATE AUTO T2: NORMAL
SODIUM SERPL-SCNC: 137 MMOL/L (ref 135–145)
T CELL COMMENT: ABNORMAL
TACROLIMUS BLD-MCNC: 6.4 UG/L (ref 5–15)
TESTMETHODALLO: NORMAL
TESTMETHODAUTO: NORMAL
TME LAST DOSE: NORMAL H
TME LAST DOSE: NORMAL H
TREATMENT ALLO B1: NORMAL
TREATMENT ALLO B2: NORMAL
TREATMENT ALLO T1: NORMAL
TREATMENT ALLO T2: NORMAL
TREATMENT AUTO B1: NORMAL
TREATMENT AUTO B2: NORMAL
TREATMENT AUTO T1: NORMAL
TREATMENT AUTO T2: NORMAL
UNACCEPTABLE ANTIGENS: NORMAL
UNOS CPRA: 100
WBC # BLD AUTO: 4.6 10E3/UL (ref 4–11)

## 2025-05-13 PROCEDURE — 250N000013 HC RX MED GY IP 250 OP 250 PS 637: Performed by: STUDENT IN AN ORGANIZED HEALTH CARE EDUCATION/TRAINING PROGRAM

## 2025-05-13 PROCEDURE — 99233 SBSQ HOSP IP/OBS HIGH 50: CPT | Performed by: INTERNAL MEDICINE

## 2025-05-13 PROCEDURE — 80048 BASIC METABOLIC PNL TOTAL CA: CPT | Performed by: STUDENT IN AN ORGANIZED HEALTH CARE EDUCATION/TRAINING PROGRAM

## 2025-05-13 PROCEDURE — 99238 HOSP IP/OBS DSCHRG MGMT 30/<: CPT | Mod: 24 | Performed by: NURSE PRACTITIONER

## 2025-05-13 PROCEDURE — 250N000013 HC RX MED GY IP 250 OP 250 PS 637: Performed by: NURSE PRACTITIONER

## 2025-05-13 PROCEDURE — 85004 AUTOMATED DIFF WBC COUNT: CPT | Performed by: STUDENT IN AN ORGANIZED HEALTH CARE EDUCATION/TRAINING PROGRAM

## 2025-05-13 PROCEDURE — 250N000013 HC RX MED GY IP 250 OP 250 PS 637: Performed by: TRANSPLANT SURGERY

## 2025-05-13 PROCEDURE — 250N000012 HC RX MED GY IP 250 OP 636 PS 637: Performed by: PHYSICIAN ASSISTANT

## 2025-05-13 PROCEDURE — 250N000012 HC RX MED GY IP 250 OP 636 PS 637

## 2025-05-13 PROCEDURE — 83735 ASSAY OF MAGNESIUM: CPT | Performed by: STUDENT IN AN ORGANIZED HEALTH CARE EDUCATION/TRAINING PROGRAM

## 2025-05-13 PROCEDURE — 250N000012 HC RX MED GY IP 250 OP 636 PS 637: Performed by: STUDENT IN AN ORGANIZED HEALTH CARE EDUCATION/TRAINING PROGRAM

## 2025-05-13 PROCEDURE — 86359 T CELLS TOTAL COUNT: CPT | Performed by: TRANSPLANT SURGERY

## 2025-05-13 PROCEDURE — 80197 ASSAY OF TACROLIMUS: CPT | Performed by: STUDENT IN AN ORGANIZED HEALTH CARE EDUCATION/TRAINING PROGRAM

## 2025-05-13 PROCEDURE — 84100 ASSAY OF PHOSPHORUS: CPT | Performed by: STUDENT IN AN ORGANIZED HEALTH CARE EDUCATION/TRAINING PROGRAM

## 2025-05-13 PROCEDURE — 36415 COLL VENOUS BLD VENIPUNCTURE: CPT | Performed by: STUDENT IN AN ORGANIZED HEALTH CARE EDUCATION/TRAINING PROGRAM

## 2025-05-13 RX ORDER — VALGANCICLOVIR 450 MG/1
450 TABLET, FILM COATED ORAL DAILY
Qty: 60 TABLET | Refills: 2 | Status: ACTIVE | OUTPATIENT
Start: 2025-05-14

## 2025-05-13 RX ORDER — FUROSEMIDE 20 MG/1
40 TABLET ORAL ONCE
Status: COMPLETED | OUTPATIENT
Start: 2025-05-13 | End: 2025-05-13

## 2025-05-13 RX ORDER — CARVEDILOL 3.12 MG/1
3.12 TABLET ORAL 2 TIMES DAILY
Status: DISCONTINUED | OUTPATIENT
Start: 2025-05-13 | End: 2025-05-13 | Stop reason: HOSPADM

## 2025-05-13 RX ORDER — MYCOPHENOLATE MOFETIL 250 MG/1
750 CAPSULE ORAL 2 TIMES DAILY
Qty: 180 CAPSULE | Refills: 11 | Status: ACTIVE | OUTPATIENT
Start: 2025-05-13

## 2025-05-13 RX ORDER — ATORVASTATIN CALCIUM 10 MG/1
10 TABLET, FILM COATED ORAL DAILY
Qty: 30 TABLET | Refills: 2 | Status: SHIPPED | OUTPATIENT
Start: 2025-05-14

## 2025-05-13 RX ORDER — TACROLIMUS 0.5 MG/1
0.5 CAPSULE ORAL 2 TIMES DAILY
Status: ACTIVE
Start: 2025-05-13

## 2025-05-13 RX ORDER — TACROLIMUS 1 MG/1
4 CAPSULE ORAL 2 TIMES DAILY
Qty: 240 CAPSULE | Refills: 11 | Status: ACTIVE | OUTPATIENT
Start: 2025-05-13 | End: 2025-05-14

## 2025-05-13 RX ORDER — ACETAMINOPHEN 325 MG/1
975 TABLET ORAL EVERY 8 HOURS PRN
Qty: 60 TABLET | Refills: 0 | Status: SHIPPED | OUTPATIENT
Start: 2025-05-13

## 2025-05-13 RX ORDER — TACROLIMUS 1 MG/1
4 CAPSULE ORAL 2 TIMES DAILY
Status: ACTIVE
Start: 2025-05-13 | End: 2025-05-13

## 2025-05-13 RX ORDER — OXYCODONE HYDROCHLORIDE 5 MG/1
5 TABLET ORAL EVERY 6 HOURS PRN
Refills: 0 | Status: DISCONTINUED | OUTPATIENT
Start: 2025-05-13 | End: 2025-05-13 | Stop reason: HOSPADM

## 2025-05-13 RX ORDER — FUROSEMIDE 10 MG/ML
40 INJECTION INTRAMUSCULAR; INTRAVENOUS ONCE
Status: DISCONTINUED | OUTPATIENT
Start: 2025-05-13 | End: 2025-05-13

## 2025-05-13 RX ORDER — MAGNESIUM OXIDE 400 MG/1
400 TABLET ORAL
Qty: 30 TABLET | Refills: 2 | Status: SHIPPED | OUTPATIENT
Start: 2025-05-13 | End: 2025-05-19

## 2025-05-13 RX ORDER — SULFAMETHOXAZOLE AND TRIMETHOPRIM 400; 80 MG/1; MG/1
1 TABLET ORAL DAILY
Qty: 30 TABLET | Refills: 11 | Status: ACTIVE | OUTPATIENT
Start: 2025-05-14

## 2025-05-13 RX ORDER — PREDNISONE 5 MG/1
5 TABLET ORAL DAILY
Qty: 30 TABLET | Refills: 11 | Status: SHIPPED | OUTPATIENT
Start: 2025-05-14

## 2025-05-13 RX ORDER — AMOXICILLIN 250 MG
1 CAPSULE ORAL 2 TIMES DAILY PRN
Qty: 60 TABLET | Refills: 0 | Status: SHIPPED | OUTPATIENT
Start: 2025-05-13

## 2025-05-13 RX ORDER — CARVEDILOL 3.12 MG/1
3.12 TABLET ORAL 2 TIMES DAILY WITH MEALS
Qty: 60 TABLET | Refills: 2 | Status: SHIPPED | OUTPATIENT
Start: 2025-05-13 | End: 2025-05-14

## 2025-05-13 RX ADMIN — MAGNESIUM OXIDE TAB 400 MG (241.3 MG ELEMENTAL MG) 400 MG: 400 (241.3 MG) TAB at 12:41

## 2025-05-13 RX ADMIN — FUROSEMIDE 40 MG: 20 TABLET ORAL at 12:41

## 2025-05-13 RX ADMIN — ATORVASTATIN CALCIUM 10 MG: 10 TABLET, FILM COATED ORAL at 08:34

## 2025-05-13 RX ADMIN — TACROLIMUS 4 MG: 1 CAPSULE ORAL at 08:34

## 2025-05-13 RX ADMIN — MYCOPHENOLATE MOFETIL 750 MG: 250 CAPSULE ORAL at 08:34

## 2025-05-13 RX ADMIN — PREDNISONE 5 MG: 5 TABLET ORAL at 08:34

## 2025-05-13 RX ADMIN — SULFAMETHOXAZOLE AND TRIMETHOPRIM 1 TABLET: 400; 80 TABLET ORAL at 08:34

## 2025-05-13 RX ADMIN — CARVEDILOL 3.12 MG: 3.12 TABLET, FILM COATED ORAL at 12:41

## 2025-05-13 RX ADMIN — PANTOPRAZOLE SODIUM 40 MG: 40 TABLET, DELAYED RELEASE ORAL at 08:34

## 2025-05-13 RX ADMIN — VALGANCICLOVIR 450 MG: 450 TABLET, FILM COATED ORAL at 08:34

## 2025-05-13 ASSESSMENT — ACTIVITIES OF DAILY LIVING (ADL)
ADLS_ACUITY_SCORE: 35
ADLS_ACUITY_SCORE: 33
ADLS_ACUITY_SCORE: 35
ADLS_ACUITY_SCORE: 35
ADLS_ACUITY_SCORE: 33
ADLS_ACUITY_SCORE: 35
ADLS_ACUITY_SCORE: 33
ADLS_ACUITY_SCORE: 35
ADLS_ACUITY_SCORE: 33
ADLS_ACUITY_SCORE: 35
ADLS_ACUITY_SCORE: 35

## 2025-05-13 NOTE — CARE PLAN
Pt in NAD, A&OX4, Gait steady, VSS, resp even & unlabored, afebrile, co diarrhea, asking to have Left CVC removed and mcdaniels removed.

## 2025-05-13 NOTE — PROGRESS NOTES
CLINICAL NUTRITION SERVICES - DISCHARGE NOTE    Patient reports good intake and denies questions related to diet education provided 5/11.      Patient s discharge needs assessed and discharge planning has been conducted with the multidisciplinary transplant care team including physicians, pharmacy, social work and transplant coordinator.    Follow up/Monitoring:  Once discharged, place outpatient nutrition consult via the transplant team if nutrition concerns arise.    Candis Sanchez, MS, RD, LD, CCTD, [facility]  7A + 7B (beds [phone])  Available on Vocera [7A or 7B Clinical Dietitian]   Weekend/Holiday: Vocera [Weekend Clinical Dietitian]

## 2025-05-13 NOTE — PHARMACY-TRANSPLANT NOTE
Solid Organ Transplant Recipient Prior to Discharge Note    30 year old male s/p DDKT 5/10/25 (DBD; CIT 24.6hr on ice)     PMH: ESRD 2/2 congenital renal dysplasia s/p kidney txp 1995 (failed 2010), LDKT 2011 explanted 2011 (2/2 ischemic necrosis and organized thrombus) and DDKT 2012 (failed 2020) back on dialysis M/W/F, pericardial thickening and pericardial effusion, anemia of chronic disease, colitis/gastritis, CDI in 2023, CYNDEE with use of CPAP, obesity, anxiety and depression     Induction  5/10 Thymo 150mg: SM 500mg  5/11 Thymo 175mg; SM 250mg  5/12 Thymo 150mg; SM 100mg    Maintenance  + Tacrolimus goal 8-10ng/mL  + Mycophenolate mofetil 750mg BID  + Prednisone 5mg daily lifelong    Infectious prophylaxis  + CMV D-/R-: Valganciclovir for 3 months duration  + PJP: Bactrim indefinitely    Pharmacy has monitored for medication interactions and immunosuppression levels in conjunction with the multidisciplinary team. In anticipation for discharge, medication therapy needs have been addressed daily throughout the current admission via multidisciplinary rounds and/or discussions, order verification, daily clinical pharmacy review, and communication with prescribers.

## 2025-05-13 NOTE — DISCHARGE SUMMARY
[facility]    Discharge Summary  Transplant Surgery    Date of Admission:  5/8/2025  Date of Discharge:  5/13/2025  Discharging Provider: Candis Kaur NP / Kashif Sierra MD    Discharge Diagnoses   Active Problems:    Kidney replaced by transplant    Immunosuppressed status    Kidney transplant candidate    ESRD (end stage renal disease) on dialysis (H)    Acute post-operative pain    Anemia due to blood loss, acute    Procedure/Surgery Information   Procedure: Procedure(s):  Right Kidney Transplanbt Intraperitoneal  Bench kidney   Surgeon(s): Surgeons and Role:     * Omar Osullivan MD - Primary     * Leandro Licea MD - Fellow - Assisting       Non-operative procedures None performed     History of Present Illness   Jhoan Hoffman is a 30 year old male with pertinent PMH including 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 Patient is now status post DDKT with ureteral stent placement on 5/10/25 with Dr. Omar Osullivan.    Hospital Course   Jhoan Hoffman was admitted on 5/8/2025.  The following problems were addressed during his hospitalization:    s/p DBD DDKT +ureteral stent placement 5/10/25: Fourth kidney, intra-abdominal. Post-op US patent, normal. Creatinine down to 3 by day of discharge. Good UOP. OLGA drain removed prior to discharge.     Immunosuppressed due to medications:  Induction: via high intensity protocol (cPRA 100) with steroid pulse and Thymoglobulin 475 mg (6 mg/kg).  Maintenance:   -  mg BID   - Tacrolimus 4 mg BID. Goal level 8-10.    - Prednisone 5 mg daily (fourth kidney  transplant)  Infection prophylaxis: viral (Valcyte x 12 weeks), PJP (Bactrim indefinitely)    Transplant coordinator: Lupe Quevedo [phone]  Donor type:  DBD  DSA at time of transplant:  No  Ureteral stent: Yes  CMV:  Donor - / Recipient -  EBV:  Donor + / Recipient +  Thymoglobulin:  475 mg (6 mg/kg)    Neuro/Psych:  Acute surgical pain: Rates pain 1/10 on discharge.    - Continue PRN Tylenol.   Depression/anxiety:    - Continue PTA Prozac.     Hematology:   Anemia of chronic disease/Acute blood loss: Last transfused 1 unit pRBCs 5/11 for Hgb 6.9 (from 7.7). No e/o active bleeding. Recheck today 8.3, appropriate response.    - Continue to monitor outpatient.   Thrombocytopenia: 2/2 Thymoglobulin. PLT ~100, stable.      Cardiorespiratory:   CYNDEE:    - Continue CPAP.   HTN: PTA managed with carvedilol 25 mg BID.    - Start carvedilol 3.125 mg BID on discharge for mild hypertension.          Discharge Disposition   Discharged to local [facility]   Condition at discharge: Stable    Pending Results   These results will be followed up by Transplant team  Unresulted Labs Ordered in the Past 30 Days of this Admission       Date and Time Order Name Status Description    5/9/2025 12:33 AM Prepare red blood cells (unit) Preliminary     5/9/2025 12:31 AM Prepare red blood cells (unit) Preliminary     5/8/2025  6:37 PM BK Virus IgG Antibody In process           Final pathology results: No pathology submitted  Primary Care Physician   Alexis Pabon    Physical Exam   Temp: 98  F (36.7  C) Temp src: Oral BP: (!) 145/91 Pulse: 80   Resp: 16 SpO2: 98 % O2 Device: None (Room air)    Vitals:    05/11/25 2000 05/12/25 0113 05/13/25 0607   Weight: 83.2 kg (183 lb 6.4 oz) 83.4 kg (183 lb 12.8 oz) 82.1 kg (181 lb 1.6 oz)     Vital Signs with Ranges  Temp:  [97.9  F (36.6  C)-98.3  F (36.8  C)] 98  F (36.7  C)  Pulse:  [71-82] 80  Resp:  [16] 16  BP: (141-155)/(89-96) 145/91  SpO2:  [97 %-98 %] 98 %  I/O last 3 completed shifts:  In:  1531.67 [P.O.:450; I.V.:811.67]  Out: 3320 [Urine:3000; Drains:320]    Constitutional: resting comfortably in bed  Eyes: EOMI  ENT: internal jugular line right  Respiratory: unlabored on RA  Cardiovascular: perfused  GI: abdomen soft, non-distended. Midline incision C/D/I open to air, closed with staples. OLGA drain with serosanguinous output.   Genitourinary: Ordoñez removed  Skin: warm, dry  Musculoskeletal: no edema noted  Neurologic: A&Ox4  Neuropsychiatric: calm, cooperative    Consultations This Hospital Stay   NEPHROLOGY KIDNEY/PANCREAS TRANSPLANT ADULT IP CONSULT  NURSING TO CONSULT FOR VASCULAR ACCESS CARE IP CONSULT  SOT MEDICATION HISTORY IP PHARMACY CONSULT  PHARMACY IP CONSULT  NUTRITION SERVICES ADULT IP CONSULT  NEPHROLOGY KIDNEY/PANCREAS TRANSPLANT ADULT IP CONSULT  CARE MANAGEMENT / SOCIAL WORK IP CONSULT    Time Spent on this Encounter   I have spent greater than 30 minutes on this discharge.    Discharge Orders   Discharge Medications   Current Discharge Medication List        START taking these medications    Details   acetaminophen (TYLENOL) 325 MG tablet Take 3 tablets (975 mg) by mouth every 8 hours as needed for mild pain.  Qty: 60 tablet, Refills: 0    Associated Diagnoses: Kidney replaced by transplant      atorvastatin (LIPITOR) 10 MG tablet Take 1 tablet (10 mg) by mouth daily.  Qty: 30 tablet, Refills: 2    Associated Diagnoses: Kidney replaced by transplant      magnesium oxide (MAG-OX) 400 MG tablet Take 1 tablet (400 mg) by mouth daily (with lunch).  Qty: 30 tablet, Refills: 2    Associated Diagnoses: Kidney replaced by transplant      mycophenolate (GENERIC EQUIVALENT) 250 MG capsule Take 3 capsules (750 mg) by mouth 2 times daily.  Qty: 180 capsule, Refills: 11    Associated Diagnoses: Kidney replaced by transplant      predniSONE (DELTASONE) 5 MG tablet Take 1 tablet (5 mg) by mouth daily.  Qty: 30 tablet, Refills: 11    Associated Diagnoses: Kidney replaced by transplant       senna-docusate (SENOKOT-S/PERICOLACE) 8.6-50 MG tablet Take 1 tablet by mouth 2 times daily as needed for constipation.  Qty: 60 tablet, Refills: 0    Associated Diagnoses: Kidney replaced by transplant      sulfamethoxazole-trimethoprim (BACTRIM) 400-80 MG tablet Take 1 tablet by mouth daily.  Qty: 30 tablet, Refills: 11    Associated Diagnoses: Kidney replaced by transplant      valGANciclovir (VALCYTE) 450 MG tablet Take 1 tablet (450 mg) by mouth daily.  Qty: 60 tablet, Refills: 2    Comments: Anticipate improvement in kidney function. Eventual dose = 2 tabs daily x 3 months. Dose adjustments per transplant team.  Associated Diagnoses: Kidney replaced by transplant           CONTINUE these medications which have CHANGED    Details   carvedilol (COREG) 3.125 MG tablet Take 1 tablet (3.125 mg) by mouth 2 times daily (with meals).  Qty: 60 tablet, Refills: 2    Associated Diagnoses: HTN, kidney transplant related      !! tacrolimus (GENERIC EQUIVALENT) 0.5 MG capsule Take 1 capsule (0.5 mg) by mouth 2 times daily.    Comments: To have available for dose adjustments per transplant team. Discharge dose = 4 mg BID.  Associated Diagnoses: Kidney replaced by transplant      !! tacrolimus (GENERIC EQUIVALENT) 1 MG capsule Take 4 capsules (4 mg) by mouth 2 times daily.  Qty: 240 capsule, Refills: 11    Associated Diagnoses: Kidney replaced by transplant       !! - Potential duplicate medications found. Please discuss with provider.        CONTINUE these medications which have NOT CHANGED    Details   FLUoxetine (PROZAC) 40 MG capsule Take 40 mg by mouth at bedtime.  Qty: 90 capsule, Refills: 0    Comments: Continue home medications but switch to 60 mg daily.  Associated Diagnoses: Depression with anxiety      loratadine (CLARITIN) 10 MG tablet Take 10 mg by mouth daily as needed for allergies.      omeprazole (PRILOSEC) 40 MG DR capsule Take 40 mg by mouth daily      aspirin 81 MG EC tablet Take 81 mg by mouth daily            STOP taking these medications       B Complex-C-Folic Acid (DIALYVITE PO) Comments:   Reason for Stopping:         cholecalciferol 2000 UNITS CAPS Comments:   Reason for Stopping:         sucroferric oxyhydroxide (VELPHORO) 500 MG CHEW chewable tablet Comments:   Reason for Stopping:                  Reason for your hospital stay    You were admitted for a kidney transplant. You are discharging home in stable condition with close follow up.     Activity    Your activity upon discharge: Walk at least four times a day, lift no greater than 10 pounds for 6-8 weeks from the time of surgery.  No driving while taking narcotics or 3 weeks after surgery.     ADULT [facility] Specialty Follow-up and recommended labs and tests    [facility] FOLLOW UP:     1. [facility]: Over the next 2 days you will be seen in the [facility] (ph. [phone], option 3). Your labs will be drawn at the beginning of your appointment. DO NOT take your medications prior to having labs drawn. Please bring all your medications with you from home to take after labs are drawn.     2. Follow up with Dr. Osullivan in Transplant Clinic in 1-2 weeks.     3. Follow up with Transplant Nephrologist as scheduled.     4.  Ureteral stent removal in 4-6 weeks, to be scheduled by Transplant Coordinator. If a  does not contact you for this, please contact your transplant coordinator.     Remember to always bring an updated medication list to all appointments.        Call your Transplant Coordinator ([phone]) with questions about Transplant Center appointment scheduling.     LABS:     CBC, BMP, magnesium, phosphorus, tacrolimus level to be drawn daily while in [facility], then every Monday and Thursday by home health care nurse if arranged, or at an outpatient lab.      PTH, iron panel, and Vitamin D level to be drawn on 1st day at [facility].     Monitor and record    Monitor blood pressure and weight daily.     When  to contact your care team    WHEN TO CONTACT YOUR  COORDINATOR:     Transplant Coordinator [phone]     Notify your coordinator if you have pain over your kidney, increased redness or drainage from your incision, fever greater than 100F, decreased urine output or new or increased amount of blood in urine.     Notify your coordinator immediately if you are ever unable to take your immunosuppressive medications for any reason.     If you have URGENT concerns after office hours, please call the hospital switchboard at [phone] and ask to have the organ transplant nurse on-call paged. If you have a life-threatening emergency, go to the nearest emergency room.     Wound care and dressings    Instructions to care for your wound at home: If you have staples in place, they will be removed in 3 weeks after operation. Wash incision daily with soap and water. Do not soak or scrub.     Diet    Diet recommendations post-transplant: Heart healthy dietary habits long term (low saturated/trans fat, low sodium). High protein diet x 8 weeks. Practice food safety precautions.         Data   Most Recent 3 CBC's:  Recent Labs   Lab Test 05/13/25  0559 05/12/25  1743 05/12/25  0639 05/11/25  1022 05/11/25  0610   WBC 4.6  --  7.9  --  11.6*   HGB 8.3* 7.7* 6.9*   < > 7.4*   MCV 88  --  86  --  87   PLT 99*  --  95*  --  130*    < > = values in this interval not displayed.      Most Recent 3 BMP's:  Recent Labs   Lab Test 05/13/25  0559 05/12/25  0639 05/11/25  1022 05/11/25  0610    136  --  136   POTASSIUM 4.6 4.0 3.9 4.5  4.5   CHLORIDE 104 102  --  99   CO2 22 25  --  24   BUN 27.9* 29.3*  --  22.7*   CR 3.01* 4.10*  --  6.14*   ANIONGAP 11 9  --  13   LIZZ 9.8 9.6  --  9.5   * 129*  --  183*     Most Recent 2 LFT's:  Recent Labs   Lab Test 05/08/25  1922 04/06/23  1047   AST 16 18   ALT 9 23   ALKPHOS 98 152*   BILITOTAL 0.4 0.4     Most Recent INR's and Anticoagulation Dosing History:  Anticoagulation Dose  History  More data exists         Latest Ref Rng & Units 6/26/2019 7/23/2019 12/17/2020 2/16/2021 12/11/2022 4/6/2023 5/8/2025   Recent Dosing and Labs   INR 0.85 - 1.15 1.04  0.95  1.02  0.99  1.46  1.02  0.99      Most Recent 3 Troponin's:No lab results found.  Most Recent Cholesterol Panel:  Recent Labs   Lab Test 05/08/25  1922   CHOL 171   LDL 95   HDL 32*   TRIG 218*     Most Recent 6 Bacteria Isolates From Any Culture (See EPIC Reports for Culture Details):  Recent Labs   Lab Test 07/23/19  0600   CULT No growth     Most Recent TSH, T4 and A1c Labs:  Recent Labs   Lab Test 05/08/25  2324 12/11/22  1757   TSH  --  0.67   A1C 4.9  --      Results for orders placed or performed during the hospital encounter of 05/08/25   XR Chest 2 Views    Narrative    EXAM: XR CHEST 2 VIEWS  5/8/2025 8:07 PM     HISTORY:  pre-transplant screening       COMPARISON:  4/6/2023    FINDINGS:     Portable upright view of the chest. Trachea is midline.  Cardiomediastinal silhouette and pulmonary vasculature are within  normal limits. No focal airspace opacity, pleural effusion or  appreciable pneumothorax.    No acute osseous abnormality. Visualized upper abdomen is  unremarkable.        Impression    IMPRESSION:   Clear chest.     I have personally reviewed the examination and initial interpretation  and I agree with the findings.    GALINA MALLOY MD         SYSTEM ID:  [id number]   XR Chest Port 1 View    Narrative    XR CHEST PORT 1 VIEW 5/10/2025 6:27 PM      HISTORY: Post-op Kidney Transplant    COMPARISON: 5/8/2025.     FINDINGS: Frontal view of the chest. New left-sided central line in  place. Midline trachea. Normal cardiomediastinal contours. Lungs and  pleural spaces are clear.       Impression    IMPRESSION: Left central line projects over the brachiocephalic vein.    I have personally reviewed the examination and initial interpretation  and I agree with the findings.    JARROD SNEED MD         SYSTEM ID:  [id number]    US Renal Transplant with Doppler    Narrative    EXAMINATION: US RENAL TRANSPLANT,  5/10/2025 6:39 PM     COMPARISON: None.    HISTORY: kidney transplant, intraperitoneal on left side.    TECHNIQUE:  Grey-scale, color Doppler and spectral flow analysis.    FINDINGS:  The transplant kidney is located in left lower quadrant, and measures  10 cm. Parenchyma is of normal thickness and echogenicity. No focal  lesions. No hydronephrosis. No perinephric fluid collection.    Renal artery flow:   171 cm/sec peak systolic at hilum.  246 cm/s peak systolic at anastomosis.  Arcuate artery resistive indices (upper to lower): 0.54, 0.54, 0.45    Renal Vein Flow:  54 cm/sat hilum.   68 cm/s at anastomosis.    Iliac artery flow:  127 cm/s peak systolic above anastomosis.  179 cm/s peak systolic below anastomosis.    Iliac vein flow:  Patent above and below the anastomosis.      Impression    IMPRESSION: Normal transplant kidney doppler ultrasound.     GUIDELINES:  For native kidneys:       Diagnostic criteria suggestive of > 60% diameter stenosis             Renal artery:             Peak systolic velocity > 180 cm/s             RAR > 3.5             Turbulent flow         Arcuate artery Resistive Index Normal < 0.7    For renal transplants:       Renal transplant peak systolic velocity criteria range from > 180  cm/s to > 400 cm/s       Source suggests using:            Peak systolic velocity > or = 300 cm/s            Spectral broadening            Intraparenchymal arterial acceleration time > or = 0.1 s     Source: Suzan ENGLAND, Marian OLGA, Carlos ASHLEY, et al. Screening for  transplant renal artery stenosis: Ultrasound-based stenosis  probability stratification. American Journal of Roentgenology.  2017;209: 0110-4003. [id number]    I have personally reviewed the examination and initial interpretation  and I agree with the findings.    JARROD SNEED MD         SYSTEM ID:  [id number]     *Note: Due to a large number of  results and/or encounters for the requested time period, some results have not been displayed. A complete set of results can be found in Results Review.

## 2025-05-13 NOTE — PROGRESS NOTES
[facility]  Transplant Nephrology Progress Note  Date of Admission:  5/8/2025  Today's Date: 05/13/2025    Recommendations:   - Start Carvedilol 3.125mg BID on discharge.   - Follow-up final crossmatch.   - No acute indication for dialysis.   - Prednisone 5 mg every day long term after taper.     Assessment & Plan   # DDKT: Trend down in creatinine. Good urine output. Normal transplant kidney doppler ultrasound 5/10.    - Baseline Creatinine: ~ TBD   - Proteinuria: Not checked post transplant   - DSA Hx: Not checked recently due to time from transplant   - Last cPRA: 100%   - BK Viremia: Not checked post transplant   - Kidney Tx Biopsy Hx: No biopsy history.    # Immunosuppression: Tacrolimus immediate release (goal 8-10), Mycophenolate mofetil (dose 750 mg every 12 hours), and Methylprednisolone (dose taper)    - Induction with Recent Transplant:  High Intensity Protocol   - Continue with intensive monitoring of immunosuppression for efficacy and toxicity.   - Historical Changes in Immunosuppression: None    - Changes: Not at this time    # Infection Prevention:   Last CD4 Level: Not checked recently      - CMV IgG Ab High Risk Discordance (D+/R-) at time of transplant: No  Present CMV Serostatus: Negative  - EBV IgG Ab High Risk Discordance (D+/R-) at time of transplant: No  Present EBV Serostatus: Positive    # Hypertension: Controlled;  Goal BP: < 140/90 (Hospitalization goal)   - Changes: Yes - restart Coreg 3.125mg BID   - PTA: Carvedilol 25mg BID    # Anemia in Chronic Renal Disease: Hgb: Stable      BASHIR: No   - Iron studies: Replete    # Mineral Bone Disorder:    - Secondary renal hyperparathyroidism; PTH level: Not checked recently        On treatment: None  - Vitamin D; level: Not checked recently        On supplement: No  - Calcium; level: Normal        On supplement: No  - Phosphorus; level: Normal        On supplement: No    # Electrolytes:   - Potassium;  level: Normal        On supplement: No  - Magnesium; level: Normal        On supplement: No  - Bicarbonate; level: Normal        On supplement: No  - Sodium; level: Normal    # Other Significant PMH:   - Pericardial Effusion: December 2022, pericardial effusion without tamponade, 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 Repeat ECHO showed resolution. CT A/P from 2/2023 mentions pericardial thickening.    - CYNDEE: using CPAP  - C-Diff: February 2023.   - Obesity: BMI 29.   - Anxiety and Depression: previously required admission, almost 10 years ago. Now managed on fluoxetine.      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    Zahida Nettles APRN CNP  Transplant Nephrology  Contact information via Vocera Web Console         Physician Attestation     I saw and evaluated Jhoan Hoffman as part of a shared APRN/PA visit.     I personally reviewed the vital signs, medications, labs, and imaging.    I personally provided a substantive portion of care for this patient and I approve the care plan as written by the KARLIE.  I was involved with Medical Decision Making including: Please see A&P for additional details of medical decision making.  MANAGEMENT DISCUSSED with the following over the past 24 hours: Cr down-trending, adjusting tacrolimus to goal 8-10, continue prednisone 5 mg po every day      Meghan Bustamante MD  Date of Service (when I saw the patient): 05/13/25      Interval History  Mr. Hoffman's creatinine is 3.01 (05/13 0559); Trend down.  Good urine output.  Other significant labs/tests/vitals: VSS  No events overnight.  No chest pain or shortness of  breath.  No leg swelling.  No nausea and vomiting.  Bowel movements are formed.  No fever, sweats or chills.     Review of Systems   4 point ROS was obtained and negative except as noted in the Interval History.    MEDICATIONS:  Current Facility-Administered Medications   Medication Dose Route Frequency Provider Last Rate Last Admin    acetaminophen (TYLENOL) tablet 975 mg  975 mg Oral Q8H Leandro Licea MD   975 mg at 05/12/25 2204    atorvastatin (LIPITOR) tablet 10 mg  10 mg Oral Daily Leandro Licea MD   10 mg at 05/13/25 0834    FLUoxetine (PROzac) capsule 40 mg  40 mg Oral QPM Omar Osullivan MD   40 mg at 05/12/25 2023    magnesium oxide (MAG-OX) tablet 400 mg  400 mg Oral Daily with lunch Leandro Licea MD   400 mg at 05/12/25 1326    mycophenolate (GENERIC EQUIVALENT) capsule 750 mg  750 mg Oral BID IS Leandro Licea MD   750 mg at 05/13/25 0834    pantoprazole (PROTONIX) EC tablet 40 mg  40 mg Oral BID Omar Osullivan MD   40 mg at 05/13/25 0834    polyethylene glycol (MIRALAX) Packet 17 g  17 g Oral Daily Leandro Licea MD   17 g at 05/12/25 0828    predniSONE (DELTASONE) tablet 5 mg  5 mg Oral Daily Maria Antonia Pham PA-C   5 mg at 05/13/25 0834    senna-docusate (SENOKOT-S/PERICOLACE) 8.6-50 MG per tablet 1 tablet  1 tablet Oral BID Leandro Licea MD   1 tablet at 05/12/25 2023    sodium chloride (PF) 0.9% PF flush 10 mL  10 mL Intracatheter Q8H Leandro Licea MD   10 mL at 05/12/25 2204    sulfamethoxazole-trimethoprim (BACTRIM) 400-80 MG per tablet 1 tablet  1 tablet Oral Daily Omar Osullivan MD   1 tablet at 05/13/25 0834    tacrolimus (GENERIC EQUIVALENT) capsule 4 mg  4 mg Oral BID IS Briana Dior APRN CNP   4 mg at 05/13/25 0834    valGANciclovir (VALCYTE) tablet 450 mg  450 mg Oral Every Other Day Omar Osullivan MD   450 mg at 05/13/25 0834     Current Facility-Administered Medications   Medication Dose Route Frequency Provider Last Rate Last Admin  "      Physical Exam   Temp  Av.9  F (36.6  C)  Min: 97.6  F (36.4  C)  Max: 98.2  F (36.8  C)      Pulse  Av  Min: 82  Max: 87 Resp  Av  Min: 18  Max: 18  SpO2  Av.2 %  Min: 97 %  Max: 100 %     BP (!) 145/91 (BP Location: Left arm)   Pulse 80   Temp 98  F (36.7  C) (Oral)   Resp 16   Ht 1.676 m (5' 6\")   Wt 82.1 kg (181 lb 1.6 oz)   SpO2 98%   BMI 29.23 kg/m     Date 25 07 - 05/10/25 0659   Shift 7058-4534 1181-7084 3639-7195 24 Hour Total   INTAKE   P.O. 120   120   Shift Total(mL/kg) 120(1.52)   120(1.52)   OUTPUT   Shift Total(mL/kg)       Weight (kg) 78.74 78.74 78.74 78.74      Admit Weight: 78.7 kg (173 lb 9.6 oz)     GENERAL APPEARANCE: alert and no distress  HENT: mouth without ulcers or lesions  RESP: lungs clear to auscultation - no rales, rhonchi or wheezes  CV: regular rhythm, normal rate, no rub, no murmur  EDEMA: no LE edema bilaterally  ABDOMEN: soft, nondistended, nontender, bowel sounds normal  MS: extremities normal - no gross deformities noted, no evidence of inflammation in joints, no muscle tenderness  SKIN: no rash  TX KIDNEY: normal  DIALYSIS ACCESS:  LUE AV fistula with bruit and thrill    Data   All labs reviewed by me.  CMP  Recent Labs   Lab 25  0559 25  0639 25  1022 25  0610 05/10/25  2230 05/10/25  1810 25  2148 25  1922    136  --  136  --  133*   < > 140   POTASSIUM 4.6 4.0 3.9 4.5  4.5   < > 4.2  4.2   < > 4.3   CHLORIDE 104 102  --  99  --  97*   < > 102   CO2 22 25  --  24  --  22   < > 22   ANIONGAP 11 9  --  13  --  14   < > 16*   * 129*  --  183*  --  148*   < > 77   BUN 27.9* 29.3*  --  22.7*  --  22.4*   < > 24.4*   CR 3.01* 4.10*  --  6.14*  --  7.73*   < > 10.10*   GFRESTIMATED 28* 19*  --  12*  --  9*   < > 6*   LIZZ 9.8 9.6  --  9.5  --  9.3   < > 10.4   MAG 2.3 2.0  --  1.9  --  2.0   < >  --    PHOS 2.6 4.0  --  6.2*  --  5.0*  --   --    PROTTOTAL  --   --   --   --   --   --   --  7.2 "   ALBUMIN  --   --   --   --   --   --   --  4.5   BILITOTAL  --   --   --   --   --   --   --  0.4   ALKPHOS  --   --   --   --   --   --   --  98   AST  --   --   --   --   --   --   --  16   ALT  --   --   --   --   --   --   --  9    < > = values in this interval not displayed.     CBC  Recent Labs   Lab 05/13/25  0559 05/12/25  1743 05/12/25  0639 05/11/25  1929 05/11/25  1022 05/11/25  0610 05/10/25  2230 05/10/25  1810   HGB 8.3* 7.7* 6.9* 7.7*   < > 7.4*   < > 7.3*  7.3*   WBC 4.6  --  7.9  --   --  11.6*  --  6.7   RBC 2.76*  --  2.30*  --   --  2.44*  --  2.39*   HCT 24.3*  --  19.8*  --   --  21.1*  --  21.2*   MCV 88  --  86  --   --  87  --  89   MCH 30.1  --  30.0  --   --  30.3  --  30.1   MCHC 34.2  --  34.8  --   --  35.1  --  34.6   RDW 13.0  --  13.1  --   --  12.9  --  12.7   PLT 99*  --  95*  --   --  130*  --  87*    < > = values in this interval not displayed.     INR  Recent Labs   Lab 05/08/25 1922   INR 0.99   PTT 27     ABGNo lab results found in last 7 days.   Urine Studies  Recent Labs   Lab Test 05/09/25  0641 04/06/23  1100 07/23/19  0600 06/26/19  0555   COLOR Light Yellow Yellow Straw Straw   APPEARANCE Clear Slightly Cloudy* Slightly Cloudy Clear   URINEGLC 30* 30* Negative Negative   URINEBILI Negative Negative Negative Negative   URINEKETONE Negative Negative Negative Negative   SG 1.012 1.015 1.005 1.009   UBLD Small* Small* Small* Small*   URINEPH 6.0 6.0 6.0 5.0   PROTEIN 100* 50* 100* 100*   NITRITE Negative Negative Negative Negative   LEUKEST Negative Negative Negative Negative   RBCU 13* 10* 0 2   WBCU 3 9* 0 0     Recent Labs   Lab Test 07/23/19  0600 06/26/19  0555 06/07/19  1518 09/18/17  1422   UTPG 2.82* 1.86* 1.98* 0.79*     PTH  Recent Labs   Lab Test 12/19/20  0726 09/18/17  1431   PTHI 2,523* 151*     Iron Studies  Recent Labs   Lab Test 12/19/20  0726   IRON 53   *   IRONSAT 24   CLINTON 143       IMAGING:  All imaging studies reviewed by me.

## 2025-05-13 NOTE — PLAN OF CARE
"Goal Outcome Evaluation:      Plan of Care Reviewed With: patient    Overall Patient Progress: improvingOverall Patient Progress: improving    Outcome Evaluation: POD 2.    Vitals: SBP (notify above 160). Room air. Afebrile.   BP (!) 155/92 (BP Location: Right arm)   Pulse 71   Temp 98  F (36.7  C) (Oral)   Resp 16   Ht 1.676 m (5' 6\")   Wt 83.4 kg (183 lb 12.8 oz)   SpO2 97%   BMI 29.67 kg/m      Blood glucose: none.   Pain/nausea: denies.   Diet: regular.  Lines: PIV R x 2 saline locked.  : mcdaniels cares done. OP baljeet.  1350 ml. Baljeet.   GI: milk of mag x 1. BM x 2. Green (pt stated due to meds).   Drains: OLGA L  ml serosang. Cdi.  Skin: OP dressing cdi,. Dressing drainage unchanged.   Mobility: up x 1, walked with mother   Education : med and lab book updated. SPT completed with mother today.   Labs : hgb recheck @ 1800 is 7.7        "

## 2025-05-13 NOTE — PLAN OF CARE
Goal Outcome Evaluation:      Plan of Care Reviewed With: patient    Overall Patient Progress: improvingOverall Patient Progress: improving    Outcome Evaluation: POD 3, mcdaniels removed, expected IJ removal today, decreased output in OLGA, Pt expresses readiness to discharge    DISCHARGE:  Patient with orders to discharge to local [facility].     Education Provided:   Med Card updated  Lab Book updated  Handouts handbook and QR code for videos provided  Specialty Pharmacy teaching completed 5/12  LDAs Left AVF     Discharge instructions, medications & follow ups reviewed with patient and mother. Copy of discharge summary given to patient. Right PIV x2, Left internal jugular, and Left OLGA removed. [facility] notified, report given.    Patient in stable condition. AVSS. Patient and mother had no further questions regarding discharge instructions and medications. Patient transferred out by Wheelchair & left with mother.  Plan for patient to stay at local [facility] and attend follow up appointments as scheduled.         Patient's Name: Jhoan Hoffman    Procedure Date: 05/13/25  Procedure Time 1315    Procedure Performed: Central line removal     The Central Venous Catheter (CVC) was removed per provider order.     The central venous catheter was located: Left Internal Jugular vein, with a total of 3 lumens.     The patient was placed in Trendelenburg position with Insertion site lower than heart.     Valsalva response achieved by: Valsalva is contraindicated, patient was instructed to hold their breath while the CVC was removed with a constant steady motion.     Firm pressure was applied at the access site for 4 minutes until bleeding stopped.     Post removal site assessment; Clean and dry without bleeding. Occlusive dressing applied: Yes    CVC tip was inspected and intact: Yes    Tip was sent to lab for culture per provider order: No    Patient tolerated the procedure: well    2nd RN present during removal (if applicable) Hayde Patel  Capri Stephens, KAREN   5/14/2025   7:16 AM

## 2025-05-13 NOTE — PROGRESS NOTES
Care Management Discharge Note    Discharge Date: 05/13/2025     Discharge Disposition: Home, Other (Comments) (OP ATC appts)    Discharge Services: None    Discharge DME: None    Discharge Transportation: family or friend will provide    Private pay costs discussed: Not applicable    Does the patient's insurance plan have a 3 day qualifying hospital stay waiver?  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 Patient is now status post DDKT with ureteral stent placement on 5/10/25 with Dr. Omar Osullivan     Patient will discharge today to local hotel with parent for 2 weeks post discharge locally at [facility].Then patient will stay with mom Yoanna in [place], [place]. They declined home care:    Mother's address (one of two):  Yoanna Flores  [address]   [place]   [address]    Mom stated that after 2 weeks of staying local she will take patient to: [facility] for twice weekly labs:    [facility]  [address] [place] [address]  P: [phone]  F: [phone]    [facility] post transplant RNCC  Lupe Guerrero   [phone]  -RNCC left VM for RNCC.     RNCC sent in basket for ATC appts to be scheduled-  Completed.  Future Appointments   Date Time Provider Department Center   5/14/2025  7:15 AM  LAB [facility]   5/14/2025  8:00 AM Mariam Orta APRN CNP [facility]   5/14/2025  8:00 AM  [facility] INFUSION NURSE [facility]   5/15/2025  6:45 AM  LAB [facility]   5/15/2025  8:00 AM  [facility] INFUSION NURSE [facility]   5/15/2025  8:30 AM Mariam Orta APRN CNP [facility]     RNCC reviewed plan for labs and provided name and phone number for post transplant RNCC. Post transplant team/RNCC will need to send orders to next OP clinic for labs. Patient and mother acknowledged understanding.     RNCC sent in-basket to Lupe RNCC with [facility] information for coordination of labs (in 2 weeks).  Mother to transport.    Lu Cristobal RNCC Float  5/13/2025  Nurse Coordinator    Covering for 7A  Phone [phone]      Social Work and Care Management Department   SEARCHABLE in [facility] - search CARE COORDINATOR   [place] & [facility] ([phone]) Saturday & Sunday; ([phone]) [facility] Recognized Holidays   Units: 5A Onc 5201 - 5219 RNCC,  5A Onc 5220 thru 5240 RNCC, 5C OFFSERVICE [phone] RNCC & 5C OFF SERVICE [phone] RNCC   Units: 6B Vocera, 6C Card 6401 thru 6420 RNCC, 6C Card 6502 thru 6514 RNCC & 6C Card 6515 thru 6519 RNCC    Units: 7A SOT RNCC Vocera, 7B Med Surg Vocera, 7C Med Surg 7401 thru 7418 RNCC & 7C Med Surg 7502 thru 7521 RNCC   Units: 6A Vocera & 4A CVICU Vocera, 4C MICU Vocera, and 4E SICU Vocera     Units: 5 Ortho Vocera & 5 Med Surg Vocera    Units: 6 Med Surg Vocera & 8 Med Surg Vocera

## 2025-05-14 ENCOUNTER — TELEPHONE (OUTPATIENT)
Dept: TRANSPLANT | Facility: CLINIC | Age: 30
End: 2025-05-14
Payer: MEDICARE

## 2025-05-14 ENCOUNTER — INFUSION THERAPY VISIT (OUTPATIENT)
Dept: INFUSION THERAPY | Facility: CLINIC | Age: 30
End: 2025-05-14
Attending: NURSE PRACTITIONER
Payer: MEDICARE

## 2025-05-14 ENCOUNTER — TELEPHONE (OUTPATIENT)
Dept: PHARMACY | Facility: CLINIC | Age: 30
End: 2025-05-14
Payer: MEDICARE

## 2025-05-14 ENCOUNTER — RESULTS FOLLOW-UP (OUTPATIENT)
Dept: TRANSPLANT | Facility: CLINIC | Age: 30
End: 2025-05-14

## 2025-05-14 ENCOUNTER — LAB (OUTPATIENT)
Dept: LAB | Facility: CLINIC | Age: 30
End: 2025-05-14
Payer: MEDICARE

## 2025-05-14 VITALS
DIASTOLIC BLOOD PRESSURE: 89 MMHG | WEIGHT: 178.3 LBS | SYSTOLIC BLOOD PRESSURE: 153 MMHG | HEART RATE: 88 BPM | TEMPERATURE: 98.3 F | OXYGEN SATURATION: 100 % | BODY MASS INDEX: 28.78 KG/M2 | RESPIRATION RATE: 16 BRPM

## 2025-05-14 DIAGNOSIS — Z48.298 AFTERCARE FOLLOWING ORGAN TRANSPLANT: ICD-10-CM

## 2025-05-14 DIAGNOSIS — Z98.890 OTHER SPECIFIED POSTPROCEDURAL STATES: ICD-10-CM

## 2025-05-14 DIAGNOSIS — Z20.828 CONTACT WITH AND (SUSPECTED) EXPOSURE TO OTHER VIRAL COMMUNICABLE DISEASES: ICD-10-CM

## 2025-05-14 DIAGNOSIS — Z94.0 KIDNEY REPLACED BY TRANSPLANT: Primary | ICD-10-CM

## 2025-05-14 DIAGNOSIS — Z94.0 KIDNEY REPLACED BY TRANSPLANT: ICD-10-CM

## 2025-05-14 DIAGNOSIS — I15.1 HTN, KIDNEY TRANSPLANT RELATED: ICD-10-CM

## 2025-05-14 DIAGNOSIS — Z79.899 ENCOUNTER FOR LONG-TERM CURRENT USE OF MEDICATION: ICD-10-CM

## 2025-05-14 DIAGNOSIS — Z48.298 AFTERCARE FOLLOWING ORGAN TRANSPLANT: Primary | ICD-10-CM

## 2025-05-14 DIAGNOSIS — Z94.0 HTN, KIDNEY TRANSPLANT RELATED: ICD-10-CM

## 2025-05-14 LAB
ANION GAP SERPL CALCULATED.3IONS-SCNC: 11 MMOL/L (ref 7–15)
BASOPHILS # BLD AUTO: 0 10E3/UL (ref 0–0.2)
BASOPHILS NFR BLD AUTO: 0 %
BUN SERPL-MCNC: 28.2 MG/DL (ref 6–20)
CALCIUM SERPL-MCNC: 9.9 MG/DL (ref 8.8–10.4)
CHLORIDE SERPL-SCNC: 102 MMOL/L (ref 98–107)
CREAT SERPL-MCNC: 2.92 MG/DL (ref 0.67–1.17)
EGFRCR SERPLBLD CKD-EPI 2021: 29 ML/MIN/1.73M2
EOSINOPHIL # BLD AUTO: 0 10E3/UL (ref 0–0.7)
EOSINOPHIL NFR BLD AUTO: 0 %
ERYTHROCYTE [DISTWIDTH] IN BLOOD BY AUTOMATED COUNT: 12.6 % (ref 10–15)
GLUCOSE SERPL-MCNC: 91 MG/DL (ref 70–99)
HCO3 SERPL-SCNC: 24 MMOL/L (ref 22–29)
HCT VFR BLD AUTO: 25.8 % (ref 40–53)
HGB BLD-MCNC: 9 G/DL (ref 13.3–17.7)
IMM GRANULOCYTES # BLD: 0 10E3/UL
IMM GRANULOCYTES NFR BLD: 0 %
LYMPHOCYTES # BLD AUTO: 0.1 10E3/UL (ref 0.8–5.3)
LYMPHOCYTES NFR BLD AUTO: 3 %
MAGNESIUM SERPL-MCNC: 2 MG/DL (ref 1.7–2.3)
MCH RBC QN AUTO: 30.2 PG (ref 26.5–33)
MCHC RBC AUTO-ENTMCNC: 34.9 G/DL (ref 31.5–36.5)
MCV RBC AUTO: 87 FL (ref 78–100)
MONOCYTES # BLD AUTO: 0.2 10E3/UL (ref 0–1.3)
MONOCYTES NFR BLD AUTO: 5 %
NEUTROPHILS # BLD AUTO: 4.1 10E3/UL (ref 1.6–8.3)
NEUTROPHILS NFR BLD AUTO: 92 %
NRBC # BLD AUTO: 0 10E3/UL
NRBC BLD AUTO-RTO: 0 /100
PHOSPHATE SERPL-MCNC: 2.2 MG/DL (ref 2.5–4.5)
PLATELET # BLD AUTO: 100 10E3/UL (ref 150–450)
POTASSIUM SERPL-SCNC: 3.9 MMOL/L (ref 3.4–5.3)
RBC # BLD AUTO: 2.98 10E6/UL (ref 4.4–5.9)
SODIUM SERPL-SCNC: 137 MMOL/L (ref 135–145)
TACROLIMUS BLD-MCNC: 7.5 UG/L (ref 5–15)
TME LAST DOSE: NORMAL H
TME LAST DOSE: NORMAL H
WBC # BLD AUTO: 4.5 10E3/UL (ref 4–11)

## 2025-05-14 PROCEDURE — 83735 ASSAY OF MAGNESIUM: CPT | Performed by: PATHOLOGY

## 2025-05-14 PROCEDURE — 86832 HLA CLASS I HIGH DEFIN QUAL: CPT | Performed by: NURSE PRACTITIONER

## 2025-05-14 PROCEDURE — 99213 OFFICE O/P EST LOW 20 MIN: CPT | Mod: 24 | Performed by: NURSE PRACTITIONER

## 2025-05-14 PROCEDURE — 84100 ASSAY OF PHOSPHORUS: CPT | Performed by: PATHOLOGY

## 2025-05-14 PROCEDURE — 80197 ASSAY OF TACROLIMUS: CPT | Performed by: NURSE PRACTITIONER

## 2025-05-14 PROCEDURE — 86833 HLA CLASS II HIGH DEFIN QUAL: CPT | Performed by: NURSE PRACTITIONER

## 2025-05-14 PROCEDURE — 99000 SPECIMEN HANDLING OFFICE-LAB: CPT | Performed by: PATHOLOGY

## 2025-05-14 PROCEDURE — 80048 BASIC METABOLIC PNL TOTAL CA: CPT | Performed by: PATHOLOGY

## 2025-05-14 PROCEDURE — 86828 HLA CLASS I&II ANTIBODY QUAL: CPT | Performed by: NURSE PRACTITIONER

## 2025-05-14 PROCEDURE — 85025 COMPLETE CBC W/AUTO DIFF WBC: CPT | Performed by: PATHOLOGY

## 2025-05-14 RX ORDER — HYDROXYZINE HYDROCHLORIDE 25 MG/1
25 TABLET, FILM COATED ORAL 3 TIMES DAILY PRN
Qty: 20 TABLET | Refills: 0 | Status: SHIPPED | OUTPATIENT
Start: 2025-05-14

## 2025-05-14 RX ORDER — ONDANSETRON 4 MG/1
4 TABLET, ORALLY DISINTEGRATING ORAL EVERY 8 HOURS PRN
Qty: 15 TABLET | Refills: 0 | Status: SHIPPED | OUTPATIENT
Start: 2025-05-14

## 2025-05-14 RX ORDER — METHOCARBAMOL 750 MG/1
750 TABLET, FILM COATED ORAL 3 TIMES DAILY PRN
Qty: 20 TABLET | Refills: 0 | Status: SHIPPED | OUTPATIENT
Start: 2025-05-14

## 2025-05-14 RX ORDER — TACROLIMUS 1 MG/1
5 CAPSULE ORAL 2 TIMES DAILY
Qty: 300 CAPSULE | Refills: 11 | Status: SHIPPED | OUTPATIENT
Start: 2025-05-14

## 2025-05-14 RX ORDER — CARVEDILOL 3.12 MG/1
6.25 TABLET ORAL 2 TIMES DAILY WITH MEALS
COMMUNITY
Start: 2025-05-14

## 2025-05-14 NOTE — PROGRESS NOTES
"Jhoan Hoffman came to [facility] today for a lab and assess following a kidney transplant on 5/10/25.      Discharge date: 5/13/25   Transplant coordinator: Lupe Quevedo  Phone number patient can be reached at: cell      Physical Assessment:  See physical assessment located under \"Document Flowsheets\".  Incision site: clean, dry and intact. Staples in midline incision. OLGA drain site on left abdomen is covered due to leaking overnight. Serosanguinous output noted with dressing change. Saturating dressings at least 3 times since discharge per patient.   Lines: NA  Ordoñez: NA  Urine clarity: clear  Hydration: good amounts, estimates 2-3 L. Drinking mostly water and some powerade and soda.  Nutrition: Poor appetite and feeling nauseated. Pt has not thrown up but feels very uncomfortable and cannot eat much.   Last BM: loose stools, last morning of 5/14. Using senna.   Pain: 1/10 in lower abdomen.   BP: Pt hypertensive in [facility] upon arrival. BP at home this morning was 156/105. BP decreased with rest to 150/90s.   My transplant place videos watched: yes    Labs drawn by [facility] staff No  Vascular access: not needed    Plan of care for today:   Pt has had previous transplants. He and his mom had already set up his meds.   Dressing changed to  site.    Medication changes:   Increase carvedilol to 6.25 mg twice daily  Start zofran as needed for nausea. You can also take tums (calcium carbonate) chews as needed for heartburn/nausea.   Start robaxin and hydroxyzine as needed for restlessness and discomfort, especially at bedtime.     Medications administered:  Pt self-administered home medications for 8 AM dose.     Patient education:    The following teaching topics were addressed: Importance of drinking 2L of non-caffeinated fluids daily, Incisional care, Signs/symptoms of infection, Good handwashing, Medications (purposes, doses and times of administration), Phone numbers to call with melvin (Transplant coordinator, Unit " [facility]), and Plan of care   Patient and Mother verbalized understanding and all questions answered.    Drug level:  Patient took 4 mg of tacrolimus last evening at 8 pm.  Care coordinator to follow up with the result.    Face to face time: 60 min    Discharge Plan    Pt will follow up with labs tomorrow 5/15. Labs and transplant office visit on 5/19.   Discharge instructions reviewed with patient: YES  Patient/Representative verbalized understanding, all questions answered: YES    Discharged from unit at 0930 with whom: mom to [facility].    Yuridia Skinner RN

## 2025-05-14 NOTE — TELEPHONE ENCOUNTER
ISSUE:   Tacrolimus IR level 6.4 on 05/14/25, goal 8-10, dose 4 mg BID.    PLAN:   Call Patient and confirm this was an accurate 12-hour trough.   Verify Tacrolimus IR dose 4 mg BID.   Confirm no new medications or illness.   Confirm no missed doses.     If accurate trough and accurate dose, increase Tacrolimus IR dose to 5 mg BID  Repeat labs in 2 days.     OUTCOME:   Spoke with Patient, they confirm accurate trough level and current dose 4 mg BID.   Patient confirmed dose change to 5 mg BID.  Patient agreed to repeat labs in 2 days.   Orders sent to preferred pharmacy for dose change and lab for repeat labs.   Patient voiced understanding of plan.

## 2025-05-14 NOTE — TELEPHONE ENCOUNTER
Kidney and Pancreas Transplant Coordinator Transition to Outpatient Discharge Note    Surgeon (updated in Transplant Information tab): finger  Nephrologist (updated in Transplant Information tab): bhupinder still  Transplant Coordinator: Lupe Quevedo RN    Lines / drains in place at time of discharge:  Stent: Yes.  Other: none  If yes, review of parameters to track and when to contact RNCC: No..    Immunosuppression:   CNI: tac  Antimetabolite: mmf  Prednisone taper: No.    Induction: high    Prophylaxis:   CMV: D-/R- : Valcyte 3 months; renal dosing  EBV: D+/R+  Toxoplasma  D -/R -( mismatch life time of bactrim )  Antibiotic: bactrim    High Risk DSA: No.    Pharmacy after discharge: [facility]  Lab after discharge: [facility]  Lab letter faxed to outside lab, if needed: Yes.    Education:  Writer spoke with Jhoan Hoffman.   We discussed immunosuppression medications, indications, side effects, dose adjustments, and lab monitoring.   Lab draw schedule was provided via MyChart/mail to the patient and fully explained.    DSA  kits needed:  Yes.    If so, request submitted to centralized team for send-out.  Living Donor:  No.    If yes, discussed with patient data collection through [facility] for KFL in effort  to improve eplet matching. This will include additional, non-clinical lab   draw organized by an external organization, [facility] mobile phlebotomy.   Consent to Communicate on File: Yes.    If no, discussed inability for team to communicate with any person other than the patient regarding PHI, including scheduling.   If no, consent to communicate was offered: No.     Further education was provided on typical post transplant course, labs examined with thresholds, biopsy, infection prevention, office contact numbers, and when to call.     Discharge Transition Plan:   Pt will transition home, with assistance from family. Pt will have home care.   Pt states having working BP monitor, scale, and thermometer at home.    Follow  Up:  Orders for post-transplant follow-up appointments placed: Yes.  Patient Status Checklist Task updated: Yes.    Special/Additional needs:   Pt questions for follow up:     Lupe Quevedo RN  Post-Kidney Transplant Coordinator  (ph) [phone]

## 2025-05-14 NOTE — LETTER
5/14/2025      Jhoan Hoffman  [address]  [address]  [facility] [address]      Dear Colleague,    Thank you for referring your patient, Jhoan Hoffman, to the [facility]. Please see a copy of my visit note below.    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 Patient is now status post DDKT with ureteral stent placement on 5/10/25 with Dr. Omar Osullivan.       C/O nausea and insomnia.   Incisional pain tolerable.   No vomiting or diarrhea.   LBM today.   Urinating with no issues.   Good fluid intake but poor appetite. Focusing on high protein foods.     Transplant History:    Transplant Type:  DDKT  Donor Type: Donation after Brain Death   Transplant Date:  5/10/2025 (Kidney), 7/13/1995 (Kidney), 5/18/2011 (Kidney), 4/28/2012 (Kidney)   Ureteral Stent:  Yes   DSA at Tx:  No  Baseline Cr: TBD   De[place]ovo DSA: No    Acute Rejection Hx:  No    Present Maintenance Immunosuppression:  Tacrolimus, Mycophenolate mofetil, and Prednisone    CMV IgG Ab Discordance:  No  EBV IgG Ab Discordance:  No    BK Viremia:  No  EBV Viremia:  No    Transplant Coordinator: Lupe Quevedo     Transplant Office Phone Number: [phone]     Immunosuppressant Medications       Immunosuppressive Agents Disp Start End     mycophenolate (GENERIC EQUIVALENT) 250 MG capsule 180 capsule 5/13/2025 --    Sig - Route: Take 3 capsules (750 mg) by mouth  2 times daily. - Oral    Class: E-Prescribe     tacrolimus (GENERIC EQUIVALENT) 0.5 MG capsule -- 5/13/2025 --    Sig - Route: Take 1 capsule (0.5 mg) by mouth 2 times daily. - Oral    Class: No Print Out    Notes to Pharmacy: To have available for dose adjustments per transplant team. Discharge dose = 4 mg BID.     tacrolimus (GENERIC EQUIVALENT) 1 MG capsule 240 capsule 5/13/2025 --    Sig - Route: Take 4 capsules (4 mg) by mouth 2 times daily. - Oral    Class: E-Prescribe            Possible Immunosuppression-related side effects:   []             headache  []             vivid dreams  []             irritability  []             cognitive difficuties  []             fine tremor  []             nausea  []             diarrhea  []             neuropathy      []             edema  []             renal calcineurin toxicity  []             hyperkalemia  []             post-transplant diabetes  []             decreased appetite  []             increased appetite  []             other:  []             none    Prescription Medications as of 5/14/2025         Rx Number Disp Refills Start End Last Dispensed Date Next Fill Date Owning Pharmacy    acetaminophen (TYLENOL) 325 MG tablet  60 tablet 0 5/13/2025 --   [address]    Sig: Take 3 tablets (975 mg) by mouth every 8 hours as needed for mild pain.    Class: E-Prescribe    Route: Oral    aspirin 81 MG EC tablet  -- --  --       Sig: Take 81 mg by mouth daily    Class: Historical    Route: Oral    atorvastatin (LIPITOR) 10 MG tablet  30 tablet 2 5/14/2025 --   [address]    Sig: Take 1 tablet (10 mg) by mouth daily.    Class: E-Prescribe    Route: Oral    carvedilol (COREG) 3.125 MG tablet  60 tablet 2 5/13/2025 --   [address]    Sig: Take 1 tablet (3.125 mg) by mouth 2 times daily (with meals).     Class: E-Prescribe    Route: Oral    FLUoxetine (PROZAC) 40 MG capsule  90 capsule 0 7/23/2014 --       Sig: Take 40 mg by mouth at bedtime.    Class: Historical    Notes to Pharmacy: Continue home medications but switch to 60 mg daily.    Route: Oral    loratadine (CLARITIN) 10 MG tablet  -- --  --       Sig: Take 10 mg by mouth daily as needed for allergies.    Class: Historical    Route: Oral    magnesium oxide (MAG-OX) 400 MG tablet  30 tablet 2 5/13/2025 --   [address]    Sig: Take 1 tablet (400 mg) by mouth daily (with lunch).    Class: E-Prescribe    Route: Oral    mycophenolate (GENERIC EQUIVALENT) 250 MG capsule  180 capsule 11 5/13/2025 --   [address]    Sig: Take 3 capsules (750 mg) by mouth 2 times daily.    Class: E-Prescribe    Route: Oral    omeprazole (PRILOSEC) 40 MG DR capsule  -- --  --   [address]    Sig: Take 40 mg by mouth daily    Class: Historical    Route: Oral    predniSONE (DELTASONE) 5 MG tablet  30 tablet 11 5/14/2025 --   [address]    Sig: Take 1 tablet (5 mg) by mouth daily.    Class: E-Prescribe    Route: Oral    senna-docusate (SENOKOT-S/PERICOLACE) 8.6-50 MG tablet  60 tablet 0 5/13/2025 --   [address]    Sig: Take 1 tablet by mouth 2 times daily as needed for constipation.    Class: E-Prescribe    Route: Oral    sulfamethoxazole-trimethoprim (BACTRIM) 400-80 MG tablet  30 tablet 11 5/14/2025 --   [address]    Sig: Take 1 tablet by mouth daily.    Class: E-Prescribe    Route: Oral    tacrolimus (GENERIC EQUIVALENT) 0.5 MG capsule  -- -- 5/13/2025 --       Sig: Take 1 capsule (0.5 mg) by mouth 2 times daily.    Class: No  Print Out    Notes to Pharmacy: To have available for dose adjustments per transplant team. Discharge dose = 4 mg BID.    Route: Oral    tacrolimus (GENERIC EQUIVALENT) 1 MG capsule  240 capsule 11 5/13/2025 --   [facility] - [place], [place] - [address] [place] [address]    Sig: Take 4 capsules (4 mg) by mouth 2 times daily.    Class: E-Prescribe    Route: Oral    valGANciclovir (VALCYTE) 450 MG tablet  60 tablet 2 5/14/2025 --   [facility] - [place], [place] - [address] [place] [address]    Sig: Take 1 tablet (450 mg) by mouth daily.    Class: E-Prescribe    Notes to Pharmacy: Anticipate improvement in kidney function. Eventual dose = 2 tabs daily x 3 months. Dose adjustments per transplant team.    Route: Oral          Hospital Medications as of 5/14/2025         Dose Frequency Start End    furosemide (LASIX) tablet 40 mg (Completed) 40 mg ONCE 5/13/2025 5/13/2025    Class: E-Prescribe    Route: Oral            O:   Temp:  [98.3  F (36.8  C)] 98.3  F (36.8  C)  Pulse:  [88-89] 88  Resp:  [16] 16  BP: (153-166)/() 153/89  SpO2:  [100 %] 100 %  General Appearance: in no apparent distress.   Skin: Normal, no rashes or jaundice  Heart: perfused  Lungs: easy respirations, no audible wheezing.  Abdomen: rounded, The wound is dry and intact, without hernia. The abdomen is non-tender. The kidney graft is not tender.  There is no ascites.  Midline incision intact with staples CDI no erythema   OLGA site LLQ draining but no erythema.   Extremities: Tremor absent.   Edema: absent.         Latest Ref Rng & Units 5/14/2025     7:18 AM 5/13/2025     5:59 AM 5/12/2025     6:39 AM 5/11/2025     6:10 AM 5/10/2025     6:10 PM   Transplant Immunosuppression Labs   Creat 0.67 - 1.17 mg/dL 2.92  3.01  4.10  6.14  7.73    Urea Nitrogen 6.0 - 20.0 mg/dL 28.2  27.9  29.3  22.7  22.4    WBC 4.0 - 11.0 10e3/uL 4.5  4.6  7.9  11.6  6.7    Neutrophil % 92  89  93  97     ANEU 1.6 - 8.3 10e3/uL 4.1  4.1  7.4  11.2          Chemistries:   Recent Labs   Lab Test 05/14/25  0718   BUN 28.2*   CR 2.92*   GFRESTIMATED 29*   GLC 91     Lab Results   Component Value Date    A1C 4.9 05/08/2025     Recent Labs   Lab Test 05/08/25 1922   ALBUMIN 4.5   BILITOTAL 0.4   ALKPHOS 98   AST 16   ALT 9     Urine Studies:  Recent Labs   Lab Test 05/09/25  0641   COLOR Light Yellow   APPEARANCE Clear   URINEGLC 30*   URINEBILI Negative   URINEKETONE Negative   SG 1.012   UBLD Small*   URINEPH 6.0   PROTEIN 100*   NITRITE Negative   LEUKEST Negative   RBCU 13*   WBCU 3     Recent Labs   Lab Test 07/23/19  0600 06/26/19  0555   UTPG 2.82* 1.86*     Hematology:   Recent Labs   Lab Test 05/14/25  0718 05/13/25  0559 05/12/25  1743 05/12/25  0639   HGB 9.0* 8.3* 7.7* 6.9*   * 99*  --  95*   WBC 4.5 4.6  --  7.9     Coags:   Recent Labs   Lab Test 05/08/25  1922 04/06/23  1047   INR 0.99 1.02     HLA antibodies:   SA1 Hi Risk Milagro   Date Value Ref Range Status   07/23/2019   Final    A:23 24 25 32 B:8 13 27 37 38 41 44 45 47 48 49 50 51 52 53 57 58 59 60 61   62 63 72 76 77 81 Cw:9 10       SA1 HI RISK MILAGRO   Date Value Ref Range Status   05/08/2025   Final    A:23 24 25 32 B:8 13 27 37 38 41 44 45 47 48 49 50 51 52 53 57 58 59 60 61 62 63 77     SA1 Mod Risk Milagro   Date Value Ref Range Status   07/23/2019 B:35 39 46 54 71 73 75 Cw:1 6 8 12 14 16 17  Final     SA1 MOD RISK MILAGRO   Date Value Ref Range Status   05/08/2025 B:46 67 71 72 73 76Cw:1 9 10  Final     SA2 Hi Risk Milagro   Date Value Ref Range Status   07/23/2019 DR:103 7 8 11 12 13 16 DRw:51 DQ:4 5 6 DQA:02  Final     SA2 HI RISK MILAGRO   Date Value Ref Range Status   05/08/2025 DR:103 7 8 9 11 12 13 16 DRw:51 DQ:4 5 6 DQA:02  Final     SA2 Mod Risk Milagro   Date Value Ref Range Status   07/23/2019 DR:9  Final     SA2 MOD RISK MILAGRO   Date Value Ref Range Status   05/08/2025 None  Final       Assessment: Jhoan Hoffman is doing fairly well s/p DDKT:  Issues we addressed during his visit  include:    Plan:    1. Graft function: Cr down trending   2. Immunosuppression Management: No change   .  Complexity of management:Medium.  Contributing factors: HTN, DDKT nausea   3. Increase coreg from 3.125mg to 6.25mg BID.   Zofran for nausea PRN.   Robaxin and or hydroxyzine q.HS PRN     Followup: labs tomorrow, Monday clinic follow up     Total Time: 15 min,   Counselling Time: 5 min.    Mariam Orta NP      Again, thank you for allowing me to participate in the care of your patient.        Sincerely,        JAJA Holley CNP    Electronically signed

## 2025-05-14 NOTE — PATIENT INSTRUCTIONS
Dear Jhoan Hoffman    Thank you for choosing [facility] ([facility]) for your transplant cares.  The following information is a summary of our appointment as well as important reminders.      Plan:  1) Drink 2-3 liters of non-caffeine containing fluids per day ( oz)   2) Eat high phosphorus foods. Try to eat high protein foods as well to help with healing.  Do not eat grapefruit, pomegranate or pomelo.   3) Notify transplant surgery team if incision is draining more  4) Notify transplant team if vital signs are out of the parameters on your record book   5) Medication changes:  Increase carvedilol to 6.25 mg twice daily  Start zofran as needed for nausea. You can also take tums (calcium carbonate) chews as needed for heartburn/nausea.   Start robaxin and hydroxyzine as needed for restlessness and discomfort, especially at bedtime.   6) If you miss medications, you can take them within 4 hours of the scheduled time and then go back to your regular schedule. If you miss them by more than 4 hours, then call the transplant team.   If you have an emesis (throw up) after taking your pills within 10-15 min and you can see the pills, then retake your medication. If it has been more than 15 minutes then call the transplant team.   7) No driving for at least 3 weeks. No lifting more than 10 pounds (1 gallon milk) for at least 4 weeks.   8) Plan to return:  Labs on 5/15   Labs 518 and clinic visit with Mariam Orta NP with surgery team (3rd floor at the [facility])     Contact Information:  Transplant Coordinator: Lupe Quevedo  Transplant Office:  [phone]  [facility]:  [phone]  Ask for physician or coordinator on call for kidney transplant.     If you are a transplant patient and require transplant education, please click on this link: [email or web address].         If you have any questions on your upcoming  Specialty Infusion appointments, please call scheduling at [phone].  It was a pleasure taking care of you today.    Sincerely,    [facility]  [address] [place] [address]  [address]  Phone:  [phone]

## 2025-05-14 NOTE — LETTER
OUTPATIENT LABORATORY TEST ORDER     Patient Name: Jhoan Hoffman   YOB: 1995     [facility] MR# [if applicable]: [id number]   Date & Time: May 14, 2025  10:03 AM  Expiration Date: 1 year after date issued      Diagnosis: Kidney Transplant (ICD-10 Z94.0)    Aftercare following organ transplant (ICD-10 Z48.288)    Long term use of medications (ICD-10 Z79.899)    Contact with and (suspected) exposure to other viral communicable    diseases (Z20.828)     We ask your assistance in obtaining the following laboratory tests, which are part of our routine surveillance program for Solid Organ Transplant patients.     Please fax each result to [phone], same day as resulted/available    Critical lab results page [phone]  Monday - Friday 8 am to 5 pm  Evening/Weekend/Holiday communicate Critical labs results [phone]    First 8 weeks post-transplant (5/10/25-7/10/25)  Labs 2x/week (Monday and Thursday)  CBC with platelets  Basic Metabolic Panel (Sodium, Potassium, Chloride, Creatinine, CO2, Urea Nitrogen, glucose, Calcium)  Tacrolimus/Prograf/ drug level    Labs weekly (Monday)  Phosphorus  Magnesium  Labs every 2 weeks  Mycophenolic Acid Level    Months 2-4 post-transplant (7/10/25-9/10/25)  Labs 1x weekly (Monday or Tuesday)  CBC with platelets  Basic Metabolic Panel (Sodium, Potassium, Chloride, Creatinine, CO2, Urea Nitrogen, glucose, Calcium)  Tacrolimus/Prograf/ drug level  Labs monthly   Phosphorus  Magnesium      Months 4-7 post-transplant (9/10/25-12/10/25)  Labs every other week  CBC with platelets  Basic Metabolic Panel (Sodium, Potassium, Chloride, Creatinine, CO2, Urea Nitrogen, glucose, Calcium)  Tacrolimus/Prograf/ drug level  Monthly  Phosphorus  Magnesium  BK (Polyoma Virus) PCR Quantitative/Plasma    Months 7-12 post-transplant (12/10/25-5/10/26)  Monthly  CBC with platelets  Basic Metabolic Panel (Sodium, Potassium, Chloride, Creatinine, CO2, Urea  Nitrogen, glucose, Calcium)  Tacrolimus/Prograf/ drug level  BK (Polyoma Virus) PCR Quantitative/Plasma      At 1-month post-transplant (6/10/25)  DSA PRA (patient to supply )   BK Virus PCR Quantitative/Plasma  Urine protein/creatinine  Iron Panel  Vitamin D Deficiency Screening  PTH Intact  HBV, HCV, HIV by FAUSTINA testing  If unable to conduct FAUSTINA testing, please substitute the following:   Hepatitis B DNA Quantitative, Real-Time PCR   Hepatitis C RNA, Quantitation   HIV 1 RNA, Quantitation   HIV 2 RNA, Quantitation     At 2 months post-transplant (7/10/25)  DSA PRA (patient to supply  kit)  BK Virus PCR Quantitative/Plasma  Urine protein/creatinine    At month 3 only (8/10/25)  BK (Polyoma virus) PCR Quantitative/Plasma    At 4 months post-transplant (9/10/25)  DSA PRA (patient to supply )  PTH  Urine protein/creatinine      At 6 months post-transplant (Fasting Labs) (11/10/25)  Hepatic panel  Hemoglobin A1c  Uric Acid  Lipid panel  Urine protein/creatinine    At 7 months post-transplant (12/10/25)   PRA/DSA (patient to supply )    At 9 months post-transplant (2/10/26)  Urine protein/creatinine  T cell subset (CD4)     At 12 months post-transplant (Fasting labs) 5/10/26  Hepatic panel  Hemoglobin A1c  Uric Acid  Lipid panel  Urine protein/creatinine  PTH  Vitamin D      If you have any questions please call the Transplant Center at [phone]. All lab results should be faxed to [phone]      Meghan Bustamante MD

## 2025-05-14 NOTE — TELEPHONE ENCOUNTER
Jhoan Hoffman is a post-kidney transplant patient who discharged on 5/13/25. Patient will use local pharmacy or other mail order for outpatient medications.  The patient will be responsible for managing medications.    SOTMIHAI ZAPATA) IS A (KIDNEY) TRANSPLANT PATIENT  (DATE OF TRANSPLANT: (DATE: 5/10/2025) )      HEALTH BENEFIT: MEDICARE  ID# [id number] (PART A EFFECTIVE (DATE: 12/1/2020) , PART B EFFECTIVE (DATE: 9/1/2021) )   PROCESSING INFO: ID# [id number] GRP# MEDICAID BIN#[id number]  (SECONDARY HEALTH BENEFIT): (MEDICAID)  ID# [id number] (EFFECTIVE (DATE: 3/1/2020) )  PROCESSING INFO: ([place] MED MANUAL) ID# [id number] BIN#[id number]  PT WILL PAY $0 AT TIME OF SERVICE FOR IMMUNOS     PHARMACY BENEFIT: (WELL[facility]ARE) PART D  PROCESSING INFO: ID#[id number] GRP#[id number] PCN#[id number] BIN# [id number] (EFFECTIVE (DATE: 3/1/2020) )   NO DEDUCTIBLE OR MAX OUT-OF-POCKET DUE TO LOW-INCOME SUBSIDY  COPAY STRUCTURE:  $ (1.60) FOR GENERIC  $ (4.80) FOR BRAND    BILL OTC S TO RX PROCESSOR ([place] MEDICAID) BIN#[id number] ID# [id number] [id number] PCN#[id number].       TEST CLAIM SPECIALTY #28  MYCOPHENOLATE 250MG (#240/30DS)=$0 AT TIME OF SERVICE   PROGRAF 1MG (#120/30DS) =$0 AT TIME OF SERVICE  TACROLIMUS 1MG (#120/30DS) =$0 AT TIME OF SERVICE  CYCLOSPORINE 100MG (#60/30DS) =$0 AT TIME OF SERVICE  VALGANCICLOVIR 450MG (#60/30DS)=$1.60  VALACYCLOVIR 1GM (#90/30DS)=$1.60    TEST CLAIM DISCHARGE #27 (21 YEARS AND OLDER):  MYCOPHENOLATE 250MG (#240/30DS)=$0 AT TIME OF SERVICE   PROGRAF 1MG (#120/30DS) =$0 AT TIME OF SERVICE  TACROLIMUS 1MG (#120/30DS) =$0 AT TIME OF SERVICE  CYCLOSPORINE 100MG (#60/30DS) =$0 AT TIME OF SERVICE  VALGANCICLOVIR 450MG (#60/30DS)=$1.60  VALACYCLOVIR 1GM (#90/30DS)=$1.60    **CAN PATIENT FILL AT [facility] FOR MEDICATIONS LISTED? YES    Thank You,  Caitlin Villalobos, PharmD  [facility]  [phone]

## 2025-05-14 NOTE — PROGRESS NOTES
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 Patient is now status post DDKT with ureteral stent placement on 5/10/25 with Dr. Omar Osullivan.       C/O nausea and insomnia.   Incisional pain tolerable.   No vomiting or diarrhea.   LBM today.   Urinating with no issues.   Good fluid intake but poor appetite. Focusing on high protein foods.     Transplant History:    Transplant Type:  DDKT  Donor Type: Donation after Brain Death   Transplant Date:  5/10/2025 (Kidney), 7/13/1995 (Kidney), 5/18/2011 (Kidney), 4/28/2012 (Kidney)   Ureteral Stent:  Yes   DSA at Tx:  No  Baseline Cr: TBD   DeNovo DSA: No    Acute Rejection Hx:  No    Present Maintenance Immunosuppression:  Tacrolimus, Mycophenolate mofetil, and Prednisone    CMV IgG Ab Discordance:  No  EBV IgG Ab Discordance:  No    BK Viremia:  No  EBV Viremia:  No    Transplant Coordinator: Lpue Quevedo     Transplant Office Phone Number: [phone]     Immunosuppressant Medications       Immunosuppressive Agents Disp Start End     mycophenolate (GENERIC EQUIVALENT) 250 MG capsule 180 capsule 5/13/2025 --    Sig - Route: Take 3 capsules (750 mg) by mouth 2 times daily. - Oral    Class: E-Prescribe     tacrolimus (GENERIC EQUIVALENT) 0.5 MG capsule -- 5/13/2025 --    Sig - Route: Take 1 capsule (0.5 mg) by mouth 2 times daily. - Oral    Class: No Print Out    Notes to Pharmacy: To have available for dose  adjustments per transplant team. Discharge dose = 4 mg BID.     tacrolimus (GENERIC EQUIVALENT) 1 MG capsule 240 capsule 5/13/2025 --    Sig - Route: Take 4 capsules (4 mg) by mouth 2 times daily. - Oral    Class: E-Prescribe            Possible Immunosuppression-related side effects:   []             headache  []             vivid dreams  []             irritability  []             cognitive difficuties  []             fine tremor  []             nausea  []             diarrhea  []             neuropathy      []             edema  []             renal calcineurin toxicity  []             hyperkalemia  []             post-transplant diabetes  []             decreased appetite  []             increased appetite  []             other:  []             none    Prescription Medications as of 5/14/2025         Rx Number Disp Refills Start End Last Dispensed Date Next Fill Date Owning Pharmacy    acetaminophen (TYLENOL) 325 MG tablet  60 tablet 0 5/13/2025 --   [address]    Sig: Take 3 tablets (975 mg) by mouth every 8 hours as needed for mild pain.    Class: E-Prescribe    Route: Oral    aspirin 81 MG EC tablet  -- --  --       Sig: Take 81 mg by mouth daily    Class: Historical    Route: Oral    atorvastatin (LIPITOR) 10 MG tablet  30 tablet 2 5/14/2025 --   [address]    Sig: Take 1 tablet (10 mg) by mouth daily.    Class: E-Prescribe    Route: Oral    carvedilol (COREG) 3.125 MG tablet  60 tablet 2 5/13/2025 --   [address]    Sig: Take 1 tablet (3.125 mg) by mouth 2 times daily (with meals).    Class: E-Prescribe    Route: Oral    FLUoxetine (PROZAC) 40 MG capsule  90 capsule 0 7/23/2014 --       Sig: Take 40 mg by mouth at bedtime.    Class: Historical    Notes to Pharmacy: Continue home medications but switch to 60 mg daily.    Route:  Oral    loratadine (CLARITIN) 10 MG tablet  -- --  --       Sig: Take 10 mg by mouth daily as needed for allergies.    Class: Historical    Route: Oral    magnesium oxide (MAG-OX) 400 MG tablet  30 tablet 2 5/13/2025 --   [address]    Sig: Take 1 tablet (400 mg) by mouth daily (with lunch).    Class: E-Prescribe    Route: Oral    mycophenolate (GENERIC EQUIVALENT) 250 MG capsule  180 capsule 11 5/13/2025 --   [address]    Sig: Take 3 capsules (750 mg) by mouth 2 times daily.    Class: E-Prescribe    Route: Oral    omeprazole (PRILOSEC) 40 MG DR capsule  -- --  --   [address]    Sig: Take 40 mg by mouth daily    Class: Historical    Route: Oral    predniSONE (DELTASONE) 5 MG tablet  30 tablet 11 5/14/2025 --   [address]    Sig: Take 1 tablet (5 mg) by mouth daily.    Class: E-Prescribe    Route: Oral    senna-docusate (SENOKOT-S/PERICOLACE) 8.6-50 MG tablet  60 tablet 0 5/13/2025 --   [address]    Sig: Take 1 tablet by mouth 2 times daily as needed for constipation.    Class: E-Prescribe    Route: Oral    sulfamethoxazole-trimethoprim (BACTRIM) 400-80 MG tablet  30 tablet 11 5/14/2025 --   [address]    Sig: Take 1 tablet by mouth daily.    Class: E-Prescribe    Route: Oral    tacrolimus (GENERIC EQUIVALENT) 0.5 MG capsule  -- -- 5/13/2025 --       Sig: Take 1 capsule (0.5 mg) by mouth 2 times daily.    Class: No Print Out    Notes to Pharmacy: To have available for dose adjustments per transplant team. Discharge dose = 4 mg BID.    Route: Oral    tacrolimus (GENERIC EQUIVALENT) 1 MG capsule  240 capsule 11 5/13/2025 --   [facility] -  [address]    Sig: Take 4 capsules (4 mg) by mouth 2 times daily.    Class: E-Prescribe    Route: Oral    valGANciclovir (VALCYTE) 450 MG tablet  60 tablet 2 5/14/2025 --   [facility] - [place], [place] - [address] [place] [address]    Sig: Take 1 tablet (450 mg) by mouth daily.    Class: E-Prescribe    Notes to Pharmacy: Anticipate improvement in kidney function. Eventual dose = 2 tabs daily x 3 months. Dose adjustments per transplant team.    Route: Oral          Hospital Medications as of 5/14/2025         Dose Frequency Start End    furosemide (LASIX) tablet 40 mg (Completed) 40 mg ONCE 5/13/2025 5/13/2025    Class: E-Prescribe    Route: Oral            O:   Temp:  [98.3  F (36.8  C)] 98.3  F (36.8  C)  Pulse:  [88-89] 88  Resp:  [16] 16  BP: (153-166)/() 153/89  SpO2:  [100 %] 100 %  General Appearance: in no apparent distress.   Skin: Normal, no rashes or jaundice  Heart: perfused  Lungs: easy respirations, no audible wheezing.  Abdomen: rounded, The wound is dry and intact, without hernia. The abdomen is non-tender. The kidney graft is not tender.  There is no ascites.  Midline incision intact with staples CDI no erythema   OLGA site LLQ draining but no erythema.   Extremities: Tremor absent.   Edema: absent.         Latest Ref Rng & Units 5/14/2025     7:18 AM 5/13/2025     5:59 AM 5/12/2025     6:39 AM 5/11/2025     6:10 AM 5/10/2025     6:10 PM   Transplant Immunosuppression Labs   Creat 0.67 - 1.17 mg/dL 2.92  3.01  4.10  6.14  7.73    Urea Nitrogen 6.0 - 20.0 mg/dL 28.2  27.9  29.3  22.7  22.4    WBC 4.0 - 11.0 10e3/uL 4.5  4.6  7.9  11.6  6.7    Neutrophil % 92  89  93  97     ANEU 1.6 - 8.3 10e3/uL 4.1  4.1  7.4  11.2         Chemistries:   Recent Labs   Lab Test 05/14/25  0718   BUN 28.2*   CR 2.92*   GFRESTIMATED 29*   GLC 91     Lab Results   Component Value Date    A1C 4.9 05/08/2025     Recent Labs   Lab Test 05/08/25 1922   ALBUMIN 4.5   BILITOTAL 0.4    ALKPHOS 98   AST 16   ALT 9     Urine Studies:  Recent Labs   Lab Test 05/09/25  0641   COLOR Light Yellow   APPEARANCE Clear   URINEGLC 30*   URINEBILI Negative   URINEKETONE Negative   SG 1.012   UBLD Small*   URINEPH 6.0   PROTEIN 100*   NITRITE Negative   LEUKEST Negative   RBCU 13*   WBCU 3     Recent Labs   Lab Test 07/23/19  0600 06/26/19  0555   UTPG 2.82* 1.86*     Hematology:   Recent Labs   Lab Test 05/14/25  0718 05/13/25  0559 05/12/25  1743 05/12/25  0639   HGB 9.0* 8.3* 7.7* 6.9*   * 99*  --  95*   WBC 4.5 4.6  --  7.9     Coags:   Recent Labs   Lab Test 05/08/25  1922 04/06/23  1047   INR 0.99 1.02     HLA antibodies:   SA1 Hi Risk Milagro   Date Value Ref Range Status   07/23/2019   Final    A:23 24 25 32 B:8 13 27 37 38 41 44 45 47 48 49 50 51 52 53 57 58 59 60 61   62 63 72 76 77 81 Cw:9 10       SA1 HI RISK MILAGRO   Date Value Ref Range Status   05/08/2025   Final    A:23 24 25 32 B:8 13 27 37 38 41 44 45 47 48 49 50 51 52 53 57 58 59 60 61 62 63 77     SA1 Mod Risk Milagro   Date Value Ref Range Status   07/23/2019 B:35 39 46 54 71 73 75 Cw:1 6 8 12 14 16 17  Final     SA1 MOD RISK MILAGRO   Date Value Ref Range Status   05/08/2025 B:46 67 71 72 73 76Cw:1 9 10  Final     SA2 Hi Risk Milagro   Date Value Ref Range Status   07/23/2019 DR:103 7 8 11 12 13 16 DRw:51 DQ:4 5 6 DQA:02  Final     SA2 HI RISK MILAGRO   Date Value Ref Range Status   05/08/2025 DR:103 7 8 9 11 12 13 16 DRw:51 DQ:4 5 6 DQA:02  Final     SA2 Mod Risk Milagro   Date Value Ref Range Status   07/23/2019 DR:9  Final     SA2 MOD RISK MILAGRO   Date Value Ref Range Status   05/08/2025 None  Final       Assessment: Jhoan J Dalton is doing fairly well s/p DDKT:  Issues we addressed during his visit include:    Plan:    1. Graft function: Cr down trending   2. Immunosuppression Management: No change   .  Complexity of management:Medium.  Contributing factors: HTN, DDKT nausea   3. Increase coreg from 3.125mg to 6.25mg BID.   Zofran for nausea PRN.    Robaxin and or hydroxyzine q.HS PRN     Followup: labs tomorrow, Monday clinic follow up     Total Time: 15 min,   Counselling Time: 5 min.    Mariam Orta NP

## 2025-05-15 ENCOUNTER — LAB (OUTPATIENT)
Dept: LAB | Facility: CLINIC | Age: 30
End: 2025-05-15
Payer: MEDICARE

## 2025-05-15 ENCOUNTER — TELEPHONE (OUTPATIENT)
Dept: TRANSPLANT | Facility: CLINIC | Age: 30
End: 2025-05-15

## 2025-05-15 ENCOUNTER — RESULTS FOLLOW-UP (OUTPATIENT)
Dept: TRANSPLANT | Facility: CLINIC | Age: 30
End: 2025-05-15

## 2025-05-15 DIAGNOSIS — Z98.890 OTHER SPECIFIED POSTPROCEDURAL STATES: ICD-10-CM

## 2025-05-15 DIAGNOSIS — R79.89 LOW VITAMIN D LEVEL: Primary | ICD-10-CM

## 2025-05-15 DIAGNOSIS — Z20.828 CONTACT WITH AND (SUSPECTED) EXPOSURE TO OTHER VIRAL COMMUNICABLE DISEASES: ICD-10-CM

## 2025-05-15 DIAGNOSIS — Z48.298 AFTERCARE FOLLOWING ORGAN TRANSPLANT: ICD-10-CM

## 2025-05-15 DIAGNOSIS — Z79.899 ENCOUNTER FOR LONG-TERM CURRENT USE OF MEDICATION: ICD-10-CM

## 2025-05-15 DIAGNOSIS — Z94.0 KIDNEY REPLACED BY TRANSPLANT: ICD-10-CM

## 2025-05-15 LAB
ANION GAP SERPL CALCULATED.3IONS-SCNC: 10 MMOL/L (ref 7–15)
BK VIRUS IGG ANTIBODY: ABNORMAL TITER
BUN SERPL-MCNC: 20.8 MG/DL (ref 6–20)
CALCIUM SERPL-MCNC: 9.8 MG/DL (ref 8.8–10.4)
CHLORIDE SERPL-SCNC: 103 MMOL/L (ref 98–107)
CREAT SERPL-MCNC: 2.69 MG/DL (ref 0.67–1.17)
EGFRCR SERPLBLD CKD-EPI 2021: 31 ML/MIN/1.73M2
ERYTHROCYTE [DISTWIDTH] IN BLOOD BY AUTOMATED COUNT: 12.7 % (ref 10–15)
FERRITIN SERPL-MCNC: 1032 NG/ML (ref 31–409)
GLUCOSE SERPL-MCNC: 101 MG/DL (ref 70–99)
HCO3 SERPL-SCNC: 24 MMOL/L (ref 22–29)
HCT VFR BLD AUTO: 26.3 % (ref 40–53)
HGB BLD-MCNC: 9.2 G/DL (ref 13.3–17.7)
IRON BINDING CAPACITY (ROCHE): 198 UG/DL (ref 240–430)
IRON SATN MFR SERPL: 26 % (ref 15–46)
IRON SERPL-MCNC: 52 UG/DL (ref 61–157)
MAGNESIUM SERPL-MCNC: 1.9 MG/DL (ref 1.7–2.3)
MCH RBC QN AUTO: 30.4 PG (ref 26.5–33)
MCHC RBC AUTO-ENTMCNC: 35 G/DL (ref 31.5–36.5)
MCV RBC AUTO: 87 FL (ref 78–100)
MYCOPHENOLATE SERPL LC/MS/MS-MCNC: 3.73 MG/L (ref 1–3.5)
MYCOPHENOLATE-G SERPL LC/MS/MS-MCNC: 97.1 MG/L (ref 30–95)
PHOSPHATE SERPL-MCNC: 2.3 MG/DL (ref 2.5–4.5)
PLATELET # BLD AUTO: 102 10E3/UL (ref 150–450)
POTASSIUM SERPL-SCNC: 3.9 MMOL/L (ref 3.4–5.3)
PTH-INTACT SERPL-MCNC: 324 PG/ML (ref 15–65)
RBC # BLD AUTO: 3.03 10E6/UL (ref 4.4–5.9)
SODIUM SERPL-SCNC: 137 MMOL/L (ref 135–145)
TACROLIMUS BLD-MCNC: 7.9 UG/L (ref 5–15)
TME LAST DOSE: ABNORMAL H
TME LAST DOSE: ABNORMAL H
TME LAST DOSE: NORMAL H
TME LAST DOSE: NORMAL H
VIT D+METAB SERPL-MCNC: 11 NG/ML (ref 20–50)
WBC # BLD AUTO: 3.7 10E3/UL (ref 4–11)

## 2025-05-15 PROCEDURE — 99000 SPECIMEN HANDLING OFFICE-LAB: CPT | Performed by: PATHOLOGY

## 2025-05-15 PROCEDURE — 82306 VITAMIN D 25 HYDROXY: CPT | Performed by: INTERNAL MEDICINE

## 2025-05-15 PROCEDURE — 80180 DRUG SCRN QUAN MYCOPHENOLATE: CPT | Performed by: INTERNAL MEDICINE

## 2025-05-15 PROCEDURE — 80197 ASSAY OF TACROLIMUS: CPT | Performed by: INTERNAL MEDICINE

## 2025-05-15 NOTE — TELEPHONE ENCOUNTER
Spoke to patient and confirmed the following recommendation:  Start cholecalciferol 2000 international unit(s) qd

## 2025-05-15 NOTE — OP NOTE
Transplant Surgery  Operative Note     Preoperative diagnosis:  End Stage renal failure due to congenital renal dysplasia    Postoperative diagnosis:  Same,     Procedure:  1. Right kidney  Re- transplant,  Donation after Brain Death, intraperitoneal on the left common iliac artery. with venous reconstruction. A J-J ureteral stent was placed.  2. Kidney allograft preparation on Back Table  3. Exploratory laparotomy     Surgeon:  BJ BARRY    Fellow/Assistant:  Leandro Licea    Anesthesia:  General    Specimen:  None    Drains:  Teddy drain    Urine output:  See anesthesia record.    Estimated blood loss:  100ml    Fluids administered:   See anesthesia record.       Indication: The patient has End Stage renal failure due to congenital renal dysplasia and received an organ offer for a Donation after Brain Death kidney allograft. After discussing the risks and benefits of proceeding, the patient agreed to proceed with surgery and provided informed consent.  Findings: Integrity of recipient artery: good     Due to prior transplant surgical history, exploratory laparotomy was performed and the left common iliac artery was used for arterial anastomosis, and the left iliac vein was used for venous anastomosis approximatly 2-3cm from the vena cava. Ureteral stent placed.     Intraoperative Events: none,     Final ABO/Crossmatch verification: After the donor organ arrived to the operating room and prior to anastomosis, I participated in the transplant pre-verification upon organ receipt timeout by visually verifying the donor ID, organ and laterality, donor blood type, recipient unique identifier, recipient blood type, and that the donor and recipient are blood type compatible. The crossmatch was done prospectively; the T cell flow crossmatch result was negative and B cell flow crossmatch result was negative prior to anastomosis.  The patient received Thymoglobulin, Cellcept, and Solumedrol on induction.    Donor  Organ Information:   Donor UNOS ID:  [id number]    Donor arterial clamp on:  5/9/2025  3:32 PM    Total ischemic time:  1478 min    Cold ischemic time:  1,478 min    Warm ischemic time:  50 min    Preservation fluid:  UW      Back Table Details:   Procedure:  Bench preparation of the kidney allograft for transplantation with venous reconstruction - caval conduit    Surgeon:  BJ BARRY    Faculty Co-Surgeon:  BJ BARRY    Fellow/Assistant:  Leandro Licea     Donor arrival to recipient room:  5/10/2025  2:04 PM    Graft injury:  no    Graft biopsy:  no    Organ received on:  Ice    Pump resistance:       Pump flow:       Arterial anatomy:  single    Donor arterial quality:  good    Venous anatomy:  single    Ureteral anatomy:  single    Any reconstruction:  Caval conduit    Artery:  no    Vein:  yes     Complications: None.    Back Table Preparation:  The donor kidney was received and inspected. It had been flushed with UW. The graft was prepared on the back table by removing perinephric fat and ligating venous tributaries and lymphatics. The ureter was also cleaned of excess tissue. If required, reconstruction was performed as detailed above. The kidney was stored in iced cold preservation solution until ready for transplantation. Faculty was present for the critical portions of the procedure.    Operative Procedure:   Arterial anastomosis start:  5/10/2025  3:20 PM    Arterial unclamp:  5/10/2025  4:09 PM    Extra vessels used:    no      The patient was brought to the operating room, placed in a supine position, and a time out was performed. Sequential compression devices were placed on both lower extremities and general endotracheal anesthesia was induced.  The patient was given IV antibiotics and a Ordoñez catheter. A central line was placed by Anesthesia service. The abdomen was then shaved, prepped, and draped in the usual sterile fashion.  An incision was made in the midline and carried down  through the subcutaneous tissue and the abdominal wall fascia. If encountered, the epigastric vessels were ligated in continuity, divided and secured with surgical clips. The left iliac artery and vein were exposed. The retractor system was placed and the lymphatics overlying the vessels were serially ligated and divided.     The patient was heparinized. We applied atraumatic vascular clamps and the donor kidney was brought to the operative field. We made a venotomy and the renal vein was anastomosed to the recipient left iliac vein in an end-to-side fashion. An arteriotomy was made and the donor renal artery was anastomosed to the recipient left common iliac artery  in an end to side fashion. The patient was simultaneously loaded with IV mannitol, Lasix and volume. The renal artery was protected and the clamps were removed. After several cardiac cycles, we opened the renal artery and the kidney had good reperfusion and was firm and pink .    The transplant ureter was managed by creating a liche anastomosis with absorbable suture. A stent was placed across the anastomosis. The kidney made no urine prior to implantation.    Hemostasis was obtained, the anastomoses inspected, and the kidney placed in the iliac fossa. After placement, the vessel lay was inspected and found to be acceptable. The kidney position was intraperitoneal on left side. The field was irrigated with antibiotic solution. A drain was placed. The retractor was removed and the abdominal wall fascia reapproximated. Subcutaneous tissues were irrigated and hemostasis obtained.  The skin was reapproximated with staples and a dry dressing was applied.   All needle, sponge and instrument counts were correct x 2. The patient was awakened, extubated, and transferred to PACU for post-op monitoring.

## 2025-05-19 ENCOUNTER — RESULTS FOLLOW-UP (OUTPATIENT)
Dept: TRANSPLANT | Facility: CLINIC | Age: 30
End: 2025-05-19

## 2025-05-19 ENCOUNTER — OFFICE VISIT (OUTPATIENT)
Dept: TRANSPLANT | Facility: CLINIC | Age: 30
End: 2025-05-19
Attending: TRANSPLANT SURGERY

## 2025-05-19 ENCOUNTER — LAB (OUTPATIENT)
Dept: LAB | Facility: CLINIC | Age: 30
End: 2025-05-19
Payer: MEDICARE

## 2025-05-19 VITALS
OXYGEN SATURATION: 99 % | SYSTOLIC BLOOD PRESSURE: 136 MMHG | BODY MASS INDEX: 27.71 KG/M2 | DIASTOLIC BLOOD PRESSURE: 90 MMHG | HEART RATE: 71 BPM | WEIGHT: 171.7 LBS

## 2025-05-19 DIAGNOSIS — Z94.0 KIDNEY REPLACED BY TRANSPLANT: Primary | ICD-10-CM

## 2025-05-19 DIAGNOSIS — Z48.298 AFTERCARE FOLLOWING ORGAN TRANSPLANT: ICD-10-CM

## 2025-05-19 DIAGNOSIS — Z20.828 CONTACT WITH AND (SUSPECTED) EXPOSURE TO OTHER VIRAL COMMUNICABLE DISEASES: ICD-10-CM

## 2025-05-19 DIAGNOSIS — Z79.899 ENCOUNTER FOR LONG-TERM CURRENT USE OF MEDICATION: ICD-10-CM

## 2025-05-19 DIAGNOSIS — Z94.0 KIDNEY REPLACED BY TRANSPLANT: ICD-10-CM

## 2025-05-19 DIAGNOSIS — Z98.890 OTHER SPECIFIED POSTPROCEDURAL STATES: ICD-10-CM

## 2025-05-19 LAB
ANION GAP SERPL CALCULATED.3IONS-SCNC: 11 MMOL/L (ref 7–15)
BUN SERPL-MCNC: 20.5 MG/DL (ref 6–20)
CALCIUM SERPL-MCNC: 10.8 MG/DL (ref 8.8–10.4)
CHLORIDE SERPL-SCNC: 106 MMOL/L (ref 98–107)
CREAT SERPL-MCNC: 2.14 MG/DL (ref 0.67–1.17)
EGFRCR SERPLBLD CKD-EPI 2021: 41 ML/MIN/1.73M2
ERYTHROCYTE [DISTWIDTH] IN BLOOD BY AUTOMATED COUNT: 13.2 % (ref 10–15)
GLUCOSE SERPL-MCNC: 105 MG/DL (ref 70–99)
HCO3 SERPL-SCNC: 20 MMOL/L (ref 22–29)
HCT VFR BLD AUTO: 29.5 % (ref 40–53)
HGB BLD-MCNC: 10 G/DL (ref 13.3–17.7)
MCH RBC QN AUTO: 30.1 PG (ref 26.5–33)
MCHC RBC AUTO-ENTMCNC: 33.9 G/DL (ref 31.5–36.5)
MCV RBC AUTO: 89 FL (ref 78–100)
PLATELET # BLD AUTO: 177 10E3/UL (ref 150–450)
POTASSIUM SERPL-SCNC: 4.5 MMOL/L (ref 3.4–5.3)
RBC # BLD AUTO: 3.32 10E6/UL (ref 4.4–5.9)
SODIUM SERPL-SCNC: 137 MMOL/L (ref 135–145)
TACROLIMUS BLD-MCNC: 14.4 UG/L (ref 5–15)
TME LAST DOSE: NORMAL H
TME LAST DOSE: NORMAL H
WBC # BLD AUTO: 8.8 10E3/UL (ref 4–11)

## 2025-05-19 PROCEDURE — 99213 OFFICE O/P EST LOW 20 MIN: CPT | Mod: 24 | Performed by: TRANSPLANT SURGERY

## 2025-05-19 PROCEDURE — 3080F DIAST BP >= 90 MM HG: CPT | Performed by: TRANSPLANT SURGERY

## 2025-05-19 PROCEDURE — 3075F SYST BP GE 130 - 139MM HG: CPT | Performed by: TRANSPLANT SURGERY

## 2025-05-19 PROCEDURE — 99000 SPECIMEN HANDLING OFFICE-LAB: CPT | Performed by: PATHOLOGY

## 2025-05-19 PROCEDURE — 85027 COMPLETE CBC AUTOMATED: CPT | Performed by: PATHOLOGY

## 2025-05-19 PROCEDURE — 80048 BASIC METABOLIC PNL TOTAL CA: CPT | Performed by: PATHOLOGY

## 2025-05-19 PROCEDURE — 99213 OFFICE O/P EST LOW 20 MIN: CPT | Performed by: TRANSPLANT SURGERY

## 2025-05-19 PROCEDURE — 36415 COLL VENOUS BLD VENIPUNCTURE: CPT | Performed by: PATHOLOGY

## 2025-05-19 PROCEDURE — 80197 ASSAY OF TACROLIMUS: CPT | Performed by: INTERNAL MEDICINE

## 2025-05-19 RX ORDER — TACROLIMUS 1 MG/1
4 CAPSULE ORAL 2 TIMES DAILY
Qty: 240 CAPSULE | Refills: 11 | Status: SHIPPED | OUTPATIENT
Start: 2025-05-19

## 2025-05-19 NOTE — Clinical Note
5/19/2025      Jhoan Hoffman  [address]  [address]  [place] [address]      Dear Colleague,    Thank you for referring your patient, Jhoan Hoffman, to the [facility]. Please see a copy of my visit note below.    Transplant Surgery Progress Note    Date of Visit: 05/19/2025    Assessment: Doing well s/p 4th kidney transplant.  Cr coming down slowly but continuouse.    Plan:    1. Graft function: Cr coming down nicely.  2. Immunosuppression Management: No change , was increased to 5/5 based on level = 7.5 .  Complexity of management:Low.  Contributing factors:    3. Prophylaxis: Valcyte and bactrim  4. Mag/Phos/Potassium: All acceptle levels.  Mag 1.9.  Stop magnesium  5. Stent: Out in a few week  6.  Staples out at upcoming visit with Mariam (~3 weeks postop)    Followup: routine    Total Time: 20 min,     Omar Osullivan MD, PhD  Professor of Surgery        Transplants:  5/10/2025 (Kidney), 7/13/1995 (Kidney), 5/18/2011 (Kidney), 4/28/2012 (Kidney); Postoperative day:  9  History of Present Illness:    Hospital Details:  s/p DBD DDKT +ureteral stent placement 5/10/25: Fourth kidney, intra-abdominal. Post-op US patent, normal. Creatinine down to 3 by day of discharge. Good UOP. OLGA drain removed prior to discharge.      Immunosuppressed due to medications:  Induction: via high intensity protocol (cPRA 100) with steroid pulse and Thymoglobulin 475 mg (6 mg/kg).  Maintenance:   -  mg BID   - Tacrolimus 4 mg BID. Goal level 8-10.    - Prednisone 5 mg daily (fourth kidney transplant)  Infection prophylaxis: viral (Valcyte x 12 weeks), PJP (Bactrim indefinitely)     Transplant coordinator: Lupe Quevedo [phone]  Donor type:  DBD  DSA at time of transplant:  No  Ureteral stent: Yes  CMV:  Donor - / Recipient -  EBV:  Donor + / Recipient +  Thymoglobulin:  475 mg (6 mg/kg)    Review of Systems:   CONSTITUTIONAL:  No fevers or chills  EYES: negative for icterus  ENT:  negative for  hearing loss, tinnitus and sore throat  RESPIRATORY:  negative for cough, sputum, dyspnea  CARDIOVASCULAR:  negative for chest pain   GASTROINTESTINAL:  negative for nausea, vomiting, diarrhea or constipation  GENITOURINARY:  negative for incontinence, dysuria, bladder emptying problems  HEME:  No easy bruising  INTEGUMENT:  negative for rash and pruritus  NEURO:  Negative for headache, seizure disorder    Immunosuppression:    Immunosuppressant Medications       Immunosuppressive Agents Disp Start End     mycophenolate (GENERIC EQUIVALENT) 250 MG capsule 180 capsule 5/13/2025 --    Sig - Route: Take 3 capsules (750 mg) by mouth 2 times daily. - Oral    Class: E-Prescribe     tacrolimus (GENERIC EQUIVALENT) 0.5 MG capsule -- 5/13/2025 --    Sig - Route: Take 1 capsule (0.5 mg) by mouth 2 times daily. - Oral    Class: No Print Out    Notes to Pharmacy: To have available for dose adjustments per transplant team. Discharge dose = 4 mg BID.     tacrolimus (GENERIC EQUIVALENT) 1 MG capsule 300 capsule 5/14/2025 --    Sig - Route: Take 5 capsules (5 mg) by mouth 2 times daily. - Oral    Class: E-Prescribe            Possible Immunosuppression-related side effects:   []             headache  []             vivid dreams  []             irritability  []             fine tremor  []             nausea  []             diarrhea  []             neuropathy      []             edema  []             renal calcineurin toxicity  []             hyperkalemia  []             post-transplant diabetes  []             decreased appetite  []             increased appetite  []             other:  [x]             None    Prophylaxis:    [x]             Valcyte  [x]             Mycelex  []             Bactrim  []             Dapsone  []             Protonix  []             Other:      Prescription Medications as of 5/19/2025         Rx Number Disp Refills Start End Last Dispensed Date Next Fill Date Owning Pharmacy    acetaminophen (TYLENOL)  325 MG tablet  60 tablet 0 5/13/2025 --   [address]    Sig: Take 3 tablets (975 mg) by mouth every 8 hours as needed for mild pain.    Class: E-Prescribe    Route: Oral    aspirin 81 MG EC tablet  -- --  --       Sig: Take 81 mg by mouth daily    Class: Historical    Route: Oral    atorvastatin (LIPITOR) 10 MG tablet  30 tablet 2 5/14/2025 --   [address]    Sig: Take 1 tablet (10 mg) by mouth daily.    Class: E-Prescribe    Route: Oral    carvedilol (COREG) 3.125 MG tablet  -- -- 5/14/2025 --       Sig: Take 2 tablets (6.25 mg) by mouth 2 times daily (with meals).    Class: Historical    Route: Oral    cholecalciferol (VITAMIN D3) 25 mcg (1000 units) capsule  -- -- 5/15/2025 --       Sig: Take 2 capsules (50 mcg) by mouth daily.    Class: No Print Out    Route: Oral    FLUoxetine (PROZAC) 40 MG capsule  90 capsule 0 7/23/2014 --       Sig: Take 40 mg by mouth at bedtime.    Class: Historical    Notes to Pharmacy: Continue home medications but switch to 60 mg daily.    Route: Oral    hydrOXYzine HCl (ATARAX) 25 MG tablet  20 tablet 0 5/14/2025 --   [address]    Sig: Take 1 tablet (25 mg) by mouth 3 times daily as needed for itching.    Class: E-Prescribe    Route: Oral    loratadine (CLARITIN) 10 MG tablet  -- --  --       Sig: Take 10 mg by mouth daily as needed for allergies.    Class: Historical    Route: Oral    magnesium oxide (MAG-OX) 400 MG tablet  30 tablet 2 5/13/2025 --   [address]    Sig: Take 1 tablet (400 mg) by mouth daily (with lunch).    Class: E-Prescribe    Route: Oral    methocarbamol (ROBAXIN) 750 MG tablet  20 tablet 0 5/14/2025 --   [address]    Sig: Take 1 tablet (750 mg)  by mouth 3 times daily as needed for muscle spasms.    Class: E-Prescribe    Route: Oral    mycophenolate (GENERIC EQUIVALENT) 250 MG capsule  180 capsule 11 5/13/2025 --   [address]    Sig: Take 3 capsules (750 mg) by mouth 2 times daily.    Class: E-Prescribe    Route: Oral    omeprazole (PRILOSEC) 40 MG DR capsule  -- --  --   [address]    Sig: Take 40 mg by mouth daily    Class: Historical    Route: Oral    ondansetron (ZOFRAN ODT) 4 MG ODT tab  15 tablet 0 5/14/2025 --   [address]    Sig: Take 1 tablet (4 mg) by mouth every 8 hours as needed for nausea.    Class: E-Prescribe    Route: Oral    predniSONE (DELTASONE) 5 MG tablet  30 tablet 11 5/14/2025 --   [address]    Sig: Take 1 tablet (5 mg) by mouth daily.    Class: E-Prescribe    Route: Oral    senna-docusate (SENOKOT-S/PERICOLACE) 8.6-50 MG tablet  60 tablet 0 5/13/2025 --   [address]    Sig: Take 1 tablet by mouth 2 times daily as needed for constipation.    Class: E-Prescribe    Route: Oral    sulfamethoxazole-trimethoprim (BACTRIM) 400-80 MG tablet  30 tablet 11 5/14/2025 --   [address]    Sig: Take 1 tablet by mouth daily.    Class: E-Prescribe    Route: Oral    tacrolimus (GENERIC EQUIVALENT) 0.5 MG capsule  -- -- 5/13/2025 --       Sig: Take 1 capsule (0.5 mg) by mouth 2 times daily.    Class: No Print Out    Notes to Pharmacy: To have available for dose adjustments per transplant team. Discharge dose = 4 mg BID.    Route: Oral    tacrolimus (GENERIC EQUIVALENT) 1 MG capsule  300 capsule 11 5/14/2025 --   [address]  [address]    Sig: Take 5 capsules (5 mg) by mouth 2 times daily.    Class: E-Prescribe    Route: Oral    valGANciclovir (VALCYTE) 450 MG tablet  60 tablet 2 5/14/2025 --   [facility] - [place], [place] - [address] [facility]    Sig: Take 1 tablet (450 mg) by mouth daily.    Class: E-Prescribe    Notes to Pharmacy: Anticipate improvement in kidney function. Eventual dose = 2 tabs daily x 3 months. Dose adjustments per transplant team.    Route: Oral            Exam:    Pulse:  [71] 71  BP: (136)/(90) 136/90  SpO2:  [99 %] 99 %    Pulse:  [71] 71  BP: (136)/(90) 136/90  SpO2:  [99 %] 99 %  General Appearance: in no apparent distress.   Skin: Normal, no rashes or jaundice  Lungs: easy respirations, no audible wheezing.  Abdomen: rounded, The wound is dry and intact,  Extremities: Tremor absent.   Edema: absent.          Latest Ref Rng & Units 5/19/2025     7:55 AM 5/15/2025     7:54 AM 5/14/2025     7:18 AM 5/13/2025     5:59 AM 5/12/2025     6:39 AM   Transplant Immunosuppression Labs   Creat 0.67 - 1.17 mg/dL 2.14  2.69  2.92  3.01  4.10    Urea Nitrogen 6.0 - 20.0 mg/dL 20.5  20.8  28.2  27.9  29.3    WBC 4.0 - 11.0 10e3/uL 8.8  3.7  4.5  4.6  7.9    Neutrophil %   92  89  93    ANEU 1.6 - 8.3 10e3/uL   4.1  4.1  7.4        Chemistries:   Recent Labs   Lab Test 05/19/25  0755   BUN 20.5*   CR 2.14*   GFRESTIMATED 41*   *     Lab Results   Component Value Date    A1C 4.9 05/08/2025     Recent Labs   Lab Test 05/08/25  1922   ALBUMIN 4.5   BILITOTAL 0.4   ALKPHOS 98   AST 16   ALT 9     Urine Studies:  Recent Labs   Lab Test 05/09/25  0641   COLOR Light Yellow   APPEARANCE Clear   URINEGLC 30*   URINEBILI Negative   URINEKETONE Negative   SG 1.012   UBLD Small*   URINEPH 6.0   PROTEIN 100*   NITRITE Negative   LEUKEST Negative   RBCU 13*   WBCU 3     Recent Labs   Lab Test 07/23/19  0600 06/26/19  0555   UTPG 2.82* 1.86*     Hematology:   Recent Labs   Lab Test 05/19/25  0755 05/15/25  0754  05/14/25  0718   HGB 10.0* 9.2* 9.0*    102* 100*   WBC 8.8 3.7* 4.5     Coags:   Recent Labs   Lab Test 05/08/25  1922 04/06/23  1047 12/11/22  1757   INR 0.99 1.02 1.46*     HLA antibodies:   SA1 Hi Risk Milagro   Date Value Ref Range Status   07/23/2019   Final    A:23 24 25 32 B:8 13 27 37 38 41 44 45 47 48 49 50 51 52 53 57 58 59 60 61   62 63 72 76 77 81 Cw:9 10       SA1 HI RISK MILAGRO   Date Value Ref Range Status   05/14/2025   Final    A:23 24 25 32 B:8 13 27 37 38 41 44 45 47 49 50 51 52 53 57 58 59 60 61 63 77     SA1 Mod Risk Milagro   Date Value Ref Range Status   07/23/2019 B:35 39 46 54 71 73 75 Cw:1 6 8 12 14 16 17  Final     SA1 MOD RISK MILAGRO   Date Value Ref Range Status   05/14/2025 B:48 62 67 71 72 73 76Cw:1 6 9 10 12  Final     SA2 Hi Risk Milagro   Date Value Ref Range Status   07/23/2019 DR:103 7 8 11 12 13 16 DRw:51 DQ:4 5 6 DQA:02  Final     SA2 HI RISK MILAGRO   Date Value Ref Range Status   05/14/2025 DR:103 7 8 9 11 12 13 16 DRw:51 DQ:4 5 6  Final     SA2 Mod Risk Milagro   Date Value Ref Range Status   07/23/2019 DR:9  Final     SA2 MOD RISK MILAGRO   Date Value Ref Range Status   05/14/2025 None  Final             Again, thank you for allowing me to participate in the care of your patient.        Sincerely,        Omar Osullivan MD    Electronically signed

## 2025-05-19 NOTE — PROGRESS NOTES
Transplant Surgery Progress Note    Date of Visit: 05/19/2025    Assessment: Doing well s/p 4th kidney transplant.  Cr coming down slowly but continuouse.    Plan:    1. Graft function: Cr coming down nicely.  2. Immunosuppression Management: No change , was increased to 5/5 based on level = 7.5 .  Complexity of management:Low.  Contributing factors:    3. Prophylaxis: Valcyte and bactrim  4. Mag/Phos/Potassium: All acceptle levels.  Mag 1.9.  Stop magnesium  5. Stent: Out in a few week  6.  Staples out at upcoming visit with Mariam (~3 weeks postop)    Followup: routine    Total Time: 20 min,     Omar Osullivan MD, PhD  Professor of Surgery        Transplants:  5/10/2025 (Kidney), 7/13/1995 (Kidney), 5/18/2011 (Kidney), 4/28/2012 (Kidney); Postoperative day:  9  History of Present Illness:    Hospital Details:  s/p DBD DDKT +ureteral stent placement 5/10/25: Fourth kidney, intra-abdominal. Post-op US patent, normal. Creatinine down to 3 by day of discharge. Good UOP. OLGA drain removed prior to discharge.      Immunosuppressed due to medications:  Induction: via high intensity protocol (cPRA 100) with steroid pulse and Thymoglobulin 475 mg (6 mg/kg).  Maintenance:   -  mg BID   - Tacrolimus 4 mg BID. Goal level 8-10.    - Prednisone 5 mg daily (fourth kidney transplant)  Infection prophylaxis: viral (Valcyte x 12 weeks), PJP (Bactrim indefinitely)     Transplant coordinator: Lupe Quevedo [phone]  Donor type:  DBD  DSA at time of transplant:  No  Ureteral stent: Yes  CMV:  Donor - / Recipient -  EBV:  Donor + / Recipient +  Thymoglobulin:  475 mg (6 mg/kg)    Review of Systems:   CONSTITUTIONAL:  No fevers or chills  EYES: negative for icterus  ENT:  negative for hearing loss, tinnitus and sore throat  RESPIRATORY:  negative for cough, sputum, dyspnea  CARDIOVASCULAR:  negative for chest pain   GASTROINTESTINAL:  negative for nausea, vomiting, diarrhea or constipation  GENITOURINARY:  negative for  incontinence, dysuria, bladder emptying problems  HEME:  No easy bruising  INTEGUMENT:  negative for rash and pruritus  NEURO:  Negative for headache, seizure disorder    Immunosuppression:    Immunosuppressant Medications       Immunosuppressive Agents Disp Start End     mycophenolate (GENERIC EQUIVALENT) 250 MG capsule 180 capsule 5/13/2025 --    Sig - Route: Take 3 capsules (750 mg) by mouth 2 times daily. - Oral    Class: E-Prescribe     tacrolimus (GENERIC EQUIVALENT) 0.5 MG capsule -- 5/13/2025 --    Sig - Route: Take 1 capsule (0.5 mg) by mouth 2 times daily. - Oral    Class: No Print Out    Notes to Pharmacy: To have available for dose adjustments per transplant team. Discharge dose = 4 mg BID.     tacrolimus (GENERIC EQUIVALENT) 1 MG capsule 300 capsule 5/14/2025 --    Sig - Route: Take 5 capsules (5 mg) by mouth 2 times daily. - Oral    Class: E-Prescribe            Possible Immunosuppression-related side effects:   []             headache  []             vivid dreams  []             irritability  []             fine tremor  []             nausea  []             diarrhea  []             neuropathy      []             edema  []             renal calcineurin toxicity  []             hyperkalemia  []             post-transplant diabetes  []             decreased appetite  []             increased appetite  []             other:  [x]             None    Prophylaxis:    [x]             Valcyte  [x]             Mycelex  []             Bactrim  []             Dapsone  []             Protonix  []             Other:      Prescription Medications as of 5/19/2025         Rx Number Disp Refills Start End Last Dispensed Date Next Fill Date Owning Pharmacy    acetaminophen (TYLENOL) 325 MG tablet  60 tablet 0 5/13/2025 --   [facility] - [place], [place] - [address] [facility]    Sig: Take 3 tablets (975 mg) by mouth every 8 hours as needed for mild pain.    Class: E-Prescribe    Route: Oral    aspirin  81 MG EC tablet  -- --  --       Sig: Take 81 mg by mouth daily    Class: Historical    Route: Oral    atorvastatin (LIPITOR) 10 MG tablet  30 tablet 2 5/14/2025 --   [address]    Sig: Take 1 tablet (10 mg) by mouth daily.    Class: E-Prescribe    Route: Oral    carvedilol (COREG) 3.125 MG tablet  -- -- 5/14/2025 --       Sig: Take 2 tablets (6.25 mg) by mouth 2 times daily (with meals).    Class: Historical    Route: Oral    cholecalciferol (VITAMIN D3) 25 mcg (1000 units) capsule  -- -- 5/15/2025 --       Sig: Take 2 capsules (50 mcg) by mouth daily.    Class: No Print Out    Route: Oral    FLUoxetine (PROZAC) 40 MG capsule  90 capsule 0 7/23/2014 --       Sig: Take 40 mg by mouth at bedtime.    Class: Historical    Notes to Pharmacy: Continue home medications but switch to 60 mg daily.    Route: Oral    hydrOXYzine HCl (ATARAX) 25 MG tablet  20 tablet 0 5/14/2025 --   [address]    Sig: Take 1 tablet (25 mg) by mouth 3 times daily as needed for itching.    Class: E-Prescribe    Route: Oral    loratadine (CLARITIN) 10 MG tablet  -- --  --       Sig: Take 10 mg by mouth daily as needed for allergies.    Class: Historical    Route: Oral    magnesium oxide (MAG-OX) 400 MG tablet  30 tablet 2 5/13/2025 --   [address]    Sig: Take 1 tablet (400 mg) by mouth daily (with lunch).    Class: E-Prescribe    Route: Oral    methocarbamol (ROBAXIN) 750 MG tablet  20 tablet 0 5/14/2025 --   [address]    Sig: Take 1 tablet (750 mg) by mouth 3 times daily as needed for muscle spasms.    Class: E-Prescribe    Route: Oral    mycophenolate (GENERIC EQUIVALENT) 250 MG capsule  180 capsule 11 5/13/2025 --   [address]  [address]    Sig: Take 3 capsules (750 mg) by mouth 2 times daily.    Class: E-Prescribe    Route: Oral    omeprazole (PRILOSEC) 40 MG DR capsule  -- --  --   [address]    Sig: Take 40 mg by mouth daily    Class: Historical    Route: Oral    ondansetron (ZOFRAN ODT) 4 MG ODT tab  15 tablet 0 5/14/2025 --   [address]    Sig: Take 1 tablet (4 mg) by mouth every 8 hours as needed for nausea.    Class: E-Prescribe    Route: Oral    predniSONE (DELTASONE) 5 MG tablet  30 tablet 11 5/14/2025 --   [address]    Sig: Take 1 tablet (5 mg) by mouth daily.    Class: E-Prescribe    Route: Oral    senna-docusate (SENOKOT-S/PERICOLACE) 8.6-50 MG tablet  60 tablet 0 5/13/2025 --   [address]    Sig: Take 1 tablet by mouth 2 times daily as needed for constipation.    Class: E-Prescribe    Route: Oral    sulfamethoxazole-trimethoprim (BACTRIM) 400-80 MG tablet  30 tablet 11 5/14/2025 --   [address]    Sig: Take 1 tablet by mouth daily.    Class: E-Prescribe    Route: Oral    tacrolimus (GENERIC EQUIVALENT) 0.5 MG capsule  -- -- 5/13/2025 --       Sig: Take 1 capsule (0.5 mg) by mouth 2 times daily.    Class: No Print Out    Notes to Pharmacy: To have available for dose adjustments per transplant team. Discharge dose = 4 mg BID.    Route: Oral    tacrolimus (GENERIC EQUIVALENT) 1 MG capsule  300 capsule 11 5/14/2025 --   [address]    Sig: Take 5 capsules (5 mg) by mouth 2 times daily.    Class: E-Prescribe    Route: Oral    valGANciclovir (VALCYTE) 450 MG tablet  60 tablet 2 5/14/2025 --   [address]     Sig: Take 1 tablet (450 mg) by mouth daily.    Class: E-Prescribe    Notes to Pharmacy: Anticipate improvement in kidney function. Eventual dose = 2 tabs daily x 3 months. Dose adjustments per transplant team.    Route: Oral            Exam:    Pulse:  [71] 71  BP: (136)/(90) 136/90  SpO2:  [99 %] 99 %    Pulse:  [71] 71  BP: (136)/(90) 136/90  SpO2:  [99 %] 99 %  General Appearance: in no apparent distress.   Skin: Normal, no rashes or jaundice  Lungs: easy respirations, no audible wheezing.  Abdomen: rounded, The wound is dry and intact,  Extremities: Tremor absent.   Edema: absent.          Latest Ref Rng & Units 5/19/2025     7:55 AM 5/15/2025     7:54 AM 5/14/2025     7:18 AM 5/13/2025     5:59 AM 5/12/2025     6:39 AM   Transplant Immunosuppression Labs   Creat 0.67 - 1.17 mg/dL 2.14  2.69  2.92  3.01  4.10    Urea Nitrogen 6.0 - 20.0 mg/dL 20.5  20.8  28.2  27.9  29.3    WBC 4.0 - 11.0 10e3/uL 8.8  3.7  4.5  4.6  7.9    Neutrophil %   92  89  93    ANEU 1.6 - 8.3 10e3/uL   4.1  4.1  7.4        Chemistries:   Recent Labs   Lab Test 05/19/25 0755   BUN 20.5*   CR 2.14*   GFRESTIMATED 41*   *     Lab Results   Component Value Date    A1C 4.9 05/08/2025     Recent Labs   Lab Test 05/08/25  1922   ALBUMIN 4.5   BILITOTAL 0.4   ALKPHOS 98   AST 16   ALT 9     Urine Studies:  Recent Labs   Lab Test 05/09/25  0641   COLOR Light Yellow   APPEARANCE Clear   URINEGLC 30*   URINEBILI Negative   URINEKETONE Negative   SG 1.012   UBLD Small*   URINEPH 6.0   PROTEIN 100*   NITRITE Negative   LEUKEST Negative   RBCU 13*   WBCU 3     Recent Labs   Lab Test 07/23/19  0600 06/26/19  0555   UTPG 2.82* 1.86*     Hematology:   Recent Labs   Lab Test 05/19/25  0755 05/15/25  0754 05/14/25  0718   HGB 10.0* 9.2* 9.0*    102* 100*   WBC 8.8 3.7* 4.5     Coags:   Recent Labs   Lab Test 05/08/25  1922 04/06/23  1047 12/11/22  1757   INR 0.99 1.02 1.46*     HLA antibodies:   SA1 Hi Risk Diana   Date Value Ref Range  Status   07/23/2019   Final    A:23 24 25 32 B:8 13 27 37 38 41 44 45 47 48 49 50 51 52 53 57 58 59 60 61   62 63 72 76 77 81 Cw:9 10       SA1 HI RISK MILAGRO   Date Value Ref Range Status   05/14/2025   Final    A:23 24 25 32 B:8 13 27 37 38 41 44 45 47 49 50 51 52 53 57 58 59 60 61 63 77     SA1 Mod Risk Milagro   Date Value Ref Range Status   07/23/2019 B:35 39 46 54 71 73 75 Cw:1 6 8 12 14 16 17  Final     SA1 MOD RISK MILAGRO   Date Value Ref Range Status   05/14/2025 B:48 62 67 71 72 73 76Cw:1 6 9 10 12  Final     SA2 Hi Risk Milagro   Date Value Ref Range Status   07/23/2019 DR:103 7 8 11 12 13 16 DRw:51 DQ:4 5 6 DQA:02  Final     SA2 HI RISK MILAGRO   Date Value Ref Range Status   05/14/2025 DR:103 7 8 9 11 12 13 16 DRw:51 DQ:4 5 6  Final     SA2 Mod Risk Milagro   Date Value Ref Range Status   07/23/2019 DR:9  Final     SA2 MOD RISK MILAGRO   Date Value Ref Range Status   05/14/2025 None  Final

## 2025-05-21 ENCOUNTER — TELEPHONE (OUTPATIENT)
Dept: TRANSPLANT | Facility: CLINIC | Age: 30
End: 2025-05-21
Payer: MEDICARE

## 2025-05-21 NOTE — TELEPHONE ENCOUNTER
[facility] Kidney [facility] Pancreas Transplant Team Conference  Date: 5/21/2025  Transplant Coordinator: Lupe     Attendees:  [x]  Dr. Culp [x] Jacqueline Sifuentes, RN [x] Vicki Eddy LPN     [x]  Dr. Bustamante [x] Lupe Quevedo, RN    [x] Dr. Rodriguez [x] Zeina Christianson, RN    [x] Dr. Garcia [] Merlyn Mcghee, KAREN [] Shaina Hong RN   [] Dr. Orlando [] Sugar Neri, RN    [] Dr. Osullivan [] Paige Rodriges, KAREN [] Elie Weeks, PharmD   []  Dr. Sierra [] Isabel Burkett, KAREN    [x] Dr. Palma [] Ilya Collins RN     [] Gege Salas RN    [x] Mariam Orta, TERESA [] Nelsy Moore RN        Verbal Plan Read Back:   Start sodium bicarb  Stop vitamin D  Check DSA    Routed to RN Coordinator   Vicki Eddy LPN

## 2025-05-21 NOTE — LETTER
OUTPATIENT LABORATORY TEST ORDER     Patient Name: Jhoan Hoffman   YOB: 1995     [facility] MR# [if applicable]: [id number]   Date & Time: May 14, 2025  10:03 AM  Expiration Date: 1 year after date issued      Diagnosis: Kidney Transplant (ICD-10 Z94.0)    Aftercare following organ transplant (ICD-10 Z48.288)    Long term use of medications (ICD-10 Z79.899)    Contact with and (suspected) exposure to other viral communicable    diseases (Z20.828)     We ask your assistance in obtaining the following laboratory tests, which are part of our routine surveillance program for Solid Organ Transplant patients.     Please fax each result to [phone], same day as resulted/available    Critical lab results page [phone]  Monday - Friday 8 am to 5 pm  Evening/Weekend/Holiday communicate Critical labs results [phone]    First 8 weeks post-transplant (5/10/25-7/10/25)  Labs 2x/week (Monday and Thursday)  CBC with platelets  Basic Metabolic Panel (Sodium, Potassium, Chloride, Creatinine, CO2, Urea Nitrogen, glucose, Calcium)  Tacrolimus/Prograf/ drug level    Labs weekly (Monday)  Phosphorus  Magnesium  Labs every 2 weeks  Mycophenolic Acid Level    Months 2-4 post-transplant (7/10/25-9/10/25)  Labs 1x weekly (Monday or Tuesday)  CBC with platelets  Basic Metabolic Panel (Sodium, Potassium, Chloride, Creatinine, CO2, Urea Nitrogen, glucose, Calcium)  Tacrolimus/Prograf/ drug level  Labs monthly   Phosphorus  Magnesium      Months 4-7 post-transplant (9/10/25-12/10/25)  Labs every other week  CBC with platelets  Basic Metabolic Panel (Sodium, Potassium, Chloride, Creatinine, CO2, Urea Nitrogen, glucose, Calcium)  Tacrolimus/Prograf/ drug level  Monthly  Phosphorus  Magnesium  BK (Polyoma Virus) PCR Quantitative/Plasma    Months 7-12 post-transplant (12/10/25-5/10/26)  Monthly  CBC with platelets  Basic Metabolic Panel (Sodium, Potassium, Chloride, Creatinine, CO2, Urea  Nitrogen, glucose, Calcium)  Tacrolimus/Prograf/ drug level  BK (Polyoma Virus) PCR Quantitative/Plasma      At 1-month post-transplant (6/10/25)  DSA PRA (patient to supply )   BK Virus PCR Quantitative/Plasma  Urine protein/creatinine  Iron Panel  Vitamin D Deficiency Screening  PTH Intact  HBV, HCV, HIV by FAUSTINA testing  If unable to conduct FAUSTINA testing, please substitute the following:   Hepatitis B DNA Quantitative, Real-Time PCR   Hepatitis C RNA, Quantitation   HIV 1 RNA, Quantitation   HIV 2 RNA, Quantitation     At 2 months post-transplant (7/10/25)  DSA PRA (patient to supply  kit)  BK Virus PCR Quantitative/Plasma  Urine protein/creatinine    At month 3 only (8/10/25)  BK (Polyoma virus) PCR Quantitative/Plasma    At 4 months post-transplant (9/10/25)  DSA PRA (patient to supply )  PTH  Urine protein/creatinine      At 6 months post-transplant ([facility]) (11/10/25)  Hepatic panel  Hemoglobin A1c  Uric Acid  Lipid panel  Urine protein/creatinine    At 7 months post-transplant (12/10/25)   PRA/DSA (patient to supply )    At 9 months post-transplant (2/10/26)  Urine protein/creatinine  T cell subset (CD4)     At 12 months post-transplant (Fasting labs) 5/10/26  Hepatic panel  Hemoglobin A1c  Uric Acid  Lipid panel  Urine protein/creatinine  PTH  Vitamin D      If you have any questions please call the Transplant Center at [phone]. All lab results should be faxed to [phone]      Meghan Bustamante MD

## 2025-05-21 NOTE — LETTER
OUTPATIENT LABORATORY TEST ORDER     Patient Name: Jhoan Hoffman   YOB: 1995     [facility] MR# [if applicable]: [id number]   Date & Time: May 14, 2025  10:03 AM  Expiration Date: 1 year after date issued      Diagnosis: Kidney Transplant (ICD-10 Z94.0)    Aftercare following organ transplant (ICD-10 Z48.288)    Long term use of medications (ICD-10 Z79.899)    Contact with and (suspected) exposure to other viral communicable    diseases (Z20.828)     We ask your assistance in obtaining the following laboratory tests, which are part of our routine surveillance program for Solid Organ Transplant patients.     Please fax each result to [phone], same day as resulted/available    Critical lab results page [phone]  Monday - Friday 8 am to 5 pm  Evening/Weekend/Holiday communicate Critical labs results [phone]    First 8 weeks post-transplant (5/10/25-7/10/25)  Labs 2x/week (Monday and Thursday)  CBC with platelets  Basic Metabolic Panel (Sodium, Potassium, Chloride, Creatinine, CO2, Urea Nitrogen, glucose, Calcium)  Tacrolimus/Prograf/ drug level    Labs weekly (Monday)  Phosphorus  Magnesium  Labs every 2 weeks  Mycophenolic Acid Level    Months 2-4 post-transplant (7/10/25-9/10/25)  Labs 1x weekly (Monday or Tuesday)  CBC with platelets  Basic Metabolic Panel (Sodium, Potassium, Chloride, Creatinine, CO2, Urea Nitrogen, glucose, Calcium)  Tacrolimus/Prograf/ drug level  Labs monthly   Phosphorus  Magnesium      Months 4-7 post-transplant (9/10/25-12/10/25)  Labs every other week  CBC with platelets  Basic Metabolic Panel (Sodium, Potassium, Chloride, Creatinine, CO2, Urea Nitrogen, glucose, Calcium)  Tacrolimus/Prograf/ drug level  Monthly  Phosphorus  Magnesium  BK (Polyoma Virus) PCR Quantitative/Plasma    Months 7-12 post-transplant (12/10/25-5/10/26)  Monthly  CBC with platelets  Basic Metabolic Panel (Sodium, Potassium, Chloride, Creatinine, CO2, Urea  Nitrogen, glucose, Calcium)  Tacrolimus/Prograf/ drug level  BK (Polyoma Virus) PCR Quantitative/Plasma      At 1-month post-transplant (6/10/25)  DSA PRA (patient to supply )   BK Virus PCR Quantitative/Plasma  Urine protein/creatinine  Iron Panel  Vitamin D Deficiency Screening  PTH Intact  HBV, HCV, HIV by FAUSTINA testing  If unable to conduct FAUSTINA testing, please substitute the following:   Hepatitis B DNA Quantitative, Real-Time PCR   Hepatitis C RNA, Quantitation   HIV 1 RNA, Quantitation   HIV 2 RNA, Quantitation     At 2 months post-transplant (7/10/25)  DSA PRA (patient to supply  kit)  BK Virus PCR Quantitative/Plasma  Urine protein/creatinine    At month 3 only (8/10/25)  BK (Polyoma virus) PCR Quantitative/Plasma    At 4 months post-transplant (9/10/25)  DSA PRA (patient to supply )  PTH  Urine protein/creatinine      At 6 months post-transplant ([facility]) (11/10/25)  Hepatic panel  Hemoglobin A1c  Uric Acid  Lipid panel  Urine protein/creatinine    At 7 months post-transplant (12/10/25)   PRA/DSA (patient to supply )    At 9 months post-transplant (2/10/26)  Urine protein/creatinine  T cell subset (CD4)     At 12 months post-transplant (Fasting labs) 5/10/26  Hepatic panel  Hemoglobin A1c  Uric Acid  Lipid panel  Urine protein/creatinine  PTH  Vitamin D      If you have any questions please call the Transplant Center at [phone]. All lab results should be faxed to [phone]      Meghan Bustamante MD

## 2025-05-22 ENCOUNTER — VIRTUAL VISIT (OUTPATIENT)
Dept: PHARMACY | Facility: CLINIC | Age: 30
End: 2025-05-22
Payer: MEDICAID

## 2025-05-22 DIAGNOSIS — Z48.298 AFTERCARE FOLLOWING ORGAN TRANSPLANT: Primary | ICD-10-CM

## 2025-05-22 DIAGNOSIS — R52 PAIN: ICD-10-CM

## 2025-05-22 DIAGNOSIS — F41.1 GAD (GENERALIZED ANXIETY DISORDER): ICD-10-CM

## 2025-05-22 DIAGNOSIS — K21.9 GASTROESOPHAGEAL REFLUX DISEASE, UNSPECIFIED WHETHER ESOPHAGITIS PRESENT: ICD-10-CM

## 2025-05-22 DIAGNOSIS — E78.5 DYSLIPIDEMIA: ICD-10-CM

## 2025-05-22 DIAGNOSIS — Z94.0 HTN, KIDNEY TRANSPLANT RELATED: ICD-10-CM

## 2025-05-22 DIAGNOSIS — I15.1 HTN, KIDNEY TRANSPLANT RELATED: ICD-10-CM

## 2025-05-22 NOTE — PROGRESS NOTES
Medication Therapy Management (MTM) Encounter    ASSESSMENT:                            Medication Adherence/Access: No issues identified.    Kidney Transplant:    Stable    Hyperlipidemia   Stable     GERD    Stable     Mental Health   Anxiety  Stable     Pain   Stable     Hypertension   Stable BP <150/90 for first 2 months post txp    PLAN:                            Valganciclovir (Valcyte) birth defect information:  This medicine may cause birth defects if either partner is using it during conception or pregnancy. Men should use condoms during treatment and for at least 90 days after treatment ends.  For this reason, If someone other than patient is handling Valcyte, gloves should be worn to avoid skin contact.    Follow-up: 4 months post    SUBJECTIVE/OBJECTIVE:                          Jhoan Hoffman is a 30 year old male seen for a transitions of care visit. He was discharged from [facility] on 5/13 for kidney txp.      Reason for visit: initial.    Allergies/ADRs: Reviewed in chart  Past Medical History: Reviewed in chart  Tobacco: He reports that he has never smoked. He has never used smokeless tobacco.  Alcohol: not currently using  THC/CBD: none    Medication Adherence/Access: Patient uses pill box(es).  Patient takes medications 2 time(s) per day. 9am and 9pm  Per patient, misses medication 0 time(s) per week.   The patient fills medications at [facility]: NO, fills medications at [facility].    Kidney Transplant:    Tacrolimus 4 mg twice daily  Mycophenolate Mofetil 750 mg twice daily  Prednisone 5 mg every morning.   Pt reports no side effects  Transplant date: 5/10/25 4th transplant  Vascular Prophylaxis: Aspirin 81mg daily. Denies gastrointestinal bleed sx.  CMV prophylaxis: CrCl 40 to 59 mL/minute: Valcyte 450 mg once daily Treat 3 months post tx   Valcyte birth defects discussed: Yes   PJP prophylaxis: Bactrim SS daily  Tx Coordinator: Evy Shelby MD: Jose Alberto Tristna, Using Med Card: Yes  Immunizations:  annual flu shot 2024; Pneumovax 23:  2009; Prevnar 20: 2023; TDaP:  2022; Shingrix: unknown, HBV: immunity per last titer, COVID: unknown   Estimated Creatinine Clearance: 49.1 mL/min (A) (based on SCr of 2.14 mg/dL (H)).    Hyperlipidemia   Atorvastatin 10mg daily  Patient reports no significant myalgias or other side effects.  The ASCVD Risk score (Allan DK, et al., 2019) failed to calculate for the following reasons:    The 2019 ASCVD risk score is only valid for ages 40 to 79     GERD    Omeprazole 40 mg once daily   Patient reports heartburn. Tried coming off of it, but meds cause heartburn.   Patient feels that current regimen is effective.     Mental Health   Anxiety  Fluoxetine 40mg once daily.   Patient reports no current medication side effects.  Patient reports symptoms are stable.     Pain   Acetaminophen 975mg every morning   Pain is 0-1/10     Hypertension   Carvedilol 6.25mg twice daily   Patient reports no current medication side effects  Patient self monitors blood pressure.  Home BP monitoring 148/84.  135/89 pulse 94 today     Today's Vitals: There were no vitals taken for this visit.  ----------------  Post Discharge Medication Reconciliation Status: discharge medications reconciled, continue medications without change.    I spent 13 minutes with this patient today. All changes were made via collaborative practice agreement with Dr. Jose Alberto Tristan.     A summary of these recommendations was sent via [employer].    Elie Weeks, PharmD  MTM Pharmacist    Phone: [phone]     Telemedicine Visit Details  The patient's medications can be safely assessed via a telemedicine encounter.  Type of service:  Telephone visit  Originating Location (pt. Location): Home    Distant Location (provider location):  On-site  Start Time: 1  End Time: 1:13 PM     Medication Therapy Recommendations  No medication therapy recommendations to display

## 2025-05-22 NOTE — TELEPHONE ENCOUNTER
Spoke to patient about lab schedule. Sent lab fax to [facility]. Patient is able to walk in or make appointment.   DSA kits mailed to patient

## 2025-05-22 NOTE — PATIENT INSTRUCTIONS
"Recommendations from today's MTM visit:                                                      Valganciclovir (Valcyte) birth defect information:  This medicine may cause birth defects if either partner is using it during conception or pregnancy. Men should use condoms during treatment and for at least 90 days after treatment ends.  For this reason, If someone other than patient is handling Valcyte, gloves should be worn to avoid skin contact.    Follow-up: 4 months post    It was great speaking with you today.  I value your experience and would be very thankful for your time in providing feedback in our clinic survey. In the next few days, you may receive an email or text message from [employer] with a link to a survey related to your  clinical pharmacist.\"     To schedule another MTM appointment, please call the clinic directly or you may call the MTM scheduling line at [phone] or toll-free at [phone].     My Clinical Pharmacist's contact information:                                                      Please feel free to contact me with any questions or concerns you have.      Elie Weeks, PharmD  MTM Pharmacist    Phone: [phone]     "

## 2025-05-23 ENCOUNTER — APPOINTMENT (OUTPATIENT)
Dept: ULTRASOUND IMAGING | Facility: CLINIC | Age: 30
End: 2025-05-23
Attending: EMERGENCY MEDICINE
Payer: MEDICARE

## 2025-05-23 ENCOUNTER — HOSPITAL ENCOUNTER (INPATIENT)
Facility: CLINIC | Age: 30
LOS: 4 days | Discharge: HOME | End: 2025-05-27
Attending: EMERGENCY MEDICINE | Admitting: SURGERY
Payer: MEDICARE

## 2025-05-23 ENCOUNTER — APPOINTMENT (OUTPATIENT)
Dept: CT IMAGING | Facility: CLINIC | Age: 30
End: 2025-05-23
Payer: MEDICARE

## 2025-05-23 DIAGNOSIS — Z94.0 KIDNEY REPLACED BY TRANSPLANT: ICD-10-CM

## 2025-05-23 DIAGNOSIS — Z94.0 STATUS POST KIDNEY TRANSPLANT: ICD-10-CM

## 2025-05-23 DIAGNOSIS — R19.5 LOOSE STOOLS: Primary | ICD-10-CM

## 2025-05-23 DIAGNOSIS — E87.5 HYPERKALEMIA: ICD-10-CM

## 2025-05-23 LAB
ALBUMIN SERPL BCG-MCNC: 4.5 G/DL (ref 3.5–5.2)
ALBUMIN UR-MCNC: NEGATIVE MG/DL
ALP SERPL-CCNC: 186 U/L (ref 40–150)
ALT SERPL W P-5'-P-CCNC: 45 U/L (ref 0–70)
ANION GAP SERPL CALCULATED.3IONS-SCNC: 11 MMOL/L (ref 7–15)
APPEARANCE UR: CLEAR
AST SERPL W P-5'-P-CCNC: 26 U/L (ref 0–45)
BASOPHILS # BLD AUTO: 0 10E3/UL (ref 0–0.2)
BASOPHILS NFR BLD AUTO: 0 %
BILIRUB SERPL-MCNC: 0.6 MG/DL
BILIRUB UR QL STRIP: NEGATIVE
BUN SERPL-MCNC: 33 MG/DL (ref 6–20)
CALCIUM SERPL-MCNC: 11.1 MG/DL (ref 8.8–10.4)
CHLORIDE SERPL-SCNC: 104 MMOL/L (ref 98–107)
COLOR UR AUTO: ABNORMAL
CREAT SERPL-MCNC: 2.29 MG/DL (ref 0.67–1.17)
EGFRCR SERPLBLD CKD-EPI 2021: 38 ML/MIN/1.73M2
EOSINOPHIL # BLD AUTO: 0.2 10E3/UL (ref 0–0.7)
EOSINOPHIL NFR BLD AUTO: 1 %
ERYTHROCYTE [DISTWIDTH] IN BLOOD BY AUTOMATED COUNT: 14.3 % (ref 10–15)
GLUCOSE SERPL-MCNC: 113 MG/DL (ref 70–99)
GLUCOSE UR STRIP-MCNC: NEGATIVE MG/DL
HCO3 SERPL-SCNC: 17 MMOL/L (ref 22–29)
HCT VFR BLD AUTO: 31.9 % (ref 40–53)
HGB BLD-MCNC: 10.6 G/DL (ref 13.3–17.7)
HGB UR QL STRIP: ABNORMAL
IMM GRANULOCYTES # BLD: 0.1 10E3/UL
IMM GRANULOCYTES NFR BLD: 1 %
KETONES UR STRIP-MCNC: NEGATIVE MG/DL
LEUKOCYTE ESTERASE UR QL STRIP: NEGATIVE
LYMPHOCYTES # BLD AUTO: 0.4 10E3/UL (ref 0.8–5.3)
LYMPHOCYTES NFR BLD AUTO: 3 %
MCH RBC QN AUTO: 29.7 PG (ref 26.5–33)
MCHC RBC AUTO-ENTMCNC: 33.2 G/DL (ref 31.5–36.5)
MCV RBC AUTO: 89 FL (ref 78–100)
MONOCYTES # BLD AUTO: 0.7 10E3/UL (ref 0–1.3)
MONOCYTES NFR BLD AUTO: 4 %
MUCOUS THREADS #/AREA URNS LPF: PRESENT /LPF
NEUTROPHILS # BLD AUTO: 15.2 10E3/UL (ref 1.6–8.3)
NEUTROPHILS NFR BLD AUTO: 91 %
NITRATE UR QL: NEGATIVE
NRBC # BLD AUTO: 0 10E3/UL
NRBC BLD AUTO-RTO: 0 /100
PH UR STRIP: 5.5 [PH] (ref 5–7)
PLATELET # BLD AUTO: 266 10E3/UL (ref 150–450)
POTASSIUM SERPL-SCNC: 5.5 MMOL/L (ref 3.4–5.3)
POTASSIUM SERPL-SCNC: 5.8 MMOL/L (ref 3.4–5.3)
PROT SERPL-MCNC: 7.5 G/DL (ref 6.4–8.3)
RBC # BLD AUTO: 3.57 10E6/UL (ref 4.4–5.9)
RBC URINE: 22 /HPF
SODIUM SERPL-SCNC: 132 MMOL/L (ref 135–145)
SP GR UR STRIP: 1.01 (ref 1–1.03)
TACROLIMUS BLD-MCNC: 13.6 UG/L (ref 5–15)
TME LAST DOSE: NORMAL H
TME LAST DOSE: NORMAL H
UROBILINOGEN UR STRIP-MCNC: NORMAL MG/DL
WBC # BLD AUTO: 16.7 10E3/UL (ref 4–11)
WBC URINE: 1 /HPF

## 2025-05-23 PROCEDURE — 120N000011 HC R&B TRANSPLANT UMMC

## 2025-05-23 PROCEDURE — 99285 EMERGENCY DEPT VISIT HI MDM: CPT | Performed by: EMERGENCY MEDICINE

## 2025-05-23 PROCEDURE — 81001 URINALYSIS AUTO W/SCOPE: CPT | Performed by: EMERGENCY MEDICINE

## 2025-05-23 PROCEDURE — 250N000011 HC RX IP 250 OP 636: Performed by: STUDENT IN AN ORGANIZED HEALTH CARE EDUCATION/TRAINING PROGRAM

## 2025-05-23 PROCEDURE — 76776 US EXAM K TRANSPL W/DOPPLER: CPT | Mod: 26 | Performed by: RADIOLOGY

## 2025-05-23 PROCEDURE — 74176 CT ABD & PELVIS W/O CONTRAST: CPT

## 2025-05-23 PROCEDURE — 74176 CT ABD & PELVIS W/O CONTRAST: CPT | Mod: 26 | Performed by: RADIOLOGY

## 2025-05-23 PROCEDURE — 82247 BILIRUBIN TOTAL: CPT | Performed by: EMERGENCY MEDICINE

## 2025-05-23 PROCEDURE — 85004 AUTOMATED DIFF WBC COUNT: CPT | Performed by: EMERGENCY MEDICINE

## 2025-05-23 PROCEDURE — 36415 COLL VENOUS BLD VENIPUNCTURE: CPT | Performed by: EMERGENCY MEDICINE

## 2025-05-23 PROCEDURE — 87040 BLOOD CULTURE FOR BACTERIA: CPT | Performed by: SURGERY

## 2025-05-23 PROCEDURE — 99285 EMERGENCY DEPT VISIT HI MDM: CPT | Mod: 25 | Performed by: EMERGENCY MEDICINE

## 2025-05-23 PROCEDURE — 80197 ASSAY OF TACROLIMUS: CPT | Performed by: EMERGENCY MEDICINE

## 2025-05-23 PROCEDURE — 36415 COLL VENOUS BLD VENIPUNCTURE: CPT | Performed by: STUDENT IN AN ORGANIZED HEALTH CARE EDUCATION/TRAINING PROGRAM

## 2025-05-23 PROCEDURE — 96360 HYDRATION IV INFUSION INIT: CPT | Performed by: EMERGENCY MEDICINE

## 2025-05-23 PROCEDURE — 258N000003 HC RX IP 258 OP 636: Performed by: EMERGENCY MEDICINE

## 2025-05-23 PROCEDURE — 84132 ASSAY OF SERUM POTASSIUM: CPT | Performed by: STUDENT IN AN ORGANIZED HEALTH CARE EDUCATION/TRAINING PROGRAM

## 2025-05-23 PROCEDURE — 76776 US EXAM K TRANSPL W/DOPPLER: CPT

## 2025-05-23 PROCEDURE — 87086 URINE CULTURE/COLONY COUNT: CPT | Performed by: STUDENT IN AN ORGANIZED HEALTH CARE EDUCATION/TRAINING PROGRAM

## 2025-05-23 RX ORDER — PROCHLORPERAZINE MALEATE 10 MG
10 TABLET ORAL EVERY 6 HOURS PRN
Status: DISCONTINUED | OUTPATIENT
Start: 2025-05-23 | End: 2025-05-27 | Stop reason: HOSPADM

## 2025-05-23 RX ORDER — SODIUM CHLORIDE 9 MG/ML
INJECTION, SOLUTION INTRAVENOUS CONTINUOUS
Status: DISCONTINUED | OUTPATIENT
Start: 2025-05-23 | End: 2025-05-24

## 2025-05-23 RX ORDER — DEXTROSE MONOHYDRATE 25 G/50ML
25 INJECTION, SOLUTION INTRAVENOUS ONCE
Status: DISCONTINUED | OUTPATIENT
Start: 2025-05-23 | End: 2025-05-23

## 2025-05-23 RX ORDER — TACROLIMUS 1 MG/1
4 CAPSULE ORAL
Status: DISCONTINUED | OUTPATIENT
Start: 2025-05-23 | End: 2025-05-25

## 2025-05-23 RX ORDER — OMEPRAZOLE 20 MG/1
40 CAPSULE, DELAYED RELEASE ORAL DAILY
Status: DISCONTINUED | OUTPATIENT
Start: 2025-05-24 | End: 2025-05-23

## 2025-05-23 RX ORDER — CARVEDILOL 6.25 MG/1
6.25 TABLET ORAL 2 TIMES DAILY WITH MEALS
Status: DISCONTINUED | OUTPATIENT
Start: 2025-05-23 | End: 2025-05-23

## 2025-05-23 RX ORDER — SULFAMETHOXAZOLE AND TRIMETHOPRIM 400; 80 MG/1; MG/1
1 TABLET ORAL DAILY
Status: DISCONTINUED | OUTPATIENT
Start: 2025-05-24 | End: 2025-05-27 | Stop reason: HOSPADM

## 2025-05-23 RX ORDER — ATORVASTATIN CALCIUM 10 MG/1
10 TABLET, FILM COATED ORAL DAILY
Status: DISCONTINUED | OUTPATIENT
Start: 2025-05-24 | End: 2025-05-27 | Stop reason: HOSPADM

## 2025-05-23 RX ORDER — AMOXICILLIN 250 MG
2 CAPSULE ORAL 2 TIMES DAILY PRN
Status: DISCONTINUED | OUTPATIENT
Start: 2025-05-23 | End: 2025-05-27 | Stop reason: HOSPADM

## 2025-05-23 RX ORDER — ONDANSETRON 4 MG/1
4 TABLET, ORALLY DISINTEGRATING ORAL EVERY 6 HOURS PRN
Status: DISCONTINUED | OUTPATIENT
Start: 2025-05-23 | End: 2025-05-27 | Stop reason: HOSPADM

## 2025-05-23 RX ORDER — FUROSEMIDE 10 MG/ML
60 INJECTION INTRAMUSCULAR; INTRAVENOUS ONCE
Status: COMPLETED | OUTPATIENT
Start: 2025-05-23 | End: 2025-05-23

## 2025-05-23 RX ORDER — PANTOPRAZOLE SODIUM 40 MG/1
40 TABLET, DELAYED RELEASE ORAL
Status: DISCONTINUED | OUTPATIENT
Start: 2025-05-24 | End: 2025-05-27

## 2025-05-23 RX ORDER — DEXTROSE MONOHYDRATE 25 G/50ML
25-50 INJECTION, SOLUTION INTRAVENOUS
Status: DISCONTINUED | OUTPATIENT
Start: 2025-05-23 | End: 2025-05-25

## 2025-05-23 RX ORDER — ASPIRIN 81 MG/1
81 TABLET, CHEWABLE ORAL DAILY
Status: DISCONTINUED | OUTPATIENT
Start: 2025-05-24 | End: 2025-05-27 | Stop reason: HOSPADM

## 2025-05-23 RX ORDER — LIDOCAINE 40 MG/G
CREAM TOPICAL
Status: DISCONTINUED | OUTPATIENT
Start: 2025-05-23 | End: 2025-05-27 | Stop reason: HOSPADM

## 2025-05-23 RX ORDER — METHOCARBAMOL 750 MG/1
750 TABLET, FILM COATED ORAL 3 TIMES DAILY PRN
Status: ON HOLD | COMMUNITY
End: 2025-05-27

## 2025-05-23 RX ORDER — CARVEDILOL 3.12 MG/1
3.12 TABLET ORAL 2 TIMES DAILY WITH MEALS
Status: DISCONTINUED | OUTPATIENT
Start: 2025-05-23 | End: 2025-05-23

## 2025-05-23 RX ORDER — POLYETHYLENE GLYCOL 3350 17 G/17G
17 POWDER, FOR SOLUTION ORAL 2 TIMES DAILY PRN
Status: DISCONTINUED | OUTPATIENT
Start: 2025-05-23 | End: 2025-05-27 | Stop reason: HOSPADM

## 2025-05-23 RX ORDER — ONDANSETRON 2 MG/ML
4 INJECTION INTRAMUSCULAR; INTRAVENOUS EVERY 6 HOURS PRN
Status: DISCONTINUED | OUTPATIENT
Start: 2025-05-23 | End: 2025-05-27 | Stop reason: HOSPADM

## 2025-05-23 RX ORDER — MYCOPHENOLATE MOFETIL 250 MG/1
750 CAPSULE ORAL 2 TIMES DAILY
Status: DISCONTINUED | OUTPATIENT
Start: 2025-05-23 | End: 2025-05-24

## 2025-05-23 RX ORDER — VALGANCICLOVIR 450 MG/1
450 TABLET, FILM COATED ORAL DAILY
Status: DISCONTINUED | OUTPATIENT
Start: 2025-05-24 | End: 2025-05-26

## 2025-05-23 RX ORDER — PREDNISONE 5 MG/1
5 TABLET ORAL DAILY
Status: DISCONTINUED | OUTPATIENT
Start: 2025-05-24 | End: 2025-05-27 | Stop reason: HOSPADM

## 2025-05-23 RX ORDER — CARVEDILOL 6.25 MG/1
6.25 TABLET ORAL 2 TIMES DAILY WITH MEALS
Status: DISCONTINUED | OUTPATIENT
Start: 2025-05-23 | End: 2025-05-27 | Stop reason: HOSPADM

## 2025-05-23 RX ORDER — FLUOXETINE HYDROCHLORIDE 40 MG/1
40 CAPSULE ORAL AT BEDTIME
Status: DISCONTINUED | OUTPATIENT
Start: 2025-05-23 | End: 2025-05-27 | Stop reason: HOSPADM

## 2025-05-23 RX ORDER — NICOTINE POLACRILEX 4 MG
15-30 LOZENGE BUCCAL
Status: DISCONTINUED | OUTPATIENT
Start: 2025-05-23 | End: 2025-05-25

## 2025-05-23 RX ORDER — AMOXICILLIN 250 MG
1 CAPSULE ORAL 2 TIMES DAILY PRN
Status: DISCONTINUED | OUTPATIENT
Start: 2025-05-23 | End: 2025-05-27 | Stop reason: HOSPADM

## 2025-05-23 RX ORDER — HYDROXYZINE HYDROCHLORIDE 25 MG/1
25 TABLET, FILM COATED ORAL PRN
COMMUNITY
End: 2025-07-15

## 2025-05-23 RX ADMIN — FUROSEMIDE 60 MG: 10 INJECTION, SOLUTION INTRAMUSCULAR; INTRAVENOUS at 22:13

## 2025-05-23 RX ADMIN — SODIUM CHLORIDE 1000 ML: 0.9 INJECTION, SOLUTION INTRAVENOUS at 19:35

## 2025-05-23 ASSESSMENT — ACTIVITIES OF DAILY LIVING (ADL)
ADLS_ACUITY_SCORE: 52

## 2025-05-23 ASSESSMENT — COLUMBIA-SUICIDE SEVERITY RATING SCALE - C-SSRS
2. HAVE YOU ACTUALLY HAD ANY THOUGHTS OF KILLING YOURSELF IN THE PAST MONTH?: NO
6. HAVE YOU EVER DONE ANYTHING, STARTED TO DO ANYTHING, OR PREPARED TO DO ANYTHING TO END YOUR LIFE?: NO
1. IN THE PAST MONTH, HAVE YOU WISHED YOU WERE DEAD OR WISHED YOU COULD GO TO SLEEP AND NOT WAKE UP?: NO

## 2025-05-23 NOTE — ED PROVIDER NOTES
ED PROVIDER NOTE  [facility]  [place] [place] [place]      History     Chief Complaint   Patient presents with    Abnormal Labs     HPI  Jhoan Hoffman is a 30 year old male who presents to the emergency department with abnormal labs.  The patient states that he is on his fourth kidney transplant and states that this transplant was done 13 days ago.  Patient states he has been recovering well at home but had labs checked earlier today and it was noted that his creatinine was 2.37, his BUN was 13.29, and his white count was 16, with a hemoglobin of 11.1.  This is compared to a creatinine done 4 days ago which was 2.14.  Patient states he was told to come in for recheck and assessment.  Patient denies any fevers, denies any vomiting, diarrhea, melena, or bright red blood per rectum.  Patient states he has had some loose stools, denies any coughing, denies any fevers, denies any sore throat.    I have reviewed the Medications, Allergies, Past Medical and Surgical History, and Social History in the [employer] system.  Past Medical History:   Diagnosis Date    Anemia     in ESRD    Anxiety 2012    C. difficile diarrhea 2023    Depression     Depression 2012    Hypertension     Kidney replaced by transplant 1995 Failed 2010    Kidney replaced by transplant 2011    Failed 2011, ischemic necrosis and organized thrombus    Kidney replaced by transplant 2012 Failed 2020    CYNDEE (obstructive sleep apnea)     Pericarditis 2022    Peritoneal dialysis status        Past Surgical History:   Procedure Laterality Date    BENCH KIDNEY  5/10/2025    Procedure: Bench kidney;  Surgeon: Omar Osullivan MD;  Location: [facility] OR    BIOPSY      CREATE GRAFT LOOP ARTERIOVENOUS UPPER EXTREMITY  12/17/2020    Procedure: Create Graft Left Arteriovenous Upper Extremity;  Surgeon: Sidney Palma MD;  Location: [facility] OR    CV PERICARDIOCENTESIS N/A 12/12/2022    Procedure: Pericardiocentesis;  Surgeon: Dennis Rodriguez MD;   Location: [facility] CARD[facility]    CYSTOSCOPY, REMOVE STENT(S), COMBINED  2012    Procedure:COMBINED CYSTOSCOPY, REMOVE STENT(S); Cystoscopy, Removal Of Urinary Stent; Surgeon:FLORENTINO KNIGHT; Location:[facility] OR    ENDARECTERECTOMY RENAL  2011    Procedure:ENDARECTERECTOMY RENAL; Exploration of Transplant Kidney, Back Table Flush, Biopsy of Kidney and Reanastamosis of Kidney; Surgeon:FLORENTINO KNIGHT; Location[facility] OR    EXPLANT TRANSPLANTED KIDNEY  2011    Procedure:EXPLANT TRANSPLANTED KIDNEY; Transplant Nephrectomy; Surgeon:FLORENTINO KNIGHT; [facility]    INSERT CATHETER HEMODIALYSIS  2011    Procedure:INSERT CATHETER HEMODIALYSIS; Double Lumen; Surgeon:JOE HE; [facility]    INSERT CATHETER PERITONEAL DIALYSIS  2012    Procedure:INSERT CATHETER PERITONEAL DIALYSIS; Placement dialysis cath under fluoro, before kidney tx; Surgeon:FLORENTINO KNIGHT; [facility] OR    IR CVC TUNNEL PLACEMENT > 5 YRS OF AGE  2020    LAPAROTOMY EXPLORATORY  2011    Procedure:LAPAROTOMY EXPLORATORY; washout of kidney and closure; Surgeon:FLORENTINO KNIGHT; [facility]    PERCUTANEOUS BIOPSY KIDNEY  2011    Procedure:PERCUTANEOUS BIOPSY KIDNEY; Surgeon:GIL WILLIAM; [facility]    PERCUTANEOUS BIOPSY KIDNEY Right 2019    Procedure: Right Kidney Biopsy;  Surgeon: Peter Briggs MD;  [facility]    PERCUTANEOUS BIOPSY KIDNEY Right 2019    Procedure: Right Kidney Biopsy;  Surgeon: Pro Menard MD;  [facility]    REMOVE CATHETER PERITONEAL  5/3/2012    Procedure:REMOVE CATHETER PERITONEAL; Removal Of Peritoneal Dialysis Catheter; Surgeon:FLORENTINO KNIGHT; [facility]    REVISION FISTULA ARTERIOVENOUS UPPER EXTREMITY Left 2021    Procedure: Left upper AV fistula revision (transposition) with intraoperative ultrasound;  Surgeon: Sidney Palma MD;  [facility]    TRANSPLANT KIDNEY RECIPIENT  DONOR Right  5/10/2025    Procedure: Right Kidney Transplanbt Intraperitoneal;  Surgeon: Omar Osullivan MD;  [facility]    TRANSPLANT KIDNEY RECIPIENT LIVING RELATED  1995    TRANSPLANT KIDNEY RECIPIENT LIVING UNRELATED  5/18/2011    Procedure:TRANSPLANT KIDNEY RECIPIENT LIVING UNRELATED; Living unrelated left kidney transplant and placement of ureteral stent into transplant ureter.; Surgeon:FLORENTINO KNIGHT; [facility]    TRANSPLANT KIDNEY RECIPIENT LIVING UNRELATED  4/28/2012    Procedure:TRANSPLANT KIDNEY RECIPIENT LIVING UNRELATED; kidney transplant -   UNOS# [id number]  Organ arrival: 2100 4/27  Cross match results: 0200 4/28  Donor blood type: A  Recipient blood type: A; Surgeon:FLORENTINO KNIGHT; [facility]         Dose / Directions   acetaminophen 325 MG tablet  Commonly known as: TYLENOL  Used for: Kidney replaced by transplant      Dose: 975 mg  Take 3 tablets (975 mg) by mouth every 8 hours as needed for mild pain.  Quantity: 60 tablet  Refills: 0     aspirin 81 MG EC tablet      Dose: 81 mg  Take 81 mg by mouth daily  Refills: 0     atorvastatin 10 MG tablet  Commonly known as: LIPITOR  Used for: Kidney replaced by transplant      Dose: 10 mg  Take 1 tablet (10 mg) by mouth daily.  Quantity: 30 tablet  Refills: 2     carvedilol 3.125 MG tablet  Commonly known as: COREG  Used for: HTN, kidney transplant related      Dose: 6.25 mg  Take 2 tablets (6.25 mg) by mouth 2 times daily (with meals).  Refills: 0     FLUoxetine 40 MG capsule  Commonly known as: PROzac  Used for: Depression with anxiety      Dose: 40 mg  Take 40 mg by mouth at bedtime.  Quantity: 90 capsule  Refills: 0     mycophenolate 250 MG capsule  Commonly known as: GENERIC EQUIVALENT  Used for: Kidney replaced by transplant      Dose: 750 mg  Take 3 capsules (750 mg) by mouth 2 times daily.  Quantity: 180 capsule  Refills: 11     omeprazole 40 MG DR capsule  Commonly known as: PriLOSEC      Dose: 40 mg  Take 40 mg by mouth  daily  Refills: 0     ondansetron 4 MG ODT tab  Commonly known as: ZOFRAN ODT  Used for: Kidney replaced by transplant      Dose: 4 mg  Take 1 tablet (4 mg) by mouth every 8 hours as needed for nausea.  Quantity: 15 tablet  Refills: 0     predniSONE 5 MG tablet  Commonly known as: DELTASONE  Used for: Kidney replaced by transplant      Dose: 5 mg  Take 1 tablet (5 mg) by mouth daily.  Quantity: 30 tablet  Refills: 11     senna-docusate 8.6-50 MG tablet  Commonly known as: SENOKOT-S/PERICOLACE  Used for: Kidney replaced by transplant      Dose: 1 tablet  Take 1 tablet by mouth 2 times daily as needed for constipation.  Quantity: 60 tablet  Refills: 0     sulfamethoxazole-trimethoprim 400-80 MG tablet  Commonly known as: BACTRIM  Indication: PCP prophylaxis  Used for: Kidney replaced by transplant      Dose: 1 tablet  Take 1 tablet by mouth daily.  Quantity: 30 tablet  Refills: 11     * tacrolimus 0.5 MG capsule  Commonly known as: GENERIC EQUIVALENT  Used for: Kidney replaced by transplant      Dose: 0.5 mg  Take 1 capsule (0.5 mg) by mouth 2 times daily.  Refills: 0     * tacrolimus 1 MG capsule  Commonly known as: GENERIC EQUIVALENT  Used for: Kidney replaced by transplant      Dose: 4 mg  Take 4 capsules (4 mg) by mouth 2 times daily.  Quantity: 240 capsule  Refills: 11     valGANciclovir 450 MG tablet  Commonly known as: VALCYTE  Indication: Medication Treatment to Prevent Cytomegalovirus Disease  Used for: Kidney replaced by transplant      Dose: 450 mg  Take 1 tablet (450 mg) by mouth daily.  Quantity: 60 tablet  Refills: 2       Past medical history, past surgical history, medications, and allergies were reviewed with the patient. Additional pertinent items: None    Family History   Problem Relation Age of Onset    Kidney Disease No family hx of        Social History     Tobacco Use    Smoking status: Never    Smokeless tobacco: Never    Tobacco comments:     mother smokes outside   Substance Use Topics     "Alcohol use: Not Currently     Social history was reviewed with the patient. Additional pertinent items: None    Allergies   Allergen Reactions    Amoxicillin Swelling     Facial swelling, \"very massive swelling\"    Blood Transfusion Related (Informational Only) Other (See Comments)     Patient has a history of a clinically significant antibody against RBC antigens.  A delay in compatible RBCs may occur. Anti-Jka identified 11/25/08 and Unidentified Antibody found 01/01/09 at [facility].    Penicillins Other (See Comments) and Swelling     Facial swelling, \"very massive swelling\"    Azithromycin      Z pack - can't take with cyclosporine.    Vancomycin      Vancomycin infusion reaction     Cefprozil Rash       Review of Systems  A medically appropriate review of systems was performed with pertinent positives and negatives noted in the HPI, and all other systems negative.    Physical Exam   BP: 118/77  Pulse: 90  Temp: 97.8  F (36.6  C)  Resp: 16  SpO2: 100 %      Physical Exam  Vitals and nursing note reviewed.   HENT:      Head: Atraumatic.   Eyes:      Extraocular Movements: Extraocular movements intact.      Pupils: Pupils are equal, round, and reactive to light.   Cardiovascular:      Rate and Rhythm: Regular rhythm.   Pulmonary:      Breath sounds: Normal breath sounds.   Abdominal:      Palpations: Abdomen is soft.   Musculoskeletal:         General: No deformity.      Cervical back: Neck supple.   Neurological:      General: No focal deficit present.      Mental Status: He is alert and oriented to person, place, and time.   Psychiatric:         Mood and Affect: Mood normal.         ED Course     Orders Placed This Encounter   Procedures    US Renal Transplant with Doppler    CT Abdomen Pelvis w/o Contrast    Comprehensive metabolic panel    UA with Microscopic reflex to Culture    Tacrolimus by Tandem Mass Spectrometry    CBC with platelets and differential    Potassium    Basic metabolic panel    CBC " with platelets    Peripheral IV catheter    Catheter Site Care    Notify Provider    Assess for hypoglycemia symptoms    Notify Provider    Assign Team    Vital signs    Peripheral IV catheter    Catheter Site Care    Cardiac Monitoring Adult    Intake and output    Daily weights    Glucose monitor nursing POCT    Incentive spirometry nursing    Activity Up ad abelardo    NO Indwelling urinary catheter (Ordoñez) PRESENT or NEEDED    Voiding Protocol (Trial of Void)    Apply pneumatic compression device (PCD) - Bilateral Calf    Full Code    Oxygen: Nasal cannula, Oxygen mask    Admit to Inpatient    Admit to Inpatient    Pneumatic Compression Device (Equipment) - Bilateral Calf    Blood Culture Peripheral blood (BC)    Blood Culture Peripheral blood (BC)    Urine Culture    CBC with platelets differential        Procedures         Results for orders placed or performed during the hospital encounter of 05/23/25 (from the past 24 hours)   UA with Microscopic reflex to Culture    Specimen: Urine, Clean Catch   Result Value Ref Range    Color Urine Light Yellow Colorless, Straw, Light Yellow, Yellow    Appearance Urine Clear Clear    Glucose Urine Negative Negative mg/dL    Bilirubin Urine Negative Negative    Ketones Urine Negative Negative mg/dL    Specific Gravity Urine 1.015 1.003 - 1.035    Blood Urine Moderate (A) Negative    pH Urine 5.5 5.0 - 7.0    Protein Albumin Urine Negative Negative mg/dL    Urobilinogen Urine Normal Normal mg/dL    Nitrite Urine Negative Negative    Leukocyte Esterase Urine Negative Negative    Mucus Urine Present (A) None Seen /LPF    RBC Urine 22 (H) <=2 /HPF    WBC Urine 1 <=5 /HPF    Narrative    Urine Culture not indicated   CBC with platelets differential    Narrative    The following orders were created for panel order CBC with platelets differential.  Procedure                               Abnormality         Status                     ---------                               -----------          ------                     CBC with platelets and ...[[id number]]  Abnormal            Final result                 Please view results for these tests on the individual orders.   Comprehensive metabolic panel   Result Value Ref Range    Sodium 132 (L) 135 - 145 mmol/L    Potassium 5.8 (H) 3.4 - 5.3 mmol/L    Carbon Dioxide (CO2) 17 (L) 22 - 29 mmol/L    Anion Gap 11 7 - 15 mmol/L    Urea Nitrogen 33.0 (H) 6.0 - 20.0 mg/dL    Creatinine 2.29 (H) 0.67 - 1.17 mg/dL    GFR Estimate 38 (L) >60 mL/min/1.73m2    Calcium 11.1 (H) 8.8 - 10.4 mg/dL    Chloride 104 98 - 107 mmol/L    Glucose 113 (H) 70 - 99 mg/dL    Alkaline Phosphatase 186 (H) 40 - 150 U/L    AST 26 0 - 45 U/L    ALT 45 0 - 70 U/L    Protein Total 7.5 6.4 - 8.3 g/dL    Albumin 4.5 3.5 - 5.2 g/dL    Bilirubin Total 0.6 <=1.2 mg/dL   CBC with platelets and differential   Result Value Ref Range    WBC Count 16.7 (H) 4.0 - 11.0 10e3/uL    RBC Count 3.57 (L) 4.40 - 5.90 10e6/uL    Hemoglobin 10.6 (L) 13.3 - 17.7 g/dL    Hematocrit 31.9 (L) 40.0 - 53.0 %    MCV 89 78 - 100 fL    MCH 29.7 26.5 - 33.0 pg    MCHC 33.2 31.5 - 36.5 g/dL    RDW 14.3 10.0 - 15.0 %    Platelet Count 266 150 - 450 10e3/uL    % Neutrophils 91 %    % Lymphocytes 3 %    % Monocytes 4 %    % Eosinophils 1 %    % Basophils 0 %    % Immature Granulocytes 1 %    NRBCs per 100 WBC 0 <1 /100    Absolute Neutrophils 15.2 (H) 1.6 - 8.3 10e3/uL    Absolute Lymphocytes 0.4 (L) 0.8 - 5.3 10e3/uL    Absolute Monocytes 0.7 0.0 - 1.3 10e3/uL    Absolute Eosinophils 0.2 0.0 - 0.7 10e3/uL    Absolute Basophils 0.0 0.0 - 0.2 10e3/uL    Absolute Immature Granulocytes 0.1 <=0.4 10e3/uL    Absolute NRBCs 0.0 10e3/uL   US Renal Transplant with Doppler    Narrative    EXAM: US RENAL TRANSPLANT WITH DOPPLER  LOCATION: [facility]  DATE: 5/23/2025    INDICATION: 2 wks s p transplant  COMPARISON: 5/10/2025  TECHNIQUE: Ultrasound of the renal transplant with Doppler  waveform spectral analysis.    FINDINGS: The transplant kidney is located in the left lower quadrant. The transplant kidney is normal in echogenicity. There is no cortical thinning. There is no hydronephrosis, calculus, cyst or mass. Ureteral stent partially visualized. There is no   perinephric fluid.    The urinary bladder is normal.     TRANSPLANT DOPPLER:    The main renal artery peak systolic velocity is normal (less than 200 cm/sec). The intra-renal transplant resistive indices are 0.72-0.77 (history 0.45-0.54).  Renal vein is patent.      Impression    IMPRESSION:   1.  Normal grayscale appearance of the left lower quadrant renal transplant.  2.  Normal transplant renal artery peak systolic velocity.  3.  Mildly elevated intrarenal resistive indices, nonspecific in the recent postoperative setting.       *Note: Due to a large number of results and/or encounters for the requested time period, some results have not been displayed. A complete set of results can be found in Results Review.     Surgery saw the patient and evaluated and decided on admission for this patient.  I additionally added a number of orders listed above including an abdominal CT.  At this time patient will be admitted with his overall workup in progress.      Medications   sodium chloride (PF) 0.9% PF flush 3 mL (3 mLs Intracatheter Not Given 5/23/25 2049)   sodium chloride (PF) 0.9% PF flush 3 mL (has no administration in time range)   sodium chloride 0.9 % infusion (has no administration in time range)   glucose gel 15-30 g (has no administration in time range)     Or   dextrose 50 % injection 25-50 mL (has no administration in time range)     Or   glucagon injection 1 mg (has no administration in time range)   furosemide (LASIX) injection 60 mg (has no administration in time range)   lidocaine 1 % 0.1-1 mL (has no administration in time range)   lidocaine (LMX4) cream (has no administration in time range)   sodium chloride (PF) 0.9% PF  flush 3 mL (has no administration in time range)   sodium chloride (PF) 0.9% PF flush 3 mL (has no administration in time range)   senna-docusate (SENOKOT-S/PERICOLACE) 8.6-50 MG per tablet 1 tablet (has no administration in time range)     Or   senna-docusate (SENOKOT-S/PERICOLACE) 8.6-50 MG per tablet 2 tablet (has no administration in time range)   polyethylene glycol (MIRALAX) Packet 17 g (has no administration in time range)   ondansetron (ZOFRAN ODT) ODT tab 4 mg (has no administration in time range)     Or   ondansetron (ZOFRAN) injection 4 mg (has no administration in time range)   prochlorperazine (COMPAZINE) injection 10 mg (has no administration in time range)     Or   prochlorperazine (COMPAZINE) tablet 10 mg (has no administration in time range)   atorvastatin (LIPITOR) tablet 10 mg (has no administration in time range)   carvedilol (COREG) tablet 6.25 mg (has no administration in time range)   FLUoxetine (PROzac) capsule 40 mg (has no administration in time range)   mycophenolate (GENERIC EQUIVALENT) capsule 750 mg (has no administration in time range)   omeprazole (PriLOSEC) CR capsule 40 mg (has no administration in time range)   predniSONE (DELTASONE) tablet 5 mg (has no administration in time range)   sulfamethoxazole-trimethoprim (BACTRIM) 400-80 MG per tablet 1 tablet (has no administration in time range)   tacrolimus (GENERIC EQUIVALENT) capsule 4 mg (has no administration in time range)   valGANciclovir (VALCYTE) tablet 450 mg (has no administration in time range)   aspirin EC tablet 81 mg (has no administration in time range)   sodium chloride 0.9% BOLUS 1,000 mL (1,000 mLs Intravenous $New Bag 5/23/25 1935)            Critical care was not performed.     Medical Decision Making  The patient's presentation was of high complexity (an acute health issue posing potential threat to life or bodily function).    The patient's evaluation involved:  ordering and/or review of 3+ test(s) in this  encounter (see above)  discussion of management or test interpretation with another health professional (transplant surgery)    The patient's management necessitated high risk (a decision regarding hospitalization).      Assessments & Plan (with Medical Decision Making)     I have reviewed the nursing notes.    Patient was seen and evaluated by transplant at this time will be admitted to their service.    I have reviewed the findings, diagnosis, and plan with the patient.      Final diagnoses:   Status post kidney transplant - 13 days s/p   Hyperkalemia     Adm Trx      KIRSTIN DYKES MD    5/23/2025   [facility] EMERGENCY DEPARTMENT       Kirstin Dykes MD  05/23/25 2055

## 2025-05-23 NOTE — ED TRIAGE NOTES
Patient presents to ED With CC of abnormal labs and told to come in. Patient had a kidney transplant 13 days ago. WBC elevated and BUN and creatine

## 2025-05-24 LAB
ANION GAP SERPL CALCULATED.3IONS-SCNC: 15 MMOL/L (ref 7–15)
BUN SERPL-MCNC: 33.2 MG/DL (ref 6–20)
CALCIUM SERPL-MCNC: 10.7 MG/DL (ref 8.8–10.4)
CHLORIDE SERPL-SCNC: 104 MMOL/L (ref 98–107)
CREAT SERPL-MCNC: 2.46 MG/DL (ref 0.67–1.17)
EGFRCR SERPLBLD CKD-EPI 2021: 35 ML/MIN/1.73M2
ERYTHROCYTE [DISTWIDTH] IN BLOOD BY AUTOMATED COUNT: 14.1 % (ref 10–15)
GLUCOSE BLDC GLUCOMTR-MCNC: 103 MG/DL (ref 70–99)
GLUCOSE BLDC GLUCOMTR-MCNC: 106 MG/DL (ref 70–99)
GLUCOSE BLDC GLUCOMTR-MCNC: 115 MG/DL (ref 70–99)
GLUCOSE BLDC GLUCOMTR-MCNC: 116 MG/DL (ref 70–99)
GLUCOSE BLDC GLUCOMTR-MCNC: 116 MG/DL (ref 70–99)
GLUCOSE BLDC GLUCOMTR-MCNC: 122 MG/DL (ref 70–99)
GLUCOSE BLDC GLUCOMTR-MCNC: 141 MG/DL (ref 70–99)
GLUCOSE BLDC GLUCOMTR-MCNC: 145 MG/DL (ref 70–99)
GLUCOSE BLDC GLUCOMTR-MCNC: 149 MG/DL (ref 70–99)
GLUCOSE BLDC GLUCOMTR-MCNC: 150 MG/DL (ref 70–99)
GLUCOSE BLDC GLUCOMTR-MCNC: 156 MG/DL (ref 70–99)
GLUCOSE BLDC GLUCOMTR-MCNC: 158 MG/DL (ref 70–99)
GLUCOSE BLDC GLUCOMTR-MCNC: 57 MG/DL (ref 70–99)
GLUCOSE BLDC GLUCOMTR-MCNC: 77 MG/DL (ref 70–99)
GLUCOSE BLDC GLUCOMTR-MCNC: 78 MG/DL (ref 70–99)
GLUCOSE BLDC GLUCOMTR-MCNC: 79 MG/DL (ref 70–99)
GLUCOSE BLDC GLUCOMTR-MCNC: 94 MG/DL (ref 70–99)
GLUCOSE BLDC GLUCOMTR-MCNC: 95 MG/DL (ref 70–99)
GLUCOSE SERPL-MCNC: 165 MG/DL (ref 70–99)
HCO3 SERPL-SCNC: 15 MMOL/L (ref 22–29)
HCT VFR BLD AUTO: 30.1 % (ref 40–53)
HGB BLD-MCNC: 10.2 G/DL (ref 13.3–17.7)
MCH RBC QN AUTO: 30.3 PG (ref 26.5–33)
MCHC RBC AUTO-ENTMCNC: 33.9 G/DL (ref 31.5–36.5)
MCV RBC AUTO: 89 FL (ref 78–100)
PLATELET # BLD AUTO: 221 10E3/UL (ref 150–450)
POTASSIUM SERPL-SCNC: 5 MMOL/L (ref 3.4–5.3)
POTASSIUM SERPL-SCNC: 5 MMOL/L (ref 3.4–5.3)
POTASSIUM SERPL-SCNC: 5.1 MMOL/L (ref 3.4–5.3)
POTASSIUM SERPL-SCNC: 5.5 MMOL/L (ref 3.4–5.3)
POTASSIUM SERPL-SCNC: 5.7 MMOL/L (ref 3.4–5.3)
RBC # BLD AUTO: 3.37 10E6/UL (ref 4.4–5.9)
SODIUM SERPL-SCNC: 134 MMOL/L (ref 135–145)
WBC # BLD AUTO: 12.3 10E3/UL (ref 4–11)

## 2025-05-24 PROCEDURE — 250N000013 HC RX MED GY IP 250 OP 250 PS 637: Performed by: NURSE PRACTITIONER

## 2025-05-24 PROCEDURE — 99223 1ST HOSP IP/OBS HIGH 75: CPT | Mod: 24 | Performed by: INTERNAL MEDICINE

## 2025-05-24 PROCEDURE — 258N000003 HC RX IP 258 OP 636: Performed by: NURSE PRACTITIONER

## 2025-05-24 PROCEDURE — 250N000012 HC RX MED GY IP 250 OP 636 PS 637

## 2025-05-24 PROCEDURE — 250N000012 HC RX MED GY IP 250 OP 636 PS 637: Performed by: NURSE PRACTITIONER

## 2025-05-24 PROCEDURE — 87799 DETECT AGENT NOS DNA QUANT: CPT | Performed by: NURSE PRACTITIONER

## 2025-05-24 PROCEDURE — 250N000013 HC RX MED GY IP 250 OP 250 PS 637: Performed by: SURGERY

## 2025-05-24 PROCEDURE — 250N000012 HC RX MED GY IP 250 OP 636 PS 637: Performed by: STUDENT IN AN ORGANIZED HEALTH CARE EDUCATION/TRAINING PROGRAM

## 2025-05-24 PROCEDURE — 258N000001 HC RX 258

## 2025-05-24 PROCEDURE — 93010 ELECTROCARDIOGRAM REPORT: CPT | Performed by: INTERNAL MEDICINE

## 2025-05-24 PROCEDURE — 250N000011 HC RX IP 250 OP 636: Performed by: NURSE PRACTITIONER

## 2025-05-24 PROCEDURE — 86833 HLA CLASS II HIGH DEFIN QUAL: CPT | Performed by: NURSE PRACTITIONER

## 2025-05-24 PROCEDURE — 36415 COLL VENOUS BLD VENIPUNCTURE: CPT | Performed by: STUDENT IN AN ORGANIZED HEALTH CARE EDUCATION/TRAINING PROGRAM

## 2025-05-24 PROCEDURE — 250N000013 HC RX MED GY IP 250 OP 250 PS 637: Performed by: STUDENT IN AN ORGANIZED HEALTH CARE EDUCATION/TRAINING PROGRAM

## 2025-05-24 PROCEDURE — 258N000003 HC RX IP 258 OP 636

## 2025-05-24 PROCEDURE — 85027 COMPLETE CBC AUTOMATED: CPT | Performed by: STUDENT IN AN ORGANIZED HEALTH CARE EDUCATION/TRAINING PROGRAM

## 2025-05-24 PROCEDURE — 120N000011 HC R&B TRANSPLANT UMMC

## 2025-05-24 PROCEDURE — 250N000009 HC RX 250: Performed by: NURSE PRACTITIONER

## 2025-05-24 PROCEDURE — 84132 ASSAY OF SERUM POTASSIUM: CPT | Performed by: NURSE PRACTITIONER

## 2025-05-24 PROCEDURE — 82962 GLUCOSE BLOOD TEST: CPT

## 2025-05-24 PROCEDURE — 82310 ASSAY OF CALCIUM: CPT | Performed by: STUDENT IN AN ORGANIZED HEALTH CARE EDUCATION/TRAINING PROGRAM

## 2025-05-24 PROCEDURE — 86832 HLA CLASS I HIGH DEFIN QUAL: CPT | Performed by: NURSE PRACTITIONER

## 2025-05-24 PROCEDURE — 258N000001 HC RX 258: Performed by: NURSE PRACTITIONER

## 2025-05-24 PROCEDURE — 84132 ASSAY OF SERUM POTASSIUM: CPT

## 2025-05-24 PROCEDURE — 93005 ELECTROCARDIOGRAM TRACING: CPT

## 2025-05-24 PROCEDURE — 86828 HLA CLASS I&II ANTIBODY QUAL: CPT | Performed by: NURSE PRACTITIONER

## 2025-05-24 PROCEDURE — 36415 COLL VENOUS BLD VENIPUNCTURE: CPT | Performed by: NURSE PRACTITIONER

## 2025-05-24 RX ORDER — DEXTROSE MONOHYDRATE 100 MG/ML
INJECTION, SOLUTION INTRAVENOUS CONTINUOUS
Status: ACTIVE | OUTPATIENT
Start: 2025-05-24 | End: 2025-05-24

## 2025-05-24 RX ORDER — DEXTROSE MONOHYDRATE 25 G/50ML
25 INJECTION, SOLUTION INTRAVENOUS ONCE
Status: COMPLETED | OUTPATIENT
Start: 2025-05-24 | End: 2025-05-24

## 2025-05-24 RX ORDER — INDOMETHACIN 25 MG/1
50 CAPSULE ORAL ONCE
Status: COMPLETED | OUTPATIENT
Start: 2025-05-24 | End: 2025-05-24

## 2025-05-24 RX ORDER — CALCIUM GLUCONATE 20 MG/ML
1 INJECTION, SOLUTION INTRAVENOUS ONCE
Status: COMPLETED | OUTPATIENT
Start: 2025-05-24 | End: 2025-05-24

## 2025-05-24 RX ORDER — DEXTROSE MONOHYDRATE 25 G/50ML
50 INJECTION, SOLUTION INTRAVENOUS ONCE
Status: COMPLETED | OUTPATIENT
Start: 2025-05-24 | End: 2025-05-24

## 2025-05-24 RX ORDER — DEXTROSE MONOHYDRATE 100 MG/ML
INJECTION, SOLUTION INTRAVENOUS CONTINUOUS
Status: DISCONTINUED | OUTPATIENT
Start: 2025-05-24 | End: 2025-05-24

## 2025-05-24 RX ADMIN — TACROLIMUS 4 MG: 1 CAPSULE ORAL at 07:36

## 2025-05-24 RX ADMIN — PANTOPRAZOLE SODIUM 40 MG: 40 TABLET, DELAYED RELEASE ORAL at 07:36

## 2025-05-24 RX ADMIN — CALCIUM GLUCONATE 1 G: 20 INJECTION, SOLUTION INTRAVENOUS at 15:37

## 2025-05-24 RX ADMIN — PREDNISONE 5 MG: 5 TABLET ORAL at 07:36

## 2025-05-24 RX ADMIN — ATORVASTATIN CALCIUM 10 MG: 10 TABLET, FILM COATED ORAL at 07:36

## 2025-05-24 RX ADMIN — DEXTROSE: 10 SOLUTION INTRAVENOUS at 02:06

## 2025-05-24 RX ADMIN — FLUOXETINE HYDROCHLORIDE 40 MG: 40 CAPSULE ORAL at 20:39

## 2025-05-24 RX ADMIN — PANTOPRAZOLE SODIUM 40 MG: 40 TABLET, DELAYED RELEASE ORAL at 16:36

## 2025-05-24 RX ADMIN — SULFAMETHOXAZOLE AND TRIMETHOPRIM 1 TABLET: 400; 80 TABLET ORAL at 07:36

## 2025-05-24 RX ADMIN — SODIUM CHLORIDE 10 UNITS: 0.9 INJECTION, SOLUTION INTRAVENOUS at 01:13

## 2025-05-24 RX ADMIN — MYCOPHENOLATE MOFETIL 750 MG: 250 CAPSULE ORAL at 07:36

## 2025-05-24 RX ADMIN — SODIUM BICARBONATE: 84 INJECTION, SOLUTION INTRAVENOUS at 10:43

## 2025-05-24 RX ADMIN — ASPIRIN 81 MG CHEWABLE TABLET 81 MG: 81 TABLET CHEWABLE at 07:36

## 2025-05-24 RX ADMIN — DEXTROSE 300 ML: 10 SOLUTION INTRAVENOUS at 16:15

## 2025-05-24 RX ADMIN — TACROLIMUS 4 MG: 1 CAPSULE ORAL at 17:34

## 2025-05-24 RX ADMIN — SODIUM ZIRCONIUM CYCLOSILICATE 10 G: 10 POWDER, FOR SUSPENSION ORAL at 15:06

## 2025-05-24 RX ADMIN — CARVEDILOL 6.25 MG: 6.25 TABLET, FILM COATED ORAL at 07:35

## 2025-05-24 RX ADMIN — DEXTROSE MONOHYDRATE 50 ML: 25 INJECTION, SOLUTION INTRAVENOUS at 01:08

## 2025-05-24 RX ADMIN — SODIUM CHLORIDE 7.6 UNITS: 0.9 INJECTION, SOLUTION INTRAVENOUS at 15:50

## 2025-05-24 RX ADMIN — SODIUM BICARBONATE 50 MEQ: 84 INJECTION INTRAVENOUS at 15:58

## 2025-05-24 RX ADMIN — VALGANCICLOVIR 450 MG: 450 TABLET, FILM COATED ORAL at 07:35

## 2025-05-24 RX ADMIN — DEXTROSE MONOHYDRATE 25 G: 25 INJECTION, SOLUTION INTRAVENOUS at 15:41

## 2025-05-24 RX ADMIN — MYCOPHENOLIC ACID 540 MG: 360 TABLET, DELAYED RELEASE ORAL at 20:39

## 2025-05-24 ASSESSMENT — ACTIVITIES OF DAILY LIVING (ADL)
ADLS_ACUITY_SCORE: 52
ADLS_ACUITY_SCORE: 26
ADLS_ACUITY_SCORE: 52
ADLS_ACUITY_SCORE: 26
ADLS_ACUITY_SCORE: 52
ADLS_ACUITY_SCORE: 26
ADLS_ACUITY_SCORE: 26
ADLS_ACUITY_SCORE: 52
ADLS_ACUITY_SCORE: 52
ADLS_ACUITY_SCORE: 26
ADLS_ACUITY_SCORE: 52
ADLS_ACUITY_SCORE: 26
ADLS_ACUITY_SCORE: 26
ADLS_ACUITY_SCORE: 52

## 2025-05-24 NOTE — PLAN OF CARE
"Goal Outcome Evaluation:      Plan of Care Reviewed With: patient    Overall Patient Progress: no changeOverall Patient Progress: no change    Outcome Evaluation: K+ 5.7 shifted today; otherwise stable    /59 (BP Location: Right arm)   Pulse 91   Temp 97.6  F (36.4  C) (Oral)   Resp 18   Ht 1.626 m (5' 4\")   Wt 76.2 kg (168 lb)   SpO2 100%   BMI 28.84 kg/m      Assumed cares [id number]  Neuro: A/Ox4  Pain/Nausea: denies pain and nausea  Cardiac: rate and rhythm regular  Resp: lung sounds clear, equal bilaterally  GI/: voiding spontaneously; no BM today  Diet/Appetite: 2gm K+ diet  BG: checking Q 1hr after shifting K  Access: PIV - bicarb gtt; PIV - D10 @75  Activity: up ad abelardo  Plan:   Will continue with plan of care and notify team of any changes.?        "

## 2025-05-24 NOTE — MEDICATION SCRIBE - ADMISSION MEDICATION HISTORY
Medication Scribe Admission Medication History    Admission medication history is complete. The information provided in this note is only as accurate as the sources available at the time of the update.    Information Source(s): Patient via in-person    Pertinent Information: Jhoan reports no changes in current medications. Per recent office visit on 05/14/25, he was prescribed methocarbamol (ROBAXIN) 750 MG tablet 3 times daily PRN. Patient denies taking any other medications. Dispense report and outside medication reconciliation list have been reviewed.     Changes made to PTA medication list:  Added:   hydrOXYzine HCl (ATARAX) 25 MG tablet (last dispense 05/14/25)  methocarbamol (ROBAXIN) 750 MG tablet (not listed on dispense report)  Deleted:   tacrolimus 0.5 MG capsule  Changed: None    Allergies reviewed with patient and updates made in EHR: yes    Medication History Completed By: Audra Villatoro 5/23/2025 10:46 PM    PTA Med List   Medication Sig Last Dose/Taking    acetaminophen (TYLENOL) 325 MG tablet Take 3 tablets (975 mg) by mouth every 8 hours as needed for mild pain. Taking As Needed    aspirin 81 MG EC tablet Take 81 mg by mouth daily 5/23/2025 at  9:00 AM    atorvastatin (LIPITOR) 10 MG tablet Take 1 tablet (10 mg) by mouth daily. 5/23/2025 at  9:00 AM    carvedilol (COREG) 3.125 MG tablet Take 2 tablets (6.25 mg) by mouth 2 times daily (with meals). 5/23/2025 at  9:00 PM    FLUoxetine (PROZAC) 40 MG capsule Take 40 mg by mouth at bedtime. 5/23/2025 at  9:00 PM    hydrOXYzine HCl (ATARAX) 25 MG tablet Take 25 mg by mouth as needed for other (Sleep). Taking As Needed    methocarbamol (ROBAXIN) 750 MG tablet Take 750 mg by mouth 3 times daily as needed for muscle spasms. Taking As Needed    mycophenolate (GENERIC EQUIVALENT) 250 MG capsule Take 3 capsules (750 mg) by mouth 2 times daily. 5/23/2025 at  9:00 PM    omeprazole (PRILOSEC) 40 MG DR capsule Take 40 mg by mouth daily 5/22/2025 at 11:00 PM     ondansetron (ZOFRAN ODT) 4 MG ODT tab Take 1 tablet (4 mg) by mouth every 8 hours as needed for nausea. 5/22/2025 at 11:00 PM    predniSONE (DELTASONE) 5 MG tablet Take 1 tablet (5 mg) by mouth daily. 5/23/2025 at  9:00 AM    senna-docusate (SENOKOT-S/PERICOLACE) 8.6-50 MG tablet Take 1 tablet by mouth 2 times daily as needed for constipation. Taking As Needed    sulfamethoxazole-trimethoprim (BACTRIM) 400-80 MG tablet Take 1 tablet by mouth daily. 5/23/2025 at  9:00 AM    tacrolimus (GENERIC EQUIVALENT) 1 MG capsule Take 4 capsules (4 mg) by mouth 2 times daily. 5/23/2025 at  9:00 PM    valGANciclovir (VALCYTE) 450 MG tablet Take 1 tablet (450 mg) by mouth daily. 5/23/2025 at  9:00 AM

## 2025-05-24 NOTE — ED NOTES
Attempted to page provider for pt would like to eat. No provider currently signed in. Pending updates to provider list in vocera. Pt updated.

## 2025-05-24 NOTE — PROGRESS NOTES
Patient admitted overnight for leukocytosis, hyperkalemia, and slight increase in creatinine.   Hyperkalemia treated and better this morning.   Leukocytosis decreased without intervention. No signs or symptoms of infection, cultures sent.    Possible rejection vs dehydration. Patient not too keen on getting a biopsy so will check creatinine tomorrow and make decision on biopsy tomorrow.

## 2025-05-24 NOTE — CONSULTS
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 He has an old non-functioning transplant in his right lower quadrant.    Patient presented from clinic yesterday after he was found to have hyperkalemia as well as a leukocytosis on labs. He was admitted for infectious workup. His Cr was initially slightly increased 2.14 on 5/19/25 to 2.46 today) and so IR was consulted for left lower quadrant transplant kidney biopsy to evaluate for rejection. Nephrology made some changes to patient's transplant medications today and initiated IV fluids. If creatinine does not improve tomorrow they would like to proceed with transplant kidney biopsy to evaluate for rejection.    He takes aspirin but is not on anticoagulation.    Imaging:   None reviewed.    Assessment:   29 yo w/ LLQ transplant kidney w/ slightly elevated creatinine. IR is consulted for possible LLQ transplant kidney biopsy, which would be appropriate if patient's creatinine continues to remain elevated/rise.    Plan:   - Case discussed with Candis Kaur NP - if patient's creatinine improves then no need for biopsy. If creatinine worsens then they would like to proceed with biopsy.  - Patient should be NPO at midnight for possible biopsy tomorrow.  - Provider from nephrology will need to be present during the biopsy to evaluate the specimen microscopically and assess sample adequacy. Additionally, pathology will need to be agreeable to reviewing the slides.      Labs:  CBC RESULTS:   Recent Labs   Lab Test 05/24/25  0513   WBC 12.3*   RBC 3.37*   HGB 10.2*   HCT 30.1*   MCV 89   MCH 30.3   MCHC 33.9   RDW 14.1        INR   Date Value Ref Range Status   05/08/2025 0.99 0.85 - 1.15 Final   02/16/2021 0.99 0.86 - 1.14 Final             Discussed with Dr. Spencer.  Kvng Whitney M.D.  Interventional Radiology PGY-6  IR pass pager: [phone]

## 2025-05-24 NOTE — ED NOTES
Time: 0154    Provider Notified: Jeane Abbottujiogabdullahi    Concern: First BG check post shifting with insulin/D50 is 57, suggestion to add D10 bolus per hyperkalemia shifting protocol    Intervention: D10 75 ml/hr continuous ordered

## 2025-05-24 NOTE — H&P
"    [facility]    Consult Note - Transplant Surgery Service  Date of Admission:  5/23/2025  Consult Requested by: ED  Reason for Consult: Leukocytosis and hyperkalemia    Assessment & Plan: Surgery   Jhoan Hoffman is a 30 year old male who recently underwent a DDKT (had three previous kidney transplants) w/ ureteral stent placement on 5/10/25 with Dr. Osullivan, HLD, GERD, and SUN who presents from clinic with nausea as well as emesis found to hyperkalemia as well as leukocytosis on labs.     Relatively asymptomatic but noted to have hyperkalemia as well as leukocytosis of 16.7K. Trend of his Cr shows that his lowest Cr since transplantation was 2.14, but now with hyperkalemia. Will do an infectious work-up and will obtain CT A/P w/o contrast for further evaluation for intra-abd process.     - admit to transplant   - Lasix 60mg IV x1  - CT A/P without contrast   - Urine cx, blood cxs   - resume PTA medications    Drains: None     Code Status:  Full    Clinically Significant Risk Factors Present on Admission        # Hyperkalemia: Highest K = 5.8 mmol/L in last 2 days, will monitor as appropriate  # Hyponatremia: Lowest Na = 132 mmol/L in last 2 days, will monitor as appropriate    # Hypercalcemia: Highest Ca = 11.1 mg/dL in last 2 days, will monitor as appropriate      # Drug Induced Platelet Defect: home medication list includes an antiplatelet medication   # Hypertension: Noted on problem list      # Anemia: based on hgb <11       # Overweight: Estimated body mass index is 27.71 kg/m  as calculated from the following:    Height as of 5/8/25: 1.676 m (5' 6\").    Weight as of 5/19/25: 77.9 kg (171 lb 11.2 oz).       # Financial/Environmental Concerns:         The patient's care was discussed with the fellow on service.    Bonnie Arenas MD  [facility]  Non-urgent messages: Securely message with [employer] (more info)  Text " page via [facility] Paging/Directory     ______________________________________________________________________    Chief Complaint   Abnormal labs    History of Present Illness   Jhoan Hoffman is a 30 year old male who recently underwent a DDKT (had three previous kidney transplants) w/ ureteral stent placement on 5/10/25 with Dr. Osullivan, HLD, GERD, and SUN who presents from clinic with nausea as well as emesis found to hyperkalemia as well as leukocytosis on labs.     Patient recently admitted form 5/8-5/13/25 for a DDKT (intra-abdominal placement) as well as ureteral stent placement. His hospital course was relatively unremarkable. Pt reports that he had been feeling nauseous two days ago but that has since resolved. He denies any emesis, fevers/chills, dyspnea, chest pain, dysuria, or diarrhea. He continues to make urine. He went for a lab draw in which he was found to have wbc of 16K (8.8K four days ago) and Cr of 2.37 (2.14 four yusuf prior). On presentation to the ED, his vitals were wnl with labs demonstrating a Cr of 2.29. He underwent a renal transplant U/S which demonstrated normal transplanted renal artery peak velocity with mildly elevated intrarenal resistive indices.     Past Medical History    Past Medical History:   Diagnosis Date    Anemia     in ESRD    Anxiety 2012    C. difficile diarrhea 2023    Depression     Depression 2012    Hypertension     Kidney replaced by transplant 1995    Failed 2010    Kidney replaced by transplant 2011    Failed 2011, ischemic necrosis and organized thrombus    Kidney replaced by transplant 2012 Failed 2020    CYNDEE (obstructive sleep apnea)     Pericarditis 2022    Peritoneal dialysis status      Past Surgical History   Past Surgical History:   Procedure Laterality Date    BENCH KIDNEY  5/10/2025    Procedure: Bench kidney;  Surgeon: Omar Osullivan MD;  Location: [facility] OR    BIOPSY      CREATE GRAFT LOOP ARTERIOVENOUS UPPER EXTREMITY  12/17/2020    Procedure: Create  Graft Left Arteriovenous Upper Extremity;  Surgeon: Sidney Palma MD;  Location: [facility] OR    CV PERICARDIOCENTESIS N/A 12/12/2022    Procedure: Pericardiocentesis;  Surgeon: Dennis Rodriguez MD;  Location: [facility]    CYSTOSCOPY, REMOVE STENT(S), COMBINED  5/25/2012    Procedure:COMBINED CYSTOSCOPY, REMOVE STENT(S); Cystoscopy, Removal Of Urinary Stent; Surgeon:FLORENTINO KNIGHT; Location:[facility] OR    ENDARECTERECTOMY RENAL  5/18/2011    Procedure:ENDARECTERECTOMY RENAL; Exploration of Transplant Kidney, Back Table Flush, Biopsy of Kidney and Reanastamosis of Kidney; Surgeon:FLORENTINO KNIGHT; Location:[facility]    EXPLANT TRANSPLANTED KIDNEY  7/12/2011    Procedure:EXPLANT TRANSPLANTED KIDNEY; Transplant Nephrectomy; Surgeon:FLORENTINO KNIGHT; [facility]    INSERT CATHETER HEMODIALYSIS  5/18/2011    Procedure:INSERT CATHETER HEMODIALYSIS; Double Lumen; Surgeon:JOE HE; Location:[facility]    INSERT CATHETER PERITONEAL DIALYSIS  4/28/2012    Procedure:INSERT CATHETER PERITONEAL DIALYSIS; Placement dialysis cath under fluoro, before kidney tx; Surgeon:FLORENTINO KNIGHT; Location:[facility]    IR CVC TUNNEL PLACEMENT > 5 YRS OF AGE  12/18/2020    LAPAROTOMY EXPLORATORY  5/19/2011    Procedure:LAPAROTOMY EXPLORATORY; washout of kidney and closure; Surgeon:FLORENTINO KNIGHT; [facility]    PERCUTANEOUS BIOPSY KIDNEY  6/2/2011    Procedure:PERCUTANEOUS BIOPSY KIDNEY; Surgeon:GIL WILLIAM; [facility]    PERCUTANEOUS BIOPSY KIDNEY Right 6/26/2019    Procedure: Right Kidney Biopsy;  Surgeon: Peter Briggs MD;  [facility]: [facility]    PERCUTANEOUS BIOPSY KIDNEY Right 7/23/2019    Procedure: Right Kidney Biopsy;  Surgeon: Pro Menard MD;  [facility]: [facility]    REMOVE CATHETER PERITONEAL  5/3/2012    Procedure:REMOVE CATHETER PERITONEAL; Removal Of Peritoneal Dialysis Catheter; Surgeon:FLORENTINO KNIGHT; [facility]    REVISION FISTULA ARTERIOVENOUS UPPER  EXTREMITY Left 2021    Procedure: Left upper AV fistula revision (transposition) with intraoperative ultrasound;  Surgeon: Sidney Palma MD;  Location: [facility]    TRANSPLANT KIDNEY RECIPIENT  DONOR Right 5/10/2025    Procedure: Right Kidney Transplanbt Intraperitoneal;  Surgeon: Omar Osullivan MD;  Location: [facility]    TRANSPLANT KIDNEY RECIPIENT LIVING RELATED      TRANSPLANT KIDNEY RECIPIENT LIVING UNRELATED  2011    Procedure:TRANSPLANT KIDNEY RECIPIENT LIVING UNRELATED; Living unrelated left kidney transplant and placement of ureteral stent into transplant ureter.; Surgeon:FLORENTINO KNIGHT; [facility]    TRANSPLANT KIDNEY RECIPIENT LIVING UNRELATED  2012    Procedure:TRANSPLANT KIDNEY RECIPIENT LIVING UNRELATED; kidney transplant -   UNOS# [id number]  Organ arrival:   Cross match results:   Donor blood type: A  Recipient blood type: A; Surgeon:FLORENTINO KNIGHT; Location:[facility]     Prior to Admission Medications   I have reviewed this patient's current medications     Physical Exam   Vital Signs: Temp: 97.8  F (36.6  C)   BP: 118/77 Pulse: 90   Resp: 16 SpO2: 100 %      Weight: 0 lbs 0 ozNo intake or output data in the 24 hours ending 25    GENERAL: NAD, resting comfortably in bed  HEENT: atraumatic, normocephalic, no scleral icterus  CARDIO: normal rate and regular rhythm  PULM: no increased WOB  ABD: non-distended, staples intact, soft, and non-tender  NEURO: CN II-XII grossly intact, no focal neuro deficits  PSYCH: appropriate affect and behavior    Data     I have personally reviewed the following data over the past 24 hrs:    16.7 (H)  \   10.6 (L)   / 266     132 (L) 104 33.0 (H) /  113 (H)   5.8 (H) 17 (L) 2.29 (H) \     ALT: 45 AST: 26 AP: 186 (H) TBILI: 0.6   ALB: 4.5 TOT PROTEIN: 7.5 LIPASE: N/A       Imaging results reviewed over the past 24 hrs:   Recent Results (from the past 24 hours)   US Renal Transplant with Doppler    Narrative     EXAM: US RENAL TRANSPLANT WITH DOPPLER  LOCATION: [facility]  DATE: 5/23/2025    INDICATION: 2 wks s p transplant  COMPARISON: 5/10/2025  TECHNIQUE: Ultrasound of the renal transplant with Doppler waveform spectral analysis.    FINDINGS: The transplant kidney is located in the left lower quadrant. The transplant kidney is normal in echogenicity. There is no cortical thinning. There is no hydronephrosis, calculus, cyst or mass. Ureteral stent partially visualized. There is no   perinephric fluid.    The urinary bladder is normal.     TRANSPLANT DOPPLER:    The main renal artery peak systolic velocity is normal (less than 200 cm/sec). The intra-renal transplant resistive indices are 0.72-0.77 (history 0.45-0.54).  Renal vein is patent.      Impression    IMPRESSION:   1.  Normal grayscale appearance of the left lower quadrant renal transplant.  2.  Normal transplant renal artery peak systolic velocity.  3.  Mildly elevated intrarenal resistive indices, nonspecific in the recent postoperative setting.

## 2025-05-24 NOTE — CONSULTS
[facility]  Transplant Nephrology Consult Note  Date of Admission:  5/23/2025  Today's Date: 05/24/2025  Requesting physician: Sidney Palma MD    Reason for Consult:  - DDKT    Recommendations:   - No acute indications for dialysis.  - Recommend decreasing tacrolimus dose to 4 mg bid.  - Also recommend changing mycophenolate mofetil to mycophenolic acid 540 mg bid.  - Agree with IV fluids.  - If creatinine isn't improved, may consider kidney transplant biopsy.  - Recommend checking magnesium and phosphorus levels.    Assessment & Plan   # DDKT: Increased creatinine.  Good urine output.  No acute indications for dialysis.   - TERRY felt secondary to prerenal/dehydration, as well as high tacrolimus level.  Cannot completely rule out acute rejection as patient is at higher risk with high sensitization and 4th kidney.   - Baseline Creatinine: ~ TBD   - Proteinuria: Not checked post transplant   - DSA Hx: No DSA   - Last cPRA: 100%   - BK Viremia: No   - Kidney Tx Biopsy Hx: No biopsy history.    # Immunosuppression: Tacrolimus immediate release (goal 8-10), Mycophenolate mofetil (dose 750 mg every 12 hours), and Prednisone (dose 5 mg daily)   - Induction with Recent Transplant:  High Intensity Protocol   - Continue with intensive monitoring of immunosuppression for efficacy and toxicity.   - Historical Changes in Immunosuppression: None   - Changes: Yes - With high tacrolimus level, would decrease dose to 4 mg bid.  Also, with ongoing loose stools, recommend changing mycophenolate mofetil to mycophenolic acid 540 mg bid.    # Infection Prevention:   Last CD4 Level: Not checked  - PJP: Sulfa/TMP (Bactrim)  - CMV: Valganciclovir (Valcyte)      - CMV IgG Ab High Risk Discordance (D+/R-) at time of transplant: No  Present CMV Serostatus: Negative  - EBV IgG Ab High Risk Discordance (D+/R-) at time of transplant: No  Present EBV Serostatus: Positive    # Hypertension:  Controlled, but low at times;  Goal BP: < 140/90   - Changes: Yes - Would hold carvedilol.  Recommend giving IV fluids.    # Elevated Blood Glucose: Glucose generally running ~ 110-160s    # Anemia in Chronic Renal Disease: Hgb: Stable      BASHIR: No   - Iron studies: Low iron saturation, but high ferritin    # Mineral Bone Disorder:    - Secondary renal hyperparathyroidism; PTH level: Moderately elevated (301-600 pg/ml)        On treatment: None  - Vitamin D; level: Low        On supplement: No; Not on cholecalciferol due to high serum calcium.  - Calcium; level: High        On supplement: No; Agree with IV fluids.  - Phosphorus; level: Stable low        On supplement: No    # Electrolytes:  - Potassium; level: High normal        On supplement: No; Did receive treatment for hyperkalemia overnight.  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 Repeat ECHO showed resolution. CT A/P from 2/2023 mentions pericardial thickening.               - CYNDEE on CPAP: Patient is compliant.  - Anxiety and Depression: previously required admission, almost 10 years ago. Now managed on fluoxetine.    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    Jose Culp MD  Transplant Nephrology  Contact information via Vocera Web Console or via [facility] Paging/Directory      History of Present Illness  31 yo male with ESKD  secondary to congenital renal dysplasia, s/p previous kidney transplants in 1995,  2011, and 2012.  More recently s/p DDKT 5/10/25.  Kidney function has been improving, but start to have some nausea and loose stools.  Patient had labs done that showed increase serum creatinine and hyperkalemia.  Patient was seen in ED and also found to have elevated WBC.  He was admitted for management and received K cocktail overnight.  Patient reports he was feeling a bit dehydrated, despite trying to push fluids.  No chest pain or shortness of breath.  No fever, sweats or chills.  Creatinine was further evaluated this morning.    Review of Systems   The 10 point Review of Systems is negative other than noted in the HPI or here.      MEDICATIONS:  Current Facility-Administered Medications   Medication Dose Route Frequency Provider Last Rate Last Admin    aspirin EC tablet 81 mg  81 mg Oral Daily Bonnie Arenas MD   81 mg at 05/24/25 0736    atorvastatin (LIPITOR) tablet 10 mg  10 mg Oral Daily Bonnie Arenas MD   10 mg at 05/24/25 0736    carvedilol (COREG) tablet 6.25 mg  6.25 mg Oral BID w/meals Sidney Palma MD   6.25 mg at 05/24/25 0735    FLUoxetine (PROzac) capsule 40 mg  40 mg Oral At Bedtime Bonnie Arenas MD        mycophenolate (GENERIC EQUIVALENT) capsule 750 mg  750 mg Oral BID Bonnie Arenas MD   750 mg at 05/24/25 0736    pantoprazole (PROTONIX) EC tablet 40 mg  40 mg Oral BID AC Sidney Palma MD   40 mg at 05/24/25 0736    predniSONE (DELTASONE) tablet 5 mg  5 mg Oral Daily Bonnie Arenas MD   5 mg at 05/24/25 0736    sodium chloride (PF) 0.9% PF flush 3 mL  3 mL Intracatheter Q8H Shelton Aldana MD        sodium chloride (PF) 0.9% PF flush 3 mL  3 mL Intracatheter Q8H Bonnie Tapia MD   3 mL at 05/24/25 0737    sulfamethoxazole-trimethoprim (BACTRIM) 400-80 MG per tablet 1 tablet  1 tablet Oral Daily Bonnie Arenas MD   1 tablet at 05/24/25  "0736    tacrolimus (GENERIC EQUIVALENT) capsule 4 mg  4 mg Oral BID IS Bonnie Arenas MD   4 mg at 25 0736    valGANciclovir (VALCYTE) tablet 450 mg  450 mg Oral Daily Bonnie Arenas MD   450 mg at 25 0735     Current Facility-Administered Medications   Medication Dose Route Frequency Provider Last Rate Last Admin    sodium bicarbonate 125 mEq in sterile water (preservative free) 1,000 mL infusion   Intravenous Continuous Candis Kaur  mL/hr at 25 1043 New Bag at 25 1043       Physical Exam   Temp  Av.9  F (36.6  C)  Min: 97.5  F (36.4  C)  Max: 98.3  F (36.8  C)      Pulse  Av  Min: 83  Max: 109 Resp  Av.6  Min: 16  Max: 18  SpO2  Av.9 %  Min: 99 %  Max: 100 %     BP 99/64   Pulse 95   Temp 98.3  F (36.8  C) (Oral)   Resp 18   Ht 1.626 m (5' 4\")   Wt 76.2 kg (168 lb)   SpO2 100%   BMI 28.84 kg/m     Date 25 07 - 25 0659   Shift 1025-5356 3815-2312 0503-0498 24 Hour Total   INTAKE   P.O. 200   200   Shift Total(mL/kg) 200(2.62)   200(2.62)   OUTPUT   Shift Total(mL/kg)       Weight (kg) 76.2 76.2 76.2 76.2      Admit Weight: 76.2 kg (168 lb)     GENERAL APPEARANCE: alert and no distress  HENT: mouth without ulcers or lesions  RESP: lungs clear to auscultation - no rales, rhonchi or wheezes  CV: regular rhythm, normal rate, no rub, no murmur  EDEMA: no LE edema bilaterally  ABDOMEN: soft, nondistended, nontender, bowel sounds normal  MS: extremities normal - no gross deformities noted, no evidence of inflammation in joints, no muscle tenderness  SKIN: no rash  TX KIDNEY: normal  DIALYSIS ACCESS:  LUE AV fistula with good thrill    Data   All labs reviewed by me.  CMP  Recent Labs   Lab 25  0734 25  0623 25  0600 25  0513 25  0107 25  2204 25  1723 25  0755   NA  --   --   --  134*  --   --  132* 137   POTASSIUM  --   --   --  5.0  5.0  --  5.5* 5.8* 4.5   CHLORIDE  --   --   " --  104  --   --  104 106   CO2  --   --   --  15*  --   --  17* 20*   ANIONGAP  --   --   --  15  --   --  11 11   * 141* 149* 165*   < >  --  113* 105*   BUN  --   --   --  33.2*  --   --  33.0* 20.5*   CR  --   --   --  2.46*  --   --  2.29* 2.14*   GFRESTIMATED  --   --   --  35*  --   --  38* 41*   LIZZ  --   --   --  10.7*  --   --  11.1* 10.8*   PROTTOTAL  --   --   --   --   --   --  7.5  --    ALBUMIN  --   --   --   --   --   --  4.5  --    BILITOTAL  --   --   --   --   --   --  0.6  --    ALKPHOS  --   --   --   --   --   --  186*  --    AST  --   --   --   --   --   --  26  --    ALT  --   --   --   --   --   --  45  --     < > = values in this interval not displayed.     CBC  Recent Labs   Lab 05/24/25  0513 05/23/25  1723 05/19/25  0755   HGB 10.2* 10.6* 10.0*   WBC 12.3* 16.7* 8.8   RBC 3.37* 3.57* 3.32*   HCT 30.1* 31.9* 29.5*   MCV 89 89 89   MCH 30.3 29.7 30.1   MCHC 33.9 33.2 33.9   RDW 14.1 14.3 13.2    266 177     INRNo lab results found in last 7 days.  ABGNo lab results found in last 7 days.   Urine Studies  Recent Labs   Lab Test 05/23/25  1715 05/09/25  0641 04/06/23  1100 07/23/19  0600   COLOR Light Yellow Light Yellow Yellow Straw   APPEARANCE Clear Clear Slightly Cloudy* Slightly Cloudy   URINEGLC Negative 30* 30* Negative   URINEBILI Negative Negative Negative Negative   URINEKETONE Negative Negative Negative Negative   SG 1.015 1.012 1.015 1.005   UBLD Moderate* Small* Small* Small*   URINEPH 5.5 6.0 6.0 6.0   PROTEIN Negative 100* 50* 100*   NITRITE Negative Negative Negative Negative   LEUKEST Negative Negative Negative Negative   RBCU 22* 13* 10* 0   WBCU 1 3 9* 0     Recent Labs   Lab Test 07/23/19  0600 06/26/19  0555 06/07/19  1518 09/18/17  1422   UTPG 2.82* 1.86* 1.98* 0.79*     PTH  Recent Labs   Lab Test 05/15/25  0754 12/19/20  0726 09/18/17  1431   PTHI 324* 2,523* 151*     Iron Studies  Recent Labs   Lab Test 05/15/25  0754 12/19/20  0726   IRON 52* 53   FEB  198* 218*   IRONSAT 26 24   CLINTON 1,028* 815       IMAGING:  All imaging studies reviewed by me.    Past Medical History    I have reviewed this patient's medical history and updated it with pertinent information if needed.   Past Medical History:   Diagnosis Date    Anemia     in ESRD    Anxiety 2012    C. difficile diarrhea 2023    Depression     Depression 2012    Hypertension     Kidney replaced by transplant 1995    Failed 2010    Kidney replaced by transplant 2011    Failed 2011, ischemic necrosis and organized thrombus    Kidney replaced by transplant 2012 Failed 2020    CYNDEE (obstructive sleep apnea)     Pericarditis 2022    Peritoneal dialysis status        Past Surgical History   I have reviewed this patient's surgical history and updated it with pertinent information if needed.  Past Surgical History:   Procedure Laterality Date    BENCH KIDNEY  5/10/2025    Procedure: Bench kidney;  Surgeon: Omar Osullivan MD;  Location: [facility]    BIOPSY      CREATE GRAFT LOOP ARTERIOVENOUS UPPER EXTREMITY  12/17/2020    Procedure: Create Graft Left Arteriovenous Upper Extremity;  Surgeon: Sidney Palma MD;  Location: [facility] OR    CV PERICARDIOCENTESIS N/A 12/12/2022    Procedure: Pericardiocentesis;  Surgeon: Dennis Rodriguez MD;  Location: [facility]    CYSTOSCOPY, REMOVE STENT(S), COMBINED  5/25/2012    Procedure:COMBINED CYSTOSCOPY, REMOVE STENT(S); Cystoscopy, Removal Of Urinary Stent; Surgeon:FLORENTINO KNIGHT; Location:[facility] OR    ENDARECTERECTOMY RENAL  5/18/2011    Procedure:ENDARECTERECTOMY RENAL; Exploration of Transplant Kidney, Back Table Flush, Biopsy of Kidney and Reanastamosis of Kidney; Surgeon:FLORENTINO KNIGHT; Location:[facility]    EXPLANT TRANSPLANTED KIDNEY  7/12/2011    Procedure:EXPLANT TRANSPLANTED KIDNEY; Transplant Nephrectomy; Surgeon:FLORENTINO KNIGHT; [facility]    INSERT CATHETER HEMODIALYSIS  5/18/2011    Procedure:INSERT CATHETER HEMODIALYSIS; Double  Lumen; Surgeon:JOE HE; Location:[facility]    INSERT CATHETER PERITONEAL DIALYSIS  2012    Procedure:INSERT CATHETER PERITONEAL DIALYSIS; Placement dialysis cath under fluoro, before kidney tx; Surgeon:FLORENTINO KNIGHT; [facility]    IR CVC TUNNEL PLACEMENT > 5 YRS OF AGE  2020    LAPAROTOMY EXPLORATORY  2011    Procedure:LAPAROTOMY EXPLORATORY; washout of kidney and closure; Surgeon:FLORENTINO KNIGHT; [facility]    PERCUTANEOUS BIOPSY KIDNEY  2011    Procedure:PERCUTANEOUS BIOPSY KIDNEY; Surgeon:GIL WILLIAM; [facility]    PERCUTANEOUS BIOPSY KIDNEY Right 2019    Procedure: Right Kidney Biopsy;  Surgeon: Peter Briggs MD;  [facility]    PERCUTANEOUS BIOPSY KIDNEY Right 2019    Procedure: Right Kidney Biopsy;  Surgeon: Pro Menard MD;  [facility]: [facility]    REMOVE CATHETER PERITONEAL  5/3/2012    Procedure:REMOVE CATHETER PERITONEAL; Removal Of Peritoneal Dialysis Catheter; Surgeon:FLORENTINO KNIGHT; [facility]    REVISION FISTULA ARTERIOVENOUS UPPER EXTREMITY Left 2021    Procedure: Left upper AV fistula revision (transposition) with intraoperative ultrasound;  Surgeon: Sidney Palma MD;  Location: [facility]    TRANSPLANT KIDNEY RECIPIENT  DONOR Right 5/10/2025    Procedure: Right Kidney Transplanbt Intraperitoneal;  Surgeon: Omar Osullivan MD;  [facility]: [facility]    TRANSPLANT KIDNEY RECIPIENT LIVING RELATED  1995    TRANSPLANT KIDNEY RECIPIENT LIVING UNRELATED  2011    Procedure:TRANSPLANT KIDNEY RECIPIENT LIVING UNRELATED; Living unrelated left kidney transplant and placement of ureteral stent into transplant ureter.; Surgeon:FLORENTINO KNIGHT; [facility]    TRANSPLANT KIDNEY RECIPIENT LIVING UNRELATED  2012    Procedure:TRANSPLANT KIDNEY RECIPIENT LIVING UNRELATED; kidney transplant -   UNOS# [id number]  Organ arrival:   Cross match results:   Donor blood type: A  Recipient blood type:  ROLANDO; Surgeon:FLORENTINO KNIGHT; [facility]       Family History   I have reviewed this patient's family history and updated it with pertinent information if needed.   Family History   Problem Relation Age of Onset    Kidney Disease No family hx of        Social History   I have reviewed this patient's social history and updated it with pertinent information if needed. Jhoan Hoffman  reports that he has never smoked. He has never used smokeless tobacco. He reports that he does not currently use alcohol. He reports that he does not use drugs.    Prior to Admission Medications   Medications Prior to Admission   Medication Sig Dispense Refill Last Dose/Taking    acetaminophen (TYLENOL) 325 MG tablet Take 3 tablets (975 mg) by mouth every 8 hours as needed for mild pain. 60 tablet 0 Taking As Needed    aspirin 81 MG EC tablet Take 81 mg by mouth daily   5/23/2025 at  9:00 AM    atorvastatin (LIPITOR) 10 MG tablet Take 1 tablet (10 mg) by mouth daily. 30 tablet 2 5/23/2025 at  9:00 AM    carvedilol (COREG) 3.125 MG tablet Take 2 tablets (6.25 mg) by mouth 2 times daily (with meals).   5/23/2025 at  9:00 PM    FLUoxetine (PROZAC) 40 MG capsule Take 40 mg by mouth at bedtime. 90 capsule 0 5/23/2025 at  9:00 PM    hydrOXYzine HCl (ATARAX) 25 MG tablet Take 25 mg by mouth as needed for other (Sleep).   Taking As Needed    methocarbamol (ROBAXIN) 750 MG tablet Take 750 mg by mouth 3 times daily as needed for muscle spasms.   Taking As Needed    mycophenolate (GENERIC EQUIVALENT) 250 MG capsule Take 3 capsules (750 mg) by mouth 2 times daily. 180 capsule 11 5/23/2025 at  9:00 PM    omeprazole (PRILOSEC) 40 MG DR capsule Take 40 mg by mouth daily   5/22/2025 at 11:00 PM    ondansetron (ZOFRAN ODT) 4 MG ODT tab Take 1 tablet (4 mg) by mouth every 8 hours as needed for nausea. 15 tablet 0 5/22/2025 at 11:00 PM    predniSONE (DELTASONE) 5 MG tablet Take 1 tablet (5 mg) by mouth daily. 30 tablet 11 5/23/2025 at  9:00 AM     senna-docusate (SENOKOT-S/PERICOLACE) 8.6-50 MG tablet Take 1 tablet by mouth 2 times daily as needed for constipation. 60 tablet 0 Taking As Needed    sulfamethoxazole-trimethoprim (BACTRIM) 400-80 MG tablet Take 1 tablet by mouth daily. 30 tablet 11 5/23/2025 at  9:00 AM    tacrolimus (GENERIC EQUIVALENT) 1 MG capsule Take 4 capsules (4 mg) by mouth 2 times daily. 240 capsule 11 5/23/2025 at  9:00 PM    valGANciclovir (VALCYTE) 450 MG tablet Take 1 tablet (450 mg) by mouth daily. 60 tablet 2 5/23/2025 at  9:00 AM

## 2025-05-24 NOTE — PROGRESS NOTES
Admitted/transferred from: ED  Time of arrival on unit 0959  2 RN full except under PIV skin assessment completed by Zaid ALATORRE RN and Love NICE RN  Skin assessment finding: issues found midline abdominal incision - stapled, TAMMIE   Interventions/actions: other continue to monitor     Will continue to monitor.  ZAID LEMONS RN on 5/24/2025 at 1:33 PM

## 2025-05-25 ENCOUNTER — APPOINTMENT (OUTPATIENT)
Dept: INTERVENTIONAL RADIOLOGY/VASCULAR | Facility: CLINIC | Age: 30
End: 2025-05-25
Payer: MEDICARE

## 2025-05-25 LAB
ANION GAP SERPL CALCULATED.3IONS-SCNC: 12 MMOL/L (ref 7–15)
BACTERIA UR CULT: NO GROWTH
BK VIRUS SPECIMEN TYPE: NORMAL
BKV DNA # SPEC NAA+PROBE: NOT DETECTED IU/ML
BUN SERPL-MCNC: 29.8 MG/DL (ref 6–20)
CALCIUM SERPL-MCNC: 10.6 MG/DL (ref 8.8–10.4)
CHLORIDE SERPL-SCNC: 103 MMOL/L (ref 98–107)
CREAT SERPL-MCNC: 2.48 MG/DL (ref 0.67–1.17)
EGFRCR SERPLBLD CKD-EPI 2021: 35 ML/MIN/1.73M2
ERYTHROCYTE [DISTWIDTH] IN BLOOD BY AUTOMATED COUNT: 14.2 % (ref 10–15)
GLUCOSE SERPL-MCNC: 95 MG/DL (ref 70–99)
HCO3 SERPL-SCNC: 20 MMOL/L (ref 22–29)
HCT VFR BLD AUTO: 28.2 % (ref 40–53)
HGB BLD-MCNC: 9.4 G/DL (ref 13.3–17.7)
MAGNESIUM SERPL-MCNC: 1.5 MG/DL (ref 1.7–2.3)
MCH RBC QN AUTO: 29.3 PG (ref 26.5–33)
MCHC RBC AUTO-ENTMCNC: 33.3 G/DL (ref 31.5–36.5)
MCV RBC AUTO: 88 FL (ref 78–100)
PHOSPHATE SERPL-MCNC: 2.6 MG/DL (ref 2.5–4.5)
PLATELET # BLD AUTO: 192 10E3/UL (ref 150–450)
POTASSIUM SERPL-SCNC: 5.3 MMOL/L (ref 3.4–5.3)
RBC # BLD AUTO: 3.21 10E6/UL (ref 4.4–5.9)
SODIUM SERPL-SCNC: 135 MMOL/L (ref 135–145)
TACROLIMUS BLD-MCNC: 17.4 UG/L (ref 5–15)
TME LAST DOSE: ABNORMAL H
TME LAST DOSE: ABNORMAL H
WBC # BLD AUTO: 9.8 10E3/UL (ref 4–11)

## 2025-05-25 PROCEDURE — 250N000013 HC RX MED GY IP 250 OP 250 PS 637: Performed by: NURSE PRACTITIONER

## 2025-05-25 PROCEDURE — 88313 SPECIAL STAINS GROUP 2: CPT | Mod: 26 | Performed by: PATHOLOGY

## 2025-05-25 PROCEDURE — 258N000003 HC RX IP 258 OP 636: Performed by: NURSE PRACTITIONER

## 2025-05-25 PROCEDURE — 88305 TISSUE EXAM BY PATHOLOGIST: CPT | Mod: 26 | Performed by: PATHOLOGY

## 2025-05-25 PROCEDURE — 80197 ASSAY OF TACROLIMUS: CPT | Performed by: NURSE PRACTITIONER

## 2025-05-25 PROCEDURE — 250N000012 HC RX MED GY IP 250 OP 636 PS 637: Performed by: NURSE PRACTITIONER

## 2025-05-25 PROCEDURE — 88348 ELECTRON MICROSCOPY DX: CPT | Mod: 26 | Performed by: PATHOLOGY

## 2025-05-25 PROCEDURE — 0TB13ZX EXCISION OF LEFT KIDNEY, PERCUTANEOUS APPROACH, DIAGNOSTIC: ICD-10-PCS | Performed by: STUDENT IN AN ORGANIZED HEALTH CARE EDUCATION/TRAINING PROGRAM

## 2025-05-25 PROCEDURE — 250N000013 HC RX MED GY IP 250 OP 250 PS 637: Performed by: STUDENT IN AN ORGANIZED HEALTH CARE EDUCATION/TRAINING PROGRAM

## 2025-05-25 PROCEDURE — 250N000011 HC RX IP 250 OP 636: Mod: JZ

## 2025-05-25 PROCEDURE — 99152 MOD SED SAME PHYS/QHP 5/>YRS: CPT

## 2025-05-25 PROCEDURE — 99233 SBSQ HOSP IP/OBS HIGH 50: CPT | Mod: 24 | Performed by: INTERNAL MEDICINE

## 2025-05-25 PROCEDURE — 120N000011 HC R&B TRANSPLANT UMMC

## 2025-05-25 PROCEDURE — 250N000009 HC RX 250: Performed by: NURSE PRACTITIONER

## 2025-05-25 PROCEDURE — 84100 ASSAY OF PHOSPHORUS: CPT | Performed by: NURSE PRACTITIONER

## 2025-05-25 PROCEDURE — 250N000011 HC RX IP 250 OP 636: Performed by: NURSE PRACTITIONER

## 2025-05-25 PROCEDURE — 36415 COLL VENOUS BLD VENIPUNCTURE: CPT | Performed by: NURSE PRACTITIONER

## 2025-05-25 PROCEDURE — 250N000012 HC RX MED GY IP 250 OP 636 PS 637: Performed by: STUDENT IN AN ORGANIZED HEALTH CARE EDUCATION/TRAINING PROGRAM

## 2025-05-25 PROCEDURE — 250N000013 HC RX MED GY IP 250 OP 250 PS 637: Performed by: SURGERY

## 2025-05-25 PROCEDURE — 88348 ELECTRON MICROSCOPY DX: CPT | Mod: TC | Performed by: NURSE PRACTITIONER

## 2025-05-25 PROCEDURE — 76942 ECHO GUIDE FOR BIOPSY: CPT | Mod: 26 | Performed by: STUDENT IN AN ORGANIZED HEALTH CARE EDUCATION/TRAINING PROGRAM

## 2025-05-25 PROCEDURE — 88346 IMFLUOR 1ST 1ANTB STAIN PX: CPT | Mod: 26 | Performed by: PATHOLOGY

## 2025-05-25 PROCEDURE — 88350 IMFLUOR EA ADDL 1ANTB STN PX: CPT | Mod: 26 | Performed by: PATHOLOGY

## 2025-05-25 PROCEDURE — 82310 ASSAY OF CALCIUM: CPT | Performed by: NURSE PRACTITIONER

## 2025-05-25 PROCEDURE — 83735 ASSAY OF MAGNESIUM: CPT | Performed by: NURSE PRACTITIONER

## 2025-05-25 PROCEDURE — 250N000009 HC RX 250

## 2025-05-25 PROCEDURE — 50200 RENAL BIOPSY PERQ: CPT | Mod: LT | Performed by: STUDENT IN AN ORGANIZED HEALTH CARE EDUCATION/TRAINING PROGRAM

## 2025-05-25 PROCEDURE — 272N000505 IR RENAL BIOPSY LEFT

## 2025-05-25 PROCEDURE — 85048 AUTOMATED LEUKOCYTE COUNT: CPT | Performed by: NURSE PRACTITIONER

## 2025-05-25 RX ORDER — DIPHENHYDRAMINE HCL 25 MG
25 CAPSULE ORAL EVERY 6 HOURS PRN
Status: DISCONTINUED | OUTPATIENT
Start: 2025-05-25 | End: 2025-05-27 | Stop reason: HOSPADM

## 2025-05-25 RX ORDER — SODIUM BICARBONATE 650 MG/1
650 TABLET ORAL 2 TIMES DAILY
Status: DISCONTINUED | OUTPATIENT
Start: 2025-05-25 | End: 2025-05-25

## 2025-05-25 RX ORDER — TACROLIMUS 1 MG/1
3 CAPSULE ORAL
Status: DISCONTINUED | OUTPATIENT
Start: 2025-05-26 | End: 2025-05-27 | Stop reason: HOSPADM

## 2025-05-25 RX ORDER — MAGNESIUM OXIDE 400 MG/1
400 TABLET ORAL DAILY
Status: DISCONTINUED | OUTPATIENT
Start: 2025-05-25 | End: 2025-05-27 | Stop reason: HOSPADM

## 2025-05-25 RX ORDER — MAGNESIUM SULFATE HEPTAHYDRATE 40 MG/ML
2 INJECTION, SOLUTION INTRAVENOUS ONCE
Status: COMPLETED | OUTPATIENT
Start: 2025-05-25 | End: 2025-05-25

## 2025-05-25 RX ORDER — FENTANYL CITRATE 50 UG/ML
25-50 INJECTION, SOLUTION INTRAMUSCULAR; INTRAVENOUS EVERY 5 MIN PRN
Refills: 0 | Status: DISCONTINUED | OUTPATIENT
Start: 2025-05-25 | End: 2025-05-25

## 2025-05-25 RX ORDER — FLUMAZENIL 0.1 MG/ML
0.2 INJECTION, SOLUTION INTRAVENOUS
Status: DISCONTINUED | OUTPATIENT
Start: 2025-05-25 | End: 2025-05-25

## 2025-05-25 RX ORDER — NALOXONE HYDROCHLORIDE 0.4 MG/ML
0.4 INJECTION, SOLUTION INTRAMUSCULAR; INTRAVENOUS; SUBCUTANEOUS
Status: DISCONTINUED | OUTPATIENT
Start: 2025-05-25 | End: 2025-05-25

## 2025-05-25 RX ORDER — NALOXONE HYDROCHLORIDE 0.4 MG/ML
0.2 INJECTION, SOLUTION INTRAMUSCULAR; INTRAVENOUS; SUBCUTANEOUS
Status: DISCONTINUED | OUTPATIENT
Start: 2025-05-25 | End: 2025-05-25

## 2025-05-25 RX ADMIN — PANTOPRAZOLE SODIUM 40 MG: 40 TABLET, DELAYED RELEASE ORAL at 07:53

## 2025-05-25 RX ADMIN — SODIUM BICARBONATE 650 MG: 650 TABLET ORAL at 07:53

## 2025-05-25 RX ADMIN — FENTANYL CITRATE 25 MCG: 50 INJECTION, SOLUTION INTRAMUSCULAR; INTRAVENOUS at 10:36

## 2025-05-25 RX ADMIN — MYCOPHENOLIC ACID 540 MG: 360 TABLET, DELAYED RELEASE ORAL at 07:53

## 2025-05-25 RX ADMIN — VALGANCICLOVIR 450 MG: 450 TABLET, FILM COATED ORAL at 07:53

## 2025-05-25 RX ADMIN — PREDNISONE 5 MG: 5 TABLET ORAL at 07:53

## 2025-05-25 RX ADMIN — DIPHENHYDRAMINE HYDROCHLORIDE 25 MG: 25 CAPSULE ORAL at 12:55

## 2025-05-25 RX ADMIN — FENTANYL CITRATE 50 MCG: 50 INJECTION, SOLUTION INTRAMUSCULAR; INTRAVENOUS at 10:43

## 2025-05-25 RX ADMIN — SULFAMETHOXAZOLE AND TRIMETHOPRIM 1 TABLET: 400; 80 TABLET ORAL at 07:53

## 2025-05-25 RX ADMIN — MAGNESIUM OXIDE TAB 400 MG (241.3 MG ELEMENTAL MG) 400 MG: 400 (241.3 MG) TAB at 13:25

## 2025-05-25 RX ADMIN — MYCOPHENOLIC ACID 540 MG: 360 TABLET, DELAYED RELEASE ORAL at 20:05

## 2025-05-25 RX ADMIN — ASPIRIN 81 MG CHEWABLE TABLET 81 MG: 81 TABLET CHEWABLE at 07:53

## 2025-05-25 RX ADMIN — MIDAZOLAM 0.5 MG: 1 INJECTION INTRAMUSCULAR; INTRAVENOUS at 10:48

## 2025-05-25 RX ADMIN — FLUOXETINE HYDROCHLORIDE 40 MG: 40 CAPSULE ORAL at 22:08

## 2025-05-25 RX ADMIN — LIDOCAINE HYDROCHLORIDE 5 ML: 10 INJECTION, SOLUTION EPIDURAL; INFILTRATION; INTRACAUDAL; PERINEURAL at 11:01

## 2025-05-25 RX ADMIN — ATORVASTATIN CALCIUM 10 MG: 10 TABLET, FILM COATED ORAL at 07:53

## 2025-05-25 RX ADMIN — MIDAZOLAM 0.5 MG: 1 INJECTION INTRAMUSCULAR; INTRAVENOUS at 10:36

## 2025-05-25 RX ADMIN — TACROLIMUS 4 MG: 1 CAPSULE ORAL at 07:53

## 2025-05-25 RX ADMIN — MIDAZOLAM 1 MG: 1 INJECTION INTRAMUSCULAR; INTRAVENOUS at 10:43

## 2025-05-25 RX ADMIN — PANTOPRAZOLE SODIUM 40 MG: 40 TABLET, DELAYED RELEASE ORAL at 16:58

## 2025-05-25 RX ADMIN — SODIUM BICARBONATE: 84 INJECTION, SOLUTION INTRAVENOUS at 13:26

## 2025-05-25 RX ADMIN — FENTANYL CITRATE 25 MCG: 50 INJECTION, SOLUTION INTRAMUSCULAR; INTRAVENOUS at 10:49

## 2025-05-25 RX ADMIN — MAGNESIUM SULFATE HEPTAHYDRATE 2 G: 2 INJECTION, SOLUTION INTRAVENOUS at 13:26

## 2025-05-25 ASSESSMENT — ACTIVITIES OF DAILY LIVING (ADL)
ADLS_ACUITY_SCORE: 25
ADLS_ACUITY_SCORE: 26
ADLS_ACUITY_SCORE: 25
ADLS_ACUITY_SCORE: 25
ADLS_ACUITY_SCORE: 26
ADLS_ACUITY_SCORE: 26
ADLS_ACUITY_SCORE: 25
ADLS_ACUITY_SCORE: 26
ADLS_ACUITY_SCORE: 25
ADLS_ACUITY_SCORE: 25
ADLS_ACUITY_SCORE: 26
ADLS_ACUITY_SCORE: 25
ADLS_ACUITY_SCORE: 26
ADLS_ACUITY_SCORE: 26
ADLS_ACUITY_SCORE: 25

## 2025-05-25 NOTE — PLAN OF CARE
"Goal Outcome Evaluation:      Plan of Care Reviewed With: patient    Overall Patient Progress: no changeOverall Patient Progress: no change    Outcome Evaluation: VSS on RA, K+ 5.3, Creatinine 2.48, NPO since midnight, plan for possible biopsy      /72 (BP Location: Right arm)   Pulse 90   Temp 98.1  F (36.7  C) (Oral)   Resp 18   Ht 1.626 m (5' 4\")   Wt 75.2 kg (165 lb 11.2 oz)   SpO2 100%   BMI 28.44 kg/m      Shift: 0474-4811  VS: VSS on RA, afebrile  Neuro: Aox4  Behaviors: calm, cooperative  Labs: K+ 5.3, Creatine 2.48  Respiratory: WDL  Cardiac: WDL  Pain/Nausea: denies pain, denies nausea  Diet: NPO at midnight  IV Access: PIV x2 SL  GI/: voiding adequately, strict I&O, LBM 5/24  Skin: Midline abd incision, open to air, staples  Mobility: UAL  Plan: Poss biopsy today, continue plan of care      "

## 2025-05-25 NOTE — PROCEDURES
[facility]    Procedure: IR Procedure Note    Date/Time: 5/25/2025 11:06 AM    Performed by: Kvng Whitney MD  Authorized by: Kvng Whitney MD  IR Fellow Physician: ADAM Whitney MD  Other(s) attending procedure: PEDRO Spencer MD    Pre Procedure Diagnosis: elevated creatinine. history of kidney transplant  Post Procedure Diagnosis: same    UNIVERSAL PROTOCOL   Site Marked: NA  Prior Images Obtained and Reviewed:  Yes  Required items: Required blood products, implants, devices and special equipment available    Patient identity confirmed:  Arm band, provided demographic data, hospital-assigned identification number and verbally with patient  Patient was reevaluated immediately before administering moderate or deep sedation or anesthesia  Confirmation Checklist:  Correct equipment/implants were available, procedure was appropriate and matched the consent or emergent situation, relevant allergies and patient's identity using two indicators  Time out: Immediately prior to the procedure a time out was called    Universal Protocol: the Joint Commission Universal Protocol was followed    Preparation: Patient was prepped and draped in usual sterile fashion    ESBL (mL):  1     ANESTHESIA    Anesthesia:  Local infiltration  Local Anesthetic:  Lidocaine 1% without epinephrine      SEDATION  Patient Sedated: Yes    Sedation Type:  Moderate (conscious) sedation  Sedation:  Fentanyl and midazolam  Vital signs: Vital signs monitored during sedation    See dictated procedure note for full details.  Findings: See dictation.    Specimens: core needle biopsy specimens sent for pathological analysis    Procedural Complications: None    Condition: Stable    Plan: Return to patient care unit. Bedrest for 2 hours.      PROCEDURE  Describe Procedure: Ultrasound guided left lower quadrant transplant kidney biopsy. Samples were handed off to Dr. Culp who confirmed adequacy of  samples.  Patient Tolerance:  Patient tolerated the procedure well with no immediate complications  Length of time physician/provider present for 1:1 monitoring during sedation:  0-22 min

## 2025-05-25 NOTE — IR NOTE
Patient Name: Jhoan Hoffman  Medical Record Number: [id number]  Today's Date: 5/25/2025    Procedure: LLQ Kidney Transplant Biopsy  Proceduralist: Dr. Tj VASQUEZ, Dr Devonte MEDINA  Pathology present: Dr. Robbi HICKMAN Present  Brief completed: MARQUIS    Procedure Start: 1045  Procedure end: 1054  Sedation medications administered: 2 mg of versed, 100 mcg of fentanyl  Sedation time: 9 minutes (0238-9104)    Report given to: [facility] RN  : MARQUIS    Other Notes: Pt arrived to IR room 6 from [facility]. Consent reviewed. Pt denies any questions or concerns regarding procedure. Pt positioned supine and monitored per protocol. Pt tolerated procedure without any noted complications. LLQ abdominal site cleansed and dressed per protocol. Pt transferred back to [facility].     Keep HOB flat and on bedrest for 2 hours, until 1300.

## 2025-05-25 NOTE — PLAN OF CARE
"Goal Outcome Evaluation:      Plan of Care Reviewed With: patient, family    Overall Patient Progress: no changeOverall Patient Progress: no change    Outcome Evaluation: VSS on RA. K 5.3. Cr 2.48. Kidney biopsy today. Denied pain or nausea. C/o itchiness post-biopsy; gave Benadryl x1    /70 (BP Location: Right arm)   Pulse 81   Temp 97.9  F (36.6  C) (Oral)   Resp 20   Ht 1.626 m (5' 4\")   Wt 75.2 kg (165 lb 11.2 oz)   SpO2 100%   BMI 28.44 kg/m      Shift: 9202-5007  Isolation Status: None  VS: VSS on RA, afebrile  Neuro: Aox4  Behaviors: Calm, pleasant  BG: None  Labs: Cr 2.48. K 5.3  Respiratory: WDL  Cardiac: WDL  Pain/Nausea: Denies  PRN: Benadryl x1 for itchiness  Diet: Regular, tolerating well  IV Access: R PIV x2; L AVF  Infusion(s): Sod. Bicarb @ 100cc/hr  Lines/Drains: None  GI/: Voiding; BM x1  Skin: L bx site with primapore; CDI  Mobility: UAL  Events/Education: Kidney biopsy today  Plan: Continue with POC       "

## 2025-05-25 NOTE — PROGRESS NOTES
[facility]  Transplant Nephrology Progress Note  Date of Admission:  5/23/2025  Today's Date: 05/25/2025    Recommendations:   - No acute indications for dialysis.  - Would make no changes in immunosuppression.  - Would continue on IV fluids with sodium bicarbonate.  - Plan kidney transplant biopsy today.  - Would continue holding carvedilol.  - Recommend starting magnesium oxide 400 mg daily with lunch.    Assessment & Plan   # DDKT: Stable, elevated creatinine.  Good urine output.  No acute indications for dialysis.  Would recommend kidney transplant biopsy.   - TERRY felt secondary to prerenal/dehydration, as well as high tacrolimus level.  Cannot completely rule out acute rejection as patient is at higher risk with high sensitization and 4th kidney.   - Baseline Creatinine: ~ TBD   - Proteinuria: Not checked post transplant   - DSA Hx: No DSA   - Last cPRA: 100%   - BK Viremia: No   - Kidney Tx Biopsy Hx: No biopsy history.    # Immunosuppression: Tacrolimus immediate release (goal 8-10), Mycophenolic acid (dose 540 mg every 12 hours), and Prednisone (dose 5 mg daily)   - Induction with Recent Transplant:  High Intensity Protocol   - Continue with intensive monitoring of immunosuppression for efficacy and toxicity.   - Historical Changes in Immunosuppression: None   - Changes: Not at this time    # Infection Prevention:   Last CD4 Level: Not checked  - PJP: Sulfa/TMP (Bactrim)  - CMV: Valganciclovir (Valcyte)      - CMV IgG Ab High Risk Discordance (D+/R-) at time of transplant: No  Present CMV Serostatus: Negative  - EBV IgG Ab High Risk Discordance (D+/R-) at time of transplant: No  Present EBV Serostatus: Positive    # Hypertension: Controlled;  Goal BP: < 140/90   - Changes: Not at this time; Continue to hold carvedilol and continue IV fluids with sodium bicarbonate.    # Elevated Blood Glucose: Glucose generally running ~ 110-160s    # Anemia in Chronic Renal  Disease: Hgb: Stable      BASHIR: No   - Iron studies: Low iron saturation, but high ferritin    # Mineral Bone Disorder:    - Secondary renal hyperparathyroidism; PTH level: Moderately elevated (301-600 pg/ml)        On treatment: None  - Vitamin D; level: Low        On supplement: No; Not on cholecalciferol due to high serum calcium.  - Calcium; level: High        On supplement: No; Agree with IV fluids.  - Phosphorus; level: Normal        On supplement: No    # Electrolytes:  - Potassium; level: High normal        On supplement: No; Would continue to replace bicarb.  - Magnesium; level: Low        On supplement: No; Recommend starting magnesium oxide 400 mg daily at lunch.  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 Repeat ECHO showed resolution. CT A/P from 2/2023 mentions pericardial thickening.               - CYNDEE on CPAP: Patient is compliant.  - Anxiety and Depression: previously required admission, almost 10 years ago. Now managed on fluoxetine.    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    Jose Culp MD  Transplant Nephrology  Contact information via Vocera Web Console or via [facility] Paging/Directory      Interval History  Mr. Cobb creatinine is 2.48 (05/25 0346); Stable, elevated.  Good urine output.  Other significant labs/tests/vitals: Stable electrolytes.  Trend down in hemoglobin.  No new events overnight.  No chest pain or  shortness of breath.  No leg swelling.  No nausea and vomiting since admission.  Bowel movements are loose.  No fever, sweats or chills.    Review of Systems   4 point ROS was obtained and negative except as noted in the Interval History.    MEDICATIONS:  Current Facility-Administered Medications   Medication Dose Route Frequency Provider Last Rate Last Admin    aspirin EC tablet 81 mg  81 mg Oral Daily Bonnie Arenas MD   81 mg at 05/25/25 0753    atorvastatin (LIPITOR) tablet 10 mg  10 mg Oral Daily Bonnie Arenas MD   10 mg at 05/25/25 0753    [Held by provider] carvedilol (COREG) tablet 6.25 mg  6.25 mg Oral BID w/meals Sidney Palma MD   6.25 mg at 05/24/25 0735    FLUoxetine (PROzac) capsule 40 mg  40 mg Oral At Bedtime Bonnie Arenas MD   40 mg at 05/24/25 2039    mycophenolic acid (GENERIC EQUIVALENT) EC tablet 540 mg  540 mg Oral BID Candis Kaur NP   540 mg at 05/25/25 0753    pantoprazole (PROTONIX) EC tablet 40 mg  40 mg Oral BID AC Sidney Palma MD   40 mg at 05/25/25 0753    predniSONE (DELTASONE) tablet 5 mg  5 mg Oral Daily Bonnie Arenas MD   5 mg at 05/25/25 0753    sodium bicarbonate tablet 650 mg  650 mg Oral BID Candis Kaur NP   650 mg at 05/25/25 0753    sodium chloride (PF) 0.9% PF flush 3 mL  3 mL Intracatheter Q8H Shelton Aldana MD   3 mL at 05/25/25 0118    sodium chloride (PF) 0.9% PF flush 3 mL  3 mL Intracatheter Q8H [facility] Bonnie Arenas MD   3 mL at 05/25/25 0754    sulfamethoxazole-trimethoprim (BACTRIM) 400-80 MG per tablet 1 tablet  1 tablet Oral Daily Bonnie Arenas MD   1 tablet at 05/25/25 0753    tacrolimus (GENERIC EQUIVALENT) capsule 4 mg  4 mg Oral BID IS Bonnie Arenas MD   4 mg at 05/25/25 0753    valGANciclovir (VALCYTE) tablet 450 mg  450 mg Oral Daily Bonnie Arenas MD   450 mg at 05/25/25 0750     Current Facility-Administered Medications   Medication Dose Route Frequency  "Provider Last Rate Last Admin       Physical Exam   Temp  Av.9  F (36.6  C)  Min: 97.5  F (36.4  C)  Max: 98.3  F (36.8  C)      Pulse  Av  Min: 83  Max: 109 Resp  Av.6  Min: 16  Max: 18  SpO2  Av.9 %  Min: 99 %  Max: 100 %     /68   Pulse 96   Temp 97.7  F (36.5  C) (Oral)   Resp 20   Ht 1.626 m (5' 4\")   Wt 75.2 kg (165 lb 11.2 oz)   SpO2 96%   BMI 28.44 kg/m     Date 25 07 - 25 0659   Shift 9526-3110 7368-2314 0131-2927 24 Hour Total   INTAKE   P.O. 200   200   Shift Total(mL/kg) 200(2.62)   200(2.62)   OUTPUT   Shift Total(mL/kg)       Weight (kg) 76.2 76.2 76.2 76.2      Admit Weight: 76.2 kg (168 lb)     GENERAL APPEARANCE: alert and no distress  HENT: mouth without ulcers or lesions  RESP: lungs clear to auscultation - no rales, rhonchi or wheezes  CV: regular rhythm, normal rate, no rub, no murmur  EDEMA: no LE edema bilaterally  ABDOMEN: soft, nondistended, nontender, bowel sounds normal  MS: extremities normal - no gross deformities noted, no evidence of inflammation in joints, no muscle tenderness  SKIN: no rash  TX KIDNEY: normal  DIALYSIS ACCESS:  LUE AV fistula with good thrill    Data   All labs reviewed by me.  CMP  Recent Labs   Lab 25  0346 25  2157 25  2035 25  1936 25  1733 25  1721 25  1540 25  1457 25  1341 25  0600 25  0513 25  2204 25  1723 25  0755     --   --   --   --   --   --   --   --   --  134*  --  132* 137   POTASSIUM 5.3  --   --   --   --  5.1  --  5.5* 5.7*  --  5.0  5.0   < > 5.8* 4.5   CHLORIDE 103  --   --   --   --   --   --   --   --   --  104  --  104 106   CO2 20*  --   --   --   --   --   --   --   --   --  15*  --  17* 20*   ANIONGAP 12  --   --   --   --   --   --   --   --   --  15  --  11 11   GLC 95 106* 116* 145*   < >  --    < >  --   --    < > 165*   < > 113* 105*   BUN 29.8*  --   --   --   --   --   --   --   --   --  33.2*  -- "  33.0* 20.5*   CR 2.48*  --   --   --   --   --   --   --   --   --  2.46*  --  2.29* 2.14*   GFRESTIMATED 35*  --   --   --   --   --   --   --   --   --  35*  --  38* 41*   LIZZ 10.6*  --   --   --   --   --   --   --   --   --  10.7*  --  11.1* 10.8*   MAG 1.5*  --   --   --   --   --   --   --   --   --   --   --   --   --    PHOS 2.6  --   --   --   --   --   --   --   --   --   --   --   --   --    PROTTOTAL  --   --   --   --   --   --   --   --   --   --   --   --  7.5  --    ALBUMIN  --   --   --   --   --   --   --   --   --   --   --   --  4.5  --    BILITOTAL  --   --   --   --   --   --   --   --   --   --   --   --  0.6  --    ALKPHOS  --   --   --   --   --   --   --   --   --   --   --   --  186*  --    AST  --   --   --   --   --   --   --   --   --   --   --   --  26  --    ALT  --   --   --   --   --   --   --   --   --   --   --   --  45  --     < > = values in this interval not displayed.     CBC  Recent Labs   Lab 05/25/25  0346 05/24/25  0513 05/23/25  1723 05/19/25  0751   HGB 9.4* 10.2* 10.6* 10.0*   WBC 9.8 12.3* 16.7* 8.8   RBC 3.21* 3.37* 3.57* 3.32*   HCT 28.2* 30.1* 31.9* 29.5*   MCV 88 89 89 89   MCH 29.3 30.3 29.7 30.1   MCHC 33.3 33.9 33.2 33.9   RDW 14.2 14.1 14.3 13.2    221 266 177     INRNo lab results found in last 7 days.  ABGNo lab results found in last 7 days.   Urine Studies  Recent Labs   Lab Test 05/23/25  1715 05/09/25  0641 04/06/23  1100 07/23/19  0600   COLOR Light Yellow Light Yellow Yellow Straw   APPEARANCE Clear Clear Slightly Cloudy* Slightly Cloudy   URINEGLC Negative 30* 30* Negative   URINEBILI Negative Negative Negative Negative   URINEKETONE Negative Negative Negative Negative   SG 1.015 1.012 1.015 1.005   UBLD Moderate* Small* Small* Small*   URINEPH 5.5 6.0 6.0 6.0   PROTEIN Negative 100* 50* 100*   NITRITE Negative Negative Negative Negative   LEUKEST Negative Negative Negative Negative   RBCU 22* 13* 10* 0   WBCU 1 3 9* 0     Recent Labs   Lab Test  07/23/19  0600 06/26/19  0555 06/07/19  1518 09/18/17  1422   UTPG 2.82* 1.86* 1.98* 0.79*     PTH  Recent Labs   Lab Test 05/15/25  0754 12/19/20  0726 09/18/17  1431   PTHI 324* 2,523* 151*     Iron Studies  Recent Labs   Lab Test 05/15/25  0754 12/19/20  0726   IRON 52* 53   * 218*   IRONSAT 26 24   CLINTON 1,032* 143       IMAGING:  All imaging studies reviewed by me.

## 2025-05-25 NOTE — PLAN OF CARE
Goal Outcome Evaluation:      Plan of Care Reviewed With: patient    Overall Patient Progress: improvingOverall Patient Progress: improving    Outcome Evaluation: K + 5.1. VSS on RA. Aox4.

## 2025-05-25 NOTE — PROGRESS NOTES
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 Omar Osullivan. He presented from home with N/V and was found to have an TERRY, hyperkalemia, and leukocytosis.     Changes today:   - Kidney biopsy today   - Hold tacrolimus tonight, restart 3 mg BID tomorrow  ____________________________________________    s/p DBD DDKT +ureteral stent placement 5/10/25 c/b TERRY: POD#15. Fourth kidney, intra-abdominal. 5/23 US patent with no hydronephrosis. Cr 2.5 (from 2.5). TERRY possibly r/t rejection, supratherapeutic tacrolimus levels.   - Plan for kidney biopsy today     Immunosuppression management:   Induction: Received high intensity protocol (cPRA 100).  Maintenance:    - Myfortic 540 mg BID d/t N/V/D.    - Admit dose Tacrolimus 5 mg BID. Goal level 8-10. Supratherapeutic levesl; hold tonight and restart 3 mg BID tomorrow.   - Prednisone 5 mg daily (fourth kidney transplant)    Neuro/Psych:  Depression/anxiety:    - Continue PTA Prozac.    Hematology:   Anemia of chronic disease: Hgb 9-10, stable.    Cardiorespiratory:   CYNDEE:    - Continue CPAP.   HTN: PTA managed with carvedilol 6.25 mg BID.    - HOLD carvedilol.     GI/Nutrition: Regular diet.     Endocrine: No issues.     Fluid/Electrolytes:   Hypovolemia; Metabolic acidosis:   - Give 1L bicarb fluids today  Hypomagnesemia:    - Start Mag-Ox 400 mg daily   - Give additional 2 grams IV    Infectious disease:   Leukocytosis: Infectious workup negative. WBC now WNL. Resolved.     Prophylaxis: DVT (mechanical), GI (PPI), fall, viral (Valcyte x 12 weeks), PJP (Bactrim indefinitely)     Disposition: 7A    Medically Ready for  Discharge: Anticipated in 2-4 Days     KARLIE/Fellow/Resident Provider: Candis Kaur NP Pager #[phone]    Faculty: Sidney Palma M.D.  _________________________________________________________________  Transplant History: Admitted 5/23/2025 for TERRY.  5/10/2025 (Kidney), 7/13/1995 (Kidney), 5/18/2011 (Kidney), 4/28/2012 (Kidney), Postoperative day: 15     Interval History: History is obtained from the patient  Overnight events: No acute events overnight. Cr about the same today, will proceed with biopsy.     ROS:   A 10-point review of systems was negative except as noted above.    Meds:  Current Facility-Administered Medications   Medication Dose Route Frequency Provider Last Rate Last Admin    aspirin EC tablet 81 mg  81 mg Oral Daily Bonnie Arenas MD   81 mg at 05/25/25 0753    atorvastatin (LIPITOR) tablet 10 mg  10 mg Oral Daily Bonnie Arenas MD   10 mg at 05/25/25 0753    [Held by provider] carvedilol (COREG) tablet 6.25 mg  6.25 mg Oral BID w/meals Sidney Palma MD   6.25 mg at 05/24/25 0735    FLUoxetine (PROzac) capsule 40 mg  40 mg Oral At Bedtime Bonnie Arenas MD   40 mg at 05/24/25 2039    magnesium oxide (MAG-OX) tablet 400 mg  400 mg Oral Daily Candis Kaur NP   400 mg at 05/25/25 1325    magnesium sulfate 2 g in 50 mL sterile water intermittent infusion  2 g Intravenous Once Candis Kaur NP 50 mL/hr at 05/25/25 1326 2 g at 05/25/25 1326    mycophenolic acid (GENERIC EQUIVALENT) EC tablet 540 mg  540 mg Oral BID Candis Kaur NP   540 mg at 05/25/25 0753    pantoprazole (PROTONIX) EC tablet 40 mg  40 mg Oral BID AC Sidney Palma MD   40 mg at 05/25/25 0753    predniSONE (DELTASONE) tablet 5 mg  5 mg Oral Daily Bonnie Arenas MD   5 mg at 05/25/25 0753    sodium chloride (PF) 0.9% PF flush 3 mL  3 mL Intracatheter Q8H [facility] Shelton Nuñez MD   3 mL at 05/25/25 0118    sodium chloride (PF) 0.9% PF flush 3 mL  3 mL Intracatheter Q8H  "[facility] Bonnie Arenas MD   3 mL at 05/25/25 0754    sulfamethoxazole-trimethoprim (BACTRIM) 400-80 MG per tablet 1 tablet  1 tablet Oral Daily Bonnie Arenas MD   1 tablet at 05/25/25 0753    [START ON 5/26/2025] tacrolimus (GENERIC EQUIVALENT) capsule 3 mg  3 mg Oral BID IS Candis Kaur NP        valGANciclovir (VALCYTE) tablet 450 mg  450 mg Oral Daily Bonnie Arenas MD   450 mg at 05/25/25 0753       Physical Exam:     Admit Weight: 76.2 kg (168 lb)    Current vitals:   /70 (BP Location: Right arm)   Pulse 81   Temp 97.9  F (36.6  C) (Oral)   Resp 20   Ht 1.626 m (5' 4\")   Wt 75.2 kg (165 lb 11.2 oz)   SpO2 100%   BMI 28.44 kg/m           Vital sign ranges:    Temp:  [97.5  F (36.4  C)-98.1  F (36.7  C)] 97.9  F (36.6  C)  Pulse:  [] 81  Resp:  [15-22] 20  BP: (110-128)/(68-74) 128/70  SpO2:  [96 %-100 %] 100 %  Patient Vitals for the past 24 hrs:   BP Temp Temp src Pulse Resp SpO2 Weight   05/25/25 1354 128/70 97.9  F (36.6  C) Oral 81 20 100 % --   05/25/25 1055 115/68 -- -- 96 20 96 % --   05/25/25 1050 116/72 -- -- 97 22 97 % --   05/25/25 1045 113/68 -- -- 96 15 100 % --   05/25/25 1030 110/74 -- -- 97 16 100 % --   05/25/25 0937 125/74 97.7  F (36.5  C) Oral 101 18 100 % --   05/25/25 0620 114/72 98.1  F (36.7  C) Oral 90 18 100 % 75.2 kg (165 lb 11.2 oz)   05/24/25 2235 119/68 97.5  F (36.4  C) Oral 87 18 99 % --     General Appearance: in no apparent distress.   Skin: normal, warm, dry  Heart: regular rate and rhythm  Lungs: unlabored on RA  Abdomen: The abdomen is soft. Incision C/D/I.   : mcdaniels is not present.    Extremities: edema: absent.   Neurologic: awake, alert, and oriented. Tremor absent.     Data:   CMP  Recent Labs   Lab 05/25/25  0346 05/24/25  2157 05/24/25  1733 05/24/25  1721 05/24/25  0600 05/24/25  0513 05/23/25  2204 05/23/25  1723     --   --   --   --  134*  --  132*   POTASSIUM 5.3  --   --  5.1   < > 5.0  5.0   < > 5.8* " "  CHLORIDE 103  --   --   --   --  104  --  104   CO2 20*  --   --   --   --  15*  --  17*   GLC 95 106*   < >  --    < > 165*   < > 113*   BUN 29.8*  --   --   --   --  33.2*  --  33.0*   CR 2.48*  --   --   --   --  2.46*  --  2.29*   GFRESTIMATED 35*  --   --   --   --  35*  --  38*   LIZZ 10.6*  --   --   --   --  10.7*  --  11.1*   MAG 1.5*  --   --   --   --   --   --   --    PHOS 2.6  --   --   --   --   --   --   --    ALBUMIN  --   --   --   --   --   --   --  4.5   BILITOTAL  --   --   --   --   --   --   --  0.6   ALKPHOS  --   --   --   --   --   --   --  186*   AST  --   --   --   --   --   --   --  26   ALT  --   --   --   --   --   --   --  45    < > = values in this interval not displayed.     CBC  Recent Labs   Lab 05/25/25  0346 05/24/25  0513   HGB 9.4* 10.2*   WBC 9.8 12.3*    221     COAGSNo lab results found in last 7 days.    Invalid input(s): \"XA\"   Urinalysis  Recent Labs   Lab Test 05/23/25  1715 05/09/25  0641 04/06/23  1100 07/23/19  0600 06/26/19  0555   COLOR Light Yellow Light Yellow   < > Straw Straw   APPEARANCE Clear Clear   < > Slightly Cloudy Clear   URINEGLC Negative 30*   < > Negative Negative   URINEBILI Negative Negative   < > Negative Negative   URINEKETONE Negative Negative   < > Negative Negative   SG 1.015 1.012   < > 1.005 1.009   UBLD Moderate* Small*   < > Small* Small*   URINEPH 5.5 6.0   < > 6.0 5.0   PROTEIN Negative 100*   < > 100* 100*   NITRITE Negative Negative   < > Negative Negative   LEUKEST Negative Negative   < > Negative Negative   RBCU 22* 13*   < > 0 2   WBCU 1 3   < > 0 0   UTPG  --   --   --  2.82* 1.86*    < > = values in this interval not displayed.     Virology:  CMV DNA Quantitation Specimen   Date Value Ref Range Status   12/02/2013 Whole blood, EDTA anticoagulant  Final     CMV Quantitative   Date Value Ref Range Status   12/02/2013  <100 Copies/mL Final    Test canceled - Lab  error   Canceled, Test credited   02/15/2013 <100  No " CMV DNA detected. <100 Copies/mL Final   11/20/2012 <100  No CMV DNA detected. <100 Copies/mL Final     CMV IgG Antibody   Date Value Ref Range Status   04/27/2012 <0.20  Negative for anti-CMV IgG U/mL Final   05/17/2011 0.5 EU/mL Final     Comment:     Negative for anti-CMV IgG     EBV VCA IgG Antibody   Date Value Ref Range Status   04/27/2012 >750.00  Positive, suggests immunologic exposure. U/mL Final     Hepatitis C Antibody   Date Value Ref Range Status   05/08/2025 Nonreactive Nonreactive Final     Comment:     A nonreactive screening test result does not exclude the possibility of exposure to or infection with HCV. Nonreactive screening test results in individuals with prior exposure to HCV may be due to antibody levels below the limit of detection of this assay or lack of reactivity to the HCV antigens used in this assay. Patients with recent HCV infections (<3 months from time of exposure) may have false-negative HCV antibody results due to the time needed for seroconversion (average of 8 to 9 weeks).   04/27/2012 Negative NEG Final     Hep B Surface Diana   Date Value Ref Range Status   05/17/2011 2.2  Final     Comment:     Negative, No antibody detected when the value is less than 5.0 mlU/mL.     BK Virus Result   Date Value Ref Range Status   07/23/2019 BK Virus DNA Not Detected BKNEG^BK Virus DNA Not Detected copies/mL Final   06/26/2019 BK Virus DNA Not Detected BKNEG^BK Virus DNA Not Detected copies/mL Final   12/24/2015 1,356 (A) BKNEG copies/mL Final   07/13/2015 11,110 (A) BKNEG copies/mL Final   03/03/2015 5,180 (A) BKNEG copies/mL Final   02/10/2014 <1000 <1000 copies/mL Final   01/08/2014 <1000 <1000 copies/mL Final   12/02/2013 1,800 (H) <1,000 copies/mL Final   10/30/2013 13,300 (H) <1,000 copies/mL Final   10/01/2013 5,900 (H) <1,000 copies/mL Final   08/20/2013 <1000 <1000 copies/mL Final   07/17/2013 <1000 <1000 copies/mL Final   06/21/2013 3,200 (H) <1,000 copies/mL Final   05/24/2013  3,700 (H) <1,000 copies/mL Final   05/01/2013 5,400 (H) <1,000 copies/mL Final   04/11/2013 13,000 (H) <1,000 copies/mL Final   03/20/2013 1,000 (H) <1,000 copies/mL Final   03/04/2013 <1000 <1000 copies/mL Final   02/15/2013 <1000 <1000 copies/mL Final   02/02/2013 <1000 <1000 copies/mL Final   01/15/2013 <1000 <1000 copies/mL Final   01/08/2013 <1000 <1000 copies/mL Final   12/27/2012 <1000 <1000 copies/mL Final   12/20/2012 <1000 <1000 copies/mL Final   12/12/2012 <1000 <1000 copies/mL Final   12/07/2012 <1000 <1000 copies/mL Final   11/28/2012 <1000 <1000 copies/mL Final   11/14/2012 <1000 <1000 copies/mL Final   11/02/2012 <1000 <1000 copies/mL Final   10/26/2012 <1000 <1000 copies/mL Final   10/19/2012 984363 (H) <1000 copies/mL Final   10/12/2012 <1000 <1000 copies/mL Final   10/04/2012 20233 (H) <1000 copies/mL Final   09/26/2012 <1000 <1000 copies/mL Final   09/13/2012 07653 (H) <1000 copies/mL Final   09/04/2012 76514 (H) <1000 copies/mL Final   08/28/2012 430924 (H) <1000 copies/mL Final   08/20/2012 275028 (H) <1000 copies/mL Final   08/14/2012 13537 (H) <1000 copies/mL Final   08/01/2012 9700 (H) <1000 copies/mL Final   12/28/2008 <1000 <1000 copies/mL Final   12/03/2008 <1000 <1000 copies/mL Final   12/02/2008 <1000 <1000 copies/mL Final   11/25/2008 <1000 <1000 copies/mL Final     BK Virus DNA copies/mL   Date Value Ref Range Status   04/06/2023 Not Detected Not Detected copies/mL Final     BK Virus DNA IU/mL   Date Value Ref Range Status   05/24/2025 Not Detected Not Detected IU/mL Final   05/08/2025 Not Detected Not Detected IU/mL Final

## 2025-05-25 NOTE — PRE-PROCEDURE
GENERAL PRE-PROCEDURE:   Procedure:  Image guided left lower quadrant transplant kidney biopsy  Date/Time:  5/25/2025 10:33 AM    Verbal consent obtained?: Yes    Written consent obtained?: Yes    Risks and benefits: Risks, benefits and alternatives were discussed    Consent given by:  Patient  Patient states understanding of procedure being performed: Yes    Patient's understanding of procedure matches consent: Yes    Procedure consent matches procedure scheduled: Yes    Expected level of sedation:  Moderate  Appropriately NPO:  Yes  ASA Class:  2  Mallampati  :  Grade 2- soft palate, base of uvula, tonsillar pillars, and portion of posterior pharyngeal wall visible  Lungs:  Lungs clear with good breath sounds bilaterally  Heart:  Normal heart sounds and rate  History & Physical reviewed:  History and physical reviewed and no updates needed  Statement of review:  I have reviewed the lab findings, diagnostic data, medications, and the plan for sedation

## 2025-05-26 LAB
ANION GAP SERPL CALCULATED.3IONS-SCNC: 12 MMOL/L (ref 7–15)
BUN SERPL-MCNC: 32.2 MG/DL (ref 6–20)
CALCIUM SERPL-MCNC: 10.3 MG/DL (ref 8.8–10.4)
CHLORIDE SERPL-SCNC: 104 MMOL/L (ref 98–107)
CREAT SERPL-MCNC: 2.71 MG/DL (ref 0.67–1.17)
EGFRCR SERPLBLD CKD-EPI 2021: 31 ML/MIN/1.73M2
ERYTHROCYTE [DISTWIDTH] IN BLOOD BY AUTOMATED COUNT: 14.4 % (ref 10–15)
GLUCOSE SERPL-MCNC: 115 MG/DL (ref 70–99)
HCO3 SERPL-SCNC: 20 MMOL/L (ref 22–29)
HCT VFR BLD AUTO: 27.9 % (ref 40–53)
HGB BLD-MCNC: 9.3 G/DL (ref 13.3–17.7)
MAGNESIUM SERPL-MCNC: 1.8 MG/DL (ref 1.7–2.3)
MCH RBC QN AUTO: 30 PG (ref 26.5–33)
MCHC RBC AUTO-ENTMCNC: 33.3 G/DL (ref 31.5–36.5)
MCV RBC AUTO: 90 FL (ref 78–100)
PHOSPHATE SERPL-MCNC: 3 MG/DL (ref 2.5–4.5)
PLATELET # BLD AUTO: 185 10E3/UL (ref 150–450)
POTASSIUM SERPL-SCNC: 4.7 MMOL/L (ref 3.4–5.3)
RBC # BLD AUTO: 3.1 10E6/UL (ref 4.4–5.9)
SODIUM SERPL-SCNC: 136 MMOL/L (ref 135–145)
TACROLIMUS BLD-MCNC: 7.8 UG/L (ref 5–15)
TME LAST DOSE: NORMAL H
TME LAST DOSE: NORMAL H
WBC # BLD AUTO: 8.4 10E3/UL (ref 4–11)

## 2025-05-26 PROCEDURE — 80197 ASSAY OF TACROLIMUS: CPT | Performed by: NURSE PRACTITIONER

## 2025-05-26 PROCEDURE — 250N000013 HC RX MED GY IP 250 OP 250 PS 637: Performed by: STUDENT IN AN ORGANIZED HEALTH CARE EDUCATION/TRAINING PROGRAM

## 2025-05-26 PROCEDURE — 84100 ASSAY OF PHOSPHORUS: CPT | Performed by: NURSE PRACTITIONER

## 2025-05-26 PROCEDURE — 250N000013 HC RX MED GY IP 250 OP 250 PS 637: Performed by: SURGERY

## 2025-05-26 PROCEDURE — 250N000013 HC RX MED GY IP 250 OP 250 PS 637: Performed by: NURSE PRACTITIONER

## 2025-05-26 PROCEDURE — 36415 COLL VENOUS BLD VENIPUNCTURE: CPT | Performed by: NURSE PRACTITIONER

## 2025-05-26 PROCEDURE — 250N000012 HC RX MED GY IP 250 OP 636 PS 637: Performed by: NURSE PRACTITIONER

## 2025-05-26 PROCEDURE — 99233 SBSQ HOSP IP/OBS HIGH 50: CPT | Mod: 24 | Performed by: NURSE PRACTITIONER

## 2025-05-26 PROCEDURE — 250N000012 HC RX MED GY IP 250 OP 636 PS 637: Performed by: STUDENT IN AN ORGANIZED HEALTH CARE EDUCATION/TRAINING PROGRAM

## 2025-05-26 PROCEDURE — 82565 ASSAY OF CREATININE: CPT | Performed by: NURSE PRACTITIONER

## 2025-05-26 PROCEDURE — 85027 COMPLETE CBC AUTOMATED: CPT | Performed by: NURSE PRACTITIONER

## 2025-05-26 PROCEDURE — 120N000011 HC R&B TRANSPLANT UMMC

## 2025-05-26 PROCEDURE — 83735 ASSAY OF MAGNESIUM: CPT | Performed by: NURSE PRACTITIONER

## 2025-05-26 RX ORDER — VALGANCICLOVIR 450 MG/1
450 TABLET, FILM COATED ORAL EVERY OTHER DAY
Status: DISCONTINUED | OUTPATIENT
Start: 2025-05-28 | End: 2025-05-27

## 2025-05-26 RX ORDER — ACETAMINOPHEN 325 MG/1
975 TABLET ORAL EVERY 8 HOURS
Status: DISCONTINUED | OUTPATIENT
Start: 2025-05-26 | End: 2025-05-26

## 2025-05-26 RX ORDER — ACETAMINOPHEN 325 MG/1
975 TABLET ORAL EVERY 8 HOURS PRN
Status: DISCONTINUED | OUTPATIENT
Start: 2025-05-26 | End: 2025-05-27 | Stop reason: HOSPADM

## 2025-05-26 RX ORDER — SODIUM BICARBONATE 650 MG/1
1300 TABLET ORAL 3 TIMES DAILY
Status: DISCONTINUED | OUTPATIENT
Start: 2025-05-26 | End: 2025-05-27 | Stop reason: HOSPADM

## 2025-05-26 RX ADMIN — SULFAMETHOXAZOLE AND TRIMETHOPRIM 1 TABLET: 400; 80 TABLET ORAL at 08:09

## 2025-05-26 RX ADMIN — SODIUM BICARBONATE 1300 MG: 650 TABLET ORAL at 16:38

## 2025-05-26 RX ADMIN — PANTOPRAZOLE SODIUM 40 MG: 40 TABLET, DELAYED RELEASE ORAL at 08:09

## 2025-05-26 RX ADMIN — SODIUM BICARBONATE 1300 MG: 650 TABLET ORAL at 19:36

## 2025-05-26 RX ADMIN — TACROLIMUS 3 MG: 1 CAPSULE ORAL at 08:09

## 2025-05-26 RX ADMIN — FLUOXETINE HYDROCHLORIDE 40 MG: 40 CAPSULE ORAL at 22:37

## 2025-05-26 RX ADMIN — MYCOPHENOLIC ACID 540 MG: 360 TABLET, DELAYED RELEASE ORAL at 19:36

## 2025-05-26 RX ADMIN — VALGANCICLOVIR 450 MG: 450 TABLET, FILM COATED ORAL at 08:08

## 2025-05-26 RX ADMIN — ACETAMINOPHEN 975 MG: 325 TABLET ORAL at 00:19

## 2025-05-26 RX ADMIN — MAGNESIUM OXIDE TAB 400 MG (241.3 MG ELEMENTAL MG) 400 MG: 400 (241.3 MG) TAB at 08:09

## 2025-05-26 RX ADMIN — PREDNISONE 5 MG: 5 TABLET ORAL at 08:08

## 2025-05-26 RX ADMIN — ATORVASTATIN CALCIUM 10 MG: 10 TABLET, FILM COATED ORAL at 08:09

## 2025-05-26 RX ADMIN — MYCOPHENOLIC ACID 540 MG: 360 TABLET, DELAYED RELEASE ORAL at 08:08

## 2025-05-26 RX ADMIN — PANTOPRAZOLE SODIUM 40 MG: 40 TABLET, DELAYED RELEASE ORAL at 16:39

## 2025-05-26 RX ADMIN — TACROLIMUS 3 MG: 1 CAPSULE ORAL at 18:24

## 2025-05-26 RX ADMIN — ASPIRIN 81 MG CHEWABLE TABLET 81 MG: 81 TABLET CHEWABLE at 08:09

## 2025-05-26 ASSESSMENT — ACTIVITIES OF DAILY LIVING (ADL)
ADLS_ACUITY_SCORE: 25
DEPENDENT_IADLS:: INDEPENDENT
ADLS_ACUITY_SCORE: 25

## 2025-05-26 NOTE — CONSULTS
Care Management Initial Consult    General Information  Assessment completed with: Patient, VM-chart review,    Type of CM/SW Visit: Initial Assessment    Primary Care Provider verified and updated as needed: Yes   Readmission within the last 30 days: other (see comments) (Yes; per provider follow up)   Return Category: Post-op/Post-procedure complication  Reason for Consult: other (see comments) (elevated risk score for readmission)  Advance Care Planning: Advance Care Planning Reviewed: no concerns identified          Communication Assessment  Patient's communication style: spoken language (English or Bilingual)    Hearing Difficulty or Deaf: no   Wear Glasses or Blind: no    Cognitive  Cognitive/Neuro/Behavioral: WDL                      Living Environment:   People in home: alone     Current living Arrangements: apartment      Able to return to prior arrangements: yes       Family/Social Support:  Care provided by: parent(s)  Provides care for: no one     Support system:            Description of Support System:           Current Resources:   Patient receiving home care services: No        Community Resources: None  Equipment currently used at home: none  Supplies currently used at home: None    Employment/Financial:  Employment Status: disabled        Financial Concerns: none           Does the patient's insurance plan have a 3 day qualifying hospital stay waiver?  No    Lifestyle & Psychosocial Needs:  Social Drivers of Health     Food Insecurity: Low Risk  (5/24/2025)    Food Insecurity     Within the past 12 months, did you worry that your food would run out before you got money to buy more?: No     Within the past 12 months, did the food you bought just not last and you didn t have money to get more?: No   Depression: Not at risk (5/19/2025)    PHQ-2     PHQ-2 Score: 0   Housing Stability: High Risk (5/24/2025)    Housing Stability     Do you have housing? : No     Are you worried about losing your  housing?: No   Tobacco Use: Low Risk  (5/23/2025)    Patient History     Smoking Tobacco Use: Never     Smokeless Tobacco Use: Never     Passive Exposure: Not on file   Financial Resource Strain: Low Risk  (5/24/2025)    Financial Resource Strain     Within the past 12 months, have you or your family members you live with been unable to get utilities (heat, electricity) when it was really needed?: No   Alcohol Use: Not At Risk (5/11/2021)    Received from [facility] and [facility]    AUDIT-C     Frequency of Alcohol Consumption: Never     Average Number of Drinks: Not on file     Frequency of Binge Drinking: Not on file   Transportation Needs: Low Risk  (5/24/2025)    Transportation Needs     Within the past 12 months, has lack of transportation kept you from medical appointments, getting your medicines, non-medical meetings or appointments, work, or from getting things that you need?: No   Physical Activity: Not on File (11/8/2023)    Received from [facility]    Physical Activity     Physical Activity: 0   Interpersonal Safety: Low Risk  (5/24/2025)    Interpersonal Safety     Do you feel physically and emotionally safe where you currently live?: Yes     Within the past 12 months, have you been hit, slapped, kicked or otherwise physically hurt by someone?: No     Within the past 12 months, have you been humiliated or emotionally abused in other ways by your partner or ex-partner?: No   Recent Concern: Interpersonal Safety - High Risk (5/9/2025)    Interpersonal Safety     Do you feel physically and emotionally safe where you currently live?: No     Within the past 12 months, have you been hit, slapped, kicked or otherwise physically hurt by someone?: No     Within the past 12 months, have you been humiliated or emotionally abused in other ways by your partner or ex-partner?: No   Stress: Not on File (11/8/2023)    Received from [facility]    Stress     Stress: 0   Social Connections: Not on File (10/8/2024)     Received from [facility]    Social Connections     Connectedness: 0   Health Literacy: Not on file       Functional Status:  Prior to admission patient needed assistance:   Dependent ADLs:: Independent  Dependent IADLs:: Independent       Mental Health Status:  Mental Health Status: No Current Concerns       Chemical Dependency Status:  Chemical Dependency Status: No Current Concerns             Values/Beliefs:  Spiritual, Cultural Beliefs, Zoroastrianism Practices, Values that affect care: no               Discussed  Partnership in Safe Discharge Planning  document with patient/family: Yes    Additional Information:  Care management consulted due to elevated risk score for readmission. Care management assessment completed at bedside with patient after chart review.    Patient lives alone. He is independent of ADL/IADL's. No current services or DME. Family will transport at discharge.     Patient denies any discharge needs. Per Nelsy Ledesma NP, no discharge needs indicated, likely discharge tomorrow pending medical stability.    Care management signing off. Please reconsult if indicated.     Next Steps: Signing off.     Dinesh Steward RN    5/26/2025  Nurse Coordinator      Social Work and Care Management Department       SEARCHABLE in [facility] - search CARE COORDINATOR       [place] ([phone]) Saturday & Sunday; ([phone]) [facility] Recognized Holidays     Units: 5A Onc 5201 - [phone] RNCC,  5A Onc 5220 thru 5240 RNCC, 5C OFFSERVICE [phone] RNCC & 5C OFF SERVICE [phone] RNCC       Units: 6B Vocera, 6C Card 6401 thru 6420 RNCC, 6C Card 6502 thru 6514 RNCC & 6C Card 6515 thru 6519 RNCC        Units: 7A SOT RNCC Vocera, 7B Med Surg Vocera, 7C Med Surg 7401 thru 7418 RNCC & 7C Med Surg 7502 thru 7521 RNCC       Units: 6A Vocera & 4A CVICU Vocera, 4C MICU Vocera, and 4E SICU Vocera         Units: 5 Ortho Vocera & 5 Med Surg Vocera        Units: 6 Med Surg Vocera & 8 Med Surg Vocera

## 2025-05-26 NOTE — PLAN OF CARE
Goal Outcome Evaluation:      Plan of Care Reviewed With: patient          Outcome Evaluation: Patient to discharge home when medically stable

## 2025-05-26 NOTE — PLAN OF CARE
"Goal Outcome Evaluation:      Plan of Care Reviewed With: patient    Overall Patient Progress: no changeOverall Patient Progress: no change    Outcome Evaluation: VSS on RA. denies nausea. Pain managed with PRN tylenol      /70 (BP Location: Right arm, Patient Position: Semi-Dial's, Cuff Size: Adult Regular)   Pulse 101   Temp 98.6  F (37  C) (Oral)   Resp 16   Ht 1.626 m (5' 4\")   Wt 75.8 kg (167 lb 3.2 oz)   SpO2 100%   BMI 28.70 kg/m      Shift: 3430-6534  VS: stable on RA, afebrile  Neuro: AOx4  BG: none  Labs: pending am collections   Respiratory: WNL  Pain/Nausea/PRN: Denies nausea; PRN tylenol   Diet: REG  LDA: piv sl   GI/: VOISING; no bm THIS SHIFT   Skin: ABDOMINAL incision TAMMIE stapled   Mobility: UAL  Plan: continue POC     Handoff given to following RN.      "

## 2025-05-26 NOTE — PROGRESS NOTES
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 Omar Osullivan. He presented from home with N/V and was found to have an TERRY, hyperkalemia, and leukocytosis.     Changes today:   - Lupus anticoagulant draw tomorrow AM  ____________________________________________    s/p DBD DDKT +ureteral stent placement 5/10/25 c/b TERRY: POD#16. Fourth kidney, intra-abdominal. 5/23 US patent with no hydronephrosis. Cr 2.7 (from 2.5). TERRY possibly r/t rejection, more likely supratherapeutic tacrolimus levels.   - Follow kidney biopsy from 5/25    Immunosuppression management:   Induction: Received high intensity protocol (cPRA 100).  Maintenance:    - Myfortic 540 mg BID d/t N/V/D.    - Admit dose Tacrolimus 5 mg BID. Goal level 8-10. Supratherapeutic levesl; PM dose held 5/25, dose lowered 5/26 AM to 3 mg BID.   - Prednisone 5 mg daily (fourth kidney transplant)    Neuro/Psych:  Depression/anxiety:    - Continue PTA Prozac.    Hematology:   Anemia of chronic disease: Hgb 9-10, stable.    Cardiorespiratory:   CYNDEE:    - Continue CPAP.   HTN: PTA managed with carvedilol 6.25 mg BID.    - HOLD carvedilol.     GI/Nutrition: Regular diet.     Endocrine: No issues.     Fluid/Electrolytes:   Hypovolemia; Metabolic acidosis, improved.: Received 1L bicarb fluids 5/25.   Hypomagnesemia, resolved:    - Mag-Ox 400 mg daily    Infectious disease:   Leukocytosis, resolved: Infectious workup negative.     Prophylaxis: DVT (mechanical), GI (PPI), fall, viral (Valcyte x 12 weeks), PJP (Bactrim indefinitely)     Disposition: 7A.      Medically Ready for Discharge: Anticipated Tomorrow      KARLIE/Fellow/Resident Provider:   JAJA Frey, CNP  Adult Solid Organ Transplant   Contact: Vocera Web Console    Faculty: Sidney Palma M.D.  _________________________________________________________________  Transplant History: Admitted 5/23/2025 for TERRY.  5/10/2025 (Kidney), 7/13/1995 (Kidney), 5/18/2011 (Kidney), 4/28/2012 (Kidney), Postoperative day: 16     Interval History: History is obtained from the patient  Overnight events: No acute events overnight.     ROS:   A 10-point review of systems was negative except as noted above.    Meds:  Current Facility-Administered Medications   Medication Dose Route Frequency Provider Last Rate Last Admin    aspirin EC tablet 81 mg  81 mg Oral Daily Bonnie Arenas MD   81 mg at 05/25/25 0753    atorvastatin (LIPITOR) tablet 10 mg  10 mg Oral Daily Bonnie Arenas MD   10 mg at 05/25/25 0753    [Held by provider] carvedilol (COREG) tablet 6.25 mg  6.25 mg Oral BID w/meals Sidney Palma MD   6.25 mg at 05/24/25 0735    FLUoxetine (PROzac) capsule 40 mg  40 mg Oral At Bedtime Bonnie Arenas MD   40 mg at 05/25/25 2208    magnesium oxide (MAG-OX) tablet 400 mg  400 mg Oral Daily Candis Kaur NP   400 mg at 05/25/25 1325    mycophenolic acid (GENERIC EQUIVALENT) EC tablet 540 mg  540 mg Oral BID Candis Kaur NP   540 mg at 05/25/25 2005    pantoprazole (PROTONIX) EC tablet 40 mg  40 mg Oral BID AC Sidney Palma MD   40 mg at 05/25/25 1658    predniSONE (DELTASONE) tablet 5 mg  5 mg Oral Daily Bonnie Arenas MD   5 mg at 05/25/25 0753    sodium chloride (PF) 0.9% PF flush 3 mL  3 mL Intracatheter Q8H Shelton Aldana MD   3 mL at 05/25/25 2359    sodium chloride (PF) 0.9% PF flush 3 mL  3 mL Intracatheter Q8H Bonnie Tapia MD   3 mL at 05/25/25 1436    sulfamethoxazole-trimethoprim (BACTRIM) 400-80 MG per tablet 1 tablet  1 tablet Oral Daily Bonnie Arenas MD   1 tablet at  "05/25/25 0753    tacrolimus (GENERIC EQUIVALENT) capsule 3 mg  3 mg Oral BID IS Candis Kaur NP        valGANciclovir (VALCYTE) tablet 450 mg  450 mg Oral Daily Bonnie Arenas MD   450 mg at 05/25/25 0753       Physical Exam:     Admit Weight: 76.2 kg (168 lb)    Current vitals:   /70 (BP Location: Right arm, Patient Position: Semi-Dial's, Cuff Size: Adult Regular)   Pulse 101   Temp 98.6  F (37  C) (Oral)   Resp 16   Ht 1.626 m (5' 4\")   Wt 75.8 kg (167 lb 3.2 oz)   SpO2 100%   BMI 28.70 kg/m           Vital sign ranges:    Temp:  [97.7  F (36.5  C)-99.1  F (37.3  C)] 98.6  F (37  C)  Pulse:  [] 101  Resp:  [12-22] 16  BP: (109-128)/(64-76) 113/70  SpO2:  [96 %-100 %] 100 %  Patient Vitals for the past 24 hrs:   BP Temp Temp src Pulse Resp SpO2 Weight   05/26/25 0539 -- -- -- -- -- -- 75.8 kg (167 lb 3.2 oz)   05/26/25 0537 113/70 98.6  F (37  C) Oral 101 16 100 % --   05/26/25 0320 118/64 98.2  F (36.8  C) Oral 102 16 100 % --   05/25/25 2148 123/76 99.1  F (37.3  C) Oral -- 12 100 % --   05/25/25 1816 109/67 97.9  F (36.6  C) Oral 98 18 100 % --   05/25/25 1354 128/70 97.9  F (36.6  C) Oral 81 20 100 % --   05/25/25 1055 115/68 -- -- 96 20 96 % --   05/25/25 1050 116/72 -- -- 97 22 97 % --   05/25/25 1045 113/68 -- -- 96 15 100 % --   05/25/25 1030 110/74 -- -- 97 16 100 % --   05/25/25 0937 125/74 97.7  F (36.5  C) Oral 101 18 100 % --     General Appearance: in no apparent distress.   Skin: normal, warm, dry  Heart: he appears adequately perfused   Lungs: unlabored on RA  Abdomen: The abdomen is non-distended.   : mcdaniels is not present.    Extremities: edema: absent.   Neurologic: awake, alert, and oriented. Tremor absent.     Data:   CMP  Recent Labs   Lab 05/26/25  0538 05/25/25  0346 05/23/25  2204 05/23/25  1723    135   < > 132*   POTASSIUM 4.7 5.3   < > 5.8*   CHLORIDE 104 103   < > 104   CO2 20* 20*   < > 17*   * 95   < > 113*   BUN 32.2* 29.8*   < > " "33.0*   CR 2.71* 2.48*   < > 2.29*   GFRESTIMATED 31* 35*   < > 38*   LIZZ 10.3 10.6*   < > 11.1*   MAG 1.8 1.5*  --   --    PHOS 3.0 2.6  --   --    ALBUMIN  --   --   --  4.5   BILITOTAL  --   --   --  0.6   ALKPHOS  --   --   --  186*   AST  --   --   --  26   ALT  --   --   --  45    < > = values in this interval not displayed.     CBC  Recent Labs   Lab 05/26/25  0538 05/25/25  0346   HGB 9.3* 9.4*   WBC 8.4 9.8    192     COAGSNo lab results found in last 7 days.    Invalid input(s): \"XA\"   Urinalysis  Recent Labs   Lab Test 05/23/25  1715 05/09/25  0641 04/06/23  1100 07/23/19  0600 06/26/19  0555   COLOR Light Yellow Light Yellow   < > Straw Straw   APPEARANCE Clear Clear   < > Slightly Cloudy Clear   URINEGLC Negative 30*   < > Negative Negative   URINEBILI Negative Negative   < > Negative Negative   URINEKETONE Negative Negative   < > Negative Negative   SG 1.015 1.012   < > 1.005 1.009   UBLD Moderate* Small*   < > Small* Small*   URINEPH 5.5 6.0   < > 6.0 5.0   PROTEIN Negative 100*   < > 100* 100*   NITRITE Negative Negative   < > Negative Negative   LEUKEST Negative Negative   < > Negative Negative   RBCU 22* 13*   < > 0 2   WBCU 1 3   < > 0 0   UTPG  --   --   --  2.82* 1.86*    < > = values in this interval not displayed.     Virology:  CMV DNA Quantitation Specimen   Date Value Ref Range Status   12/02/2013 Whole blood, EDTA anticoagulant  Final     CMV Quantitative   Date Value Ref Range Status   12/02/2013  <100 Copies/mL Final    Test canceled - Lab  error   Canceled, Test credited   02/15/2013 <100  No CMV DNA detected. <100 Copies/mL Final   11/20/2012 <100  No CMV DNA detected. <100 Copies/mL Final     CMV IgG Antibody   Date Value Ref Range Status   04/27/2012 <0.20  Negative for anti-CMV IgG U/mL Final   05/17/2011 0.5 EU/mL Final     Comment:     Negative for anti-CMV IgG     EBV VCA IgG Antibody   Date Value Ref Range Status   04/27/2012 >750.00  Positive, suggests " immunologic exposure. U/mL Final     Hepatitis C Antibody   Date Value Ref Range Status   05/08/2025 Nonreactive Nonreactive Final     Comment:     A nonreactive screening test result does not exclude the possibility of exposure to or infection with HCV. Nonreactive screening test results in individuals with prior exposure to HCV may be due to antibody levels below the limit of detection of this assay or lack of reactivity to the HCV antigens used in this assay. Patients with recent HCV infections (<3 months from time of exposure) may have false-negative HCV antibody results due to the time needed for seroconversion (average of 8 to 9 weeks).   04/27/2012 Negative NEG Final     Hep B Surface Diana   Date Value Ref Range Status   05/17/2011 2.2  Final     Comment:     Negative, No antibody detected when the value is less than 5.0 mlU/mL.     BK Virus Result   Date Value Ref Range Status   07/23/2019 BK Virus DNA Not Detected BKNEG^BK Virus DNA Not Detected copies/mL Final   06/26/2019 BK Virus DNA Not Detected BKNEG^BK Virus DNA Not Detected copies/mL Final   12/24/2015 1,356 (A) BKNEG copies/mL Final   07/13/2015 11,110 (A) BKNEG copies/mL Final   03/03/2015 5,180 (A) BKNEG copies/mL Final   02/10/2014 <1000 <1000 copies/mL Final   01/08/2014 <1000 <1000 copies/mL Final   12/02/2013 1,800 (H) <1,000 copies/mL Final   10/30/2013 13,300 (H) <1,000 copies/mL Final   10/01/2013 5,900 (H) <1,000 copies/mL Final   08/20/2013 <1000 <1000 copies/mL Final   07/17/2013 <1000 <1000 copies/mL Final   06/21/2013 3,200 (H) <1,000 copies/mL Final   05/24/2013 3,700 (H) <1,000 copies/mL Final   05/01/2013 5,400 (H) <1,000 copies/mL Final   04/11/2013 13,000 (H) <1,000 copies/mL Final   03/20/2013 1,000 (H) <1,000 copies/mL Final   03/04/2013 <1000 <1000 copies/mL Final   02/15/2013 <1000 <1000 copies/mL Final   02/02/2013 <1000 <1000 copies/mL Final   01/15/2013 <1000 <1000 copies/mL Final   01/08/2013 <1000 <1000 copies/mL Final    12/27/2012 <1000 <1000 copies/mL Final   12/20/2012 <1000 <1000 copies/mL Final   12/12/2012 <1000 <1000 copies/mL Final   12/07/2012 <1000 <1000 copies/mL Final   11/28/2012 <1000 <1000 copies/mL Final   11/14/2012 <1000 <1000 copies/mL Final   11/02/2012 <1000 <1000 copies/mL Final   10/26/2012 <1000 <1000 copies/mL Final   10/19/2012 725836 (H) <1000 copies/mL Final   10/12/2012 <1000 <1000 copies/mL Final   10/04/2012 46979 (H) <1000 copies/mL Final   09/26/2012 <1000 <1000 copies/mL Final   09/13/2012 32659 (H) <1000 copies/mL Final   09/04/2012 83503 (H) <1000 copies/mL Final   08/28/2012 [id number] (H) <1000 copies/mL Final   08/20/2012 [id number] (H) <1000 copies/mL Final   08/14/2012 82987 (H) <1000 copies/mL Final   08/01/2012 9700 (H) <1000 copies/mL Final   12/28/2008 <1000 <1000 copies/mL Final   12/03/2008 <1000 <1000 copies/mL Final   12/02/2008 <1000 <1000 copies/mL Final   11/25/2008 <1000 <1000 copies/mL Final     BK Virus DNA copies/mL   Date Value Ref Range Status   04/06/2023 Not Detected Not Detected copies/mL Final     BK Virus DNA IU/mL   Date Value Ref Range Status   05/24/2025 Not Detected Not Detected IU/mL Final   05/08/2025 Not Detected Not Detected IU/mL Final

## 2025-05-26 NOTE — PROGRESS NOTES
[facility]  Transplant Nephrology Progress Note  Date of Admission:  5/23/2025  Today's Date: 05/26/2025    Recommendations:   - Start sodium bicarb 1300mg TID.   - No acute indications for dialysis.  - Continue current immunosuppression.   - Preliminary kidney biopsy: mild ATN, small thrombi in vessels, CNI-toxicity versus DSA presence    Assessment & Plan   # DDKT: Increased, elevated creatinine.  Good urine output.  No acute indications for dialysis.  Kidney biopsy 05/25: mild ATN, small thrombi in vessels. CNI toxicity vs DSA presence.  Tac now 7.8.   - TERRY felt secondary to prerenal/dehydration, as well as high tacrolimus level.  Cannot completely rule out acute rejection as patient is at higher risk with high sensitization and 4th kidney.   - Baseline Creatinine: ~ TBD   - Proteinuria: Not checked post transplant   - DSA Hx: No DSA   - Last cPRA: 100%   - BK Viremia: No   - Kidney Tx Biopsy Hx: No biopsy history.    # Immunosuppression: Tacrolimus immediate release (goal 8-10), Mycophenolic acid (dose 540 mg every 12 hours), and Prednisone (dose 5 mg daily)   - Induction with Recent Transplant:  High Intensity Protocol   - Continue with intensive monitoring of immunosuppression for efficacy and toxicity.   - Historical Changes in Immunosuppression: None   - Changes: Not at this time    # Infection Prevention:   Last CD4 Level: Not checked  - PJP: Sulfa/TMP (Bactrim)  - CMV: Valganciclovir (Valcyte)      - CMV IgG Ab High Risk Discordance (D+/R-) at time of transplant: No  Present CMV Serostatus: Negative  - EBV IgG Ab High Risk Discordance (D+/R-) at time of transplant: No  Present EBV Serostatus: Positive    # Hypertension: Controlled;  Goal BP: < 140/90   - Changes: Not at this time;    - PTA: Carvedilol 6.25mg    - Current: Coreg held    # Elevated Blood Glucose: Glucose generally running ~ 110-160s    # Anemia in Chronic Renal Disease: Hgb: Stable      BASHIR:  No   - Iron studies: Low iron saturation, but high ferritin    # Mineral Bone Disorder:    - Secondary renal hyperparathyroidism; PTH level: Moderately elevated (301-600 pg/ml)        On treatment: None  - Vitamin D; level: Low        On supplement: No; Not on cholecalciferol due to high serum calcium.  - Calcium; level: High        On supplement: No; Agree with IV fluids.  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 Repeat ECHO showed resolution. CT A/P from 2/2023 mentions pericardial thickening.               - CYNDEE on CPAP: Patient is compliant.  - Anxiety and Depression: previously required admission, almost 10 years ago. Now managed on fluoxetine.    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    JAJA Downs [facility]  Transplant Nephrology  Contact information via Vocera Web Console       Interval History  Mr. Cobb creatinine is 2.71 (05/26 0538); Stable above baseline, pending kidney bx.   Good urine output.  Other significant labs/tests/vitals: VSS  nio events overnight.  no chest pain or shortness of breath.  no leg swelling.  no nausea and vomiting.  Bowel movements are loose.  no fever, sweats or chills.     Review of Systems   4 point ROS was obtained and negative except as noted in the  Interval History.    MEDICATIONS:  Current Facility-Administered Medications   Medication Dose Route Frequency Provider Last Rate Last Admin    aspirin EC tablet 81 mg  81 mg Oral Daily Bonnie Arenas MD   81 mg at 25 0809    atorvastatin (LIPITOR) tablet 10 mg  10 mg Oral Daily Bonnie Arenas MD   10 mg at 25 0809    [Held by provider] carvedilol (COREG) tablet 6.25 mg  6.25 mg Oral BID w/meals Sidney Palma MD   6.25 mg at 25 0735    FLUoxetine (PROzac) capsule 40 mg  40 mg Oral At Bedtime Bonnie Arenas MD   40 mg at 25 2208    magnesium oxide (MAG-OX) tablet 400 mg  400 mg Oral Daily Candis Kaur NP   400 mg at 25 0809    mycophenolic acid (GENERIC EQUIVALENT) EC tablet 540 mg  540 mg Oral BID Candis Kaur NP   540 mg at 25 0808    pantoprazole (PROTONIX) EC tablet 40 mg  40 mg Oral BID AC Sidney Palma MD   40 mg at 25 0809    predniSONE (DELTASONE) tablet 5 mg  5 mg Oral Daily Bonnie Arenas MD   5 mg at 25 0808    sodium chloride (PF) 0.9% PF flush 3 mL  3 mL [facility]ter [facility] Shelton Nu[facility]ez MD   3 mL at 25 0811    sodium chloride (PF) 0.9% PF flush 3 mL  3 mL Intracatheter [facility] Bonnie Arenas MD   3 mL at 25 2359    sulfamethoxazole-trimethoprim (BACTRIM) 400-80 MG per tablet 1 tablet  1 tablet Oral Daily Bonnie Arenas MD   1 tablet at 25 0809    tacrolimus (GENERIC EQUIVALENT) capsule 3 mg  3 mg Oral BID IS Candis Kaur NP   3 mg at 25 0809    valGANciclovir (VALCYTE) tablet 450 mg  450 mg Oral Daily Bonnie Arenas MD   450 mg at 25 0808     Current Facility-Administered Medications   Medication Dose Route Frequency Provider Last Rate Last Admin       Physical Exam   Temp  Av.9  F (36.6  C)  Min: 97.5  F (36.4  C)  Max: 98.3  F (36.8  C)      Pulse  Av  Min: 83  Max: 109 Resp  Av.6  Min: 16  Max: 18  SpO2  Avg:  "99.9 %  Min: 99 %  Max: 100 %     /70 (BP Location: Right arm, Patient Position: Semi-Dial's, Cuff Size: Adult Regular)   Pulse 101   Temp 98.6  F (37  C) (Oral)   Resp 16   Ht 1.626 m (5' 4\")   Wt 75.8 kg (167 lb 3.2 oz)   SpO2 100%   BMI 28.70 kg/m     Date 05/24/25 0700 - 05/25/25 0659   Shift 5033-3358 6802-7026 1110-9795 24 Hour Total   INTAKE   P.O. 200   200   Shift Total(mL/kg) 200(2.62)   200(2.62)   OUTPUT   Shift Total(mL/kg)       Weight (kg) 76.2 76.2 76.2 76.2      Admit Weight: 76.2 kg (168 lb)     GENERAL APPEARANCE: alert and no distress  HENT: mouth without ulcers or lesions  RESP: lungs clear to auscultation - no rales, rhonchi or wheezes  CV: regular rhythm, normal rate, no rub, no murmur  EDEMA: no LE edema bilaterally  ABDOMEN: soft, nondistended, nontender, bowel sounds normal  MS: extremities normal - no gross deformities noted, no evidence of inflammation in joints, no muscle tenderness  SKIN: no rash  TX KIDNEY: normal  DIALYSIS ACCESS:  LUE AV fistula with good thrill    Data   All labs reviewed by me.  CMP  Recent Labs   Lab 05/26/25  0538 05/25/25  0346 05/24/25  2157 05/24/25  2035 05/24/25  1733 05/24/25  1721 05/24/25  1540 05/24/25  1457 05/24/25  0600 05/24/25  0513 05/23/25  2204 05/23/25  1723    135  --   --   --   --   --   --   --  134*  --  132*   POTASSIUM 4.7 5.3  --   --   --  5.1  --  5.5*   < > 5.0  5.0   < > 5.8*   CHLORIDE 104 103  --   --   --   --   --   --   --  104  --  104   CO2 20* 20*  --   --   --   --   --   --   --  15*  --  17*   ANIONGAP 12 12  --   --   --   --   --   --   --  15  --  11   * 95 106* 116*   < >  --    < >  --    < > 165*   < > 113*   BUN 32.2* 29.8*  --   --   --   --   --   --   --  33.2*  --  33.0*   CR 2.71* 2.48*  --   --   --   --   --   --   --  2.46*  --  2.29*   GFRESTIMATED 31* 35*  --   --   --   --   --   --   --  35*  --  38*   LIZZ 10.3 10.6*  --   --   --   --   --   --   --  10.7*  --  11.1*   MAG " 1.8 1.5*  --   --   --   --   --   --   --   --   --   --    PHOS 3.0 2.6  --   --   --   --   --   --   --   --   --   --    PROTTOTAL  --   --   --   --   --   --   --   --   --   --   --  7.5   ALBUMIN  --   --   --   --   --   --   --   --   --   --   --  4.5   BILITOTAL  --   --   --   --   --   --   --   --   --   --   --  0.6   ALKPHOS  --   --   --   --   --   --   --   --   --   --   --  186*   AST  --   --   --   --   --   --   --   --   --   --   --  26   ALT  --   --   --   --   --   --   --   --   --   --   --  45    < > = values in this interval not displayed.     CBC  Recent Labs   Lab 05/26/25  0538 05/25/25  0346 05/24/25  0513 05/23/25  1723   HGB 9.3* 9.4* 10.2* 10.6*   WBC 8.4 9.8 12.3* 16.7*   RBC 3.10* 3.21* 3.37* 3.57*   HCT 27.9* 28.2* 30.1* 31.9*   MCV 90 88 89 89   MCH 30.0 29.3 30.3 29.7   MCHC 33.3 33.3 33.9 33.2   RDW 14.4 14.2 14.1 14.3    192 221 266     INRNo lab results found in last 7 days.  ABGNo lab results found in last 7 days.   Urine Studies  Recent Labs   Lab Test 05/23/25  1715 05/09/25  0641 04/06/23  1100 07/23/19  0600   COLOR Light Yellow Light Yellow Yellow Straw   APPEARANCE Clear Clear Slightly Cloudy* Slightly Cloudy   URINEGLC Negative 30* 30* Negative   URINEBILI Negative Negative Negative Negative   URINEKETONE Negative Negative Negative Negative   SG 1.015 1.012 1.015 1.005   UBLD Moderate* Small* Small* Small*   URINEPH 5.5 6.0 6.0 6.0   PROTEIN Negative 100* 50* 100*   NITRITE Negative Negative Negative Negative   LEUKEST Negative Negative Negative Negative   RBCU 22* 13* 10* 0   WBCU 1 3 9* 0     Recent Labs   Lab Test 07/23/19  0600 06/26/19  0555 06/07/19  1518 09/18/17  1422   UTPG 2.82* 1.86* 1.98* 0.79*     PTH  Recent Labs   Lab Test 05/15/25  0754 12/19/20  0726 09/18/17  1431   PTHI 324* 2,523* 151*     Iron Studies  Recent Labs   Lab Test 05/15/25  0754 12/19/20  0726   IRON 52* 53   * 218*   IRONSAT 26 24   CLINTON 1,032* 143        IMAGING:  All imaging studies reviewed by me.

## 2025-05-26 NOTE — PLAN OF CARE
"Goal Outcome Evaluation:    Plan of Care Reviewed With: patient  Overall Patient Progress: no change  Outcome Evaluation: biopsy results not showing rejection, waiting for Cr to come down before pt can discharge    /74 (BP Location: Right arm)   Pulse 102   Temp 98.7  F (37.1  C) (Oral)   Resp 16   Ht 1.626 m (5' 4\")   Wt 75.8 kg (167 lb 3.2 oz)   SpO2 99%   BMI 28.70 kg/m      Shift: 3807-1291  Isolation Status: N/A  VS: VSS on RA, afebrile  Neuro: AOx4  Behaviors: receptive to cares   BG: N/A  Labs/Imaging: biopsy resulted today, Cr 2.71  Respiratory: WDL  Cardiac: WDL  Pain/Nausea: denies  PRNs: N/A  Diet: regular w/ good appetite  LDA: PIV SL, L AV fistula  Infusion(s): N/A  GI/: LBM 5/26, voiding spontaneously   Skin: midline incision stapled TAMMIE, primapore covering LLQ bx site CDI  Mobility: UAL  Plan: continue monitoring Cr, continue with plan of care & update team with any changes    Handoff given to following RN.        "

## 2025-05-27 ENCOUNTER — RESULTS FOLLOW-UP (OUTPATIENT)
Dept: TRANSPLANT | Facility: CLINIC | Age: 30
End: 2025-05-27

## 2025-05-27 VITALS
HEIGHT: 64 IN | RESPIRATION RATE: 16 BRPM | OXYGEN SATURATION: 100 % | WEIGHT: 164.7 LBS | TEMPERATURE: 98.5 F | HEART RATE: 98 BPM | BODY MASS INDEX: 28.12 KG/M2 | DIASTOLIC BLOOD PRESSURE: 75 MMHG | SYSTOLIC BLOOD PRESSURE: 130 MMHG

## 2025-05-27 PROBLEM — E83.42 HYPOMAGNESEMIA: Status: ACTIVE | Noted: 2025-05-27

## 2025-05-27 PROBLEM — N17.9 ACUTE TUBULAR INJURY OF TRANSPLANTED KIDNEY: Status: ACTIVE | Noted: 2025-05-27

## 2025-05-27 PROBLEM — E87.8 LOW BICARBONATE LEVEL: Status: ACTIVE | Noted: 2025-05-27

## 2025-05-27 PROBLEM — T86.19 ACUTE TUBULAR INJURY OF TRANSPLANTED KIDNEY: Status: ACTIVE | Noted: 2025-05-27

## 2025-05-27 PROBLEM — D72.829 LEUKOCYTOSIS: Status: ACTIVE | Noted: 2025-05-27

## 2025-05-27 LAB
ANION GAP SERPL CALCULATED.3IONS-SCNC: 13 MMOL/L (ref 7–15)
ATRIAL RATE - MUSE: 92 BPM
BUN SERPL-MCNC: 24.8 MG/DL (ref 6–20)
CALCIUM SERPL-MCNC: 10.7 MG/DL (ref 8.8–10.4)
CHLORIDE SERPL-SCNC: 104 MMOL/L (ref 98–107)
CREAT SERPL-MCNC: 2.45 MG/DL (ref 0.67–1.17)
DIASTOLIC BLOOD PRESSURE - MUSE: NORMAL MMHG
DONOR IDENTIFICATION: NORMAL
DSA COMMENTS: NORMAL
DSA PRESENT: NO
DSA TEST METHOD: NORMAL
EGFRCR SERPLBLD CKD-EPI 2021: 35 ML/MIN/1.73M2
ERYTHROCYTE [DISTWIDTH] IN BLOOD BY AUTOMATED COUNT: 14.3 % (ref 10–15)
GLUCOSE SERPL-MCNC: 96 MG/DL (ref 70–99)
HCO3 SERPL-SCNC: 18 MMOL/L (ref 22–29)
HCT VFR BLD AUTO: 28.5 % (ref 40–53)
HGB BLD-MCNC: 9.3 G/DL (ref 13.3–17.7)
INR PPP: 1.06 (ref 0.85–1.15)
INT SUB COMMENTS: NORMAL
INT SUB RESULT: NORMAL
INTERF SUBSTANCE: NORMAL
INTERPRETATION ECG - MUSE: NORMAL
INTSUB TEST METHOD: NORMAL
LABORATORY COMMENT REPORT: NEGATIVE
LABORATORY COMMENT REPORT: NORMAL
MAGNESIUM SERPL-MCNC: 1.8 MG/DL (ref 1.7–2.3)
MCH RBC QN AUTO: 29.4 PG (ref 26.5–33)
MCHC RBC AUTO-ENTMCNC: 32.6 G/DL (ref 31.5–36.5)
MCV RBC AUTO: 90 FL (ref 78–100)
ORGAN: NORMAL
P AXIS - MUSE: 40 DEGREES
PHOSPHATE SERPL-MCNC: 2.7 MG/DL (ref 2.5–4.5)
PLATELET # BLD AUTO: 186 10E3/UL (ref 150–450)
POTASSIUM SERPL-SCNC: 5 MMOL/L (ref 3.4–5.3)
PR INTERVAL - MUSE: 146 MS
QRS DURATION - MUSE: 80 MS
QT - MUSE: 342 MS
QTC - MUSE: 422 MS
R AXIS - MUSE: 54 DEGREES
RBC # BLD AUTO: 3.16 10E6/UL (ref 4.4–5.9)
SA 1  COMMENTS: NORMAL
SA 1 CELL: NORMAL
SA 1 TEST METHOD: NORMAL
SA 2 CELL: NORMAL
SA 2 COMMENTS: NORMAL
SA 2 TEST METHOD: NORMAL
SA1 HI RISK ABY: NORMAL
SA1 MOD RISK ABY: NORMAL
SA2 HI RISK ABY: NORMAL
SA2 MOD RISK ABY: NORMAL
SCREEN APTT/NORMAL: 0.83
SCREEN DRVVT/NORMAL: 0.96 %
SODIUM SERPL-SCNC: 135 MMOL/L (ref 135–145)
SYSTOLIC BLOOD PRESSURE - MUSE: NORMAL MMHG
T AXIS - MUSE: 56 DEGREES
TACROLIMUS BLD-MCNC: 9.1 UG/L (ref 5–15)
THROMBIN TIME: 14.8 SECONDS (ref 13–19)
TME LAST DOSE: NORMAL H
TME LAST DOSE: NORMAL H
UNACCEPTABLE ANTIGENS: NORMAL
UNOS CPRA: 100
VENTRICULAR RATE- MUSE: 92 BPM
WBC # BLD AUTO: 6.3 10E3/UL (ref 4–11)

## 2025-05-27 PROCEDURE — 250N000013 HC RX MED GY IP 250 OP 250 PS 637: Performed by: STUDENT IN AN ORGANIZED HEALTH CARE EDUCATION/TRAINING PROGRAM

## 2025-05-27 PROCEDURE — 250N000012 HC RX MED GY IP 250 OP 636 PS 637: Performed by: STUDENT IN AN ORGANIZED HEALTH CARE EDUCATION/TRAINING PROGRAM

## 2025-05-27 PROCEDURE — 84100 ASSAY OF PHOSPHORUS: CPT | Performed by: NURSE PRACTITIONER

## 2025-05-27 PROCEDURE — 85613 RUSSELL VIPER VENOM DILUTED: CPT | Performed by: NURSE PRACTITIONER

## 2025-05-27 PROCEDURE — 80197 ASSAY OF TACROLIMUS: CPT | Performed by: NURSE PRACTITIONER

## 2025-05-27 PROCEDURE — 250N000013 HC RX MED GY IP 250 OP 250 PS 637: Performed by: SURGERY

## 2025-05-27 PROCEDURE — 250N000012 HC RX MED GY IP 250 OP 636 PS 637: Performed by: NURSE PRACTITIONER

## 2025-05-27 PROCEDURE — 80048 BASIC METABOLIC PNL TOTAL CA: CPT | Performed by: NURSE PRACTITIONER

## 2025-05-27 PROCEDURE — 250N000013 HC RX MED GY IP 250 OP 250 PS 637: Performed by: NURSE PRACTITIONER

## 2025-05-27 PROCEDURE — 99233 SBSQ HOSP IP/OBS HIGH 50: CPT | Mod: 24 | Performed by: NURSE PRACTITIONER

## 2025-05-27 PROCEDURE — 85390 FIBRINOLYSINS SCREEN I&R: CPT | Mod: 26 | Performed by: PATHOLOGY

## 2025-05-27 PROCEDURE — 99238 HOSP IP/OBS DSCHRG MGMT 30/<: CPT | Mod: 24 | Performed by: PHYSICIAN ASSISTANT

## 2025-05-27 PROCEDURE — 36415 COLL VENOUS BLD VENIPUNCTURE: CPT | Performed by: NURSE PRACTITIONER

## 2025-05-27 PROCEDURE — 83735 ASSAY OF MAGNESIUM: CPT | Performed by: NURSE PRACTITIONER

## 2025-05-27 PROCEDURE — 85014 HEMATOCRIT: CPT | Performed by: NURSE PRACTITIONER

## 2025-05-27 RX ORDER — SODIUM BICARBONATE 650 MG/1
1300 TABLET ORAL 3 TIMES DAILY
Qty: 180 TABLET | Refills: 3 | Status: SHIPPED | OUTPATIENT
Start: 2025-05-27 | End: 2025-06-11

## 2025-05-27 RX ORDER — MAGNESIUM OXIDE 400 MG/1
400 TABLET ORAL DAILY
Qty: 30 TABLET | Refills: 3 | Status: SHIPPED | OUTPATIENT
Start: 2025-05-28 | End: 2025-05-29

## 2025-05-27 RX ORDER — PANTOPRAZOLE SODIUM 40 MG/1
40 TABLET, DELAYED RELEASE ORAL 2 TIMES DAILY
Status: DISCONTINUED | OUTPATIENT
Start: 2025-05-27 | End: 2025-05-27 | Stop reason: HOSPADM

## 2025-05-27 RX ORDER — TACROLIMUS 0.5 MG/1
CAPSULE ORAL
Qty: 60 CAPSULE | Refills: 11 | Status: ACTIVE | OUTPATIENT
Start: 2025-05-27 | End: 2025-06-05

## 2025-05-27 RX ORDER — MYCOPHENOLIC ACID 180 MG/1
540 TABLET, DELAYED RELEASE ORAL 2 TIMES DAILY
Qty: 180 TABLET | Refills: 11 | Status: ACTIVE | OUTPATIENT
Start: 2025-05-27 | End: 2025-05-29

## 2025-05-27 RX ORDER — TACROLIMUS 1 MG/1
3 CAPSULE ORAL 2 TIMES DAILY
Qty: 180 CAPSULE | Refills: 11 | Status: ACTIVE | OUTPATIENT
Start: 2025-05-27 | End: 2025-05-29

## 2025-05-27 RX ORDER — VALGANCICLOVIR 450 MG/1
450 TABLET, FILM COATED ORAL DAILY
Status: DISCONTINUED | OUTPATIENT
Start: 2025-05-27 | End: 2025-05-27 | Stop reason: HOSPADM

## 2025-05-27 RX ADMIN — TACROLIMUS 3 MG: 1 CAPSULE ORAL at 08:49

## 2025-05-27 RX ADMIN — PREDNISONE 5 MG: 5 TABLET ORAL at 08:49

## 2025-05-27 RX ADMIN — MYCOPHENOLIC ACID 540 MG: 360 TABLET, DELAYED RELEASE ORAL at 08:48

## 2025-05-27 RX ADMIN — SODIUM BICARBONATE 1300 MG: 650 TABLET ORAL at 08:49

## 2025-05-27 RX ADMIN — PANTOPRAZOLE SODIUM 40 MG: 40 TABLET, DELAYED RELEASE ORAL at 08:49

## 2025-05-27 RX ADMIN — ATORVASTATIN CALCIUM 10 MG: 10 TABLET, FILM COATED ORAL at 08:49

## 2025-05-27 RX ADMIN — SULFAMETHOXAZOLE AND TRIMETHOPRIM 1 TABLET: 400; 80 TABLET ORAL at 08:49

## 2025-05-27 RX ADMIN — ASPIRIN 81 MG CHEWABLE TABLET 81 MG: 81 TABLET CHEWABLE at 08:49

## 2025-05-27 RX ADMIN — ACETAMINOPHEN 975 MG: 325 TABLET ORAL at 01:05

## 2025-05-27 RX ADMIN — VALGANCICLOVIR 450 MG: 450 TABLET, FILM COATED ORAL at 08:49

## 2025-05-27 RX ADMIN — MAGNESIUM OXIDE TAB 400 MG (241.3 MG ELEMENTAL MG) 400 MG: 400 (241.3 MG) TAB at 08:49

## 2025-05-27 ASSESSMENT — ACTIVITIES OF DAILY LIVING (ADL)
ADLS_ACUITY_SCORE: 25

## 2025-05-27 NOTE — PROGRESS NOTES
[facility]  Transplant Nephrology Progress Note  Date of Admission:  5/23/2025  Today's Date: 05/27/2025    Recommendations:   - Recommend 5g Lokelma daily PRN on discharge.   - Recommend low potassium diet.   - Continue to hold Carvedilol 6.25mg PO on discharge.   - No acute indications for dialysis.  - Continue current immunosuppression.   - Preliminary kidney biopsy: mild ATN, small thrombi in vessels, CNI-toxicity versus DSA presence    Assessment & Plan   # DDKT: Trend down creatinine.  Good urine output.  No acute indications for dialysis.  Kidney biopsy 05/25: mild ATN, small thrombi in vessels. CNI toxicity vs DSA presence.  Tac 9.1 today.   - TERRY felt secondary to prerenal/dehydration, as well as high tacrolimus level.  Cannot completely rule out acute rejection as patient is at higher risk with high sensitization and 4th kidney.   - Baseline Creatinine: ~ TBD   - Proteinuria: Not checked post transplant   - DSA Hx: No DSA   - Last cPRA: 100%   - BK Viremia: No   - Kidney Tx Biopsy Hx: Mild ATN, small thrombi in vessels.    # Immunosuppression: Tacrolimus immediate release (goal 8-10), Mycophenolic acid (dose 540 mg every 12 hours), and Prednisone (dose 5 mg daily)   - Induction with Recent Transplant:  High Intensity Protocol   - Continue with intensive monitoring of immunosuppression for efficacy and toxicity.   - Historical Changes in Immunosuppression: None   - Changes: Not at this time    # Infection Prevention:   Last CD4 Level: Not checked  - PJP: Sulfa/TMP (Bactrim)  - CMV: Valganciclovir (Valcyte)      - CMV IgG Ab High Risk Discordance (D+/R-) at time of transplant: No  Present CMV Serostatus: Negative  - EBV IgG Ab High Risk Discordance (D+/R-) at time of transplant: No  Present EBV Serostatus: Positive    # Hypertension: Controlled;  Goal BP: < 140/90   - Changes: Not at this time;    - PTA: Carvedilol 6.25mg    - Current: Coreg held    # Elevated  Blood Glucose: Glucose generally running ~ 110-160s    # Anemia in Chronic Renal Disease: Hgb: Stable      BASHIR: No   - Iron studies: Low iron saturation, but high ferritin    # Mineral Bone Disorder:    - Secondary renal hyperparathyroidism; PTH level: Moderately elevated (301-600 pg/ml)        On treatment: None  - Vitamin D; level: Low        On supplement: No; Not on cholecalciferol due to high serum calcium.  - Calcium; level: High        On supplement: No; Agree with IV fluids.  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 Repeat ECHO showed resolution. CT A/P from 2/2023 mentions pericardial thickening.               - CYNDEE on CPAP: Patient is compliant.  - Anxiety and Depression: previously required admission, almost 10 years ago. Now managed on fluoxetine.    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    JAJA Downs [facility]  Transplant Nephrology  Contact information via Vocera Web Console       Interval History  Mr. Hoffman's creatinine is 2.45 (05/27 0555); Trend down.  Good urine output.  Other significant labs/tests/vitals: VSS with coreg held  no events overnight.  no chest pain or shortness of breath.  no leg swelling.  no nausea and vomiting.   no fever, sweats or  chills.     Review of Systems   4 point ROS was obtained and negative except as noted in the Interval History.    MEDICATIONS:  Current Facility-Administered Medications   Medication Dose Route Frequency Provider Last Rate Last Admin    aspirin EC tablet 81 mg  81 mg Oral Daily Bonnie Arenas MD   81 mg at 05/26/25 0809    atorvastatin (LIPITOR) tablet 10 mg  10 mg Oral Daily Bonnie Arenas MD   10 mg at 05/26/25 0809    [Held by provider] carvedilol (COREG) tablet 6.25 mg  6.25 mg Oral BID w/meals Sidney Palma MD   6.25 mg at 05/24/25 0735    FLUoxetine (PROzac) capsule 40 mg  40 mg Oral At Bedtime Bonnie Arenas MD   40 mg at 05/26/25 2237    magnesium oxide (MAG-OX) tablet 400 mg  400 mg Oral Daily Candis Kaur NP   400 mg at 05/26/25 0809    mycophenolic acid (GENERIC EQUIVALENT) EC tablet 540 mg  540 mg Oral BID Candis Kaur NP   540 mg at 05/26/25 1936    pantoprazole (PROTONIX) EC tablet 40 mg  40 mg Oral BID AC Sidney Palma MD   40 mg at 05/26/25 1639    predniSONE (DELTASONE) tablet 5 mg  5 mg Oral Daily Bonnie Arenas MD   5 mg at 05/26/25 0808    sodium bicarbonate tablet 1,300 mg  1,300 mg Oral TID Nelsy Ledesma NP   1,300 mg at 05/26/25 1936    sodium chloride (PF) 0.9% PF flush 3 mL  3 mL Intracatheter Q8H Shelton Aldana MD   3 mL at 05/26/25 1639    sodium chloride (PF) 0.9% PF flush 3 mL  3 mL Intracatheter Q8H Bonnie Tapia MD   3 mL at 05/26/25 1639    sulfamethoxazole-trimethoprim (BACTRIM) 400-80 MG per tablet 1 tablet  1 tablet Oral Daily Bonnie Arenas MD   1 tablet at 05/26/25 0809    tacrolimus (GENERIC EQUIVALENT) capsule 3 mg  3 mg Oral BID IS Candis Kaur NP   3 mg at 05/26/25 1824    [START ON 5/28/2025] valGANciclovir (VALCYTE) tablet 450 mg  450 mg Oral Every Other Day Sidney Palma MD         Current Facility-Administered Medications   Medication Dose Route Frequency Provider  "Last Rate Last Admin       Physical Exam   Temp  Av.9  F (36.6  C)  Min: 97.5  F (36.4  C)  Max: 98.3  F (36.8  C)      Pulse  Av  Min: 83  Max: 109 Resp  Av.6  Min: 16  Max: 18  SpO2  Av.9 %  Min: 99 %  Max: 100 %     /77 (BP Location: Right arm, Patient Position: Semi-Dial's, Cuff Size: Adult Regular)   Pulse 97   Temp 98  F (36.7  C) (Oral)   Resp 16   Ht 1.626 m (5' 4\")   Wt 74.7 kg (164 lb 11.2 oz)   SpO2 100%   BMI 28.27 kg/m     Date 25 07 - 25 0659   Shift 3239-2577 1600-1085 3753-6604 24 Hour Total   INTAKE   P.O. 200   200   Shift Total(mL/kg) 200(2.62)   200(2.62)   OUTPUT   Shift Total(mL/kg)       Weight (kg) 76.2 76.2 76.2 76.2      Admit Weight: 76.2 kg (168 lb)     GENERAL APPEARANCE: alert and no distress  HENT: mouth without ulcers or lesions  RESP: lungs clear to auscultation - no rales, rhonchi or wheezes  CV: regular rhythm, normal rate, no rub, no murmur  EDEMA: no LE edema bilaterally  ABDOMEN: soft, nondistended, nontender, bowel sounds normal  MS: extremities normal - no gross deformities noted, no evidence of inflammation in joints, no muscle tenderness  SKIN: no rash  TX KIDNEY: normal  DIALYSIS ACCESS:  LUE AV fistula with good thrill    Data   All labs reviewed by me.  CMP  Recent Labs   Lab 25  0555 25  0538 25  0346 25  2157 25  1733 25  1721 25  0600 25  0513 25  2204 25  1723    136 135  --   --   --   --  134*  --  132*   POTASSIUM 5.0 4.7 5.3  --   --  5.1   < > 5.0  5.0   < > 5.8*   CHLORIDE 104 104 103  --   --   --   --  104  --  104   CO2 18* 20* 20*  --   --   --   --  15*  --  17*   ANIONGAP 13 12 12  --   --   --   --  15  --  11   GLC 96 115* 95 106*   < >  --    < > 165*   < > 113*   BUN 24.8* 32.2* 29.8*  --   --   --   --  33.2*  --  33.0*   CR 2.45* 2.71* 2.48*  --   --   --   --  2.46*  --  2.29*   GFRESTIMATED 35* 31* 35*  --   --   --   --  35*  --  38* "   LIZZ 10.7* 10.3 10.6*  --   --   --   --  10.7*  --  11.1*   MAG 1.8 1.8 1.5*  --   --   --   --   --   --   --    PHOS 2.7 3.0 2.6  --   --   --   --   --   --   --    PROTTOTAL  --   --   --   --   --   --   --   --   --  7.5   ALBUMIN  --   --   --   --   --   --   --   --   --  4.5   BILITOTAL  --   --   --   --   --   --   --   --   --  0.6   ALKPHOS  --   --   --   --   --   --   --   --   --  186*   AST  --   --   --   --   --   --   --   --   --  26   ALT  --   --   --   --   --   --   --   --   --  45    < > = values in this interval not displayed.     CBC  Recent Labs   Lab 05/27/25  0555 05/26/25  0538 05/25/25  0346 05/24/25  0513   HGB 9.3* 9.3* 9.4* 10.2*   WBC 6.3 8.4 9.8 12.3*   RBC 3.16* 3.10* 3.21* 3.37*   HCT 28.5* 27.9* 28.2* 30.1*   MCV 90 90 88 89   MCH 29.4 30.0 29.3 30.3   MCHC 32.6 33.3 33.3 33.9   RDW 14.3 14.4 14.2 14.1    185 192 221     INRNo lab results found in last 7 days.  ABGNo lab results found in last 7 days.   Urine Studies  Recent Labs   Lab Test 05/23/25  1715 05/09/25  0641 04/06/23  1100 07/23/19  0600   COLOR Light Yellow Light Yellow Yellow Straw   APPEARANCE Clear Clear Slightly Cloudy* Slightly Cloudy   URINEGLC Negative 30* 30* Negative   URINEBILI Negative Negative Negative Negative   URINEKETONE Negative Negative Negative Negative   SG 1.015 1.012 1.015 1.005   UBLD Moderate* Small* Small* Small*   URINEPH 5.5 6.0 6.0 6.0   PROTEIN Negative 100* 50* 100*   NITRITE Negative Negative Negative Negative   LEUKEST Negative Negative Negative Negative   RBCU 22* 13* 10* 0   WBCU 1 3 9* 0     Recent Labs   Lab Test 07/23/19  0600 06/26/19  0555 06/07/19  1518 09/18/17  1422   UTPG 2.82* 1.86* 1.98* 0.79*     PTH  Recent Labs   Lab Test 05/15/25  0754 12/19/20  0726 09/18/17  1431   PTHI 324* 2,523* 151*     Iron Studies  Recent Labs   Lab Test 05/15/25  0754 12/19/20  0726   IRON 52* 53   * 218*   IRONSAT 26 24   CLINTON 1,032* 143       IMAGING:  All imaging  studies reviewed by me.

## 2025-05-27 NOTE — PLAN OF CARE
DISCHARGE:  Patient with orders to discharge to home.     Discharge instructions, medications & follow ups reviewed with patient and mother. Copy of discharge summary given to patient. PIV removed.     Patient in stable condition. AVSS. Patient and mother had no further questions regarding discharge instructions and medications. Patient transferred out by self & left with mom.

## 2025-05-27 NOTE — PROGRESS NOTES
"CLINICAL NUTRITION SERVICES - ASSESSMENT NOTE    RECOMMENDATIONS FOR MDs/PROVIDERS TO ORDER:  ***    Registered Dietitian Interventions:  ***    Future/Additional Recommendations:  ***     REASON FOR ASSESSMENT  {RDNReasonforassessment:100964}    INFORMATION OBTAINED  {RDNSubjectiveinformation:648712}    NUTRITION HISTORY  ***  Home nutrition support plan: ***    CURRENT NUTRITION ORDERS  Diet: {RDNDietorder:646683}  Snacks/Supplements: {KK Oral Supp:777907}      CURRENT INTAKE/TOLERANCE  ***    LABS  Nutrition-relevant labs: {RDNNewfindingsrelevantlabsmeds:360649}    MEDICATIONS  Nutrition-relevant medications: {RDNNewfindingsrelevantlabsmeds:497829}    ANTHROPOMETRICS  Height: 162.6 cm (5' 4\")  Admission Weight: 76.2 kg (168 lb) (05/24/25 0621)   Most Recent Weight: 74.7 kg (164 lb 11.2 oz) (05/27/25 0633)  IBW: *** kg  BMI: Body mass index is 28.27 kg/m .   Weight History: ***  Wt Readings from Last 15 Encounters:   05/27/25 74.7 kg (164 lb 11.2 oz)   05/19/25 77.9 kg (171 lb 11.2 oz)   05/14/25 80.9 kg (178 lb 4.8 oz)   05/13/25 82.1 kg (181 lb 1.6 oz)   01/20/25 83.3 kg (183 lb 10.3 oz)   04/06/23 82.9 kg (182 lb 12.8 oz)   02/16/23 79.9 kg (176 lb 2.4 oz)   12/14/22 83 kg (183 lb)   03/25/21 86.3 kg (190 lb 4.8 oz)   03/04/21 87 kg (191 lb 12.8 oz)   02/16/21 86.3 kg (190 lb 4.1 oz)   02/02/21 85.1 kg (187 lb 9.6 oz)   12/29/20 86.7 kg (191 lb 1.6 oz)   12/21/20 87 kg (191 lb 12.8 oz)   11/09/20 86.5 kg (190 lb 9.6 oz)       Dosing Weight: *** kg, based on {RDNDosingweightjustification:053437}    ASSESSED NUTRITION NEEDS  Estimated Energy Needs: *** kcals/day ({RDNEstimatedenergyneeds:150508})  Justification: {RDNEstimatedenergyneedsjustification:922591}  Estimated Protein Needs: *** grams protein/day ({RDNEstimatedproteinneeds:054603})  Justification: {RDNEstimatedproteinneedsjustification:290371}  Estimated Fluid Needs: *** mL/day ({RDNEstimatedfluidneeds:391253})  Justification: " {RDNEstimatedfluidneedsjustification:804130}    SYSTEM AND PHYSICAL FINDINGS    {RDNPhysicalfindings:661261}  GI symptoms: {RDNSystemandphysicalfindingsskinwoundsgisymptoms:442976}  Skin/wounds: {RDNSystemandphysicalfindingsskinwoundsgisymptoms:799898}    MALNUTRITION  % Intake: {RDNMalnutrition%intake:121253}  % Weight Loss: {RDNMalnutrition%weightloss:874983}  Subcutaneous Fat Loss: {RDNMalnutritionsubcutaneousfatloss:536651}  Muscle Loss: {RDNMalnutritionmuscleloss:390241}  Fluid Accumulation/Edema: {RDNMalnutritionfluidaccumulationedema:713452}  Malnutrition Diagnosis: {RDNMalnutritiondiagnosis:466183}  Malnutrition Present on Admission: {RDNMalnutritionpresentonadmit:860137}    NUTRITION DIAGNOSIS  {RDNNutritiondiagnosis:209736} related to *** as evidenced by ***    INTERVENTIONS  {RDNInterventions:570109}    GOALS  {RDNGoals:216434}     MONITORING/EVALUATION  Progress toward goals will be monitored and evaluated per policy.

## 2025-05-27 NOTE — DISCHARGE SUMMARY
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 Omar Osullivan. He presented from home with N/V and was found to have an TERRY, hyperkalemia, and leukocytosis.     Hospital Course   Jhoan Hoffman was admitted on 5/23/2025.  The following problems were addressed during his hospitalization:    s/p DBD DDKT +ureteral stent placement 5/10/25 c/b TERRY: POD#17. Fourth kidney, intra-abdominal. 5/23 US patent with no hydronephrosis. Cr 2.5 (2.7). Kidney biopsy: ATI with some thrombi in some vessels. 5/14 DSA none   - 5/27 DSA in process at the time of discharge.   - Kidney biopsy final results pending     Immunosuppression management:   Induction: Received high intensity protocol (cPRA 100).  Maintenance:    - Myfortic 540 mg BID d/t N/V/D.    - Admit dose Tacrolimus 5 mg BID. Goal level 8-10. Supratherapeutic levesl; PM dose held 5/25, dose lowered 5/26 AM to 3 mg BID.   - Prednisone 5 mg daily (fourth kidney transplant)  Infectious disease prophylaxis: viral (Valcyte x 12 weeks), PJP (Bactrim indefinitely)    Transplant coordinator: Lupe Quevedo [phone]  Donor type:  DBD  DSA at time of transplant:  No  Ureteral  stent: Yes  CMV:  Donor - / Recipient -  EBV:  Donor + / Recipient +  Thymoglobulin:  475 mg (6 mg/kg)     Neuro/Psych:  Depression/anxiety:    - Continue PTA Prozac.     Hematology:   Anemia of chronic disease: Hgb 9-10, stable.     Cardiorespiratory:   CYNDEE:    - Continue CPAP.   HTN: PTA managed with carvedilol 6.25 mg BID.    - Carvedilol discontinued this admission     GI/Nutrition: Regular diet.      Endocrine: No issues.      Fluid/Electrolytes:   Hypovolemia; Metabolic acidosis, improved.: Received 1L bicarb fluids 5/25. Discharged on oral sodium bicarbonate 1300 mg BID.  Hypomagnesemia, resolved:    - Mag-Ox 400 mg daily     Infectious disease:   Leukocytosis, resolved: Infectious workup negative.     Maria Antonia Pham PA-C    Discharge Disposition   Discharged to home   Condition at discharge: Stable    Pending Results   These results will be followed up by Lupe Quevedo   Unresulted Labs Ordered in the Past 30 Days of this Admission       Date and Time Order Name Status Description    5/24/2025  8:42 AM Surgical pathology exam - Transplant Renal In process     5/23/2025  9:08 PM Blood Culture Peripheral blood (BC) Hand, Right Preliminary     5/23/2025  9:08 PM Blood Culture Peripheral blood (BC) Hand, Right Preliminary     5/9/2025 12:33 AM Prepare red blood cells (unit) Preliminary     5/9/2025 12:31 AM Prepare red blood cells (unit) Preliminary           Final pathology results: kidney biopsy final results pending    Primary Care Physician   Alexis Pabon    Physical Exam   Temp: 98.5  F (36.9  C) Temp src: Oral BP: 130/75 Pulse: 98   Resp: 16 SpO2: 100 % O2 Device: None (Room air)    Vitals:    05/25/25 0620 05/26/25 0539 05/27/25 0633   Weight: 75.2 kg (165 lb 11.2 oz) 75.8 kg (167 lb 3.2 oz) 74.7 kg (164 lb 11.2 oz)     Vital Signs with Ranges  Temp:  [97.5  F (36.4  C)-98.7  F (37.1  C)] 98.5  F (36.9  C)  Pulse:  [] 98  Resp:  [16-17] 16  BP: (118-130)/(73-77) 130/75  SpO2:  [98 %-100 %] 100  %  I/O last 3 completed shifts:  In: -   Out: 2350 [Urine:2350]    General Appearance: in no apparent distress.   Skin: normal, warm, dry  Heart: RRR   Lungs: unlabored breathing on RA  Abdomen: The abdomen is non-distended, non tender. Incision healing well, no sign of infection, staples present.   : no mcdaniels    Extremities: edema: absent.   Neurologic: awake, alert, and oriented. Tremor absent.    Consultations This Hospital Stay   NEPHROLOGY KIDNEY/PANCREAS TRANSPLANT ADULT IP CONSULT  INTERVENTIONAL RADIOLOGY ADULT/PEDS IP CONSULT  NURSING TO CONSULT FOR VASCULAR ACCESS CARE IP CONSULT  CARE MANAGEMENT / SOCIAL WORK IP CONSULT    Time Spent on this Encounter   I have spent greater than 30 minutes on this discharge.    Discharge Orders   Discharge Medications   Discharge Medication List as of 5/27/2025 12:02 PM        START taking these medications    Details   magnesium oxide (MAG-OX) 400 MG tablet Take 1 tablet (400 mg) by mouth daily., Disp-30 tablet, R-3, E-Prescribe      mycophenolic acid (GENERIC EQUIVALENT) 180 MG EC tablet Take 3 tablets (540 mg) by mouth 2 times daily., Disp-180 tablet, R-11, E-Prescribe      psyllium (METAMUCIL/KONSYL) Packet Take 1 packet by mouth daily. For management of loose stools., OTC      sodium bicarbonate 650 MG tablet Take 2 tablets (1,300 mg) by mouth 3 times daily., Disp-180 tablet, R-3, E-Prescribe      sodium zirconium cyclosilicate (LOKELMA) 5 g PACK packet Take 1 packet (5 g) by mouth daily as needed (Hyperkalemia > 5.5)., Disp-30 packet, R-3, E-Prescribe           CONTINUE these medications which have CHANGED    Details   !! tacrolimus (GENERIC EQUIVALENT) 0.5 MG capsule Tacrolimus 0.5 mg capsules BID to allow for dose adjustments., Disp-60 capsule, R-11, E-Prescribe      !! tacrolimus (GENERIC EQUIVALENT) 1 MG capsule Take 3 capsules (3 mg) by mouth 2 times daily., Disp-180 capsule, R-11, E-Prescribe       !! - Potential duplicate medications found. Please discuss  with provider.        CONTINUE these medications which have NOT CHANGED    Details   acetaminophen (TYLENOL) 325 MG tablet Take 3 tablets (975 mg) by mouth every 8 hours as needed for mild pain., Disp-60 tablet, R-0, E-Prescribe      aspirin 81 MG EC tablet Take 81 mg by mouth daily, Historical      atorvastatin (LIPITOR) 10 MG tablet Take 1 tablet (10 mg) by mouth daily., Disp-30 tablet, R-2, E-Prescribe      FLUoxetine (PROZAC) 40 MG capsule Take 40 mg by mouth at bedtime., Disp-90 capsule, R-0, HistoricalContinue home medications but switch to 60 mg daily.      hydrOXYzine HCl (ATARAX) 25 MG tablet Take 25 mg by mouth as needed for other (Sleep)., Historical      omeprazole (PRILOSEC) 40 MG DR capsule Take 40 mg by mouth daily, Historical      ondansetron (ZOFRAN ODT) 4 MG ODT tab Take 1 tablet (4 mg) by mouth every 8 hours as needed for nausea., Disp-15 tablet, R-0, E-Prescribe      predniSONE (DELTASONE) 5 MG tablet Take 1 tablet (5 mg) by mouth daily., Disp-30 tablet, R-11, E-Prescribe      senna-docusate (SENOKOT-S/PERICOLACE) 8.6-50 MG tablet Take 1 tablet by mouth 2 times daily as needed for constipation., Disp-60 tablet, R-0, E-Prescribe      sulfamethoxazole-trimethoprim (BACTRIM) 400-80 MG tablet Take 1 tablet by mouth daily., Disp-30 tablet, R-11, E-Prescribe      valGANciclovir (VALCYTE) 450 MG tablet Take 1 tablet (450 mg) by mouth daily., Disp-60 tablet, R-2, E-PrescribeAnticipate improvement in kidney function. Eventual dose = 2 tabs daily x 3 months. Dose adjustments per transplant team.           STOP taking these medications       carvedilol (COREG) 3.125 MG tablet Comments:   Reason for Stopping:         methocarbamol (ROBAXIN) 750 MG tablet Comments:   Reason for Stopping:         mycophenolate (GENERIC EQUIVALENT) 250 MG capsule Comments:   Reason for Stopping:                  Reason for your hospital stay    Acute transplant kidney injury suspect secondary to tacrolimus toxicity.      Activity    Your activity upon discharge: Walk at least four times a day, lift no greater than 10 pounds for 6-8 weeks from the time of surgery.  No driving while taking narcotics or 3 weeks after surgery.     ADULT [facility] Specialty Follow-up and recommended labs and tests    [facility] FOLLOW UP:   1. Follow up with in Transplant Clinic on 6/2/25 with transplant KARLIE for review of graft function, electrolytes, immunosuppression, and staple removal. Please cancel appointment for 5/29/25 with Mariam Orta.  2. Follow up with Transplant Nephrologist as scheduled.   4.  Cystoscopy for ureteral stent removal on 6/14/25. See future appointments.  5. Follow up with your primary care provider in ~8 weeks. Patient to schedule.     Remember to always bring an updated medication list to all appointments.      Call your Transplant Coordinator ([phone]) with questions about Transplant Center appointment scheduling.     LABS:   CBC, BMP, magnesium, phosphorus, tacrolimus level to be drawn daily while in ATC, then every Monday and Thursday at an outpatient lab.     Diet    Follow this diet upon discharge: Continue low potassium diet, limit to 3 grams. Diet recommendations post-transplant: Heart healthy dietary habits long term (low saturated/trans fat, low sodium). High protein diet x 8 weeks. Practice food safety precautions.         Data   Most Recent 3 CBC's:  Recent Labs   Lab Test 05/27/25  0555 05/26/25  0538 05/25/25  0346   WBC 6.3 8.4 9.8   HGB 9.3* 9.3* 9.4*   MCV 90 90 88    185 192      Most Recent 3 BMP's:  Recent Labs   Lab Test 05/27/25  0555 05/26/25  0538 05/25/25  0346    136 135   POTASSIUM 5.0 4.7 5.3   CHLORIDE 104 104 103   CO2 18* 20* 20*   BUN 24.8* 32.2* 29.8*   CR 2.45* 2.71* 2.48*   ANIONGAP 13 12 12   LIZZ 10.7* 10.3 10.6*   GLC 96 115* 95     Most Recent 2 LFT's:  Recent Labs   Lab Test 05/23/25  1723 05/08/25  1922   AST 26 16   ALT 45 9   ALKPHOS 186* 98    BILITOTAL 0.6 0.4     Most Recent INR's and Anticoagulation Dosing History:  Anticoagulation Dose History  More data exists         Latest Ref Rng & Units 7/23/2019 12/17/2020 2/16/2021 12/11/2022 4/6/2023 5/8/2025 5/27/2025   Recent Dosing and Labs   INR 0.85 - 1.15 0.95  1.02  0.99  1.46  1.02  0.99  1.06      Most Recent 3 Troponin's:No lab results found.  Most Recent Cholesterol Panel:  Recent Labs   Lab Test 05/08/25  1922   CHOL 171   LDL 95   HDL 32*   TRIG 218*     Most Recent 6 Bacteria Isolates From Any Culture (See EPIC Reports for Culture Details):  Recent Labs   Lab Test 07/23/19  0600   CULT No growth     Most Recent TSH, T4 and A1c Labs:  Recent Labs   Lab Test 05/08/25  2324 12/11/22  1757   TSH  --  0.67   A1C 4.9  --        Lab Results   Component Value Date    LIPASE 202 02/16/2011    LIPASE 256 (H) 10/20/2010     Lab Results   Component Value Date    AMYLASE 142 (H) 02/16/2011    AMYLASE 127 (H) 10/20/2010

## 2025-05-27 NOTE — PLAN OF CARE
"Goal Outcome Evaluation:      Plan of Care Reviewed With: patient    Overall Patient Progress: no changeOverall Patient Progress: no change    Outcome Evaluation: VSS on RA. A&ox4    /77 (BP Location: Right arm, Patient Position: Semi-Dial's, Cuff Size: Adult Regular)   Pulse 97   Temp 98  F (36.7  C) (Oral)   Resp 16   Ht 1.626 m (5' 4\")   Wt 74.7 kg (164 lb 11.2 oz)   SpO2 100%   BMI 28.27 kg/m        Shift: 1839-2224  Isolation Status: none  VS: stable on RA, afebrile  Neuro: Aox4  Behaviors: calm, cooperative  BG: none  Labs: AM labs pending  Pain/Nausea: Denies nausea. Pain @ IV insertion site - PRN tylenol x1  Diet: regular  LDA: LAVF  GI/: voids spontaneously; 1BM this shift  Skin: midline incision - stapled TAMMIE; LLQ bx site with primapore  Mobility: independent  Plan: Continue with POC and notify team with any changes.         "

## 2025-05-28 ENCOUNTER — RESULTS FOLLOW-UP (OUTPATIENT)
Dept: TRANSPLANT | Facility: CLINIC | Age: 30
End: 2025-05-28

## 2025-05-28 ENCOUNTER — TELEPHONE (OUTPATIENT)
Dept: TRANSPLANT | Facility: CLINIC | Age: 30
End: 2025-05-28
Payer: MEDICARE

## 2025-05-28 LAB — BACTERIA SPEC CULT: NO GROWTH

## 2025-05-28 NOTE — TELEPHONE ENCOUNTER
Post Kidney and Pancreas Transplant Team Conference  Date: 5/28/2025  Transplant Coordinator: Lupe     Attendees:  [x]  Dr. Culp [x] Jacqueline Sifuentes, RN [x] Vicki Eddy LPN     [x]  Dr. Bustamante [x] Lupe Quevedo, KAREN    [x] Dr. Rodriguez [] Zeina Christianson RN    [] Dr. Garcia [x] Merlyn Mcghee, RN [] Shaina Hong RN   [x] Dr. Orlando [] Sugar Neri, RN    [] Dr. Osullivan [] Paige Rodriges, RN [x] Elie Weeks, PharmD   []  Dr. Sierra [] Isabel Burkett, RN    [] Dr. Palma [] Ilya Collins RN     [] Gege Salas RN    [x] Mariam Orta, TERESA [] Nelsy Moore RN        Verbal Plan Read Back:   No changes.    Routed to RN Coordinator   Vicki Eddy LPN

## 2025-05-29 DIAGNOSIS — Z94.0 KIDNEY REPLACED BY TRANSPLANT: ICD-10-CM

## 2025-05-29 DIAGNOSIS — Z94.0 STATUS POST KIDNEY TRANSPLANT: ICD-10-CM

## 2025-05-29 LAB
BACTERIA SPEC CULT: NO GROWTH
PATH REPORT.COMMENTS IMP SPEC: NORMAL
PATH REPORT.FINAL DX SPEC: NORMAL
PATH REPORT.GROSS SPEC: NORMAL
PATH REPORT.MICROSCOPIC SPEC OTHER STN: NORMAL
PATH REPORT.RELEVANT HX SPEC: NORMAL
PHOTO IMAGE: NORMAL

## 2025-05-29 RX ORDER — MYCOPHENOLIC ACID 180 MG/1
540 TABLET, DELAYED RELEASE ORAL 2 TIMES DAILY
Qty: 180 TABLET | Refills: 11 | Status: SHIPPED | OUTPATIENT
Start: 2025-05-29

## 2025-05-29 RX ORDER — ATORVASTATIN CALCIUM 10 MG/1
10 TABLET, FILM COATED ORAL DAILY
Qty: 30 TABLET | Refills: 11 | Status: SHIPPED | OUTPATIENT
Start: 2025-05-29

## 2025-05-29 RX ORDER — VALGANCICLOVIR 450 MG/1
450 TABLET, FILM COATED ORAL DAILY
Qty: 60 TABLET | Refills: 2 | Status: SHIPPED | OUTPATIENT
Start: 2025-05-29

## 2025-05-29 RX ORDER — SULFAMETHOXAZOLE AND TRIMETHOPRIM 400; 80 MG/1; MG/1
1 TABLET ORAL DAILY
Qty: 30 TABLET | Refills: 11 | Status: SHIPPED | OUTPATIENT
Start: 2025-05-29

## 2025-05-29 RX ORDER — PREDNISONE 5 MG/1
5 TABLET ORAL DAILY
Qty: 30 TABLET | Refills: 11 | Status: SHIPPED | OUTPATIENT
Start: 2025-05-29

## 2025-05-29 RX ORDER — ONDANSETRON 4 MG/1
4 TABLET, ORALLY DISINTEGRATING ORAL EVERY 8 HOURS PRN
Qty: 15 TABLET | Refills: 0 | Status: SHIPPED | OUTPATIENT
Start: 2025-05-29

## 2025-05-29 RX ORDER — MAGNESIUM OXIDE 400 MG/1
400 TABLET ORAL DAILY
Qty: 30 TABLET | Refills: 3 | Status: SHIPPED | OUTPATIENT
Start: 2025-05-29

## 2025-05-29 RX ORDER — TACROLIMUS 1 MG/1
3 CAPSULE ORAL 2 TIMES DAILY
Qty: 180 CAPSULE | Refills: 11 | Status: SHIPPED | OUTPATIENT
Start: 2025-05-29

## 2025-06-03 ENCOUNTER — TELEPHONE (OUTPATIENT)
Dept: TRANSPLANT | Facility: CLINIC | Age: 30
End: 2025-06-03
Payer: MEDICARE

## 2025-06-03 ENCOUNTER — RESULTS FOLLOW-UP (OUTPATIENT)
Dept: TRANSPLANT | Facility: CLINIC | Age: 30
End: 2025-06-03

## 2025-06-03 DIAGNOSIS — Z94.0 KIDNEY REPLACED BY TRANSPLANT: Primary | ICD-10-CM

## 2025-06-03 DIAGNOSIS — E83.39 HYPOPHOSPHATEMIA: ICD-10-CM

## 2025-06-03 DIAGNOSIS — E83.52 HYPERCALCEMIA: ICD-10-CM

## 2025-06-03 NOTE — TELEPHONE ENCOUNTER
Panchito Vaughn MD to Me  (Selected Message)        6/3/25 12:30 PM  Please start cinecalcet 30 mg every day and phosphorous 250 mg twice a day.

## 2025-06-03 NOTE — TELEPHONE ENCOUNTER
"Issue: tac low, calcium high, phosphorus low      Discussed with patient and sent necessary supplementation to pharmacy. He called initially stating that Mycophenolic Acid was too expensive with his insurance, and would like to stay on Mycophenolate. He states his stomach is feeling \"much better\" and is still taking Mycophenolate currently. Will resend to pharmacy.  "

## 2025-06-04 ENCOUNTER — TELEPHONE (OUTPATIENT)
Dept: TRANSPLANT | Facility: CLINIC | Age: 30
End: 2025-06-04
Payer: MEDICARE

## 2025-06-04 NOTE — TELEPHONE ENCOUNTER
Patient Call: General  Route to LPN    Reason for call: Pt called to discuss recent med level results    Call back needed? Yes    Return Call Needed  Same as documented in contacts section  When to return call?: Same day: Route High Priority

## 2025-06-05 RX ORDER — TACROLIMUS 1 MG/1
3 CAPSULE ORAL 2 TIMES DAILY
Qty: 180 CAPSULE | Refills: 11 | Status: SHIPPED | OUTPATIENT
Start: 2025-06-05

## 2025-06-05 RX ORDER — TACROLIMUS 0.5 MG/1
0.5 CAPSULE ORAL 2 TIMES DAILY
Qty: 60 CAPSULE | Refills: 11 | Status: SHIPPED | OUTPATIENT
Start: 2025-06-05

## 2025-06-05 RX ORDER — CINACALCET 30 MG/1
30 TABLET, FILM COATED ORAL DAILY
Qty: 30 TABLET | Refills: 11 | Status: SHIPPED | OUTPATIENT
Start: 2025-06-05

## 2025-06-05 RX ORDER — MYCOPHENOLATE MOFETIL 250 MG/1
750 CAPSULE ORAL 2 TIMES DAILY
Qty: 180 CAPSULE | Refills: 11 | Status: SHIPPED | OUTPATIENT
Start: 2025-06-05

## 2025-06-09 ENCOUNTER — LAB (OUTPATIENT)
Dept: LAB | Facility: CLINIC | Age: 30
End: 2025-06-09
Payer: MEDICARE

## 2025-06-09 DIAGNOSIS — Z94.0 KIDNEY REPLACED BY TRANSPLANT: ICD-10-CM

## 2025-06-09 DIAGNOSIS — Z79.899 ENCOUNTER FOR LONG-TERM CURRENT USE OF MEDICATION: ICD-10-CM

## 2025-06-09 DIAGNOSIS — Z20.828 CONTACT WITH AND (SUSPECTED) EXPOSURE TO OTHER VIRAL COMMUNICABLE DISEASES: ICD-10-CM

## 2025-06-09 DIAGNOSIS — Z48.298 AFTERCARE FOLLOWING ORGAN TRANSPLANT: ICD-10-CM

## 2025-06-09 DIAGNOSIS — Z98.890 OTHER SPECIFIED POSTPROCEDURAL STATES: ICD-10-CM

## 2025-06-09 PROCEDURE — 86833 HLA CLASS II HIGH DEFIN QUAL: CPT

## 2025-06-09 PROCEDURE — 86832 HLA CLASS I HIGH DEFIN QUAL: CPT

## 2025-06-09 PROCEDURE — 86828 HLA CLASS I&II ANTIBODY QUAL: CPT | Mod: XU

## 2025-06-11 ENCOUNTER — TELEPHONE (OUTPATIENT)
Dept: TRANSPLANT | Facility: CLINIC | Age: 30
End: 2025-06-11
Payer: MEDICARE

## 2025-06-11 ENCOUNTER — RESULTS FOLLOW-UP (OUTPATIENT)
Dept: TRANSPLANT | Facility: CLINIC | Age: 30
End: 2025-06-11

## 2025-06-11 DIAGNOSIS — Z94.0 STATUS POST KIDNEY TRANSPLANT: ICD-10-CM

## 2025-06-11 RX ORDER — SODIUM BICARBONATE 650 MG/1
1950 TABLET ORAL 3 TIMES DAILY
Qty: 180 TABLET | Refills: 3 | Status: SHIPPED | OUTPATIENT
Start: 2025-06-11

## 2025-06-11 NOTE — TELEPHONE ENCOUNTER
Issue: low phos, low sodium bicarb    Outcome: Spoke to patient about his electrolytes. Phosphorus supplement was sent for him to , he has not yet started this. Sodium bicarb increased to 3 tabs, TID.     Patient is doing and feeling well. Reports diarrhea is improving. Will continue to get labs and monitor these levels.

## 2025-06-12 ENCOUNTER — LAB (OUTPATIENT)
Dept: LAB | Facility: CLINIC | Age: 30
End: 2025-06-12
Attending: INTERNAL MEDICINE
Payer: MEDICARE

## 2025-06-12 ENCOUNTER — OFFICE VISIT (OUTPATIENT)
Dept: TRANSPLANT | Facility: CLINIC | Age: 30
End: 2025-06-12
Attending: INTERNAL MEDICINE
Payer: MEDICARE

## 2025-06-12 ENCOUNTER — TELEPHONE (OUTPATIENT)
Dept: TRANSPLANT | Facility: CLINIC | Age: 30
End: 2025-06-12

## 2025-06-12 VITALS
DIASTOLIC BLOOD PRESSURE: 79 MMHG | TEMPERATURE: 98.3 F | WEIGHT: 168.13 LBS | OXYGEN SATURATION: 100 % | HEART RATE: 100 BPM | BODY MASS INDEX: 28.86 KG/M2 | SYSTOLIC BLOOD PRESSURE: 125 MMHG

## 2025-06-12 DIAGNOSIS — E83.42 HYPOMAGNESEMIA: ICD-10-CM

## 2025-06-12 DIAGNOSIS — I31.39 PERICARDIAL EFFUSION WITH CARDIAC TAMPONADE: ICD-10-CM

## 2025-06-12 DIAGNOSIS — Z94.0 HTN, KIDNEY TRANSPLANT RELATED: Primary | ICD-10-CM

## 2025-06-12 DIAGNOSIS — N18.6 ESRD (END STAGE RENAL DISEASE) (H): ICD-10-CM

## 2025-06-12 DIAGNOSIS — E83.52 HYPERCALCEMIA: ICD-10-CM

## 2025-06-12 DIAGNOSIS — E87.5 HYPERKALEMIA: ICD-10-CM

## 2025-06-12 DIAGNOSIS — N25.81 SECONDARY RENAL HYPERPARATHYROIDISM: ICD-10-CM

## 2025-06-12 DIAGNOSIS — Z48.298 AFTERCARE FOLLOWING ORGAN TRANSPLANT: ICD-10-CM

## 2025-06-12 DIAGNOSIS — Z98.890 OTHER SPECIFIED POSTPROCEDURAL STATES: ICD-10-CM

## 2025-06-12 DIAGNOSIS — Z76.82 ORGAN TRANSPLANT CANDIDATE: ICD-10-CM

## 2025-06-12 DIAGNOSIS — N18.31 ANEMIA OF CHRONIC RENAL FAILURE, STAGE 3A (H): ICD-10-CM

## 2025-06-12 DIAGNOSIS — T86.19 ACUTE TUBULAR INJURY OF TRANSPLANTED KIDNEY: ICD-10-CM

## 2025-06-12 DIAGNOSIS — E87.8 LOW BICARBONATE LEVEL: ICD-10-CM

## 2025-06-12 DIAGNOSIS — N17.9 ACUTE TUBULAR INJURY OF TRANSPLANTED KIDNEY: ICD-10-CM

## 2025-06-12 DIAGNOSIS — F32.A DEPRESSION, UNSPECIFIED DEPRESSION TYPE: ICD-10-CM

## 2025-06-12 DIAGNOSIS — Q60.2 CONGENITAL RENAL AGENESIS AND DYSGENESIS: ICD-10-CM

## 2025-06-12 DIAGNOSIS — Z20.828 CONTACT WITH AND (SUSPECTED) EXPOSURE TO OTHER VIRAL COMMUNICABLE DISEASES: ICD-10-CM

## 2025-06-12 DIAGNOSIS — Z94.0 KIDNEY REPLACED BY TRANSPLANT: ICD-10-CM

## 2025-06-12 DIAGNOSIS — B34.8 BK VIREMIA: ICD-10-CM

## 2025-06-12 DIAGNOSIS — Z29.89 NEED FOR PNEUMOCYSTIS PROPHYLAXIS: ICD-10-CM

## 2025-06-12 DIAGNOSIS — E83.39 HYPOPHOSPHATEMIA: ICD-10-CM

## 2025-06-12 DIAGNOSIS — D84.9 IMMUNOSUPPRESSED STATUS: ICD-10-CM

## 2025-06-12 DIAGNOSIS — D63.1 ANEMIA OF CHRONIC RENAL FAILURE, STAGE 3A (H): ICD-10-CM

## 2025-06-12 DIAGNOSIS — Z79.899 ENCOUNTER FOR LONG-TERM CURRENT USE OF MEDICATION: ICD-10-CM

## 2025-06-12 DIAGNOSIS — I15.1 HTN, KIDNEY TRANSPLANT RELATED: Primary | ICD-10-CM

## 2025-06-12 DIAGNOSIS — I31.4 PERICARDIAL EFFUSION WITH CARDIAC TAMPONADE: ICD-10-CM

## 2025-06-12 DIAGNOSIS — Q60.5 CONGENITAL RENAL AGENESIS AND DYSGENESIS: ICD-10-CM

## 2025-06-12 DIAGNOSIS — R19.5 LOOSE STOOLS: ICD-10-CM

## 2025-06-12 LAB
ALBUMIN MFR UR ELPH: 27 MG/DL
ALBUMIN UR-MCNC: 10 MG/DL
ANION GAP SERPL CALCULATED.3IONS-SCNC: 13 MMOL/L (ref 7–15)
APPEARANCE UR: CLEAR
BILIRUB UR QL STRIP: NEGATIVE
BUN SERPL-MCNC: 18.5 MG/DL (ref 6–20)
CALCIUM SERPL-MCNC: 11.1 MG/DL (ref 8.8–10.4)
CHLORIDE SERPL-SCNC: 105 MMOL/L (ref 98–107)
COLOR UR AUTO: ABNORMAL
CREAT SERPL-MCNC: 2.03 MG/DL (ref 0.67–1.17)
CREAT UR-MCNC: 114 MG/DL
EGFRCR SERPLBLD CKD-EPI 2021: 44 ML/MIN/1.73M2
ERYTHROCYTE [DISTWIDTH] IN BLOOD BY AUTOMATED COUNT: 15.3 % (ref 10–15)
GLUCOSE SERPL-MCNC: 107 MG/DL (ref 70–99)
GLUCOSE UR STRIP-MCNC: 200 MG/DL
HCO3 SERPL-SCNC: 17 MMOL/L (ref 22–29)
HCT VFR BLD AUTO: 31.4 % (ref 40–53)
HGB BLD-MCNC: 10.4 G/DL (ref 13.3–17.7)
HGB UR QL STRIP: ABNORMAL
KETONES UR STRIP-MCNC: NEGATIVE MG/DL
LEUKOCYTE ESTERASE UR QL STRIP: NEGATIVE
MCH RBC QN AUTO: 30.2 PG (ref 26.5–33)
MCHC RBC AUTO-ENTMCNC: 33.1 G/DL (ref 31.5–36.5)
MCV RBC AUTO: 91 FL (ref 78–100)
MUCOUS THREADS #/AREA URNS LPF: PRESENT /LPF
NITRATE UR QL: NEGATIVE
PH UR STRIP: 6 [PH] (ref 5–7)
PLATELET # BLD AUTO: 187 10E3/UL (ref 150–450)
POTASSIUM SERPL-SCNC: 4.6 MMOL/L (ref 3.4–5.3)
PROT/CREAT 24H UR: 0.24 MG/MG CR (ref 0–0.2)
RBC # BLD AUTO: 3.44 10E6/UL (ref 4.4–5.9)
RBC URINE: 142 /HPF
SODIUM SERPL-SCNC: 135 MMOL/L (ref 135–145)
SP GR UR STRIP: 1.02 (ref 1–1.03)
SQUAMOUS EPITHELIAL: <1 /HPF
UROBILINOGEN UR STRIP-MCNC: NORMAL MG/DL
WBC # BLD AUTO: 7.5 10E3/UL (ref 4–11)
WBC URINE: 4 /HPF

## 2025-06-12 PROCEDURE — 87799 DETECT AGENT NOS DNA QUANT: CPT | Performed by: INTERNAL MEDICINE

## 2025-06-12 PROCEDURE — 87521 HEPATITIS C PROBE&RVRS TRNSC: CPT | Performed by: INTERNAL MEDICINE

## 2025-06-12 PROCEDURE — 80180 DRUG SCRN QUAN MYCOPHENOLATE: CPT | Performed by: INTERNAL MEDICINE

## 2025-06-12 PROCEDURE — 86828 HLA CLASS I&II ANTIBODY QUAL: CPT | Performed by: INTERNAL MEDICINE

## 2025-06-12 PROCEDURE — G0463 HOSPITAL OUTPT CLINIC VISIT: HCPCS | Performed by: INTERNAL MEDICINE

## 2025-06-12 PROCEDURE — 86833 HLA CLASS II HIGH DEFIN QUAL: CPT | Performed by: INTERNAL MEDICINE

## 2025-06-12 PROCEDURE — 99000 SPECIMEN HANDLING OFFICE-LAB: CPT | Performed by: PATHOLOGY

## 2025-06-12 PROCEDURE — 86832 HLA CLASS I HIGH DEFIN QUAL: CPT | Performed by: INTERNAL MEDICINE

## 2025-06-12 ASSESSMENT — PAIN SCALES - GENERAL: PAINLEVEL_OUTOF10: NO PAIN (0)

## 2025-06-12 NOTE — LETTER
6/12/2025      Jhoan Hoffman  [address]  [address]  [place] [address]      Dear Colleague,    Thank you for referring your patient, Jhoan Hoffman, to the [facility]. Please see a copy of my visit note below.    TRANSPLANT NEPHROLOGY CLINIC VISIT     Assessment & Plan  # DDKT: CKD Stage 3a/3b - Trend up, awaiting on tac levels. Could be related to high calcium levels as well. Encouraged to fill his cinacalcet as soon as possible and start taking it.   - Baseline Creatinine: ~ TBD, justin was 1.8   - Proteinuria: Minimal (0.2-0.5 grams)   - DSA Hx: No DSA   - Last cPRA: 100%   - BK Viremia: No   - Kidney Tx Biopsy Hx: No history of acute rejection.   - Primary Nephrologist: Dr. Sourav Ames.    # Immunosuppression: Tacrolimus immediate release (goal 8-10), Mycophenolic acid (dose 540 mg every 12 hours), and Prednisone (dose 5 mg daily)   - Induction with Recent Transplant:  High Intensity Protocol   - Continue with intensive monitoring of immunosuppression for efficacy and toxicity.   - Historical Changes in Immunosuppression: None   - Changes: Not at this time, current dose is 3.5 mg twice a day    # Infection Prevention:   Last CD4 Level: Not checked  - PJP: Sulfa/TMP (Bactrim)  - CMV: Valganciclovir (Valcyte)      - CMV IgG Ab High Risk Discordance (D+/R-) at time of transplant: No  Present CMV Serostatus: Negative  - EBV IgG Ab High Risk Discordance (D+/R-) at time of transplant: No  Present EBV Serostatus: Positive    # Hypertension: Controlled;  Goal BP: < 130/80   - Changes: Not at this time    # Elevated Blood Glucose: Glucose generally running ~ 110-160's    # Anemia in Chronic Renal Disease: Hgb: Stable, low      BASHIR: No   - Iron studies: Replete    # Mineral Bone Disorder:    - Secondary renal hyperparathyroidism; PTH level: Moderately elevated (301-600 pg/ml)        On treatment: Cinacalcet  - Vitamin D; level: Low        On supplement: No; Not on cholecalciferol due to high  serum calcium   - Calcium; level: High        On supplement: No, pt did not start on cinacalcet yet. Recommend to Increase oral fluid intake  - Phosphorus; level: Low        On supplement: No, need to refill them. 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 Repeat ECHO showed resolution. CT A/P from 2/2023 mentions pericardial thickening.               - CYNDEE on CPAP: Patient is compliant.  - Anxiety and Depression: previously required admission, almost 10 years ago. Now managed on fluoxetine.     # Skin Cancer Risk:    - Discussed sun protection and recommend regular follow up with Dermatology.    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    Return visit: Return in about 5 weeks (around 7/17/2025).    Jayla Garcia MD    The longitudinal plan of care for the diagnosis(es)/condition(s) as documented were addressed during this visit. Due to the added complexity in care, I will continue to support Jhoan in the subsequent management and with ongoing continuity of care.      Chief Complaint  Mr. Hoffman is a 30 year old here for kidney transplant and immunosuppression management.  "    History of Present Illness    Mr. Hoffman reports feeling stable overall.  Since last clinic visit:   Hospitalizations: Yes in late feb for TERRY with high tac levels   New Medical Issues: No  Chest pain or shortness of breath: No  Lower extremity swelling: No  Weight change: yes, likely lost some water weight after transplant  Nausea and vomiting: No  Diarrhea: No, but will have 1-3 soft BM per day  Heartburn symptoms: No  Fever, sweats or chills: No  Urinary complaints: No    Home BP: 130's systolic      Problem List  Patient Active Problem List   Diagnosis     Kidney replaced by transplant     HTN, kidney transplant related     Depression     BK viremia     GERD (gastroesophageal reflux disease)     Secondary renal hyperparathyroidism     Vitamin D deficiency     Immunosuppressed status     Aftercare following organ transplant     Encounter regarding vascular access for dialysis for ESRD (H)     ESRD (end stage renal disease) (H)     Encounter for adjustment and management of vascular access device     Encounter regarding vascular access for dialysis for end-stage renal disease (H)     Pericardial effusion with cardiac tamponade     Kidney transplant candidate     ESRD (end stage renal disease) on dialysis (H)     Acute post-operative pain     Anemia due to blood loss, acute     Hyperkalemia     Status post kidney transplant     Acute tubular injury of transplanted kidney     Hypomagnesemia     Low bicarbonate level     Leukocytosis       Allergies  Allergies   Allergen Reactions     Amoxicillin Swelling     Facial swelling, \"very massive swelling\"     Blood Transfusion Related (Informational Only) Other (See Comments)     Patient has a history of a clinically significant antibody against RBC antigens.  A delay in compatible RBCs may occur. Anti-Jka identified 11/25/08 and Unidentified Antibody found 01/01/09 at [facility].     Penicillins Other (See Comments) and Swelling     Facial swelling, \"very massive " "swelling\"     Azithromycin      Z pack - can't take with cyclosporine.     Vancomycin      Vancomycin infusion reaction      Cefprozil Rash       Medications  Current Outpatient Medications   Medication Sig Dispense Refill     acetaminophen (TYLENOL) 325 MG tablet Take 3 tablets (975 mg) by mouth every 8 hours as needed for mild pain. 60 tablet 0     aspirin 81 MG EC tablet Take 81 mg by mouth daily       atorvastatin (LIPITOR) 10 MG tablet Take 1 tablet (10 mg) by mouth daily. 30 tablet 11     cinacalcet (SENSIPAR) 30 MG tablet Take 1 tablet (30 mg) by mouth daily. 30 tablet 11     FLUoxetine (PROZAC) 40 MG capsule Take 40 mg by mouth at bedtime. 90 capsule 0     hydrOXYzine HCl (ATARAX) 25 MG tablet Take 25 mg by mouth as needed for other (Sleep).       magnesium oxide (MAG-OX) 400 MG tablet Take 1 tablet (400 mg) by mouth daily. 30 tablet 3     mycophenolate (GENERIC EQUIVALENT) 250 MG capsule Take 3 capsules (750 mg) by mouth 2 times daily. 180 capsule 11     omeprazole (PRILOSEC) 40 MG DR capsule Take 40 mg by mouth daily       ondansetron (ZOFRAN ODT) 4 MG ODT tab Take 1 tablet (4 mg) by mouth every 8 hours as needed for nausea. 15 tablet 0     phosphorus tablet 250 mg 250 MG per tablet Take 1 tablet (250 mg) by mouth 2 times daily. 60 tablet 1     predniSONE (DELTASONE) 5 MG tablet Take 1 tablet (5 mg) by mouth daily. 30 tablet 11     psyllium (METAMUCIL/KONSYL) Packet Take 1 packet by mouth daily. For management of loose stools.       senna-docusate (SENOKOT-S/PERICOLACE) 8.6-50 MG tablet Take 1 tablet by mouth 2 times daily as needed for constipation. 60 tablet 0     sodium bicarbonate 650 MG tablet Take 3 tablets (1,950 mg) by mouth 3 times daily. 180 tablet 3     sodium zirconium cyclosilicate (LOKELMA) 5 g PACK packet Take 1 packet (5 g) by mouth daily as needed (Hyperkalemia > 5.5). 30 packet 3     sulfamethoxazole-trimethoprim (BACTRIM) 400-80 MG tablet Take 1 tablet by mouth daily. 30 tablet 11     " tacrolimus (GENERIC EQUIVALENT) 0.5 MG capsule Take 1 capsule (0.5 mg) by mouth 2 times daily. Total dose: 3.5mg twice daily 60 capsule 11     tacrolimus (GENERIC EQUIVALENT) 1 MG capsule Take 3 capsules (3 mg) by mouth 2 times daily. Total dose: 3.5mg twice daily 180 capsule 11     valGANciclovir (VALCYTE) 450 MG tablet Take 1 tablet (450 mg) by mouth daily. 60 tablet 2     No current facility-administered medications for this visit.     There are no discontinued medications.    Physical Exam  Vital Signs: /79 (BP Location: Right arm, Cuff Size: Adult Regular)   Pulse 100   Temp 98.3  F (36.8  C)   Wt 76.3 kg (168 lb 2 oz)   SpO2 100%   BMI 28.86 kg/m      GENERAL APPEARANCE: alert and no distress  EYES: eyes grossly normal to inspection  HENT: normal cephalic/atraumatic  RESP: lungs clear to auscultation - no rales, rhonchi or wheezes  CV: regular rhythm, normal rate,  no murmur  EDEMA: no LE edema bilaterally  ABDOMEN: soft, nondistended, nontender  MS: extremities normal - no gross deformities noted  SKIN: no rash  NEURO: mentation intact and speech normal  PSYCH: mentation appears normal and affect normal/bright  TX KIDNEY: normal  DIALYSIS ACCESS:  LUE AV fistula with good thrill    Data        Latest Ref Rng & Units 6/12/2025    11:33 AM 6/9/2025     9:03 AM 6/5/2025     8:50 AM   Renal   Sodium 135 - 145 mmol/L 135      Na (external) 136 - 145 mmol/L  134  133    K 3.4 - 5.3 mmol/L 4.6      K (external) 3.5 - 5.1 mmol/L  4.4  5.2    Cl 98 - 107 mmol/L 105  105  101    Cl (external) 98 - 107 mmol/L 105  105  101    CO2 22 - 29 mmol/L 17      CO2 (external) 22 - 29 mmol/L  17  21    Urea Nitrogen 6.0 - 20.0 mg/dL 18.5      BUN (external) 6.0 - 20.0 mg/dL  15.7  26.2    Creatinine 0.67 - 1.17 mg/dL 2.03      Cr (external) 0.67 - 1.17 mg/dL  1.88  2.28    Glucose 70 - 99 mg/dL 107      Glucose (external) 70 - 110 mg/dL  112  101    Calcium 8.8 - 10.4 mg/dL 11.1      Ca (external) 8.6 - 10.0 mg/dL   10.3  11.1    Mg (external) 1.6 - 2.6 mg/dL  1.5           Latest Ref Rng & Units 6/9/2025     9:03 AM 6/2/2025     8:58 AM 5/29/2025     9:08 AM   Bone Health   Phos (external) 2.5 - 4.5 mg/dL 1.5  1.7  2.5    PTHi (external) 15.0 - 65.0 pg/mL 208.0      Vit D Def (external) 30.0 - 100.0 ng/mL 21.5            Latest Ref Rng & Units 6/12/2025    11:33 AM 6/9/2025     9:03 AM 6/5/2025     8:50 AM   Heme   WBC 4.0 - 11.0 10e3/uL 7.5      WBC (external) 4.8 - 10.8 10(3)/uL  5.8  8.2    Hgb 13.3 - 17.7 g/dL 10.4      Hgb (external) 14.0 - 18.0 g/dL  9.8  11.1    Plt 150 - 450 10e3/uL 187      Plt (external) 150 - 350 10(3)/uL  164  229    ABSOLUTE NEUTROPHILS (EXTERNAL) 1.2 - 8.1 10(3)/uL  4.9  6.9    ABSOLUTE LYMPHOCYTES (EXTERNAL) 0.6 - 4.7 10(3)/uL  0.4  0.5    ABSOLUTE MONOCYTES (EXTERNAL) 0.0 - 1.3 10(3)/uL  0.3  0.5    ABSOLUTE EOSINOPHILS (EXTERNAL) 0.0 - 0.7 10(3)/uL  0.1  0.3    ABSOLUTE BASOPHILS (EXTERNAL) 0.0 - 0.2 10(3)/uL  0.0  0.0          Latest Ref Rng & Units 5/23/2025     5:23 PM 5/8/2025     7:22 PM 4/6/2023    10:47 AM   Liver   AP 40 - 150 U/L 186  98  152    TBili <=1.2 mg/dL 0.6  0.4  0.4    ALT 0 - 70 U/L 45  9  23    AST 0 - 45 U/L 26  16  18    Tot Protein 6.4 - 8.3 g/dL 7.5  7.2  7.8    Albumin 3.5 - 5.2 g/dL 4.5  4.5  4.7          Latest Ref Rng & Units 5/8/2025    11:24 PM 2/16/2011     4:33 PM 10/20/2010     3:03 PM   Pancreas   A1C <5.7 % 4.9      Amylase 30 - 110 U/L  142  127    Lipase 20 - 250 U/L  202  256          Latest Ref Rng & Units 5/15/2025     7:54 AM 12/19/2020     7:26 AM 6/22/2020     8:25 AM   Iron studies   Iron 61 - 157 ug/dL 52  53     Iron Saturation Index 15 - 46 %  24     Iron Sat Index 15 - 46 % 26      Ferritin 31 - 409 ng/mL 1,032  143     Ferritin (external) 10.0 - 381.0 ng/mL   120.0          Latest Ref Rng & Units 6/9/2025     9:03 AM 5/8/2025     7:22 PM 4/6/2023    10:47 AM   [facility] Virology   BK Quant Log Ext log IU/mL Undetected      BK Quant Result Ext  Undetected IU/mL Undetected      BK Quant Spec Ext  Plasma      EBV CAPSID ANTIBODY IGG No detectable antibody.  Positive  Positive    HIV 1&2 Ext Nonreactive Nonreactive        Failed to redirect to the Timeline version of the REVFS SmartLink.  Recent Labs   Lab Test 05/14/25  0718 05/15/25  0754 05/19/25  0755 05/23/25  1723 05/25/25  0346 05/26/25  0538 05/27/25  0555   DOSTAC 5/13/2025 5/14/2025 5/18/2025  --   --   --   --    TACROL 7.5 7.9 14.4   < > 17.4* 7.8 9.1    < > = values in this interval not displayed.     Recent Labs   Lab Test 12/13/22  0812 05/15/25  0754   DOSMPA  --  5/14/2025   7:45 PM   MPACID <0.25* 3.73*   MPAG <6.5* 97.1*    Prescription drug management  40 minutes spent by me on the date of the encounter doing chart review, history and exam, documentation and further activities per the note    Again, thank you for allowing me to participate in the care of your patient.        Sincerely,        Jayla Garcia MD    Electronically signed

## 2025-06-12 NOTE — PROGRESS NOTES
TRANSPLANT NEPHROLOGY CLINIC VISIT     Assessment & Plan   # DDKT: CKD Stage 3a/3b - Trend up, awaiting on tac levels. Could be related to high calcium levels as well. Encouraged to fill his cinacalcet as soon as possible and start taking it.   - Baseline Creatinine: ~ TBD, justin was 1.8   - Proteinuria: Minimal (0.2-0.5 grams)   - DSA Hx: No DSA   - Last cPRA: 100%   - BK Viremia: No   - Kidney Tx Biopsy Hx: No history of acute rejection.   - Primary Nephrologist: Dr. Sourav Ames.    # Immunosuppression: Tacrolimus immediate release (goal 8-10), Mycophenolic acid (dose 540 mg every 12 hours), and Prednisone (dose 5 mg daily)   - Induction with Recent Transplant:  High Intensity Protocol   - Continue with intensive monitoring of immunosuppression for efficacy and toxicity.   - Historical Changes in Immunosuppression: None   - Changes: Not at this time, current dose is 3.5 mg twice a day    # Infection Prevention:   Last CD4 Level: Not checked  - PJP: Sulfa/TMP (Bactrim)  - CMV: Valganciclovir (Valcyte)      - CMV IgG Ab High Risk Discordance (D+/R-) at time of transplant: No  Present CMV Serostatus: Negative  - EBV IgG Ab High Risk Discordance (D+/R-) at time of transplant: No  Present EBV Serostatus: Positive    # Hypertension: Controlled;  Goal BP: < 130/80   - Changes: Not at this time    # Elevated Blood Glucose: Glucose generally running ~ 110-160's    # Anemia in Chronic Renal Disease: Hgb: Stable, low      BASHIR: No   - Iron studies: Replete    # Mineral Bone Disorder:    - Secondary renal hyperparathyroidism; PTH level: Moderately elevated (301-600 pg/ml)        On treatment: Cinacalcet  - Vitamin D; level: Low        On supplement: No; Not on cholecalciferol due to high serum calcium   - Calcium; level: High        On supplement: No, pt did not start on cinacalcet yet. Recommend to Increase oral fluid intake  - Phosphorus; level: Low        On supplement: No, need to refill them. 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 Repeat ECHO showed resolution. CT A/P from 2/2023 mentions pericardial thickening.               - CYNDEE on CPAP: Patient is compliant.  - Anxiety and Depression: previously required admission, almost 10 years ago. Now managed on fluoxetine.     # Skin Cancer Risk:    - Discussed sun protection and recommend regular follow up with Dermatology.    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    Return visit: Return in about 5 weeks (around 7/17/2025).    Jayla Garcia MD    The longitudinal plan of care for the diagnosis(es)/condition(s) as documented were addressed during this visit. Due to the added complexity in care, I will continue to support Jhoan in the subsequent management and with ongoing continuity of care.      Chief Complaint   Mr. Hoffman is a 30 year old here for kidney transplant and immunosuppression management.     History of Present Illness     Mr. Hoffman reports feeling stable overall.  Since last clinic visit:   Hospitalizations: Yes in late feb for TERRY with high tac levels   New Medical Issues: No  Chest pain or shortness of breath: No  Lower  "extremity swelling: No  Weight change: yes, likely lost some water weight after transplant  Nausea and vomiting: No  Diarrhea: No, but will have 1-3 soft BM per day  Heartburn symptoms: No  Fever, sweats or chills: No  Urinary complaints: No    Home BP: 130's systolic      Problem List   Patient Active Problem List   Diagnosis    Kidney replaced by transplant    HTN, kidney transplant related    Depression    BK viremia    GERD (gastroesophageal reflux disease)    Secondary renal hyperparathyroidism    Vitamin D deficiency    Immunosuppressed status    Aftercare following organ transplant    Encounter regarding vascular access for dialysis for ESRD (H)    ESRD (end stage renal disease) (H)    Encounter for adjustment and management of vascular access device    Encounter regarding vascular access for dialysis for end-stage renal disease (H)    Pericardial effusion with cardiac tamponade    Kidney transplant candidate    ESRD (end stage renal disease) on dialysis (H)    Acute post-operative pain    Anemia due to blood loss, acute    Hyperkalemia    Status post kidney transplant    Acute tubular injury of transplanted kidney    Hypomagnesemia    Low bicarbonate level    Leukocytosis       Allergies   Allergies   Allergen Reactions    Amoxicillin Swelling     Facial swelling, \"very massive swelling\"    Blood Transfusion Related (Informational Only) Other (See Comments)     Patient has a history of a clinically significant antibody against RBC antigens.  A delay in compatible RBCs may occur. Anti-Jka identified 11/25/08 and Unidentified Antibody found 01/01/09 at [facility].    Penicillins Other (See Comments) and Swelling     Facial swelling, \"very massive swelling\"    Azithromycin      Z pack - can't take with cyclosporine.    Vancomycin      Vancomycin infusion reaction     Cefprozil Rash       Medications   Current Outpatient Medications   Medication Sig Dispense Refill    acetaminophen (TYLENOL) 325 MG tablet " Take 3 tablets (975 mg) by mouth every 8 hours as needed for mild pain. 60 tablet 0    aspirin 81 MG EC tablet Take 81 mg by mouth daily      atorvastatin (LIPITOR) 10 MG tablet Take 1 tablet (10 mg) by mouth daily. 30 tablet 11    cinacalcet (SENSIPAR) 30 MG tablet Take 1 tablet (30 mg) by mouth daily. 30 tablet 11    FLUoxetine (PROZAC) 40 MG capsule Take 40 mg by mouth at bedtime. 90 capsule 0    hydrOXYzine HCl (ATARAX) 25 MG tablet Take 25 mg by mouth as needed for other (Sleep).      magnesium oxide (MAG-OX) 400 MG tablet Take 1 tablet (400 mg) by mouth daily. 30 tablet 3    mycophenolate (GENERIC EQUIVALENT) 250 MG capsule Take 3 capsules (750 mg) by mouth 2 times daily. 180 capsule 11    omeprazole (PRILOSEC) 40 MG DR capsule Take 40 mg by mouth daily      ondansetron (ZOFRAN ODT) 4 MG ODT tab Take 1 tablet (4 mg) by mouth every 8 hours as needed for nausea. 15 tablet 0    phosphorus tablet 250 mg 250 MG per tablet Take 1 tablet (250 mg) by mouth 2 times daily. 60 tablet 1    predniSONE (DELTASONE) 5 MG tablet Take 1 tablet (5 mg) by mouth daily. 30 tablet 11    psyllium (METAMUCIL/KONSYL) Packet Take 1 packet by mouth daily. For management of loose stools.      senna-docusate (SENOKOT-S/PERICOLACE) 8.6-50 MG tablet Take 1 tablet by mouth 2 times daily as needed for constipation. 60 tablet 0    sodium bicarbonate 650 MG tablet Take 3 tablets (1,950 mg) by mouth 3 times daily. 180 tablet 3    sodium zirconium cyclosilicate (LOKELMA) 5 g PACK packet Take 1 packet (5 g) by mouth daily as needed (Hyperkalemia > 5.5). 30 packet 3    sulfamethoxazole-trimethoprim (BACTRIM) 400-80 MG tablet Take 1 tablet by mouth daily. 30 tablet 11    tacrolimus (GENERIC EQUIVALENT) 0.5 MG capsule Take 1 capsule (0.5 mg) by mouth 2 times daily. Total dose: 3.5mg twice daily 60 capsule 11    tacrolimus (GENERIC EQUIVALENT) 1 MG capsule Take 3 capsules (3 mg) by mouth 2 times daily. Total dose: 3.5mg twice daily 180 capsule 11     valGANciclovir (VALCYTE) 450 MG tablet Take 1 tablet (450 mg) by mouth daily. 60 tablet 2     No current facility-administered medications for this visit.     There are no discontinued medications.    Physical Exam   Vital Signs: /79 (BP Location: Right arm, Cuff Size: Adult Regular)   Pulse 100   Temp 98.3  F (36.8  C)   Wt 76.3 kg (168 lb 2 oz)   SpO2 100%   BMI 28.86 kg/m      GENERAL APPEARANCE: alert and no distress  EYES: eyes grossly normal to inspection  HENT: normal cephalic/atraumatic  RESP: lungs clear to auscultation - no rales, rhonchi or wheezes  CV: regular rhythm, normal rate,  no murmur  EDEMA: no LE edema bilaterally  ABDOMEN: soft, nondistended, nontender  MS: extremities normal - no gross deformities noted  SKIN: no rash  NEURO: mentation intact and speech normal  PSYCH: mentation appears normal and affect normal/bright  TX KIDNEY: normal  DIALYSIS ACCESS:  LUE AV fistula with good thrill    Data         Latest Ref Rng & Units 6/12/2025    11:33 AM 6/9/2025     9:03 AM 6/5/2025     8:50 AM   Renal   Sodium 135 - 145 mmol/L 135      Na (external) 136 - 145 mmol/L  134  133    K 3.4 - 5.3 mmol/L 4.6      K (external) 3.5 - 5.1 mmol/L  4.4  5.2    Cl 98 - 107 mmol/L 105  105  101    Cl (external) 98 - 107 mmol/L 105  105  101    CO2 22 - 29 mmol/L 17      CO2 (external) 22 - 29 mmol/L  17  21    Urea Nitrogen 6.0 - 20.0 mg/dL 18.5      BUN (external) 6.0 - 20.0 mg/dL  15.7  26.2    Creatinine 0.67 - 1.17 mg/dL 2.03      Cr (external) 0.67 - 1.17 mg/dL  1.88  2.28    Glucose 70 - 99 mg/dL 107      Glucose (external) 70 - 110 mg/dL  112  101    Calcium 8.8 - 10.4 mg/dL 11.1      Ca (external) 8.6 - 10.0 mg/dL  10.3  11.1    Mg (external) 1.6 - 2.6 mg/dL  1.5           Latest Ref Rng & Units 6/9/2025     9:03 AM 6/2/2025     8:58 AM 5/29/2025     9:08 AM   Bone Health   Phos (external) 2.5 - 4.5 mg/dL 1.5  1.7  2.5    PTHi (external) 15.0 - 65.0 pg/mL 208.0      Vit D Def (external) 30.0 -  100.0 ng/mL 21.5            Latest Ref Rng & Units 6/12/2025    11:33 AM 6/9/2025     9:03 AM 6/5/2025     8:50 AM   Heme   WBC 4.0 - 11.0 10e3/uL 7.5      WBC (external) 4.8 - 10.8 10(3)/uL  5.8  8.2    Hgb 13.3 - 17.7 g/dL 10.4      Hgb (external) 14.0 - 18.0 g/dL  9.8  11.1    Plt 150 - 450 10e3/uL 187      Plt (external) 150 - 350 10(3)/uL  164  229    ABSOLUTE NEUTROPHILS (EXTERNAL) 1.2 - 8.1 10(3)/uL  4.9  6.9    ABSOLUTE LYMPHOCYTES (EXTERNAL) 0.6 - 4.7 10(3)/uL  0.4  0.5    ABSOLUTE MONOCYTES (EXTERNAL) 0.0 - 1.3 10(3)/uL  0.3  0.5    ABSOLUTE EOSINOPHILS (EXTERNAL) 0.0 - 0.7 10(3)/uL  0.1  0.3    ABSOLUTE BASOPHILS (EXTERNAL) 0.0 - 0.2 10(3)/uL  0.0  0.0          Latest Ref Rng & Units 5/23/2025     5:23 PM 5/8/2025     7:22 PM 4/6/2023    10:47 AM   Liver   AP 40 - 150 U/L 186  98  152    TBili <=1.2 mg/dL 0.6  0.4  0.4    ALT 0 - 70 U/L 45  9  23    AST 0 - 45 U/L 26  16  18    Tot Protein 6.4 - 8.3 g/dL 7.5  7.2  7.8    Albumin 3.5 - 5.2 g/dL 4.5  4.5  4.7          Latest Ref Rng & Units 5/8/2025    11:24 PM 2/16/2011     4:33 PM 10/20/2010     3:03 PM   Pancreas   A1C <5.7 % 4.9      Amylase 30 - 110 U/L  142  127    Lipase 20 - 250 U/L  202  256          Latest Ref Rng & Units 5/15/2025     7:54 AM 12/19/2020     7:26 AM 6/22/2020     8:25 AM   Iron studies   Iron 61 - 157 ug/dL 52  53     Iron Saturation Index 15 - 46 %  24     Iron Sat Index 15 - 46 % 26      Ferritin 31 - 409 ng/mL 1,032  143     Ferritin (external) 10.0 - 381.0 ng/mL   120.0          Latest Ref Rng & Units 6/9/2025     9:03 AM 5/8/2025     7:22 PM 4/6/2023    10:47 AM   [facility] Virology   BK Quant Log Ext log IU/mL Undetected      BK Quant Result Ext Undetected IU/mL Undetected      BK Quant Spec Ext  Plasma      EBV CAPSID ANTIBODY IGG No detectable antibody.  Positive  Positive    HIV 1&2 Ext Nonreactive Nonreactive        Failed to redirect to the Timeline version of the REVFS SmartLink.  Recent Labs   Lab Test 05/14/25  0718  05/15/25  0754 05/19/25  0755 05/23/25  1723 05/25/25  0346 05/26/25  0538 05/27/25  0555   DOSTAC 5/13/2025 5/14/2025 5/18/2025  --   --   --   --    TACROL 7.5 7.9 14.4   < > 17.4* 7.8 9.1    < > = values in this interval not displayed.     Recent Labs   Lab Test 12/13/22  0812 05/15/25  0754   DOSMPA  --  5/14/2025   7:45 PM   MPACID <0.25* 3.73*   MPAG <6.5* 97.1*    Prescription drug management  40 minutes spent by me on the date of the encounter doing chart review, history and exam, documentation and further activities per the note

## 2025-06-12 NOTE — TELEPHONE ENCOUNTER
Spoke to Jhoan about not needing tomorrow's appointment since he got his staples out locally and just saw Mariam. Will cancel and he will follow up next week for Cystoscopy.    Patient was only taking 3mg BID of Tacrolimus, so he will increase to 3.5mg twice daily since his level was below goal

## 2025-06-12 NOTE — TELEPHONE ENCOUNTER
Please call Jhoan/Mom;  they would like to leave town and go home today if possible.  Elizabeth was seen by Dr. PASTOR Garcia and has appt Friday with Mariam Orta - was wondering if he really needs to see her?  Also, wanted to  discuss his 06/09/2025 Tacrolimus level.

## 2025-06-12 NOTE — NURSING NOTE
Chief Complaint   Patient presents with    RECHECK     AKT     /79 (BP Location: Right arm, Cuff Size: Adult Regular)   Pulse 100   Temp 98.3  F (36.8  C)   Wt 76.3 kg (168 lb 2 oz)   SpO2 100%   BMI 28.86 kg/m  .  Kamilla Hudson on 6/12/2025 at 12:19 PM

## 2025-06-15 LAB
DONOR IDENTIFICATION: NORMAL
DONOR IDENTIFICATION: NORMAL
DSA COMMENTS: NORMAL
DSA COMMENTS: NORMAL
DSA PRESENT: NO
DSA PRESENT: NO
DSA TEST METHOD: NORMAL
DSA TEST METHOD: NORMAL
INT SUB COMMENTS: NORMAL
INT SUB COMMENTS: NORMAL
INT SUB RESULT: NORMAL
INT SUB RESULT: NORMAL
INTERF SUBSTANCE: NORMAL
INTERF SUBSTANCE: NORMAL
INTSUB TEST METHOD: NORMAL
INTSUB TEST METHOD: NORMAL
ORGAN: NORMAL
ORGAN: NORMAL
SA 1  COMMENTS: NORMAL
SA 1  COMMENTS: NORMAL
SA 1 CELL: NORMAL
SA 1 CELL: NORMAL
SA 1 TEST METHOD: NORMAL
SA 1 TEST METHOD: NORMAL
SA 2 CELL: NORMAL
SA 2 CELL: NORMAL
SA 2 COMMENTS: NORMAL
SA 2 COMMENTS: NORMAL
SA 2 TEST METHOD: NORMAL
SA 2 TEST METHOD: NORMAL
SA1 HI RISK ABY: NORMAL
SA1 HI RISK ABY: NORMAL
SA1 MOD RISK ABY: NORMAL
SA1 MOD RISK ABY: NORMAL
SA2 HI RISK ABY: NORMAL
SA2 HI RISK ABY: NORMAL
SA2 MOD RISK ABY: NORMAL
SA2 MOD RISK ABY: NORMAL
UNACCEPTABLE ANTIGENS: NORMAL
UNACCEPTABLE ANTIGENS: NORMAL
UNOS CPRA: 100
UNOS CPRA: 100

## 2025-06-16 ENCOUNTER — RESULTS FOLLOW-UP (OUTPATIENT)
Dept: TRANSPLANT | Facility: CLINIC | Age: 30
End: 2025-06-16

## 2025-06-16 ENCOUNTER — TELEPHONE (OUTPATIENT)
Dept: TRANSPLANT | Facility: CLINIC | Age: 30
End: 2025-06-16
Payer: MEDICARE

## 2025-06-16 DIAGNOSIS — N17.9 ACUTE TUBULAR INJURY OF TRANSPLANTED KIDNEY: ICD-10-CM

## 2025-06-16 DIAGNOSIS — T86.19 ACUTE TUBULAR INJURY OF TRANSPLANTED KIDNEY: ICD-10-CM

## 2025-06-16 NOTE — TELEPHONE ENCOUNTER
Patient states the last few days his hands have been mildly shaking. He has been on Tacrolimus with prior transplants and has not had this issue so it is unsure whether or not this is related. Tacrolimus level is pending and will reduce if needed. Creatinine was elevated on last draw, so will adjust tac if needed. Patient will push fluids and has cystoscopy on Thursday.

## 2025-06-17 ENCOUNTER — RESULTS FOLLOW-UP (OUTPATIENT)
Dept: TRANSPLANT | Facility: CLINIC | Age: 30
End: 2025-06-17

## 2025-06-17 ENCOUNTER — TELEPHONE (OUTPATIENT)
Dept: TRANSPLANT | Facility: CLINIC | Age: 30
End: 2025-06-17
Payer: MEDICARE

## 2025-06-17 DIAGNOSIS — E83.39 HYPOPHOSPHATEMIA: ICD-10-CM

## 2025-06-17 DIAGNOSIS — Z48.298 AFTERCARE FOLLOWING ORGAN TRANSPLANT: Primary | ICD-10-CM

## 2025-06-17 DIAGNOSIS — T86.19 ACUTE TUBULAR INJURY OF TRANSPLANTED KIDNEY: ICD-10-CM

## 2025-06-17 DIAGNOSIS — N17.9 ACUTE TUBULAR INJURY OF TRANSPLANTED KIDNEY: ICD-10-CM

## 2025-06-18 NOTE — TELEPHONE ENCOUNTER
Patient has not yet picked up his Phosphorus supplement, so will first start at 250mg BID and increase further if needed. Talked about incorporating more phosphorus into diet.     Still waiting on patient tacrolimus level, not yet resulted from local lab.    Per committee review, wants STAT renal ultrasound. Will obtain tomorrow while in town for cysto. [facility] called and made appointment time for this.

## 2025-06-19 ENCOUNTER — OFFICE VISIT (OUTPATIENT)
Dept: TRANSPLANT | Facility: CLINIC | Age: 30
End: 2025-06-19
Attending: NURSE PRACTITIONER

## 2025-06-19 ENCOUNTER — OFFICE VISIT (OUTPATIENT)
Dept: TRANSPLANT | Facility: CLINIC | Age: 30
End: 2025-06-19
Attending: INTERNAL MEDICINE
Payer: MEDICARE

## 2025-06-19 ENCOUNTER — RESULTS FOLLOW-UP (OUTPATIENT)
Dept: TRANSPLANT | Facility: CLINIC | Age: 30
End: 2025-06-19

## 2025-06-19 ENCOUNTER — LAB (OUTPATIENT)
Dept: LAB | Facility: CLINIC | Age: 30
End: 2025-06-19
Payer: MEDICARE

## 2025-06-19 VITALS
TEMPERATURE: 98.2 F | HEART RATE: 104 BPM | SYSTOLIC BLOOD PRESSURE: 118 MMHG | OXYGEN SATURATION: 99 % | DIASTOLIC BLOOD PRESSURE: 69 MMHG

## 2025-06-19 DIAGNOSIS — T86.19 ACUTE TUBULAR INJURY OF TRANSPLANTED KIDNEY: ICD-10-CM

## 2025-06-19 DIAGNOSIS — Z20.828 CONTACT WITH AND (SUSPECTED) EXPOSURE TO OTHER VIRAL COMMUNICABLE DISEASES: ICD-10-CM

## 2025-06-19 DIAGNOSIS — Z48.298 AFTERCARE FOLLOWING ORGAN TRANSPLANT: ICD-10-CM

## 2025-06-19 DIAGNOSIS — Z94.0 KIDNEY REPLACED BY TRANSPLANT: ICD-10-CM

## 2025-06-19 DIAGNOSIS — Z98.890 OTHER SPECIFIED POSTPROCEDURAL STATES: ICD-10-CM

## 2025-06-19 DIAGNOSIS — Z96.0 URETERAL STENT PRESENT: Primary | ICD-10-CM

## 2025-06-19 DIAGNOSIS — N17.9 ACUTE TUBULAR INJURY OF TRANSPLANTED KIDNEY: ICD-10-CM

## 2025-06-19 DIAGNOSIS — Z79.899 ENCOUNTER FOR LONG-TERM CURRENT USE OF MEDICATION: ICD-10-CM

## 2025-06-19 LAB
ANION GAP SERPL CALCULATED.3IONS-SCNC: 11 MMOL/L (ref 7–15)
BUN SERPL-MCNC: 21 MG/DL (ref 6–20)
CALCIUM SERPL-MCNC: 9 MG/DL (ref 8.8–10.4)
CHLORIDE SERPL-SCNC: 104 MMOL/L (ref 98–107)
CREAT SERPL-MCNC: 2.27 MG/DL (ref 0.67–1.17)
EGFRCR SERPLBLD CKD-EPI 2021: 39 ML/MIN/1.73M2
ERYTHROCYTE [DISTWIDTH] IN BLOOD BY AUTOMATED COUNT: 15.5 % (ref 10–15)
GLUCOSE SERPL-MCNC: 97 MG/DL (ref 70–99)
HCO3 SERPL-SCNC: 20 MMOL/L (ref 22–29)
HCT VFR BLD AUTO: 29.9 % (ref 40–53)
HGB BLD-MCNC: 9.8 G/DL (ref 13.3–17.7)
MAGNESIUM SERPL-MCNC: 1.3 MG/DL (ref 1.7–2.3)
MCH RBC QN AUTO: 30.3 PG (ref 26.5–33)
MCHC RBC AUTO-ENTMCNC: 32.8 G/DL (ref 31.5–36.5)
MCV RBC AUTO: 93 FL (ref 78–100)
PHOSPHATE SERPL-MCNC: 1.6 MG/DL (ref 2.5–4.5)
PLATELET # BLD AUTO: 168 10E3/UL (ref 150–450)
POTASSIUM SERPL-SCNC: 4.8 MMOL/L (ref 3.4–5.3)
RBC # BLD AUTO: 3.23 10E6/UL (ref 4.4–5.9)
SODIUM SERPL-SCNC: 135 MMOL/L (ref 135–145)
TACROLIMUS BLD-MCNC: 10.7 UG/L (ref 5–15)
TME LAST DOSE: NORMAL H
TME LAST DOSE: NORMAL H
WBC # BLD AUTO: 7.7 10E3/UL (ref 4–11)

## 2025-06-19 PROCEDURE — 250N000009 HC RX 250: Performed by: NURSE PRACTITIONER

## 2025-06-19 PROCEDURE — 99000 SPECIMEN HANDLING OFFICE-LAB: CPT | Performed by: PATHOLOGY

## 2025-06-19 PROCEDURE — 80180 DRUG SCRN QUAN MYCOPHENOLATE: CPT | Performed by: INTERNAL MEDICINE

## 2025-06-19 PROCEDURE — 80197 ASSAY OF TACROLIMUS: CPT | Performed by: INTERNAL MEDICINE

## 2025-06-19 RX ORDER — LIDOCAINE HYDROCHLORIDE 20 MG/ML
JELLY TOPICAL ONCE
Status: COMPLETED | OUTPATIENT
Start: 2025-06-19 | End: 2025-06-19

## 2025-06-19 RX ORDER — LEVOFLOXACIN 250 MG/1
500 TABLET, FILM COATED ORAL ONCE
Status: DISPENSED | OUTPATIENT
Start: 2025-06-19

## 2025-06-19 RX ADMIN — LIDOCAINE HYDROCHLORIDE: 20 JELLY TOPICAL at 12:51

## 2025-06-19 NOTE — LETTER
6/19/2025      Jhoan Hoffman  [address]  [address]  [facility] [address]      Dear Colleague,    Thank you for referring your patient, Jhoan Hoffman, to the [facility]. Please see a copy of my visit note below.    Preoperative Evaluation  [facility]  [address] [facility] [address]  Phone: [phone]  Fax: [phone]  Primary Provider: Alexis Pabon MD  Pre-op Performing Provider: JAJA Holley CNP  Jun 19, 2025 6/19/2025   Surgical Information   What procedure is being done? cystoscopy with ureteral stent removal   Facility or Hospital where procedure/surgery will be performed: [facility]   Who is doing the procedure / surgery? TBD   Date of surgery / procedure: TBD   Time of surgery / procedure: TBD   Where do you plan to recover after surgery? at home with family     Fax number for surgical facility: Note does not need to be faxed, will be available electronically in Epic.    Assessment & Plan    The proposed surgical procedure is considered LOW risk.    Transplanted kidney with ureteral stent.   -Needs cystoscopy with ureteral stent removal        - No identified additional risk factors other than previously addressed      Recommendation  Approval given to proceed with proposed procedure, without further diagnostic evaluation.  Take meds as routinely scheduled.       Subjective  Jhoan is a 30 year old, presenting for the following:  Pre op       HPI: S/P kidney transplant, needs cystoscopy for ureteral stent removal.   Unsuccessful cystoscopy in clinic, unable to advance scope into bladder.               6/19/2025   Pre-Op Questionnaire   Have you ever had a heart attack or stroke? No   Have you ever had surgery on your heart or blood vessels, such as a stent placement, a coronary artery bypass, or surgery on an artery in your head, neck, heart, or legs? No   Do you have chest pain with activity? No   Do you have a history  of heart failure? No   Do you currently have a cold, bronchitis or symptoms of other infection? No   Do you have a cough, shortness of breath, or wheezing? No   Do you or anyone in your family have previous history of blood clots? No   Do you or does anyone in your family have a serious bleeding problem such as prolonged bleeding following surgeries or cuts? No   Have you ever had problems with anemia or been told to take iron pills? No   Have you had any abnormal blood loss such as black, tarry or bloody stools? No   Have you ever had a blood transfusion? (!) YES   Have you ever had a transfusion reaction? No   Are you willing to have a blood transfusion if it is medically needed before, during, or after your surgery? Yes   Have you or any of your relatives ever had problems with anesthesia? No   Do you have sleep apnea, excessive snoring or daytime drowsiness? No   Do you have any artifical heart valves or other implanted medical devices like a pacemaker, defibrillator, or continuous glucose monitor? No   Do you have artificial joints? No   Are you allergic to latex? No           Patient Active Problem List    Diagnosis Date Noted     Hypercalcemia 06/12/2025     Priority: Medium     Need for pneumocystis prophylaxis 06/12/2025     Priority: Medium     Hypophosphatemia 06/12/2025     Priority: Medium     Acute tubular injury of transplanted kidney 05/27/2025     Priority: Medium     Hypomagnesemia 05/27/2025     Priority: Medium     Low bicarbonate level 05/27/2025     Priority: Medium     Leukocytosis 05/27/2025     Priority: Medium     Hyperkalemia 05/23/2025     Priority: Medium     Status post kidney transplant 05/23/2025     Priority: Medium     Acute post-operative pain 05/13/2025     Priority: Medium     Anemia due to blood loss, acute 05/13/2025     Priority: Medium     ESRD (end stage renal disease) on dialysis (H) 05/09/2025     Priority: Medium     Kidney transplant candidate 05/08/2025     Priority:  Medium     Pericardial effusion with cardiac tamponade 12/11/2022     Priority: Medium     Encounter regarding vascular access for dialysis for end-stage renal disease (H) 02/04/2021     Priority: Medium     Added automatically from request for surgery [id number]       ESRD (end stage renal disease) (H) 12/17/2020     Priority: Medium     Encounter for adjustment and management of vascular access device 12/17/2020     Priority: Medium     Encounter regarding vascular access for dialysis for ESRD (H) 11/12/2020     Priority: Medium     Added automatically from request for surgery [id number]       Vitamin D deficiency 02/20/2017     Priority: Medium     Immunosuppressed status 02/20/2017     Priority: Medium     Aftercare following organ transplant 02/20/2017     Priority: Medium     Secondary renal hyperparathyroidism 08/26/2016     Priority: Medium     GERD (gastroesophageal reflux disease) 01/21/2016     Priority: Medium     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 difficile diarrhea 2023     Depression      Depression 2012     Hypertension      Kidney replaced by transplant 1995    Failed 2010     Kidney replaced by transplant 2011    Failed 2011, ischemic necrosis and organized thrombus     Kidney replaced by transplant 2012 Failed 2020     CYNDEE (obstructive sleep apnea)      Pericarditis 2022     Peritoneal dialysis status      Past Surgical History:   Procedure Laterality Date     BENCH KIDNEY  5/10/2025    Procedure: Bench kidney;  Surgeon: Omar Osullivan MD;  Location: [facility]     BIOPSY       CREATE GRAFT LOOP ARTERIOVENOUS UPPER EXTREMITY  12/17/2020     Procedure: Create Graft Left Arteriovenous Upper Extremity;  Surgeon: Sidney Palma MD;  Location: [facility]     CV PERICARDIOCENTESIS N/A 12/12/2022    Procedure: Pericardiocentesis;  Surgeon: Dennis Rodriguez MD;  Location: [facility]     CYSTOSCOPY, REMOVE STENT(S), COMBINED  5/25/2012    Procedure:COMBINED CYSTOSCOPY, REMOVE STENT(S); Cystoscopy, Removal Of Urinary Stent; Surgeon:FLORENTINO KNIGHT; [facility]     ENDARECTERECTOMY RENAL  5/18/2011    Procedure:ENDARECTERECTOMY RENAL; Exploration of Transplant Kidney, Back Table Flush, Biopsy of Kidney and Reanastamosis of Kidney; Surgeon:FLORENTINO KNIGHT; [facility]     EXPLANT TRANSPLANTED KIDNEY  7/12/2011    Procedure:EXPLANT TRANSPLANTED KIDNEY; Transplant Nephrectomy; Surgeon:FLORENTINO KNIGHT; [facility]     INSERT CATHETER HEMODIALYSIS  5/18/2011    Procedure:INSERT CATHETER HEMODIALYSIS; Double Lumen; Surgeon:JOE HE; [facility]     INSERT CATHETER PERITONEAL DIALYSIS  4/28/2012    Procedure:INSERT CATHETER PERITONEAL DIALYSIS; Placement dialysis cath under fluoro, before kidney tx; Surgeon:FLORENTINO KNIGHT; [facility]     IR CVC TUNNEL PLACEMENT > 5 YRS OF AGE  12/18/2020     IR RENAL BIOPSY LEFT  5/25/2025     LAPAROTOMY EXPLORATORY  5/19/2011    Procedure:LAPAROTOMY EXPLORATORY; washout of kidney and closure; Surgeon:FLORENTINO KNIGHT; [facility]     PERCUTANEOUS BIOPSY KIDNEY  6/2/2011    Procedure:PERCUTANEOUS BIOPSY KIDNEY; Surgeon:GIL WILLIAM; [facility]     PERCUTANEOUS BIOPSY KIDNEY Right 6/26/2019    Procedure: Right Kidney Biopsy;  Surgeon: ePter Briggs MD;  [facility]     PERCUTANEOUS BIOPSY KIDNEY Right 7/23/2019    Procedure: Right Kidney Biopsy;  Surgeon: Pro Menard MD;  [facility]     REMOVE CATHETER PERITONEAL  5/3/2012    Procedure:REMOVE CATHETER PERITONEAL; Removal Of Peritoneal Dialysis Catheter; Surgeon:EUGENE  FLORENTINO; [facility]     REVISION FISTULA ARTERIOVENOUS UPPER EXTREMITY Left 2021    Procedure: Left upper AV fistula revision (transposition) with intraoperative ultrasound;  Surgeon: Sidney Palma MD;  [facility]     TRANSPLANT KIDNEY RECIPIENT  DONOR Right 5/10/2025    Procedure: Right Kidney Transplanbt Intraperitoneal;  Surgeon: Omar Osullivan MD;  [facility]: [facility]     TRANSPLANT KIDNEY RECIPIENT LIVING RELATED  1995     TRANSPLANT KIDNEY RECIPIENT LIVING UNRELATED  2011    Procedure:TRANSPLANT KIDNEY RECIPIENT LIVING UNRELATED; Living unrelated left kidney transplant and placement of ureteral stent into transplant ureter.; Surgeon:FLORENTINO KNIGHT; [facility]     TRANSPLANT KIDNEY RECIPIENT LIVING UNRELATED  2012    Procedure:TRANSPLANT KIDNEY RECIPIENT LIVING UNRELATED; kidney transplant -   UNOS# [id number]  Organ arrival:   Cross match results: 0200   Donor blood type: A  Recipient blood type: A; Surgeon:FLORENTINO KNIGHT; [facility]     Current Outpatient Medications   Medication Sig Dispense Refill     acetaminophen (TYLENOL) 325 MG tablet Take 3 tablets (975 mg) by mouth every 8 hours as needed for mild pain. 60 tablet 0     aspirin 81 MG EC tablet Take 81 mg by mouth daily       atorvastatin (LIPITOR) 10 MG tablet Take 1 tablet (10 mg) by mouth daily. 30 tablet 11     cinacalcet (SENSIPAR) 30 MG tablet Take 1 tablet (30 mg) by mouth daily. 30 tablet 11     FLUoxetine (PROZAC) 40 MG capsule Take 40 mg by mouth at bedtime. 90 capsule 0     hydrOXYzine HCl (ATARAX) 25 MG tablet Take 25 mg by mouth as needed for other (Sleep).       magnesium oxide (MAG-OX) 400 MG tablet Take 1 tablet (400 mg) by mouth daily. 30 tablet 3     mycophenolate (GENERIC EQUIVALENT) 250 MG capsule Take 3 capsules (750 mg) by mouth 2 times daily. 180 capsule 11     omeprazole (PRILOSEC) 40 MG DR capsule Take 40 mg by mouth daily       ondansetron (ZOFRAN ODT) 4 MG ODT  "tab Take 1 tablet (4 mg) by mouth every 8 hours as needed for nausea. 15 tablet 0     phosphorus tablet 250 mg 250 MG per tablet Take 1 tablet (250 mg) by mouth 2 times daily. 60 tablet 1     predniSONE (DELTASONE) 5 MG tablet Take 1 tablet (5 mg) by mouth daily. 30 tablet 11     psyllium (METAMUCIL/KONSYL) Packet Take 1 packet by mouth daily as needed for constipation. For management of loose stools. 90 packet 2     sodium bicarbonate 650 MG tablet Take 3 tablets (1,950 mg) by mouth 3 times daily. 180 tablet 3     sodium zirconium cyclosilicate (LOKELMA) 5 g PACK packet Take 1 packet (5 g) by mouth daily as needed (Hyperkalemia > 5.5). 30 packet 3     sulfamethoxazole-trimethoprim (BACTRIM) 400-80 MG tablet Take 1 tablet by mouth daily. 30 tablet 11     tacrolimus (GENERIC EQUIVALENT) 0.5 MG capsule Take 1 capsule (0.5 mg) by mouth 2 times daily. Total dose: 3.5mg twice daily 60 capsule 11     tacrolimus (GENERIC EQUIVALENT) 1 MG capsule Take 3 capsules (3 mg) by mouth 2 times daily. Total dose: 3.5mg twice daily 180 capsule 11     valGANciclovir (VALCYTE) 450 MG tablet Take 1 tablet (450 mg) by mouth daily. 60 tablet 2       Allergies   Allergen Reactions     Amoxicillin Swelling     Facial swelling, \"very massive swelling\"     Blood Transfusion Related (Informational Only) Other (See Comments)     Patient has a history of a clinically significant antibody against RBC antigens.  A delay in compatible RBCs may occur. Anti-Jka identified 11/25/08 and Unidentified Antibody found 01/01/09 at [facility].     Penicillins Other (See Comments) and Swelling     Facial swelling, \"very massive swelling\"     Azithromycin      Z pack - can't take with cyclosporine.     Vancomycin      Vancomycin infusion reaction      Cefprozil Rash        Social History     Tobacco Use     Smoking status: Never     Smokeless tobacco: Never     Tobacco comments:     mother smokes outside   Substance Use Topics     Alcohol use: Not " "Currently     Family History   Problem Relation Age of Onset     Kidney Disease No family hx of      History   Drug Use No             Review of Systems  Constitutional, neuro, ENT, endocrine, pulmonary, cardiac, gastrointestinal, genitourinary, musculoskeletal, integument and psychiatric systems are negative, except as otherwise noted.    Objective   There were no vitals taken for this visit.   Estimated body mass index is 28.86 kg/m  as calculated from the following:    Height as of 5/24/25: 1.626 m (5' 4\").    Weight as of 6/12/25: 76.3 kg (168 lb 2 oz).  Physical Exam  GENERAL: alert and no distress  EYES: Eyes grossly normal to inspection, PERRL and conjunctivae and sclerae normal  HENT: nose and mouth without ulcers or lesions  NECK: no adenopathy, no asymmetry, masses, or scars  RESP: lungs clear to auscultation - no rales, rhonchi or wheezes  CV: regular rate and rhythm, normal S1 S2, no S3 or S4, no murmur, click or rub, no peripheral edema  ABDOMEN: soft, nontender, no hepatosplenomegaly, no masses and bowel sounds normal  MS: no gross musculoskeletal defects noted, no edema  SKIN: no suspicious lesions or rashes  NEURO: Normal strength and tone, mentation intact and speech normal  PSYCH: mentation appears normal, affect normal/bright    Recent Labs   Lab Test 06/19/25  0916 06/12/25  1133 05/27/25  0555 05/09/25  1405 05/08/25  2324 05/08/25  1922   HGB 9.8* 10.4* 9.3*   < >  --  8.9*    187 186   < >  --  156   INR  --   --  1.06  --   --  0.99    135 135   < >  --  140   POTASSIUM 4.8 4.6 5.0   < >  --  4.3   CR 2.27* 2.03* 2.45*   < >  --  10.10*   A1C  --   --   --   --  4.9  --     < > = values in this interval not displayed.        Diagnostics  No labs were ordered during this visit.     EKG 5/24/25:   Sinus rhythm   Normal ECG   When compared with ECG of 08-May-2025 19:26,   No significant change was found   Confirmed by MD JU, ZACHARIAH ([id number]) on 5/27/2025 5:49:49 PM     CXR 5/8/25: "   IMPRESSION:   Clear chest.       Signed Electronically by: JAJA Holley CNP  A copy of this evaluation report is provided to the requesting physician.            Again, thank you for allowing me to participate in the care of your patient.        Sincerely,        JAJA Holley CNP    Electronically signed

## 2025-06-19 NOTE — LETTER
6/19/2025      Jhoan Hoffman  [address]  [address]  [facility] [address]      Dear Colleague,    Thank you for referring your patient, Jhoan Hoffman, to the [facility]. Please see a copy of my visit note below.    Chief Complaint   Patient presents with     Cystoscopy     Stent removal       See procedure note.     No stent was removed    Karina Hatch CMA      Again, thank you for allowing me to participate in the care of your patient.        Sincerely,        JAJA Holley CNP    Electronically signed

## 2025-06-19 NOTE — PROCEDURES
Transplant Surgery  Operative Note    Preop dx: Status post  Donor kidney transplant.  Post op dx: same   Procedure: Flexible cystoscopy and ureteral stent removal   Surgeon: Mariam Orta NP   Assistant: Karina Hatch MA   Anesthesia: local  EBL: 0   Specimens: none.    Indication: The patient is status post kidney transplant and the ureteral stent is no longer needed.    Procedure: The patient was positioned supine on the table.  The groin was sterilely prepped and draped in the usual fashion. Time out was done. Urojet was applied to the urethra. A flexible cystoscope was inserted and unable to advance into the bladder.   The cystoscope was removed.   The patient tolerated the procedure. Will schedule in OR. Patient and surgeon aware.

## 2025-06-19 NOTE — PROGRESS NOTES
Preoperative Evaluation  [facility]  [address] [facility] [address]  Phone: [phone]  Fax: [phone]  Primary Provider: Alexis Pabon MD  Pre-op Performing Provider: JAJA Holley CNP  Jun 19, 2025 6/19/2025   Surgical Information   What procedure is being done? cystoscopy with ureteral stent removal   Facility or Hospital where procedure/surgery will be performed: [facility]   Who is doing the procedure / surgery? TBD   Date of surgery / procedure: TBD   Time of surgery / procedure: TBD   Where do you plan to recover after surgery? at home with family     Fax number for surgical facility: Note does not need to be faxed, will be available electronically in Epic.    Assessment & Plan     The proposed surgical procedure is considered LOW risk.    Transplanted kidney with ureteral stent.   -Needs cystoscopy with ureteral stent removal        - No identified additional risk factors other than previously addressed      Recommendation  Approval given to proceed with proposed procedure, without further diagnostic evaluation.  Take meds as routinely scheduled.       Livia Staples is a 30 year old, presenting for the following:  Pre op       HPI: S/P kidney transplant, needs cystoscopy for ureteral stent removal.   Unsuccessful cystoscopy in clinic, unable to advance scope into bladder.               6/19/2025   Pre-Op Questionnaire   Have you ever had a heart attack or stroke? No   Have you ever had surgery on your heart or blood vessels, such as a stent placement, a coronary artery bypass, or surgery on an artery in your head, neck, heart, or legs? No   Do you have chest pain with activity? No   Do you have a history of heart failure? No   Do you currently have a cold, bronchitis or symptoms of other infection? No   Do you have a cough, shortness of breath, or wheezing? No   Do you or anyone in your family have previous history of blood clots? No   Do  you or does anyone in your family have a serious bleeding problem such as prolonged bleeding following surgeries or cuts? No   Have you ever had problems with anemia or been told to take iron pills? No   Have you had any abnormal blood loss such as black, tarry or bloody stools? No   Have you ever had a blood transfusion? (!) YES   Have you ever had a transfusion reaction? No   Are you willing to have a blood transfusion if it is medically needed before, during, or after your surgery? Yes   Have you or any of your relatives ever had problems with anesthesia? No   Do you have sleep apnea, excessive snoring or daytime drowsiness? No   Do you have any artifical heart valves or other implanted medical devices like a pacemaker, defibrillator, or continuous glucose monitor? No   Do you have artificial joints? No   Are you allergic to latex? No           Patient Active Problem List    Diagnosis Date Noted    Hypercalcemia 06/12/2025     Priority: Medium    Need for pneumocystis prophylaxis 06/12/2025     Priority: Medium    Hypophosphatemia 06/12/2025     Priority: Medium    Acute tubular injury of transplanted kidney 05/27/2025     Priority: Medium    Hypomagnesemia 05/27/2025     Priority: Medium    Low bicarbonate level 05/27/2025     Priority: Medium    Leukocytosis 05/27/2025     Priority: Medium    Hyperkalemia 05/23/2025     Priority: Medium    Status post kidney transplant 05/23/2025     Priority: Medium    Acute post-operative pain 05/13/2025     Priority: Medium    Anemia due to blood loss, acute 05/13/2025     Priority: Medium    ESRD (end stage renal disease) on dialysis (H) 05/09/2025     Priority: Medium    Kidney transplant candidate 05/08/2025     Priority: Medium    Pericardial effusion with cardiac tamponade 12/11/2022     Priority: Medium    Encounter regarding vascular access for dialysis for end-stage renal disease (H) 02/04/2021     Priority: Medium     Added automatically from request for surgery  [id number]      ESRD (end stage renal disease) (H) 12/17/2020     Priority: Medium    Encounter for adjustment and management of vascular access device 12/17/2020     Priority: Medium    Encounter regarding vascular access for dialysis for ESRD (H) 11/12/2020     Priority: Medium     Added automatically from request for surgery [id number]      Vitamin D deficiency 02/20/2017     Priority: Medium    Immunosuppressed status 02/20/2017     Priority: Medium    Aftercare following organ transplant 02/20/2017     Priority: Medium    Secondary renal hyperparathyroidism 08/26/2016     Priority: Medium    GERD (gastroesophageal reflux disease) 01/21/2016     Priority: Medium    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 difficile diarrhea 2023    Depression     Depression 2012    Hypertension     Kidney replaced by transplant 1995    Failed 2010    Kidney replaced by transplant 2011    Failed 2011, ischemic necrosis and organized thrombus    Kidney replaced by transplant 2012 Failed 2020    CYNDEE (obstructive sleep apnea)     Pericarditis 2022    Peritoneal dialysis status      Past Surgical History:   Procedure Laterality Date    BENCH KIDNEY  5/10/2025    Procedure: Bench kidney;  Surgeon: Omar Osullivan MD;  Location: [facility]    BIOPSY      CREATE GRAFT LOOP ARTERIOVENOUS UPPER EXTREMITY  12/17/2020    Procedure: Create Graft Left Arteriovenous Upper Extremity;  Surgeon: Sidney Palma MD;  Location: [facility]    CV PERICARDIOCENTESIS N/A 12/12/2022    Procedure: Pericardiocentesis;  Surgeon: Dennis Rodriguez MD;  Location: [facility]    CYSTOSCOPY, REMOVE  STENT(S), COMBINED  2012    Procedure:COMBINED CYSTOSCOPY, REMOVE STENT(S); Cystoscopy, Removal Of Urinary Stent; Surgeon:FLORENTINO KNIGHT; Location:[facility]    ENDARECTERECTOMY RENAL  2011    Procedure:ENDARECTERECTOMY RENAL; Exploration of Transplant Kidney, Back Table Flush, Biopsy of Kidney and Reanastamosis of Kidney; Surgeon:FLORENTINO KNIGHT; [facility]    EXPLANT TRANSPLANTED KIDNEY  2011    Procedure:EXPLANT TRANSPLANTED KIDNEY; Transplant Nephrectomy; Surgeon:FLORENTINO KNIGHT; [facility]    INSERT CATHETER HEMODIALYSIS  2011    Procedure:INSERT CATHETER HEMODIALYSIS; Double Lumen; Surgeon:JOE HE; [facility]    INSERT CATHETER PERITONEAL DIALYSIS  2012    Procedure:INSERT CATHETER PERITONEAL DIALYSIS; Placement dialysis cath under fluoro, before kidney tx; Surgeon:FLORENTINO KNIGHT; [facility]    IR CVC TUNNEL PLACEMENT > 5 YRS OF AGE  2020    IR RENAL BIOPSY LEFT  2025    LAPAROTOMY EXPLORATORY  2011    Procedure:LAPAROTOMY EXPLORATORY; washout of kidney and closure; Surgeon:FLORENTINO NKIGHT; [facility]    PERCUTANEOUS BIOPSY KIDNEY  2011    Procedure:PERCUTANEOUS BIOPSY KIDNEY; Surgeon:GIL WILLIAM; [facility]    PERCUTANEOUS BIOPSY KIDNEY Right 2019    Procedure: Right Kidney Biopsy;  Surgeon: Peter Briggs MD;  [facility]    PERCUTANEOUS BIOPSY KIDNEY Right 2019    Procedure: Right Kidney Biopsy;  Surgeon: Pro Menard MD;  [facility]: [facility]    REMOVE CATHETER PERITONEAL  5/3/2012    Procedure:REMOVE CATHETER PERITONEAL; Removal Of Peritoneal Dialysis Catheter; Surgeon:FLORENTINO KNIGHT; [facility]    REVISION FISTULA ARTERIOVENOUS UPPER EXTREMITY Left 2021    Procedure: Left upper AV fistula revision (transposition) with intraoperative ultrasound;  Surgeon: Sidney Palma MD;  [facility]: [facility]    TRANSPLANT KIDNEY RECIPIENT  DONOR Right 5/10/2025    Procedure:  Right Kidney Transplanbt Intraperitoneal;  Surgeon: Omar Osullivan MD;  [facility]: [facility]    TRANSPLANT KIDNEY RECIPIENT LIVING RELATED  1995    TRANSPLANT KIDNEY RECIPIENT LIVING UNRELATED  5/18/2011    Procedure:TRANSPLANT KIDNEY RECIPIENT LIVING UNRELATED; Living unrelated left kidney transplant and placement of ureteral stent into transplant ureter.; Surgeon:FLORENTINO KNIGHT; [facility]    TRANSPLANT KIDNEY RECIPIENT LIVING UNRELATED  4/28/2012    Procedure:TRANSPLANT KIDNEY RECIPIENT LIVING UNRELATED; kidney transplant -   UNOS# [id number]  Organ arrival: 2100 4/27  Cross match results: 0200 4/28  Donor blood type: A  Recipient blood type: A; Surgeon:FLORENTINO KNIGHT; [facility]     Current Outpatient Medications   Medication Sig Dispense Refill    acetaminophen (TYLENOL) 325 MG tablet Take 3 tablets (975 mg) by mouth every 8 hours as needed for mild pain. 60 tablet 0    aspirin 81 MG EC tablet Take 81 mg by mouth daily      atorvastatin (LIPITOR) 10 MG tablet Take 1 tablet (10 mg) by mouth daily. 30 tablet 11    cinacalcet (SENSIPAR) 30 MG tablet Take 1 tablet (30 mg) by mouth daily. 30 tablet 11    FLUoxetine (PROZAC) 40 MG capsule Take 40 mg by mouth at bedtime. 90 capsule 0    hydrOXYzine HCl (ATARAX) 25 MG tablet Take 25 mg by mouth as needed for other (Sleep).      magnesium oxide (MAG-OX) 400 MG tablet Take 1 tablet (400 mg) by mouth daily. 30 tablet 3    mycophenolate (GENERIC EQUIVALENT) 250 MG capsule Take 3 capsules (750 mg) by mouth 2 times daily. 180 capsule 11    omeprazole (PRILOSEC) 40 MG DR capsule Take 40 mg by mouth daily      ondansetron (ZOFRAN ODT) 4 MG ODT tab Take 1 tablet (4 mg) by mouth every 8 hours as needed for nausea. 15 tablet 0    phosphorus tablet 250 mg 250 MG per tablet Take 1 tablet (250 mg) by mouth 2 times daily. 60 tablet 1    predniSONE (DELTASONE) 5 MG tablet Take 1 tablet (5 mg) by mouth daily. 30 tablet 11    psyllium (METAMUCIL/KONSYL) Packet Take 1  "packet by mouth daily as needed for constipation. For management of loose stools. 90 packet 2    sodium bicarbonate 650 MG tablet Take 3 tablets (1,950 mg) by mouth 3 times daily. 180 tablet 3    sodium zirconium cyclosilicate (LOKELMA) 5 g PACK packet Take 1 packet (5 g) by mouth daily as needed (Hyperkalemia > 5.5). 30 packet 3    sulfamethoxazole-trimethoprim (BACTRIM) 400-80 MG tablet Take 1 tablet by mouth daily. 30 tablet 11    tacrolimus (GENERIC EQUIVALENT) 0.5 MG capsule Take 1 capsule (0.5 mg) by mouth 2 times daily. Total dose: 3.5mg twice daily 60 capsule 11    tacrolimus (GENERIC EQUIVALENT) 1 MG capsule Take 3 capsules (3 mg) by mouth 2 times daily. Total dose: 3.5mg twice daily 180 capsule 11    valGANciclovir (VALCYTE) 450 MG tablet Take 1 tablet (450 mg) by mouth daily. 60 tablet 2       Allergies   Allergen Reactions    Amoxicillin Swelling     Facial swelling, \"very massive swelling\"    Blood Transfusion Related (Informational Only) Other (See Comments)     Patient has a history of a clinically significant antibody against RBC antigens.  A delay in compatible RBCs may occur. Anti-Jka identified 11/25/08 and Unidentified Antibody found 01/01/09 at [facility].    Penicillins Other (See Comments) and Swelling     Facial swelling, \"very massive swelling\"    Azithromycin      Z pack - can't take with cyclosporine.    Vancomycin      Vancomycin infusion reaction     Cefprozil Rash        Social History     Tobacco Use    Smoking status: Never    Smokeless tobacco: Never    Tobacco comments:     mother smokes outside   Substance Use Topics    Alcohol use: Not Currently     Family History   Problem Relation Age of Onset    Kidney Disease No family hx of      History   Drug Use No             Review of Systems  Constitutional, neuro, ENT, endocrine, pulmonary, cardiac, gastrointestinal, genitourinary, musculoskeletal, integument and psychiatric systems are negative, except as otherwise " "noted.    Objective    There were no vitals taken for this visit.   Estimated body mass index is 28.86 kg/m  as calculated from the following:    Height as of 5/24/25: 1.626 m (5' 4\").    Weight as of 6/12/25: 76.3 kg (168 lb 2 oz).  Physical Exam  GENERAL: alert and no distress  EYES: Eyes grossly normal to inspection, PERRL and conjunctivae and sclerae normal  HENT: nose and mouth without ulcers or lesions  NECK: no adenopathy, no asymmetry, masses, or scars  RESP: lungs clear to auscultation - no rales, rhonchi or wheezes  CV: regular rate and rhythm, normal S1 S2, no S3 or S4, no murmur, click or rub, no peripheral edema  ABDOMEN: soft, nontender, no hepatosplenomegaly, no masses and bowel sounds normal  MS: no gross musculoskeletal defects noted, no edema  SKIN: no suspicious lesions or rashes  NEURO: Normal strength and tone, mentation intact and speech normal  PSYCH: mentation appears normal, affect normal/bright    Recent Labs   Lab Test 06/19/25  0916 06/12/25  1133 05/27/25  0555 05/09/25  1405 05/08/25  2324 05/08/25  1922   HGB 9.8* 10.4* 9.3*   < >  --  8.9*    187 186   < >  --  156   INR  --   --  1.06  --   --  0.99    135 135   < >  --  140   POTASSIUM 4.8 4.6 5.0   < >  --  4.3   CR 2.27* 2.03* 2.45*   < >  --  10.10*   A1C  --   --   --   --  4.9  --     < > = values in this interval not displayed.        Diagnostics  No labs were ordered during this visit.     EKG 5/24/25:   Sinus rhythm   Normal ECG   When compared with ECG of 08-May-2025 19:26,   No significant change was found   Confirmed by MD JU, ZACHARIAH ([id number]) on 5/27/2025 5:49:49 PM     CXR 5/8/25:   IMPRESSION:   Clear chest.       Signed Electronically by: JAJA Holley CNP  A copy of this evaluation report is provided to the requesting physician.         "

## 2025-06-19 NOTE — PROGRESS NOTES
Chief Complaint   Patient presents with    Cystoscopy     Stent removal       See procedure note.     No stent was removed    Karina Hatch CMA

## 2025-06-23 ENCOUNTER — TELEPHONE (OUTPATIENT)
Dept: TRANSPLANT | Facility: CLINIC | Age: 30
End: 2025-06-23
Payer: MEDICARE

## 2025-06-23 DIAGNOSIS — N17.9 ACUTE TUBULAR INJURY OF TRANSPLANTED KIDNEY: ICD-10-CM

## 2025-06-23 DIAGNOSIS — T86.19 ACUTE TUBULAR INJURY OF TRANSPLANTED KIDNEY: ICD-10-CM

## 2025-06-23 DIAGNOSIS — Z94.0 STATUS POST KIDNEY TRANSPLANT: ICD-10-CM

## 2025-06-23 NOTE — TELEPHONE ENCOUNTER
General  Route to LPN    Reason for call: Pt said he has no heard from scheduling for his urology appt     Call back needed? Yes    Return Call Needed  Same as documented in contacts section  When to return call?: Greater than one day: Route standard priority

## 2025-06-24 ENCOUNTER — TELEPHONE (OUTPATIENT)
Dept: TRANSPLANT | Facility: CLINIC | Age: 30
End: 2025-06-24
Payer: MEDICARE

## 2025-06-24 ENCOUNTER — RESULTS FOLLOW-UP (OUTPATIENT)
Dept: TRANSPLANT | Facility: CLINIC | Age: 30
End: 2025-06-24

## 2025-06-24 DIAGNOSIS — T86.19 ACUTE TUBULAR INJURY OF TRANSPLANTED KIDNEY: ICD-10-CM

## 2025-06-24 DIAGNOSIS — N17.9 ACUTE TUBULAR INJURY OF TRANSPLANTED KIDNEY: ICD-10-CM

## 2025-06-24 RX ORDER — MAGNESIUM OXIDE 400 MG/1
400 TABLET ORAL 2 TIMES DAILY
Qty: 60 TABLET | Refills: 3 | Status: SHIPPED | OUTPATIENT
Start: 2025-06-24

## 2025-06-24 NOTE — TELEPHONE ENCOUNTER
Spoke with patient regarding his urology consult for stent removal. He said they have not called patient to schedule, so [facility] gave him the number to do so. He will call today.     We increased patients magnesium supplement to 1 tablet, twice daily.     Patient had no further concerns.

## 2025-06-24 NOTE — TELEPHONE ENCOUNTER
Spoke with patient about his concerns regarding Urology vs OR for stent removal. Urology will call him to schedule imaging to ensure no stricture is present

## 2025-06-25 ENCOUNTER — TELEPHONE (OUTPATIENT)
Dept: UROLOGY | Facility: CLINIC | Age: 30
End: 2025-06-25
Payer: MEDICARE

## 2025-06-30 ENCOUNTER — TELEPHONE (OUTPATIENT)
Dept: TRANSPLANT | Facility: CLINIC | Age: 30
End: 2025-06-30
Payer: MEDICARE

## 2025-06-30 DIAGNOSIS — T86.19 ACUTE TUBULAR INJURY OF TRANSPLANTED KIDNEY: ICD-10-CM

## 2025-06-30 DIAGNOSIS — N17.9 ACUTE TUBULAR INJURY OF TRANSPLANTED KIDNEY: ICD-10-CM

## 2025-06-30 NOTE — TELEPHONE ENCOUNTER
Patient confirmed scheduled appointment:  Date: 9/18  Time: 12pm  Visit type: AKT  Provider: Radha  Location: [facility]  Testing/imaging: Lab  Additional notes: NA

## 2025-07-01 ENCOUNTER — RESULTS FOLLOW-UP (OUTPATIENT)
Dept: TRANSPLANT | Facility: CLINIC | Age: 30
End: 2025-07-01

## 2025-07-01 DIAGNOSIS — T86.19 ACUTE TUBULAR INJURY OF TRANSPLANTED KIDNEY: ICD-10-CM

## 2025-07-01 DIAGNOSIS — N17.9 ACUTE TUBULAR INJURY OF TRANSPLANTED KIDNEY: ICD-10-CM

## 2025-07-02 ENCOUNTER — PRE VISIT (OUTPATIENT)
Dept: UROLOGY | Facility: CLINIC | Age: 30
End: 2025-07-02
Payer: MEDICAID

## 2025-07-02 NOTE — TELEPHONE ENCOUNTER
Previsit Planning        Reason for visit: Stent Removal     Relevant Information: Prior approval by Dr. Cruz    Records/imaging/labs/orders: Available     Rooming:   -Cystoscope with graspers  -Have peds scope available

## 2025-07-09 DIAGNOSIS — N17.9 ACUTE TUBULAR INJURY OF TRANSPLANTED KIDNEY: ICD-10-CM

## 2025-07-09 DIAGNOSIS — T86.19 ACUTE TUBULAR INJURY OF TRANSPLANTED KIDNEY: ICD-10-CM

## 2025-07-09 DIAGNOSIS — Z94.0 KIDNEY REPLACED BY TRANSPLANT: ICD-10-CM

## 2025-07-09 RX ORDER — TACROLIMUS 1 MG/1
3 CAPSULE ORAL 2 TIMES DAILY
Qty: 180 CAPSULE | Refills: 11 | Status: SHIPPED | OUTPATIENT
Start: 2025-07-09

## 2025-07-09 RX ORDER — TACROLIMUS 0.5 MG/1
CAPSULE ORAL
Qty: 60 CAPSULE | Refills: 11 | Status: SHIPPED | OUTPATIENT
Start: 2025-07-09

## 2025-07-10 ENCOUNTER — LAB (OUTPATIENT)
Dept: LAB | Facility: CLINIC | Age: 30
End: 2025-07-10
Payer: MEDICARE

## 2025-07-10 DIAGNOSIS — Q60.2 CONGENITAL RENAL AGENESIS AND DYSGENESIS: ICD-10-CM

## 2025-07-10 DIAGNOSIS — Q60.5 CONGENITAL RENAL AGENESIS AND DYSGENESIS: ICD-10-CM

## 2025-07-10 DIAGNOSIS — N18.6 ESRD (END STAGE RENAL DISEASE) (H): ICD-10-CM

## 2025-07-10 DIAGNOSIS — Z76.82 ORGAN TRANSPLANT CANDIDATE: ICD-10-CM

## 2025-07-10 PROCEDURE — 86828 HLA CLASS I&II ANTIBODY QUAL: CPT

## 2025-07-10 PROCEDURE — 86833 HLA CLASS II HIGH DEFIN QUAL: CPT

## 2025-07-10 PROCEDURE — 86832 HLA CLASS I HIGH DEFIN QUAL: CPT

## 2025-07-11 ENCOUNTER — OFFICE VISIT (OUTPATIENT)
Dept: UROLOGY | Facility: CLINIC | Age: 30
End: 2025-07-11
Payer: MEDICARE

## 2025-07-11 VITALS
HEIGHT: 66 IN | DIASTOLIC BLOOD PRESSURE: 76 MMHG | WEIGHT: 175 LBS | SYSTOLIC BLOOD PRESSURE: 114 MMHG | BODY MASS INDEX: 28.12 KG/M2 | HEART RATE: 96 BPM | OXYGEN SATURATION: 98 %

## 2025-07-11 DIAGNOSIS — Z96.0 URETERAL STENT PRESENT: ICD-10-CM

## 2025-07-11 DIAGNOSIS — Z96.0 S/P URETERAL STENT PLACEMENT: Primary | ICD-10-CM

## 2025-07-11 DIAGNOSIS — Z48.298 AFTERCARE FOLLOWING ORGAN TRANSPLANT: ICD-10-CM

## 2025-07-11 PROCEDURE — 52310 CYSTOSCOPY AND TREATMENT: CPT | Mod: 58 | Performed by: UROLOGY

## 2025-07-11 PROCEDURE — 3078F DIAST BP <80 MM HG: CPT | Performed by: UROLOGY

## 2025-07-11 PROCEDURE — 1126F AMNT PAIN NOTED NONE PRSNT: CPT | Performed by: UROLOGY

## 2025-07-11 PROCEDURE — 3074F SYST BP LT 130 MM HG: CPT | Performed by: UROLOGY

## 2025-07-11 RX ORDER — SULFAMETHOXAZOLE AND TRIMETHOPRIM 800; 160 MG/1; MG/1
1 TABLET ORAL ONCE
Status: COMPLETED | OUTPATIENT
Start: 2025-07-11 | End: 2025-07-11

## 2025-07-11 RX ORDER — LIDOCAINE HYDROCHLORIDE 20 MG/ML
JELLY TOPICAL ONCE
Status: COMPLETED | OUTPATIENT
Start: 2025-07-11 | End: 2025-07-11

## 2025-07-11 RX ADMIN — SULFAMETHOXAZOLE AND TRIMETHOPRIM 1 TABLET: 800; 160 TABLET ORAL at 13:26

## 2025-07-11 RX ADMIN — LIDOCAINE HYDROCHLORIDE: 20 JELLY TOPICAL at 13:26

## 2025-07-11 ASSESSMENT — PAIN SCALES - GENERAL: PAINLEVEL_OUTOF10: NO PAIN (0)

## 2025-07-11 NOTE — PROGRESS NOTES
Urology Procedure Note     Pre-operative diagnosis: Indwelling ureteral stent present after kidney transplant   Post-operative diagnosis: Same   Procedure: Cystourethroscopy with removal of indwelling ureteral stent     Surgeon: Louie Cruz MD, MS           Indications: Jhoan Hoffman is a 30 year old male with indwelling stent after DDK.  5/10/25: re-do DDK to left iliac with Lich ureteral anastomosis, stented (EKadie Finger).  6/19/25: unsuccessful cysto/stent pull by transplant NP. Per her note, unable to advance into bladder with patient awake.     Procedure: Jhoan Hoffman was taken to the procedure room.   He was positioned supine in and genitalia were prepped and draped in the standard fashion.      A #16-Sudanese flexible cystoscope was introduced through the urethra into the urinary bladder.  The anterior urethra and membranous urethra were negative. The sphincter was tight and is probably what made the last cysto difficult.  The prostatic urethra was not obstructing.  Within the urinary bladder, there was not evidence of stones, tumors, or growths.   The stent was visualized emanating from the transplant UO, grasped with a grasping forceps and removed intact without difficulty.    One tablet of  antibiotic coverage.    F/U: with transplant team for renal US

## 2025-07-11 NOTE — LETTER
"7/11/2025       RE: Jhoan Hoffman  [address]  [address]  [facility] [address]     Dear Colleague,    Thank you for referring your patient, Jhoan Hoffman, to the [facility] [place] at [facility]. Please see a copy of my visit note below.    Urology Procedure Note     Pre-operative diagnosis: Indwelling ureteral stent present after kidney transplant   Post-operative diagnosis: Same   Procedure: Cystourethroscopy with removal of indwelling ureteral stent     Surgeon: Louie Cruz MD, MS           Indications: Jhoan Hoffman is a 30 year old male with indwelling stent after DDK.  5/10/25: re-do DDK to left iliac with Lich ureteral anastomosis, stented (BULL Osullivan).  6/19/25: unsuccessful cysto/stent pull by transplant NP. Per her note, unable to advance into bladder with patient awake.     Procedure: Jhoan Hoffman was taken to the procedure room.   He was positioned supine in and genitalia were prepped and draped in the standard fashion.      A #16-German flexible cystoscope was introduced through the urethra into the urinary bladder.  The anterior urethra and membranous urethra were negative.  The prostatic urethra {WAS/WAS NOT:043371::\"was\"} obstructing.  Within the urinary bladder, there {WAS/WAS NOT:399508::\"was\"} evidence of stones, tumors, or growths.   The stent was visualized emanating from ***, grasped with a grasping forceps and removed intact without difficulty.    One tablet of *** was given for antibiotic coverage.    F/U: *** 1 month with renal ultrasound      Again, thank you for allowing me to participate in the care of your patient.      Sincerely,    Louie Cruz MD    "

## 2025-07-11 NOTE — NURSING NOTE
"Chief Complaint   Patient presents with    Consult     Stent Removaal       Blood pressure 114/76, pulse 96, height 1.676 m (5' 6\"), weight 79.4 kg (175 lb), SpO2 98%. Body mass index is 28.25 kg/m .    Patient Active Problem List   Diagnosis    Anemia of chronic renal failure, stage 3a (H)    Kidney replaced by transplant    HTN, kidney transplant related    Depression    BK viremia    GERD (gastroesophageal reflux disease)    Secondary renal hyperparathyroidism    Vitamin D deficiency    Immunosuppressed status    Aftercare following organ transplant    Encounter regarding vascular access for dialysis for ESRD (H)    ESRD (end stage renal disease) (H)    Encounter for adjustment and management of vascular access device    Encounter regarding vascular access for dialysis for end-stage renal disease (H)    Pericardial effusion with cardiac tamponade    Kidney transplant candidate    ESRD (end stage renal disease) on dialysis (H)    Acute post-operative pain    Anemia due to blood loss, acute    Hyperkalemia    Status post kidney transplant    Acute tubular injury of transplanted kidney    Hypomagnesemia    Low bicarbonate level    Leukocytosis    Hypercalcemia    Need for pneumocystis prophylaxis    Hypophosphatemia       Allergies   Allergen Reactions    Amoxicillin Swelling     Facial swelling, \"very massive swelling\"    Blood Transfusion Related (Informational Only) Other (See Comments)     Patient has a history of a clinically significant antibody against RBC antigens.  A delay in compatible RBCs may occur. Anti-Jka identified 11/25/08 and Unidentified Antibody found 01/01/09 at [facility].    Penicillins Other (See Comments) and Swelling     Facial swelling, \"very massive swelling\"    Azithromycin      Z pack - can't take with cyclosporine.    Vancomycin      Vancomycin infusion reaction     Cefprozil Rash       Current Outpatient Medications   Medication Sig Dispense Refill    acetaminophen (TYLENOL) 325 " MG tablet Take 3 tablets (975 mg) by mouth every 8 hours as needed for mild pain. 60 tablet 0    aspirin 81 MG EC tablet Take 81 mg by mouth daily      atorvastatin (LIPITOR) 10 MG tablet Take 1 tablet (10 mg) by mouth daily. 30 tablet 11    cinacalcet (SENSIPAR) 30 MG tablet Take 1 tablet (30 mg) by mouth daily. 30 tablet 11    FLUoxetine (PROZAC) 40 MG capsule Take 40 mg by mouth at bedtime. 90 capsule 0    hydrOXYzine HCl (ATARAX) 25 MG tablet Take 25 mg by mouth as needed for other (Sleep).      magnesium oxide (MAG-OX) 400 MG tablet Take 1 tablet (400 mg) by mouth 2 times daily. 60 tablet 3    mycophenolate (GENERIC EQUIVALENT) 250 MG capsule Take 3 capsules (750 mg) by mouth 2 times daily. 180 capsule 11    omeprazole (PRILOSEC) 40 MG DR capsule Take 40 mg by mouth daily      ondansetron (ZOFRAN ODT) 4 MG ODT tab Take 1 tablet (4 mg) by mouth every 8 hours as needed for nausea. 15 tablet 0    phosphorus tablet 250 mg 250 MG per tablet Take 1 tablet (250 mg) by mouth 2 times daily. 60 tablet 1    predniSONE (DELTASONE) 5 MG tablet Take 1 tablet (5 mg) by mouth daily. 30 tablet 11    psyllium (METAMUCIL/KONSYL) Packet Take 1 packet by mouth daily as needed for constipation. For management of loose stools. 90 packet 2    sodium bicarbonate 650 MG tablet Take 3 tablets (1,950 mg) by mouth 3 times daily. 180 tablet 3    sodium zirconium cyclosilicate (LOKELMA) 5 g PACK packet Take 1 packet (5 g) by mouth daily as needed (Hyperkalemia > 5.5). 30 packet 3    sulfamethoxazole-trimethoprim (BACTRIM) 400-80 MG tablet Take 1 tablet by mouth daily. 30 tablet 11    tacrolimus (GENERIC EQUIVALENT) 0.5 MG capsule Hold for dose changes, does not need to be filled at this time 60 capsule 11    tacrolimus (GENERIC EQUIVALENT) 1 MG capsule Take 3 capsules (3 mg) by mouth 2 times daily. 180 capsule 11    valGANciclovir (VALCYTE) 450 MG tablet Take 1 tablet (450 mg) by mouth daily. 60 tablet 2       Social History     Tobacco Use     Smoking status: Never    Smokeless tobacco: Never    Tobacco comments:     mother smokes outside   Substance Use Topics    Alcohol use: Not Currently    Drug use: No       Invasive Procedure Safety Checklist:    Procedure: Cystoscopy    Action: Complete sections and checkboxes as appropriate.    Pre-procedure:  1. Patient ID Verified with 2 identifiers (Hortensia and  or MRN) : YES    2. Procedure and site verified with patient/designee (when able) : YES    3. Accurate consent documentation in medical record : YES    4. H&P (or appropriate assessment) documented in medical record : N/A  H&P must be up to 30 days prior to procedure an updated within 24 hours of                 Procedure as applicable.     5. Relevant diagnostic and radiology test results appropriately labeled and displayed as applicable : YES    6. Blood products, implants, devices, and/or special equipment available for the procedure as applicable : YES    7. Procedure site(s) marked with provider initials [Exclusions: none] : NO    8. Marking not required. Reason : Yes  Procedure does not require site marking    Time Out:     Time-Out performed immediately prior to starting procedure, including verbal and active participation of all team members addressing: YES    1. Correct patient identity.  2. Confirmed that the correct side and site are marked.  3. An accurate procedure to be done.  4. Agreement on the procedure to be done.  5. Correct patient position.  6. Relevant images and results are properly labeled and appropriately displayed.  7. The need to administer antibiotics or fluids for irrigation purposes during the procedure as applicable.  8. Safety precautions based on patient history or medication use.    During Procedure: Verification of correct person, site, and procedure occurs any time the responsibility for care of the patient is transferred to another member of the care team.    The following medication was given:     MEDICATION:   Lidocaine without epinephrine 2% jelly  ROUTE: urethral   SITE: urethral   DOSE: 10 mL  LOT #: [id number]  : [employer]  EXPIRATION DATE: 2/27  NDC#: [address]-5   Was there drug waste? No    Prior to med admin, verified patient identity using patient's name and date of birth.  Due to med administration, patient instructed to remain in clinic for 15 minutes  afterwards, and to report any adverse reaction to me immediately.    Drug Amount Wasted:  None.  Vial/Syringe: Syringe    The following medication was given:     MEDICATION: Bactrim DS (Trimethoprim/Sulfamethoxazole)  ROUTE: PO  SITE: Medication was given orally   DOSE: 800mg/160mg  LOT #: [id number]  : [employer]  EXPIRATION DATE: 2027/03  NDC#: 3387-6519-06   Was there drug waste? No    Prior to medication administration, verified patient identity using patient's name and date of birth.  Due to medication administration, patient instructed to remain in clinic for 15 minutes  afterwards, and to report any adverse reaction to me immediately.        Dyllan Lucas, EMT.p  7/11/2025  12:40 PM

## 2025-07-14 ENCOUNTER — VIRTUAL VISIT (OUTPATIENT)
Dept: TRANSPLANT | Facility: CLINIC | Age: 30
End: 2025-07-14
Attending: INTERNAL MEDICINE
Payer: MEDICARE

## 2025-07-14 DIAGNOSIS — Z48.298 AFTERCARE FOLLOWING ORGAN TRANSPLANT: ICD-10-CM

## 2025-07-14 DIAGNOSIS — N17.9 ACUTE TUBULAR INJURY OF TRANSPLANTED KIDNEY: ICD-10-CM

## 2025-07-14 DIAGNOSIS — T86.19 ACUTE TUBULAR INJURY OF TRANSPLANTED KIDNEY: ICD-10-CM

## 2025-07-14 PROCEDURE — 99207 PR NO CHARGE COORDINATED CARE PS: CPT

## 2025-07-14 NOTE — LETTER
2025      Jhoan Hoffman  [address]  [address]  [place] [address]      Dear Colleague,    Thank you for referring your patient, Jhoan Hoffman, to the [facility]. Please see a copy of my visit note below.    Post-Transplant Patient Social Work Assessment:    Patient Name: Jhoan Hoffman  : 1995  Age: 30 year old  MRN: [id number]  Date of Transplant: 05/10/2025    Patient is known to this writer from follow-up in the kidney transplant program. Spoke with Jhoan today via to update their psychosocial assessment.      Presenting Information:  Current Living Situation: Patient resides alone in [facility]  Functional Status: Patient is ambulatory and independent with ADL's, IADL's, and does not use any DME.   Cultural/Language/Spiritual Considerations: N/A    Post-Transplant Support System:  Primary Support Person(s): Patient identified his mother Yoanna who lives about 1 hour away from patient as his primary support.   Other Supports:  Patient identified his father Jose Juan who lives nearby and his grandparetns Jonathon and Brenda who are also nearby as additional supports to patients.     Health Care Directive Information:  Primary Decision Maker: Patient   Alternate Decision Maker: Patient identified his mother Yoanna as decision maker if he is unable to   Health Care Directive: Provided education    Mental & AODA Concerns/History:  History/Changes to Mental Health:  Patient denies any changes with his mental health post transplant. Patient reports that he has been stable on the same depression medication (fluoxetine) for several years that his PCP manages. Denied SI today or within past two weeks. Per chart review pt was hsp for his mental health in  and  due to depression and anxiety. Patient denies any concerns to writer at this time and feels well supported and that his mental health is well managed.     History/Changes to Chemical Health: Patient denies any changes since  "his transplant. Patient denies any current or past history of abuse or dependency on alcohol or illicit drugs. Patient denies any current use of street drugs, including marijuana, vaping, edible marijuana, or other mood altering substances. Patient denies any past history of alcohol and/or chemical dependency programming to manage substance use.  Current Status: Appropriate   Coping: \"Jerome and just relaxing.\"  Services Needed/Recommended: None  Compliance with Medications, Appointments, Etc: Patient reports that he is taking his medications as prescribed and has not missed any or any appointments. Patient identified no barriers to navigating healthcare system.     Work/Financial Concerns:  Current Work Status: Disabled   Primary Source of Income: SSDI  Impact of Transplant on Income: Patient denied any impact on income. Writer reviewed that SSDI may  1 year post transplant if ESRD was qualifying diagnosis. Pt noted that it did not  previously and believes he has other qualifying diagnosis for SSDI.   Insurance and Medication Coverage: Patient reports adequate insurance and access to medications. Patient currently has Medicare and Medicaid  Financial Concerns: None reported  Resources Needed: None reported    Education Provided by : Social Work role in the outpatient setting, post-transplant expectations, and information on ESRD Medicare.     Assessment, Recommendations, and Plan: Kidney/Pancreas/Auto-Islet SOT SW Team to continue to follow indefinitely for outpatient concerns/questions and for one year post-transplant for hospital readmissions.  provided patient with SW's contact information for any questions/concerns.     VERONICA Kemp, Northern Maine Medical CenterSW  Kidney/Pancreas/Auto Islet Transplant Program ([facility])  [facility] Acute Care Management  Phone: [phone]  Available on Vocera: Kidney, Pancreas, Auto-Islet     Again, thank you for allowing me to participate in the care of your " patient.        Sincerely,        Yessy Al Redington-Fairview General HospitalEMBER    Electronically signed

## 2025-07-14 NOTE — PROGRESS NOTES
Post-Transplant Patient Social Work Assessment:    Patient Name: Jhoan Hoffman  : 1995  Age: 30 year old  MRN: [id number]  Date of Transplant: 05/10/2025    Patient is known to this writer from follow-up in the kidney transplant program. Spoke with Jhoan today via to update their psychosocial assessment.      Presenting Information:  Current Living Situation: Patient resides alone in [facility]  Functional Status: Patient is ambulatory and independent with ADL's, IADL's, and does not use any DME.   Cultural/Language/Spiritual Considerations: N/A    Post-Transplant Support System:  Primary Support Person(s): Patient identified his mother Yoanna who lives about 1 hour away from patient as his primary support.   Other Supports:  Patient identified his father Jose Juan who lives nearby and his grandparetns Jonathon and Brenda who are also nearby as additional supports to patients.     Health Care Directive Information:  Primary Decision Maker: Patient   Alternate Decision Maker: Patient identified his mother Yoanna as decision maker if he is unable to   Health Care Directive: Provided education    Mental & AODA Concerns/History:  History/Changes to Mental Health:  Patient denies any changes with his mental health post transplant. Patient reports that he has been stable on the same depression medication (fluoxetine) for several years that his PCP manages. Denied SI today or within past two weeks. Per chart review pt was hsp for his mental health in  and  due to depression and anxiety. Patient denies any concerns to writer at this time and feels well supported and that his mental health is well managed.     History/Changes to Chemical Health: Patient denies any changes since his transplant. Patient denies any current or past history of abuse or dependency on alcohol or illicit drugs. Patient denies any current use of street drugs, including marijuana, vaping, edible marijuana, or other mood altering substances.  "Patient denies any past history of alcohol and/or chemical dependency programming to manage substance use.  Current Status: Appropriate   Coping: \"Jerome and just relaxing.\"  Services Needed/Recommended: None  Compliance with Medications, Appointments, Etc: Patient reports that he is taking his medications as prescribed and has not missed any or any appointments. Patient identified no barriers to navigating healthcare system.     Work/Financial Concerns:  Current Work Status: Disabled   Primary Source of Income: SSDI  Impact of Transplant on Income: Patient denied any impact on income. Writer reviewed that SSDI may  1 year post transplant if ESRD was qualifying diagnosis. Pt noted that it did not  previously and believes he has other qualifying diagnosis for SSDI.   Insurance and Medication Coverage: Patient reports adequate insurance and access to medications. Patient currently has Medicare and Medicaid  Financial Concerns: None reported  Resources Needed: None reported    Education Provided by : Social Work role in the outpatient setting, post-transplant expectations, and information on ESRD Medicare.     Assessment, Recommendations, and Plan: Kidney/Pancreas/Auto-Islet SOT SW Team to continue to follow indefinitely for outpatient concerns/questions and for one year post-transplant for hospital readmissions.  provided patient with SW's contact information for any questions/concerns.     VERONICA Kemp, LICSW  Kidney/Pancreas/Auto Islet Transplant Program (Q-[facility])  [facility] Acute Care Management  Phone: [phone]  Available on Vocera: Kidney, Pancreas, Auto-Islet   "

## 2025-07-15 ENCOUNTER — TELEPHONE (OUTPATIENT)
Dept: TRANSPLANT | Facility: CLINIC | Age: 30
End: 2025-07-15

## 2025-07-15 ENCOUNTER — LAB (OUTPATIENT)
Dept: LAB | Facility: CLINIC | Age: 30
End: 2025-07-15
Attending: INTERNAL MEDICINE
Payer: MEDICARE

## 2025-07-15 ENCOUNTER — OFFICE VISIT (OUTPATIENT)
Dept: TRANSPLANT | Facility: CLINIC | Age: 30
End: 2025-07-15
Attending: INTERNAL MEDICINE
Payer: MEDICARE

## 2025-07-15 VITALS
OXYGEN SATURATION: 97 % | HEIGHT: 66 IN | SYSTOLIC BLOOD PRESSURE: 119 MMHG | TEMPERATURE: 97.9 F | WEIGHT: 175.1 LBS | BODY MASS INDEX: 28.14 KG/M2 | DIASTOLIC BLOOD PRESSURE: 80 MMHG | HEART RATE: 97 BPM

## 2025-07-15 DIAGNOSIS — E83.42 HYPOMAGNESEMIA: Primary | ICD-10-CM

## 2025-07-15 DIAGNOSIS — N18.32 ANEMIA IN STAGE 3B CHRONIC KIDNEY DISEASE (H): ICD-10-CM

## 2025-07-15 DIAGNOSIS — N17.9 ACUTE TUBULAR INJURY OF TRANSPLANTED KIDNEY: ICD-10-CM

## 2025-07-15 DIAGNOSIS — T86.19 ACUTE TUBULAR INJURY OF TRANSPLANTED KIDNEY: ICD-10-CM

## 2025-07-15 DIAGNOSIS — N18.32 STAGE 3B CHRONIC KIDNEY DISEASE (H): ICD-10-CM

## 2025-07-15 DIAGNOSIS — Z79.899 ENCOUNTER FOR LONG-TERM CURRENT USE OF MEDICATION: ICD-10-CM

## 2025-07-15 DIAGNOSIS — Z29.89 NEED FOR PNEUMOCYSTIS PROPHYLAXIS: ICD-10-CM

## 2025-07-15 DIAGNOSIS — Z98.890 OTHER SPECIFIED POSTPROCEDURAL STATES: ICD-10-CM

## 2025-07-15 DIAGNOSIS — Z94.0 HTN, KIDNEY TRANSPLANT RELATED: ICD-10-CM

## 2025-07-15 DIAGNOSIS — Z94.0 KIDNEY REPLACED BY TRANSPLANT: ICD-10-CM

## 2025-07-15 DIAGNOSIS — D63.1 ANEMIA IN STAGE 3B CHRONIC KIDNEY DISEASE (H): ICD-10-CM

## 2025-07-15 DIAGNOSIS — Z48.298 AFTERCARE FOLLOWING ORGAN TRANSPLANT: ICD-10-CM

## 2025-07-15 DIAGNOSIS — E87.20 METABOLIC ACIDOSIS: ICD-10-CM

## 2025-07-15 DIAGNOSIS — E83.39 HYPOPHOSPHATEMIA: ICD-10-CM

## 2025-07-15 DIAGNOSIS — I15.1 HTN, KIDNEY TRANSPLANT RELATED: ICD-10-CM

## 2025-07-15 DIAGNOSIS — Z20.828 CONTACT WITH AND (SUSPECTED) EXPOSURE TO OTHER VIRAL COMMUNICABLE DISEASES: ICD-10-CM

## 2025-07-15 PROBLEM — Z45.2 ENCOUNTER FOR ADJUSTMENT AND MANAGEMENT OF VASCULAR ACCESS DEVICE: Status: RESOLVED | Noted: 2020-12-17 | Resolved: 2025-07-15

## 2025-07-15 PROBLEM — E83.52 HYPERCALCEMIA: Status: RESOLVED | Noted: 2025-06-12 | Resolved: 2025-07-15

## 2025-07-15 PROBLEM — Z99.2 ENCOUNTER REGARDING VASCULAR ACCESS FOR DIALYSIS FOR ESRD (H): Status: RESOLVED | Noted: 2020-11-12 | Resolved: 2025-07-15

## 2025-07-15 PROBLEM — Z76.82 KIDNEY TRANSPLANT CANDIDATE: Status: RESOLVED | Noted: 2025-05-08 | Resolved: 2025-07-15

## 2025-07-15 PROBLEM — I31.39 PERICARDIAL EFFUSION WITH CARDIAC TAMPONADE: Status: RESOLVED | Noted: 2022-12-11 | Resolved: 2025-07-15

## 2025-07-15 PROBLEM — D62 ANEMIA DUE TO BLOOD LOSS, ACUTE: Status: RESOLVED | Noted: 2025-05-13 | Resolved: 2025-07-15

## 2025-07-15 PROBLEM — I31.4 PERICARDIAL EFFUSION WITH CARDIAC TAMPONADE: Status: RESOLVED | Noted: 2022-12-11 | Resolved: 2025-07-15

## 2025-07-15 PROBLEM — Z99.2 ENCOUNTER REGARDING VASCULAR ACCESS FOR DIALYSIS FOR END-STAGE RENAL DISEASE (H): Status: RESOLVED | Noted: 2021-02-04 | Resolved: 2025-07-15

## 2025-07-15 PROBLEM — N18.6 ENCOUNTER REGARDING VASCULAR ACCESS FOR DIALYSIS FOR ESRD (H): Status: RESOLVED | Noted: 2020-11-12 | Resolved: 2025-07-15

## 2025-07-15 PROBLEM — N18.6 ESRD (END STAGE RENAL DISEASE) ON DIALYSIS (H): Status: RESOLVED | Noted: 2025-05-09 | Resolved: 2025-07-15

## 2025-07-15 PROBLEM — Z99.2 ESRD (END STAGE RENAL DISEASE) ON DIALYSIS (H): Status: RESOLVED | Noted: 2025-05-09 | Resolved: 2025-07-15

## 2025-07-15 PROBLEM — G89.18 ACUTE POST-OPERATIVE PAIN: Status: RESOLVED | Noted: 2025-05-13 | Resolved: 2025-07-15

## 2025-07-15 PROBLEM — N18.6 ENCOUNTER REGARDING VASCULAR ACCESS FOR DIALYSIS FOR END-STAGE RENAL DISEASE (H): Status: RESOLVED | Noted: 2021-02-04 | Resolved: 2025-07-15

## 2025-07-15 PROBLEM — D72.829 LEUKOCYTOSIS: Status: RESOLVED | Noted: 2025-05-27 | Resolved: 2025-07-15

## 2025-07-15 PROBLEM — N18.6 ESRD (END STAGE RENAL DISEASE) (H): Status: RESOLVED | Noted: 2020-12-17 | Resolved: 2025-07-15

## 2025-07-15 LAB
ANION GAP SERPL CALCULATED.3IONS-SCNC: 11 MMOL/L (ref 7–15)
BUN SERPL-MCNC: 29.3 MG/DL (ref 6–20)
CALCIUM SERPL-MCNC: 8.6 MG/DL (ref 8.8–10.4)
CHLORIDE SERPL-SCNC: 105 MMOL/L (ref 98–107)
CREAT SERPL-MCNC: 2.41 MG/DL (ref 0.67–1.17)
EGFRCR SERPLBLD CKD-EPI 2021: 36 ML/MIN/1.73M2
ERYTHROCYTE [DISTWIDTH] IN BLOOD BY AUTOMATED COUNT: 14.1 % (ref 10–15)
GLUCOSE SERPL-MCNC: 111 MG/DL (ref 70–99)
HCO3 SERPL-SCNC: 20 MMOL/L (ref 22–29)
HCT VFR BLD AUTO: 33.7 % (ref 40–53)
HGB BLD-MCNC: 11 G/DL (ref 13.3–17.7)
MAGNESIUM SERPL-MCNC: 1.3 MG/DL (ref 1.7–2.3)
MCH RBC QN AUTO: 30.9 PG (ref 26.5–33)
MCHC RBC AUTO-ENTMCNC: 32.6 G/DL (ref 31.5–36.5)
MCV RBC AUTO: 95 FL (ref 78–100)
PHOSPHATE SERPL-MCNC: 2.3 MG/DL (ref 2.5–4.5)
PLATELET # BLD AUTO: 159 10E3/UL (ref 150–450)
POTASSIUM SERPL-SCNC: 5.1 MMOL/L (ref 3.4–5.3)
RBC # BLD AUTO: 3.56 10E6/UL (ref 4.4–5.9)
SODIUM SERPL-SCNC: 136 MMOL/L (ref 135–145)
TACROLIMUS BLD-MCNC: 8.4 UG/L (ref 5–15)
TME LAST DOSE: NORMAL H
TME LAST DOSE: NORMAL H
WBC # BLD AUTO: 8.2 10E3/UL (ref 4–11)

## 2025-07-15 PROCEDURE — 85027 COMPLETE CBC AUTOMATED: CPT | Performed by: PATHOLOGY

## 2025-07-15 PROCEDURE — 3074F SYST BP LT 130 MM HG: CPT | Performed by: INTERNAL MEDICINE

## 2025-07-15 PROCEDURE — G2211 COMPLEX E/M VISIT ADD ON: HCPCS | Performed by: INTERNAL MEDICINE

## 2025-07-15 PROCEDURE — 80048 BASIC METABOLIC PNL TOTAL CA: CPT | Performed by: PATHOLOGY

## 2025-07-15 PROCEDURE — 83735 ASSAY OF MAGNESIUM: CPT | Performed by: PATHOLOGY

## 2025-07-15 PROCEDURE — 87799 DETECT AGENT NOS DNA QUANT: CPT | Performed by: INTERNAL MEDICINE

## 2025-07-15 PROCEDURE — 1126F AMNT PAIN NOTED NONE PRSNT: CPT | Performed by: INTERNAL MEDICINE

## 2025-07-15 PROCEDURE — 99000 SPECIMEN HANDLING OFFICE-LAB: CPT | Performed by: PATHOLOGY

## 2025-07-15 PROCEDURE — 3079F DIAST BP 80-89 MM HG: CPT | Performed by: INTERNAL MEDICINE

## 2025-07-15 PROCEDURE — 84100 ASSAY OF PHOSPHORUS: CPT | Performed by: PATHOLOGY

## 2025-07-15 PROCEDURE — 36415 COLL VENOUS BLD VENIPUNCTURE: CPT | Performed by: PATHOLOGY

## 2025-07-15 PROCEDURE — G0463 HOSPITAL OUTPT CLINIC VISIT: HCPCS | Performed by: INTERNAL MEDICINE

## 2025-07-15 PROCEDURE — 80197 ASSAY OF TACROLIMUS: CPT | Performed by: INTERNAL MEDICINE

## 2025-07-15 PROCEDURE — 99215 OFFICE O/P EST HI 40 MIN: CPT | Mod: 24 | Performed by: INTERNAL MEDICINE

## 2025-07-15 RX ORDER — MAGNESIUM OXIDE 400 MG/1
TABLET ORAL
Qty: 270 TABLET | Refills: 3 | Status: SHIPPED | OUTPATIENT
Start: 2025-07-15

## 2025-07-15 ASSESSMENT — PAIN SCALES - GENERAL: PAINLEVEL_OUTOF10: NO PAIN (0)

## 2025-07-15 NOTE — LETTER
7/15/2025      RE: Jhoan IGLBERT DelgadoDalton  [address]  [address]  [facility] [id number]       TRANSPLANT NEPHROLOGY CLINIC VISIT     Assessment & Plan  # DDKT: CKD Stage 3b - Stable, elevated creatinine in the ~ 2.1-2.4 range with jsutin Cr ~ 1.9.  Higher likely due to supra therapeutic tacrolimus level.  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  If creatinine remains significantly above 2 once tacrolimus level is at goal, would consider repeating kidney transplant biopsy.   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   - Primary Nephrologist: Dr. Sourav Ames.    # Immunosuppression: Tacrolimus immediate release (goal 8-10), Mycophenolic acid (dose 540 mg every 12 hours), and Prednisone (dose 5 mg daily)   - Induction with Recent Transplant:  High Intensity Protocol   - Continue with intensive monitoring of immunosuppression for efficacy and toxicity.   - Historical Changes in Immunosuppression: None   - Changes: Not at this time    # Infection Prevention:   Last CD4 Level: Not checked  - PJP: Sulfa/TMP (Bactrim)  - CMV: Valganciclovir (Valcyte)      - CMV IgG Ab High Risk Discordance (D+/R-) at time of transplant: No  Present CMV Serostatus: Negative  - EBV IgG Ab High Risk Discordance (D+/R-) at time of transplant: No  Present EBV Serostatus: Positive    # Hypertension: Controlled;  Goal BP: < 130/80   - Changes: Not at this time    # Anemia in Chronic Renal Disease: Hgb: Stable, low      BASHIR: No   - Iron studies: Replete    # Mineral Bone Disorder:    - Secondary renal hyperparathyroidism; PTH level: Moderately elevated (301-600 pg/ml)        On treatment: Cinacalcet; With lower serum calcium  level, will stop cinacalcet.  - Vitamin D; level: Low        On supplement: No; Not on cholecalciferol due to high serum calcium, but if calcium level remains normal off cinacalcet, may look to start cholecalciferol.   - Calcium; level: Low normal        On supplement: No; Will stop cinacalcet.  - Phosphorus; level: Stable low        On supplement: Yes; Will decrease phosphorus supplement to 250 mg bid.    # Electrolytes:  - Potassium; level: Normal        On supplement: No  - Magnesium; level: Stable low        On supplement: Yes; Will increase magnesium oxide to 800 mg with lunch and continue 400 mg with supper.  - Bicarbonate; level: Stable low        On supplement: Yes; Patient is occasionally missing mid day dose.  Would consider adding 1 tsp baking soda.    # Other Significant PMH:   - GERD: Controlled on PPI.   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 Repeat ECHO showed resolution. CT A/P from 2/2023 mentions pericardial thickening.  - Anxiety and Depression: Patient reports doing okay recently.  Previously required admission many years ago. Now managed on fluoxetine.     # Skin Cancer Risk:    - Discussed sun protection and recommend regular follow up with Dermatology.    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    Return visit: Return for previously scheduled visit.    Jose Culp MD    The longitudinal plan of care for the diagnosis(es)/condition(s) as  "documented were addressed during this visit. Due to the added complexity in care, I will continue to support Jhoan in the subsequent management and with ongoing continuity of care.      Chief Complaint  Mr. Hoffman is a 30 year old here for kidney transplant and immunosuppression management.     History of Present Illness   Mr. Hoffman reports feeling good overall.  Since last clinic visit:   Hospitalizations: No   New Medical Issues: No  Active/Exercise: Yes  Chest pain or shortness of breath: No  Lower extremity swelling: No  Weight change: Yes; Weight is up ~ 10 lbs, which is closer to his previous weight after losing ~ 20 lbs post transplant   Appetite: Good   Nausea and vomiting: No  Diarrhea: Yes; Occasional loose stools.  Heartburn symptoms: No; Controlled on omeprazole.  Fever, sweats or chills: No  Night sweats: Yes; Slight increase following this recent transplant, but had some symptoms previously.  Urinary complaints: No    Home BP: 120/80s    Problem List  Patient Active Problem List   Diagnosis     Anemia in chronic renal disease     Kidney replaced by transplant     HTN, kidney transplant related     Depression     GERD (gastroesophageal reflux disease)     Secondary renal hyperparathyroidism     Vitamin D deficiency     Immunosuppressed status     Aftercare following organ transplant     Hyperkalemia     Status post kidney transplant     Hypomagnesemia     Metabolic acidosis     Need for pneumocystis prophylaxis     Hypophosphatemia     Stage 3b chronic kidney disease (H)       Allergies  Allergies   Allergen Reactions     Amoxicillin Swelling     Facial swelling, \"very massive swelling\"     Blood Transfusion Related (Informational Only) Other (See Comments)     Patient has a history of a clinically significant antibody against RBC antigens.  A delay in compatible RBCs may occur. 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     Penicillins Other (See " "Comments) and Swelling     Facial swelling, \"very massive swelling\"     Azithromycin      Z pack - can't take with cyclosporine.     Vancomycin      Vancomycin infusion reaction      Cefprozil Rash       Medications  Current Outpatient Medications   Medication Sig Dispense Refill     acetaminophen (TYLENOL) 325 MG tablet Take 3 tablets (975 mg) by mouth every 8 hours as needed for mild pain. 60 tablet 0     aspirin 81 MG EC tablet Take 81 mg by mouth daily       atorvastatin (LIPITOR) 10 MG tablet Take 1 tablet (10 mg) by mouth daily. 30 tablet 11     FLUoxetine (PROZAC) 40 MG capsule Take 40 mg by mouth at bedtime. 90 capsule 0     magnesium oxide (MAG-OX) 400 MG tablet Take 2 tablets (800 mg) by mouth daily (with lunch) AND 1 tablet (400 mg) daily (before supper). 270 tablet 3     mycophenolate (GENERIC EQUIVALENT) 250 MG capsule Take 3 capsules (750 mg) by mouth 2 times daily. 180 capsule 11     omeprazole (PRILOSEC) 40 MG DR capsule Take 40 mg by mouth daily       phosphorus tablet 250 mg 250 MG per tablet Take 1 tablet (250 mg) by mouth 2 times daily. 60 tablet 1     predniSONE (DELTASONE) 5 MG tablet Take 1 tablet (5 mg) by mouth daily. 30 tablet 11     sodium bicarbonate 650 MG tablet Take 3 tablets (1,950 mg) by mouth 3 times daily. 180 tablet 3     sulfamethoxazole-trimethoprim (BACTRIM) 400-80 MG tablet Take 1 tablet by mouth daily. 30 tablet 11     tacrolimus (GENERIC EQUIVALENT) 1 MG capsule Take 3 capsules (3 mg) by mouth 2 times daily. (Patient taking differently: Take 1 mg by mouth 2 times daily. 2mg in the AM and 2mg in PM) 180 capsule 11     valGANciclovir (VALCYTE) 450 MG tablet Take 1 tablet (450 mg) by mouth daily. 60 tablet 2     ondansetron (ZOFRAN ODT) 4 MG ODT tab Take 1 tablet (4 mg) by mouth every 8 hours as needed for nausea. (Patient not taking: Reported on 7/15/2025) 15 tablet 0     psyllium (METAMUCIL/KONSYL) Packet Take 1 packet by mouth daily as needed for constipation. For management " "of loose stools. (Patient not taking: Reported on 7/15/2025) 90 packet 2     sodium zirconium cyclosilicate (LOKELMA) 5 g PACK packet Take 1 packet (5 g) by mouth daily as needed (Hyperkalemia > 5.5). (Patient not taking: Reported on 7/15/2025) 30 packet 3     tacrolimus (GENERIC EQUIVALENT) 0.5 MG capsule Hold for dose changes, does not need to be filled at this time (Patient not taking: Reported on 7/15/2025) 60 capsule 11     No current facility-administered medications for this visit.     Medications Discontinued During This Encounter   Medication Reason     magnesium oxide (MAG-OX) 400 MG tablet Reorder (No AVS)     hydrOXYzine HCl (ATARAX) 25 MG tablet      cinacalcet (SENSIPAR) 30 MG tablet      phosphorus tablet 250 mg 250 MG per tablet        Physical Exam  Vital Signs: /80 (BP Location: Right arm, Patient Position: Sitting, Cuff Size: Adult Regular)   Pulse 97   Temp 97.9  F (36.6  C) (Oral)   Ht 1.676 m (5' 6\")   Wt 79.4 kg (175 lb 1.6 oz)   SpO2 97%   BMI 28.26 kg/m      GENERAL APPEARANCE: alert and no distress  EYES: eyes grossly normal to inspection  HENT: normal cephalic/atraumatic  RESP: lungs clear to auscultation - no rales, rhonchi or wheezes  CV: regular rhythm, normal rate,  no murmur  EDEMA: no LE edema bilaterally  ABDOMEN: soft, nondistended, nontender  MS: extremities normal - no gross deformities noted  SKIN: no rash  TX KIDNEY: normal  DIALYSIS ACCESS:  LUE AV fistula with good thrill    Data        Latest Ref Rng & Units 7/15/2025    10:36 AM 7/10/2025     9:41 AM 7/7/2025     9:20 AM   Renal   Sodium 135 - 145 mmol/L 136      Na (external) 136 - 145 mmol/L  140  138    K 3.4 - 5.3 mmol/L 5.1      K (external) 3.5 - 5.1 mmol/L  5.1  5.0    Cl 98 - 107 mmol/L 105  105  102    Cl (external) 98 - 107 mmol/L 105  105  102    CO2 22 - 29 mmol/L 20      CO2 (external) 22 - 29 mmol/L  18  21    Urea Nitrogen 6.0 - 20.0 mg/dL 29.3      BUN (external) 6.0 - 20.0 mg/dL  17.8  18.4  "   Creatinine 0.67 - 1.17 mg/dL 2.41      Cr (external) 0.67 - 1.17 mg/dL  2.48  2.43    Glucose 70 - 99 mg/dL 111      Glucose (external) 70 - 110 mg/dL  103  94    Calcium 8.8 - 10.4 mg/dL 8.6      Ca (external) 8.6 - 10.0 mg/dL  8.8  8.5    Magnesium 1.7 - 2.3 mg/dL 1.3            Latest Ref Rng & Units 7/15/2025    10:36 AM 7/3/2025     9:17 AM 6/30/2025     8:53 AM   Bone Health   Phosphorus 2.5 - 4.5 mg/dL 2.3      Phos (external) 2.5 - 4.5 mg/dL  3.3  2.1          Latest Ref Rng & Units 7/15/2025    10:36 AM 7/10/2025     9:41 AM 7/7/2025     9:20 AM   Heme   WBC 4.0 - 11.0 10e3/uL 8.2      WBC (external) 4.8 - 10.8 10(3)/uL  9.8  9.2    Hgb 13.3 - 17.7 g/dL 11.0      Hgb (external) 14.0 - 18.0 g/dL  11.3  11.5    Plt 150 - 450 10e3/uL 159      Plt (external) 150 - 350 10(3)/uL  206  200    ABSOLUTE NEUTROPHILS (EXTERNAL) 1.2 - 8.1 10(3)/uL  8.5  7.8    ABSOLUTE LYMPHOCYTES (EXTERNAL) 0.6 - 4.7 10(3)/uL  0.5  0.6    ABSOLUTE MONOCYTES (EXTERNAL) 0.0 - 1.3 10(3)/uL  0.6  0.6    ABSOLUTE EOSINOPHILS (EXTERNAL) 0.0 - 0.7 10(3)/uL  0.1  0.1    ABSOLUTE BASOPHILS (EXTERNAL) 0.0 - 0.2 10(3)/uL  0.0  0.0          Latest Ref Rng & Units 5/23/2025     5:23 PM 5/8/2025     7:22 PM 4/6/2023    10:47 AM   Liver   AP 40 - 150 U/L 186  98  152    TBili <=1.2 mg/dL 0.6  0.4  0.4    ALT 0 - 70 U/L 45  9  23    AST 0 - 45 U/L 26  16  18    Tot Protein 6.4 - 8.3 g/dL 7.5  7.2  7.8    Albumin 3.5 - 5.2 g/dL 4.5  4.5  4.7          Latest Ref Rng & Units 5/8/2025    11:24 PM 2/16/2011     4:33 PM 10/20/2010     3:03 PM   Pancreas   A1C <5.7 % 4.9      Amylase 30 - 110 U/L  142  127    Lipase 20 - 250 U/L  202  256          Latest Ref Rng & Units 5/15/2025     7:54 AM 12/19/2020     7:26 AM 6/22/2020     8:25 AM   Iron studies   Iron 61 - 157 ug/dL 52  53     Iron Saturation Index 15 - 46 %  24     Iron Sat Index 15 - 46 % 26      Ferritin 31 - 409 ng/mL 1,032  143     Ferritin (external) 10.0 - 381.0 ng/mL   120.0          Latest  Ref Rng & Units 7/10/2025     9:41 AM 6/9/2025     9:03 AM 5/8/2025     7:22 PM   [facility] Virology   BK Quant Log Ext log IU/mL Undetected  Undetected     BK Quant Result Ext Undetected IU/mL Undetected  Undetected     BK Quant Spec Ext  Plasma  Plasma     EBV CAPSID ANTIBODY IGG No detectable antibody.   Positive    HIV 1&2 Ext Nonreactive  Nonreactive       Failed to redirect to the Timeline version of the REVFS SmartLink.  Recent Labs   Lab Test 05/15/25  0754 05/19/25  0755 05/23/25  1723 05/26/25  0538 05/27/25  0555 06/19/25  0916   DOSTAC 5/14/2025 5/18/2025  --   --   --  6/18/2025   TACROL 7.9 14.4   < > 7.8 9.1 10.7    < > = values in this interval not displayed.     Recent Labs   Lab Test 12/13/22  0812 05/15/25  0754 06/12/25  1133 06/19/25  0916   DOSMPA  --  5/14/2025   7:45 PM  --   --    MPACID <0.25* 3.73* 5.90* 4.32*   MPAG <6.5* 97.1* 75.9 113.3*       Jose Culp MD

## 2025-07-15 NOTE — LETTER
7/15/2025      Jhoan Hoffman  [address]  [address]  [facility] [address]      Dear Colleague,    Thank you for referring your patient, Jhoan Hoffman, to the [facility]. Please see a copy of my visit note below.    TRANSPLANT NEPHROLOGY CLINIC VISIT     Assessment & Plan  # DDKT: CKD Stage 3b - Stable, elevated creatinine in the ~ 2.1-2.4 range with justin Cr ~ 1.9.  Higher likely due to supra therapeutic tacrolimus level.  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  If creatinine remains significantly above 2 once tacrolimus level is at goal, would consider repeating kidney transplant biopsy.   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   - Primary Nephrologist: Dr. Sourav Ames.    # Immunosuppression: Tacrolimus immediate release (goal 8-10), Mycophenolic acid (dose 540 mg every 12 hours), and Prednisone (dose 5 mg daily)   - Induction with Recent Transplant:  High Intensity Protocol   - Continue with intensive monitoring of immunosuppression for efficacy and toxicity.   - Historical Changes in Immunosuppression: None   - Changes: Not at this time    # Infection Prevention:   Last CD4 Level: Not checked  - PJP: Sulfa/TMP (Bactrim)  - CMV: Valganciclovir (Valcyte)      - CMV IgG Ab High Risk Discordance (D+/R-) at time of transplant: No  Present CMV Serostatus: Negative  - EBV IgG Ab High Risk Discordance (D+/R-) at time of transplant: No  Present EBV Serostatus: Positive    # Hypertension: Controlled;  Goal BP: < 130/80   - Changes: Not at this time    # Anemia in Chronic Renal Disease: Hgb: Stable, low      BASHIR: No   - Iron studies: Replete    # Mineral  Bone Disorder:    - Secondary renal hyperparathyroidism; PTH level: Moderately elevated (301-600 pg/ml)        On treatment: Cinacalcet; With lower serum calcium level, will stop cinacalcet.  - Vitamin D; level: Low        On supplement: No; Not on cholecalciferol due to high serum calcium, but if calcium level remains normal off cinacalcet, may look to start cholecalciferol.   - Calcium; level: Low normal        On supplement: No; Will stop cinacalcet.  - Phosphorus; level: Stable low        On supplement: Yes; Will decrease phosphorus supplement to 250 mg bid.    # Electrolytes:  - Potassium; level: Normal        On supplement: No  - Magnesium; level: Stable low        On supplement: Yes; Will increase magnesium oxide to 800 mg with lunch and continue 400 mg with supper.  - Bicarbonate; level: Stable low        On supplement: Yes; Patient is occasionally missing mid day dose.  Would consider adding 1 tsp baking soda.    # Other Significant PMH:   - GERD: Controlled on PPI.   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 Repeat ECHO showed resolution. CT A/P from 2/2023 mentions pericardial thickening.  - Anxiety and Depression: Patient reports doing okay recently.  Previously required admission many years ago. Now managed on fluoxetine.     # Skin Cancer Risk:    - Discussed sun protection and recommend regular follow up with Dermatology.    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    Return visit: Return for previously scheduled visit.    Jose Culp MD    The longitudinal plan of care for the diagnosis(es)/condition(s) as documented were addressed during this visit. Due to the added complexity in care, I will continue to support Jhoan in the subsequent management and with ongoing continuity of care.      Chief Complaint  Mr. Hoffman is a 30 year old here for kidney transplant and immunosuppression management.     History of Present Illness   Mr. Hoffman reports feeling good overall.  Since last clinic visit:   Hospitalizations: No   New Medical Issues: No  Active/Exercise: Yes  Chest pain or shortness of breath: No  Lower extremity swelling: No  Weight change: Yes; Weight is up ~ 10 lbs, which is closer to his previous weight after losing ~ 20 lbs post transplant   Appetite: Good   Nausea and vomiting: No  Diarrhea: Yes; Occasional loose stools.  Heartburn symptoms: No; Controlled on omeprazole.  Fever, sweats or chills: No  Night sweats: Yes; Slight increase following this recent transplant, but had some symptoms previously.  Urinary complaints: No    Home BP: 120/80s    Problem List  Patient Active Problem List   Diagnosis     Anemia in chronic renal disease     Kidney replaced by transplant     HTN, kidney transplant related     Depression     GERD (gastroesophageal reflux disease)     Secondary renal hyperparathyroidism     Vitamin D deficiency     Immunosuppressed status     Aftercare following organ transplant     Hyperkalemia     Status post kidney transplant     Hypomagnesemia     Metabolic acidosis     Need for pneumocystis prophylaxis     Hypophosphatemia     Stage 3b chronic kidney disease (H)       Allergies  Allergies   Allergen Reactions     Amoxicillin Swelling     Facial swelling, \"very massive swelling\"     Blood Transfusion Related (Informational Only) Other (See Comments)     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  A delay in compatible RBCs may occur. 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     Penicillins Other (See Comments) and Swelling     Facial swelling, \"very massive swelling\"     Azithromycin      Z pack - can't take with cyclosporine.     Vancomycin      Vancomycin infusion reaction      Cefprozil Rash       Medications  Current Outpatient Medications   Medication Sig Dispense Refill     acetaminophen (TYLENOL) 325 MG tablet Take 3 tablets (975 mg) by mouth every 8 hours as needed for mild pain. 60 tablet 0     aspirin 81 MG EC tablet Take 81 mg by mouth daily       atorvastatin (LIPITOR) 10 MG tablet Take 1 tablet (10 mg) by mouth daily. 30 tablet 11     FLUoxetine (PROZAC) 40 MG capsule Take 40 mg by mouth at bedtime. 90 capsule 0     magnesium oxide (MAG-OX) 400 MG tablet Take 2 tablets (800 mg) by mouth daily (with lunch) AND 1 tablet (400 mg) daily (before supper). 270 tablet 3     mycophenolate (GENERIC EQUIVALENT) 250 MG capsule Take 3 capsules (750 mg) by mouth 2 times daily. 180 capsule 11     omeprazole (PRILOSEC) 40 MG DR capsule Take 40 mg by mouth daily       phosphorus tablet 250 mg 250 MG per tablet Take 1 tablet (250 mg) by mouth 2 times daily. 60 tablet 1     predniSONE (DELTASONE) 5 MG tablet Take 1 tablet (5 mg) by mouth daily. 30 tablet 11     sodium bicarbonate 650 MG tablet Take 3 tablets (1,950 mg) by mouth 3 times daily. 180 tablet 3     sulfamethoxazole-trimethoprim (BACTRIM) 400-80 MG tablet Take 1 tablet by mouth daily. 30 tablet 11     tacrolimus (GENERIC EQUIVALENT) 1 MG capsule Take 3 capsules (3 mg) by mouth 2 times daily. (Patient taking differently: Take 1 mg by mouth 2 times daily. 2mg in the AM and 2mg in PM) 180 capsule 11     valGANciclovir (VALCYTE) 450 MG tablet Take 1 tablet (450 mg) by mouth daily. 60 tablet 2     ondansetron (ZOFRAN ODT) 4 MG ODT tab Take 1 tablet (4 mg) by mouth every 8 hours as needed for nausea. " "(Patient not taking: Reported on 7/15/2025) 15 tablet 0     psyllium (METAMUCIL/KONSYL) Packet Take 1 packet by mouth daily as needed for constipation. For management of loose stools. (Patient not taking: Reported on 7/15/2025) 90 packet 2     sodium zirconium cyclosilicate (LOKELMA) 5 g PACK packet Take 1 packet (5 g) by mouth daily as needed (Hyperkalemia > 5.5). (Patient not taking: Reported on 7/15/2025) 30 packet 3     tacrolimus (GENERIC EQUIVALENT) 0.5 MG capsule Hold for dose changes, does not need to be filled at this time (Patient not taking: Reported on 7/15/2025) 60 capsule 11     No current facility-administered medications for this visit.     Medications Discontinued During This Encounter   Medication Reason     magnesium oxide (MAG-OX) 400 MG tablet Reorder (No AVS)     hydrOXYzine HCl (ATARAX) 25 MG tablet      cinacalcet (SENSIPAR) 30 MG tablet      phosphorus tablet 250 mg 250 MG per tablet        Physical Exam  Vital Signs: /80 (BP Location: Right arm, Patient Position: Sitting, Cuff Size: Adult Regular)   Pulse 97   Temp 97.9  F (36.6  C) (Oral)   Ht 1.676 m (5' 6\")   Wt 79.4 kg (175 lb 1.6 oz)   SpO2 97%   BMI 28.26 kg/m      GENERAL APPEARANCE: alert and no distress  EYES: eyes grossly normal to inspection  HENT: normal cephalic/atraumatic  RESP: lungs clear to auscultation - no rales, rhonchi or wheezes  CV: regular rhythm, normal rate,  no murmur  EDEMA: no LE edema bilaterally  ABDOMEN: soft, nondistended, nontender  MS: extremities normal - no gross deformities noted  SKIN: no rash  TX KIDNEY: normal  DIALYSIS ACCESS:  LUE AV fistula with good thrill    Data        Latest Ref Rng & Units 7/15/2025    10:36 AM 7/10/2025     9:41 AM 7/7/2025     9:20 AM   Renal   Sodium 135 - 145 mmol/L 136      Na (external) 136 - 145 mmol/L  140  138    K 3.4 - 5.3 mmol/L 5.1      K (external) 3.5 - 5.1 mmol/L  5.1  5.0    Cl 98 - 107 mmol/L 105  105  102    Cl (external) 98 - 107 mmol/L 105  " 105  102    CO2 22 - 29 mmol/L 20      CO2 (external) 22 - 29 mmol/L  18  21    Urea Nitrogen 6.0 - 20.0 mg/dL 29.3      BUN (external) 6.0 - 20.0 mg/dL  17.8  18.4    Creatinine 0.67 - 1.17 mg/dL 2.41      Cr (external) 0.67 - 1.17 mg/dL  2.48  2.43    Glucose 70 - 99 mg/dL 111      Glucose (external) 70 - 110 mg/dL  103  94    Calcium 8.8 - 10.4 mg/dL 8.6      Ca (external) 8.6 - 10.0 mg/dL  8.8  8.5    Magnesium 1.7 - 2.3 mg/dL 1.3            Latest Ref Rng & Units 7/15/2025    10:36 AM 7/3/2025     9:17 AM 6/30/2025     8:53 AM   Bone Health   Phosphorus 2.5 - 4.5 mg/dL 2.3      Phos (external) 2.5 - 4.5 mg/dL  3.3  2.1          Latest Ref Rng & Units 7/15/2025    10:36 AM 7/10/2025     9:41 AM 7/7/2025     9:20 AM   Heme   WBC 4.0 - 11.0 10e3/uL 8.2      WBC (external) 4.8 - 10.8 10(3)/uL  9.8  9.2    Hgb 13.3 - 17.7 g/dL 11.0      Hgb (external) 14.0 - 18.0 g/dL  11.3  11.5    Plt 150 - 450 10e3/uL 159      Plt (external) 150 - 350 10(3)/uL  206  200    ABSOLUTE NEUTROPHILS (EXTERNAL) 1.2 - 8.1 10(3)/uL  8.5  7.8    ABSOLUTE LYMPHOCYTES (EXTERNAL) 0.6 - 4.7 10(3)/uL  0.5  0.6    ABSOLUTE MONOCYTES (EXTERNAL) 0.0 - 1.3 10(3)/uL  0.6  0.6    ABSOLUTE EOSINOPHILS (EXTERNAL) 0.0 - 0.7 10(3)/uL  0.1  0.1    ABSOLUTE BASOPHILS (EXTERNAL) 0.0 - 0.2 10(3)/uL  0.0  0.0          Latest Ref Rng & Units 5/23/2025     5:23 PM 5/8/2025     7:22 PM 4/6/2023    10:47 AM   Liver   AP 40 - 150 U/L 186  98  152    TBili <=1.2 mg/dL 0.6  0.4  0.4    ALT 0 - 70 U/L 45  9  23    AST 0 - 45 U/L 26  16  18    Tot Protein 6.4 - 8.3 g/dL 7.5  7.2  7.8    Albumin 3.5 - 5.2 g/dL 4.5  4.5  4.7          Latest Ref Rng & Units 5/8/2025    11:24 PM 2/16/2011     4:33 PM 10/20/2010     3:03 PM   Pancreas   A1C <5.7 % 4.9      Amylase 30 - 110 U/L  142  127    Lipase 20 - 250 U/L  202  256          Latest Ref Rng & Units 5/15/2025     7:54 AM 12/19/2020     7:26 AM 6/22/2020     8:25 AM   Iron studies   Iron 61 - 157 ug/dL 52  53     Iron  Saturation Index 15 - 46 %  24     Iron Sat Index 15 - 46 % 26      Ferritin 31 - 409 ng/mL 1,032  143     Ferritin (external) 10.0 - 381.0 ng/mL   120.0          Latest Ref Rng & Units 7/10/2025     9:41 AM 6/9/2025     9:03 AM 5/8/2025     7:22 PM   [facility] Virology   BK Quant Log Ext log IU/mL Undetected  Undetected     BK Quant Result Ext Undetected IU/mL Undetected  Undetected     BK Quant Spec Ext  Plasma  Plasma     EBV CAPSID ANTIBODY IGG No detectable antibody.   Positive    HIV 1&2 Ext Nonreactive  Nonreactive       Failed to redirect to the Timeline version of the REVFS SmartLink.  Recent Labs   Lab Test 05/15/25  0754 05/19/25  0755 05/23/25  1723 05/26/25  0538 05/27/25  0555 06/19/25  0916   DOSTAC 5/14/2025 5/18/2025  --   --   --  6/18/2025   TACROL 7.9 14.4   < > 7.8 9.1 10.7    < > = values in this interval not displayed.     Recent Labs   Lab Test 12/13/22  0812 05/15/25  0754 06/12/25  1133 06/19/25  0916   DOSMPA  --  5/14/2025   7:45 PM  --   --    MPACID <0.25* 3.73* 5.90* 4.32*   MPAG <6.5* 97.1* 75.9 113.3*       Again, thank you for allowing me to participate in the care of your patient.        Sincerely,        Jose Culp MD    Electronically signed

## 2025-07-15 NOTE — TELEPHONE ENCOUNTER
Issue: elevated Tac    Outcome: called patient, he is currently waiting for his appointment with Dr. Culp. He had tacrolimus drawn today and is currently taking 2mg twice daily. Will await repeat labs and see if dose needs to be adjusted

## 2025-07-15 NOTE — PROGRESS NOTES
TRANSPLANT NEPHROLOGY CLINIC VISIT     Assessment & Plan   # DDKT: CKD Stage 3b - Stable, elevated creatinine in the ~ 2.1-2.4 range with justin Cr ~ 1.9.  Higher likely due to supra therapeutic tacrolimus level.  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  If creatinine remains significantly above 2 once tacrolimus level is at goal, would consider repeating kidney transplant biopsy.   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   - Primary Nephrologist: Dr. Sourav Ames.    # Immunosuppression: Tacrolimus immediate release (goal 8-10), Mycophenolic acid (dose 540 mg every 12 hours), and Prednisone (dose 5 mg daily)   - Induction with Recent Transplant:  High Intensity Protocol   - Continue with intensive monitoring of immunosuppression for efficacy and toxicity.   - Historical Changes in Immunosuppression: None   - Changes: Not at this time    # Infection Prevention:   Last CD4 Level: Not checked  - PJP: Sulfa/TMP (Bactrim)  - CMV: Valganciclovir (Valcyte)      - CMV IgG Ab High Risk Discordance (D+/R-) at time of transplant: No  Present CMV Serostatus: Negative  - EBV IgG Ab High Risk Discordance (D+/R-) at time of transplant: No  Present EBV Serostatus: Positive    # Hypertension: Controlled;  Goal BP: < 130/80   - Changes: Not at this time    # Anemia in Chronic Renal Disease: Hgb: Stable, low      BASHIR: No   - Iron studies: Replete    # Mineral Bone Disorder:    - Secondary renal hyperparathyroidism; PTH level: Moderately elevated (301-600 pg/ml)        On treatment: Cinacalcet; With lower serum calcium level, will stop cinacalcet.  - Vitamin D; level: Low        On supplement: No;  Not on cholecalciferol due to high serum calcium, but if calcium level remains normal off cinacalcet, may look to start cholecalciferol.   - Calcium; level: Low normal        On supplement: No; Will stop cinacalcet.  - Phosphorus; level: Stable low        On supplement: Yes; Will decrease phosphorus supplement to 250 mg bid.    # Electrolytes:  - Potassium; level: Normal        On supplement: No  - Magnesium; level: Stable low        On supplement: Yes; Will increase magnesium oxide to 800 mg with lunch and continue 400 mg with supper.  - Bicarbonate; level: Stable low        On supplement: Yes; Patient is occasionally missing mid day dose.  Would consider adding 1 tsp baking soda.    # Other Significant PMH:   - GERD: Controlled on PPI.   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 Repeat ECHO showed resolution. CT A/P from 2/2023 mentions pericardial thickening.  - Anxiety and Depression: Patient reports doing okay recently.  Previously required admission many years ago. Now managed on fluoxetine.     # Skin Cancer Risk:    - Discussed sun protection and recommend regular follow up with Dermatology.    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    Return visit: Return for previously scheduled visit.    Jose Culp MD    The longitudinal plan of care for the diagnosis(es)/condition(s) as documented were addressed during this visit. Due to the added complexity in care, I  "will continue to support Jhoan in the subsequent management and with ongoing continuity of care.      Chief Complaint   Mr. Hoffman is a 30 year old here for kidney transplant and immunosuppression management.     History of Present Illness    Mr. Hoffman reports feeling good overall.  Since last clinic visit:   Hospitalizations: No   New Medical Issues: No  Active/Exercise: Yes  Chest pain or shortness of breath: No  Lower extremity swelling: No  Weight change: Yes; Weight is up ~ 10 lbs, which is closer to his previous weight after losing ~ 20 lbs post transplant   Appetite: Good   Nausea and vomiting: No  Diarrhea: Yes; Occasional loose stools.  Heartburn symptoms: No; Controlled on omeprazole.  Fever, sweats or chills: No  Night sweats: Yes; Slight increase following this recent transplant, but had some symptoms previously.  Urinary complaints: No    Home BP: 120/80s    Problem List   Patient Active Problem List   Diagnosis    Anemia in chronic renal disease    Kidney replaced by transplant    HTN, kidney transplant related    Depression    GERD (gastroesophageal reflux disease)    Secondary renal hyperparathyroidism    Vitamin D deficiency    Immunosuppressed status    Aftercare following organ transplant    Hyperkalemia    Status post kidney transplant    Hypomagnesemia    Metabolic acidosis    Need for pneumocystis prophylaxis    Hypophosphatemia    Stage 3b chronic kidney disease (H)       Allergies   Allergies   Allergen Reactions    Amoxicillin Swelling     Facial swelling, \"very massive swelling\"    Blood Transfusion Related (Informational Only) Other (See Comments)     Patient has a history of a clinically significant antibody against RBC antigens.  A delay in compatible RBCs may occur. 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    Penicillins Other (See Comments) and Swelling     Facial swelling, \"very massive swelling\"    Azithromycin      Z pack - can't " take with cyclosporine.    Vancomycin      Vancomycin infusion reaction     Cefprozil Rash       Medications   Current Outpatient Medications   Medication Sig Dispense Refill    acetaminophen (TYLENOL) 325 MG tablet Take 3 tablets (975 mg) by mouth every 8 hours as needed for mild pain. 60 tablet 0    aspirin 81 MG EC tablet Take 81 mg by mouth daily      atorvastatin (LIPITOR) 10 MG tablet Take 1 tablet (10 mg) by mouth daily. 30 tablet 11    FLUoxetine (PROZAC) 40 MG capsule Take 40 mg by mouth at bedtime. 90 capsule 0    magnesium oxide (MAG-OX) 400 MG tablet Take 2 tablets (800 mg) by mouth daily (with lunch) AND 1 tablet (400 mg) daily (before supper). 270 tablet 3    mycophenolate (GENERIC EQUIVALENT) 250 MG capsule Take 3 capsules (750 mg) by mouth 2 times daily. 180 capsule 11    omeprazole (PRILOSEC) 40 MG DR capsule Take 40 mg by mouth daily      phosphorus tablet 250 mg 250 MG per tablet Take 1 tablet (250 mg) by mouth 2 times daily. 60 tablet 1    predniSONE (DELTASONE) 5 MG tablet Take 1 tablet (5 mg) by mouth daily. 30 tablet 11    sodium bicarbonate 650 MG tablet Take 3 tablets (1,950 mg) by mouth 3 times daily. 180 tablet 3    sulfamethoxazole-trimethoprim (BACTRIM) 400-80 MG tablet Take 1 tablet by mouth daily. 30 tablet 11    tacrolimus (GENERIC EQUIVALENT) 1 MG capsule Take 3 capsules (3 mg) by mouth 2 times daily. (Patient taking differently: Take 1 mg by mouth 2 times daily. 2mg in the AM and 2mg in PM) 180 capsule 11    valGANciclovir (VALCYTE) 450 MG tablet Take 1 tablet (450 mg) by mouth daily. 60 tablet 2    ondansetron (ZOFRAN ODT) 4 MG ODT tab Take 1 tablet (4 mg) by mouth every 8 hours as needed for nausea. (Patient not taking: Reported on 7/15/2025) 15 tablet 0    psyllium (METAMUCIL/KONSYL) Packet Take 1 packet by mouth daily as needed for constipation. For management of loose stools. (Patient not taking: Reported on 7/15/2025) 90 packet 2    sodium zirconium cyclosilicate (LOKELMA) 5 g  "PACK packet Take 1 packet (5 g) by mouth daily as needed (Hyperkalemia > 5.5). (Patient not taking: Reported on 7/15/2025) 30 packet 3    tacrolimus (GENERIC EQUIVALENT) 0.5 MG capsule Hold for dose changes, does not need to be filled at this time (Patient not taking: Reported on 7/15/2025) 60 capsule 11     No current facility-administered medications for this visit.     Medications Discontinued During This Encounter   Medication Reason    magnesium oxide (MAG-OX) 400 MG tablet Reorder (No AVS)    hydrOXYzine HCl (ATARAX) 25 MG tablet     cinacalcet (SENSIPAR) 30 MG tablet     phosphorus tablet 250 mg 250 MG per tablet        Physical Exam   Vital Signs: /80 (BP Location: Right arm, Patient Position: Sitting, Cuff Size: Adult Regular)   Pulse 97   Temp 97.9  F (36.6  C) (Oral)   Ht 1.676 m (5' 6\")   Wt 79.4 kg (175 lb 1.6 oz)   SpO2 97%   BMI 28.26 kg/m      GENERAL APPEARANCE: alert and no distress  EYES: eyes grossly normal to inspection  HENT: normal cephalic/atraumatic  RESP: lungs clear to auscultation - no rales, rhonchi or wheezes  CV: regular rhythm, normal rate,  no murmur  EDEMA: no LE edema bilaterally  ABDOMEN: soft, nondistended, nontender  MS: extremities normal - no gross deformities noted  SKIN: no rash  TX KIDNEY: normal  DIALYSIS ACCESS:  LUE AV fistula with good thrill    Data         Latest Ref Rng & Units 7/15/2025    10:36 AM 7/10/2025     9:41 AM 7/7/2025     9:20 AM   Renal   Sodium 135 - 145 mmol/L 136      Na (external) 136 - 145 mmol/L  140  138    K 3.4 - 5.3 mmol/L 5.1      K (external) 3.5 - 5.1 mmol/L  5.1  5.0    Cl 98 - 107 mmol/L 105  105  102    Cl (external) 98 - 107 mmol/L 105  105  102    CO2 22 - 29 mmol/L 20      CO2 (external) 22 - 29 mmol/L  18  21    Urea Nitrogen 6.0 - 20.0 mg/dL 29.3      BUN (external) 6.0 - 20.0 mg/dL  17.8  18.4    Creatinine 0.67 - 1.17 mg/dL 2.41      Cr (external) 0.67 - 1.17 mg/dL  2.48  2.43    Glucose 70 - 99 mg/dL 111      Glucose " (external) 70 - 110 mg/dL  103  94    Calcium 8.8 - 10.4 mg/dL 8.6      Ca (external) 8.6 - 10.0 mg/dL  8.8  8.5    Magnesium 1.7 - 2.3 mg/dL 1.3            Latest Ref Rng & Units 7/15/2025    10:36 AM 7/3/2025     9:17 AM 6/30/2025     8:53 AM   Bone Health   Phosphorus 2.5 - 4.5 mg/dL 2.3      Phos (external) 2.5 - 4.5 mg/dL  3.3  2.1          Latest Ref Rng & Units 7/15/2025    10:36 AM 7/10/2025     9:41 AM 7/7/2025     9:20 AM   Heme   WBC 4.0 - 11.0 10e3/uL 8.2      WBC (external) 4.8 - 10.8 10(3)/uL  9.8  9.2    Hgb 13.3 - 17.7 g/dL 11.0      Hgb (external) 14.0 - 18.0 g/dL  11.3  11.5    Plt 150 - 450 10e3/uL 159      Plt (external) 150 - 350 10(3)/uL  206  200    ABSOLUTE NEUTROPHILS (EXTERNAL) 1.2 - 8.1 10(3)/uL  8.5  7.8    ABSOLUTE LYMPHOCYTES (EXTERNAL) 0.6 - 4.7 10(3)/uL  0.5  0.6    ABSOLUTE MONOCYTES (EXTERNAL) 0.0 - 1.3 10(3)/uL  0.6  0.6    ABSOLUTE EOSINOPHILS (EXTERNAL) 0.0 - 0.7 10(3)/uL  0.1  0.1    ABSOLUTE BASOPHILS (EXTERNAL) 0.0 - 0.2 10(3)/uL  0.0  0.0          Latest Ref Rng & Units 5/23/2025     5:23 PM 5/8/2025     7:22 PM 4/6/2023    10:47 AM   Liver   AP 40 - 150 U/L 186  98  152    TBili <=1.2 mg/dL 0.6  0.4  0.4    ALT 0 - 70 U/L 45  9  23    AST 0 - 45 U/L 26  16  18    Tot Protein 6.4 - 8.3 g/dL 7.5  7.2  7.8    Albumin 3.5 - 5.2 g/dL 4.5  4.5  4.7          Latest Ref Rng & Units 5/8/2025    11:24 PM 2/16/2011     4:33 PM 10/20/2010     3:03 PM   Pancreas   A1C <5.7 % 4.9      Amylase 30 - 110 U/L  142  127    Lipase 20 - 250 U/L  202  256          Latest Ref Rng & Units 5/15/2025     7:54 AM 12/19/2020     7:26 AM 6/22/2020     8:25 AM   Iron studies   Iron 61 - 157 ug/dL 52  53     Iron Saturation Index 15 - 46 %  24     Iron Sat Index 15 - 46 % 26      Ferritin 31 - 409 ng/mL 1,032  143     Ferritin (external) 10.0 - 381.0 ng/mL   120.0          Latest Ref Rng & Units 7/10/2025     9:41 AM 6/9/2025     9:03 AM 5/8/2025     7:22 PM   [facility] Txp Virology   BK Quant Log Ext log IU/mL  Undetected  Undetected     BK Quant Result Ext Undetected IU/mL Undetected  Undetected     BK Quant Spec Ext  Plasma  Plasma     EBV CAPSID ANTIBODY IGG No detectable antibody.   Positive    HIV 1&2 Ext Nonreactive  Nonreactive       Failed to redirect to the Timeline version of the REVFS SmartLink.  Recent Labs   Lab Test 05/15/25  0754 05/19/25  0755 05/23/25  1723 05/26/25  0538 05/27/25  0555 06/19/25  0916   DOSTAC 5/14/2025 5/18/2025  --   --   --  6/18/2025   TACROL 7.9 14.4   < > 7.8 9.1 10.7    < > = values in this interval not displayed.     Recent Labs   Lab Test 12/13/22  0812 05/15/25  0754 06/12/25  1133 06/19/25  0916   DOSMPA  --  5/14/2025   7:45 PM  --   --    MPACID <0.25* 3.73* 5.90* 4.32*   MPAG <6.5* 97.1* 75.9 113.3*

## 2025-07-15 NOTE — PATIENT INSTRUCTIONS
Patient Recommendations:  - Decrease tacrolimus to 2 mg every 12 hours.  - Stop cinacalcet (Sensipar)  - Decrease phosphorus supplement to 250 mg twice daily.  - Increase magnesium supplement to 800 mg with lunch and continue 400 mg with supper.  - Recommend increased dietary fiber, such as psyllium, Metamucil, Benefiber or Citrucel.  - If continuing to forget midday sodium bicarbonate, could also add 1 tsp of baking soda to sodium bicarbonate supplement.    Transplant Patient Information  Your Post Transplant Coordinator is: Lupe Quevedo  For non urgent items, we encourage you to contact your coordinator/care team online via [employer]  You and your care team can also contact your transplant coordinator Monday - Friday, 8am - 5pm at [phone] (Option 2 to reach the coordinator or Option 4 to schedule an appointment).  After hours for urgent matters, please call [facility] at [phone].

## 2025-07-15 NOTE — NURSING NOTE
"Chief Complaint   Patient presents with    RECHECK     Follow up. PT reports no new or worsening symptoms.        Vitals:    07/15/25 1111   BP: 119/80   BP Location: Right arm   Patient Position: Sitting   Cuff Size: Adult Regular   Pulse: 97   Temp: 97.9  F (36.6  C)   TempSrc: Oral   SpO2: 97%   Weight: 79.4 kg (175 lb 1.6 oz)   Height: 1.676 m (5' 6\")       BP Readings from Last 3 Encounters:   07/15/25 119/80   07/11/25 114/76   06/19/25 118/69       /80 (BP Location: Right arm, Patient Position: Sitting, Cuff Size: Adult Regular)   Pulse 97   Temp 97.9  F (36.6  C) (Oral)   Ht 1.676 m (5' 6\")   Wt 79.4 kg (175 lb 1.6 oz)   SpO2 97%   BMI 28.26 kg/m       Sweat Daniels    "

## 2025-07-16 LAB
BK VIRUS SPECIMEN TYPE: NORMAL
BKV DNA # SPEC NAA+PROBE: NOT DETECTED IU/ML

## 2025-07-17 LAB
DONOR IDENTIFICATION: NORMAL
DSA COMMENTS: NORMAL
DSA PRESENT: NO
DSA TEST METHOD: NORMAL
INT SUB COMMENTS: NORMAL
INT SUB RESULT: NORMAL
INTERF SUBSTANCE: NORMAL
INTSUB TEST METHOD: NORMAL
ORGAN: NORMAL
SA 1  COMMENTS: NORMAL
SA 1 CELL: NORMAL
SA 1 TEST METHOD: NORMAL
SA 2 CELL: NORMAL
SA 2 COMMENTS: NORMAL
SA 2 TEST METHOD: NORMAL
SA1 HI RISK ABY: NORMAL
SA1 MOD RISK ABY: NORMAL
SA2 HI RISK ABY: NORMAL
SA2 MOD RISK ABY: NORMAL
UNACCEPTABLE ANTIGENS: NORMAL
UNOS CPRA: 100

## 2025-07-22 ENCOUNTER — EXTERNAL ORDER RESULTS (OUTPATIENT)
Dept: LAB | Facility: CLINIC | Age: 30
End: 2025-07-22
Payer: MEDICARE

## 2025-07-23 LAB
% BASOPHILS (EXTERNAL): 0.3 % (ref 0–3)
% EOSINOPHILS (EXTERNAL): 0.5 % (ref 0–7)
% IMMATURE GRANULOCYTES (EXTERNAL): 2.6 % (ref 0–5)
% LYMPHOCYTES (EXTERNAL): 3.9 % (ref 20–45)
% MONOCYTES (EXTERNAL): 6.9 % (ref 1–10)
% NEUTROPHILS (EXTERNAL): 85.8 % (ref 40–75)
ABSOLUTE BASOPHILS (EXTERNAL): 0 10(3)/UL (ref 0–0.2)
ABSOLUTE EOSINOPHILS (EXTERNAL): 0.1 10(3)/UL (ref 0–0.7)
ABSOLUTE IMMATURE GRANULOCYTES (EXTERNAL): 0.26 10(3)/UL (ref 0–0.54)
ABSOLUTE LYMPHOCYTES (EXTERNAL): 0.4 10(3)/UL (ref 0.6–4.7)
ABSOLUTE MONOCYTES (EXTERNAL): 0.7 10(3)/UL (ref 0–1.3)
ABSOLUTE NEUTROPHILS (EXTERNAL): 8.5 10(3)/UL (ref 1.2–8.1)

## 2025-07-28 ENCOUNTER — TELEPHONE (OUTPATIENT)
Dept: TRANSPLANT | Facility: CLINIC | Age: 30
End: 2025-07-28
Payer: MEDICARE

## 2025-07-28 NOTE — TELEPHONE ENCOUNTER
Issue: low tacrolimus level    Outcome: patient self corrected Tacrolimus to from 2mg BID to 2.5mg BID over the weekend. Will reassess labs on this dose. It appears that patient has large swings with small dose increases. Denies use of CBD/THC products or different medications

## 2025-07-29 ENCOUNTER — EXTERNAL ORDER RESULTS (OUTPATIENT)
Dept: LAB | Facility: CLINIC | Age: 30
End: 2025-07-29
Payer: MEDICARE

## 2025-07-29 NOTE — PATIENT INSTRUCTIONS
"AFTER YOUR CYSTOSCOPY        You have just completed a cystoscopy, or \"cysto\", which allowed your physician to learn more about your bladder (or to remove a stent placed after surgery). We suggest that you continue to avoid caffeine, fruit juice, and alcohol for the next 24 hours, however, you are encouraged to return to your normal activities.         A few things that are considered normal after your cystoscopy:     * Small amount of bleeding (or spotting) that clears within the next 24 hours     * Slight burning sensation with urination     * Sensation to of needing to avoid more frequently     * The feeling of \"air\" in your urine     * Mild discomfort that is relieved with Tylenol        Please contact our office promptly if you:     * Develop a fever above 101 degrees     * Are unable to urinate     * Develop bright red blood that does not stop     * Severe pain or swelling         Please contact our office with any concerns or questions @[email or web address].  " Detail Level: Zone Additional Notes: Importance of timely wash off of cantharidin in 2 hours discussed with patient and his mother.  Patient's mom set a cell phone alarm.  Wash off sooner if pain/irritation. Told patient's mother that she needed to be with her son in 2 hours for the wash off.  She cannot rely on the school nurse.  Patient's mother expresses understanding.  \\nright radial dorsal hand vv is much smaller.\\n\\nOne of the vv on the left hand was not treated b/c of donut effect last time.  \\nDoes not appear to be consistent with sal acid because lesions are not white discolored as usually seen with sal acid application.  Encouraged them to be more consistent with sal acid (starting after the blistering from cantharidin resolves).

## 2025-07-30 LAB
% BASOPHILS (EXTERNAL): 0.5 % (ref 0–3)
% EOSINOPHILS (EXTERNAL): 2.1 % (ref 0–7)
% IMMATURE GRANULOCYTES (EXTERNAL): 5.7 % (ref 0–5)
% LYMPHOCYTES (EXTERNAL): 8.6 % (ref 20–45)
% MONOCYTES (EXTERNAL): 7.8 % (ref 1–10)
% NEUTROPHILS (EXTERNAL): 75.3 % (ref 40–75)
ABSOLUTE BASOPHILS (EXTERNAL): 0 10(3)/UL (ref 0–0.2)
ABSOLUTE EOSINOPHILS (EXTERNAL): 0.1 10(3)/UL (ref 0–0.7)
ABSOLUTE IMMATURE GRANULOCYTES (EXTERNAL): 0.33 10(3)/UL (ref 0–0.54)
ABSOLUTE LYMPHOCYTES (EXTERNAL): 0.5 10(3)/UL (ref 0.6–4.7)
ABSOLUTE MONOCYTES (EXTERNAL): 0.5 10(3)/UL (ref 0–1.3)
ABSOLUTE NEUTROPHILS (EXTERNAL): 4.4 10(3)/UL (ref 1.2–8.1)

## 2025-08-06 ENCOUNTER — RESULTS FOLLOW-UP (OUTPATIENT)
Dept: TRANSPLANT | Facility: CLINIC | Age: 30
End: 2025-08-06
Payer: MEDICARE

## 2025-08-07 ENCOUNTER — TELEPHONE (OUTPATIENT)
Dept: TRANSPLANT | Facility: CLINIC | Age: 30
End: 2025-08-07
Payer: MEDICARE

## 2025-08-07 DIAGNOSIS — E83.52 HYPERCALCEMIA: Primary | ICD-10-CM

## 2025-08-07 RX ORDER — CINACALCET 30 MG/1
30 TABLET, FILM COATED ORAL EVERY OTHER DAY
Qty: 15 TABLET | Refills: 11 | Status: SHIPPED | OUTPATIENT
Start: 2025-08-07

## 2025-08-12 ENCOUNTER — EXTERNAL ORDER RESULTS (OUTPATIENT)
Dept: LAB | Facility: CLINIC | Age: 30
End: 2025-08-12
Payer: MEDICARE

## 2025-08-13 LAB
% BASOPHILS (EXTERNAL): 0.4 % (ref 0–3)
% EOSINOPHILS (EXTERNAL): 1.5 % (ref 0–7)
% IMMATURE GRANULOCYTES (EXTERNAL): 0.7 % (ref 0–5)
% LYMPHOCYTES (EXTERNAL): 9 % (ref 20–45)
% MONOCYTES (EXTERNAL): 9.5 % (ref 1–10)
% NEUTROPHILS (EXTERNAL): 78.9 % (ref 40–75)
ABSOLUTE BASOPHILS (EXTERNAL): 0 10(3)/UL (ref 0–0.2)
ABSOLUTE EOSINOPHILS (EXTERNAL): 0.1 10(3)/UL (ref 0–0.7)
ABSOLUTE IMMATURE GRANULOCYTES (EXTERNAL): 0.04 10(3)/UL (ref 0–0.54)
ABSOLUTE LYMPHOCYTES (EXTERNAL): 0.5 10(3)/UL (ref 0.6–4.7)
ABSOLUTE MONOCYTES (EXTERNAL): 0.5 10(3)/UL (ref 0–1.3)
ABSOLUTE NEUTROPHILS (EXTERNAL): 4.2 10(3)/UL (ref 1.2–8.1)

## 2025-08-26 ENCOUNTER — LAB (OUTPATIENT)
Dept: LAB | Facility: CLINIC | Age: 30
End: 2025-08-26
Payer: MEDICARE

## 2025-08-26 ENCOUNTER — EXTERNAL ORDER RESULTS (OUTPATIENT)
Dept: LAB | Facility: CLINIC | Age: 30
End: 2025-08-26
Payer: MEDICARE

## 2025-08-26 DIAGNOSIS — Z94.0 KIDNEY REPLACED BY TRANSPLANT: ICD-10-CM

## 2025-08-26 DIAGNOSIS — Z79.899 ENCOUNTER FOR LONG-TERM CURRENT USE OF MEDICATION: ICD-10-CM

## 2025-08-26 DIAGNOSIS — Z20.828 CONTACT WITH AND (SUSPECTED) EXPOSURE TO OTHER VIRAL COMMUNICABLE DISEASES: ICD-10-CM

## 2025-08-26 DIAGNOSIS — Z48.298 AFTERCARE FOLLOWING ORGAN TRANSPLANT: ICD-10-CM

## 2025-08-26 DIAGNOSIS — Z98.890 OTHER SPECIFIED POSTPROCEDURAL STATES: ICD-10-CM

## 2025-08-27 LAB
% BASOPHILS (EXTERNAL): 0.6 % (ref 0–3)
% EOSINOPHILS (EXTERNAL): 2.3 % (ref 0–7)
% IMMATURE GRANULOCYTES (EXTERNAL): 0.8 % (ref 0–5)
% LYMPHOCYTES (EXTERNAL): 13.5 % (ref 20–45)
% MONOCYTES (EXTERNAL): 7.5 % (ref 1–10)
% NEUTROPHILS (EXTERNAL): 75.3 % (ref 40–75)
ABSOLUTE BASOPHILS (EXTERNAL): 0 10(3)/UL (ref 0–0.2)
ABSOLUTE EOSINOPHILS (EXTERNAL): 0.1 10(3)/UL (ref 0–0.7)
ABSOLUTE IMMATURE GRANULOCYTES (EXTERNAL): 0.04 10(3)/UL (ref 0–0.54)
ABSOLUTE LYMPHOCYTES (EXTERNAL): 0.7 10(3)/UL (ref 0.6–4.7)
ABSOLUTE MONOCYTES (EXTERNAL): 0.4 10(3)/UL (ref 0–1.3)
ABSOLUTE NEUTROPHILS (EXTERNAL): 3.6 10(3)/UL (ref 1.2–8.1)

## 2025-08-28 DIAGNOSIS — E83.52 HYPERCALCEMIA: ICD-10-CM

## 2025-08-28 RX ORDER — CINACALCET 30 MG/1
30 TABLET, FILM COATED ORAL DAILY
Qty: 30 TABLET | Refills: 11 | OUTPATIENT
Start: 2025-08-28

## 2025-09-02 ENCOUNTER — EXTERNAL ORDER RESULTS (OUTPATIENT)
Dept: LAB | Facility: CLINIC | Age: 30
End: 2025-09-02
Payer: MEDICARE

## 2025-09-03 LAB
% BASOPHILS (EXTERNAL): 0.7 % (ref 0–3)
% EOSINOPHILS (EXTERNAL): 2.3 % (ref 0–7)
% IMMATURE GRANULOCYTES (EXTERNAL): 0.9 % (ref 0–5)
% LYMPHOCYTES (EXTERNAL): 11.9 % (ref 20–45)
% MONOCYTES (EXTERNAL): 10 % (ref 1–10)
% NEUTROPHILS (EXTERNAL): 74.2 % (ref 40–75)
ABSOLUTE BASOPHILS (EXTERNAL): 0 10(3)/UL (ref 0–0.2)
ABSOLUTE EOSINOPHILS (EXTERNAL): 0.1 10(3)/UL (ref 0–0.7)
ABSOLUTE IMMATURE GRANULOCYTES (EXTERNAL): 0.04 10(3)/UL (ref 0–0.54)
ABSOLUTE LYMPHOCYTES (EXTERNAL): 0.5 10(3)/UL (ref 0.6–4.7)
ABSOLUTE MONOCYTES (EXTERNAL): 0.4 10(3)/UL (ref 0–1.3)
ABSOLUTE NEUTROPHILS (EXTERNAL): 3.2 10(3)/UL (ref 1.2–8.1)

## (undated) DEVICE — LINEN TOWEL PACK X6 WHITE 5487

## (undated) DEVICE — INSERT FOGARTY 33MM TRACTION HYDRAJAW HYDRA33

## (undated) DEVICE — DRSG MEDIPORE 3 1/2X13 3/4" 3573

## (undated) DEVICE — SU PROLENE 4-0 SHDA 36" 8521H

## (undated) DEVICE — Device

## (undated) DEVICE — SU SILK 2-0 TIE 12X30" A305H

## (undated) DEVICE — GEL ULTRASOUND AQUASONIC 20GM 01-01

## (undated) DEVICE — SU PROLENE 5-0 RB-2DA 18" 8713H

## (undated) DEVICE — SU SILK 0 TIE 6X30" A306H

## (undated) DEVICE — SU VICRYL 3-0 SH 27" UND J416H

## (undated) DEVICE — VESSEL LOOPS DEV-O-LOOP WHITE MINI 31145728

## (undated) DEVICE — DRAPE STOCKINETTE 4" 8544

## (undated) DEVICE — SU PDS II 4-0 SH 27" Z315H

## (undated) DEVICE — RX SURGIFLO HEMOSTATIC MATRIX W/THROMBIN 8ML 2994

## (undated) DEVICE — EYE SPONGE SPEAR WECK CEL 0008685

## (undated) DEVICE — SU PROLENE 7-0 BV175-6 24" 8735H

## (undated) DEVICE — BASIN SET SINGLE STERILE 13752-624

## (undated) DEVICE — PLUG CATH AND DRAIN TUBE PROTECTOR DYND12200

## (undated) DEVICE — SU VICRYL+ 3-0 27IN SH UND VCP416H

## (undated) DEVICE — LINEN TOWEL PACK X30 5481

## (undated) DEVICE — CATH FOLEY 3WAY 16FR 30ML LATEX 0167SI16

## (undated) DEVICE — SOL WATER IRRIG 1000ML BOTTLE 2F7114

## (undated) DEVICE — DRSG PRIMAPORE 02X3" 7133

## (undated) DEVICE — ESU GROUND PAD ADULT W/CORD E7507

## (undated) DEVICE — TUBING IRRIG CYSTO/BLADDER SET 81" LF 2C4040

## (undated) DEVICE — INTRO SHEATH 8FRX10CM PINNACLE RSS802

## (undated) DEVICE — SU SILK 1 TIE 6X30" A307H

## (undated) DEVICE — DRAPE EXTREMITY UPPER 120X76" 29414

## (undated) DEVICE — BLADE CLIPPER DISP 4406

## (undated) DEVICE — BLADE KNIFE SURG 11 WITH HANDLE 4-411

## (undated) DEVICE — BNDG ELASTIC 4"X5YDS STERILE 6611-4S

## (undated) DEVICE — PREP CHLORAPREP 26ML TINTED ORANGE  260815

## (undated) DEVICE — ADH SKIN CLOSURE PREMIERPRO EXOFIN 1.0ML 3470

## (undated) DEVICE — SU PROLENE 7-0 BV175-6 4-30" EPM8731

## (undated) DEVICE — PREP CHLORAPREP W/ORANGE TINT 10.5ML 260715

## (undated) DEVICE — DRAIN JACKSON PRATT ROUND SIL 19FR W/TROCAR LF JP-2232

## (undated) DEVICE — NDL COUNTER 20CT 31142493

## (undated) DEVICE — FILTER HEPA FLUID TRAP NEPTUNE 0703-040-001

## (undated) DEVICE — DRAIN RESERVOIR 100ML JP 0070740

## (undated) DEVICE — SU SILK 3-0 SH CR 8X18" C013D

## (undated) DEVICE — SU SILK 4-0 TIE 12X30" A303H

## (undated) DEVICE — SU SILK 3-0 TIE 12X30" A304H

## (undated) DEVICE — DEVICE CATH STABILIZATION STATLOCK FOLEY 3-WAY FOL0105

## (undated) DEVICE — SU SILK 5-0 TIE 12X30" A302H

## (undated) DEVICE — DRAIN CLOSED FLUID SYSTEM ABSCESS EVAC SET 90500040

## (undated) DEVICE — DRSG KERLIX 4 1/2"X4YDS ROLL 6730

## (undated) DEVICE — CLIP HORIZON SM RED WIDE SLOT 001201

## (undated) DEVICE — STPL RELOAD LINEAR 30X2.5MM VASC XR30V

## (undated) DEVICE — ESU ELEC BLADE 6" COATED E1450-6

## (undated) DEVICE — PAD CHUX UNDERPAD 23X24" 7136

## (undated) DEVICE — BLADE CLIPPER SGL USE 9680

## (undated) DEVICE — SU PDS II 0 TP-1 60" Z991G

## (undated) DEVICE — DRAPE IOBAN INCISE 23X17" 6650EZ

## (undated) DEVICE — SUTURE BOOTS 051003PBX

## (undated) DEVICE — SOL NACL 0.9% IRRIG 1000ML BOTTLE 2F7124

## (undated) DEVICE — CLIP APPLIER 13" LG LIGACLIP MCL20

## (undated) DEVICE — SU PDS II 5-0 RB-1 27" Z303H

## (undated) DEVICE — INTRO SHEATH MICRO PLATINUM TIP 4FRX40CM 7274

## (undated) DEVICE — COVER ULTRASOUND PROBE W/GEL FLEXI-FEEL 6"X58" LF  25-FF658

## (undated) DEVICE — PACK AV FISTULA

## (undated) DEVICE — GLOVE PROTEXIS MICRO 7.5  2D73PM75

## (undated) DEVICE — DRAPE SLEEVE 599

## (undated) DEVICE — PREP CHLORAPREP 26ML TINTED HI-LITE ORANGE 930815

## (undated) DEVICE — SU MONOCRYL 4-0 PS-2 27" UND Y426H

## (undated) DEVICE — SUCTION MANIFOLD NEPTUNE 2 SYS 4 PORT 0702-020-000

## (undated) DEVICE — SU ETHILON 3-0 PS-1 18" 1663H

## (undated) DEVICE — PACK HEART RIGHT CUSTOM SAN32RHF18

## (undated) DEVICE — SU PROLENE 5-0 RB-1DA 36"  8556H

## (undated) DEVICE — LINEN GOWN XLG 5407

## (undated) DEVICE — ESU LIGASURE IMPACT OPEN SEALER/DVDR CVD LG JAW LF4418

## (undated) DEVICE — SU PROLENE 6-0 RB-2DA 30" 8711H

## (undated) DEVICE — ESU PENCIL SMOKE EVAC W/ROCKER SWITCH 0703-047-000

## (undated) DEVICE — CLIP APPLIER 11" MED LIGACLIP MCM30

## (undated) DEVICE — SET PERICARDIOCENTESIS 8.3FRX40CM

## (undated) DEVICE — STPL LINEAR 30X2.5MM VASC TX30V

## (undated) DEVICE — SYR 10ML LL W/O NDL 302995

## (undated) DEVICE — SURGICEL HEMOSTAT 4X8" 1952

## (undated) DEVICE — DRSG KERLIX 4 1/2"X4YDS ROLL 6715

## (undated) DEVICE — SURGICEL ABSORBABLE HEMOSTAT SNOW 4"X4" 2083

## (undated) DEVICE — WIRE GUIDE 0.035"X145CM AMPLATZ XSTIFF J THSCF-35-145-3-AES

## (undated) DEVICE — WIPE PREMOIST CLEANSING WASHCLOTHS 7988

## (undated) DEVICE — SU PDS II 5-0 RB-1 DA 30" Z320H

## (undated) DEVICE — WIRE GUIDE 0.035"X150CM EMERALD J TIP 502521

## (undated) DEVICE — SU PDS II 6-0 RB-2DA 30" Z149H

## (undated) DEVICE — SU VICRYL 3-0 SH 27" J316H

## (undated) DEVICE — DRAPE NEW SLUSH/WARMER HUSHSLUSH 2.0 ESD340 ESD340

## (undated) DEVICE — SOL NACL 0.9% INJ 1000ML BAG 2B1324X

## (undated) DEVICE — BNDG ABDOMINAL BINDER 9X45-62" 79-89071

## (undated) DEVICE — SYR 01ML 27GA 0.5" NDL TBC 309623

## (undated) DEVICE — SU SILK 2-0 SH 30" K833H

## (undated) DEVICE — CLIP APPLIER 09 3/8" SM LIGACLIP MCS20

## (undated) RX ORDER — FENTANYL CITRATE 50 UG/ML
INJECTION, SOLUTION INTRAMUSCULAR; INTRAVENOUS
Status: DISPENSED
Start: 2021-02-16

## (undated) RX ORDER — HYDRALAZINE HYDROCHLORIDE 20 MG/ML
INJECTION INTRAMUSCULAR; INTRAVENOUS
Status: DISPENSED
Start: 2020-12-17

## (undated) RX ORDER — FENTANYL CITRATE 50 UG/ML
INJECTION, SOLUTION INTRAMUSCULAR; INTRAVENOUS
Status: DISPENSED
Start: 2025-05-25

## (undated) RX ORDER — ONDANSETRON 2 MG/ML
INJECTION INTRAMUSCULAR; INTRAVENOUS
Status: DISPENSED
Start: 2020-12-17

## (undated) RX ORDER — PAPAVERINE HYDROCHLORIDE 30 MG/ML
INJECTION INTRAMUSCULAR; INTRAVENOUS
Status: DISPENSED
Start: 2020-12-17

## (undated) RX ORDER — METOPROLOL TARTRATE 100 MG
TABLET ORAL
Status: DISPENSED
Start: 2024-07-16

## (undated) RX ORDER — EPHEDRINE SULFATE 50 MG/ML
INJECTION, SOLUTION INTRAMUSCULAR; INTRAVENOUS; SUBCUTANEOUS
Status: DISPENSED
Start: 2020-12-17

## (undated) RX ORDER — HYDROMORPHONE HCL IN WATER/PF 6 MG/30 ML
PATIENT CONTROLLED ANALGESIA SYRINGE INTRAVENOUS
Status: DISPENSED
Start: 2025-05-10

## (undated) RX ORDER — FENTANYL CITRATE 50 UG/ML
INJECTION, SOLUTION INTRAMUSCULAR; INTRAVENOUS
Status: DISPENSED
Start: 2020-12-17

## (undated) RX ORDER — EPHEDRINE SULFATE 50 MG/ML
INJECTION, SOLUTION INTRAMUSCULAR; INTRAVENOUS; SUBCUTANEOUS
Status: DISPENSED
Start: 2021-02-16

## (undated) RX ORDER — SULFAMETHOXAZOLE AND TRIMETHOPRIM 800; 160 MG/1; MG/1
TABLET ORAL
Status: DISPENSED
Start: 2025-07-11

## (undated) RX ORDER — BUPIVACAINE HYDROCHLORIDE 2.5 MG/ML
INJECTION, SOLUTION EPIDURAL; INFILTRATION; INTRACAUDAL
Status: DISPENSED
Start: 2020-12-17

## (undated) RX ORDER — HYDROMORPHONE HYDROCHLORIDE 1 MG/ML
INJECTION, SOLUTION INTRAMUSCULAR; INTRAVENOUS; SUBCUTANEOUS
Status: DISPENSED
Start: 2025-05-10

## (undated) RX ORDER — FENTANYL CITRATE 50 UG/ML
INJECTION, SOLUTION INTRAMUSCULAR; INTRAVENOUS
Status: DISPENSED
Start: 2025-05-10

## (undated) RX ORDER — BUPIVACAINE HYDROCHLORIDE 5 MG/ML
INJECTION, SOLUTION EPIDURAL; INTRACAUDAL
Status: DISPENSED
Start: 2020-12-17

## (undated) RX ORDER — PROPOFOL 10 MG/ML
INJECTION, EMULSION INTRAVENOUS
Status: DISPENSED
Start: 2021-02-16

## (undated) RX ORDER — DEXAMETHASONE SODIUM PHOSPHATE 4 MG/ML
INJECTION, SOLUTION INTRA-ARTICULAR; INTRALESIONAL; INTRAMUSCULAR; INTRAVENOUS; SOFT TISSUE
Status: DISPENSED
Start: 2020-12-17

## (undated) RX ORDER — METOPROLOL TARTRATE 1 MG/ML
INJECTION, SOLUTION INTRAVENOUS
Status: DISPENSED
Start: 2024-07-16

## (undated) RX ORDER — ACETAMINOPHEN 325 MG/1
TABLET ORAL
Status: DISPENSED
Start: 2025-05-10

## (undated) RX ORDER — LIDOCAINE HYDROCHLORIDE 10 MG/ML
INJECTION, SOLUTION EPIDURAL; INFILTRATION; INTRACAUDAL; PERINEURAL
Status: DISPENSED
Start: 2025-05-25

## (undated) RX ORDER — LIDOCAINE HYDROCHLORIDE 10 MG/ML
INJECTION, SOLUTION EPIDURAL; INFILTRATION; INTRACAUDAL; PERINEURAL
Status: DISPENSED
Start: 2020-12-17

## (undated) RX ORDER — FENTANYL CITRATE 50 UG/ML
INJECTION, SOLUTION INTRAMUSCULAR; INTRAVENOUS
Status: DISPENSED
Start: 2022-12-12

## (undated) RX ORDER — DEXTROSE, SODIUM CHLORIDE, SODIUM LACTATE, POTASSIUM CHLORIDE, AND CALCIUM CHLORIDE 5; .6; .31; .03; .02 G/100ML; G/100ML; G/100ML; G/100ML; G/100ML
INJECTION, SOLUTION INTRAVENOUS
Status: DISPENSED
Start: 2025-05-10

## (undated) RX ORDER — HYDROMORPHONE HYDROCHLORIDE 1 MG/ML
INJECTION, SOLUTION INTRAMUSCULAR; INTRAVENOUS; SUBCUTANEOUS
Status: DISPENSED
Start: 2020-12-17

## (undated) RX ORDER — LIDOCAINE HYDROCHLORIDE 20 MG/ML
INJECTION, SOLUTION EPIDURAL; INFILTRATION; INTRACAUDAL; PERINEURAL
Status: DISPENSED
Start: 2021-02-16

## (undated) RX ORDER — IVABRADINE 5 MG/1
TABLET, FILM COATED ORAL
Status: DISPENSED
Start: 2024-07-16

## (undated) RX ORDER — CLINDAMYCIN PHOSPHATE 900 MG/50ML
INJECTION, SOLUTION INTRAVENOUS
Status: DISPENSED
Start: 2020-12-17

## (undated) RX ORDER — FENTANYL CITRATE-0.9 % NACL/PF 10 MCG/ML
PLASTIC BAG, INJECTION (ML) INTRAVENOUS
Status: DISPENSED
Start: 2020-12-17

## (undated) RX ORDER — GLYCOPYRROLATE 0.2 MG/ML
INJECTION, SOLUTION INTRAMUSCULAR; INTRAVENOUS
Status: DISPENSED
Start: 2020-12-17

## (undated) RX ORDER — HEPARIN SODIUM 1000 [USP'U]/ML
INJECTION, SOLUTION INTRAVENOUS; SUBCUTANEOUS
Status: DISPENSED
Start: 2020-12-17

## (undated) RX ORDER — CLINDAMYCIN PHOSPHATE 900 MG/50ML
INJECTION, SOLUTION INTRAVENOUS
Status: DISPENSED
Start: 2021-02-16

## (undated) RX ORDER — HEPARIN SODIUM 1000 [USP'U]/ML
INJECTION, SOLUTION INTRAVENOUS; SUBCUTANEOUS
Status: DISPENSED
Start: 2021-02-16

## (undated) RX ORDER — PAPAVERINE HYDROCHLORIDE 30 MG/ML
INJECTION INTRAMUSCULAR; INTRAVENOUS
Status: DISPENSED
Start: 2025-05-10

## (undated) RX ORDER — SODIUM CHLORIDE 9 MG/ML
INJECTION, SOLUTION INTRAVENOUS
Status: DISPENSED
Start: 2025-05-10

## (undated) RX ORDER — PROPOFOL 10 MG/ML
INJECTION, EMULSION INTRAVENOUS
Status: DISPENSED
Start: 2020-12-17

## (undated) RX ORDER — LIDOCAINE HYDROCHLORIDE AND EPINEPHRINE 5; 5 MG/ML; UG/ML
INJECTION, SOLUTION INFILTRATION; PERINEURAL
Status: DISPENSED
Start: 2020-12-17

## (undated) RX ORDER — NITROGLYCERIN 0.4 MG/1
TABLET SUBLINGUAL
Status: DISPENSED
Start: 2024-07-16

## (undated) RX ORDER — OXYCODONE HYDROCHLORIDE 5 MG/1
TABLET ORAL
Status: DISPENSED
Start: 2020-12-17

## (undated) RX ORDER — FENTANYL CITRATE-0.9 % NACL/PF 10 MCG/ML
PLASTIC BAG, INJECTION (ML) INTRAVENOUS
Status: DISPENSED
Start: 2021-02-16

## (undated) RX ORDER — HEPARIN SODIUM 1000 [USP'U]/ML
INJECTION, SOLUTION INTRAVENOUS; SUBCUTANEOUS
Status: DISPENSED
Start: 2025-05-10

## (undated) RX ORDER — LIDOCAINE HYDROCHLORIDE 20 MG/ML
JELLY TOPICAL
Status: DISPENSED
Start: 2025-07-11

## (undated) RX ORDER — ONDANSETRON 2 MG/ML
INJECTION INTRAMUSCULAR; INTRAVENOUS
Status: DISPENSED
Start: 2021-02-16

## (undated) RX ORDER — VERAPAMIL HYDROCHLORIDE 2.5 MG/ML
INJECTION INTRAVENOUS
Status: DISPENSED
Start: 2025-05-10

## (undated) RX ORDER — LABETALOL HYDROCHLORIDE 5 MG/ML
INJECTION, SOLUTION INTRAVENOUS
Status: DISPENSED
Start: 2020-12-17

## (undated) RX ORDER — LIDOCAINE HYDROCHLORIDE AND EPINEPHRINE 10; 10 MG/ML; UG/ML
INJECTION, SOLUTION INFILTRATION; PERINEURAL
Status: DISPENSED
Start: 2020-12-17

## (undated) RX ORDER — PAPAVERINE HYDROCHLORIDE 30 MG/ML
INJECTION INTRAMUSCULAR; INTRAVENOUS
Status: DISPENSED
Start: 2021-02-16

## (undated) RX ORDER — ACETAMINOPHEN 325 MG/1
TABLET ORAL
Status: DISPENSED
Start: 2020-12-17

## (undated) RX ORDER — HYDROMORPHONE HYDROCHLORIDE 2 MG/1
TABLET ORAL
Status: DISPENSED
Start: 2021-02-16

## (undated) RX ORDER — LIDOCAINE HYDROCHLORIDE 20 MG/ML
INJECTION, SOLUTION EPIDURAL; INFILTRATION; INTRACAUDAL; PERINEURAL
Status: DISPENSED
Start: 2020-12-17

## (undated) RX ORDER — SODIUM CHLORIDE 450 MG/100ML
INJECTION, SOLUTION INTRAVENOUS
Status: DISPENSED
Start: 2025-05-10

## (undated) RX ORDER — ONDANSETRON 2 MG/ML
INJECTION INTRAMUSCULAR; INTRAVENOUS
Status: DISPENSED
Start: 2025-05-10